# Patient Record
Sex: MALE | Race: BLACK OR AFRICAN AMERICAN | NOT HISPANIC OR LATINO | Employment: OTHER | URBAN - METROPOLITAN AREA
[De-identification: names, ages, dates, MRNs, and addresses within clinical notes are randomized per-mention and may not be internally consistent; named-entity substitution may affect disease eponyms.]

---

## 2017-03-30 ENCOUNTER — HOSPITAL ENCOUNTER (EMERGENCY)
Facility: HOSPITAL | Age: 67
Discharge: HOME | End: 2017-03-30
Attending: EMERGENCY MEDICINE
Payer: MEDICARE

## 2017-03-30 VITALS
OXYGEN SATURATION: 97 % | RESPIRATION RATE: 15 BRPM | DIASTOLIC BLOOD PRESSURE: 80 MMHG | TEMPERATURE: 98 F | HEIGHT: 66 IN | HEART RATE: 89 BPM | WEIGHT: 162 LBS | BODY MASS INDEX: 26.03 KG/M2 | SYSTOLIC BLOOD PRESSURE: 172 MMHG

## 2017-03-30 DIAGNOSIS — R07.89 LEFT-SIDED CHEST WALL PAIN: ICD-10-CM

## 2017-03-30 DIAGNOSIS — S22.42XA CLOSED FRACTURE OF MULTIPLE RIBS OF LEFT SIDE, INITIAL ENCOUNTER: Primary | ICD-10-CM

## 2017-03-30 PROCEDURE — 94799 UNLISTED PULMONARY SVC/PX: CPT

## 2017-03-30 PROCEDURE — 25000003 PHARM REV CODE 250: Performed by: PHYSICIAN ASSISTANT

## 2017-03-30 PROCEDURE — 99900035 HC TECH TIME PER 15 MIN (STAT)

## 2017-03-30 PROCEDURE — 99284 EMERGENCY DEPT VISIT MOD MDM: CPT

## 2017-03-30 RX ORDER — WARFARIN 7.5 MG/1
7.5 TABLET ORAL DAILY
Status: ON HOLD | COMMUNITY
End: 2022-02-10 | Stop reason: HOSPADM

## 2017-03-30 RX ORDER — HYDROCODONE BITARTRATE AND ACETAMINOPHEN 5; 325 MG/1; MG/1
1 TABLET ORAL
Status: COMPLETED | OUTPATIENT
Start: 2017-03-30 | End: 2017-03-30

## 2017-03-30 RX ORDER — HYDROCODONE BITARTRATE AND ACETAMINOPHEN 5; 325 MG/1; MG/1
1 TABLET ORAL EVERY 6 HOURS PRN
Qty: 12 TABLET | Refills: 0 | Status: SHIPPED | OUTPATIENT
Start: 2017-03-30 | End: 2019-09-25

## 2017-03-30 RX ORDER — METFORMIN HYDROCHLORIDE 500 MG/1
1000 TABLET ORAL 2 TIMES DAILY WITH MEALS
Status: ON HOLD | COMMUNITY
End: 2022-04-07 | Stop reason: HOSPADM

## 2017-03-30 RX ADMIN — HYDROCODONE BITARTRATE AND ACETAMINOPHEN 1 TABLET: 5; 325 TABLET ORAL at 04:03

## 2017-03-30 NOTE — ED NOTES
"Spoke to pt's son, Forest Jones to let him know pt does not have ride home. Son states tell him to call me at 5:45, I will be outside".   "

## 2017-03-30 NOTE — DISCHARGE INSTRUCTIONS
Please take pain medicine as directed and be careful as this medication can cause drowsiness and balance problems which can lead to falls and further injuries.  Please return to the emergency department if you have worsening shortness of breath, coughing, fever.

## 2017-03-30 NOTE — ED AVS SNAPSHOT
[facility]  [place] [address]               Forest Lopez   3/30/2017  3:24 AM   ED    Description:  Male : 1950   Department:  [facility]           Your Care was Coordinated By:     Provider Role From To    Bri Yanez MD Attending Provider 17 --    Paul Miller PA-C Physician Assistant 17 --      Reason for Visit     Rib Injury           Diagnoses this Visit        Comments    Closed fracture of multiple ribs of left side, initial encounter    -  Primary     Left-sided chest wall pain           ED Disposition     None           To Do List           Follow-up Information     Go to [facility].    Specialty:  Emergency Medicine    Why:  If symptoms worsen    Contact information:    [place] [id number] [phone]        Follow up with [facility]. Schedule an appointment as soon as possible for a visit in 3 days.    Why:  For follow-up care    Contact information:    [place] [address]  [phone]         These Medications        Disp Refills Start End    hydrocodone-acetaminophen 5-325mg (NORCO) 5-325 mg per tablet 12 tablet 0 3/30/2017     Take 1 tablet by mouth every 6 (six) hours as needed for Pain. - Oral    Pharmacy: [facility] [address] Ph #: [phone]         [facility]     [facility] Nurse Care Line -  Assistance  Unless otherwise directed by your provider, please contact [facility], our nurse care line that is available for  assistance.     Registered nurses in the [facility] provide: appointment scheduling, clinical advisement, health education, and other advisory services.  Call: [phone] (toll free)               Medications           Message regarding Medications     Verify the changes and/or additions to your medication  regime listed below are the same as discussed with your clinician today.  If any of these changes or additions are incorrect, please notify your healthcare provider.        START taking these NEW medications        Refills    hydrocodone-acetaminophen 5-325mg (NORCO) 5-325 mg per tablet 0    Sig: Take 1 tablet by mouth every 6 (six) hours as needed for Pain.    Class: Print    Route: Oral      These medications were administered today        Dose Freq    hydrocodone-acetaminophen 5-325mg per tablet 1 tablet 1 tablet ED 1 Time    Sig: Take 1 tablet by mouth ED 1 Time.    Class: Normal    Route: Oral    Cosign for Ordering: Accepted by Bri Yanez MD on 3/30/2017  3:51 AM      STOP taking these medications     atorvastatin (LIPITOR) 40 MG tablet Take 1 tablet (40 mg total) by mouth once daily.    hydrocodone-acetaminophen 7.5-325mg (NORCO) 7.5-325 mg per tablet Take 1 tablet by mouth every 8 (eight) hours as needed.    XARELTO 20 mg Tab TAKE 1 TABLET BY MOUTH ONCE DAILY.    cilostazol (PLETAL) 50 MG Tab TAKE 1 TABLET (50 MG TOTAL) BY MOUTH ONCE DAILY.           Verify that the below list of medications is an accurate representation of the medications you are currently taking.  If none reported, the list may be blank. If incorrect, please contact your healthcare provider. Carry this list with you in case of emergency.           Current Medications     blood sugar diagnostic (BLOOD GLUCOSE TEST) Strp by Misc.(Non-Drug; Combo Route) route.    glipiZIDE (GLUCOTROL) 10 MG tablet Take 1 tablet (10 mg total) by mouth 2 (two) times daily before meals.    glipiZIDE (GLUCOTROL) 10 MG tablet TAKE 1 TABLET (10 MG TOTAL) BY MOUTH 2 TIMES DAILY BEFORE MEALS.    metformin (GLUCOPHAGE) 500 MG tablet Take 500 mg by mouth 2 (two) times daily with meals.    omeprazole (PRILOSEC) 20 MG capsule TAKE 2 CAPSULES (40 MG) BY MOUTH ONCE DAILY.    omeprazole (PRILOSEC) 40 MG capsule Take 1 capsule (40 mg total) by mouth once daily.     "tamsulosin (FLOMAX) 0.4 mg Cp24 Take 1 capsule (0.4 mg total) by mouth once daily.    tobramycin-dexamethasone 0.3-0.1% (TOBRADEX) 0.3-0.1 % DrpS     triamcinolone acetonide 0.1% (KENALOG) 0.1 % cream Apply topically 3 (three) times daily.    ULTILET CLASSIC LANCETS MISC by Misc.(Non-Drug; Combo Route) route.    warfarin (COUMADIN) 7.5 MG tablet Take 7.5 mg by mouth once daily.    benazepril (LOTENSIN) 5 MG tablet TAKE 1 TABLET (5 MG TOTAL) BY MOUTH ONCE DAILY.    ciclopirox (PENLAC) 8 % Soln Apply topically once daily. Apply topically to affected nails once daily.  Remove once weekly.  Repeat.  Follow package insert.    finasteride (PROSCAR) 5 mg tablet Take 1 tablet (5 mg total) by mouth once daily.    hydrocodone-acetaminophen 5-325mg (NORCO) 5-325 mg per tablet Take 1 tablet by mouth every 6 (six) hours as needed for Pain.    sildenafil (VIAGRA) 100 MG tablet Take 1 tablet (100 mg total) by mouth daily as needed for Erectile Dysfunction.           Clinical Reference Information           Your Vitals Were     BP Pulse Temp Resp Height Weight    130/80 (BP Location: Left arm, Patient Position: Sitting) 96 98.3 °F (36.8 °C) (Oral) 15 5' 6" (1.676 m) 73.5 kg (162 lb)    SpO2 BMI             97% 26.15 kg/m2         Allergies as of 3/30/2017     No Known Allergies      Immunizations Administered on Date of Encounter - 3/30/2017     None      ED Micro, Lab, POCT     None      ED Imaging Orders     Start Ordered       Status Ordering Provider    03/30/17 0344 03/30/17 0343  X-Ray Chest PA And Lateral  1 time imaging      Final result     03/30/17 0343 03/30/17 0343  X-Ray Ribs 2 View Left  1 time imaging      Final result         Discharge Instructions       Please take pain medicine as directed and be careful as this medication can cause drowsiness and balance problems which can lead to falls and further injuries.  Please return to the emergency department if you have worsening shortness of breath, coughing, " fever.    Discharge References/Attachments     RIB FRACTURE (ENGLISH)    PAIN, MEDICINE FOR (ENGLISH)      [facility] Sign-Up     Activating your [facility] account is as easy as 1-2-3!     1) Visit [email or web address], select Sign Up Now, enter this activation code and your date of birth, then select Next.  [email or web address]  Expires: 5/14/2017  5:03 AM      2) Create a username and password to use when you visit [facility] in the future and select a security question in case you lose your password and select Next.    3) Enter your e-mail address and click Sign Up!    Additional Information  If you have questions, please e-mail [email or web address] or call [phone] to talk to our [facility] staff. Remember, [facility] is NOT to be used for urgent needs. For medical emergencies, dial 911.         Smoking Cessation     If you would like to quit smoking:   You may be eligible for free services if you are a [place] resident and started smoking cigarettes before September 1, 1988.  Call the Smoking Cessation Trust (SCT) toll free at (819) 574-2167 or (737) 878-0981.   Call 7-800-QUIT-NOW if you do not meet the above criteria.   Contact us via email: [email or web address]   View our website for more information: [email or web address]         [facility] complies with applicable Federal civil rights laws and does not discriminate on the basis of race, color, national origin, age, disability, or sex.        Language Assistance Services     ATTENTION: Language assistance services are available, free of charge. Please call [phone].      ATENCIÓN: Si habla español, tiene a nobles disposición servicios gratuitos de asistencia lingüística. Llame al [phone].     CHÚ Ý: N?u b?n nói Ti?ng Vi?t, có các d?ch v? h? tr? ngôn ng? mi?n phí dành cho b?n. G?i s? [phone].
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
## 2019-09-25 ENCOUNTER — HOSPITAL ENCOUNTER (OUTPATIENT)
Facility: HOSPITAL | Age: 69
Discharge: HOME HEALTH | DRG: 065 | End: 2019-09-30
Attending: EMERGENCY MEDICINE | Admitting: HOSPITALIST
Payer: MEDICARE

## 2019-09-25 DIAGNOSIS — I63.9 CVA (CEREBRAL VASCULAR ACCIDENT): ICD-10-CM

## 2019-09-25 DIAGNOSIS — I63.9 ACUTE ISCHEMIC STROKE: Primary | ICD-10-CM

## 2019-09-25 DIAGNOSIS — I63.511 CEREBROVASCULAR ACCIDENT (CVA) DUE TO STENOSIS OF RIGHT MIDDLE CEREBRAL ARTERY: ICD-10-CM

## 2019-09-25 DIAGNOSIS — I44.1 MOBITZ TYPE 2 SECOND DEGREE ATRIOVENTRICULAR BLOCK: ICD-10-CM

## 2019-09-25 DIAGNOSIS — I63.9 STROKE: ICD-10-CM

## 2019-09-25 PROBLEM — F12.10 CANNABIS ABUSE: Status: ACTIVE | Noted: 2019-09-25

## 2019-09-25 PROBLEM — Z86.718 HISTORY OF DVT (DEEP VEIN THROMBOSIS): Status: ACTIVE | Noted: 2019-09-25

## 2019-09-25 PROBLEM — Z86.718 HISTORY OF DVT (DEEP VEIN THROMBOSIS): Chronic | Status: ACTIVE | Noted: 2019-09-25

## 2019-09-25 PROBLEM — N40.0 BPH (BENIGN PROSTATIC HYPERPLASIA): Status: ACTIVE | Noted: 2019-09-25

## 2019-09-25 PROBLEM — F12.10 CANNABIS ABUSE: Chronic | Status: ACTIVE | Noted: 2019-09-25

## 2019-09-25 PROBLEM — K21.9 GERD (GASTROESOPHAGEAL REFLUX DISEASE): Status: ACTIVE | Noted: 2019-09-25

## 2019-09-25 PROBLEM — Z79.01 CHRONIC ANTICOAGULATION: Chronic | Status: ACTIVE | Noted: 2019-09-25

## 2019-09-25 PROBLEM — F14.10 COCAINE ABUSE: Status: ACTIVE | Noted: 2019-09-25

## 2019-09-25 PROBLEM — K21.9 GERD (GASTROESOPHAGEAL REFLUX DISEASE): Chronic | Status: ACTIVE | Noted: 2019-09-25

## 2019-09-25 PROBLEM — Z79.01 CHRONIC ANTICOAGULATION: Status: ACTIVE | Noted: 2019-09-25

## 2019-09-25 PROBLEM — F14.10 COCAINE ABUSE: Chronic | Status: ACTIVE | Noted: 2019-09-25

## 2019-09-25 PROBLEM — N40.0 BPH (BENIGN PROSTATIC HYPERPLASIA): Chronic | Status: ACTIVE | Noted: 2019-09-25

## 2019-09-25 LAB
ALBUMIN SERPL BCP-MCNC: 3.8 G/DL (ref 3.5–5.2)
ALP SERPL-CCNC: 81 U/L (ref 55–135)
ALT SERPL W/O P-5'-P-CCNC: 11 U/L (ref 10–44)
ANION GAP SERPL CALC-SCNC: 9 MMOL/L (ref 8–16)
APTT BLDCRRT: 30.2 SEC (ref 21–32)
AST SERPL-CCNC: 15 U/L (ref 10–40)
BASOPHILS # BLD AUTO: 0.05 K/UL (ref 0–0.2)
BASOPHILS NFR BLD: 0.4 % (ref 0–1.9)
BILIRUB SERPL-MCNC: 0.2 MG/DL (ref 0.1–1)
BUN SERPL-MCNC: 13 MG/DL (ref 8–23)
CALCIUM SERPL-MCNC: 10.4 MG/DL (ref 8.7–10.5)
CHLORIDE SERPL-SCNC: 104 MMOL/L (ref 95–110)
CHOLEST SERPL-MCNC: 156 MG/DL (ref 120–199)
CHOLEST/HDLC SERPL: 4.3 {RATIO} (ref 2–5)
CO2 SERPL-SCNC: 23 MMOL/L (ref 23–29)
CREAT SERPL-MCNC: 1.1 MG/DL (ref 0.5–1.4)
DIFFERENTIAL METHOD: ABNORMAL
EOSINOPHIL # BLD AUTO: 0.1 K/UL (ref 0–0.5)
EOSINOPHIL NFR BLD: 0.9 % (ref 0–8)
ERYTHROCYTE [DISTWIDTH] IN BLOOD BY AUTOMATED COUNT: 13.5 % (ref 11.5–14.5)
EST. GFR  (AFRICAN AMERICAN): >60 ML/MIN/1.73 M^2
EST. GFR  (NON AFRICAN AMERICAN): >60 ML/MIN/1.73 M^2
GLUCOSE SERPL-MCNC: 276 MG/DL (ref 70–110)
HCT VFR BLD AUTO: 40 % (ref 40–54)
HDLC SERPL-MCNC: 36 MG/DL (ref 40–75)
HDLC SERPL: 23.1 % (ref 20–50)
HGB BLD-MCNC: 12.8 G/DL (ref 14–18)
INR PPP: 1.2 (ref 0.8–1.2)
LDLC SERPL CALC-MCNC: 98.6 MG/DL (ref 63–159)
LYMPHOCYTES # BLD AUTO: 3.7 K/UL (ref 1–4.8)
LYMPHOCYTES NFR BLD: 31 % (ref 18–48)
MCH RBC QN AUTO: 25.8 PG (ref 27–31)
MCHC RBC AUTO-ENTMCNC: 32 G/DL (ref 32–36)
MCV RBC AUTO: 81 FL (ref 82–98)
MONOCYTES # BLD AUTO: 1 K/UL (ref 0.3–1)
MONOCYTES NFR BLD: 8.2 % (ref 4–15)
NEUTROPHILS # BLD AUTO: 7 K/UL (ref 1.8–7.7)
NEUTROPHILS NFR BLD: 59.5 % (ref 38–73)
NONHDLC SERPL-MCNC: 120 MG/DL
PLATELET # BLD AUTO: 266 K/UL (ref 150–350)
PMV BLD AUTO: 9.8 FL (ref 9.2–12.9)
POCT GLUCOSE: 251 MG/DL (ref 70–110)
POCT GLUCOSE: 263 MG/DL (ref 70–110)
POTASSIUM SERPL-SCNC: 4.1 MMOL/L (ref 3.5–5.1)
PROT SERPL-MCNC: 7.5 G/DL (ref 6–8.4)
PROTHROMBIN TIME: 13 SEC (ref 9–12.5)
RBC # BLD AUTO: 4.97 M/UL (ref 4.6–6.2)
SODIUM SERPL-SCNC: 136 MMOL/L (ref 136–145)
T4 FREE SERPL-MCNC: 1.05 NG/DL (ref 0.71–1.51)
TRIGL SERPL-MCNC: 107 MG/DL (ref 30–150)
TSH SERPL DL<=0.005 MIU/L-ACNC: 0.25 UIU/ML (ref 0.4–4)
WBC # BLD AUTO: 11.76 K/UL (ref 3.9–12.7)

## 2019-09-25 PROCEDURE — 96372 THER/PROPH/DIAG INJ SC/IM: CPT | Mod: 59

## 2019-09-25 PROCEDURE — 84443 ASSAY THYROID STIM HORMONE: CPT

## 2019-09-25 PROCEDURE — C9399 UNCLASSIFIED DRUGS OR BIOLOG: HCPCS | Performed by: INTERNAL MEDICINE

## 2019-09-25 PROCEDURE — 21400001 HC TELEMETRY ROOM

## 2019-09-25 PROCEDURE — 85025 COMPLETE CBC W/AUTO DIFF WBC: CPT

## 2019-09-25 PROCEDURE — 85730 THROMBOPLASTIN TIME PARTIAL: CPT

## 2019-09-25 PROCEDURE — 93005 ELECTROCARDIOGRAM TRACING: CPT

## 2019-09-25 PROCEDURE — G0378 HOSPITAL OBSERVATION PER HR: HCPCS

## 2019-09-25 PROCEDURE — 82962 GLUCOSE BLOOD TEST: CPT

## 2019-09-25 PROCEDURE — 63600175 PHARM REV CODE 636 W HCPCS: Performed by: INTERNAL MEDICINE

## 2019-09-25 PROCEDURE — 36000 PLACE NEEDLE IN VEIN: CPT

## 2019-09-25 PROCEDURE — 80053 COMPREHEN METABOLIC PANEL: CPT

## 2019-09-25 PROCEDURE — 80061 LIPID PANEL: CPT

## 2019-09-25 PROCEDURE — 99291 CRITICAL CARE FIRST HOUR: CPT | Mod: 25

## 2019-09-25 PROCEDURE — 84439 ASSAY OF FREE THYROXINE: CPT

## 2019-09-25 PROCEDURE — G0427 PR INPT TELEHEALTH CON 70/>M: ICD-10-PCS | Mod: GT,G0,, | Performed by: PSYCHIATRY & NEUROLOGY

## 2019-09-25 PROCEDURE — 83036 HEMOGLOBIN GLYCOSYLATED A1C: CPT

## 2019-09-25 PROCEDURE — G0427 INPT/ED TELECONSULT70: HCPCS | Mod: GT,G0,, | Performed by: PSYCHIATRY & NEUROLOGY

## 2019-09-25 PROCEDURE — 25000003 PHARM REV CODE 250: Performed by: INTERNAL MEDICINE

## 2019-09-25 PROCEDURE — 25500020 PHARM REV CODE 255: Performed by: EMERGENCY MEDICINE

## 2019-09-25 PROCEDURE — 85610 PROTHROMBIN TIME: CPT

## 2019-09-25 PROCEDURE — 93010 EKG 12-LEAD: ICD-10-PCS | Mod: ,,, | Performed by: INTERNAL MEDICINE

## 2019-09-25 PROCEDURE — 93010 ELECTROCARDIOGRAM REPORT: CPT | Mod: ,,, | Performed by: INTERNAL MEDICINE

## 2019-09-25 RX ORDER — ROSUVASTATIN CALCIUM 40 MG/1
10 TABLET, COATED ORAL NIGHTLY
Status: ON HOLD | COMMUNITY
End: 2020-06-14

## 2019-09-25 RX ORDER — GEMFIBROZIL 600 MG/1
600 TABLET, FILM COATED ORAL
Status: ON HOLD | COMMUNITY
End: 2022-02-10 | Stop reason: HOSPADM

## 2019-09-25 RX ORDER — INSULIN ASPART 100 [IU]/ML
3 INJECTION, SOLUTION INTRAVENOUS; SUBCUTANEOUS
Status: DISCONTINUED | OUTPATIENT
Start: 2019-09-26 | End: 2019-09-28

## 2019-09-25 RX ORDER — FINASTERIDE 5 MG/1
5 TABLET, FILM COATED ORAL DAILY
Status: DISCONTINUED | OUTPATIENT
Start: 2019-09-26 | End: 2019-09-30 | Stop reason: HOSPADM

## 2019-09-25 RX ORDER — IBUPROFEN 200 MG
24 TABLET ORAL
Status: DISCONTINUED | OUTPATIENT
Start: 2019-09-25 | End: 2019-09-30 | Stop reason: HOSPADM

## 2019-09-25 RX ORDER — INSULIN ASPART 100 [IU]/ML
0-5 INJECTION, SOLUTION INTRAVENOUS; SUBCUTANEOUS
Status: DISCONTINUED | OUTPATIENT
Start: 2019-09-25 | End: 2019-09-30 | Stop reason: HOSPADM

## 2019-09-25 RX ORDER — GLUCAGON 1 MG
1 KIT INJECTION
Status: DISCONTINUED | OUTPATIENT
Start: 2019-09-25 | End: 2019-09-30 | Stop reason: HOSPADM

## 2019-09-25 RX ORDER — WARFARIN 7.5 MG/1
7.5 TABLET ORAL DAILY
Status: DISCONTINUED | OUTPATIENT
Start: 2019-09-26 | End: 2019-09-27

## 2019-09-25 RX ORDER — RAMELTEON 8 MG/1
8 TABLET ORAL NIGHTLY PRN
Status: DISCONTINUED | OUTPATIENT
Start: 2019-09-25 | End: 2019-09-30 | Stop reason: HOSPADM

## 2019-09-25 RX ORDER — ACETAMINOPHEN 500 MG
500 TABLET ORAL EVERY 6 HOURS PRN
Status: DISCONTINUED | OUTPATIENT
Start: 2019-09-25 | End: 2019-09-30 | Stop reason: HOSPADM

## 2019-09-25 RX ORDER — HYDROCODONE BITARTRATE AND ACETAMINOPHEN 5; 325 MG/1; MG/1
1 TABLET ORAL EVERY 6 HOURS PRN
Status: DISCONTINUED | OUTPATIENT
Start: 2019-09-25 | End: 2019-09-25

## 2019-09-25 RX ORDER — CLONIDINE HYDROCHLORIDE 0.1 MG/1
0.1 TABLET ORAL 3 TIMES DAILY PRN
Status: DISCONTINUED | OUTPATIENT
Start: 2019-09-25 | End: 2019-09-30 | Stop reason: HOSPADM

## 2019-09-25 RX ORDER — LISINOPRIL 40 MG/1
40 TABLET ORAL DAILY
Status: ON HOLD | COMMUNITY
End: 2022-01-31

## 2019-09-25 RX ORDER — BENAZEPRIL HYDROCHLORIDE 5 MG/1
5 TABLET ORAL DAILY
Status: DISCONTINUED | OUTPATIENT
Start: 2019-09-26 | End: 2019-09-25

## 2019-09-25 RX ORDER — AMOXICILLIN 250 MG
1 CAPSULE ORAL 2 TIMES DAILY PRN
Status: DISCONTINUED | OUTPATIENT
Start: 2019-09-25 | End: 2019-09-30 | Stop reason: HOSPADM

## 2019-09-25 RX ORDER — IBUPROFEN 200 MG
16 TABLET ORAL
Status: DISCONTINUED | OUTPATIENT
Start: 2019-09-25 | End: 2019-09-30 | Stop reason: HOSPADM

## 2019-09-25 RX ORDER — ROSUVASTATIN CALCIUM 10 MG/1
10 TABLET, COATED ORAL NIGHTLY
Status: DISCONTINUED | OUTPATIENT
Start: 2019-09-25 | End: 2019-09-30 | Stop reason: HOSPADM

## 2019-09-25 RX ORDER — ASPIRIN 325 MG
325 TABLET, DELAYED RELEASE (ENTERIC COATED) ORAL DAILY
Status: DISCONTINUED | OUTPATIENT
Start: 2019-09-26 | End: 2019-09-30 | Stop reason: HOSPADM

## 2019-09-25 RX ORDER — TAMSULOSIN HYDROCHLORIDE 0.4 MG/1
0.4 CAPSULE ORAL DAILY
Status: DISCONTINUED | OUTPATIENT
Start: 2019-09-26 | End: 2019-09-25

## 2019-09-25 RX ORDER — SODIUM CHLORIDE 0.9 % (FLUSH) 0.9 %
10 SYRINGE (ML) INJECTION
Status: DISCONTINUED | OUTPATIENT
Start: 2019-09-25 | End: 2019-09-25

## 2019-09-25 RX ORDER — PANTOPRAZOLE SODIUM 40 MG/1
40 TABLET, DELAYED RELEASE ORAL DAILY
Status: DISCONTINUED | OUTPATIENT
Start: 2019-09-26 | End: 2019-09-30 | Stop reason: HOSPADM

## 2019-09-25 RX ORDER — ONDANSETRON 2 MG/ML
8 INJECTION INTRAMUSCULAR; INTRAVENOUS EVERY 8 HOURS PRN
Status: DISCONTINUED | OUTPATIENT
Start: 2019-09-25 | End: 2019-09-30 | Stop reason: HOSPADM

## 2019-09-25 RX ADMIN — ROSUVASTATIN CALCIUM 10 MG: 10 TABLET, COATED ORAL at 11:09

## 2019-09-25 RX ADMIN — INSULIN DETEMIR 10 UNITS: 100 INJECTION, SOLUTION SUBCUTANEOUS at 11:09

## 2019-09-25 RX ADMIN — IOHEXOL 75 ML: 350 INJECTION, SOLUTION INTRAVENOUS at 05:09

## 2019-09-25 RX ADMIN — RAMELTEON 8 MG: 8 TABLET, FILM COATED ORAL at 11:09

## 2019-09-25 RX ADMIN — INSULIN ASPART 1 UNITS: 100 INJECTION, SOLUTION INTRAVENOUS; SUBCUTANEOUS at 11:09

## 2019-09-25 NOTE — ASSESSMENT & PLAN NOTE
Cerebrovascular accident (CVA) due to stenosis of right middle cerebral artery  Antithrombotics for secondary stroke prevention: Anticoagulants: Warfarin INR adjusted target    Statins for secondary stroke prevention and hyperlipidemia, if present:   Statins: Atorvastatin- 80 mg daily    Aggressive risk factor modification: HTN, DM, HLD     Rehab efforts: The patient has been evaluated by a stroke team provider and the therapy needs have been fully considered based off the presenting complaints and exam findings. The following therapy evaluations are needed: PT evaluate and treat, OT evaluate and treat, SLP evaluate and treat    Diagnostics ordered/pending: CTA Head to assess vasculature , CTA Neck/Arch to assess vasculature, HgbA1C to assess blood glucose levels, Lipid Profile to assess cholesterol levels, MRI head without contrast to assess brain parenchyma, TTE to assess cardiac function/status     VTE prophylaxis: None: Reason for No Pharmacological VTE Prophylaxis: Currently on anticoagulation    BP parameters: Infarct: No intervention, SBP <220

## 2019-09-25 NOTE — HPI
69 y/o male LKN at 0300 today when he retried. Woke up at 830 with left facial and arm paresis and drooling as well as dysarthria.  Denies numbness, ataxia or visual changes.

## 2019-09-25 NOTE — SUBJECTIVE & OBJECTIVE
Woke up with symptoms?: yes    Recent bleeding noted: no  Does the patient take any Blood Thinners? yes  Medications: Anticoagulants:  coumadin      Past Medical History: hypertension, diabetes and hyperlipidemia    Past Surgical History: no major surgeries within the last 2 weeks    Family History: no relevant history    Social History: drinking and smoker (active)    Allergies: No Known Allergies     Review of Systems   Neurological: Positive for facial asymmetry, speech difficulty and weakness.   All other systems reviewed and are negative.    Objective:   Vitals: Blood pressure (!) 183/82, pulse 84, resp. rate 16, height 6' (1.829 m), weight 70.3 kg (155 lb), SpO2 100 %.     CT READ: Yes  No hemmorhage. No mass effect. No early infarct signs.     Physical Exam   Constitutional: He is oriented to person, place, and time. He appears well-developed and well-nourished.   HENT:   Head: Normocephalic and atraumatic.   Eyes: Pupils are equal, round, and reactive to light. EOM are normal.   Cardiovascular: Normal rate and regular rhythm.   Pulmonary/Chest: Effort normal.   Neurological: He is alert and oriented to person, place, and time. A cranial nerve deficit and sensory deficit is present.   Vitals reviewed.

## 2019-09-25 NOTE — CONSULTS
[facility]  Vascular Neurology  Comprehensive Stroke Center  Tele-Consultation Note      Inpatient consult to Telemedicine-Stroke  Consult performed by: Kathryn Orellana MD  Consult ordered by: Diana Newton MD  Reason for consult: Cerebrovascular accident (CVA) due to stenosis of right middle cerebral artery          Consulting Provider: DIANA NEWTON  Current Providers  No providers found    Patient Location:  [facility] EMERGENCY DEPARTMENT Emergency Department  Spoke hospital nurse at bedside with patient assisting consultant.     Patient information was obtained from patient.         Assessment/Plan:     STROKE DOCUMENTATION     Acute Stroke Times:   Acute Stroke Times   Last Known Normal Date: 09/25/19  Last Known Normal Time: 0300  Symptom Onset Date: 09/25/19  Symptom Onset Time: 0300  Stroke Team Called Date: 09/25/19  Stroke Team Called Time: 1629  Stroke Team Arrival Date: 09/25/19  Stroke Team Arrival Time: 1629  CT Interpretation Time: 1619    NIH Scale:  Interval: baseline  1a. Level of Consciousness: 0-->Alert, keenly responsive  1b. LOC Questions: 0-->Answers both questions correctly  1c. LOC Commands: 0-->Performs both tasks correctly  2. Best Gaze: 0-->Normal  3. Visual: 0-->No visual loss  4. Facial Palsy: 2-->Partial paralysis (total or near-total paralysis of lower face)  5a. Motor Arm, Left: 1-->Drift, limb holds 90 (or 45) degrees, but drifts down before full 10 seconds, does not hit bed or other support  5b. Motor Arm, Right: 0-->No drift, limb holds 90 (or 45) degrees for full 10 secs  6a. Motor Leg, Left: 0-->No drift, leg holds 30 degree position for full 5 secs  6b. Motor Leg, Right: 0-->No drift, leg holds 30 degree position for full 5 secs  7. Limb Ataxia: 0-->Absent  8. Sensory: 1-->Mild-to-moderate sensory loss, patient feels pinprick is less sharp or is dull on the affected side, or there is a loss of superficial pain with pinprick, but patient  is aware of being touched  9. Best Language: 0-->No aphasia, normal  10. Dysarthria: 1-->Mild-to-moderate dysarthria, patient slurs at least some words and, at worst, can be understood with some difficulty  11. Extinction and Inattention (formerly Neglect): 0-->No abnormality  Total (NIH Stroke Scale): 5     Modified [place] Score: 1  [place] Coma Scale:    ABCD2 Score:    [id number] Score:   HAS -BLED Score:   ICH Score:   Hunt & Allred Classification:       Diagnoses:   Cerebrovascular accident (CVA) due to stenosis of right middle cerebral artery  Cerebrovascular accident (CVA) due to stenosis of right middle cerebral artery  Antithrombotics for secondary stroke prevention: Anticoagulants: Warfarin INR adjusted target    Statins for secondary stroke prevention and hyperlipidemia, if present:   Statins: Atorvastatin- 80 mg daily    Aggressive risk factor modification: HTN, DM, HLD     Rehab efforts: The patient has been evaluated by a stroke team provider and the therapy needs have been fully considered based off the presenting complaints and exam findings. The following therapy evaluations are needed: PT evaluate and treat, OT evaluate and treat, SLP evaluate and treat    Diagnostics ordered/pending: CTA Head to assess vasculature , CTA Neck/Arch to assess vasculature, HgbA1C to assess blood glucose levels, Lipid Profile to assess cholesterol levels, MRI head without contrast to assess brain parenchyma, TTE to assess cardiac function/status     VTE prophylaxis: None: Reason for No Pharmacological VTE Prophylaxis: Currently on anticoagulation    BP parameters: Infarct: No intervention, SBP <220            Blood pressure (!) 183/82, pulse 84, resp. rate 16, height 6' (1.829 m), weight 70.3 kg (155 lb), SpO2 100 %.  Alteplase Eligible?: No  Alteplase Recommendation: Alteplase not recommended due to Outside of treatment window  and Full dose anticoagulation   Possible Interventional Revascularization Candidate? No; No  large vessel occlusion    Disposition Recommendation: admit to inpatient  do not transfer    Subjective:     History of Present Illness:  67 y/o male LKN at 0300 today when he retried. Woke up at 830 with left facial and arm paresis and drooling as well as dysarthria.  Denies numbness, ataxia or visual changes.        Woke up with symptoms?: yes    Recent bleeding noted: no  Does the patient take any Blood Thinners? yes  Medications: Anticoagulants:  coumadin      Past Medical History: hypertension, diabetes and hyperlipidemia    Past Surgical History: no major surgeries within the last 2 weeks    Family History: no relevant history    Social History: drinking and smoker (active)    Allergies: No Known Allergies     Review of Systems   Neurological: Positive for facial asymmetry, speech difficulty and weakness.   All other systems reviewed and are negative.    Objective:   Vitals: Blood pressure (!) 183/82, pulse 84, resp. rate 16, height 6' (1.829 m), weight 70.3 kg (155 lb), SpO2 100 %.     CT READ: Yes  No hemmorhage. No mass effect. No early infarct signs.     Physical Exam   Constitutional: He is oriented to person, place, and time. He appears well-developed and well-nourished.   HENT:   Head: Normocephalic and atraumatic.   Eyes: Pupils are equal, round, and reactive to light. EOM are normal.   Cardiovascular: Normal rate and regular rhythm.   Pulmonary/Chest: Effort normal.   Neurological: He is alert and oriented to person, place, and time. A cranial nerve deficit and sensory deficit is present.   Vitals reviewed.            Recommended the emergency room physician to have a brief discussion with the patient and/or family if available regarding the risks and benefits of treatment, and to briefly document the occurrence of that discussion in his clinical encounter note.     The attending portion of this evaluation, treatment, and documentation was performed per Kathryn Orellana MD via  audiovisual.    Billing code:  (moderate to severe stroke, large areas of edema, some mimics)    · This patient has a critical neurological condition/illness, with high morbidity and mortality.  · There is a high probability for acute neurological change leading to clinical and possibly life-threatening deterioration requiring highest level of physician preparedness for urgent intervention.  · Care was coordinated with other physicians involved in the patient's care.  · Radiologic studies and laboratory data were reviewed and interpreted, and plan of care was re-assessed based on the results.  · Diagnosis, treatment options and prognosis may have been discussed with the patient and/or family members or caregiver.  · Further advanced medical management and further evaluation is warranted for his care.      In your opinion, this was a: Tier 2 Van Negative    Consult End Time: 5:05 PM     Kathryn Orellana MD  Comprehensive Stroke Center  Vascular Neurology   [facility]

## 2019-09-25 NOTE — ED PROVIDER NOTES
"Encounter Date: 9/25/2019    SCRIBE #1 NOTE: I, Milton Saldaña, am scribing for, and in the presence of,  Carl Noe MD. I have scribed the following portions of the note - Other sections scribed: HPI, ROS.       History     Chief Complaint   Patient presents with    Extremity Weakness     left arm weakness onset 0900 while watching TV, pt states he woke fine, ate breakfast and weakness began 1 hour after eating     CC: Extremity Weakness    HPI: This is a 69 y.o. M who has DM, HLD, and HTN who presents to the ED via EMS transportation for emergent evaluation of acute weakness in the left upper extremity that began after eating breakfast at 9:00am today. Pt reports that he noticed the remote control falling out of his left hand while watching television this morning. Pt has associated left sided facial droop, and slurred speech. He states, "my speech is not normal." Additionally, the pt has a "Hx of DVT" and currently takes Coumadin 7.5mg per day. Pt denies fever, chills, ear pain, sore throat, eye problem, cough, SOB, CP, abdominal pain, nausea, vomiting, diarrhea, dysuria, leg problems, back pain, rash, headache, or Hx of stroke.    The history is provided by the patient. No  was used.     Review of patient's allergies indicates:  No Known Allergies  Past Medical History:   Diagnosis Date    Blind left eye     Diabetes mellitus, type 2     Hyperlipidemia     Hypertension      No past surgical history on file.  Family History   Problem Relation Age of Onset    Heart disease Mother         heart surgery    Heart disease Father         heart attack    Stroke Sister      Social History     Tobacco Use    Smoking status: Light Tobacco Smoker    Smokeless tobacco: Never Used   Substance Use Topics    Alcohol use: Yes     Alcohol/week: 0.0 standard drinks    Drug use: No     Review of Systems   Constitutional: Negative for chills and fever.   HENT: Negative for ear pain and sore " throat.    Eyes: Negative for pain.   Respiratory: Negative for cough and shortness of breath.    Cardiovascular: Negative for chest pain.   Gastrointestinal: Negative for abdominal pain, diarrhea, nausea and vomiting.   Genitourinary: Negative for dysuria.   Musculoskeletal: Negative for back pain and myalgias.   Skin: Negative for rash.   Neurological: Positive for facial asymmetry, speech difficulty and weakness (in the left upper extremity).   Hematological: Does not bruise/bleed easily.       Physical Exam     Initial Vitals [09/25/19 1618]   BP Pulse Resp Temp SpO2   (!) 166/89 87 16 -- 100 %      MAP       --         Physical Exam  The patient was examined specifically for the following:   General:No significant distress, Good color, Warm and dry. Head and neck:Scalp atraumatic, Neck supple. Neurological:Appropriate conversation, Gross motor deficits. Eyes:Conjugate gaze, Clear corneas. ENT: No epistaxis. Cardiac: Regular rate and rhythm, Grossly normal heart tones. Pulmonary: Wheezing, Rales. Gastrointestinal: Abdominal tenderness, Abdominal distention. Musculoskeletal: Extremity deformity, Apparent pain with range of motion of the joints. Skin: Rash.   The findings on examination were normal except for the following:  The patient has left arm weakness and a left facial droop and slurred speech.   ED Course   Critical Care  Date/Time: 9/25/2019 5:55 PM  Performed by: Carl Noe MD  Authorized by: Carl Noe MD   Direct patient critical care time: 22 minutes  Additional history critical care time: 11 minutes  Ordering / reviewing critical care time: 11 minutes  Documentation critical care time: 11 minutes  Consulting other physicians critical care time: 5 minutes  Consult with family critical care time: 3 minutes  Total critical care time (exclusive of procedural time) : 63 minutes  Critical care time was exclusive of separately billable procedures and treating other patients and teaching  time.  Critical care was necessary to treat or prevent imminent or life-threatening deterioration of the following conditions: CNS failure or compromise.  Critical care was time spent personally by me on the following activities: discussions with consultants, evaluation of patient's response to treatment, obtaining history from patient or surrogate, ordering and review of laboratory studies, pulse oximetry, review of old charts, development of treatment plan with patient or surrogate, discussions with primary provider, examination of patient, ordering and performing treatments and interventions, ordering and review of radiographic studies and re-evaluation of patient's condition.        Labs Reviewed   CBC W/ AUTO DIFFERENTIAL - Abnormal; Notable for the following components:       Result Value    Hemoglobin 12.8 (*)     Mean Corpuscular Volume 81 (*)     Mean Corpuscular Hemoglobin 25.8 (*)     All other components within normal limits   COMPREHENSIVE METABOLIC PANEL - Abnormal; Notable for the following components:    Glucose 276 (*)     All other components within normal limits   PROTIME-INR - Abnormal; Notable for the following components:    Prothrombin Time 13.0 (*)     All other components within normal limits   TSH - Abnormal; Notable for the following components:    TSH 0.251 (*)     All other components within normal limits   LIPID PANEL - Abnormal; Notable for the following components:    HDL 36 (*)     All other components within normal limits   APTT   T4, FREE   POCT GLUCOSE, HAND-HELD DEVICE     EKG Readings: (Independently Interpreted)   This patient is in a normal sinus rhythm with a second-degree AV block, Mobitz type 1, with a heart rate of 69.  The p.r. QRS and QT intervals are normal.  There is no definite evidence of acute myocardial infarction.  ST segments and T-waves are normal.        Imaging Results          CTA Head and Neck (xpd) (In process)  Result time 09/25/19 17:49:51                X-Ray Chest AP Portable (Final result)  Result time 09/25/19 16:52:45    Final result by Jace Guaman MD (09/25/19 16:52:45)                 Impression:      No acute radiographic findings in the chest on this single view.      Electronically signed by: Jace Guaman MD  Date:    09/25/2019  Time:    16:52             Narrative:    EXAMINATION:  XR CHEST AP PORTABLE    CLINICAL HISTORY:  Stroke;    TECHNIQUE:  Single frontal view of the chest was performed.    COMPARISON:  Radiograph 03/30/2017.    FINDINGS:  Cardiac monitoring leads project over the bilateral hemithoraces.  Mediastinal structures are midline.  Hilar contours are unremarkable.  Cardiac silhouette is normal in size.  Lung volumes are symmetric.  No consolidation.  No pneumothorax or pleural effusions.  No free air beneath the diaphragm.  Degenerative changes of the spine noted.  There are multiple remote left-sided rib fractures.                                CT Head Without Contrast (Final result)  Result time 09/25/19 16:43:04    Final result by Josep Morse MD (09/25/19 16:43:04)                 Impression:      Right frontal lobe subtle focus of peripheral hypoattenuation which may be related to artifact from volume averaging versus recent infarct.  No intracranial hemorrhage.  Further evaluation/follow-up as warranted.    Mild generalized cerebral volume loss with supratentorial white matter patchy hypoattenuation suggesting sequela of advanced chronic small vessel ischemic change.    Right internal capsule and bilateral thalamic suspected lacunar type infarcts, age-indeterminate.  Correlate clinically.    This report was flagged in Epic as abnormal.      Electronically signed by: Josep Morse MD  Date:    09/25/2019  Time:    16:43             Narrative:    EXAMINATION:  CT HEAD WITHOUT CONTRAST    CLINICAL HISTORY:  Focal neuro deficit, new, fixed or worsening, >6 hours;    TECHNIQUE:  Low dose axial CT images obtained throughout  the head without intravenous contrast. Sagittal and coronal reconstructions were performed.    COMPARISON:  None.    FINDINGS:  Intracranial compartment:    Brain appears normally formed.  Mild generalized cerebral volume loss, age-appropriate.  Ventricles are midline without distortion by mass effect or acute hydrocephalus noting cavum septum pellucidum.  No extra-axial blood or fluid collections.    Moderate to marked degree of patchy hypoattenuation of the bilateral subcortical and periventricular white matter, nonspecific but likely sequela of advanced chronic small vessel ischemic change.  More focal areas of hypoattenuation at the right internal capsule and bilateral thalami, likely lacunar type infarcts.  Small focus peripheral hypoattenuation with poor visualization of the cortical ribbon and gray-white differentiation at the posterior right frontal lobe best seen on axial image 21 of series 2 no parenchymal mass, hemorrhage, edema or major vascular distribution infarct.  Skull base atherosclerotic vascular calcifications noted.    Skull/extracranial contents (limited evaluation): No fracture.  Mild patchy mucosal thickening of the right-sided ethmoidal air cells.  Mastoid air cells and remaining imaged paranasal sinuses are essentially clear.  Probable prior surgery to the right ocular lens.                             Medical decision making:  I examined this patient at 4:11 p.m. stroke code was called.  Patient was evaluated by Dr. Orellana who recommends admission and a CTA and an MRI of the brain.  I will execute this directive.  I will write admission orders to the internal medicine service.  I will consult Neurology.  No further anticoagulation has been recommended.  The patient is on Coumadin.  The INR is 2. The patient is also found to be in a a second-degree heart block, Mobitz type 1.  This patient will not be given aspirin because they are already on Coumadin.  TPA is not consideration because the  patient is on Coumadin and had a time of onset greater than 7 hr prior to evaluation.                   I personally performed the services described in this documentation.  All medical record  entries made by the scribe are at my direction and in my presence.  Signed, Dr. Noe        Clinical Impression:       ICD-10-CM ICD-9-CM   1. Acute ischemic stroke I63.9 434.91   2. Stroke I63.9 434.91   3. Mobitz type 2 second degree atrioventricular block I44.1 426.12                                Carl Noe MD  09/25/19 1704

## 2019-09-26 LAB
ALBUMIN SERPL BCP-MCNC: 4.1 G/DL (ref 3.5–5.2)
ALP SERPL-CCNC: 82 U/L (ref 55–135)
ALT SERPL W/O P-5'-P-CCNC: 11 U/L (ref 10–44)
ANION GAP SERPL CALC-SCNC: 8 MMOL/L (ref 8–16)
AST SERPL-CCNC: 14 U/L (ref 10–40)
BASOPHILS # BLD AUTO: 0.04 K/UL (ref 0–0.2)
BASOPHILS NFR BLD: 0.5 % (ref 0–1.9)
BILIRUB SERPL-MCNC: 0.3 MG/DL (ref 0.1–1)
BUN SERPL-MCNC: 11 MG/DL (ref 8–23)
CALCIUM SERPL-MCNC: 10.1 MG/DL (ref 8.7–10.5)
CHLORIDE SERPL-SCNC: 102 MMOL/L (ref 95–110)
CO2 SERPL-SCNC: 28 MMOL/L (ref 23–29)
CREAT SERPL-MCNC: 1.1 MG/DL (ref 0.5–1.4)
DIFFERENTIAL METHOD: ABNORMAL
EOSINOPHIL # BLD AUTO: 0.2 K/UL (ref 0–0.5)
EOSINOPHIL NFR BLD: 1.8 % (ref 0–8)
ERYTHROCYTE [DISTWIDTH] IN BLOOD BY AUTOMATED COUNT: 12.8 % (ref 11.5–14.5)
EST. GFR  (AFRICAN AMERICAN): >60 ML/MIN/1.73 M^2
EST. GFR  (NON AFRICAN AMERICAN): >60 ML/MIN/1.73 M^2
ESTIMATED AVG GLUCOSE: 240 MG/DL (ref 68–131)
ESTIMATED AVG GLUCOSE: 240 MG/DL (ref 68–131)
GLUCOSE SERPL-MCNC: 155 MG/DL (ref 70–110)
HBA1C MFR BLD HPLC: 10 % (ref 4–5.6)
HBA1C MFR BLD HPLC: 10 % (ref 4–5.6)
HCT VFR BLD AUTO: 42.1 % (ref 40–54)
HGB BLD-MCNC: 13.5 G/DL (ref 14–18)
INR PPP: 1.2 (ref 0.8–1.2)
LYMPHOCYTES # BLD AUTO: 3.1 K/UL (ref 1–4.8)
LYMPHOCYTES NFR BLD: 38.2 % (ref 18–48)
MAGNESIUM SERPL-MCNC: 1.7 MG/DL (ref 1.6–2.6)
MCH RBC QN AUTO: 26 PG (ref 27–31)
MCHC RBC AUTO-ENTMCNC: 32.1 G/DL (ref 32–36)
MCV RBC AUTO: 81 FL (ref 82–98)
MONOCYTES # BLD AUTO: 0.7 K/UL (ref 0.3–1)
MONOCYTES NFR BLD: 8.6 % (ref 4–15)
NEUTROPHILS # BLD AUTO: 4.1 K/UL (ref 1.8–7.7)
NEUTROPHILS NFR BLD: 51 % (ref 38–73)
PHOSPHATE SERPL-MCNC: 3.7 MG/DL (ref 2.7–4.5)
PLATELET # BLD AUTO: 292 K/UL (ref 150–350)
PMV BLD AUTO: 10.3 FL (ref 9.2–12.9)
POCT GLUCOSE: 205 MG/DL (ref 70–110)
POCT GLUCOSE: 216 MG/DL (ref 70–110)
POCT GLUCOSE: 218 MG/DL (ref 70–110)
POCT GLUCOSE: 257 MG/DL (ref 70–110)
POCT GLUCOSE: 263 MG/DL (ref 70–110)
POTASSIUM SERPL-SCNC: 4 MMOL/L (ref 3.5–5.1)
PROT SERPL-MCNC: 7.7 G/DL (ref 6–8.4)
PROTHROMBIN TIME: 12.6 SEC (ref 9–12.5)
RBC # BLD AUTO: 5.19 M/UL (ref 4.6–6.2)
SODIUM SERPL-SCNC: 138 MMOL/L (ref 136–145)
WBC # BLD AUTO: 8.14 K/UL (ref 3.9–12.7)

## 2019-09-26 PROCEDURE — 63600175 PHARM REV CODE 636 W HCPCS: Performed by: INTERNAL MEDICINE

## 2019-09-26 PROCEDURE — 94761 N-INVAS EAR/PLS OXIMETRY MLT: CPT

## 2019-09-26 PROCEDURE — 96372 THER/PROPH/DIAG INJ SC/IM: CPT

## 2019-09-26 PROCEDURE — 85610 PROTHROMBIN TIME: CPT

## 2019-09-26 PROCEDURE — 36415 COLL VENOUS BLD VENIPUNCTURE: CPT

## 2019-09-26 PROCEDURE — 83036 HEMOGLOBIN GLYCOSYLATED A1C: CPT

## 2019-09-26 PROCEDURE — G0378 HOSPITAL OBSERVATION PER HR: HCPCS

## 2019-09-26 PROCEDURE — 92610 EVALUATE SWALLOWING FUNCTION: CPT

## 2019-09-26 PROCEDURE — 21400001 HC TELEMETRY ROOM

## 2019-09-26 PROCEDURE — G8997 SWALLOW GOAL STATUS: HCPCS | Mod: CJ

## 2019-09-26 PROCEDURE — G8996 SWALLOW CURRENT STATUS: HCPCS | Mod: CJ

## 2019-09-26 PROCEDURE — 99223 1ST HOSP IP/OBS HIGH 75: CPT | Mod: ,,, | Performed by: NEUROLOGICAL SURGERY

## 2019-09-26 PROCEDURE — 97165 OT EVAL LOW COMPLEX 30 MIN: CPT

## 2019-09-26 PROCEDURE — 80053 COMPREHEN METABOLIC PANEL: CPT

## 2019-09-26 PROCEDURE — 25000003 PHARM REV CODE 250: Performed by: INTERNAL MEDICINE

## 2019-09-26 PROCEDURE — 85025 COMPLETE CBC W/AUTO DIFF WBC: CPT

## 2019-09-26 PROCEDURE — 83735 ASSAY OF MAGNESIUM: CPT

## 2019-09-26 PROCEDURE — 99223 PR INITIAL HOSPITAL CARE,LEVL III: ICD-10-PCS | Mod: ,,, | Performed by: NEUROLOGICAL SURGERY

## 2019-09-26 PROCEDURE — 84100 ASSAY OF PHOSPHORUS: CPT

## 2019-09-26 RX ADMIN — INSULIN ASPART 3 UNITS: 100 INJECTION, SOLUTION INTRAVENOUS; SUBCUTANEOUS at 08:09

## 2019-09-26 RX ADMIN — ASPIRIN 325 MG: 325 TABLET, DELAYED RELEASE ORAL at 08:09

## 2019-09-26 RX ADMIN — INSULIN ASPART 3 UNITS: 100 INJECTION, SOLUTION INTRAVENOUS; SUBCUTANEOUS at 12:09

## 2019-09-26 RX ADMIN — ROSUVASTATIN CALCIUM 10 MG: 10 TABLET, COATED ORAL at 09:09

## 2019-09-26 RX ADMIN — PANTOPRAZOLE SODIUM 40 MG: 40 TABLET, DELAYED RELEASE ORAL at 08:09

## 2019-09-26 RX ADMIN — WARFARIN SODIUM 7.5 MG: 7.5 TABLET ORAL at 05:09

## 2019-09-26 RX ADMIN — FINASTERIDE 5 MG: 5 TABLET, FILM COATED ORAL at 08:09

## 2019-09-26 RX ADMIN — INSULIN ASPART 3 UNITS: 100 INJECTION, SOLUTION INTRAVENOUS; SUBCUTANEOUS at 05:09

## 2019-09-26 RX ADMIN — INSULIN ASPART 2 UNITS: 100 INJECTION, SOLUTION INTRAVENOUS; SUBCUTANEOUS at 05:09

## 2019-09-26 RX ADMIN — INSULIN ASPART 1 UNITS: 100 INJECTION, SOLUTION INTRAVENOUS; SUBCUTANEOUS at 09:09

## 2019-09-26 RX ADMIN — RAMELTEON 8 MG: 8 TABLET, FILM COATED ORAL at 09:09

## 2019-09-26 RX ADMIN — INSULIN DETEMIR 10 UNITS: 100 INJECTION, SOLUTION SUBCUTANEOUS at 09:09

## 2019-09-26 NOTE — ASSESSMENT & PLAN NOTE
His INR is subtherapeutic at 1.2; will restart his home regimen of warfarin and monitor INR daily.

## 2019-09-26 NOTE — PLAN OF CARE
09/26/19 1547   Post-Acute Status   Post-Acute Authorization Placement   Post-Acute Placement Status Referrals Sent   Discharge Delays None known at this time     Pasrr completed.  Locet called in to [facility] @ [phone].  PASSR faxed to [facility] at:  [phone].   Awaiting 142.    SW faxed face sheet, packet, and MAR to several SNF facilities. SW waiting to determine if services will be provided.

## 2019-09-26 NOTE — ASSESSMENT & PLAN NOTE
CT-head was significant for [r]ight frontal lobe subtle focus of peripheral hypoattenuation which may be related to artifact from volume averaging versus recent infarct.  No intracranial hemorrhage.  MRI/MRA-brain are pending.  I have reviewed the EKG and it reveals sinus rhythm with a second degree AVB.  2D-echo is pending. Patient is not within the therapeutic window for tPA. Patient is aspirin-naive, so will start aspirin; obtain a lipid panel; allow for permissive hypertension in order not to extend the penumbra; consult PT/OT and Speech Therapy for evaluation; consult  for discharge planning; and consult Neurology for further recommendations.

## 2019-09-26 NOTE — NURSING
Patient complains of pain on the right eye ball. Eye assessment no redness or swelling to right eye, pupil size 2 mm.  No apparent distress noted. Notified Dr. Sorenson, no new orders.

## 2019-09-26 NOTE — H&P
[facility] Hospital Medicine  History & Physical    Patient Name: Forest Lopez  MRN: [id number]  Admission Date: 9/25/2019  Attending Physician: Karlene Jacome MD   Primary Care Provider: [facility]         Patient information was obtained from patient.     Subjective:     Principal Problem:Cerebrovascular accident (CVA) due to stenosis of right middle cerebral artery    Chief Complaint: Left arm weakness this morning.    HPI: Mr. Forest Lopez is a 69 y.o. right-handed male with essential hypertension, type 2 diabetes mellitus (HbA1c 7.3% Sept 2015), hyperlipidemia (LDL 98.6 Sept 2019), GERD, BPH, cocaine abuse, cannabis abuse, tobacco abuse, and history of DVT on chronic anticoagulation who presents to [facility] ED with complaints of left upper extremity weakness this morning.  He noted the left hand weakness this morning when he woke up around 9:00 AM--he was holding a sandwich when he suddenly dropped it.  He was drinking some water and it was spilling out the left side of his lips.  He decided to go back to sleep and when he woke up around noon he decided to come to the ED.  He does report some speech difficulties but denies any headaches, swallowing difficulties, visual disturbances, palpitations, chest pain, nor shortness of breath.  He denies any numbness and denies any symptoms in any other extremities.  He has otherwise been in his usual state of health.    Chart Review:  Patient has not had any recent hospitalizations within the system.    Outpatient Follow-Up  Date of Visit Physician Service   Oct 2015 Christine Ching DPM Podiatry   Sept 2015 Kenny Shoemaker MD Primary Care   Jul 2015 Paul King MD Urology     Past Medical History:   Diagnosis Date    Blind left eye     able to see, but poorly    BPH (benign prostatic hyperplasia)     Diabetes mellitus, type 2     Hyperlipidemia     Hypertension     Left rib fracture 03/30/2017    Stroke-like symptoms 09/25/2019    L  facial droop, slurred speech & L arm weakness       Past Surgical History:   Procedure Laterality Date    NO PAST SURGERIES  09/25/2019       Review of patient's allergies indicates:  No Known Allergies    No current facility-administered medications on file prior to encounter.      Current Outpatient Medications on File Prior to Encounter   Medication Sig    gemfibrozil (LOPID) 600 MG tablet Take 600 mg by mouth 2 (two) times daily before meals.    glipiZIDE (GLUCOTROL) 10 MG tablet Take 1 tablet (10 mg total) by mouth 2 (two) times daily before meals.    lisinopril (PRINIVIL,ZESTRIL) 40 MG tablet Take 40 mg by mouth once daily.    metformin (GLUCOPHAGE) 500 MG tablet Take 1,000 mg by mouth 2 (two) times daily with meals.     omeprazole (PRILOSEC) 20 MG capsule TAKE 2 CAPSULES (40 MG) BY MOUTH ONCE DAILY.    rosuvastatin (CRESTOR) 40 MG Tab Take 10 mg by mouth every evening.    tamsulosin (FLOMAX) 0.4 mg Cp24 Take 1 capsule (0.4 mg total) by mouth once daily.    warfarin (COUMADIN) 7.5 MG tablet Take 7.5 mg by mouth once daily.    blood sugar diagnostic (BLOOD GLUCOSE TEST) Strp by Misc.(Non-Drug; Combo Route) route.    ciclopirox (PENLAC) 8 % Soln Apply topically once daily. Apply topically to affected nails once daily.  Remove once weekly.  Repeat.  Follow package insert.    finasteride (PROSCAR) 5 mg tablet Take 1 tablet (5 mg total) by mouth once daily.    glipiZIDE (GLUCOTROL) 10 MG tablet TAKE 1 TABLET (10 MG TOTAL) BY MOUTH 2 TIMES DAILY BEFORE MEALS.    sildenafil (VIAGRA) 100 MG tablet Take 1 tablet (100 mg total) by mouth daily as needed for Erectile Dysfunction.    triamcinolone acetonide 0.1% (KENALOG) 0.1 % cream Apply topically 3 (three) times daily.    ULTILET CLASSIC LANCETS MISC by Misc.(Non-Drug; Combo Route) route.    [DISCONTINUED] benazepril (LOTENSIN) 5 MG tablet TAKE 1 TABLET (5 MG TOTAL) BY MOUTH ONCE DAILY.    [DISCONTINUED] hydrocodone-acetaminophen 5-325mg (NORCO) 5-325  "mg per tablet Take 1 tablet by mouth every 6 (six) hours as needed for Pain.    [DISCONTINUED] omeprazole (PRILOSEC) 40 MG capsule Take 1 capsule (40 mg total) by mouth once daily.    [DISCONTINUED] tobramycin-dexamethasone 0.3-0.1% (TOBRADEX) 0.3-0.1 % DrpS      Family History     Problem Relation (Age of Onset)    Heart disease Mother, Father    Stroke Sister        Tobacco Use    Smoking status: Light Tobacco Smoker     Types: Cigarettes, Cigars    Smokeless tobacco: Never Used    Tobacco comment: socially   Substance and Sexual Activity    Alcohol use: Yes     Alcohol/week: 0.0 standard drinks     Comment: drinks beer socially     Drug use: Yes     Types: Marijuana, "Crack" cocaine     Comment: 9/25/19: last smoked "a blunt, with some crack in it, about a month ago"    Sexual activity: Not Currently     Review of Systems   Constitutional: Negative for activity change, appetite change, chills, diaphoresis, fatigue, fever and unexpected weight change.   HENT: Negative.    Eyes: Negative.    Respiratory: Negative for cough, chest tightness, shortness of breath and wheezing.    Cardiovascular: Negative for chest pain, palpitations and leg swelling.   Gastrointestinal: Negative for abdominal distention, abdominal pain, blood in stool, constipation, diarrhea, nausea and vomiting.   Genitourinary: Negative for dysuria and hematuria.   Musculoskeletal: Negative.    Skin: Negative.    Neurological: Positive for facial asymmetry, speech difficulty and weakness. Negative for dizziness, tremors, seizures, syncope, light-headedness, numbness and headaches.   Psychiatric/Behavioral: Negative.      Objective:     Vital Signs (Most Recent):  Temp: 98.4 °F (36.9 °C) (09/25/19 1920)  Pulse: (!) 58 (09/25/19 1901)  Resp: 19 (09/25/19 1901)  BP: (!) 150/71 (09/25/19 1901)  SpO2: 100 % (09/25/19 1901) Vital Signs (24h Range):  Temp:  [98.4 °F (36.9 °C)] 98.4 °F (36.9 °C)  Pulse:  [58-87] 58  Resp:  [16-20] 19  SpO2:  [100 %] " 100 %  BP: (150-183)/(71-89) 150/71     Weight: 70.3 kg (155 lb)  Body mass index is 21.02 kg/m².    Physical Exam   Constitutional: He is oriented to person, place, and time. He appears well-developed and well-nourished. No distress.   HENT:   Head: Normocephalic and atraumatic.   Right Ear: External ear normal.   Left Ear: External ear normal.   Nose: Nose normal.   Eyes: Right eye exhibits no discharge. Left eye exhibits no discharge.   Neck: Normal range of motion.   Cardiovascular: Normal rate, regular rhythm, normal heart sounds and intact distal pulses. Exam reveals no gallop and no friction rub.   No murmur heard.  Pulmonary/Chest: Effort normal and breath sounds normal. No stridor. No respiratory distress. He has no wheezes. He has no rales. He exhibits no tenderness.   Abdominal: Soft. Bowel sounds are normal. He exhibits no distension. There is no tenderness. There is no rebound and no guarding.   Musculoskeletal: Normal range of motion. He exhibits no edema.   Neurological: He is alert and oriented to person, place, and time. GCS eye subscore is 4. GCS verbal subscore is 5. GCS motor subscore is 6.   Reflex Scores:       Bicep reflexes are 2+ on the right side and 2+ on the left side.       Brachioradialis reflexes are 2+ on the right side and 2+ on the left side.       Patellar reflexes are 3+ on the right side and 2+ on the left side.  Although a little delayed on the LUE, he has 5/5 strength throughout and intact sensation; there is left-sided facial asymmetry along with dysarthria and left-sided dysmetria, but no aphasia or pronator drift   Skin: Skin is warm and dry. He is not diaphoretic. No erythema.   Psychiatric: He has a normal mood and affect. His behavior is normal. Judgment and thought content normal.   Nursing note and vitals reviewed.          Significant Labs: All pertinent labs within the past 24 hours have been reviewed.    Significant Imaging: I have reviewed and interpreted all  pertinent imaging results/findings within the past 24 hours.    Assessment/Plan:     * Cerebrovascular accident (CVA) due to stenosis of right middle cerebral artery  CT-head was significant for [r]ight frontal lobe subtle focus of peripheral hypoattenuation which may be related to artifact from volume averaging versus recent infarct.  No intracranial hemorrhage.  MRI/MRA-brain are pending.  I have reviewed the EKG and it reveals sinus rhythm with a second degree AVB.  2D-echo is pending. Patient is not within the therapeutic window for tPA. Patient is aspirin-naive, so will start aspirin; obtain a lipid panel; allow for permissive hypertension in order not to extend the penumbra; consult PT/OT and Speech Therapy for evaluation; consult  for discharge planning; and consult Neurology for further recommendations.     Essential hypertension  Will allow for permissive hypertension as addressed above.    Type 2 diabetes mellitus, controlled  Well controlled on a home regimen of metformin and a sulfonylurea; will provide basal-prandial insulin therapy along with insulin sliding scale.    Hyperlipidemia  Poorly controlled; will continue his home regimen of rosuvastatin.    GERD (gastroesophageal reflux disease)  With his to his home regimen of omeprazole for pantoprazole while he is inpatient.    BPH (benign prostatic hyperplasia)  Stable; will continue his home regimen of finasteride and tamsulosin.    Cocaine abuse  He has been counseled on cessation.    Cannabis abuse  He has been counseled on cessation.    Tobacco dependence  Patient was counseled on smoking cessation and he will be provided a nicotine transdermal patch applied while inpatient.  Will provide additional smoking cessation counseling prior to discharge.    History of DVT (deep vein thrombosis)  His INR is subtherapeutic at 1.2; will restart his home regimen of warfarin and monitor INR daily.    Chronic anticoagulation  As addressed  above.    VTE Risk Mitigation (From admission, onward)         Ordered     warfarin (COUMADIN) tablet 7.5 mg  Daily      09/25/19 2026     IP VTE HIGH RISK PATIENT  Once      09/25/19 2026                   Alexi Lozano M.D.  Staff Nocturnist  Department of Hospital Medicine  [facility]  Pager: [phone]          N.B.: Portions of this note was dictated using M*Modal Fluency Direct--there may be voice recognition errors occasionally missed on review.

## 2019-09-26 NOTE — CONSULTS
[facility]  Neurology  Consult Note    Patient Name: Forest Lopez  MRN: [id number]  Admission Date: 9/25/2019  Hospital Length of Stay: 1 days  Code Status: Full Code   Attending Provider: Beatrice Sorenson MD   Consulting Provider: Justin Hernandez MD  Primary Care Physician: [facility]  Principal Problem:Cerebrovascular accident (CVA) due to stenosis of right middle cerebral artery    Inpatient consult to Neurology  Consult performed by: Justin Hernandez MD  Consult ordered by: Alexi Lozano MD         Subjective:     Chief Complaint:   Left arm weakness    HPI:   69-year-old male hypertension type 2 diabetes, hyperlipidemia, cocaine abuse, and THC abuse, presented to the hospital with left upper extremity weakness that began this morning.  He says that he woke up this morning and noticed that his left arm was feeling weak.  He tried to drink something but felt that he was having inability to maintain any liquids in his mouth as it seemed to be falling to the left side.  He says that he decided to take a rest and after the symptoms did not resolve when he awoke, he decided to come to the hospital for evaluation.  He has been feeling no improvement since he presented to the hospital.  He has been taking medications as directed including his oral anticoagulation.       Past Medical History:   Diagnosis Date    Blind left eye     able to see, but poorly    BPH (benign prostatic hyperplasia)     Diabetes mellitus, type 2     Hyperlipidemia     Hypertension     Left rib fracture 03/30/2017    Stroke-like symptoms 09/25/2019    L facial droop, slurred speech & L arm weakness       Past Surgical History:   Procedure Laterality Date    NO PAST SURGERIES  09/25/2019       Review of patient's allergies indicates:  No Known Allergies    Current Neurological Medications:     No current facility-administered medications on file prior to encounter.      Current Outpatient Medications on File Prior  "to Encounter   Medication Sig    gemfibrozil (LOPID) 600 MG tablet Take 600 mg by mouth 2 (two) times daily before meals.    glipiZIDE (GLUCOTROL) 10 MG tablet Take 1 tablet (10 mg total) by mouth 2 (two) times daily before meals.    lisinopril (PRINIVIL,ZESTRIL) 40 MG tablet Take 40 mg by mouth once daily.    metformin (GLUCOPHAGE) 500 MG tablet Take 1,000 mg by mouth 2 (two) times daily with meals.     omeprazole (PRILOSEC) 20 MG capsule TAKE 2 CAPSULES (40 MG) BY MOUTH ONCE DAILY.    rosuvastatin (CRESTOR) 40 MG Tab Take 10 mg by mouth every evening.    tamsulosin (FLOMAX) 0.4 mg Cp24 Take 1 capsule (0.4 mg total) by mouth once daily.    warfarin (COUMADIN) 7.5 MG tablet Take 7.5 mg by mouth once daily.    blood sugar diagnostic (BLOOD GLUCOSE TEST) Strp by Misc.(Non-Drug; Combo Route) route.    ciclopirox (PENLAC) 8 % Soln Apply topically once daily. Apply topically to affected nails once daily.  Remove once weekly.  Repeat.  Follow package insert.    finasteride (PROSCAR) 5 mg tablet Take 1 tablet (5 mg total) by mouth once daily.    glipiZIDE (GLUCOTROL) 10 MG tablet TAKE 1 TABLET (10 MG TOTAL) BY MOUTH 2 TIMES DAILY BEFORE MEALS.    sildenafil (VIAGRA) 100 MG tablet Take 1 tablet (100 mg total) by mouth daily as needed for Erectile Dysfunction.    triamcinolone acetonide 0.1% (KENALOG) 0.1 % cream Apply topically 3 (three) times daily.    ULTILET CLASSIC LANCETS MISC by Misc.(Non-Drug; Combo Route) route.     Family History     Problem Relation (Age of Onset)    Heart disease Mother, Father    Stroke Sister        Tobacco Use    Smoking status: Light Tobacco Smoker     Types: Cigarettes, Cigars    Smokeless tobacco: Never Used    Tobacco comment: socially   Substance and Sexual Activity    Alcohol use: Yes     Alcohol/week: 0.0 standard drinks     Comment: drinks beer socially     Drug use: Yes     Types: Marijuana, "Crack" cocaine     Comment: 9/25/19: last smoked "a blunt, with some " "crack in it, about a month ago"    Sexual activity: Not Currently     Review of Systems   Constitutional: Negative for activity change, appetite change, chills, diaphoresis, fatigue, fever and unexpected weight change.   HENT: Negative.    Eyes: Negative.    Respiratory: Negative for cough, chest tightness, shortness of breath and wheezing.    Cardiovascular: Negative for chest pain, palpitations and leg swelling.   Gastrointestinal: Negative for abdominal distention, abdominal pain, blood in stool, constipation, diarrhea, nausea and vomiting.   Genitourinary: Negative for dysuria and hematuria.   Musculoskeletal: Negative.    Skin: Negative.    Neurological: Positive for facial asymmetry, speech difficulty and weakness. Negative for dizziness, tremors, seizures, syncope, light-headedness, numbness and headaches.   Psychiatric/Behavioral: Negative.      Objective:     Vital Signs (Most Recent):  Temp: 98.7 °F (37.1 °C) (09/26/19 1948)  Pulse: (!) 57 (09/26/19 1948)  Resp: 18 (09/26/19 1948)  BP: (!) 157/83 (09/26/19 1948)  SpO2: 96 % (09/26/19 1948) Vital Signs (24h Range):  Temp:  [98 °F (36.7 °C)-98.7 °F (37.1 °C)] 98.7 °F (37.1 °C)  Pulse:  [56-74] 57  Resp:  [16-18] 18  SpO2:  [93 %-97 %] 96 %  BP: (127-158)/(74-93) 157/83     Weight: 70.2 kg (154 lb 12.2 oz)  Body mass index is 20.99 kg/m².    Physical Exam   Constitutional: He is oriented to person, place, and time. He appears well-developed and well-nourished.   HENT:   Head: Normocephalic and atraumatic.   Eyes: Pupils are equal, round, and reactive to light. EOM are normal.   Cardiovascular: Intact distal pulses.   Pulmonary/Chest: Effort normal. No respiratory distress.   Musculoskeletal: Normal range of motion.   Neurological: He is alert and oriented to person, place, and time. He has a normal Finger-Nose-Finger Test.   Reflex Scores:       Tricep reflexes are 2+ on the right side and 2+ on the left side.       Bicep reflexes are 2+ on the right side " and 2+ on the left side.       Brachioradialis reflexes are 2+ on the right side and 2+ on the left side.       Patellar reflexes are 2+ on the right side and 2+ on the left side.       Achilles reflexes are 2+ on the right side and 2+ on the left side.  Skin: Skin is warm and dry.   Psychiatric: He has a normal mood and affect. His behavior is normal. His speech is slurred.       NEUROLOGICAL EXAMINATION:     MENTAL STATUS   Oriented to person, place, and time.   Registration: recalls 3 of 3 objects.   Attention: normal. Concentration: normal.   Speech: slurred   Level of consciousness: alert  Knowledge: good.   Able to name object.     CRANIAL NERVES     CN II   Visual acuity: normal  Right visual field deficit: none  Left visual field deficit: none     CN III, IV, VI   Pupils are equal, round, and reactive to light.  Extraocular motions are normal.   Right pupil: Shape: regular. Reactivity: brisk.   Left pupil: Shape: regular. Reactivity: brisk.   CN III: no CN III palsy  CN VI: no CN VI palsy  Nystagmus: none   Ophthalmoparesis: none    CN V   Right facial sensation deficit: none  Left facial sensation deficit: complete    CN VII   Right facial weakness: none  Left facial weakness: central    CN VIII   Hearing: intact    CN IX, X   Palate: symmetric    CN XI   Right sternocleidomastoid strength: normal  Left sternocleidomastoid strength: normal  Right trapezius strength: normal  Left trapezius strength: normal    CN XII   CN XII normal.   Tongue: not atrophic  Tongue deviation: none    MOTOR EXAM   Muscle bulk: normal  Overall muscle tone: normal    Strength   Strength 5/5 except as noted.   Left deltoid: 4/5  Left biceps: 4/5  Left triceps: 4/5  Left wrist flexion: 4/5  Left wrist extension: 4/5    REFLEXES     Reflexes   Right brachioradialis: 2+  Left brachioradialis: 2+  Right biceps: 2+  Left biceps: 2+  Right triceps: 2+  Left triceps: 2+  Right patellar: 2+  Left patellar: 2+  Right achilles: 2+  Left  achilles: 2+    SENSORY EXAM   Right arm light touch: normal  Left arm light touch: decreased from elbow  Right leg light touch: normal  Left leg light touch: normal  Right arm vibration: normal  Left arm vibration: normal  Right leg vibration: normal  Left leg vibration: normal  Right arm proprioception: normal  Left arm proprioception: normal  Right leg proprioception: normal  Left leg proprioception: normal  Right arm pinprick: normal  Left arm pinprick: decreased from elbow  Right leg pinprick: normal  Left leg pinprick: normal  Graphesthesia: normal  Stereognosis: normal    GAIT AND COORDINATION      Coordination   Finger to nose coordination: normal    Tremor   Resting tremor: absent      Significant Labs: All pertinent lab results from the past 24 hours have been reviewed.    Significant Imaging: I have reviewed all pertinent imaging results/findings within the past 24 hours.    Assessment and Plan:     * Cerebrovascular accident (CVA) due to stenosis of right middle cerebral artery  No acute stroke seen on MRI.  Patient will benefit from optimization of risk factors including blood pressure, cholesterol, and advice on cessation of all illicit drug use.      VTE Risk Mitigation (From admission, onward)         Ordered     warfarin (COUMADIN) tablet 7.5 mg  Daily      09/25/19 2026     IP VTE HIGH RISK PATIENT  Once      09/25/19 2026                Thank you for your consult. I will continue to follow Please contact us if you have any additional questions.    Justin Hernandez MD  Neurology  [facility]

## 2019-09-26 NOTE — ASSESSMENT & PLAN NOTE
Well controlled on a home regimen of metformin and a sulfonylurea; will provide basal-prandial insulin therapy along with insulin sliding scale.

## 2019-09-26 NOTE — NURSING
Arrived to pt room, pt awake in bed. Cardiac monitor in use, alarm set. Oriented pt to room. Bed alarm set. Call light within reach. Will continue to monitor.

## 2019-09-26 NOTE — PT/OT/SLP EVAL
"Occupational Therapy   Evaluation    Name: Forest Lopez  MRN: [id number]  Admitting Diagnosis:  Cerebrovascular accident (CVA) due to stenosis of right middle cerebral artery      Recommendations:     Discharge Recommendations: nursing facility, skilled  Discharge Equipment Recommendations:  other (see comments)(TBD)  Barriers to discharge:  Decreased caregiver support    Assessment:     Forest Lopez is a 69 y.o. male with a medical diagnosis of Cerebrovascular accident (CVA) due to stenosis of right middle cerebral artery.  He presents with  independence for self-care and functional transfers. Performance deficits affecting function: weakness, impaired sensation, impaired self care skills, impaired functional mobilty, impaired balance, decreased coordination, decreased upper extremity function, decreased lower extremity function, decreased safety awareness, decreased ROM, impaired coordination, impaired fine motor.      Rehab Prognosis: Good; patient would benefit from acute skilled OT services to address these deficits and reach maximum level of function.       Plan:     Patient to be seen 5 x/week to address the above listed problems via self-care/home management, therapeutic activities, therapeutic exercises  · Plan of Care Expires: 10/10/19  · Plan of Care Reviewed with: patient    Subjective     Chief Complaint: "I'm not sure that I can go home alone right now"  Patient/Family Comments/goals: to get better    Occupational Profile:  Living Environment: lives alone at [facility] on the 8th floor, building has an elavator   Previous level of function: independent  Roles and Routines: doesn't drive  Equipment Used at Home:  none  Assistance upon Discharge: no assistance available    Pain/Comfort:  · Pain Rating 1: 0/10    Patients cultural, spiritual, Jain conflicts given the current situation: no    Objective:     Communicated with: nurse Maradiaga prior to session.  Patient found supine with " peripheral IV, telemetry upon OT entry to room.    General Precautions: Standard, fall, other (see comments)(Mechanical soft diet with thin liquids)   Orthopedic Precautions:N/A   Braces: N/A     Occupational Performance:    Bed Mobility:    · Patient completed Rolling/Turning to Left with  minimum assistance  · Patient completed Rolling/Turning to Right with minimum assistance  · Patient completed Scooting/Bridging with contact guard assistance  · Patient completed Supine to Sit with contact guard assistance  · Patient completed Sit to Supine with minimum assistance    Functional Mobility/Transfers:  · Patient completed Sit <> Stand Transfer with contact guard assistance  with  rolling walker   · Functional Mobility: able to take side steps along the EOB with SBA, patient is impulsive, picking up the RW while side stepping, educated on proper walker use.    Activities of Daily Living:  · Feeding:  independence bed level, supported sit in bed, requiring verbal cues secondary to pocketing in L cheek  · Upper Body Dressing: contact guard assistance to kathie robe  · Lower Body Dressing: stand by assistance for patient to adjust socks while seated EOB    Cognitive/Visual Perceptual:  Cognitive/Psychosocial Skills:     -       Oriented to: Person, Place and Situation   -       Follows Commands/attention:Follows one-step commands  -       Communication: clear/fluent, some words garbled secondary to L facial droop  -       Memory: No Deficits noted  -       Safety awareness/insight to disability: impaired secondary to decreased walker safety   -       Mood/Affect/Coping skills/emotional control: Appropriate to situation    Physical Exam:  Balance:    -       sit balance good; stading balance good minus  Postural examination/scapula alignment:    -       Rounded shoulders  Skin integrity: Visible skin intact  Upper Extremity Range of Motion:     -       Right Upper Extremity: WFL  -       Left Upper Extremity: WFL except  decreased shoulder  Upper Extremity Strength:    -       Right Upper Extremity: WFL  -       Left Upper Extremity: limited 3-/5    AMPAC 6 Click ADL:  AMPAC Total Score: 21    Treatment & Education:  Evaluation   Education:    Patient left HOB elevated with all lines intact, call button in reach and Ashwini notified    GOALS:   Multidisciplinary Problems     Occupational Therapy Goals        Problem: Occupational Therapy Goal    Goal Priority Disciplines Outcome Interventions   Occupational Therapy Goal     OT, PT/OT Ongoing, Progressing    Description:  Goals to be met by: 10/10/2019     Patient will increase functional independence with ADLs by performing:    UE Dressing with Grand.  LE Dressing with Grand.  Grooming while standing at sink with Stand-by Assistance.  Sitting at edge of bed x15 minutes with Grand.  Supine to sit with Grand.  Step transfer with Contact Guard Assistance  Upper extremity exercise program per handout, with assistance as needed.                      History:     Past Medical History:   Diagnosis Date    Blind left eye     able to see, but poorly    BPH (benign prostatic hyperplasia)     Diabetes mellitus, type 2     Hyperlipidemia     Hypertension     Left rib fracture 03/30/2017    Stroke-like symptoms 09/25/2019    L facial droop, slurred speech & L arm weakness       Past Surgical History:   Procedure Laterality Date    NO PAST SURGERIES  09/25/2019       Time Tracking:     OT Date of Treatment: 09/26/19  OT Start Time: 1223  OT Stop Time: 1239  OT Total Time (min): 16 min    Billable Minutes:Evaluation 16 minutes    MICHELLE Dasilva, MS  9/26/2019

## 2019-09-26 NOTE — NURSING
Report given to RN. Pt resting comfortably. No acute distress noted. Safety precautions maintained.     Chart check completed.

## 2019-09-26 NOTE — ASSESSMENT & PLAN NOTE
No acute stroke seen on MRI.  Patient will benefit from optimization of risk factors including blood pressure, cholesterol, and advice on cessation of all illicit drug use.

## 2019-09-26 NOTE — HPI
Mr. Forest Lopez is a 69 y.o. 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  He noted the left hand weakness this morning when he woke up around 9:00 AM--he was holding a sandwich when he suddenly dropped it.  He was drinking some water and it was spilling out the left side of his lips.  He decided to go back to sleep and when he woke up around noon he decided to come to the ED.  He does report some speech difficulties but denies any headaches, swallowing difficulties, visual disturbances, palpitations, chest pain, nor shortness of breath.  He denies any numbness and denies any symptoms in any other extremities.  He has otherwise been in his usual state of health.

## 2019-09-26 NOTE — PLAN OF CARE
Problem: SLP Goal  Goal: SLP Goal  Description  ST. Pt will tolerate PO diet of mechanical soft solids with thin liquids without overt s/s of aspiration.  2. Pt will perform OME 2sets x10 each with good ability.  3.Pt will participate in speech/language eval to further assess dysarthria.   Outcome: Ongoing, Progressing   19 ST: Swallow eval completed. Pt presents with mild oral dysphagia. Rec: mechanical soft diet with thin liquids & whole meds. ST will follow pt for tx & complete speech/lang eval. Kourtney Espinoza, CCC-SLP

## 2019-09-26 NOTE — MEDICAL/APP STUDENT
[facility] Medicine  History & Physical    Patient Name: Forest Lopez   MRN: [id number]  Admission Date: 09/25/2019  Attending Physician:        PCP:     Julian Escobar    CC:     Chief Complaint   Patient presents with    Extremity Weakness     left arm weakness onset 0900 while watching TV, pt states he woke fine, ate breakfast and weakness began 1 hour after eating       HISTORY OF PRESENT ILLNESS:     Forest Lopez is a 69 y.o. male that (in part)  has a past medical history of Blind left eye, BPH (benign prostatic hyperplasia), Diabetes mellitus, type 2, Hyperlipidemia, Hypertension, Left rib fracture, and Stroke-like symptoms. Presents to [facility] for L hand weakness and slurred speech. Patient states that he first noticed the facial droop at 9:30 am when he was trying to drink water with his breakfast and the water would not stay in his mouth. After this he went to watch tv when he noticed that he was unable to hold the remote in his left hand. He proceeded to fall asleep and noticed that the symptoms were still present upon waking. At that time he went to the ED. Patient endorses slurred speech, facial droop, left handed weakness, and feeling off balance when standing. He denies palpitations, confusion, fever/chills, CP, SOB, vision changes. Patient is currently on Coumadin for hx of b/l DVTs.     In the ED, stroke code was called. CT showed R MCA ischemic stroke. TPA was not given due to time > 7 hours since symptom onset.     Hospital medicine has been asked to admit for further evaluation and treatment.       REVIEW OF SYSTEMS:     Review of Systems   Constitutional: Negative for chills and fever.   HENT: Negative for sinus pain and sore throat.    Eyes: Negative for blurred vision and double vision.   Respiratory: Negative for cough and shortness of breath.    Cardiovascular: Negative for chest pain, palpitations and leg  "swelling.   Gastrointestinal: Positive for diarrhea (3 episodes yesterday). Negative for nausea and vomiting.   Genitourinary: Negative for frequency and urgency.   Neurological: Positive for speech change (slurred) and weakness (L hand). Negative for dizziness, loss of consciousness and headaches.        Facial asymmetry   Psychiatric/Behavioral: Negative for memory loss. The patient is not nervous/anxious.          PAST MEDICAL / SURGICAL HISTORY:     Past Medical History:   Diagnosis Date    Blind left eye     able to see, but poorly    BPH (benign prostatic hyperplasia)     Diabetes mellitus, type 2     Hyperlipidemia     Hypertension     Left rib fracture 03/30/2017    Stroke-like symptoms 09/25/2019    L facial droop, slurred speech & L arm weakness     Past Surgical History:   Procedure Laterality Date    NO PAST SURGERIES  09/25/2019         FAMILY HISTORY:     Family History   Problem Relation Age of Onset    Heart disease Mother         heart surgery    Heart disease Father         heart attack    Stroke Sister          SOCIAL HISTORY:     Social History     Tobacco Use    Smoking status: Light Tobacco Smoker     Types: Cigarettes, Cigars    Smokeless tobacco: Never Used    Tobacco comment: socially   Substance Use Topics    Alcohol use: Yes     Alcohol/week: 0.0 standard drinks     Comment: drinks beer socially     Drug use: Yes     Types: Marijuana, "Crack" cocaine     Comment: 9/25/19: last smoked "a blunt, with some crack in it, about a month ago"     Social History     Socioeconomic History    Marital status: Single     Spouse name: Not on file    Number of children: Not on file    Years of education: Not on file    Highest education level: Not on file   Occupational History    Not on file   Social Needs    Financial resource strain: Not on file    Food insecurity:     Worry: Not on file     Inability: Not on file    Transportation needs:     Medical: Not on file     " "Non-medical: Not on file   Tobacco Use    Smoking status: Light Tobacco Smoker     Types: Cigarettes, Cigars    Smokeless tobacco: Never Used    Tobacco comment: socially   Substance and Sexual Activity    Alcohol use: Yes     Alcohol/week: 0.0 standard drinks     Comment: drinks beer socially     Drug use: Yes     Types: Marijuana, "Crack" cocaine     Comment: 9/25/19: last smoked "a blunt, with some crack in it, about a month ago"    Sexual activity: Not Currently   Lifestyle    Physical activity:     Days per week: Not on file     Minutes per session: Not on file    Stress: Not on file   Relationships    Social connections:     Talks on phone: Not on file     Gets together: Not on file     Attends Yazdanism service: Not on file     Active member of club or organization: Not on file     Attends meetings of clubs or organizations: Not on file     Relationship status: Not on file   Other Topics Concern    Not on file   Social History Narrative    Not on file         ALLERGIES:       Review of patient's allergies indicates:  No Known Allergies      HOME MEDICATIONS:     Prior to Admission medications    Medication Sig Start Date End Date Taking? Authorizing Provider   gemfibrozil (LOPID) 600 MG tablet Take 600 mg by mouth 2 (two) times daily before meals.   Yes Historical Provider, MD   glipiZIDE (GLUCOTROL) 10 MG tablet Take 1 tablet (10 mg total) by mouth 2 (two) times daily before meals. 11/19/14  Yes Kenny Shoemaker MD   lisinopril (PRINIVIL,ZESTRIL) 40 MG tablet Take 40 mg by mouth once daily.   Yes Historical Provider, MD   metformin (GLUCOPHAGE) 500 MG tablet Take 1,000 mg by mouth 2 (two) times daily with meals.    Yes Historical Provider, MD   omeprazole (PRILOSEC) 20 MG capsule TAKE 2 CAPSULES (40 MG) BY MOUTH ONCE DAILY. 10/17/15  Yes Kenny Shoemaker MD   rosuvastatin (CRESTOR) 40 MG Tab Take 10 mg by mouth every evening.   Yes Historical Provider, MD   tamsulosin (FLOMAX) 0.4 mg Cp24 Take 1 " capsule (0.4 mg total) by mouth once daily. 7/30/15  Yes SHERRY King MD   warfarin (COUMADIN) 7.5 MG tablet Take 7.5 mg by mouth once daily.   Yes Historical Provider, MD   blood sugar diagnostic (BLOOD GLUCOSE TEST) Strp by Misc.(Non-Drug; Combo Route) route.    Historical Provider, MD   ciclopirox (PENLAC) 8 % Soln Apply topically once daily. Apply topically to affected nails once daily.  Remove once weekly.  Repeat.  Follow package insert. 5/20/14   Preston George DPM   finasteride (PROSCAR) 5 mg tablet Take 1 tablet (5 mg total) by mouth once daily. 7/30/15 7/29/16  SHERRY King MD   glipiZIDE (GLUCOTROL) 10 MG tablet TAKE 1 TABLET (10 MG TOTAL) BY MOUTH 2 TIMES DAILY BEFORE MEALS. 10/20/15   SANDY Paul   sildenafil (VIAGRA) 100 MG tablet Take 1 tablet (100 mg total) by mouth daily as needed for Erectile Dysfunction. 9/29/15   Kenny Shoemaker MD   triamcinolone acetonide 0.1% (KENALOG) 0.1 % cream Apply topically 3 (three) times daily. 10/1/14   Kenny Shoemaker MD   ULTILET CLASSIC LANCETS MISC by Misc.(Non-Drug; Combo Route) route.    Historical Provider, MD   benazepril (LOTENSIN) 5 MG tablet TAKE 1 TABLET (5 MG TOTAL) BY MOUTH ONCE DAILY. 11/20/15 9/25/19  SANDY Paul   hydrocodone-acetaminophen 5-325mg (NORCO) 5-325 mg per tablet Take 1 tablet by mouth every 6 (six) hours as needed for Pain. 3/30/17 9/25/19  Paul Miller PAShantalC   omeprazole (PRILOSEC) 40 MG capsule Take 1 capsule (40 mg total) by mouth once daily. 2/19/15 9/25/19  Kenny Shoemaker MD   tobramycin-dexamethasone 0.3-0.1% (TOBRADEX) 0.3-0.1 % DrpS  9/9/15 9/25/19  Historical Provider, MD          [facility] MEDICATIONS:     Scheduled Meds:    [START ON 9/26/2019] aspirin  325 mg Oral Daily    [START ON 9/26/2019] finasteride  5 mg Oral Daily    [START ON 9/26/2019] insulin aspart U-100  3 Units Subcutaneous TIDWM    insulin detemir U-100  10 Units Subcutaneous QHS    [START ON 9/26/2019]  pantoprazole  40 mg Oral Daily    rosuvastatin  10 mg Oral QHS    [START ON 9/26/2019] warfarin  7.5 mg Oral Daily     Continuous Infusions:    sodium chloride 0.9%       PRN Meds: acetaminophen, cloNIDine, dextrose 50%, dextrose 50%, glucagon (human recombinant), glucose, glucose, influenza, insulin aspart U-100, ondansetron, promethazine (PHENERGAN) IVPB, ramelteon, senna-docusate 8.6-50 mg, sodium chloride 0.9%      PHYSICAL EXAM:     Physical Exam   Constitutional: He is oriented to person, place, and time. He appears well-developed and well-nourished.   HENT:   Head: Normocephalic and atraumatic.   Eyes: Pupils are equal, round, and reactive to light. EOM are normal.   Neck: Normal range of motion.   Cardiovascular: Normal rate and regular rhythm.   Pulmonary/Chest: Effort normal and breath sounds normal.   Abdominal: Soft. Bowel sounds are normal.   Musculoskeletal: Normal range of motion.   Decreased  strength in L hand, L facial droop, slurred speech   Neurological: He is alert and oriented to person, place, and time. No sensory deficit.   Skin: Skin is warm and dry.   Psychiatric: He has a normal mood and affect. His behavior is normal.   Nursing note and vitals reviewed.        Wt Readings from Last 1 Encounters:   09/25/19 1618 70.3 kg (155 lb)     Body mass index is 21.02 kg/m².  Vitals:    09/25/19 1701 09/25/19 1802 09/25/19 1901 09/25/19 1920   BP:  (!) 162/87 (!) 150/71    BP Location:       Patient Position:       Pulse: 80 70 (!) 58    Resp:  17 19    Temp:   98.4 °F (36.9 °C) 98.4 °F (36.9 °C)   TempSrc:   Oral Oral   SpO2:  100% 100%    Weight:       Height:                  LABORATORY STUDIES:     Recent Results (from the past 36 hour(s))   POCT glucose    Collection Time: 09/25/19  4:13 PM   Result Value Ref Range    POCT Glucose 263 (H) 70 - 110 mg/dL   CBC W/ AUTO DIFFERENTIAL    Collection Time: 09/25/19  4:18 PM   Result Value Ref Range    WBC 11.76 3.90 - 12.70 K/uL    RBC 4.97 4.60  - 6.20 M/uL    Hemoglobin 12.8 (L) 14.0 - 18.0 g/dL    Hematocrit 40.0 40.0 - 54.0 %    Mean Corpuscular Volume 81 (L) 82 - 98 fL    Mean Corpuscular Hemoglobin 25.8 (L) 27.0 - 31.0 pg    Mean Corpuscular Hemoglobin Conc 32.0 32.0 - 36.0 g/dL    RDW 13.5 11.5 - 14.5 %    Platelets 266 150 - 350 K/uL    MPV 9.8 9.2 - 12.9 fL    Gran # (ANC) 7.0 1.8 - 7.7 K/uL    Lymph # 3.7 1.0 - 4.8 K/uL    Mono # 1.0 0.3 - 1.0 K/uL    Eos # 0.1 0.0 - 0.5 K/uL    Baso # 0.05 0.00 - 0.20 K/uL    Gran% 59.5 38.0 - 73.0 %    Lymph% 31.0 18.0 - 48.0 %    Mono% 8.2 4.0 - 15.0 %    Eosinophil% 0.9 0.0 - 8.0 %    Basophil% 0.4 0.0 - 1.9 %    Differential Method Automated    Comprehensive metabolic panel    Collection Time: 09/25/19  4:18 PM   Result Value Ref Range    Sodium 136 136 - 145 mmol/L    Potassium 4.1 3.5 - 5.1 mmol/L    Chloride 104 95 - 110 mmol/L    CO2 23 23 - 29 mmol/L    Glucose 276 (H) 70 - 110 mg/dL    BUN, Bld 13 8 - 23 mg/dL    Creatinine 1.1 0.5 - 1.4 mg/dL    Calcium 10.4 8.7 - 10.5 mg/dL    Total Protein 7.5 6.0 - 8.4 g/dL    Albumin 3.8 3.5 - 5.2 g/dL    Total Bilirubin 0.2 0.1 - 1.0 mg/dL    Alkaline Phosphatase 81 55 - 135 U/L    AST 15 10 - 40 U/L    ALT 11 10 - 44 U/L    Anion Gap 9 8 - 16 mmol/L    eGFR if African American >60 >60 mL/min/1.73 m^2    eGFR if non African American >60 >60 mL/min/1.73 m^2   Protime-INR    Collection Time: 09/25/19  4:18 PM   Result Value Ref Range    Prothrombin Time 13.0 (H) 9.0 - 12.5 sec    INR 1.2 0.8 - 1.2   TSH    Collection Time: 09/25/19  4:18 PM   Result Value Ref Range    TSH 0.251 (L) 0.400 - 4.000 uIU/mL   LDL - Lipid Panel    Collection Time: 09/25/19  4:18 PM   Result Value Ref Range    Cholesterol 156 120 - 199 mg/dL    Triglycerides 107 30 - 150 mg/dL    HDL 36 (L) 40 - 75 mg/dL    LDL Cholesterol 98.6 63.0 - 159.0 mg/dL    Hdl/Cholesterol Ratio 23.1 20.0 - 50.0 %    Total Cholesterol/HDL Ratio 4.3 2.0 - 5.0    Non-HDL Cholesterol 120 mg/dL   APTT    Collection  Time: 09/25/19  4:18 PM   Result Value Ref Range    aPTT 30.2 21.0 - 32.0 sec   T4, free    Collection Time: 09/25/19  4:18 PM   Result Value Ref Range    Free T4 1.05 0.71 - 1.51 ng/dL       Lab Results   Component Value Date    INR 1.2 09/25/2019     Lab Results   Component Value Date    HGBA1C 7.3 (H) 09/29/2015     Recent Labs     09/25/19  1613   POCTGLUCOSE 263*       IMAGING:     Imaging Results          MRI Brain Without Contrast (In process)  Result time 09/25/19 22:40:35               CTA Head and Neck (xpd) (Final result)  Result time 09/25/19 18:08:43    Final result by Cuong Herrera MD (09/25/19 18:08:43)                 Impression:      No enhancing lesion or mass or acute infarcts seen.    No significant stenosis.  No aneurysm or embolism or thrombosis.    Degenerative changes of cervical spine    Mild emphysema.      Electronically signed by: Cuong Herrera  Date:    09/25/2019  Time:    18:08             Narrative:    EXAMINATION:  CTA HEAD AND NECK (XPD)    CLINICAL HISTORY:  Focal neuro deficit, new, fixed or worsening, >6 hours;    TECHNIQUE:  Non contrast low dose axial images were obtained through the head.  CT angiogram was performed from the level of the terri to the top of the head following the IV administration of 75mL of Omnipaque 350.   Sagittal and coronal reconstructions and maximum intensity projection reconstructions were performed. Arterial stenosis percentages are based on NASCET measurement criteria.    COMPARISON:  Head CT from 04/20 18:00 on 09/25/2019.    FINDINGS:  Head CT: No enhancing lesion or mass.  No midline shift or mass effect.  There is moderate periventricular white matter decreased density likely microvascular changes.  No subdural collection appreciated.    Vascular: Brachiocephalic artery and the visualized right subclavian artery appear normal.    The right common carotid artery and the right external carotid artery and the right internal carotid artery  appears normal through the neck and skull base and cavernous sinuses.  The right M1 and M2 branches appear normal.  The right A1 segment and right RODOLFO appears normal.  The anterior communicating artery appears normal.    The left common carotid artery and the left external carotid artery and the left internal carotid artery appears normal through the neck and skull base and cavernous sinus.  The left M1 segment and the M2 branches appear normal.    The vertebral arteries appear codominant.  The right vertebral artery appears normal.  The left vertebral artery appears normal without stenosis.  The basilar artery appears normal.  The superior cerebellar arteries appear normal the left posterior cerebral artery appears normal and has fetal origin.  The right P1 segment and PCA a appear normal.  There is a right posterior communicating artery.    No aneurysm or stenosis or embolus found.    Miscellaneous: There is moderate to severe degenerative change of the C3-4 and C5-6 and C6-7 levels with posterior osteophytic ridging.  No fracture or compression deformity or endplate erosion.  The atlantal dens interval appears normal.  Facets show normal alignment without fracture.  No prevertebral soft tissue swelling.  The epiglottis appears normal.  Mandible appears intact with normal morphology and position of the condyles.  The patient is edentulous.  No skull fracture.  The visualized mastoid sinuses and middle ears appear normal.  There is a small retention cyst in the right maxillary sinus, likely incidental finding.  The orbits and globes appear normal.  There is right cataract surgery.  Visualized lungs show mild centrilobular emphysema.  No pneumothorax or pleural effusion.  No interstitial edema.                               X-Ray Chest AP Portable (Final result)  Result time 09/25/19 16:52:45    Final result by Jace Guaman MD (09/25/19 16:52:45)                 Impression:      No acute radiographic findings  in the chest on this single view.      Electronically signed by: Jace Guaman MD  Date:    09/25/2019  Time:    16:52             Narrative:    EXAMINATION:  XR CHEST AP PORTABLE    CLINICAL HISTORY:  Stroke;    TECHNIQUE:  Single frontal view of the chest was performed.    COMPARISON:  Radiograph 03/30/2017.    FINDINGS:  Cardiac monitoring leads project over the bilateral hemithoraces.  Mediastinal structures are midline.  Hilar contours are unremarkable.  Cardiac silhouette is normal in size.  Lung volumes are symmetric.  No consolidation.  No pneumothorax or pleural effusions.  No free air beneath the diaphragm.  Degenerative changes of the spine noted.  There are multiple remote left-sided rib fractures.                                CT Head Without Contrast (Final result)  Result time 09/25/19 16:43:04    Final result by Josep Morse MD (09/25/19 16:43:04)                 Impression:      Right frontal lobe subtle focus of peripheral hypoattenuation which may be related to artifact from volume averaging versus recent infarct.  No intracranial hemorrhage.  Further evaluation/follow-up as warranted.    Mild generalized cerebral volume loss with supratentorial white matter patchy hypoattenuation suggesting sequela of advanced chronic small vessel ischemic change.    Right internal capsule and bilateral thalamic suspected lacunar type infarcts, age-indeterminate.  Correlate clinically.    This report was flagged in Epic as abnormal.      Electronically signed by: Josep Morse MD  Date:    09/25/2019  Time:    16:43             Narrative:    EXAMINATION:  CT HEAD WITHOUT CONTRAST    CLINICAL HISTORY:  Focal neuro deficit, new, fixed or worsening, >6 hours;    TECHNIQUE:  Low dose axial CT images obtained throughout the head without intravenous contrast. Sagittal and coronal reconstructions were performed.    COMPARISON:  None.    FINDINGS:  Intracranial compartment:    Brain appears normally formed.  Mild  generalized cerebral volume loss, age-appropriate.  Ventricles are midline without distortion by mass effect or acute hydrocephalus noting cavum septum pellucidum.  No extra-axial blood or fluid collections.    Moderate to marked degree of patchy hypoattenuation of the bilateral subcortical and periventricular white matter, nonspecific but likely sequela of advanced chronic small vessel ischemic change.  More focal areas of hypoattenuation at the right internal capsule and bilateral thalami, likely lacunar type infarcts.  Small focus peripheral hypoattenuation with poor visualization of the cortical ribbon and gray-white differentiation at the posterior right frontal lobe best seen on axial image 21 of series 2 no parenchymal mass, hemorrhage, edema or major vascular distribution infarct.  Skull base atherosclerotic vascular calcifications noted.    Skull/extracranial contents (limited evaluation): No fracture.  Mild patchy mucosal thickening of the right-sided ethmoidal air cells.  Mastoid air cells and remaining imaged paranasal sinuses are essentially clear.  Probable prior surgery to the right ocular lens.                                  CONSULTS:     IP CONSULT TO TELEMEDICINE - STROKE  IP CONSULT TO NEUROLOGY  IP CONSULT TO SOCIAL WORK/CASE MANAGEMENT       ASSESSMENT & PLAN:     Primary Diagnosis:  Cerebrovascular accident (CVA) due to stenosis of right middle cerebral artery    Cerebrovascular accident (CVA) due to stenosis of right middle cerebral artery  Cerebrovascular accident (CVA) due to stenosis of right middle cerebral artery  Antithrombotics for secondary stroke prevention: Anticoagulants: Warfarin INR adjusted target    Statins for secondary stroke prevention and hyperlipidemia, if present:   Statins: Atorvastatin- 80 mg daily    Aggressive risk factor modification: HTN, DM, HLD     Rehab efforts: The patient has been evaluated by a stroke team provider and the therapy needs have been fully  considered based off the presenting complaints and exam findings. The following therapy evaluations are needed: PT evaluate and treat, OT evaluate and treat, SLP evaluate and treat    Diagnostics ordered/pending: CTA Head to assess vasculature , CTA Neck/Arch to assess vasculature, HgbA1C to assess blood glucose levels, Lipid Profile to assess cholesterol levels, MRI head without contrast to assess brain parenchyma, TTE to assess cardiac function/status     VTE prophylaxis: None: Reason for No Pharmacological VTE Prophylaxis: Currently on anticoagulation    BP parameters: Infarct: No intervention, SBP <220        Essential hypertension  Will allow for permissive hypertension as addressed above.    Type 2 diabetes mellitus, controlled  Well controlled on a home regimen of metformin and a sulfonylurea; will provide basal-prandial insulin therapy along with insulin sliding scale.    Hyperlipidemia  Poorly controlled; will continue his home regimen of rosuvastatin.    GERD (gastroesophageal reflux disease)  With his to his home regimen of omeprazole for pantoprazole while he is inpatient.    BPH (benign prostatic hyperplasia)  Stable; will continue his home regimen of finasteride and tamsulosin.    Cocaine abuse  He has been counseled on cessation.    Cannabis abuse  He has been counseled on cessation.    Tobacco dependence  Patient was counseled on smoking cessation and he will be provided a nicotine transdermal patch applied while inpatient.  Will provide additional smoking cessation counseling prior to discharge.    History of DVT (deep vein thrombosis)  His INR is subtherapeutic at 1.2; will restart his home regimen of warfarin and monitor INR daily.    Chronic anticoagulation  As addressed above.    Cerebrovascular accident (CVA) due to stenosis of right middle cerebral artery  CT-head was significant for [r]ight frontal lobe subtle focus of peripheral hypoattenuation which may be related to artifact from volume  averaging versus recent infarct.  No intracranial hemorrhage.  MRI/MRA-brain are pending.  I have reviewed the EKG and it reveals sinus rhythm with a second degree AVB.  2D-echo is pending. Patient is not within the therapeutic window for tPA. Patient is aspirin-naive, so will start aspirin; obtain a lipid panel; allow for permissive hypertension in order not to extend the penumbra; consult PT/OT and Speech Therapy for evaluation; consult  for discharge planning; and consult Neurology for further recommendations.         VTE Risk Mitigation (From admission, onward)         Ordered     warfarin (COUMADIN) tablet 7.5 mg  Daily      09/25/19 2026     IP VTE HIGH RISK PATIENT  Once      09/25/19 2026                PENELOPE Delaney-S2  [facility] Medicine   [facility]

## 2019-09-26 NOTE — SUBJECTIVE & OBJECTIVE
Past Medical History:   Diagnosis Date    Blind left eye     able to see, but poorly    BPH (benign prostatic hyperplasia)     Diabetes mellitus, type 2     Hyperlipidemia     Hypertension     Left rib fracture 03/30/2017    Stroke-like symptoms 09/25/2019    L facial droop, slurred speech & L arm weakness       Past Surgical History:   Procedure Laterality Date    NO PAST SURGERIES  09/25/2019       Review of patient's allergies indicates:  No Known Allergies    No current facility-administered medications on file prior to encounter.      Current Outpatient Medications on File Prior to Encounter   Medication Sig    gemfibrozil (LOPID) 600 MG tablet Take 600 mg by mouth 2 (two) times daily before meals.    glipiZIDE (GLUCOTROL) 10 MG tablet Take 1 tablet (10 mg total) by mouth 2 (two) times daily before meals.    lisinopril (PRINIVIL,ZESTRIL) 40 MG tablet Take 40 mg by mouth once daily.    metformin (GLUCOPHAGE) 500 MG tablet Take 1,000 mg by mouth 2 (two) times daily with meals.     omeprazole (PRILOSEC) 20 MG capsule TAKE 2 CAPSULES (40 MG) BY MOUTH ONCE DAILY.    rosuvastatin (CRESTOR) 40 MG Tab Take 10 mg by mouth every evening.    tamsulosin (FLOMAX) 0.4 mg Cp24 Take 1 capsule (0.4 mg total) by mouth once daily.    warfarin (COUMADIN) 7.5 MG tablet Take 7.5 mg by mouth once daily.    blood sugar diagnostic (BLOOD GLUCOSE TEST) Strp by Misc.(Non-Drug; Combo Route) route.    ciclopirox (PENLAC) 8 % Soln Apply topically once daily. Apply topically to affected nails once daily.  Remove once weekly.  Repeat.  Follow package insert.    finasteride (PROSCAR) 5 mg tablet Take 1 tablet (5 mg total) by mouth once daily.    glipiZIDE (GLUCOTROL) 10 MG tablet TAKE 1 TABLET (10 MG TOTAL) BY MOUTH 2 TIMES DAILY BEFORE MEALS.    sildenafil (VIAGRA) 100 MG tablet Take 1 tablet (100 mg total) by mouth daily as needed for Erectile Dysfunction.    triamcinolone acetonide 0.1% (KENALOG) 0.1 % cream Apply  "topically 3 (three) times daily.    ULTILET CLASSIC LANCETS MISC by Misc.(Non-Drug; Combo Route) route.    [DISCONTINUED] benazepril (LOTENSIN) 5 MG tablet TAKE 1 TABLET (5 MG TOTAL) BY MOUTH ONCE DAILY.    [DISCONTINUED] hydrocodone-acetaminophen 5-325mg (NORCO) 5-325 mg per tablet Take 1 tablet by mouth every 6 (six) hours as needed for Pain.    [DISCONTINUED] omeprazole (PRILOSEC) 40 MG capsule Take 1 capsule (40 mg total) by mouth once daily.    [DISCONTINUED] tobramycin-dexamethasone 0.3-0.1% (TOBRADEX) 0.3-0.1 % DrpS      Family History     Problem Relation (Age of Onset)    Heart disease Mother, Father    Stroke Sister        Tobacco Use    Smoking status: Light Tobacco Smoker     Types: Cigarettes, Cigars    Smokeless tobacco: Never Used    Tobacco comment: socially   Substance and Sexual Activity    Alcohol use: Yes     Alcohol/week: 0.0 standard drinks     Comment: drinks beer socially     Drug use: Yes     Types: Marijuana, "Crack" cocaine     Comment: 9/25/19: last smoked "a blunt, with some crack in it, about a month ago"    Sexual activity: Not Currently     Review of Systems   Constitutional: Negative for activity change, appetite change, chills, diaphoresis, fatigue, fever and unexpected weight change.   HENT: Negative.    Eyes: Negative.    Respiratory: Negative for cough, chest tightness, shortness of breath and wheezing.    Cardiovascular: Negative for chest pain, palpitations and leg swelling.   Gastrointestinal: Negative for abdominal distention, abdominal pain, blood in stool, constipation, diarrhea, nausea and vomiting.   Genitourinary: Negative for dysuria and hematuria.   Musculoskeletal: Negative.    Skin: Negative.    Neurological: Positive for facial asymmetry, speech difficulty and weakness. Negative for dizziness, tremors, seizures, syncope, light-headedness, numbness and headaches.   Psychiatric/Behavioral: Negative.      Objective:     Vital Signs (Most Recent):  Temp: 98.4 " °F (36.9 °C) (09/25/19 1920)  Pulse: (!) 58 (09/25/19 1901)  Resp: 19 (09/25/19 1901)  BP: (!) 150/71 (09/25/19 1901)  SpO2: 100 % (09/25/19 1901) Vital Signs (24h Range):  Temp:  [98.4 °F (36.9 °C)] 98.4 °F (36.9 °C)  Pulse:  [58-87] 58  Resp:  [16-20] 19  SpO2:  [100 %] 100 %  BP: (150-183)/(71-89) 150/71     Weight: 70.3 kg (155 lb)  Body mass index is 21.02 kg/m².    Physical Exam   Constitutional: He is oriented to person, place, and time. He appears well-developed and well-nourished. No distress.   HENT:   Head: Normocephalic and atraumatic.   Right Ear: External ear normal.   Left Ear: External ear normal.   Nose: Nose normal.   Eyes: Right eye exhibits no discharge. Left eye exhibits no discharge.   Neck: Normal range of motion.   Cardiovascular: Normal rate, regular rhythm, normal heart sounds and intact distal pulses. Exam reveals no gallop and no friction rub.   No murmur heard.  Pulmonary/Chest: Effort normal and breath sounds normal. No stridor. No respiratory distress. He has no wheezes. He has no rales. He exhibits no tenderness.   Abdominal: Soft. Bowel sounds are normal. He exhibits no distension. There is no tenderness. There is no rebound and no guarding.   Musculoskeletal: Normal range of motion. He exhibits no edema.   Neurological: He is alert and oriented to person, place, and time. GCS eye subscore is 4. GCS verbal subscore is 5. GCS motor subscore is 6.   Reflex Scores:       Bicep reflexes are 2+ on the right side and 2+ on the left side.       Brachioradialis reflexes are 2+ on the right side and 2+ on the left side.       Patellar reflexes are 3+ on the right side and 2+ on the left side.  Although a little delayed on the LUE, he has 5/5 strength throughout and intact sensation; there is left-sided facial asymmetry along with dysarthria and left-sided dysmetria, but no aphasia or pronator drift   Skin: Skin is warm and dry. He is not diaphoretic. No erythema.   Psychiatric: He has a  normal mood and affect. His behavior is normal. Judgment and thought content normal.   Nursing note and vitals reviewed.          Significant Labs: All pertinent labs within the past 24 hours have been reviewed.    Significant Imaging: I have reviewed and interpreted all pertinent imaging results/findings within the past 24 hours.

## 2019-09-26 NOTE — PLAN OF CARE
09/26/19 1125   Discharge Assessment   Assessment Type Discharge Planning Assessment   Confirmed/corrected address and phone number on facesheet? Yes   Assessment information obtained from? Patient   Communicated expected length of stay with patient/caregiver yes   Prior to hospitilization cognitive status: Alert/Oriented   Prior to hospitalization functional status: Independent   Current cognitive status: Alert/Oriented   Current Functional Status: Assistive Equipment   Facility Arrived From: Home    Lives With alone   Able to Return to Prior Arrangements yes   Is patient able to care for self after discharge? Unable to determine at this time (comments)   Who are your caregiver(s) and their phone number(s)? Forest pt's son, [phone]   Patient's perception of discharge disposition admitted as an inpatient   Readmission Within the Last 30 Days no previous admission in last 30 days   Patient currently being followed by outpatient case management? No   Patient currently receives any other outside agency services? No   Equipment Currently Used at Home none   Do you have any problems affording any of your prescribed medications? No   Is the patient taking medications as prescribed? yes   Does the patient have transportation home? Yes   Transportation Anticipated family or friend will provide   Dialysis Name and Scheduled days N/A    Does the patient receive services at [facility]? Yes   Discharge Plan A Home;Home Health   Discharge Plan B Other  (TBD )   DME Needed Upon Discharge  other (see comments)  (TBD )   Patient/Family in Agreement with Plan yes     SW to patient's room to discuss Helping the patient manage care at home.   TN/SW role explained to pt.  Patient identified using two identifiers:  Name and date of birth.    SW's name and contact info placed on white board.     Person who will help at home if needed:  Forest, pt's son.     Preferred pharmacy:   [facility] [place] - [phone]  [address]  [place] [address]  Phone: [phone] Fax: [phone]      "Help at home Questions" discussed and placed in "My Health Packet" and placed at bedside.     Preferred Appointment time: Afternoon appointments.         "

## 2019-09-26 NOTE — PLAN OF CARE
Problem: Adult Inpatient Plan of Care  Goal: Plan of Care Review  Outcome: Ongoing, Progressing  Goal: Patient-Specific Goal (Individualization)  Outcome: Ongoing, Progressing  Goal: Absence of Hospital-Acquired Illness or Injury  Outcome: Ongoing, Progressing  Goal: Optimal Comfort and Wellbeing  Outcome: Ongoing, Progressing  Goal: Readiness for Transition of Care  Outcome: Ongoing, Progressing  Goal: Rounds/Family Conference  Outcome: Ongoing, Progressing     Problem: Fall Injury Risk  Goal: Absence of Fall and Fall-Related Injury  Outcome: Ongoing, Progressing     Problem: Fall Injury Risk  Goal: Absence of Fall and Fall-Related Injury  Outcome: Ongoing, Progressing     Problem: Diabetes Comorbidity  Goal: Blood Glucose Level Within Desired Range  Outcome: Ongoing, Progressing

## 2019-09-26 NOTE — PT/OT/SLP EVAL
"Speech Language Pathology Evaluation  Bedside Swallow    Patient Name:  Forest Lopez   MRN:  [id number]  Admitting Diagnosis: Cerebrovascular accident (CVA) due to stenosis of right middle cerebral artery    Recommendations:                 General Recommendations:  Dysphagia therapy  Diet recommendations:  Mechanical soft, Thin   Aspiration Precautions: 1 bite/sip at a time, Alternating bites/sips, Check for pocketing/oral residue, Small bites/sips and Standard aspiration precautions   General Precautions: Standard, other (see comments)(mechanical soft)  Communication strategies:  speak slowly, open mouth wide when talking    History:     Past Medical History:   Diagnosis Date    Blind left eye     able to see, but poorly    BPH (benign prostatic hyperplasia)     Diabetes mellitus, type 2     Hyperlipidemia     Hypertension     Left rib fracture 03/30/2017    Stroke-like symptoms 09/25/2019    L facial droop, slurred speech & L arm weakness       Past Surgical History:   Procedure Laterality Date    NO PAST SURGERIES  09/25/2019       Modified Barium Swallow: n/a    Chest X-Rays: 9/25/19 Lung volumes are symmetric.  No consolidation.  No pneumothorax or pleural effusions.  No free air beneath the diaphragm    Prior diet: unrestricted.    Subjective     Pt asleep upon SLP arrival, however, easily awoken to participate in eval. Pt reports occasional spillage of liquids from left anterior corner of mouth when drinking from cup.    Patient goals: "I'm eating good."    Pain/Comfort:  · Pain Rating 1: 0/10    Objective:     Oral Musculature Evaluation  · Oral Musculature: facial asymmetry present, other (see comments)(left sided)  · Dentition: edentulous  · Secretion Management: adequate  · Mucosal Quality: good  · Mandibular Strength and Mobility: WFL  · Oral Labial Strength and Mobility: impaired retraction, impaired coordination, impaired seal, impaired pursing  · Lingual Strength and Mobility: WFL  · Velar " Elevation: WFL  · Buccal Strength and Mobility: impaired  · Voice Prior to PO Intake: adequate volume; clear quality    Bedside Swallow Eval:   Consistencies Assessed:  · Thin liquids via straw x6 trials self presented  · Puree x4 trials  · Soft solids x4 trials   · Regular solids x 2 trials    Oral Phase:   · Excess chewing  · Prolonged mastication  · Oral residue        Spillage of liquids from left anterior corner of mouth with cup drinking    Pharyngeal Phase:   · no overt clinical signs/symptoms of aspiration    Compensatory Strategies  · Drink using straw  · Lingual sweep    Treatment: Rec: PO diet of mech soft/thin liquids; OME & will complete dysarthria eval    Assessment:     Forest Lopez is a 69 y.o. male with a Cerebrovascular accident (CVA) due to stenosis of right middle cerebral artery. Pt presents with mod oral dysphagia c/b impaired labial ROM, strength, and pocketing on left side. ST will also complete dysarthria eval to further assess speech production and update POC.    Goals:   Multidisciplinary Problems     SLP Goals        Problem: SLP Goal    Goal Priority Disciplines Outcome   SLP Goal    Medium SLP Ongoing, Progressing   Description:  ST. Pt will tolerate PO diet of mechanical soft solids with thin liquids without overt s/s of aspiration.  2. Pt will perform OME 2sets x10 each with good ability.  3.Pt will participate in speech/language eval to further assess dysarthria.                    Plan:     · Patient to be seen:  3 x/week   · Plan of Care expires:     · Plan of Care reviewed with:  patient   · SLP Follow-Up:  Yes       Discharge recommendations:  other (see comments)(TBD)   Barriers to Discharge:  None    Time Tracking:     SLP Treatment Date:   19  Speech Start Time:  1005  Speech Stop Time:  1020     Speech Total Time (min):  15 min    Billable Minutes: Eval Swallow and Oral Function 15 min    Kourtney Espinoza CCC-SLP  2019

## 2019-09-26 NOTE — PLAN OF CARE
Problem: Occupational Therapy Goal  Goal: Occupational Therapy Goal  Description  Goals to be met by: 10/10/2019     Patient will increase functional independence with ADLs by performing:    UE Dressing with Dix.  LE Dressing with Dix.  Grooming while standing at sink with Stand-by Assistance.  Sitting at edge of bed x15 minutes with Dix.  Supine to sit with Dix.  Step transfer with Contact Guard Assistance  Upper extremity exercise program per handout, with assistance as needed.     Outcome: Ongoing, Progressing    Patient tolerated evaluation well, good participation.  Patient will benefit from skilled OT services to address the above mentioned goals. MICHELLE Dasilva, MS

## 2019-09-27 LAB
ALBUMIN SERPL BCP-MCNC: 3.9 G/DL (ref 3.5–5.2)
ALP SERPL-CCNC: 81 U/L (ref 55–135)
ALT SERPL W/O P-5'-P-CCNC: 14 U/L (ref 10–44)
ANION GAP SERPL CALC-SCNC: 7 MMOL/L (ref 8–16)
AORTIC ROOT ANNULUS: 3.32 CM
AORTIC VALVE CUSP SEPERATION: 2.06 CM
ASCENDING AORTA: 2.34 CM
AST SERPL-CCNC: 13 U/L (ref 10–40)
AV INDEX (PROSTH): 0.69
AV MEAN GRADIENT: 3 MMHG
AV PEAK GRADIENT: 6 MMHG
AV VALVE AREA: 2.11 CM2
AV VELOCITY RATIO: 0.82
BASOPHILS # BLD AUTO: 0.05 K/UL (ref 0–0.2)
BASOPHILS NFR BLD: 0.5 % (ref 0–1.9)
BILIRUB SERPL-MCNC: 0.3 MG/DL (ref 0.1–1)
BSA FOR ECHO PROCEDURE: 1.9 M2
BUN SERPL-MCNC: 12 MG/DL (ref 8–23)
CALCIUM SERPL-MCNC: 9.7 MG/DL (ref 8.7–10.5)
CHLORIDE SERPL-SCNC: 103 MMOL/L (ref 95–110)
CO2 SERPL-SCNC: 26 MMOL/L (ref 23–29)
CREAT SERPL-MCNC: 1 MG/DL (ref 0.5–1.4)
CV ECHO LV RWT: 0.5 CM
DIFFERENTIAL METHOD: ABNORMAL
DOP CALC AO PEAK VEL: 1.19 M/S
DOP CALC AO VTI: 25.19 CM
DOP CALC LVOT AREA: 3.1 CM2
DOP CALC LVOT DIAMETER: 1.98 CM
DOP CALC LVOT PEAK VEL: 0.98 M/S
DOP CALC LVOT STROKE VOLUME: 53.27 CM3
DOP CALCLVOT PEAK VEL VTI: 17.31 CM
E WAVE DECELERATION TIME: 300.44 MSEC
E/A RATIO: 0.75
E/E' RATIO: 8.46 M/S
ECHO LV POSTERIOR WALL: 1.04 CM (ref 0.6–1.1)
EOSINOPHIL # BLD AUTO: 0.1 K/UL (ref 0–0.5)
EOSINOPHIL NFR BLD: 1.3 % (ref 0–8)
ERYTHROCYTE [DISTWIDTH] IN BLOOD BY AUTOMATED COUNT: 12.9 % (ref 11.5–14.5)
EST. GFR  (AFRICAN AMERICAN): >60 ML/MIN/1.73 M^2
EST. GFR  (NON AFRICAN AMERICAN): >60 ML/MIN/1.73 M^2
FRACTIONAL SHORTENING: 29 % (ref 28–44)
GLUCOSE SERPL-MCNC: 237 MG/DL (ref 70–110)
HCT VFR BLD AUTO: 41.7 % (ref 40–54)
HGB BLD-MCNC: 13.6 G/DL (ref 14–18)
INR PPP: 1.1 (ref 0.8–1.2)
INTERVENTRICULAR SEPTUM: 0.97 CM (ref 0.6–1.1)
IVRT: 0.08 MSEC
LA MAJOR: 4.67 CM
LA MINOR: 4.78 CM
LA WIDTH: 4.46 CM
LEFT ATRIUM SIZE: 3.17 CM
LEFT ATRIUM VOLUME INDEX: 29.6 ML/M2
LEFT ATRIUM VOLUME: 56.77 CM3
LEFT INTERNAL DIMENSION IN SYSTOLE: 2.95 CM (ref 2.1–4)
LEFT VENTRICLE DIASTOLIC VOLUME INDEX: 40.1 ML/M2
LEFT VENTRICLE DIASTOLIC VOLUME: 76.87 ML
LEFT VENTRICLE MASS INDEX: 71 G/M2
LEFT VENTRICLE SYSTOLIC VOLUME INDEX: 17.6 ML/M2
LEFT VENTRICLE SYSTOLIC VOLUME: 33.72 ML
LEFT VENTRICULAR INTERNAL DIMENSION IN DIASTOLE: 4.16 CM (ref 3.5–6)
LEFT VENTRICULAR MASS: 136.13 G
LV LATERAL E/E' RATIO: 6.88 M/S
LV SEPTAL E/E' RATIO: 11 M/S
LYMPHOCYTES # BLD AUTO: 2.5 K/UL (ref 1–4.8)
LYMPHOCYTES NFR BLD: 23.9 % (ref 18–48)
MCH RBC QN AUTO: 25.8 PG (ref 27–31)
MCHC RBC AUTO-ENTMCNC: 32.6 G/DL (ref 32–36)
MCV RBC AUTO: 79 FL (ref 82–98)
MONOCYTES # BLD AUTO: 1.1 K/UL (ref 0.3–1)
MONOCYTES NFR BLD: 10.1 % (ref 4–15)
MV PEAK A VEL: 0.73 M/S
MV PEAK E VEL: 0.55 M/S
NEUTROPHILS # BLD AUTO: 6.7 K/UL (ref 1.8–7.7)
NEUTROPHILS NFR BLD: 64.2 % (ref 38–73)
PISA TR MAX VEL: 1.75 M/S
PLATELET # BLD AUTO: 295 K/UL (ref 150–350)
PMV BLD AUTO: 10.2 FL (ref 9.2–12.9)
POCT GLUCOSE: 175 MG/DL (ref 70–110)
POCT GLUCOSE: 294 MG/DL (ref 70–110)
POCT GLUCOSE: 328 MG/DL (ref 70–110)
POCT GLUCOSE: 332 MG/DL (ref 70–110)
POTASSIUM SERPL-SCNC: 4 MMOL/L (ref 3.5–5.1)
PROT SERPL-MCNC: 7.3 G/DL (ref 6–8.4)
PROTHROMBIN TIME: 11.3 SEC (ref 9–12.5)
PULM VEIN S/D RATIO: 1.54
PV PEAK D VEL: 0.35 M/S
PV PEAK S VEL: 0.54 M/S
PV PEAK VELOCITY: 1.07 CM/S
RA MAJOR: 4.11 CM
RA PRESSURE: 3 MMHG
RA WIDTH: 3.67 CM
RBC # BLD AUTO: 5.28 M/UL (ref 4.6–6.2)
RIGHT VENTRICULAR END-DIASTOLIC DIMENSION: 3.08 CM
RV TISSUE DOPPLER FREE WALL SYSTOLIC VELOCITY 1 (APICAL 4 CHAMBER VIEW): 11.46 CM/S
SINUS: 2.53 CM
SODIUM SERPL-SCNC: 136 MMOL/L (ref 136–145)
STJ: 2.25 CM
TDI LATERAL: 0.08 M/S
TDI SEPTAL: 0.05 M/S
TDI: 0.07 M/S
TR MAX PG: 12 MMHG
TRICUSPID ANNULAR PLANE SYSTOLIC EXCURSION: 2.15 CM
TV REST PULMONARY ARTERY PRESSURE: 15 MMHG
WBC # BLD AUTO: 10.44 K/UL (ref 3.9–12.7)

## 2019-09-27 PROCEDURE — 85610 PROTHROMBIN TIME: CPT

## 2019-09-27 PROCEDURE — 36415 COLL VENOUS BLD VENIPUNCTURE: CPT

## 2019-09-27 PROCEDURE — 96372 THER/PROPH/DIAG INJ SC/IM: CPT

## 2019-09-27 PROCEDURE — 97161 PT EVAL LOW COMPLEX 20 MIN: CPT

## 2019-09-27 PROCEDURE — 21400001 HC TELEMETRY ROOM

## 2019-09-27 PROCEDURE — 80053 COMPREHEN METABOLIC PANEL: CPT

## 2019-09-27 PROCEDURE — 25000003 PHARM REV CODE 250: Performed by: INTERNAL MEDICINE

## 2019-09-27 PROCEDURE — 94761 N-INVAS EAR/PLS OXIMETRY MLT: CPT

## 2019-09-27 PROCEDURE — 92523 SPEECH SOUND LANG COMPREHEN: CPT

## 2019-09-27 PROCEDURE — 85025 COMPLETE CBC W/AUTO DIFF WBC: CPT

## 2019-09-27 PROCEDURE — G0378 HOSPITAL OBSERVATION PER HR: HCPCS

## 2019-09-27 RX ORDER — WARFARIN SODIUM 5 MG/1
10 TABLET ORAL DAILY
Status: DISCONTINUED | OUTPATIENT
Start: 2019-09-28 | End: 2019-09-30 | Stop reason: HOSPADM

## 2019-09-27 RX ADMIN — INSULIN DETEMIR 10 UNITS: 100 INJECTION, SOLUTION SUBCUTANEOUS at 09:09

## 2019-09-27 RX ADMIN — PANTOPRAZOLE SODIUM 40 MG: 40 TABLET, DELAYED RELEASE ORAL at 08:09

## 2019-09-27 RX ADMIN — INSULIN ASPART 3 UNITS: 100 INJECTION, SOLUTION INTRAVENOUS; SUBCUTANEOUS at 04:09

## 2019-09-27 RX ADMIN — FINASTERIDE 5 MG: 5 TABLET, FILM COATED ORAL at 08:09

## 2019-09-27 RX ADMIN — INSULIN ASPART 3 UNITS: 100 INJECTION, SOLUTION INTRAVENOUS; SUBCUTANEOUS at 08:09

## 2019-09-27 RX ADMIN — INSULIN ASPART 3 UNITS: 100 INJECTION, SOLUTION INTRAVENOUS; SUBCUTANEOUS at 12:09

## 2019-09-27 RX ADMIN — INSULIN ASPART 4 UNITS: 100 INJECTION, SOLUTION INTRAVENOUS; SUBCUTANEOUS at 12:09

## 2019-09-27 RX ADMIN — ROSUVASTATIN CALCIUM 10 MG: 10 TABLET, COATED ORAL at 09:09

## 2019-09-27 RX ADMIN — ASPIRIN 325 MG: 325 TABLET, DELAYED RELEASE ORAL at 08:09

## 2019-09-27 RX ADMIN — WARFARIN SODIUM 7.5 MG: 7.5 TABLET ORAL at 04:09

## 2019-09-27 RX ADMIN — INSULIN ASPART 2 UNITS: 100 INJECTION, SOLUTION INTRAVENOUS; SUBCUTANEOUS at 09:09

## 2019-09-27 RX ADMIN — SENNOSIDES, DOCUSATE SODIUM 1 TABLET: 50; 8.6 TABLET, FILM COATED ORAL at 09:09

## 2019-09-27 NOTE — PLAN OF CARE
Problem: Adult Inpatient Plan of Care  Goal: Plan of Care Review  Outcome: Ongoing, Progressing  Goal: Patient-Specific Goal (Individualization)  Outcome: Ongoing, Progressing  Goal: Absence of Hospital-Acquired Illness or Injury  Outcome: Ongoing, Progressing  Goal: Optimal Comfort and Wellbeing  Outcome: Ongoing, Progressing  Goal: Rounds/Family Conference  Outcome: Ongoing, Progressing     Problem: Fall Injury Risk  Goal: Absence of Fall and Fall-Related Injury  Outcome: Ongoing, Progressing     Problem: Fall Injury Risk  Goal: Absence of Fall and Fall-Related Injury  Outcome: Ongoing, Progressing     Problem: Diabetes Comorbidity  Goal: Blood Glucose Level Within Desired Range  Outcome: Ongoing, Progressing

## 2019-09-27 NOTE — HOSPITAL COURSE
69-year-old male hypertension type 2 diabetes, hyperlipidemia, cocaine abuse, and THC abuse, presented to the hospital with left upper extremity weakness that began this morning.  He says that he woke up this morning and noticed that his left arm was feeling weak.  He tried to drink something but felt that he was having inability to maintain any liquids in his mouth as it seemed to be falling to the left side.  He says that he decided to take a rest and after the symptoms did not resolve when he awoke, he decided to come to the hospital for evaluation.  He has been feeling no improvement since he presented to the hospital.  He has been taking medications as directed including his oral anticoagulation.    - 09/29/2019  Persistent weakness in the left face and left upper extremity.

## 2019-09-27 NOTE — SUBJECTIVE & OBJECTIVE
Interval History:  Patient with left facial droop and difficulty eating on the left side of his mouth that is not improved.  He also reports left hand, especially his ability to hold onto things has not improved.    Review of Systems   Constitutional: Negative for chills and fever.   Respiratory: Negative for shortness of breath.    Cardiovascular: Negative for chest pain.     Objective:     Vital Signs (Most Recent):  Temp: 97.6 °F (36.4 °C) (09/27/19 1622)  Pulse: 61 (09/27/19 1622)  Resp: 16 (09/27/19 1622)  BP: (!) 185/86 (09/27/19 1622)  SpO2: 98 % (09/27/19 1622) Vital Signs (24h Range):  Temp:  [97.6 °F (36.4 °C)-98.7 °F (37.1 °C)] 97.6 °F (36.4 °C)  Pulse:  [52-63] 61  Resp:  [16-18] 16  SpO2:  [96 %-98 %] 98 %  BP: (151-185)/(79-88) 185/86     Weight: 70.8 kg (156 lb)  Body mass index is 21.16 kg/m².    Intake/Output Summary (Last 24 hours) at 9/27/2019 1628  Last data filed at 9/27/2019 1300  Gross per 24 hour   Intake 1194 ml   Output 1900 ml   Net -706 ml      Physical Exam   Constitutional: He is oriented to person, place, and time. He appears well-developed and well-nourished. No distress.   HENT:   Head: Atraumatic.   Left facial droop   Eyes: Conjunctivae and EOM are normal. Right eye exhibits no discharge. Left eye exhibits no discharge.   Neck: Normal range of motion.   Cardiovascular: Normal rate, regular rhythm, normal heart sounds and intact distal pulses. Exam reveals no gallop and no friction rub.   No murmur heard.  Pulmonary/Chest: Effort normal and breath sounds normal. No stridor. No respiratory distress. He has no wheezes. He has no rales. He exhibits no tenderness.   Abdominal: Soft. Bowel sounds are normal. He exhibits no distension. There is no tenderness. There is no rebound and no guarding.   Musculoskeletal: Normal range of motion. He exhibits no edema.   Neurological: He is alert and oriented to person, place, and time. GCS eye subscore is 4. GCS verbal subscore is 5. GCS motor  subscore is 6.   Reflex Scores:       Bicep reflexes are 2+ on the right side and 2+ on the left side.       Brachioradialis reflexes are 2+ on the right side and 2+ on the left side.       Patellar reflexes are 3+ on the right side and 2+ on the left side.  4/5 strength in LUE, especially decreased  strength and intact sensation; there is left-sided facial asymmetry along with dysarthria and left-sided dysmetria, but no aphasia or pronator drift   Skin: Skin is warm and dry. He is not diaphoretic. No erythema.   Psychiatric: He has a normal mood and affect. His behavior is normal. Judgment and thought content normal.   Nursing note and vitals reviewed.      Significant Labs: All pertinent labs within the past 24 hours have been reviewed.    Significant Imaging: I have reviewed and interpreted all pertinent imaging results/findings within the past 24 hours.

## 2019-09-27 NOTE — SUBJECTIVE & OBJECTIVE
Past Medical History:   Diagnosis Date    Blind left eye     able to see, but poorly    BPH (benign prostatic hyperplasia)     Diabetes mellitus, type 2     Hyperlipidemia     Hypertension     Left rib fracture 03/30/2017    Stroke-like symptoms 09/25/2019    L facial droop, slurred speech & L arm weakness       Past Surgical History:   Procedure Laterality Date    NO PAST SURGERIES  09/25/2019       Review of patient's allergies indicates:  No Known Allergies    Current Neurological Medications: ***    No current facility-administered medications on file prior to encounter.      Current Outpatient Medications on File Prior to Encounter   Medication Sig    gemfibrozil (LOPID) 600 MG tablet Take 600 mg by mouth 2 (two) times daily before meals.    glipiZIDE (GLUCOTROL) 10 MG tablet Take 1 tablet (10 mg total) by mouth 2 (two) times daily before meals.    lisinopril (PRINIVIL,ZESTRIL) 40 MG tablet Take 40 mg by mouth once daily.    metformin (GLUCOPHAGE) 500 MG tablet Take 1,000 mg by mouth 2 (two) times daily with meals.     omeprazole (PRILOSEC) 20 MG capsule TAKE 2 CAPSULES (40 MG) BY MOUTH ONCE DAILY.    rosuvastatin (CRESTOR) 40 MG Tab Take 10 mg by mouth every evening.    tamsulosin (FLOMAX) 0.4 mg Cp24 Take 1 capsule (0.4 mg total) by mouth once daily.    warfarin (COUMADIN) 7.5 MG tablet Take 7.5 mg by mouth once daily.    blood sugar diagnostic (BLOOD GLUCOSE TEST) Strp by Misc.(Non-Drug; Combo Route) route.    ciclopirox (PENLAC) 8 % Soln Apply topically once daily. Apply topically to affected nails once daily.  Remove once weekly.  Repeat.  Follow package insert.    finasteride (PROSCAR) 5 mg tablet Take 1 tablet (5 mg total) by mouth once daily.    glipiZIDE (GLUCOTROL) 10 MG tablet TAKE 1 TABLET (10 MG TOTAL) BY MOUTH 2 TIMES DAILY BEFORE MEALS.    sildenafil (VIAGRA) 100 MG tablet Take 1 tablet (100 mg total) by mouth daily as needed for Erectile Dysfunction.    triamcinolone  "acetonide 0.1% (KENALOG) 0.1 % cream Apply topically 3 (three) times daily.    ULTILET CLASSIC LANCETS MISC by Misc.(Non-Drug; Combo Route) route.     Family History     Problem Relation (Age of Onset)    Heart disease Mother, Father    Stroke Sister        Tobacco Use    Smoking status: Light Tobacco Smoker     Types: Cigarettes, Cigars    Smokeless tobacco: Never Used    Tobacco comment: socially   Substance and Sexual Activity    Alcohol use: Yes     Alcohol/week: 0.0 standard drinks     Comment: drinks beer socially     Drug use: Yes     Types: Marijuana, "Crack" cocaine     Comment: 9/25/19: last smoked "a blunt, with some crack in it, about a month ago"    Sexual activity: Not Currently     Review of Systems   Constitutional: Negative for activity change, appetite change, chills, diaphoresis, fatigue, fever and unexpected weight change.   HENT: Negative.    Eyes: Negative.    Respiratory: Negative for cough, chest tightness, shortness of breath and wheezing.    Cardiovascular: Negative for chest pain, palpitations and leg swelling.   Gastrointestinal: Negative for abdominal distention, abdominal pain, blood in stool, constipation, diarrhea, nausea and vomiting.   Genitourinary: Negative for dysuria and hematuria.   Musculoskeletal: Negative.    Skin: Negative.    Neurological: Positive for facial asymmetry, speech difficulty and weakness. Negative for dizziness, tremors, seizures, syncope, light-headedness, numbness and headaches.   Psychiatric/Behavioral: Negative.      Objective:     Vital Signs (Most Recent):  Temp: 98.7 °F (37.1 °C) (09/26/19 1948)  Pulse: (!) 57 (09/26/19 1948)  Resp: 18 (09/26/19 1948)  BP: (!) 157/83 (09/26/19 1948)  SpO2: 96 % (09/26/19 1948) Vital Signs (24h Range):  Temp:  [98 °F (36.7 °C)-98.7 °F (37.1 °C)] 98.7 °F (37.1 °C)  Pulse:  [56-74] 57  Resp:  [16-18] 18  SpO2:  [93 %-97 %] 96 %  BP: (127-158)/(74-93) 157/83     Weight: 70.2 kg (154 lb 12.2 oz)  Body mass index is " 20.99 kg/m².    Physical Exam   Constitutional: He is oriented to person, place, and time. He appears well-developed and well-nourished.   HENT:   Head: Normocephalic and atraumatic.   Eyes: Pupils are equal, round, and reactive to light. EOM are normal.   Cardiovascular: Intact distal pulses.   Pulmonary/Chest: Effort normal. No respiratory distress.   Musculoskeletal: Normal range of motion.   Neurological: He is alert and oriented to person, place, and time. He has a normal Finger-Nose-Finger Test.   Reflex Scores:       Tricep reflexes are 2+ on the right side and 2+ on the left side.       Bicep reflexes are 2+ on the right side and 2+ on the left side.       Brachioradialis reflexes are 2+ on the right side and 2+ on the left side.       Patellar reflexes are 2+ on the right side and 2+ on the left side.       Achilles reflexes are 2+ on the right side and 2+ on the left side.  Skin: Skin is warm and dry.   Psychiatric: He has a normal mood and affect. His behavior is normal. His speech is slurred.       NEUROLOGICAL EXAMINATION:     MENTAL STATUS   Oriented to person, place, and time.   Registration: recalls 3 of 3 objects.   Attention: normal. Concentration: normal.   Speech: slurred   Level of consciousness: alert  Knowledge: good.   Able to name object.     CRANIAL NERVES     CN II   Visual acuity: normal  Right visual field deficit: none  Left visual field deficit: none     CN III, IV, VI   Pupils are equal, round, and reactive to light.  Extraocular motions are normal.   Right pupil: Shape: regular. Reactivity: brisk.   Left pupil: Shape: regular. Reactivity: brisk.   CN III: no CN III palsy  CN VI: no CN VI palsy  Nystagmus: none   Ophthalmoparesis: none    CN V   Right facial sensation deficit: none  Left facial sensation deficit: complete    CN VII   Right facial weakness: none  Left facial weakness: central    CN VIII   Hearing: intact    CN IX, X   Palate: symmetric    CN XI   Right  sternocleidomastoid strength: normal  Left sternocleidomastoid strength: normal  Right trapezius strength: normal  Left trapezius strength: normal    CN XII   CN XII normal.   Tongue: not atrophic  Tongue deviation: none    MOTOR EXAM   Muscle bulk: normal  Overall muscle tone: normal    Strength   Strength 5/5 except as noted.   Left deltoid: 4/5  Left biceps: 4/5  Left triceps: 4/5  Left wrist flexion: 4/5  Left wrist extension: 4/5    REFLEXES     Reflexes   Right brachioradialis: 2+  Left brachioradialis: 2+  Right biceps: 2+  Left biceps: 2+  Right triceps: 2+  Left triceps: 2+  Right patellar: 2+  Left patellar: 2+  Right achilles: 2+  Left achilles: 2+    SENSORY EXAM   Right arm light touch: normal  Left arm light touch: decreased from elbow  Right leg light touch: normal  Left leg light touch: normal  Right arm vibration: normal  Left arm vibration: normal  Right leg vibration: normal  Left leg vibration: normal  Right arm proprioception: normal  Left arm proprioception: normal  Right leg proprioception: normal  Left leg proprioception: normal  Right arm pinprick: normal  Left arm pinprick: decreased from elbow  Right leg pinprick: normal  Left leg pinprick: normal  Graphesthesia: normal  Stereognosis: normal    GAIT AND COORDINATION      Coordination   Finger to nose coordination: normal    Tremor   Resting tremor: absent      Significant Labs: {Results:54196}    Significant Imaging: {Imaging Review:15090}

## 2019-09-27 NOTE — NURSING
Report received from Ashwini LÓPEZ. Pt awake in bed. NAD noted. Bed alarm set. Call light within reach. Will continue to monitor.

## 2019-09-27 NOTE — SUBJECTIVE & OBJECTIVE
Interval History:  Patient reports feeling better and is very hungry, speech therapy at the bedside and he is doing well with pudding.    Review of Systems   Constitutional: Negative for chills and fever.   Respiratory: Negative for shortness of breath.    Cardiovascular: Negative for chest pain.     Objective:     Vital Signs (Most Recent):  Temp: 98.7 °F (37.1 °C) (09/26/19 1948)  Pulse: (!) 57 (09/26/19 1948)  Resp: 18 (09/26/19 1948)  BP: (!) 157/83 (09/26/19 1948)  SpO2: 96 % (09/26/19 1948) Vital Signs (24h Range):  Temp:  [98 °F (36.7 °C)-98.7 °F (37.1 °C)] 98.7 °F (37.1 °C)  Pulse:  [56-74] 57  Resp:  [16-18] 18  SpO2:  [93 %-97 %] 96 %  BP: (127-158)/(74-93) 157/83     Weight: 70.2 kg (154 lb 12.2 oz)  Body mass index is 20.99 kg/m².    Intake/Output Summary (Last 24 hours) at 9/26/2019 2035  Last data filed at 9/26/2019 1734  Gross per 24 hour   Intake 713 ml   Output 1650 ml   Net -937 ml      Physical Exam   Constitutional: He is oriented to person, place, and time. He appears well-developed and well-nourished. No distress.   HENT:   Head: Normocephalic and atraumatic.   Eyes: Conjunctivae are normal. Right eye exhibits no discharge. Left eye exhibits no discharge.   Neck: Normal range of motion.   Cardiovascular: Normal rate, regular rhythm, normal heart sounds and intact distal pulses. Exam reveals no gallop and no friction rub.   No murmur heard.  Pulmonary/Chest: Effort normal and breath sounds normal. No stridor. No respiratory distress. He has no wheezes. He has no rales. He exhibits no tenderness.   Abdominal: Soft. Bowel sounds are normal. He exhibits no distension. There is no tenderness. There is no rebound and no guarding.   Musculoskeletal: Normal range of motion. He exhibits no edema.   Neurological: He is alert and oriented to person, place, and time. GCS eye subscore is 4. GCS verbal subscore is 5. GCS motor subscore is 6.   Reflex Scores:       Bicep reflexes are 2+ on the right side and  2+ on the left side.       Brachioradialis reflexes are 2+ on the right side and 2+ on the left side.       Patellar reflexes are 3+ on the right side and 2+ on the left side.  5/5 strength throughout and intact sensation; there is left-sided facial asymmetry along with dysarthria and left-sided dysmetria, but no aphasia or pronator drift   Skin: Skin is warm and dry. He is not diaphoretic. No erythema.   Psychiatric: He has a normal mood and affect. His behavior is normal. Judgment and thought content normal.   Nursing note and vitals reviewed.      Significant Labs: All pertinent labs within the past 24 hours have been reviewed.    Significant Imaging: I have reviewed and interpreted all pertinent imaging results/findings within the past 24 hours.

## 2019-09-27 NOTE — NURSING
Bubble study performed, 8cc NS with 0.5cc air agitated and injected through PIV x2, tolerated well, NAD noted.

## 2019-09-27 NOTE — ASSESSMENT & PLAN NOTE
CT-head was significant for [r]ight frontal lobe subtle focus of peripheral hypoattenuation which may be related to artifact from volume averaging versus recent infarct.  No intracranial hemorrhage.  MRI/MRA-brain are pending.   Patient is not within the therapeutic window for tPA.   Patient is aspirin-naive, so will start aspirin and statin  Allow for permissive hypertension in order not to extend the penumbra  PT/OT and Speech Therapy for evaluation  2D-echo  and carotid ultrasound pending  Await Neurology recommendations

## 2019-09-27 NOTE — PROGRESS NOTES
[facility] Hospital Medicine  Progress Note    Patient Name: Forest Lopez  MRN: [id number]  Patient Class: IP- Inpatient   Admission Date: 9/25/2019  Length of Stay: 1 days  Attending Physician: Beatrice Sorenson MD  Primary Care Provider: [facility]        Subjective:     Principal Problem:Cerebrovascular accident (CVA) due to stenosis of right middle cerebral artery        HPI:  Mr. Forest Lopez is a 69 y.o. right-handed male with essential hypertension, type 2 diabetes mellitus (HbA1c 7.3% Sept 2015), hyperlipidemia (LDL 98.6 Sept 2019), GERD, BPH, cocaine abuse, cannabis abuse, tobacco abuse, and history of DVT on chronic anticoagulation who presents to [facility] ED with complaints of left upper extremity weakness this morning.  He noted the left hand weakness this morning when he woke up around 9:00 AM--he was holding a sandwich when he suddenly dropped it.  He was drinking some water and it was spilling out the left side of his lips.  He decided to go back to sleep and when he woke up around noon he decided to come to the ED.  He does report some speech difficulties but denies any headaches, swallowing difficulties, visual disturbances, palpitations, chest pain, nor shortness of breath.  He denies any numbness and denies any symptoms in any other extremities.  He has otherwise been in his usual state of health.    Overview/Hospital Course:  No notes on file    Interval History:  Patient reports feeling better and is very hungry, speech therapy at the bedside and he is doing well with pudding.    Review of Systems   Constitutional: Negative for chills and fever.   Respiratory: Negative for shortness of breath.    Cardiovascular: Negative for chest pain.     Objective:     Vital Signs (Most Recent):  Temp: 98.7 °F (37.1 °C) (09/26/19 1948)  Pulse: (!) 57 (09/26/19 1948)  Resp: 18 (09/26/19 1948)  BP: (!) 157/83 (09/26/19 1948)  SpO2: 96 % (09/26/19 1948) Vital Signs (24h Range):  Temp:  [98  °F (36.7 °C)-98.7 °F (37.1 °C)] 98.7 °F (37.1 °C)  Pulse:  [56-74] 57  Resp:  [16-18] 18  SpO2:  [93 %-97 %] 96 %  BP: (127-158)/(74-93) 157/83     Weight: 70.2 kg (154 lb 12.2 oz)  Body mass index is 20.99 kg/m².    Intake/Output Summary (Last 24 hours) at 9/26/2019 2035  Last data filed at 9/26/2019 1734  Gross per 24 hour   Intake 713 ml   Output 1650 ml   Net -937 ml      Physical Exam   Constitutional: He is oriented to person, place, and time. He appears well-developed and well-nourished. No distress.   HENT:   Head: Normocephalic and atraumatic.   Eyes: Conjunctivae are normal. Right eye exhibits no discharge. Left eye exhibits no discharge.   Neck: Normal range of motion.   Cardiovascular: Normal rate, regular rhythm, normal heart sounds and intact distal pulses. Exam reveals no gallop and no friction rub.   No murmur heard.  Pulmonary/Chest: Effort normal and breath sounds normal. No stridor. No respiratory distress. He has no wheezes. He has no rales. He exhibits no tenderness.   Abdominal: Soft. Bowel sounds are normal. He exhibits no distension. There is no tenderness. There is no rebound and no guarding.   Musculoskeletal: Normal range of motion. He exhibits no edema.   Neurological: He is alert and oriented to person, place, and time. GCS eye subscore is 4. GCS verbal subscore is 5. GCS motor subscore is 6.   Reflex Scores:       Bicep reflexes are 2+ on the right side and 2+ on the left side.       Brachioradialis reflexes are 2+ on the right side and 2+ on the left side.       Patellar reflexes are 3+ on the right side and 2+ on the left side.  5/5 strength throughout and intact sensation; there is left-sided facial asymmetry along with dysarthria and left-sided dysmetria, but no aphasia or pronator drift   Skin: Skin is warm and dry. He is not diaphoretic. No erythema.   Psychiatric: He has a normal mood and affect. His behavior is normal. Judgment and thought content normal.   Nursing note and  vitals reviewed.      Significant Labs: All pertinent labs within the past 24 hours have been reviewed.    Significant Imaging: I have reviewed and interpreted all pertinent imaging results/findings within the past 24 hours.      Assessment/Plan:      * Cerebrovascular accident (CVA) due to stenosis of right middle cerebral artery  CT-head was significant for [r]ight frontal lobe subtle focus of peripheral hypoattenuation which may be related to artifact from volume averaging versus recent infarct.  No intracranial hemorrhage.  MRI/MRA-brain are pending.   Patient is not within the therapeutic window for tPA.   Patient is aspirin-naive, so will start aspirin and statin  Allow for permissive hypertension in order not to extend the penumbra  PT/OT and Speech Therapy for evaluation  2D-echo  and carotid ultrasound pending  Await Neurology recommendations    Chronic anticoagulation  As addressed above.    History of DVT (deep vein thrombosis)  His INR is subtherapeutic at 1.2; will restart his home regimen of warfarin and monitor INR daily.    Cannabis abuse  He has been counseled on cessation.    Cocaine abuse  He has been counseled on cessation.    BPH (benign prostatic hyperplasia)  Stable; will continue his home regimen of finasteride and tamsulosin.    GERD (gastroesophageal reflux disease)  With his to his home regimen of omeprazole for pantoprazole while he is inpatient.    Hyperlipidemia  Poorly controlled; will continue his home regimen of rosuvastatin.    Essential hypertension  Will allow for permissive hypertension as addressed above.    Type 2 diabetes mellitus, controlled  Well controlled on a home regimen of metformin and a sulfonylurea; will provide basal-prandial insulin therapy along with insulin sliding scale.    Tobacco dependence  Patient was counseled on smoking cessation and he will be provided a nicotine transdermal patch applied while inpatient.  Will provide additional smoking cessation  counseling prior to discharge.      VTE Risk Mitigation (From admission, onward)         Ordered     warfarin (COUMADIN) tablet 7.5 mg  Daily      09/25/19 2026     IP VTE HIGH RISK PATIENT  Once      09/25/19 2026                      Beatrice Sorenson MD  Department of Hospital Medicine   [facility]

## 2019-09-27 NOTE — PLAN OF CARE
Problem: Adult Inpatient Plan of Care  Goal: Plan of Care Review  Outcome: Ongoing, Progressing     Problem: Fall Injury Risk  Goal: Absence of Fall and Fall-Related Injury  Outcome: Ongoing, Progressing     Problem: Diabetes Comorbidity  Goal: Blood Glucose Level Within Desired Range  Outcome: Ongoing, Progressing

## 2019-09-27 NOTE — PT/OT/SLP PROGRESS
Occupational Therapy      Patient Name:  Forest Lopez   MRN:  [id number]    Patient not seen today secondary to Patient unwilling to participate(stated that he just had a BM and is feeling weak, was out at testing in the am). Will follow-up on the weekend as able.    MICHELLE Dasilva, MS  9/27/2019

## 2019-09-27 NOTE — NURSING
Report given to receiving nurse Ashwini. Pt awake in bed. Walking rounds completed. Pt assessed, NAD noted.     24hr chart check completed.

## 2019-09-27 NOTE — PT/OT/SLP EVAL
Physical Therapy Evaluation    Patient Name:  Forest Lopez   MRN:  [id number]    Recommendations:     Discharge Recommendations:  outpatient PT(Pt does not drive, ok for home health services as well.)   Discharge Equipment Recommendations: none   Barriers to discharge: None    Assessment:     Forest Lopez is a 69 y.o. male admitted with a medical diagnosis of Cerebrovascular accident (CVA) due to stenosis of right middle cerebral artery (no acute infarct on this admission).  He presents with the following impairments/functional limitations:  weakness, impaired functional mobilty, gait instability, impaired balance, decreased lower extremity function, decreased upper extremity function.    Rehab Prognosis: Fair+; patient would benefit from acute skilled PT services to address these deficits and reach maximum level of function.    Recent Surgery: * No surgery found *      Plan:     During this hospitalization, patient to be seen 2 x/week to address the identified rehab impairments via gait training, therapeutic activities, therapeutic exercises and progress toward the following goals:    · Plan of Care Expires:  10/11/19    Subjective     Chief Complaint: LUE/LLE weakness  Patient/Family Comments/goals: Pt reported limited assistance at home.   Pain/Comfort:  · Pain Rating 1: 0/10    Living Environment:  Pt lives alone on the 8th floor [facility] apt with elevators.  Prior to admission, patients level of function was independent.  Pt does not drive.  Equipment used at home: none.  Upon discharge, patient will have limited assistance from son (lives in [facility]).  Pt has another son in [place], [place].    Objective:     Patient found HOB elevated with peripheral IV, telemetry  upon PT entry to room.    General Precautions: Standard, fall, diabetic, L eye with visual deficits (Pt wears eyeglasses.)   Orthopedic Precautions:N/A   Braces: N/A     Exams:  · Cognitive Exam:  Patient was able to follow multiple commands.    · Gross Motor Coordination:  WFL  · Postural Exam:  Patient presented with the following abnormalities:    · -       No postural abnormalities identified  · Sensation:    · -       Intact  light/touch BLE  · Skin Integrity/Edema:      · -       Skin integrity: Visible skin intact  · -       Edema: None noted BLE  · BLE ROM: WFL  · BLE Strength: 4/5    Functional Mobility:  · Bed Mobility:     · Scooting: supervision  · Supine to Sit: supervision with HOB elevated  · Sit to Supine: supervision with HOB elevated   · Transfers:     · Sit to Stand:  stand by assistance and contact guard assistance with no AD  · Bed to Chair: Pt declined bedside chair.   · Gait: Pt ambulated ~500 ft x 2 trials with CGA-SBA using no AD.  Pt with decreased step length and ermelinda.  Pt reported some mild weakness to LLE.    · Balance: Pt with fair+ balance.       AM-PAC 6 CLICK MOBILITY  Total Score:22     Patient left HOB elevated with all lines intact and call button in reach.    GOALS:   Multidisciplinary Problems     Physical Therapy Goals        Problem: Physical Therapy Goal    Goal Priority Disciplines Outcome Goal Variances Interventions   Physical Therapy Goal     PT, PT/OT Ongoing, Progressing     Description:  Goals to be met by: 10/11/19     Patient will increase functional independence with mobility by performin. Supine to sit with [place]  2. Rolling to Left and Right with [place]  3. Sit to stand transfer with [place]  4. Bed to chair transfer with [place]   5. Gait  >500 feet with [place] using no AD  6. Lower extremity exercise program 3 sets x10 reps per handout, with independence                      History:     Past Medical History:   Diagnosis Date    Blind left eye     able to see, but poorly    BPH (benign prostatic hyperplasia)     Diabetes mellitus, type 2     Hyperlipidemia     Hypertension     Left rib fracture 2017    Stroke-like symptoms 2019    L facial  droop, slurred speech & L arm weakness       Past Surgical History:   Procedure Laterality Date    NO PAST SURGERIES  09/25/2019       Time Tracking:     PT Received On: 09/27/19  PT Start Time: 1508     PT Stop Time: 1523  PT Total Time (min): 15 min     Billable Minutes: Evaluation 15 min      Cornelia Osullivan, PT  09/27/2019

## 2019-09-27 NOTE — PLAN OF CARE
Problem: Physical Therapy Goal  Goal: Physical Therapy Goal  Description  Goals to be met by: 10/11/19     Patient will increase functional independence with mobility by performin. Supine to sit with [place]  2. Rolling to Left and Right with [place]  3. Sit to stand transfer with [place]  4. Bed to chair transfer with [place]   5. Gait  >500 feet with [place] using no AD  6. Lower extremity exercise program 3 sets x10 reps per handout, with independence     Outcome: Ongoing, Progressing     Pt ambulated ~500 ft x2 trials with SBA using no AD.

## 2019-09-27 NOTE — PT/OT/SLP EVAL
Speech Language Pathology Evaluation  Cognitive Communication    Patient Name:  Forest Lopez   MRN:  [id number]  Admitting Diagnosis: Cerebrovascular accident (CVA) due to stenosis of right middle cerebral artery    Recommendations:     Recommendations:                General Recommendations:  dysarthria & dysphagia tx  Diet recommendations:  Mechanical soft, Thin   Aspiration Precautions: check for oral residue (L) and anterior and 1 bite/sip at a time   General Precautions: Standard, other (see comments)(mechanical soft)  Communication strategies:  provide increased time to answer    History:     Past Medical History:   Diagnosis Date    Blind left eye     able to see, but poorly    BPH (benign prostatic hyperplasia)     Diabetes mellitus, type 2     Hyperlipidemia     Hypertension     Left rib fracture 03/30/2017    Stroke-like symptoms 09/25/2019    L facial droop, slurred speech & L arm weakness       Past Surgical History:   Procedure Laterality Date    NO PAST SURGERIES  09/25/2019       Subjective   Per nursing Pt is tolerating diet without overt s/s of aspiration however anterior loss of bolus persists during oral prep.  Patient goals: bolus prep and artic to baseline    Pain/Comfort:  ·  0    Objective:   Cognitive Status:    Arousal/Alertness Appropriate response to stimuli  Attention No obvious deficits observed .  Orientation Oriented x4  Memory WFL  Problem Solving WFL      Receptive Language:   Comprehension:      WFL    Pragmatics:    wfl    Expressive Language:  Verbal:    Naming Confrontation wfl and Divergent responsive wfl  Conversational speech wfl  Nonverbal:   wfl      Motor Speech:  Dysarthria speech is apolonia. 80% intelligbile in known contexts    Voice:   Intensity decreased as compared to baseline per Pt    Visual-Spatial:  WFL    Treatment: dysarthria, dysphonia, and dysphagia therapy    Assessment:   Forest Lopez is a 69 y.o. male with dx of CVA he presents with mild-moderate  dysarthria, mild-moderate oral dysphagia c/b anterior loss of bolus during oral prep, and mild dysphonia c/b decreased intensity.     Goals:   Multidisciplinary Problems     SLP Goals        Problem: SLP Goal    Goal Priority Disciplines Outcome   SLP Goal    Medium SLP Ongoing, Progressing   Description:  ST. Pt will tolerate PO diet of mechanical soft solids with thin liquids without overt s/s of aspiration.  2. Pt will perform OME 2sets x10 each with good ability.  3.Pt will participate in speech/language eval to further assess dysarthria. (goal met 19)  4. Pt will  (I)ly utilize speech intelligibility strategies at level of conversation.                     Plan:   · Patient to be seen:  3 x/week   · Plan of Care reviewed with:  patient   · SLP Follow-Up:  Yes       Discharge recommendations:  ST at next level of care  Barriers to Discharge:  None    Time Tracking:   SLP Treatment Date:   19  Speech Start Time:  1140  Speech Stop Time:  1200     Speech Total Time (min):  20 min    Billable Minutes: Eval 20     Yen Noriega CCC-SLP  2019

## 2019-09-27 NOTE — HOSPITAL COURSE
Mr. Lopez was presented with acute left arm weakness and left-sided facial droop.  Head CT showed right frontal lobe subtle focus of peripheral hypoattenuation which may be related to artifact from volume averaging versus recent infarct.  No intracranial hemorrhage.  He presented outside the window for tPA along with contraindication due to chronic anticoagulation with Coumadin.  Treatment initiated with permissive hypertension, aspirin and high-dose statin.  Neurology consulted along with rehab services. Further workup with MRI showed 2 foci of diffusion hyperintensity within the left thalamus and right thalamus/basal ganglia felt to be related to T2 shine through from remote lacunar infarcts.  CTA of the head and neck negative for significant stenosis or aneurysm.  Carotid ultrasound also negative for significant stenosis.   Patient with persistent left facial droop along with left upper extremity weakness that has not resolved.  Rehab services recommending SNF and  working on placement.    Adjust BP and insulin. Await placement   9/29 INR 1.3- on coumadin     9/30- pt ambulated 500 feet without AD. Will dc home with HH. Pt does not drive which limits outpatient therapy. Resume BP meds and coumadin at 7.5mg daily. Needs PCP follow up and INR monitoring this week. Neurology follow up.  services to include RN, PT/OT/ST, aide and social work.    DC home

## 2019-09-28 LAB
INR PPP: 1.1 (ref 0.8–1.2)
POCT GLUCOSE: 208 MG/DL (ref 70–110)
POCT GLUCOSE: 228 MG/DL (ref 70–110)
POCT GLUCOSE: 308 MG/DL (ref 70–110)
PROTHROMBIN TIME: 11.8 SEC (ref 9–12.5)

## 2019-09-28 PROCEDURE — G8988 SELF CARE GOAL STATUS: HCPCS | Mod: CI | Performed by: SPECIALIST

## 2019-09-28 PROCEDURE — 36415 COLL VENOUS BLD VENIPUNCTURE: CPT

## 2019-09-28 PROCEDURE — 96372 THER/PROPH/DIAG INJ SC/IM: CPT

## 2019-09-28 PROCEDURE — 25000003 PHARM REV CODE 250: Performed by: HOSPITALIST

## 2019-09-28 PROCEDURE — G0378 HOSPITAL OBSERVATION PER HR: HCPCS

## 2019-09-28 PROCEDURE — 94761 N-INVAS EAR/PLS OXIMETRY MLT: CPT

## 2019-09-28 PROCEDURE — 85610 PROTHROMBIN TIME: CPT

## 2019-09-28 PROCEDURE — 25000003 PHARM REV CODE 250: Performed by: INTERNAL MEDICINE

## 2019-09-28 PROCEDURE — G8987 SELF CARE CURRENT STATUS: HCPCS | Mod: CJ | Performed by: SPECIALIST

## 2019-09-28 PROCEDURE — 97535 SELF CARE MNGMENT TRAINING: CPT | Performed by: SPECIALIST

## 2019-09-28 PROCEDURE — 97110 THERAPEUTIC EXERCISES: CPT | Performed by: SPECIALIST

## 2019-09-28 PROCEDURE — 21400001 HC TELEMETRY ROOM

## 2019-09-28 RX ORDER — LISINOPRIL 20 MG/1
20 TABLET ORAL DAILY
Status: DISCONTINUED | OUTPATIENT
Start: 2019-09-28 | End: 2019-09-30 | Stop reason: HOSPADM

## 2019-09-28 RX ORDER — INSULIN ASPART 100 [IU]/ML
5 INJECTION, SOLUTION INTRAVENOUS; SUBCUTANEOUS
Status: DISCONTINUED | OUTPATIENT
Start: 2019-09-28 | End: 2019-09-30 | Stop reason: HOSPADM

## 2019-09-28 RX ORDER — GEMFIBROZIL 600 MG/1
600 TABLET, FILM COATED ORAL
Status: DISCONTINUED | OUTPATIENT
Start: 2019-09-28 | End: 2019-09-30 | Stop reason: HOSPADM

## 2019-09-28 RX ORDER — TAMSULOSIN HYDROCHLORIDE 0.4 MG/1
0.4 CAPSULE ORAL DAILY
Status: DISCONTINUED | OUTPATIENT
Start: 2019-09-29 | End: 2019-09-30 | Stop reason: HOSPADM

## 2019-09-28 RX ADMIN — INSULIN ASPART 4 UNITS: 100 INJECTION, SOLUTION INTRAVENOUS; SUBCUTANEOUS at 04:09

## 2019-09-28 RX ADMIN — FINASTERIDE 5 MG: 5 TABLET, FILM COATED ORAL at 09:09

## 2019-09-28 RX ADMIN — INSULIN ASPART 2 UNITS: 100 INJECTION, SOLUTION INTRAVENOUS; SUBCUTANEOUS at 09:09

## 2019-09-28 RX ADMIN — INSULIN ASPART 1 UNITS: 100 INJECTION, SOLUTION INTRAVENOUS; SUBCUTANEOUS at 09:09

## 2019-09-28 RX ADMIN — INSULIN ASPART 3 UNITS: 100 INJECTION, SOLUTION INTRAVENOUS; SUBCUTANEOUS at 11:09

## 2019-09-28 RX ADMIN — PANTOPRAZOLE SODIUM 40 MG: 40 TABLET, DELAYED RELEASE ORAL at 09:09

## 2019-09-28 RX ADMIN — WARFARIN SODIUM 10 MG: 5 TABLET ORAL at 04:09

## 2019-09-28 RX ADMIN — INSULIN ASPART 5 UNITS: 100 INJECTION, SOLUTION INTRAVENOUS; SUBCUTANEOUS at 04:09

## 2019-09-28 RX ADMIN — ROSUVASTATIN CALCIUM 10 MG: 10 TABLET, COATED ORAL at 08:09

## 2019-09-28 RX ADMIN — LISINOPRIL 20 MG: 20 TABLET ORAL at 04:09

## 2019-09-28 RX ADMIN — GEMFIBROZIL 600 MG: 600 TABLET ORAL at 04:09

## 2019-09-28 RX ADMIN — INSULIN ASPART 3 UNITS: 100 INJECTION, SOLUTION INTRAVENOUS; SUBCUTANEOUS at 09:09

## 2019-09-28 RX ADMIN — ASPIRIN 325 MG: 325 TABLET, DELAYED RELEASE ORAL at 09:09

## 2019-09-28 NOTE — PROGRESS NOTES
[facility] Hospital Medicine  Progress Note    Patient Name: Forest Lopez  MRN: [id number]  Patient Class: IP- Inpatient   Admission Date: 9/25/2019  Length of Stay: 2 days  Attending Physician: Beatrice Sorenson MD  Primary Care Provider: [facility]        Subjective:     Principal Problem:Cerebrovascular accident (CVA) due to stenosis of right middle cerebral artery        HPI:  Mr. Forest Lopez is a 69 y.o. right-handed male with essential hypertension, type 2 diabetes mellitus (HbA1c 7.3% Sept 2015), hyperlipidemia (LDL 98.6 Sept 2019), GERD, BPH, cocaine abuse, cannabis abuse, tobacco abuse, and history of DVT on chronic anticoagulation who presents to [facility] ED with complaints of left upper extremity weakness this morning.  He noted the left hand weakness this morning when he woke up around 9:00 AM--he was holding a sandwich when he suddenly dropped it.  He was drinking some water and it was spilling out the left side of his lips.  He decided to go back to sleep and when he woke up around noon he decided to come to the ED.  He does report some speech difficulties but denies any headaches, swallowing difficulties, visual disturbances, palpitations, chest pain, nor shortness of breath.  He denies any numbness and denies any symptoms in any other extremities.  He has otherwise been in his usual state of health.    Overview/Hospital Course:  Mr. Lopez was presented with acute left arm weakness and left-sided facial droop.  Head CT showed right frontal lobe subtle focus of peripheral hypoattenuation which may be related to artifact from volume averaging versus recent infarct.  No intracranial hemorrhage.  He presented outside the window for tPA along with contraindication due to chronic anticoagulation with Coumadin.  Treatment initiated with permissive hypertension, aspirin and high-dose statin.  Neurology consulted along with rehab services. Further workup with MRI showed 2 foci of  diffusion hyperintensity within the left thalamus and right thalamus/basal ganglia felt to be related to T2 shine through from remote lacunar infarcts.  CTA of the head and neck negative for significant stenosis or aneurysm.  Carotid ultrasound also negative for significant stenosis.   Patient with persistent left facial droop along with left upper extremity weakness that has not resolved.  Rehab services recommending SNF and  working on placement.    Interval History:  Patient with left facial droop and difficulty eating on the left side of his mouth that is not improved.  He also reports left hand, especially his ability to hold onto things has not improved.    Review of Systems   Constitutional: Negative for chills and fever.   Respiratory: Negative for shortness of breath.    Cardiovascular: Negative for chest pain.     Objective:     Vital Signs (Most Recent):  Temp: 97.6 °F (36.4 °C) (09/27/19 1622)  Pulse: 61 (09/27/19 1622)  Resp: 16 (09/27/19 1622)  BP: (!) 185/86 (09/27/19 1622)  SpO2: 98 % (09/27/19 1622) Vital Signs (24h Range):  Temp:  [97.6 °F (36.4 °C)-98.7 °F (37.1 °C)] 97.6 °F (36.4 °C)  Pulse:  [52-63] 61  Resp:  [16-18] 16  SpO2:  [96 %-98 %] 98 %  BP: (151-185)/(79-88) 185/86     Weight: 70.8 kg (156 lb)  Body mass index is 21.16 kg/m².    Intake/Output Summary (Last 24 hours) at 9/27/2019 1628  Last data filed at 9/27/2019 1300  Gross per 24 hour   Intake 1194 ml   Output 1900 ml   Net -706 ml      Physical Exam   Constitutional: He is oriented to person, place, and time. He appears well-developed and well-nourished. No distress.   HENT:   Head: Atraumatic.   Left facial droop   Eyes: Conjunctivae and EOM are normal. Right eye exhibits no discharge. Left eye exhibits no discharge.   Neck: Normal range of motion.   Cardiovascular: Normal rate, regular rhythm, normal heart sounds and intact distal pulses. Exam reveals no gallop and no friction rub.   No murmur  heard.  Pulmonary/Chest: Effort normal and breath sounds normal. No stridor. No respiratory distress. He has no wheezes. He has no rales. He exhibits no tenderness.   Abdominal: Soft. Bowel sounds are normal. He exhibits no distension. There is no tenderness. There is no rebound and no guarding.   Musculoskeletal: Normal range of motion. He exhibits no edema.   Neurological: He is alert and oriented to person, place, and time. GCS eye subscore is 4. GCS verbal subscore is 5. GCS motor subscore is 6.   Reflex Scores:       Bicep reflexes are 2+ on the right side and 2+ on the left side.       Brachioradialis reflexes are 2+ on the right side and 2+ on the left side.       Patellar reflexes are 3+ on the right side and 2+ on the left side.  4/5 strength in LUE, especially decreased  strength and intact sensation; there is left-sided facial asymmetry along with dysarthria and left-sided dysmetria, but no aphasia or pronator drift   Skin: Skin is warm and dry. He is not diaphoretic. No erythema.   Psychiatric: He has a normal mood and affect. His behavior is normal. Judgment and thought content normal.   Nursing note and vitals reviewed.      Significant Labs: All pertinent labs within the past 24 hours have been reviewed.    Significant Imaging: I have reviewed and interpreted all pertinent imaging results/findings within the past 24 hours.      Assessment/Plan:      * Cerebrovascular accident (CVA) due to stenosis of right middle cerebral artery  CT-head was significant for [r]ight frontal lobe subtle focus of peripheral hypoattenuation which may be related to artifact from volume averaging versus recent infarct.  No intracranial hemorrhage.    Patient was not within the therapeutic window for tPA along with anticoagulation Coumadin.   Treatment with permissive hypertension, aspirin and statin  Neurology consulted, PT/OT and Speech Therapy for evaluation  MRI showed 2 foci of diffusion hyperintensity within the  left thalamus and right thalamus/basal ganglia felt to be related to T2 shine through from remote lacunar infarcts  CTA of the head and neck negative for significant stenosis or aneurysm.    Carotid ultrasound also negative for significant stenosis.     Echo with EF of 65% and normal diastolic function, bubble study was negative  Speech therapy recommended soft mechanical diet  Patient with persistent left facial droop along with left upper extremity weakness that has not resolved.    Case discussed with Neurology who felt patient met criteria for CVA today  Likely an extension of previous stroke that is obscured on imaging  Rehab services recommending SNF    working on placement    Chronic anticoagulation  As addressed above.    History of DVT (deep vein thrombosis)  His INR was subtherapeutic on presentation  Will increase Coumadin to 10 mg p.o. Daily  Continue monitor daily INR    Cannabis abuse  He has been counseled on cessation.    Cocaine abuse  He has been counseled on cessation.    BPH (benign prostatic hyperplasia)  Continue his home regimen of finasteride and tamsulosin.    GERD (gastroesophageal reflux disease)  Continue pantoprazole    Hyperlipidemia  Continue his home regimen of rosuvastatin.    Essential hypertension  Allow for permissive hypertension as addressed above.  Will consider resuming antihypertensive medications in the next few days    Type 2 diabetes mellitus, controlled  Home regimen of metformin and a sulfonylurea on hold  Hemoglobin A1c 10.0 on this admission  Continue basal-prandial insulin therapy along with insulin sliding scale  Glucose goal during hospitalization between 140-180    Tobacco dependence  Patient was counseled on smoking cessation for greater than 3 min  Nicotine transdermal patch applied while inpatient      VTE Risk Mitigation (From admission, onward)         Ordered     warfarin (COUMADIN) tablet 10 mg  Daily      09/27/19 2302     IP VTE HIGH RISK  PATIENT  Once      09/25/19 2026                      Beatrice Sorenson MD  Department of Hospital Medicine   [facility]

## 2019-09-28 NOTE — ASSESSMENT & PLAN NOTE
Home regimen of metformin and a sulfonylurea on hold  Hemoglobin A1c 10.0 on this admission  Continue basal-prandial insulin therapy along with insulin sliding scale  Glucose goal during hospitalization between 140-180

## 2019-09-28 NOTE — ASSESSMENT & PLAN NOTE
CT-head was significant for [r]ight frontal lobe subtle focus of peripheral hypoattenuation which may be related to artifact from volume averaging versus recent infarct.  No intracranial hemorrhage.    Patient was not within the therapeutic window for tPA along with anticoagulation Coumadin.   Treatment with permissive hypertension, aspirin and statin  Neurology consulted, PT/OT and Speech Therapy for evaluation  MRI showed 2 foci of diffusion hyperintensity within the left thalamus and right thalamus/basal ganglia felt to be related to T2 shine through from remote lacunar infarcts  CTA of the head and neck negative for significant stenosis or aneurysm.    Carotid ultrasound also negative for significant stenosis.     Echo with EF of 65% and normal diastolic function, bubble study was negative  Speech therapy recommended soft mechanical diet  Patient with persistent left facial droop along with left upper extremity weakness that has not resolved.    Case discussed with Neurology who felt patient met criteria for CVA today  Likely an extension of previous stroke that is obscured on imaging  Rehab services recommending SNF    working on placement

## 2019-09-28 NOTE — PT/OT/SLP PROGRESS
Occupational Therapy   Treatment    Name: Forest Lopez  MRN: [id number]  Admitting Diagnosis:  Cerebrovascular accident (CVA) due to stenosis of right middle cerebral artery       Recommendations:     Discharge Recommendations: nursing facility, skilled  Discharge Equipment Recommendations:  (TBD at next LOC)  Barriers to discharge:  Decreased caregiver support    Assessment:     Forest Lopez is a 69 y.o. male with a medical diagnosis of Cerebrovascular accident (CVA) due to stenosis of right middle cerebral artery.  He presents with continued deficits in L UE and required mod VCs for safety to follow feeding recommendations for safe swallowing. Performance deficits affecting function are weakness, impaired self care skills, impaired functional mobilty, impaired balance, decreased safety awareness, decreased coordination, decreased upper extremity function, decreased ROM, impaired coordination, impaired fine motor.     Rehab Prognosis:  Good; patient would benefit from acute skilled OT services to address these deficits and reach maximum level of function.       Plan:     Patient to be seen 5 x/week to address the above listed problems via self-care/home management, therapeutic activities, therapeutic exercises, neuromuscular re-education  · Plan of Care Expires: 10/10/19  · Plan of Care Reviewed with: patient    Subjective     Pain/Comfort:  · Pain Rating 1: 0/10    Objective:     Communicated with: RN (Zach) prior to session.  Patient found supine with   upon OT entry to room.    General Precautions: Standard, fall, diabetic   Orthopedic Precautions:N/A   Braces: N/A     Occupational Performance:     Bed Mobility:    · Patient completed Supine to Sit with stand by assistance  · Patient completed Sit to Supine with stand by assistance       Activities of Daily Living:  · Feeding:  supervision ; Pt with mild coughing after first bite of mechanical soft diet; Mod VCs for alternating bites and sips and to clear  mouth for pocketing of food.  · Upper Body Dressing: supervision donning gown as robe with VCs for ana-dressing technique.      [facility] 6 Click ADL: 21    Treatment & Education:  L UE AROM: scapula e/d, pro/ret, Shoulder f/e (to 90*), Elbow f/e, Forearm pro/sup, wrist f/e; 10 reps x 2 sets    Patient left HOB elevated with all lines intact, call button in reach and RN (Zach) presentEducation:      GOALS:   Multidisciplinary Problems     Occupational Therapy Goals        Problem: Occupational Therapy Goal    Goal Priority Disciplines Outcome Interventions   Occupational Therapy Goal     OT, PT/OT Ongoing, Progressing    Description:  Goals to be met by: 10/10/2019     Patient will increase functional independence with ADLs by performing:    UE Dressing with [place].  LE Dressing with [place].  Grooming while standing at sink with Stand-by Assistance.  Sitting at edge of bed x15 minutes with [place].  Supine to sit with [place].  Step transfer with Contact Guard Assistance  Upper extremity exercise program per handout, with assistance as needed.                      Time Tracking:     OT Date of Treatment: 09/28/19  OT Start Time: 1114  OT Stop Time: 1142  OT Total Time (min): 28 min    Billable Minutes:Self Care/Home Management 14  Therapeutic Exercise 14    Patricia Jain OT  9/28/2019

## 2019-09-28 NOTE — NURSING
Report received from MARIBEL Maradiaga. Patient in bed awake and alert, oriented x4. Appears to be no apparent distress noted.All safety and fall precautions maintained. Will continue to monitor.

## 2019-09-28 NOTE — PROGRESS NOTES
[facility] Hospital Medicine  Progress Note    Patient Name: Forest Lopez  MRN: [id number]  Patient Class: IP- Inpatient   Admission Date: 9/25/2019  Length of Stay: 3 days  Attending Physician: Karlene Jacome MD  Primary Care Provider: [facility]        Subjective:     Principal Problem:Cerebrovascular accident (CVA) due to stenosis of right middle cerebral artery        HPI:  Mr. Forest Lopez is a 69 y.o. right-handed male with essential hypertension, type 2 diabetes mellitus (HbA1c 7.3% Sept 2015), hyperlipidemia (LDL 98.6 Sept 2019), GERD, BPH, cocaine abuse, cannabis abuse, tobacco abuse, and history of DVT on chronic anticoagulation who presents to [facility] ED with complaints of left upper extremity weakness this morning.  He noted the left hand weakness this morning when he woke up around 9:00 AM--he was holding a sandwich when he suddenly dropped it.  He was drinking some water and it was spilling out the left side of his lips.  He decided to go back to sleep and when he woke up around noon he decided to come to the ED.  He does report some speech difficulties but denies any headaches, swallowing difficulties, visual disturbances, palpitations, chest pain, nor shortness of breath.  He denies any numbness and denies any symptoms in any other extremities.  He has otherwise been in his usual state of health.    Overview/Hospital Course:  Mr. Lopez was presented with acute left arm weakness and left-sided facial droop.  Head CT showed right frontal lobe subtle focus of peripheral hypoattenuation which may be related to artifact from volume averaging versus recent infarct.  No intracranial hemorrhage.  He presented outside the window for tPA along with contraindication due to chronic anticoagulation with Coumadin.  Treatment initiated with permissive hypertension, aspirin and high-dose statin.  Neurology consulted along with rehab services. Further workup with MRI showed 2 foci of  diffusion hyperintensity within the left thalamus and right thalamus/basal ganglia felt to be related to T2 shine through from remote lacunar infarcts.  CTA of the head and neck negative for significant stenosis or aneurysm.  Carotid ultrasound also negative for significant stenosis.   Patient with persistent left facial droop along with left upper extremity weakness that has not resolved.  Rehab services recommending SNF and  working on placement.    Adjust BP and insulin. Await placement     Interval History: no new complaints     Review of Systems   Constitutional: Negative for chills and fever.   Respiratory: Negative for shortness of breath.    Cardiovascular: Negative for chest pain.     Objective:     Vital Signs (Most Recent):  Temp: 96.8 °F (36 °C) (09/28/19 1157)  Pulse: 74 (09/28/19 1157)  Resp: 16 (09/28/19 1157)  BP: (!) 157/78 (09/28/19 1157)  SpO2: (!) 94 % (09/28/19 1157) Vital Signs (24h Range):  Temp:  [96.8 °F (36 °C)-98.2 °F (36.8 °C)] 96.8 °F (36 °C)  Pulse:  [58-74] 74  Resp:  [16-18] 16  SpO2:  [94 %-99 %] 94 %  BP: (142-185)/(75-99) 157/78     Weight: 71.8 kg (158 lb 4.6 oz)  Body mass index is 21.47 kg/m².    Intake/Output Summary (Last 24 hours) at 9/28/2019 1528  Last data filed at 9/28/2019 1400  Gross per 24 hour   Intake 1137 ml   Output 2060 ml   Net -923 ml      Physical Exam   Constitutional: He is oriented to person, place, and time. He appears well-developed and well-nourished. No distress.   HENT:   Head: Atraumatic.   Left facial droop   Eyes: Conjunctivae and EOM are normal. Right eye exhibits no discharge. Left eye exhibits no discharge.   Neck: Normal range of motion.   Cardiovascular: Normal rate, regular rhythm, normal heart sounds and intact distal pulses. Exam reveals no gallop and no friction rub.   No murmur heard.  Pulmonary/Chest: Effort normal and breath sounds normal. No stridor. No respiratory distress. He has no wheezes. He has no rales. He exhibits  no tenderness.   Abdominal: Soft. Bowel sounds are normal. He exhibits no distension. There is no tenderness. There is no rebound and no guarding.   Musculoskeletal: Normal range of motion. He exhibits no edema.   Neurological: He is alert and oriented to person, place, and time. GCS eye subscore is 4. GCS verbal subscore is 5. GCS motor subscore is 6.   Reflex Scores:       Bicep reflexes are 2+ on the right side and 2+ on the left side.       Brachioradialis reflexes are 2+ on the right side and 2+ on the left side.       Patellar reflexes are 3+ on the right side and 2+ on the left side.  4/5 strength in LUE, especially decreased  strength and intact sensation; there is left-sided facial asymmetry along with dysarthria and left-sided dysmetria, but no aphasia or pronator drift   Skin: Skin is warm and dry. He is not diaphoretic. No erythema.   Psychiatric: He has a normal mood and affect. His behavior is normal. Judgment and thought content normal.   Nursing note and vitals reviewed.      Significant Labs: All pertinent labs within the past 24 hours have been reviewed.    Significant Imaging: I have reviewed and interpreted all pertinent imaging results/findings within the past 24 hours.      Assessment/Plan:      * Cerebrovascular accident (CVA) due to stenosis of right middle cerebral artery  CT-head was significant for [r]ight frontal lobe subtle focus of peripheral hypoattenuation which may be related to artifact from volume averaging versus recent infarct.  No intracranial hemorrhage.    Patient was not within the therapeutic window for tPA along with anticoagulation Coumadin.   Treatment with permissive hypertension, aspirin and statin  Neurology consulted, PT/OT and Speech Therapy for evaluation  MRI showed 2 foci of diffusion hyperintensity within the left thalamus and right thalamus/basal ganglia felt to be related to T2 shine through from remote lacunar infarcts  CTA of the head and neck negative  for significant stenosis or aneurysm.    Carotid ultrasound also negative for significant stenosis.     Echo with EF of 65% and normal diastolic function, bubble study was negative  Speech therapy recommended soft mechanical diet  Patient with persistent left facial droop along with left upper extremity weakness that has not resolved.    Case discussed with Neurology who felt patient met criteria for CVA today  Likely an extension of previous stroke that is obscured on imaging  Rehab services recommending SNF    working on placement    Chronic anticoagulation  As addressed above.    History of DVT (deep vein thrombosis)  His INR was subtherapeutic on presentation  Will increase Coumadin to 10 mg p.o. Daily  Continue monitor daily INR    Cannabis abuse  He has been counseled on cessation.    Cocaine abuse  He has been counseled on cessation.    BPH (benign prostatic hyperplasia)  Continue his home regimen of finasteride and tamsulosin.    GERD (gastroesophageal reflux disease)  Continue pantoprazole    Hyperlipidemia  Continue his home regimen of rosuvastatin.    Essential hypertension  Allow for permissive hypertension as addressed above.  Lisinopril resumed at lower dose     Type 2 diabetes mellitus, controlled  Home regimen of metformin and a sulfonylurea on hold  Hemoglobin A1c 10.0 on this admission  Continue basal-prandial insulin therapy along with insulin sliding scale  Glucose goal during hospitalization between 140-180    Tobacco dependence  Patient was counseled on smoking cessation for greater than 3 min  Nicotine transdermal patch applied while inpatient      VTE Risk Mitigation (From admission, onward)         Ordered     warfarin (COUMADIN) tablet 10 mg  Daily      09/27/19 2302     IP VTE HIGH RISK PATIENT  Once      09/25/19 2026                      Karlene Jacome MD  Department of Hospital Medicine   [facility]

## 2019-09-28 NOTE — ASSESSMENT & PLAN NOTE
Patient was counseled on smoking cessation for greater than 3 min  Nicotine transdermal patch applied while inpatient

## 2019-09-28 NOTE — ASSESSMENT & PLAN NOTE
Allow for permissive hypertension as addressed above.  Will consider resuming antihypertensive medications in the next few days

## 2019-09-28 NOTE — PLAN OF CARE
Problem: Occupational Therapy Goal  Goal: Occupational Therapy Goal  Description  Goals to be met by: 10/10/2019     Patient will increase functional independence with ADLs by performing:    UE Dressing with Bryant.  LE Dressing with Bryant.  Grooming while standing at sink with Stand-by Assistance.  Sitting at edge of bed x15 minutes with Bryant.  Supine to sit with Bryant.  Step transfer with Contact Guard Assistance  Upper extremity exercise program per handout, with assistance as needed.     Outcome: Ongoing, Progressing   Pt tolerated well.  Con't with L UE deficits in AROM, strength, and coordination.  Required VCs for swallowing precautions during self feeding.

## 2019-09-28 NOTE — NURSING
Report given to MARIBEL Serrano. Patient in bed awake and alert. No apparent distress noted. No further complaints made. All safety precautions maintained.

## 2019-09-28 NOTE — SUBJECTIVE & OBJECTIVE
Interval History: no new complaints     Review of Systems   Constitutional: Negative for chills and fever.   Respiratory: Negative for shortness of breath.    Cardiovascular: Negative for chest pain.     Objective:     Vital Signs (Most Recent):  Temp: 96.8 °F (36 °C) (09/28/19 1157)  Pulse: 74 (09/28/19 1157)  Resp: 16 (09/28/19 1157)  BP: (!) 157/78 (09/28/19 1157)  SpO2: (!) 94 % (09/28/19 1157) Vital Signs (24h Range):  Temp:  [96.8 °F (36 °C)-98.2 °F (36.8 °C)] 96.8 °F (36 °C)  Pulse:  [58-74] 74  Resp:  [16-18] 16  SpO2:  [94 %-99 %] 94 %  BP: (142-185)/(75-99) 157/78     Weight: 71.8 kg (158 lb 4.6 oz)  Body mass index is 21.47 kg/m².    Intake/Output Summary (Last 24 hours) at 9/28/2019 1528  Last data filed at 9/28/2019 1400  Gross per 24 hour   Intake 1137 ml   Output 2060 ml   Net -923 ml      Physical Exam   Constitutional: He is oriented to person, place, and time. He appears well-developed and well-nourished. No distress.   HENT:   Head: Atraumatic.   Left facial droop   Eyes: Conjunctivae and EOM are normal. Right eye exhibits no discharge. Left eye exhibits no discharge.   Neck: Normal range of motion.   Cardiovascular: Normal rate, regular rhythm, normal heart sounds and intact distal pulses. Exam reveals no gallop and no friction rub.   No murmur heard.  Pulmonary/Chest: Effort normal and breath sounds normal. No stridor. No respiratory distress. He has no wheezes. He has no rales. He exhibits no tenderness.   Abdominal: Soft. Bowel sounds are normal. He exhibits no distension. There is no tenderness. There is no rebound and no guarding.   Musculoskeletal: Normal range of motion. He exhibits no edema.   Neurological: He is alert and oriented to person, place, and time. GCS eye subscore is 4. GCS verbal subscore is 5. GCS motor subscore is 6.   Reflex Scores:       Bicep reflexes are 2+ on the right side and 2+ on the left side.       Brachioradialis reflexes are 2+ on the right side and 2+ on the  left side.       Patellar reflexes are 3+ on the right side and 2+ on the left side.  4/5 strength in LUE, especially decreased  strength and intact sensation; there is left-sided facial asymmetry along with dysarthria and left-sided dysmetria, but no aphasia or pronator drift   Skin: Skin is warm and dry. He is not diaphoretic. No erythema.   Psychiatric: He has a normal mood and affect. His behavior is normal. Judgment and thought content normal.   Nursing note and vitals reviewed.      Significant Labs: All pertinent labs within the past 24 hours have been reviewed.    Significant Imaging: I have reviewed and interpreted all pertinent imaging results/findings within the past 24 hours.

## 2019-09-28 NOTE — ASSESSMENT & PLAN NOTE
His INR was subtherapeutic on presentation  Will increase Coumadin to 10 mg p.o. Daily  Continue monitor daily INR

## 2019-09-28 NOTE — PLAN OF CARE
Problem: Adult Inpatient Plan of Care  Goal: Plan of Care Review  Outcome: Ongoing, Progressing     Problem: Fall Injury Risk  Goal: Absence of Fall and Fall-Related Injury  Outcome: Ongoing, Progressing  Intervention: Identify and Manage Contributors to Fall Injury Risk  Flowsheets (Taken 9/28/2019 0559)  Self-Care Promotion: independence encouraged; BADL personal routines maintained; safe use of adaptive equipment encouraged  Medication Review/Management: medications reviewed; high risk medications identified  Intervention: Promote Injury-Free Environment  Flowsheets (Taken 9/28/2019 0559)  Safety Promotion/Fall Prevention: assistive device/personal item within reach; bed alarm set; commode/urinal/bedpan at bedside; Fall Risk reviewed with patient/family; high risk medications identified; medications reviewed; nonskid shoes/socks when out of bed; supervised activity; instructed to call staff for mobility     Problem: Adult Inpatient Plan of Care  Goal: Plan of Care Review  Outcome: Ongoing, Progressing     Problem: Fall Injury Risk  Goal: Absence of Fall and Fall-Related Injury  Outcome: Ongoing, Progressing  Intervention: Identify and Manage Contributors to Fall Injury Risk  Flowsheets (Taken 9/28/2019 0559)  Self-Care Promotion: independence encouraged; BADL personal routines maintained; safe use of adaptive equipment encouraged  Medication Review/Management: medications reviewed; high risk medications identified  Intervention: Promote Injury-Free Environment  Flowsheets (Taken 9/28/2019 0559)  Safety Promotion/Fall Prevention: assistive device/personal item within reach; bed alarm set; commode/urinal/bedpan at bedside; Fall Risk reviewed with patient/family; high risk medications identified; medications reviewed; nonskid shoes/socks when out of bed; supervised activity; instructed to call staff for mobility     Problem: Diabetes Comorbidity  Goal: Blood Glucose Level Within Desired Range  Outcome: Ongoing,  Progressing  Intervention: Maintain Glycemic Control  Flowsheets (Taken 9/28/2019 0600)  Glycemic Management: blood glucose monitoring; supplemental insulin given     No acute events overnight. No falls or any injury. Pain free as claimed. No further complaints made.

## 2019-09-29 LAB
INR PPP: 1.3 (ref 0.8–1.2)
POCT GLUCOSE: 188 MG/DL (ref 70–110)
POCT GLUCOSE: 240 MG/DL (ref 70–110)
POCT GLUCOSE: 276 MG/DL (ref 70–110)
POCT GLUCOSE: 294 MG/DL (ref 70–110)
POCT GLUCOSE: 359 MG/DL (ref 70–110)
PROTHROMBIN TIME: 13.2 SEC (ref 9–12.5)

## 2019-09-29 PROCEDURE — 92526 ORAL FUNCTION THERAPY: CPT

## 2019-09-29 PROCEDURE — 94761 N-INVAS EAR/PLS OXIMETRY MLT: CPT

## 2019-09-29 PROCEDURE — 85610 PROTHROMBIN TIME: CPT

## 2019-09-29 PROCEDURE — 96372 THER/PROPH/DIAG INJ SC/IM: CPT

## 2019-09-29 PROCEDURE — G0378 HOSPITAL OBSERVATION PER HR: HCPCS

## 2019-09-29 PROCEDURE — 92507 TX SP LANG VOICE COMM INDIV: CPT

## 2019-09-29 PROCEDURE — 36415 COLL VENOUS BLD VENIPUNCTURE: CPT

## 2019-09-29 PROCEDURE — 99232 PR SUBSEQUENT HOSPITAL CARE,LEVL II: ICD-10-PCS | Mod: ,,, | Performed by: NEUROLOGICAL SURGERY

## 2019-09-29 PROCEDURE — 99232 SBSQ HOSP IP/OBS MODERATE 35: CPT | Mod: ,,, | Performed by: NEUROLOGICAL SURGERY

## 2019-09-29 PROCEDURE — 21400001 HC TELEMETRY ROOM

## 2019-09-29 PROCEDURE — 25000003 PHARM REV CODE 250: Performed by: HOSPITALIST

## 2019-09-29 PROCEDURE — 25000003 PHARM REV CODE 250: Performed by: INTERNAL MEDICINE

## 2019-09-29 RX ADMIN — INSULIN ASPART 5 UNITS: 100 INJECTION, SOLUTION INTRAVENOUS; SUBCUTANEOUS at 12:09

## 2019-09-29 RX ADMIN — ASPIRIN 325 MG: 325 TABLET, DELAYED RELEASE ORAL at 08:09

## 2019-09-29 RX ADMIN — INSULIN ASPART 2 UNITS: 100 INJECTION, SOLUTION INTRAVENOUS; SUBCUTANEOUS at 12:09

## 2019-09-29 RX ADMIN — GEMFIBROZIL 600 MG: 600 TABLET ORAL at 03:09

## 2019-09-29 RX ADMIN — WARFARIN SODIUM 10 MG: 5 TABLET ORAL at 04:09

## 2019-09-29 RX ADMIN — INSULIN ASPART 5 UNITS: 100 INJECTION, SOLUTION INTRAVENOUS; SUBCUTANEOUS at 04:09

## 2019-09-29 RX ADMIN — PANTOPRAZOLE SODIUM 40 MG: 40 TABLET, DELAYED RELEASE ORAL at 08:09

## 2019-09-29 RX ADMIN — ROSUVASTATIN CALCIUM 10 MG: 10 TABLET, COATED ORAL at 08:09

## 2019-09-29 RX ADMIN — TAMSULOSIN HYDROCHLORIDE 0.4 MG: 0.4 CAPSULE ORAL at 08:09

## 2019-09-29 RX ADMIN — INSULIN ASPART 5 UNITS: 100 INJECTION, SOLUTION INTRAVENOUS; SUBCUTANEOUS at 08:09

## 2019-09-29 RX ADMIN — INSULIN ASPART 3 UNITS: 100 INJECTION, SOLUTION INTRAVENOUS; SUBCUTANEOUS at 08:09

## 2019-09-29 RX ADMIN — FINASTERIDE 5 MG: 5 TABLET, FILM COATED ORAL at 08:09

## 2019-09-29 RX ADMIN — GEMFIBROZIL 600 MG: 600 TABLET ORAL at 06:09

## 2019-09-29 RX ADMIN — LISINOPRIL 20 MG: 20 TABLET ORAL at 08:09

## 2019-09-29 NOTE — PROGRESS NOTES
[facility] Hospital Medicine  Progress Note    Patient Name: Forest Lopez  MRN: [id number]  Patient Class: IP- Inpatient   Admission Date: 9/25/2019  Length of Stay: 4 days  Attending Physician: Karlene Jacome MD  Primary Care Provider: [facility]        Subjective:     Principal Problem:Cerebrovascular accident (CVA) due to stenosis of right middle cerebral artery        HPI:  Mr. Forest Lopez is a 69 y.o. right-handed male with essential hypertension, type 2 diabetes mellitus (HbA1c 7.3% Sept 2015), hyperlipidemia (LDL 98.6 Sept 2019), GERD, BPH, cocaine abuse, cannabis abuse, tobacco abuse, and history of DVT on chronic anticoagulation who presents to [facility] ED with complaints of left upper extremity weakness this morning.  He noted the left hand weakness this morning when he woke up around 9:00 AM--he was holding a sandwich when he suddenly dropped it.  He was drinking some water and it was spilling out the left side of his lips.  He decided to go back to sleep and when he woke up around noon he decided to come to the ED.  He does report some speech difficulties but denies any headaches, swallowing difficulties, visual disturbances, palpitations, chest pain, nor shortness of breath.  He denies any numbness and denies any symptoms in any other extremities.  He has otherwise been in his usual state of health.    Overview/Hospital Course:  Mr. Lopez was presented with acute left arm weakness and left-sided facial droop.  Head CT showed right frontal lobe subtle focus of peripheral hypoattenuation which may be related to artifact from volume averaging versus recent infarct.  No intracranial hemorrhage.  He presented outside the window for tPA along with contraindication due to chronic anticoagulation with Coumadin.  Treatment initiated with permissive hypertension, aspirin and high-dose statin.  Neurology consulted along with rehab services. Further workup with MRI showed 2 foci of  diffusion hyperintensity within the left thalamus and right thalamus/basal ganglia felt to be related to T2 shine through from remote lacunar infarcts.  CTA of the head and neck negative for significant stenosis or aneurysm.  Carotid ultrasound also negative for significant stenosis.   Patient with persistent left facial droop along with left upper extremity weakness that has not resolved.  Rehab services recommending SNF and  working on placement.    Adjust BP and insulin. Await placement   9/29 INR 1.3- on coumadin     Interval History: pt has no new complaints     Review of Systems   Constitutional: Negative for chills and fever.   Respiratory: Negative for shortness of breath.    Cardiovascular: Negative for chest pain.     Objective:     Vital Signs (Most Recent):  Temp: 97.9 °F (36.6 °C) (09/29/19 1551)  Pulse: (!) 57 (09/29/19 1551)  Resp: 16 (09/29/19 1551)  BP: (!) 150/76 (09/29/19 1551)  SpO2: 96 % (09/29/19 1551) Vital Signs (24h Range):  Temp:  [97.8 °F (36.6 °C)-98.8 °F (37.1 °C)] 97.9 °F (36.6 °C)  Pulse:  [55-68] 57  Resp:  [16-18] 16  SpO2:  [95 %-98 %] 96 %  BP: (131-159)/(72-81) 150/76     Weight: 71.1 kg (156 lb 12 oz)  Body mass index is 21.26 kg/m².    Intake/Output Summary (Last 24 hours) at 9/29/2019 1610  Last data filed at 9/29/2019 0900  Gross per 24 hour   Intake --   Output 750 ml   Net -750 ml      Physical Exam   Constitutional: He is oriented to person, place, and time. He appears well-developed and well-nourished. No distress.   HENT:   Head: Atraumatic.   Left facial droop   Eyes: Conjunctivae and EOM are normal. Right eye exhibits no discharge. Left eye exhibits no discharge.   Neck: Normal range of motion.   Cardiovascular: Normal rate, regular rhythm, normal heart sounds and intact distal pulses. Exam reveals no gallop and no friction rub.   No murmur heard.  Pulmonary/Chest: Effort normal and breath sounds normal. No stridor. No respiratory distress. He has no  wheezes. He has no rales. He exhibits no tenderness.   Abdominal: Soft. Bowel sounds are normal. He exhibits no distension. There is no tenderness. There is no rebound and no guarding.   Musculoskeletal: Normal range of motion. He exhibits no edema.   Neurological: He is alert and oriented to person, place, and time. GCS eye subscore is 4. GCS verbal subscore is 5. GCS motor subscore is 6.   Reflex Scores:       Bicep reflexes are 2+ on the right side and 2+ on the left side.       Brachioradialis reflexes are 2+ on the right side and 2+ on the left side.       Patellar reflexes are 3+ on the right side and 2+ on the left side.  4/5 strength in LUE, especially decreased  strength and intact sensation; there is left-sided facial asymmetry along with dysarthria and left-sided dysmetria, but no aphasia or pronator drift   Skin: Skin is warm and dry. He is not diaphoretic. No erythema.   Psychiatric: He has a normal mood and affect. His behavior is normal. Judgment and thought content normal.   Nursing note and vitals reviewed.      Significant Labs: All pertinent labs within the past 24 hours have been reviewed.    Significant Imaging: I have reviewed and interpreted all pertinent imaging results/findings within the past 24 hours.      Assessment/Plan:      * Cerebrovascular accident (CVA) due to stenosis of right middle cerebral artery  CT-head was significant for [r]ight frontal lobe subtle focus of peripheral hypoattenuation which may be related to artifact from volume averaging versus recent infarct.  No intracranial hemorrhage.    Patient was not within the therapeutic window for tPA along with anticoagulation Coumadin.   Treatment with permissive hypertension, aspirin and statin  Neurology consulted, PT/OT and Speech Therapy for evaluation  MRI showed 2 foci of diffusion hyperintensity within the left thalamus and right thalamus/basal ganglia felt to be related to T2 shine through from remote lacunar  infarcts  CTA of the head and neck negative for significant stenosis or aneurysm.    Carotid ultrasound also negative for significant stenosis.     Echo with EF of 65% and normal diastolic function, bubble study was negative  Speech therapy recommended soft mechanical diet  Patient with persistent left facial droop along with left upper extremity weakness that has not resolved.    Case discussed with Neurology who felt patient met criteria for CVA today  Likely an extension of previous stroke that is obscured on imaging  Rehab services recommending SNF    working on placement    Chronic anticoagulation  As addressed above.    INR trending up - monitor daily   Coumadin resumed     History of DVT (deep vein thrombosis)  His INR was subtherapeutic on presentation  Will increase Coumadin to 10 mg p.o. Daily  Continue monitor daily INR    Cannabis abuse  He has been counseled on cessation.    Cocaine abuse  He has been counseled on cessation.    BPH (benign prostatic hyperplasia)  Continue his home regimen of finasteride and tamsulosin.    GERD (gastroesophageal reflux disease)  Continue pantoprazole    Hyperlipidemia  Continue his home regimen of rosuvastatin.    Essential hypertension  Allow for permissive hypertension as addressed above.  Lisinopril resumed at lower dose     Type 2 diabetes mellitus, controlled  Home regimen of metformin and a sulfonylurea on hold  Hemoglobin A1c 10.0 on this admission  Continue basal-prandial insulin therapy along with insulin sliding scale  Glucose goal during hospitalization between 140-180    Tobacco dependence  Patient was counseled on smoking cessation for greater than 3 min  Nicotine transdermal patch applied while inpatient      VTE Risk Mitigation (From admission, onward)         Ordered     warfarin (COUMADIN) tablet 10 mg  Daily      09/27/19 2302     IP VTE HIGH RISK PATIENT  Once      09/25/19 2026                      Karlene Jacome MD  Department of  Hospital Medicine [facility]

## 2019-09-29 NOTE — SUBJECTIVE & OBJECTIVE
Interval History: pt has no new complaints     Review of Systems   Constitutional: Negative for chills and fever.   Respiratory: Negative for shortness of breath.    Cardiovascular: Negative for chest pain.     Objective:     Vital Signs (Most Recent):  Temp: 97.9 °F (36.6 °C) (09/29/19 1551)  Pulse: (!) 57 (09/29/19 1551)  Resp: 16 (09/29/19 1551)  BP: (!) 150/76 (09/29/19 1551)  SpO2: 96 % (09/29/19 1551) Vital Signs (24h Range):  Temp:  [97.8 °F (36.6 °C)-98.8 °F (37.1 °C)] 97.9 °F (36.6 °C)  Pulse:  [55-68] 57  Resp:  [16-18] 16  SpO2:  [95 %-98 %] 96 %  BP: (131-159)/(72-81) 150/76     Weight: 71.1 kg (156 lb 12 oz)  Body mass index is 21.26 kg/m².    Intake/Output Summary (Last 24 hours) at 9/29/2019 1610  Last data filed at 9/29/2019 0900  Gross per 24 hour   Intake --   Output 750 ml   Net -750 ml      Physical Exam   Constitutional: He is oriented to person, place, and time. He appears well-developed and well-nourished. No distress.   HENT:   Head: Atraumatic.   Left facial droop   Eyes: Conjunctivae and EOM are normal. Right eye exhibits no discharge. Left eye exhibits no discharge.   Neck: Normal range of motion.   Cardiovascular: Normal rate, regular rhythm, normal heart sounds and intact distal pulses. Exam reveals no gallop and no friction rub.   No murmur heard.  Pulmonary/Chest: Effort normal and breath sounds normal. No stridor. No respiratory distress. He has no wheezes. He has no rales. He exhibits no tenderness.   Abdominal: Soft. Bowel sounds are normal. He exhibits no distension. There is no tenderness. There is no rebound and no guarding.   Musculoskeletal: Normal range of motion. He exhibits no edema.   Neurological: He is alert and oriented to person, place, and time. GCS eye subscore is 4. GCS verbal subscore is 5. GCS motor subscore is 6.   Reflex Scores:       Bicep reflexes are 2+ on the right side and 2+ on the left side.       Brachioradialis reflexes are 2+ on the right side and 2+ on  the left side.       Patellar reflexes are 3+ on the right side and 2+ on the left side.  4/5 strength in LUE, especially decreased  strength and intact sensation; there is left-sided facial asymmetry along with dysarthria and left-sided dysmetria, but no aphasia or pronator drift   Skin: Skin is warm and dry. He is not diaphoretic. No erythema.   Psychiatric: He has a normal mood and affect. His behavior is normal. Judgment and thought content normal.   Nursing note and vitals reviewed.      Significant Labs: All pertinent labs within the past 24 hours have been reviewed.    Significant Imaging: I have reviewed and interpreted all pertinent imaging results/findings within the past 24 hours.

## 2019-09-29 NOTE — PLAN OF CARE
Blood sugar during night was 276. Coverage given as ordered along with snack. Educated on good snacks to eat at home before bed like peanut butter.

## 2019-09-29 NOTE — SUBJECTIVE & OBJECTIVE
Subjective:     Interval History: 69-year-old male hypertension type 2 diabetes, hyperlipidemia, cocaine abuse, and THC abuse, presented to the hospital with left upper extremity weakness that began this morning.  He says that he woke up this morning and noticed that his left arm was feeling weak.  He tried to drink something but felt that he was having inability to maintain any liquids in his mouth as it seemed to be falling to the left side.  He says that he decided to take a rest and after the symptoms did not resolve when he awoke, he decided to come to the hospital for evaluation.  He has been feeling no improvement since he presented to the hospital.  He has been taking medications as directed including his oral anticoagulation.    - 09/29/2019  Persistent weakness in the left face and left upper extremity.        Current Neurological Medications:     Current Facility-Administered Medications   Medication Dose Route Frequency Provider Last Rate Last Dose    acetaminophen tablet 500 mg  500 mg Oral Q6H PRN Alexi Lozano MD        aspirin EC tablet 325 mg  325 mg Oral Daily Alexi Lozano MD   325 mg at 09/29/19 0857    cloNIDine tablet 0.1 mg  0.1 mg Oral TID PRN Alexi Lozano MD        dextrose 50% injection 12.5 g  12.5 g Intravenous PRN Alexi Lozano MD        dextrose 50% injection 25 g  25 g Intravenous PRN Alexi Lozano MD        finasteride tablet 5 mg  5 mg Oral Daily Alexi Lozano MD   5 mg at 09/29/19 0857    gemfibrozil tablet 600 mg  600 mg Oral BID AC Karlene Jacome MD   600 mg at 09/29/19 0616    glucagon (human recombinant) injection 1 mg  1 mg Intramuscular PRN Alexi Lozano MD        glucose chewable tablet 16 g  16 g Oral PRN Alexi Lozano MD        glucose chewable tablet 24 g  24 g Oral PRN Alexi Lozano MD        influenza (HIGH-DOSE PF) vaccine 0.5 mL  0.5 mL Intramuscular vaccine x 1 dose Alexi Lozano MD        insulin aspart U-100 pen 0-5 Units  0-5 Units Subcutaneous  PRN Alexi Lozano MD   1 Units at 09/28/19 2136    insulin aspart U-100 pen 5 Units  5 Units Subcutaneous TIDWM Karlene Jacome MD   5 Units at 09/29/19 0857    insulin detemir U-100 pen 15 Units  15 Units Subcutaneous QHS Beatrice Sorenson MD   15 Units at 09/28/19 2136    lisinopril tablet 20 mg  20 mg Oral Daily Karlene Jacome MD   20 mg at 09/29/19 0857    ondansetron injection 8 mg  8 mg Intravenous Q8H PRN Alexi Lozano MD        pantoprazole EC tablet 40 mg  40 mg Oral Daily Alexi Lozano MD   40 mg at 09/29/19 0857    promethazine (PHENERGAN) 6.25 mg in dextrose 5 % 50 mL IVPB  6.25 mg Intravenous Q6H PRN Alexi Lozano MD        ramelteon tablet 8 mg  8 mg Oral Nightly PRN Alexi Lozano MD   8 mg at 09/26/19 2121    rosuvastatin tablet 10 mg  10 mg Oral QHS Alexi Lozano MD   10 mg at 09/28/19 2013    senna-docusate 8.6-50 mg per tablet 1 tablet  1 tablet Oral BID PRN Alexi Lozano MD   1 tablet at 09/27/19 2128    sodium chloride 0.9% bolus 500 mL  500 mL Intravenous Continuous PRN Alexi Lozano MD        tamsulosin 24 hr capsule 0.4 mg  0.4 mg Oral Daily Karlene Jacome MD   0.4 mg at 09/29/19 0857    warfarin (COUMADIN) tablet 10 mg  10 mg Oral Daily Beatrice Sorenson MD   10 mg at 09/28/19 1617       Review of Systems   Constitutional: Negative for activity change, appetite change, chills, diaphoresis, fatigue, fever and unexpected weight change.   HENT: Negative.    Eyes: Negative.    Respiratory: Negative for cough, chest tightness, shortness of breath and wheezing.    Cardiovascular: Negative for chest pain, palpitations and leg swelling.   Gastrointestinal: Negative for abdominal distention, abdominal pain, blood in stool, constipation, diarrhea, nausea and vomiting.   Genitourinary: Negative for dysuria and hematuria.   Musculoskeletal: Negative.    Skin: Negative.    Neurological: Positive for facial asymmetry, speech difficulty and weakness. Negative for dizziness, tremors, seizures,  syncope, light-headedness, numbness and headaches.   Psychiatric/Behavioral: Negative.      Objective:     Vital Signs (Most Recent):  Temp: 97.9 °F (36.6 °C) (09/29/19 0814)  Pulse: 68 (09/29/19 0814)  Resp: 16 (09/29/19 0814)  BP: 131/72 (09/29/19 0814)  SpO2: 95 % (09/29/19 0814) Vital Signs (24h Range):  Temp:  [96.8 °F (36 °C)-98.8 °F (37.1 °C)] 97.9 °F (36.6 °C)  Pulse:  [55-74] 68  Resp:  [16-18] 16  SpO2:  [94 %-98 %] 95 %  BP: (131-159)/(72-88) 131/72     Weight: 71.1 kg (156 lb 12 oz)  Body mass index is 21.26 kg/m².    Physical Exam   Constitutional: He is oriented to person, place, and time. He appears well-developed and well-nourished.   HENT:   Head: Normocephalic and atraumatic.   Eyes: Pupils are equal, round, and reactive to light. EOM are normal.   Cardiovascular: Intact distal pulses.   Pulmonary/Chest: Effort normal. No respiratory distress.   Musculoskeletal: Normal range of motion.   Neurological: He is alert and oriented to person, place, and time. He has a normal Finger-Nose-Finger Test.   Reflex Scores:       Tricep reflexes are 2+ on the right side and 2+ on the left side.       Bicep reflexes are 2+ on the right side and 2+ on the left side.       Brachioradialis reflexes are 2+ on the right side and 2+ on the left side.       Patellar reflexes are 2+ on the right side and 2+ on the left side.       Achilles reflexes are 2+ on the right side and 2+ on the left side.  Skin: Skin is warm and dry.   Psychiatric: He has a normal mood and affect. His behavior is normal. His speech is slurred.       NEUROLOGICAL EXAMINATION:     MENTAL STATUS   Oriented to person, place, and time.   Registration: recalls 3 of 3 objects.   Attention: normal. Concentration: normal.   Speech: slurred   Level of consciousness: alert  Knowledge: good.   Able to name object.     CRANIAL NERVES     CN II   Visual acuity: normal  Right visual field deficit: none  Left visual field deficit: none     CN III, IV, VI    Pupils are equal, round, and reactive to light.  Extraocular motions are normal.   Right pupil: Shape: regular. Reactivity: brisk.   Left pupil: Shape: regular. Reactivity: brisk.   CN III: no CN III palsy  CN VI: no CN VI palsy  Nystagmus: none   Ophthalmoparesis: none    CN V   Right facial sensation deficit: none  Left facial sensation deficit: complete    CN VII   Right facial weakness: none  Left facial weakness: central    CN VIII   Hearing: intact    CN IX, X   Palate: symmetric    CN XI   Right sternocleidomastoid strength: normal  Left sternocleidomastoid strength: normal  Right trapezius strength: normal  Left trapezius strength: normal    CN XII   CN XII normal.   Tongue: not atrophic  Tongue deviation: none    MOTOR EXAM   Muscle bulk: normal  Overall muscle tone: normal    Strength   Strength 5/5 except as noted.   Left deltoid: 4/5  Left biceps: 4/5  Left triceps: 4/5  Left wrist flexion: 4/5  Left wrist extension: 4/5    REFLEXES     Reflexes   Right brachioradialis: 2+  Left brachioradialis: 2+  Right biceps: 2+  Left biceps: 2+  Right triceps: 2+  Left triceps: 2+  Right patellar: 2+  Left patellar: 2+  Right achilles: 2+  Left achilles: 2+    SENSORY EXAM   Right arm light touch: normal  Left arm light touch: decreased from elbow  Right leg light touch: normal  Left leg light touch: normal  Right arm vibration: normal  Left arm vibration: normal  Right leg vibration: normal  Left leg vibration: normal  Right arm proprioception: normal  Left arm proprioception: normal  Right leg proprioception: normal  Left leg proprioception: normal  Right arm pinprick: normal  Left arm pinprick: decreased from elbow  Right leg pinprick: normal  Left leg pinprick: normal  Graphesthesia: normal  Stereognosis: normal    GAIT AND COORDINATION      Coordination   Finger to nose coordination: normal    Tremor   Resting tremor: absent      Significant Labs: All pertinent lab results from the past 24 hours have been  reviewed.    Significant Imaging: I have reviewed all pertinent imaging results/findings within the past 24 hours.

## 2019-09-29 NOTE — PT/OT/SLP PROGRESS
Speech Language Pathology Treatment    Patient Name:  Forest Lopez   MRN:  [id number]  Admitting Diagnosis: Cerebrovascular accident (CVA) due to stenosis of right middle cerebral artery    Recommendations:                 General Recommendations:  Dysphagia therapy and motor speech tx  Diet recommendations:  Mechanical soft, Liquid Diet Level: Thin   Aspiration Precautions: clear (L) lateral sulci post meals and 1 bite/sip at a time   General Precautions: Standard, other (see comments)(mechanical soft)  Communication strategies:  speech intell strategy    Subjective   Pt (I) utilized speech intelligibility strategy upon greeting ST. (L) Facial droop notably improved as compared to previous session.   Patient goals: resolve to baseline    Pain/Comfort:  · Pain Rating 1: 0/10    Objective:     Has the patient been evaluated by SLP for swallowing?   Yes  Keep patient NPO? No   Current Respiratory Status: room air      Pt seen upright in bed (I)ly feeding diet of mech soft with thin liquids. No overt s/s aspiration throughout.Oral residue post intake of solids (L) lateral sulci persists, Pt demonstrated good awareness of residue (I) oral sweep (lingual and with utensil) Pt reported he prefers to use fork for oral swallow. Mild anterior loss of light puree noted on left, Pt (I)ly clearing with tongue to min-trace amount. Pt performed OMEs against min-mod resistance demonstrating good motivation throughout.   Pt's speech ineligibility initially was apolonia. 85% in unknown context. During informal conversation he utilized speech intelligibility strategies with mod assist. Articulatory fatigue noted, speech intelligibility dropped to apolonia. 70% after apolonia. 10-15 minutes of conversation.    Assessment:     Forest Lopez is a 69 y.o. male Forest Lopez is a 69 y.o. male with dx of CVA he presents with mild-moderate dysarthria, mild-moderate oral dysphagia c/b anterior loss of bolus during oral prep, and mild dysphonia c/b  decreased intensity.     Goals:   Multidisciplinary Problems     SLP Goals        Problem: SLP Goal    Goal Priority Disciplines Outcome   SLP Goal    Medium SLP Ongoing, Progressing   Description:  ST. Pt will tolerate PO diet of mechanical soft solids with thin liquids without overt s/s of aspiration.  2. Pt will perform OME 2sets x10 each with good ability.  3.Pt will participate in speech/language eval to further assess dysarthria. (goal met 19)  4. Pt will  (I)ly utilize speech intelligibility strategies at level of conversation.                     Plan:     · Patient to be seen:  3 x/week   Plan of Care reviewed with:  patient   · SLP Follow-Up:  Yes       Discharge recommendations:  rehab  Barriers to Discharge:  None    Time Tracking:     SLP Treatment Date:   19  Speech Start Time:  1200  Speech Stop Time:  1220     Speech Total Time (min):  20 min    Billable Minutes: Speech Therapy Individual 10 and Treatment Swallowing Dysfunction 10    Yen Noriega CCC-SLP  2019

## 2019-09-29 NOTE — ASSESSMENT & PLAN NOTE
No acute stroke seen on MRI.  Patient has never had any stroke in the past, nor has he had any weakness similar to this in the past. MRI showed possible T2 shine through, but FLAIR showed hyperintensity in the right basal ganglia not seen on contralateral side which increases the likelihood of a recent ischemic event. Other possibility includes extension of prior stroke. Patient will benefit from optimization of risk factors including blood pressure, cholesterol, and advice on cessation of all illicit drug use.    - placement as per PT/OT

## 2019-09-29 NOTE — NURSING
Patient awake and alert without complaints of pain or distress. Skin intact no edema . Assessment completed.

## 2019-09-29 NOTE — PROGRESS NOTES
[facility]  Neurology  Progress Note    Patient Name: Forest Lopez  MRN: [id number]  Admission Date: 9/25/2019  Hospital Length of Stay: 4 days  Code Status: Full Code   Attending Provider: Karlene Jacome MD  Primary Care Physician: [facility] Shantal Escobar   Principal Problem:Cerebrovascular accident (CVA) due to stenosis of right middle cerebral artery      Subjective:     Interval History: 69-year-old male hypertension type 2 diabetes, hyperlipidemia, cocaine abuse, and THC abuse, presented to the hospital with left upper extremity weakness that began this morning.  He says that he woke up this morning and noticed that his left arm was feeling weak.  He tried to drink something but felt that he was having inability to maintain any liquids in his mouth as it seemed to be falling to the left side.  He says that he decided to take a rest and after the symptoms did not resolve when he awoke, he decided to come to the hospital for evaluation.  He has been feeling no improvement since he presented to the hospital.  He has been taking medications as directed including his oral anticoagulation.    - 09/29/2019  Persistent weakness in the left face and left upper extremity.        Current Neurological Medications:     Current Facility-Administered Medications   Medication Dose Route Frequency Provider Last Rate Last Dose    acetaminophen tablet 500 mg  500 mg Oral Q6H PRN Alexi Lozano MD        aspirin EC tablet 325 mg  325 mg Oral Daily Alexi Lozano MD   325 mg at 09/29/19 0857    cloNIDine tablet 0.1 mg  0.1 mg Oral TID PRN Alexi Lozano MD        dextrose 50% injection 12.5 g  12.5 g Intravenous PRN Alexi Lozano MD        dextrose 50% injection 25 g  25 g Intravenous PRN Alexi Lozano MD        finasteride tablet 5 mg  5 mg Oral Daily Alexi Lozano MD   5 mg at 09/29/19 0857    gemfibrozil tablet 600 mg  600 mg Oral BID AC Karlene Jacome MD   600 mg at 09/29/19 0616    glucagon (human  recombinant) injection 1 mg  1 mg Intramuscular PRN Alexi Lozano MD        glucose chewable tablet 16 g  16 g Oral PRN Alexi Lozano MD        glucose chewable tablet 24 g  24 g Oral PRN Alexi Lozano MD        influenza (HIGH-DOSE PF) vaccine 0.5 mL  0.5 mL Intramuscular vaccine x 1 dose Alexi Lozano MD        insulin aspart U-100 pen 0-5 Units  0-5 Units Subcutaneous PRN Alexi Lozano MD   1 Units at 09/28/19 2136    insulin aspart U-100 pen 5 Units  5 Units Subcutaneous TIDWM Karlene Jacome MD   5 Units at 09/29/19 0857    insulin detemir U-100 pen 15 Units  15 Units Subcutaneous QHS Beatrice Sorenson MD   15 Units at 09/28/19 2136    lisinopril tablet 20 mg  20 mg Oral Daily Karlene Jacome MD   20 mg at 09/29/19 0857    ondansetron injection 8 mg  8 mg Intravenous Q8H PRN Alexi Lozano MD        pantoprazole EC tablet 40 mg  40 mg Oral Daily Alexi Lozano MD   40 mg at 09/29/19 0857    promethazine (PHENERGAN) 6.25 mg in dextrose 5 % 50 mL IVPB  6.25 mg Intravenous Q6H PRN Alexi Lozano MD        ramelteon tablet 8 mg  8 mg Oral Nightly PRN Alexi Lozano MD   8 mg at 09/26/19 2121    rosuvastatin tablet 10 mg  10 mg Oral QHS Alexi Lozano MD   10 mg at 09/28/19 2013    senna-docusate 8.6-50 mg per tablet 1 tablet  1 tablet Oral BID PRN Alexi Lozano MD   1 tablet at 09/27/19 2128    sodium chloride 0.9% bolus 500 mL  500 mL Intravenous Continuous PRN Alexi Lozano MD        tamsulosin 24 hr capsule 0.4 mg  0.4 mg Oral Daily Karlene Jacome MD   0.4 mg at 09/29/19 0857    warfarin (COUMADIN) tablet 10 mg  10 mg Oral Daily Beatrice Sorenson MD   10 mg at 09/28/19 1617       Review of Systems   Constitutional: Negative for activity change, appetite change, chills, diaphoresis, fatigue, fever and unexpected weight change.   HENT: Negative.    Eyes: Negative.    Respiratory: Negative for cough, chest tightness, shortness of breath and wheezing.    Cardiovascular: Negative for chest pain,  palpitations and leg swelling.   Gastrointestinal: Negative for abdominal distention, abdominal pain, blood in stool, constipation, diarrhea, nausea and vomiting.   Genitourinary: Negative for dysuria and hematuria.   Musculoskeletal: Negative.    Skin: Negative.    Neurological: Positive for facial asymmetry, speech difficulty and weakness. Negative for dizziness, tremors, seizures, syncope, light-headedness, numbness and headaches.   Psychiatric/Behavioral: Negative.      Objective:     Vital Signs (Most Recent):  Temp: 97.9 °F (36.6 °C) (09/29/19 0814)  Pulse: 68 (09/29/19 0814)  Resp: 16 (09/29/19 0814)  BP: 131/72 (09/29/19 0814)  SpO2: 95 % (09/29/19 0814) Vital Signs (24h Range):  Temp:  [96.8 °F (36 °C)-98.8 °F (37.1 °C)] 97.9 °F (36.6 °C)  Pulse:  [55-74] 68  Resp:  [16-18] 16  SpO2:  [94 %-98 %] 95 %  BP: (131-159)/(72-88) 131/72     Weight: 71.1 kg (156 lb 12 oz)  Body mass index is 21.26 kg/m².    Physical Exam   Constitutional: He is oriented to person, place, and time. He appears well-developed and well-nourished.   HENT:   Head: Normocephalic and atraumatic.   Eyes: Pupils are equal, round, and reactive to light. EOM are normal.   Cardiovascular: Intact distal pulses.   Pulmonary/Chest: Effort normal. No respiratory distress.   Musculoskeletal: Normal range of motion.   Neurological: He is alert and oriented to person, place, and time. He has a normal Finger-Nose-Finger Test.   Reflex Scores:       Tricep reflexes are 2+ on the right side and 2+ on the left side.       Bicep reflexes are 2+ on the right side and 2+ on the left side.       Brachioradialis reflexes are 2+ on the right side and 2+ on the left side.       Patellar reflexes are 2+ on the right side and 2+ on the left side.       Achilles reflexes are 2+ on the right side and 2+ on the left side.  Skin: Skin is warm and dry.   Psychiatric: He has a normal mood and affect. His behavior is normal. His speech is slurred.       NEUROLOGICAL  EXAMINATION:     MENTAL STATUS   Oriented to person, place, and time.   Registration: recalls 3 of 3 objects.   Attention: normal. Concentration: normal.   Speech: slurred   Level of consciousness: alert  Knowledge: good.   Able to name object.     CRANIAL NERVES     CN II   Visual acuity: normal  Right visual field deficit: none  Left visual field deficit: none     CN III, IV, VI   Pupils are equal, round, and reactive to light.  Extraocular motions are normal.   Right pupil: Shape: regular. Reactivity: brisk.   Left pupil: Shape: regular. Reactivity: brisk.   CN III: no CN III palsy  CN VI: no CN VI palsy  Nystagmus: none   Ophthalmoparesis: none    CN V   Right facial sensation deficit: none  Left facial sensation deficit: complete    CN VII   Right facial weakness: none  Left facial weakness: central    CN VIII   Hearing: intact    CN IX, X   Palate: symmetric    CN XI   Right sternocleidomastoid strength: normal  Left sternocleidomastoid strength: normal  Right trapezius strength: normal  Left trapezius strength: normal    CN XII   CN XII normal.   Tongue: not atrophic  Tongue deviation: none    MOTOR EXAM   Muscle bulk: normal  Overall muscle tone: normal    Strength   Strength 5/5 except as noted.   Left deltoid: 4/5  Left biceps: 4/5  Left triceps: 4/5  Left wrist flexion: 4/5  Left wrist extension: 4/5    REFLEXES     Reflexes   Right brachioradialis: 2+  Left brachioradialis: 2+  Right biceps: 2+  Left biceps: 2+  Right triceps: 2+  Left triceps: 2+  Right patellar: 2+  Left patellar: 2+  Right achilles: 2+  Left achilles: 2+    SENSORY EXAM   Right arm light touch: normal  Left arm light touch: decreased from elbow  Right leg light touch: normal  Left leg light touch: normal  Right arm vibration: normal  Left arm vibration: normal  Right leg vibration: normal  Left leg vibration: normal  Right arm proprioception: normal  Left arm proprioception: normal  Right leg proprioception: normal  Left leg  proprioception: normal  Right arm pinprick: normal  Left arm pinprick: decreased from elbow  Right leg pinprick: normal  Left leg pinprick: normal  Graphesthesia: normal  Stereognosis: normal    GAIT AND COORDINATION      Coordination   Finger to nose coordination: normal    Tremor   Resting tremor: absent      Significant Labs: All pertinent lab results from the past 24 hours have been reviewed.    Significant Imaging: I have reviewed all pertinent imaging results/findings within the past 24 hours.    Assessment and Plan:     * Cerebrovascular accident (CVA) due to stenosis of right middle cerebral artery  No acute stroke seen on MRI.  Patient has never had any stroke in the past, nor has he had any weakness similar to this in the past. MRI showed possible T2 shine through, but FLAIR showed hyperintensity in the right basal ganglia not seen on contralateral side which increases the likelihood of a recent ischemic event. Other possibility includes extension of prior stroke. Patient will benefit from optimization of risk factors including blood pressure, cholesterol, and advice on cessation of all illicit drug use.    - placement as per PT/OT        VTE Risk Mitigation (From admission, onward)         Ordered     warfarin (COUMADIN) tablet 10 mg  Daily      09/27/19 2302     IP VTE HIGH RISK PATIENT  Once      09/25/19 2026                Justin Hernandez MD  Neurology  [facility]

## 2019-09-30 VITALS
BODY MASS INDEX: 21.17 KG/M2 | WEIGHT: 156.31 LBS | OXYGEN SATURATION: 97 % | TEMPERATURE: 98 F | HEIGHT: 72 IN | HEART RATE: 96 BPM | SYSTOLIC BLOOD PRESSURE: 141 MMHG | RESPIRATION RATE: 19 BRPM | DIASTOLIC BLOOD PRESSURE: 78 MMHG

## 2019-09-30 LAB
INR PPP: 1.5 (ref 0.8–1.2)
POCT GLUCOSE: 218 MG/DL (ref 70–110)
POCT GLUCOSE: 298 MG/DL (ref 70–110)
PROTHROMBIN TIME: 15.8 SEC (ref 9–12.5)

## 2019-09-30 PROCEDURE — 97110 THERAPEUTIC EXERCISES: CPT

## 2019-09-30 PROCEDURE — 97535 SELF CARE MNGMENT TRAINING: CPT

## 2019-09-30 PROCEDURE — 85610 PROTHROMBIN TIME: CPT

## 2019-09-30 PROCEDURE — 25000003 PHARM REV CODE 250: Performed by: HOSPITALIST

## 2019-09-30 PROCEDURE — 97116 GAIT TRAINING THERAPY: CPT

## 2019-09-30 PROCEDURE — G0378 HOSPITAL OBSERVATION PER HR: HCPCS

## 2019-09-30 PROCEDURE — 96372 THER/PROPH/DIAG INJ SC/IM: CPT

## 2019-09-30 PROCEDURE — 25000003 PHARM REV CODE 250: Performed by: INTERNAL MEDICINE

## 2019-09-30 PROCEDURE — 36415 COLL VENOUS BLD VENIPUNCTURE: CPT

## 2019-09-30 RX ADMIN — INSULIN ASPART 5 UNITS: 100 INJECTION, SOLUTION INTRAVENOUS; SUBCUTANEOUS at 08:09

## 2019-09-30 RX ADMIN — INSULIN ASPART 3 UNITS: 100 INJECTION, SOLUTION INTRAVENOUS; SUBCUTANEOUS at 11:09

## 2019-09-30 RX ADMIN — PANTOPRAZOLE SODIUM 40 MG: 40 TABLET, DELAYED RELEASE ORAL at 08:09

## 2019-09-30 RX ADMIN — LISINOPRIL 20 MG: 20 TABLET ORAL at 08:09

## 2019-09-30 RX ADMIN — INSULIN ASPART 2 UNITS: 100 INJECTION, SOLUTION INTRAVENOUS; SUBCUTANEOUS at 08:09

## 2019-09-30 RX ADMIN — FINASTERIDE 5 MG: 5 TABLET, FILM COATED ORAL at 08:09

## 2019-09-30 RX ADMIN — TAMSULOSIN HYDROCHLORIDE 0.4 MG: 0.4 CAPSULE ORAL at 08:09

## 2019-09-30 RX ADMIN — ASPIRIN 325 MG: 325 TABLET, DELAYED RELEASE ORAL at 08:09

## 2019-09-30 RX ADMIN — GEMFIBROZIL 600 MG: 600 TABLET ORAL at 06:09

## 2019-09-30 RX ADMIN — INSULIN ASPART 5 UNITS: 100 INJECTION, SOLUTION INTRAVENOUS; SUBCUTANEOUS at 11:09

## 2019-09-30 NOTE — PLAN OF CARE
09/30/19 1439   Final Note   Assessment Type Final Discharge Note   Anticipated Discharge Disposition Home   What phone number can be called within the next 1-3 days to see how you are doing after discharge?   (see chart)   Hospital Follow Up  Appt(s) scheduled? Yes   Discharge plans and expectations educations in teach back method with documentation complete? Yes   Right Care Referral Info   Post Acute Recommendation No Care   Referral Type HOME CARE   Facility Name [facility]

## 2019-09-30 NOTE — PROGRESS NOTES
[facility] WRITTEN HEALTHCARE AND DISCHARGE INFORMATION:..  Follow-up Information     [facility] On 10/4/2019.    Why:  out patient services: follow up from the hospital at 3:50pm  Contact information:  [address]  [place] [address]  [phone]             Schedule an appointment as soon as possible for a visit with Justin Hernandez MD.    Specialty:  Neurology  Why:  out patient services: On wait list for an appointment.  Contact information:  [address] [facility] [address]  [address]  [place] [address]  [phone]             [facility] On 10/1/2019.    Specialties:  Home Health Services, Hospice Services  Why:  Home Health  Contact information:  [address] [place] [address]  [phone]                   Help at Home           [phone]  After discharge for assistance [facility] On Call Nurse Care Line 24/7  Assistance   Things You are responsible For To Manage Your Care At Home:  1.    Getting your prescriptions filled   2.    Taking your medications as directed, DO NOT MISS ANY DOSES!  3.    Going to your follow-up doctor appointment. This is important because it  allow the doctor to monitor your progress and determine if  any changes need to made to your treatment plan.   Thank you for choosing [facility] for your care.  Please answer any calls you may receive from [facility] we want to continue to support you as you manage your healthcare needs. [facility] is happy to have the opportunity to serve you.    Sincerely,  Your [facility] Team,  Lexie Dawkins RN, BSN, [facility]  9/30/2019  [phone]

## 2019-09-30 NOTE — NURSING
Discharge instructions given to patient in blue folder. Belongings given to patient. IV and cardiac monitor removed. No prescriptions were written for the patient. Patient folder at desk was cleaned and put in filing bin.

## 2019-09-30 NOTE — PLAN OF CARE
09/30/19 1350   Medicare Message   Important Message from Medicare regarding Discharge Appeal Rights Signed/date by patient/caregiver;Given to patient/caregiver;Explained to patient/caregiver   Date IMM was signed 09/30/19   Time IMM was signed [id number]

## 2019-09-30 NOTE — NURSING
Bedside shift report received from MARIBEL Márquez. Communication board has been updated. NAD noted. Will continue to monitor. Pt is asleep on right side at this time.

## 2019-09-30 NOTE — PLAN OF CARE
Problem: Physical Therapy Goal  Goal: Physical Therapy Goal  Description  Goals to be met by: 10/11/19     Patient will increase functional independence with mobility by performin. Supine to sit with [place]  2. Rolling to Left and Right with [place]  3. Sit to stand transfer with [place]  4. Bed to chair transfer with [place]   5. Gait  >500 feet with [place] using no AD  6. Lower extremity exercise program 3 sets x10 reps per handout, with independence     Outcome: Ongoing, Progressing  Pt ambulated ~500 ft x 3 trials with SBA using no AD.

## 2019-09-30 NOTE — PROGRESS NOTES
TN informed telemetry nurse, Parul that patient is cleared for discharge from cm viewpoint..Lexie Dawkins RN, BSN, [facility]  9/30/2019

## 2019-09-30 NOTE — PLAN OF CARE
[facility]  In bed huddle status of patient per Georgiana, Manager Inpt Physical Rehab says pt walked 500 ft without assist. Plan for Homehealth health.     09/30/19 1057   Post-Acute Status   Post-Acute Authorization Home Health/Hospice   Post-Acute Placement Status Awaiting Internal Medical Clearance   Home Health/Hospice Status Awaiting Orders for HH   Discharge Delays None known at this time   OPCM ordered.Lexie Dawkins RN, BSN, [facility]  9/30/2019

## 2019-09-30 NOTE — PHYSICIAN QUERY
PT Name: Forest Lopez  MR #: [id number]     Physician Query Form - Documentation Clarification      CDS: Melissa Heath RN  Contact information: [email or web address]    This form is a permanent document in the medical record.     Query Date: September 30, 2019    By submitting this query, we are merely seeking further clarification of documentation. Please utilize your independent clinical judgment when addressing the question(s) below.    The Medical record reflects the following:    Supporting Clinical Findings Location in Medical Record   Patient with persistent left facial droop along with left upper extremity weakness that has not resolved.    Case discussed with Neurology who felt patient met criteria for CVA today  Likely an extension of previous stroke that is obscured on imaging   HM PN 9/29   Cerebrovascular accident (CVA) due to stenosis of right middle cerebral artery  No acute stroke seen on MRI.  Patient has never had any stroke in the past, nor has he had any weakness similar to this in the past. MRI showed possible T2 shine through, but FLAIR showed hyperintensity in the right basal ganglia not seen on contralateral side which increases the likelihood of a recent ischemic event. Other possibility includes extension of prior stroke.    Neuro PN 9/29                                                                        Doctor, please clarify the diagnosis associated with above clinical findings of left upper extremity weakness.    Provider Use Only      [ x ] Left hemiparesis due to CVA      [  ] Other: _______________________                                                                                                           [  ] Clinically Undetermined     Please document in your progress notes daily for the duration of treatment, until resolved, and include in your discharge summary.

## 2019-09-30 NOTE — PT/OT/SLP PROGRESS
Physical Therapy Treatment    Patient Name:  Forest Lopez   MRN:  [id number]    Recommendations:     Discharge Recommendations:  outpatient PT(Pt does not drive, ok for home health services as well.)   Discharge Equipment Recommendations: none   Barriers to discharge home: Decreased caregiver support    Assessment:     Forest Lopez is a 69 y.o. male admitted with a medical diagnosis of Cerebrovascular accident (CVA) due to stenosis of right middle cerebral artery.  He presents with the following impairments/functional limitations:  weakness, impaired functional mobilty, gait instability, impaired balance, decreased lower extremity function, decreased upper extremity function.    Rehab Prognosis: Good; patient would benefit from acute skilled PT services to address these deficits and reach maximum level of function.    Recent Surgery: * No surgery found *      Plan:     During this hospitalization, patient to be seen 2 x/week to address the identified rehab impairments via gait training, therapeutic activities, therapeutic exercises and progress toward the following goals:    · Plan of Care Expires:  10/11/19    Subjective     Chief Complaint: Pt reported drooling, change in speech, and LUE weakness.   Patient/Family Comments/goals: to get well   Pain/Comfort:  · Pain Rating 1: 0/10      Objective:     Patient found HOB elevated with peripheral IV, telemetry upon PT entry to room.     General Precautions: Standard, fall, diabetic   Orthopedic Precautions:N/A   Braces: N/A    Functional Mobility:  · Bed Mobility:     · Scooting: supervision  · Supine to Sit: supervision with HOB elevated   · Transfers:     · Sit to Stand:  stand by assistance with no AD  · Bed to Chair: stand by assistance with  no AD  using  Step Transfer  · Gait: Pt ambulated ~500 ft x 3 trials with SBA using no AD.  Pt with 1 LOB when distracted, required CGA.  Pt with mild unsteadiness, decreased step length, and decreased ermelinda.  · Balance: Pt  with fair dynamic standing balance.       AM-PAC 6 CLICK MOBILITY  Turning over in bed (including adjusting bedclothes, sheets and blankets)?: 4  Sitting down on and standing up from a chair with arms (e.g., wheelchair, bedside commode, etc.): 4  Moving from lying on back to sitting on the side of the bed?: 4  Moving to and from a bed to a chair (including a wheelchair)?: 4  Need to walk in hospital room?: 3  Climbing 3-5 steps with a railing?: 3  Basic Mobility Total Score: 22       Therapeutic Activities and Exercises:  BLE standing therex 10 reps holding onto counter for balance: hip flex, hip abd, hip ext, heel/toe raises, and mini squats    Pt issued HEP for standing LE therex.  Pt verbalized understanding, HEP placed in pt's communication blue folder.     Patient left up in chair with all lines intact, call button in reach and nurse Parul notified.  Tray table in front.     GOALS:   Multidisciplinary Problems     Physical Therapy Goals        Problem: Physical Therapy Goal    Goal Priority Disciplines Outcome Goal Variances Interventions   Physical Therapy Goal     PT, PT/OT Ongoing, Progressing     Description:  Goals to be met by: 10/11/19     Patient will increase functional independence with mobility by performin. Supine to sit with [place]  2. Rolling to Left and Right with [place]  3. Sit to stand transfer with [place]  4. Bed to chair transfer with [place]   5. Gait  >500 feet with [place] using no AD  6. Lower extremity exercise program 3 sets x10 reps per handout, with independence                      Time Tracking:     PT Received On: 19  PT Start Time: 1046     PT Stop Time: 1112  PT Total Time (min): 26 min     Billable Minutes: Gait Training 13 min and Therapeutic Exercise 13 min                   Cornelia Osullivan PT  2019

## 2019-09-30 NOTE — PROGRESS NOTES
TN sent referral through [facility] for home health.  [facility] unable to to staff...Lexie Dawkins RN, BSN, [facility]  9/30/2019  '

## 2019-09-30 NOTE — DISCHARGE SUMMARY
[facility] Hospital Medicine  Discharge Summary      Patient Name: Forest Lopez  MRN: [id number]  Admission Date: 9/25/2019  Hospital Length of Stay: 5 days  Discharge Date and Time:  09/30/2019 12:12 PM  Attending Physician: Karlene Jacome MD   Discharging Provider: Karlene Jacome MD  Primary Care Provider: [facility]      HPI:   Mr. Forest Lopez is a 69 y.o. right-handed male with essential hypertension, type 2 diabetes mellitus (HbA1c 7.3% Sept 2015), hyperlipidemia (LDL 98.6 Sept 2019), GERD, BPH, cocaine abuse, cannabis abuse, tobacco abuse, and history of DVT on chronic anticoagulation who presents to [facility] ED with complaints of left upper extremity weakness this morning.  He noted the left hand weakness this morning when he woke up around 9:00 AM--he was holding a sandwich when he suddenly dropped it.  He was drinking some water and it was spilling out the left side of his lips.  He decided to go back to sleep and when he woke up around noon he decided to come to the ED.  He does report some speech difficulties but denies any headaches, swallowing difficulties, visual disturbances, palpitations, chest pain, nor shortness of breath.  He denies any numbness and denies any symptoms in any other extremities.  He has otherwise been in his usual state of health.    * No surgery found *      Hospital Course:   Mr. Lopez was presented with acute left arm weakness and left-sided facial droop.  Head CT showed right frontal lobe subtle focus of peripheral hypoattenuation which may be related to artifact from volume averaging versus recent infarct.  No intracranial hemorrhage.  He presented outside the window for tPA along with contraindication due to chronic anticoagulation with Coumadin.  Treatment initiated with permissive hypertension, aspirin and high-dose statin.  Neurology consulted along with rehab services. Further workup with MRI showed 2 foci of diffusion hyperintensity  within the left thalamus and right thalamus/basal ganglia felt to be related to T2 shine through from remote lacunar infarcts.  CTA of the head and neck negative for significant stenosis or aneurysm.  Carotid ultrasound also negative for significant stenosis.   Patient with persistent left facial droop along with left upper extremity weakness that has not resolved.  Rehab services recommending SNF and  working on placement.    Adjust BP and insulin. Await placement   9/29 INR 1.3- on coumadin     9/30- pt ambulated 500 feet without AD. Will dc home with HH. Pt does not drive which limits outpatient therapy. Resume BP meds and coumadin at 7.5mg daily. Needs PCP follow up and INR monitoring this week. Neurology follow up. HH services to include RN, PT/OT/ST, aide and social work.    DC home      Consults:   Consults (From admission, onward)        Status Ordering Provider     Inpatient consult to Neurology  Once     Provider:  Js Alcala MD    Completed SHELBY EDWARD     Inpatient consult to Telemedicine-Stroke  Once     Provider:  (Not yet assigned)    Completed DIANA NEWTON     IP consult to case management/social wor  Once     Provider:  (Not yet assigned)    Acknowledged SHELBY EDWARD          No new Assessment & Plan notes have been filed under this hospital service since the last note was generated.  Service: Hospital Medicine    Final Active Diagnoses:    Diagnosis Date Noted POA    PRINCIPAL PROBLEM:  Cerebrovascular accident (CVA) due to stenosis of right middle cerebral artery [I63.511] 09/25/2019 Yes    GERD (gastroesophageal reflux disease) [K21.9] 09/25/2019 Yes     Chronic    BPH (benign prostatic hyperplasia) [N40.0] 09/25/2019 Yes     Chronic    Cocaine abuse [F14.10] 09/25/2019 Yes     Chronic    Cannabis abuse [F12.10] 09/25/2019 Yes     Chronic    History of DVT (deep vein thrombosis) [Z86.718] 09/25/2019 Not Applicable     Chronic    Chronic anticoagulation [Z79.01]  09/25/2019 Not Applicable     Chronic    Essential hypertension [I10] 03/03/2014 Yes     Chronic    Type 2 diabetes mellitus, controlled [E11.9] 03/03/2014 Yes     Chronic    Hyperlipidemia [E78.5] 03/03/2014 Yes     Chronic    Tobacco dependence [F17.200] 03/03/2014 Yes     Chronic      Problems Resolved During this Admission:       Discharged Condition: good    Disposition: Home-Health Care [place]    Follow Up:  Follow-up Information     Julian Escobar In 1 week.    Contact information:  [address] [id number]  [phone]             Justin Hernandez MD.    Specialty:  Neurology  Contact information:  [address] [facility] [address]  [address]  [facility] [address]  [phone]                 Patient Instructions:      Diet diabetic     Diet Cardiac     Notify your health care provider if you experience any of the following:  temperature >100.4     Notify your health care provider if you experience any of the following:  persistent dizziness, light-headedness, or visual disturbances     Notify your health care provider if you experience any of the following:  difficulty breathing or increased cough     Activity as tolerated       Significant Diagnostic Studies:   MRI brain:  Impression       Two foci of diffusion hyperintensity within the left thalamus and right thalamus/basal ganglia felt to be related to T2 shine through from remote lacunar infarcts.  Otherwise no additional diffusion signal abnormality seen to suggest acute infarction.    Moderate chronic microvascular ischemic disease.         Pending Diagnostic Studies:     None         Medications:  Reconciled Home Medications:      Medication List      CHANGE how you take these medications    glipiZIDE 10 MG tablet  Commonly known as:  GLUCOTROL  Take 1 tablet (10 mg total) by mouth 2 (two) times daily before meals.  What changed:  Another medication with the same name was removed. Continue taking this medication, and follow the directions you see here.      omeprazole 20 MG capsule  Commonly known as:  PRILOSEC  TAKE 2 CAPSULES (40 MG) BY MOUTH ONCE DAILY.  What changed:  Another medication with the same name was removed. Continue taking this medication, and follow the directions you see here.        CONTINUE taking these medications    BLOOD GLUCOSE TEST Strp  Generic drug:  blood sugar diagnostic  by Misc.(Non-Drug; Combo Route) route.     ciclopirox 8 % Soln  Commonly known as:  PENLAC  Apply topically once daily. Apply topically to affected nails once daily.  Remove once weekly.  Repeat.  Follow package insert.     finasteride 5 mg tablet  Commonly known as:  PROSCAR  Take 1 tablet (5 mg total) by mouth once daily.     gemfibrozil 600 MG tablet  Commonly known as:  LOPID  Take 600 mg by mouth 2 (two) times daily before meals.     lisinopril 40 MG tablet  Commonly known as:  PRINIVIL,ZESTRIL  Take 40 mg by mouth once daily.     metFORMIN 500 MG tablet  Commonly known as:  GLUCOPHAGE  Take 1,000 mg by mouth 2 (two) times daily with meals.     rosuvastatin 40 MG Tab  Commonly known as:  CRESTOR  Take 10 mg by mouth every evening.     tamsulosin 0.4 mg Cap  Commonly known as:  FLOMAX  Take 1 capsule (0.4 mg total) by mouth once daily.     ULTILET CLASSIC LANCETS MISC  by Misc.(Non-Drug; Combo Route) route.     warfarin 7.5 MG tablet  Commonly known as:  COUMADIN  Take 7.5 mg by mouth once daily.        STOP taking these medications    benazepril 5 MG tablet  Commonly known as:  LOTENSIN     HYDROcodone-acetaminophen 5-325 mg per tablet  Commonly known as:  NORCO     sildenafil 100 MG tablet  Commonly known as:  VIAGRA     tobramycin-dexamethasone 0.3-0.1% 0.3-0.1 % Drps  Commonly known as:  TOBRADEX     triamcinolone acetonide 0.1% 0.1 % cream  Commonly known as:  KENALOG            Indwelling Lines/Drains at time of discharge:   Lines/Drains/Airways     None                 Time spent on the discharge of patient: 30 minutes  Patient was seen and examined on the date  of discharge and determined to be suitable for discharge.         Karlene Jacome MD  Department of Hospital Medicine  [facility]

## 2019-09-30 NOTE — PLAN OF CARE
[facility]    HOME HEALTH ORDERS  FACE TO FACE ENCOUNTER    Patient Name: Forest Lopez  YOB: 1950    PCP: Julian [facility]   PCP Address: [address] / [place]  PCP Phone Number: [phone]  PCP Fax: [phone]    Encounter Date: 09/30/2019    Admit to Home Health    Diagnoses:  Active Hospital Problems    Diagnosis  POA    *Cerebrovascular accident (CVA) due to stenosis of right middle cerebral artery [I63.511]  Yes    GERD (gastroesophageal reflux disease) [K21.9]  Yes     Chronic    BPH (benign prostatic hyperplasia) [N40.0]  Yes     Chronic    Cocaine abuse [F14.10]  Yes     Chronic    Cannabis abuse [F12.10]  Yes     Chronic    History of DVT (deep vein thrombosis) [Z86.718]  Not Applicable     Chronic    Chronic anticoagulation [Z79.01]  Not Applicable     Chronic    Essential hypertension [I10]  Yes     Chronic    Type 2 diabetes mellitus, controlled [E11.9]  Yes     Chronic    Hyperlipidemia [E78.5]  Yes     Chronic    Tobacco dependence [F17.200]  Yes     Chronic      Resolved Hospital Problems   No resolved problems to display.       No future appointments.  Follow-up Information     [facility] In 1 week.    Contact information:  [place] [address]  [phone]             Justin Hernandez MD.    Specialty:  Neurology  Contact information:  [address] [facility] [address]  [address]  [place] [facility]  [phone]                     I have seen and examined this patient face to face today. My clinical findings that support the need for the home health skilled services and home bound status are the following:  Weakness/numbness causing balance and gait disturbance due to Stroke and Weakness/Debility making it taxing to leave home.    Allergies:Review of patient's allergies indicates:  No Known Allergies    Diet: cardiac diet and diabetic diet: 1800 calorie    Activities: activity as tolerated    Nursing:   SN to complete  comprehensive assessment including routine vital signs. Instruct on disease process and s/s of complications to report to MD. Review/verify medication list sent home with the patient at time of discharge  and instruct patient/caregiver as needed. Frequency may be adjusted depending on start of care date.    Notify MD if SBP > 160 or < 90; DBP > 90 or < 50; HR > 120 or < 50; Temp > 101; Other:         CONSULTS:    Physical Therapy to evaluate and treat. Evaluate for home safety and equipment needs; Establish/upgrade home exercise program. Perform / instruct on therapeutic exercises, gait training, transfer training, and Range of Motion.  Occupational Therapy to evaluate and treat. Evaluate home environment for safety and equipment needs. Perform/Instruct on transfers, ADL training, ROM, and therapeutic exercises.  Speech Therapy  to evaluate and treat for  Language and Cognition.   to evaluate for community resources/long-range planning.  Aide to provide assistance with personal care, ADLs, and vital signs.    MISCELLANEOUS CARE:  Diabetic Care:   SN to perform and educate Diabetic management with blood glucose monitoring:, Fingerstick blood sugar AC and HS and Report CBG < 60 or > 350 to physician.      Medications: Review discharge medications with patient and family and provide education.      Current Discharge Medication List      CONTINUE these medications which have NOT CHANGED    Details   gemfibrozil (LOPID) 600 MG tablet Take 600 mg by mouth 2 (two) times daily before meals.      glipiZIDE (GLUCOTROL) 10 MG tablet Take 1 tablet (10 mg total) by mouth 2 (two) times daily before meals.  Qty: 60 tablet, Refills: 6    Associated Diagnoses: Type II or unspecified type diabetes mellitus without mention of complication, not stated as uncontrolled      lisinopril (PRINIVIL,ZESTRIL) 40 MG tablet Take 40 mg by mouth once daily.      metformin (GLUCOPHAGE) 500 MG tablet Take 1,000 mg by mouth 2 (two) times  daily with meals.       omeprazole (PRILOSEC) 20 MG capsule TAKE 2 CAPSULES (40 MG) BY MOUTH ONCE DAILY.  Qty: 60 capsule, Refills: 5      rosuvastatin (CRESTOR) 40 MG Tab Take 10 mg by mouth every evening.      tamsulosin (FLOMAX) 0.4 mg Cp24 Take 1 capsule (0.4 mg total) by mouth once daily.  Qty: 90 capsule, Refills: 1    Associated Diagnoses: BPH without urinary obstruction      warfarin (COUMADIN) 7.5 MG tablet Take 7.5 mg by mouth once daily.      blood sugar diagnostic (BLOOD GLUCOSE TEST) Strp by Misc.(Non-Drug; Combo Route) route.      ciclopirox (PENLAC) 8 % Soln Apply topically once daily. Apply topically to affected nails once daily.  Remove once weekly.  Repeat.  Follow package insert.  Qty: 6.6 mL, Refills: 12    Associated Diagnoses: Onychomycosis due to dermatophyte      finasteride (PROSCAR) 5 mg tablet Take 1 tablet (5 mg total) by mouth once daily.  Qty: 90 tablet, Refills: 1    Associated Diagnoses: BPH without urinary obstruction      ULTILET CLASSIC LANCETS MISC by Misc.(Non-Drug; Combo Route) route.         STOP taking these medications       benazepril (LOTENSIN) 5 MG tablet Comments:   Reason for Stopping:         hydrocodone-acetaminophen 5-325mg (NORCO) 5-325 mg per tablet Comments:   Reason for Stopping:         sildenafil (VIAGRA) 100 MG tablet Comments:   Reason for Stopping:         tobramycin-dexamethasone 0.3-0.1% (TOBRADEX) 0.3-0.1 % DrpS Comments:   Reason for Stopping:         triamcinolone acetonide 0.1% (KENALOG) 0.1 % cream Comments:   Reason for Stopping:               I certify that this patient is confined to his home and needs intermittent skilled nursing care, physical therapy, speech therapy and occupational therapy.

## 2019-10-01 NOTE — PT/OT/SLP PROGRESS
Occupational Therapy   Treatment/Discharge    Name: Forest Lopez  MRN: [id number]  Admitting Diagnosis:  Cerebrovascular accident (CVA) due to stenosis of right middle cerebral artery       Recommendations:     Discharge Recommendations: nursing facility, skilled  Discharge Equipment Recommendations:  none  Barriers to discharge:  Decreased caregiver support    Assessment:     Forest Lopez is a 69 y.o. male with a medical diagnosis of Cerebrovascular accident (CVA) due to stenosis of right middle cerebral artery. The patient was able to feed himself with verbal cues to take small bites and clear his mouth between bies. The patient was able to use his left hand as an assist without dropping items. The patient states he is ready for D/C to home today. Performance deficits affecting function are impaired self care skills, decreased upper extremity function, impaired functional mobilty, impaired fine motor.     Rehab Prognosis:  Good; patient would benefit from acute skilled OT services to address these deficits and reach maximum level of function.       Plan:     Patient to be seen 5 x/week to address the above listed problems via self-care/home management, therapeutic exercises, therapeutic activities  · Plan of Care Expires: 10/10/19  · Plan of Care Reviewed with: patient    Subjective     Pain/Comfort:  · Pain Rating 1: 0/10    Objective:     Patient found HOB elevated with telemetry upon OT entry to room.    General Precautions: Standard, fall, diabetic   Orthopedic Precautions:N/A   Braces: N/A     Occupational Performance:     Bed Mobility:    · Patient completed Scooting/Bridging with stand by assistance, contact guard assistance and and verbal cues to pull self to the top of the bed with use of the head board     Functional Mobility/Transfers:  · N/T      Activities of Daily Living:  · Feeding:  The patient required assist to cut his pork chop in small bites/chopped bites. The patient required verbal cues to  take smaller bites and chew foof/clear his mouth before taking another bite. The patient was able to manage food after verbal cues and tray set up. The patient was able to use his left hand to assit with buttering bread.  · Grooming: modified independence to wipe face and hands      [facility] 6 Click ADL: 21    Treatment & Education:  The patient participated in feeding in the bed. The patient's lunch was present and the patient refused to transfer to the chair 2* sitting in the chair all morning. The patient was educated re: need to cut food in small pieces and take small bites. The patient demonstated understanding after education.    Patient left HOB elevated with all lines intact, call button in reach and nurse notifiedEducation:      GOALS:   Multidisciplinary Problems     Occupational Therapy Goals     Not on file          Multidisciplinary Problems (Resolved)        Problem: Occupational Therapy Goal    Goal Priority Disciplines Outcome Interventions   Occupational Therapy Goal   (Resolved)     OT, PT/OT Met    Description:  Goals to be met by: 10/10/2019     Patient will increase functional independence with ADLs by performing:    UE Dressing with [place].  LE Dressing with [place].  Grooming while standing at sink with Stand-by Assistance.  Sitting at edge of bed x15 minutes with [place].  Supine to sit with [place].  Step transfer with Contact Guard Assistance  Upper extremity exercise program per handout, with assistance as needed.                      Time Tracking:     OT Date of Treatment: 09/30/19  OT Start Time: 1145  OT Stop Time: 1208  OT Total Time (min): 23 min    Billable Minutes:Self Care/Home Management 24    Jade Hoover OT  10/1/2019

## 2019-10-07 ENCOUNTER — OFFICE VISIT (OUTPATIENT)
Dept: NEUROLOGY | Facility: CLINIC | Age: 69
End: 2019-10-07
Payer: MEDICARE

## 2019-10-07 VITALS
DIASTOLIC BLOOD PRESSURE: 70 MMHG | WEIGHT: 153.88 LBS | SYSTOLIC BLOOD PRESSURE: 142 MMHG | HEIGHT: 72 IN | BODY MASS INDEX: 20.84 KG/M2 | HEART RATE: 73 BPM

## 2019-10-07 DIAGNOSIS — I63.511 CEREBROVASCULAR ACCIDENT (CVA) DUE TO STENOSIS OF RIGHT MIDDLE CEREBRAL ARTERY: Primary | ICD-10-CM

## 2019-10-07 DIAGNOSIS — I10 ESSENTIAL HYPERTENSION: Chronic | ICD-10-CM

## 2019-10-07 DIAGNOSIS — E78.5 HYPERLIPIDEMIA, UNSPECIFIED HYPERLIPIDEMIA TYPE: Chronic | ICD-10-CM

## 2019-10-07 DIAGNOSIS — R13.10 DYSPHAGIA, UNSPECIFIED TYPE: ICD-10-CM

## 2019-10-07 PROBLEM — I63.9 ACUTE ISCHEMIC STROKE: Status: RESOLVED | Noted: 2019-09-25 | Resolved: 2019-10-07

## 2019-10-07 PROCEDURE — 3078F DIAST BP <80 MM HG: CPT | Mod: CPTII,S$GLB,, | Performed by: NEUROLOGICAL SURGERY

## 2019-10-07 PROCEDURE — 99215 PR OFFICE/OUTPT VISIT, EST, LEVL V, 40-54 MIN: ICD-10-PCS | Mod: S$GLB,,, | Performed by: NEUROLOGICAL SURGERY

## 2019-10-07 PROCEDURE — 99999 PR PBB SHADOW E&M-EST. PATIENT-LVL III: CPT | Mod: PBBFAC,,, | Performed by: NEUROLOGICAL SURGERY

## 2019-10-07 PROCEDURE — 1101F PR PT FALLS ASSESS DOC 0-1 FALLS W/OUT INJ PAST YR: ICD-10-PCS | Mod: CPTII,S$GLB,, | Performed by: NEUROLOGICAL SURGERY

## 2019-10-07 PROCEDURE — 99215 OFFICE O/P EST HI 40 MIN: CPT | Mod: S$GLB,,, | Performed by: NEUROLOGICAL SURGERY

## 2019-10-07 PROCEDURE — 1101F PT FALLS ASSESS-DOCD LE1/YR: CPT | Mod: CPTII,S$GLB,, | Performed by: NEUROLOGICAL SURGERY

## 2019-10-07 PROCEDURE — 99999 PR PBB SHADOW E&M-EST. PATIENT-LVL III: ICD-10-PCS | Mod: PBBFAC,,, | Performed by: NEUROLOGICAL SURGERY

## 2019-10-07 PROCEDURE — 3077F PR MOST RECENT SYSTOLIC BLOOD PRESSURE >= 140 MM HG: ICD-10-PCS | Mod: CPTII,S$GLB,, | Performed by: NEUROLOGICAL SURGERY

## 2019-10-07 PROCEDURE — 3078F PR MOST RECENT DIASTOLIC BLOOD PRESSURE < 80 MM HG: ICD-10-PCS | Mod: CPTII,S$GLB,, | Performed by: NEUROLOGICAL SURGERY

## 2019-10-07 PROCEDURE — 3077F SYST BP >= 140 MM HG: CPT | Mod: CPTII,S$GLB,, | Performed by: NEUROLOGICAL SURGERY

## 2019-10-07 NOTE — PROGRESS NOTES
Chief Complaint   Patient presents with    Stroke        Forest Lopez is a 69 y.o. male with a history of multiple medical diagnoses as listed below that presents for follow-up after being hospitalized for an acute stroke.  The patient was evaluated in the hospital for sudden onset of left-sided weakness including the face, arm, and leg.  MRI did not show any acute events although there were areas of FLAIR hyperintensity that were consistent with shunt to verses new ischemic changes.  The patient's symptoms have not resolved since being hospitalized more than 2 weeks ago.  He has noticed that eating and drinking have become more difficult as food tends to fall from the left side of his mouth.  He has also been coughing and choking when he has been taking in fluids as well as solid foods.  He was able to walk V 100 ft without assistance at the hospital so he was not eligible for inpatient rehabilitation, but was felt to be an outpatient rehab candidate.  Due to lack of transportation the patient is not able to make it to outpatient rehab reliably and is consequently requesting any services that are arranged to be done in home.  His weight has been dropping.  Blood pressure has been stable.  He has been taking all medications as directed including Coumadin.  Recently the patient had a fall due to loss of balance and coordination in the left leg.  He did not injure himself.  He has not noticed any bruising since the time of the falls.      PAST MEDICAL HISTORY:  Past Medical History:   Diagnosis Date    Blind left eye     able to see, but poorly    BPH (benign prostatic hyperplasia)     Diabetes mellitus, type 2     Hyperlipidemia     Hypertension     Left rib fracture 03/30/2017    Stroke-like symptoms 09/25/2019    L facial droop, slurred speech & L arm weakness       PAST SURGICAL HISTORY:  Past Surgical History:   Procedure Laterality Date    NO PAST SURGERIES  09/25/2019       SOCIAL HISTORY:  Social  "History     Socioeconomic History    Marital status: Single     Spouse name: Not on file    Number of children: Not on file    Years of education: Not on file    Highest education level: Not on file   Occupational History    Not on file   Social Needs    Financial resource strain: Not on file    Food insecurity:     Worry: Not on file     Inability: Not on file    Transportation needs:     Medical: Not on file     Non-medical: Not on file   Tobacco Use    Smoking status: Light Tobacco Smoker     Types: Cigarettes, Cigars    Smokeless tobacco: Never Used    Tobacco comment: socially   Substance and Sexual Activity    Alcohol use: Yes     Alcohol/week: 0.0 standard drinks     Comment: drinks beer socially     Drug use: Yes     Types: Marijuana, "Crack" cocaine     Comment: 9/25/19: last smoked "a blunt, with some crack in it, about a month ago"    Sexual activity: Not Currently   Lifestyle    Physical activity:     Days per week: Not on file     Minutes per session: Not on file    Stress: Not on file   Relationships    Social connections:     Talks on phone: Not on file     Gets together: Not on file     Attends Denominational service: Not on file     Active member of club or organization: Not on file     Attends meetings of clubs or organizations: Not on file     Relationship status: Not on file   Other Topics Concern    Not on file   Social History Narrative    Not on file       FAMILY HISTORY:  Family History   Problem Relation Age of Onset    Heart disease Mother         heart surgery    Heart disease Father         heart attack    Stroke Sister        ALLERGIES AND MEDICATIONS: updated and reviewed.  Review of patient's allergies indicates:  No Known Allergies  Current Outpatient Medications   Medication Sig Dispense Refill    blood sugar diagnostic (BLOOD GLUCOSE TEST) Strp by Misc.(Non-Drug; Combo Route) route.      ciclopirox (PENLAC) 8 % Soln Apply topically once daily. Apply topically to " affected nails once daily.  Remove once weekly.  Repeat.  Follow package insert. 6.6 mL 12    finasteride (PROSCAR) 5 mg tablet Take 1 tablet (5 mg total) by mouth once daily. 90 tablet 1    gemfibrozil (LOPID) 600 MG tablet Take 600 mg by mouth 2 (two) times daily before meals.      glipiZIDE (GLUCOTROL) 10 MG tablet Take 1 tablet (10 mg total) by mouth 2 (two) times daily before meals. 60 tablet 6    lisinopril (PRINIVIL,ZESTRIL) 40 MG tablet Take 40 mg by mouth once daily.      metformin (GLUCOPHAGE) 500 MG tablet Take 1,000 mg by mouth 2 (two) times daily with meals.       omeprazole (PRILOSEC) 20 MG capsule TAKE 2 CAPSULES (40 MG) BY MOUTH ONCE DAILY. 60 capsule 5    rosuvastatin (CRESTOR) 40 MG Tab Take 10 mg by mouth every evening.      tamsulosin (FLOMAX) 0.4 mg Cp24 Take 1 capsule (0.4 mg total) by mouth once daily. 90 capsule 1    ULTILET CLASSIC LANCETS MISC by Misc.(Non-Drug; Combo Route) route.      warfarin (COUMADIN) 7.5 MG tablet Take 7.5 mg by mouth once daily.       No current facility-administered medications for this visit.        Review of Systems   Constitutional: Positive for unexpected weight change. Negative for activity change and fatigue.   HENT: Positive for trouble swallowing and voice change.    Eyes: Negative for photophobia, pain and visual disturbance.   Respiratory: Negative for apnea and shortness of breath.    Cardiovascular: Negative for chest pain and palpitations.   Gastrointestinal: Negative for constipation, nausea and vomiting.   Genitourinary: Negative for difficulty urinating.   Musculoskeletal: Positive for gait problem. Negative for arthralgias, back pain, myalgias and neck pain.   Skin: Negative for color change and rash.   Neurological: Positive for facial asymmetry, speech difficulty, weakness and numbness. Negative for dizziness, seizures, syncope, light-headedness and headaches.   Psychiatric/Behavioral: Negative for agitation, behavioral problems and  confusion.       Neurologic Exam     Mental Status   Oriented to person, place, and time.   Registration: recalls 3 of 3 objects.   Attention: normal. Concentration: normal.   Speech: speech is normal   Level of consciousness: alert  Knowledge: good.     Cranial Nerves     CN II   Visual fields full to confrontation.   Right visual field deficit: none  Left visual field deficit: none     CN III, IV, VI   Pupils are equal, round, and reactive to light.  Extraocular motions are normal.   Right pupil: Size: 3 mm. Shape: regular. Accommodation: intact.   Left pupil: Size: 3 mm. Shape: regular. Accommodation: intact.   CN III: no CN III palsy  CN VI: no CN VI palsy  Nystagmus: none   Diplopia: none  Ophthalmoparesis: none  Upgaze: normal  Downgaze: normal  Conjugate gaze: present    CN V   Facial sensation intact.   Right facial sensation deficit: none  Left facial sensation deficit: none    CN VII   Facial expression full, symmetric.   Right facial weakness: none  Left facial weakness: central    CN VIII   CN VIII normal.     CN IX, X   CN IX normal.   CN X normal.   Palate: symmetric    CN XI   CN XI normal.   Right sternocleidomastoid strength: normal  Left sternocleidomastoid strength: normal  Right trapezius strength: normal  Left trapezius strength: normal    CN XII   CN XII normal.   Tongue deviation: none    Motor Exam   Muscle bulk: normal  Overall muscle tone: normal  Right arm tone: normal  Left arm tone: normal  Right leg tone: normal  Left leg tone: normal    Strength   Strength 5/5 except as noted.   Left deltoid: 4/5  Left biceps: 4/5  Left triceps: 4/5  Left wrist flexion: 4/5  Left wrist extension: 4/5  Left interossei: 4/5  Left iliopsoas: 4/5    Sensory Exam   Right arm light touch: normal  Left arm light touch: normal  Right leg light touch: normal  Left leg light touch: normal  Right arm vibration: normal  Left arm vibration: normal  Right leg vibration: normal  Left leg vibration: normal  Right arm  proprioception: normal  Left arm proprioception: normal  Right leg proprioception: normal  Left leg proprioception: normal  Right arm pinprick: normal  Left arm pinprick: normal  Right leg pinprick: normal  Left leg pinprick: normal    Gait, Coordination, and Reflexes     Gait  Gait: wide-based    Coordination   Romberg: positive  Finger to nose coordination: abnormal  Heel to shin coordination: abnormal  Tandem walking coordination: normal    Tremor   Resting tremor: absent    Reflexes   Right brachioradialis: 2+  Left brachioradialis: 3+  Right biceps: 2+  Left biceps: 3+  Right triceps: 2+  Left triceps: 3+  Right patellar: 2+  Left patellar: 3+  Right achilles: 2+  Left achilles: 3+  Right plantar: equivocal  Left plantar: equivocal      Physical Exam   Constitutional: He is oriented to person, place, and time. He appears well-developed and well-nourished.   HENT:   Head: Normocephalic and atraumatic.   Eyes: Pupils are equal, round, and reactive to light. EOM are normal.   Cardiovascular: Intact distal pulses.   Pulmonary/Chest: Effort normal. No respiratory distress.   Musculoskeletal: Normal range of motion.   Neurological: He is alert and oriented to person, place, and time. He has an abnormal Finger-Nose-Finger Test, an abnormal Heel to Shin Test and an abnormal Romberg Test. He has a normal Tandem Gait Test.   Reflex Scores:       Tricep reflexes are 2+ on the right side and 3+ on the left side.       Bicep reflexes are 2+ on the right side and 3+ on the left side.       Brachioradialis reflexes are 2+ on the right side and 3+ on the left side.       Patellar reflexes are 2+ on the right side and 3+ on the left side.       Achilles reflexes are 2+ on the right side and 3+ on the left side.  Skin: Skin is warm and dry.   Psychiatric: He has a normal mood and affect. His speech is normal and behavior is normal.       Vitals:    10/07/19 1452   BP: (!) 142/70   BP Location: Left arm   Patient Position:  Sitting   BP Method: Large (Automatic)   Pulse: 73   Weight: 69.8 kg (153 lb 14.1 oz)   Height: 6' (1.829 m)       Assessment & Plan:    Problem List Items Addressed This Visit     Essential hypertension (Chronic)    Relevant Orders    Ambulatory Referral to Home Health    Hyperlipidemia (Chronic)    Relevant Orders    Ambulatory Referral to Home Health    Cerebrovascular accident (CVA) due to stenosis of right middle cerebral artery - Primary    Relevant Orders    Ambulatory Referral to Home Health      Other Visit Diagnoses     Dysphagia, unspecified type        Relevant Orders    Fl Modified Barium Swallow Speech    SLP video swallow          Follow-up: Follow up in about 3 months (around 1/7/2020).   More than 50% of this 45 minute encounter was spent in counseling and coordinating care after stroke    This note was done with the assistance of voice recognition software. Some errors may be present after proofreading.

## 2019-10-29 ENCOUNTER — HOME CARE VISIT (OUTPATIENT)
Dept: NEUROLOGY | Facility: HOSPITAL | Age: 69
End: 2019-10-29

## 2019-10-29 VITALS
SYSTOLIC BLOOD PRESSURE: 122 MMHG | DIASTOLIC BLOOD PRESSURE: 74 MMHG | WEIGHT: 144 LBS | OXYGEN SATURATION: 95 % | HEART RATE: 81 BPM | BODY MASS INDEX: 19.53 KG/M2

## 2019-10-29 NOTE — PROGRESS NOTES
Mr. Lopez VS Nimo WNL on today's visit. He denies knowledge of previous stroke, PMH of HTN, DM, and tobacco abuse. He denies any ER visit or hospital stays since stroke encounter. Home health present for ST/PT and nursing services. LHE educated patient on stroke RF, stroke mobile program, and stroke S/S.

## 2019-12-02 ENCOUNTER — HOME CARE VISIT (OUTPATIENT)
Dept: NEUROLOGY | Facility: HOSPITAL | Age: 69
End: 2019-12-02

## 2019-12-02 VITALS
OXYGEN SATURATION: 98 % | DIASTOLIC BLOOD PRESSURE: 78 MMHG | WEIGHT: 147.19 LBS | BODY MASS INDEX: 19.96 KG/M2 | HEART RATE: 75 BPM | SYSTOLIC BLOOD PRESSURE: 126 MMHG

## 2019-12-03 NOTE — PROGRESS NOTES
Mr. Lopez VS are WNL on todays visit. He denies any ER visits or hospital stays. Patient has new CBG machine and he has been checking BG daily. LHE reinforced medication compliance and proper monitoring of CBG with entries into log book.

## 2020-01-07 ENCOUNTER — HOME CARE VISIT (OUTPATIENT)
Dept: NEUROLOGY | Facility: HOSPITAL | Age: 70
End: 2020-01-07

## 2020-02-05 ENCOUNTER — HOME CARE VISIT (OUTPATIENT)
Dept: NEUROLOGY | Facility: HOSPITAL | Age: 70
End: 2020-02-05

## 2020-02-05 VITALS
SYSTOLIC BLOOD PRESSURE: 130 MMHG | OXYGEN SATURATION: 98 % | BODY MASS INDEX: 19.77 KG/M2 | WEIGHT: 145.81 LBS | DIASTOLIC BLOOD PRESSURE: 70 MMHG | HEART RATE: 74 BPM

## 2020-02-05 NOTE — PROGRESS NOTES
Mr. Lopez VS are WNL on today's visit. He denies any ER visits or hospital stays. Has new medications from PCP for diagnosis of COPD. LHE reviewed low salt diet, and medication compliance.

## 2020-03-06 ENCOUNTER — HOME CARE VISIT (OUTPATIENT)
Dept: NEUROLOGY | Facility: HOSPITAL | Age: 70
End: 2020-03-06

## 2020-04-06 ENCOUNTER — HOME CARE VISIT (OUTPATIENT)
Dept: NEUROLOGY | Facility: HOSPITAL | Age: 70
End: 2020-04-06

## 2020-04-06 NOTE — PROGRESS NOTES
Mr. Lopez denies any ER visits or hospital stays. SM nurse reinforced education on medication compliance and smoking cessation. Reinforced education on S/S patient needs to notify medical professionals including contact information to do so.

## 2020-05-06 ENCOUNTER — HOME CARE VISIT (OUTPATIENT)
Dept: NEUROLOGY | Facility: HOSPITAL | Age: 70
End: 2020-05-06

## 2020-05-11 NOTE — PROGRESS NOTES
Phone Visit completed. Mr. Lopez denies any ER visits or hospital stays. SM nurse reinforced continued medication compliance. Educated patient on S/S he is to report to medic al care team.

## 2020-06-10 ENCOUNTER — HOME CARE VISIT (OUTPATIENT)
Dept: NEUROLOGY | Facility: HOSPITAL | Age: 70
End: 2020-06-10

## 2020-06-13 ENCOUNTER — HOSPITAL ENCOUNTER (INPATIENT)
Facility: HOSPITAL | Age: 70
LOS: 2 days | Discharge: HOME | DRG: 897 | End: 2020-06-15
Attending: EMERGENCY MEDICINE | Admitting: EMERGENCY MEDICINE
Payer: MEDICARE

## 2020-06-13 DIAGNOSIS — F10.929 ALCOHOL INTOXICATION: ICD-10-CM

## 2020-06-13 DIAGNOSIS — F10.129 ALCOHOL ABUSE WITH INTOXICATION: ICD-10-CM

## 2020-06-13 DIAGNOSIS — E87.20 LACTIC ACIDOSIS: Primary | ICD-10-CM

## 2020-06-13 PROCEDURE — 99291 CRITICAL CARE FIRST HOUR: CPT | Mod: 25

## 2020-06-13 PROCEDURE — G0379 DIRECT REFER HOSPITAL OBSERV: HCPCS

## 2020-06-13 PROCEDURE — G0378 HOSPITAL OBSERVATION PER HR: HCPCS

## 2020-06-13 PROCEDURE — 12000002 HC ACUTE/MED SURGE SEMI-PRIVATE ROOM

## 2020-06-14 PROBLEM — E87.20 LACTIC ACIDOSIS: Status: ACTIVE | Noted: 2020-06-14

## 2020-06-14 PROBLEM — F10.129 ALCOHOL ABUSE WITH INTOXICATION: Status: ACTIVE | Noted: 2020-06-14

## 2020-06-14 LAB
ALBUMIN SERPL BCP-MCNC: 3.5 G/DL (ref 3.5–5.2)
ALBUMIN SERPL BCP-MCNC: 3.6 G/DL (ref 3.5–5.2)
ALLENS TEST: ABNORMAL
ALLENS TEST: ABNORMAL
ALP SERPL-CCNC: 81 U/L (ref 55–135)
ALP SERPL-CCNC: 81 U/L (ref 55–135)
ALT SERPL W/O P-5'-P-CCNC: 8 U/L (ref 10–44)
ALT SERPL W/O P-5'-P-CCNC: 8 U/L (ref 10–44)
AMPHET+METHAMPHET UR QL: NEGATIVE
ANION GAP SERPL CALC-SCNC: 11 MMOL/L (ref 8–16)
ANION GAP SERPL CALC-SCNC: 15 MMOL/L (ref 8–16)
ANION GAP SERPL CALC-SCNC: 16 MMOL/L (ref 8–16)
ANION GAP SERPL CALC-SCNC: 9 MMOL/L (ref 8–16)
AST SERPL-CCNC: 13 U/L (ref 10–40)
AST SERPL-CCNC: 14 U/L (ref 10–40)
B-OH-BUTYR BLD STRIP-SCNC: 0.2 MMOL/L (ref 0–0.5)
BARBITURATES UR QL SCN>200 NG/ML: NEGATIVE
BASOPHILS # BLD AUTO: 0.04 K/UL (ref 0–0.2)
BASOPHILS NFR BLD: 0.4 % (ref 0–1.9)
BENZODIAZ UR QL SCN>200 NG/ML: NEGATIVE
BILIRUB SERPL-MCNC: 0.2 MG/DL (ref 0.1–1)
BILIRUB SERPL-MCNC: 0.3 MG/DL (ref 0.1–1)
BILIRUB UR QL STRIP: NEGATIVE
BUN SERPL-MCNC: 11 MG/DL (ref 8–23)
BUN SERPL-MCNC: 12 MG/DL (ref 8–23)
BUN SERPL-MCNC: 6 MG/DL (ref 8–23)
BUN SERPL-MCNC: 9 MG/DL (ref 8–23)
BZE UR QL SCN: NEGATIVE
CALCIUM SERPL-MCNC: 8.5 MG/DL (ref 8.7–10.5)
CALCIUM SERPL-MCNC: 8.5 MG/DL (ref 8.7–10.5)
CALCIUM SERPL-MCNC: 8.6 MG/DL (ref 8.7–10.5)
CALCIUM SERPL-MCNC: 8.6 MG/DL (ref 8.7–10.5)
CANNABINOIDS UR QL SCN: NEGATIVE
CHLORIDE SERPL-SCNC: 107 MMOL/L (ref 95–110)
CHLORIDE SERPL-SCNC: 107 MMOL/L (ref 95–110)
CHLORIDE SERPL-SCNC: 112 MMOL/L (ref 95–110)
CHLORIDE SERPL-SCNC: 116 MMOL/L (ref 95–110)
CK SERPL-CCNC: 68 U/L (ref 20–200)
CLARITY UR: CLEAR
CO2 SERPL-SCNC: 15 MMOL/L (ref 23–29)
CO2 SERPL-SCNC: 15 MMOL/L (ref 23–29)
CO2 SERPL-SCNC: 21 MMOL/L (ref 23–29)
CO2 SERPL-SCNC: 22 MMOL/L (ref 23–29)
COLOR UR: NORMAL
CREAT SERPL-MCNC: 0.8 MG/DL (ref 0.5–1.4)
CREAT SERPL-MCNC: 0.9 MG/DL (ref 0.5–1.4)
CREAT UR-MCNC: 41.4 MG/DL (ref 23–375)
DELSYS: ABNORMAL
DELSYS: ABNORMAL
DIFFERENTIAL METHOD: ABNORMAL
EOSINOPHIL # BLD AUTO: 0 K/UL (ref 0–0.5)
EOSINOPHIL NFR BLD: 0.4 % (ref 0–8)
ERYTHROCYTE [DISTWIDTH] IN BLOOD BY AUTOMATED COUNT: 13.4 % (ref 11.5–14.5)
EST. GFR  (AFRICAN AMERICAN): >60 ML/MIN/1.73 M^2
EST. GFR  (NON AFRICAN AMERICAN): >60 ML/MIN/1.73 M^2
ETHANOL SERPL-MCNC: 224 MG/DL
FIO2: 21
FIO2: 21
GLUCOSE SERPL-MCNC: 136 MG/DL (ref 70–110)
GLUCOSE SERPL-MCNC: 138 MG/DL (ref 70–110)
GLUCOSE SERPL-MCNC: 171 MG/DL (ref 70–110)
GLUCOSE SERPL-MCNC: 279 MG/DL (ref 70–110)
GLUCOSE UR QL STRIP: NEGATIVE
HCO3 UR-SCNC: 20.5 MMOL/L (ref 24–28)
HCO3 UR-SCNC: 21.9 MMOL/L (ref 24–28)
HCT VFR BLD AUTO: 38.6 % (ref 40–54)
HGB BLD-MCNC: 12.2 G/DL (ref 14–18)
HGB UR QL STRIP: NEGATIVE
IMM GRANULOCYTES # BLD AUTO: 0.06 K/UL (ref 0–0.04)
IMM GRANULOCYTES NFR BLD AUTO: 0.6 % (ref 0–0.5)
INR PPP: 1.2 (ref 0.8–1.2)
KETONES UR QL STRIP: NEGATIVE
LACTATE SERPL-SCNC: 2.5 MMOL/L (ref 0.5–2.2)
LACTATE SERPL-SCNC: 3.3 MMOL/L (ref 0.5–2.2)
LACTATE SERPL-SCNC: 3.8 MMOL/L (ref 0.5–2.2)
LACTATE SERPL-SCNC: 4.6 MMOL/L (ref 0.5–2.2)
LEUKOCYTE ESTERASE UR QL STRIP: NEGATIVE
LYMPHOCYTES # BLD AUTO: 2.4 K/UL (ref 1–4.8)
LYMPHOCYTES NFR BLD: 23.6 % (ref 18–48)
MAGNESIUM SERPL-MCNC: 1.6 MG/DL (ref 1.6–2.6)
MCH RBC QN AUTO: 26.1 PG (ref 27–31)
MCHC RBC AUTO-ENTMCNC: 31.6 G/DL (ref 32–36)
MCV RBC AUTO: 83 FL (ref 82–98)
METHADONE UR QL SCN>300 NG/ML: NEGATIVE
MODE: ABNORMAL
MODE: ABNORMAL
MONOCYTES # BLD AUTO: 0.6 K/UL (ref 0.3–1)
MONOCYTES NFR BLD: 6.1 % (ref 4–15)
NEUTROPHILS # BLD AUTO: 7.1 K/UL (ref 1.8–7.7)
NEUTROPHILS NFR BLD: 68.9 % (ref 38–73)
NITRITE UR QL STRIP: NEGATIVE
NRBC BLD-RTO: 0 /100 WBC
OPIATES UR QL SCN: NEGATIVE
OSMOLALITY SERPL: 354 MOSM/KG (ref 280–300)
PCO2 BLDA: 43.3 MMHG (ref 35–45)
PCO2 BLDA: 49.4 MMHG (ref 35–45)
PCP UR QL SCN>25 NG/ML: NEGATIVE
PH SMN: 7.25 [PH] (ref 7.35–7.45)
PH SMN: 7.28 [PH] (ref 7.35–7.45)
PH UR STRIP: 6 [PH] (ref 5–8)
PHOSPHATE SERPL-MCNC: 2.2 MG/DL (ref 2.7–4.5)
PLATELET # BLD AUTO: 223 K/UL (ref 150–350)
PMV BLD AUTO: 10.8 FL (ref 9.2–12.9)
PO2 BLDA: 16 MMHG (ref 40–60)
PO2 BLDA: 41 MMHG (ref 40–60)
POC BE: -6 MMOL/L
POC BE: -6 MMOL/L
POC SATURATED O2: 16 % (ref 95–100)
POC SATURATED O2: 69 % (ref 95–100)
POC TCO2: 22 MMOL/L (ref 24–29)
POC TCO2: 23 MMOL/L (ref 24–29)
POCT GLUCOSE: 133 MG/DL (ref 70–110)
POCT GLUCOSE: 166 MG/DL (ref 70–110)
POCT GLUCOSE: 184 MG/DL (ref 70–110)
POTASSIUM SERPL-SCNC: 3.4 MMOL/L (ref 3.5–5.1)
POTASSIUM SERPL-SCNC: 3.5 MMOL/L (ref 3.5–5.1)
POTASSIUM SERPL-SCNC: 3.8 MMOL/L (ref 3.5–5.1)
POTASSIUM SERPL-SCNC: 4 MMOL/L (ref 3.5–5.1)
PROT SERPL-MCNC: 6.3 G/DL (ref 6–8.4)
PROT SERPL-MCNC: 6.7 G/DL (ref 6–8.4)
PROT UR QL STRIP: NEGATIVE
PROTHROMBIN TIME: 13.8 SEC (ref 9–12.5)
RBC # BLD AUTO: 4.67 M/UL (ref 4.6–6.2)
SAMPLE: ABNORMAL
SAMPLE: ABNORMAL
SARS-COV-2 RDRP RESP QL NAA+PROBE: NEGATIVE
SITE: ABNORMAL
SITE: ABNORMAL
SODIUM SERPL-SCNC: 137 MMOL/L (ref 136–145)
SODIUM SERPL-SCNC: 138 MMOL/L (ref 136–145)
SODIUM SERPL-SCNC: 143 MMOL/L (ref 136–145)
SODIUM SERPL-SCNC: 148 MMOL/L (ref 136–145)
SP GR UR STRIP: 1 (ref 1–1.03)
TOXICOLOGY INFORMATION: NORMAL
TROPONIN I SERPL DL<=0.01 NG/ML-MCNC: <0.006 NG/ML (ref 0–0.03)
URN SPEC COLLECT METH UR: NORMAL
UROBILINOGEN UR STRIP-ACNC: NEGATIVE EU/DL
WBC # BLD AUTO: 10.25 K/UL (ref 3.9–12.7)

## 2020-06-14 PROCEDURE — G0378 HOSPITAL OBSERVATION PER HR: HCPCS

## 2020-06-14 PROCEDURE — 63600175 PHARM REV CODE 636 W HCPCS: Performed by: EMERGENCY MEDICINE

## 2020-06-14 PROCEDURE — 25000003 PHARM REV CODE 250: Performed by: EMERGENCY MEDICINE

## 2020-06-14 PROCEDURE — 80320 DRUG SCREEN QUANTALCOHOLS: CPT

## 2020-06-14 PROCEDURE — 80053 COMPREHEN METABOLIC PANEL: CPT

## 2020-06-14 PROCEDURE — 81003 URINALYSIS AUTO W/O SCOPE: CPT

## 2020-06-14 PROCEDURE — 83605 ASSAY OF LACTIC ACID: CPT | Mod: 91

## 2020-06-14 PROCEDURE — 21400001 HC TELEMETRY ROOM

## 2020-06-14 PROCEDURE — U0002 COVID-19 LAB TEST NON-CDC: HCPCS

## 2020-06-14 PROCEDURE — 93010 ELECTROCARDIOGRAM REPORT: CPT | Mod: ,,, | Performed by: INTERNAL MEDICINE

## 2020-06-14 PROCEDURE — 96374 THER/PROPH/DIAG INJ IV PUSH: CPT

## 2020-06-14 PROCEDURE — 80053 COMPREHEN METABOLIC PANEL: CPT | Mod: 91

## 2020-06-14 PROCEDURE — 82550 ASSAY OF CK (CPK): CPT

## 2020-06-14 PROCEDURE — 82803 BLOOD GASES ANY COMBINATION: CPT

## 2020-06-14 PROCEDURE — 85025 COMPLETE CBC W/AUTO DIFF WBC: CPT

## 2020-06-14 PROCEDURE — 84484 ASSAY OF TROPONIN QUANT: CPT

## 2020-06-14 PROCEDURE — 93010 EKG 12-LEAD: ICD-10-PCS | Mod: ,,, | Performed by: INTERNAL MEDICINE

## 2020-06-14 PROCEDURE — 83036 HEMOGLOBIN GLYCOSYLATED A1C: CPT

## 2020-06-14 PROCEDURE — 84100 ASSAY OF PHOSPHORUS: CPT

## 2020-06-14 PROCEDURE — 80048 BASIC METABOLIC PNL TOTAL CA: CPT

## 2020-06-14 PROCEDURE — 93005 ELECTROCARDIOGRAM TRACING: CPT

## 2020-06-14 PROCEDURE — 83930 ASSAY OF BLOOD OSMOLALITY: CPT

## 2020-06-14 PROCEDURE — 85610 PROTHROMBIN TIME: CPT

## 2020-06-14 PROCEDURE — 36415 COLL VENOUS BLD VENIPUNCTURE: CPT

## 2020-06-14 PROCEDURE — 96361 HYDRATE IV INFUSION ADD-ON: CPT

## 2020-06-14 PROCEDURE — 83735 ASSAY OF MAGNESIUM: CPT

## 2020-06-14 PROCEDURE — 99900035 HC TECH TIME PER 15 MIN (STAT)

## 2020-06-14 PROCEDURE — 25000003 PHARM REV CODE 250: Performed by: NURSE PRACTITIONER

## 2020-06-14 PROCEDURE — 80307 DRUG TEST PRSMV CHEM ANLYZR: CPT

## 2020-06-14 PROCEDURE — 82010 KETONE BODYS QUAN: CPT

## 2020-06-14 PROCEDURE — 63600175 PHARM REV CODE 636 W HCPCS: Performed by: NURSE PRACTITIONER

## 2020-06-14 RX ORDER — HYDRALAZINE HYDROCHLORIDE 20 MG/ML
10 INJECTION INTRAMUSCULAR; INTRAVENOUS EVERY 6 HOURS PRN
Status: DISCONTINUED | OUTPATIENT
Start: 2020-06-14 | End: 2020-06-15 | Stop reason: HOSPADM

## 2020-06-14 RX ORDER — THIAMINE HCL 100 MG
100 TABLET ORAL DAILY
Status: DISCONTINUED | OUTPATIENT
Start: 2020-06-15 | End: 2020-06-15 | Stop reason: HOSPADM

## 2020-06-14 RX ORDER — PANTOPRAZOLE SODIUM 40 MG/1
40 TABLET, DELAYED RELEASE ORAL DAILY
Status: DISCONTINUED | OUTPATIENT
Start: 2020-06-14 | End: 2020-06-14

## 2020-06-14 RX ORDER — GLUCAGON 1 MG
1 KIT INJECTION
Status: DISCONTINUED | OUTPATIENT
Start: 2020-06-14 | End: 2020-06-15 | Stop reason: HOSPADM

## 2020-06-14 RX ORDER — TAMSULOSIN HYDROCHLORIDE 0.4 MG/1
0.4 CAPSULE ORAL DAILY
Status: DISCONTINUED | OUTPATIENT
Start: 2020-06-14 | End: 2020-06-15 | Stop reason: HOSPADM

## 2020-06-14 RX ORDER — MAGNESIUM SULFATE HEPTAHYDRATE 40 MG/ML
2 INJECTION, SOLUTION INTRAVENOUS ONCE
Status: COMPLETED | OUTPATIENT
Start: 2020-06-14 | End: 2020-06-14

## 2020-06-14 RX ORDER — ENOXAPARIN SODIUM 100 MG/ML
40 INJECTION SUBCUTANEOUS EVERY 24 HOURS
Status: DISCONTINUED | OUTPATIENT
Start: 2020-06-14 | End: 2020-06-14

## 2020-06-14 RX ORDER — SODIUM CHLORIDE, SODIUM LACTATE, POTASSIUM CHLORIDE, CALCIUM CHLORIDE 600; 310; 30; 20 MG/100ML; MG/100ML; MG/100ML; MG/100ML
1000 INJECTION, SOLUTION INTRAVENOUS CONTINUOUS
Status: DISCONTINUED | OUTPATIENT
Start: 2020-06-14 | End: 2020-06-14

## 2020-06-14 RX ORDER — IBUPROFEN 200 MG
24 TABLET ORAL
Status: DISCONTINUED | OUTPATIENT
Start: 2020-06-14 | End: 2020-06-15 | Stop reason: HOSPADM

## 2020-06-14 RX ORDER — INSULIN ASPART 100 [IU]/ML
0-5 INJECTION, SOLUTION INTRAVENOUS; SUBCUTANEOUS
Status: DISCONTINUED | OUTPATIENT
Start: 2020-06-14 | End: 2020-06-15 | Stop reason: HOSPADM

## 2020-06-14 RX ORDER — SODIUM CHLORIDE, SODIUM LACTATE, POTASSIUM CHLORIDE, CALCIUM CHLORIDE 600; 310; 30; 20 MG/100ML; MG/100ML; MG/100ML; MG/100ML
1000 INJECTION, SOLUTION INTRAVENOUS CONTINUOUS
Status: DISCONTINUED | OUTPATIENT
Start: 2020-06-14 | End: 2020-06-15

## 2020-06-14 RX ORDER — ONDANSETRON 2 MG/ML
4 INJECTION INTRAMUSCULAR; INTRAVENOUS EVERY 8 HOURS PRN
Status: DISCONTINUED | OUTPATIENT
Start: 2020-06-14 | End: 2020-06-15 | Stop reason: HOSPADM

## 2020-06-14 RX ORDER — ENOXAPARIN SODIUM 100 MG/ML
1 INJECTION SUBCUTANEOUS
Status: DISCONTINUED | OUTPATIENT
Start: 2020-06-14 | End: 2020-06-15 | Stop reason: HOSPADM

## 2020-06-14 RX ORDER — LISINOPRIL 20 MG/1
40 TABLET ORAL DAILY
Status: DISCONTINUED | OUTPATIENT
Start: 2020-06-14 | End: 2020-06-15 | Stop reason: HOSPADM

## 2020-06-14 RX ORDER — FOLIC ACID 1 MG/1
1 TABLET ORAL DAILY
Status: DISCONTINUED | OUTPATIENT
Start: 2020-06-15 | End: 2020-06-15 | Stop reason: HOSPADM

## 2020-06-14 RX ORDER — PANTOPRAZOLE SODIUM 40 MG/1
40 TABLET, DELAYED RELEASE ORAL DAILY
Status: DISCONTINUED | OUTPATIENT
Start: 2020-06-14 | End: 2020-06-15 | Stop reason: HOSPADM

## 2020-06-14 RX ORDER — IBUPROFEN 200 MG
16 TABLET ORAL
Status: DISCONTINUED | OUTPATIENT
Start: 2020-06-14 | End: 2020-06-15 | Stop reason: HOSPADM

## 2020-06-14 RX ORDER — GEMFIBROZIL 600 MG/1
600 TABLET, FILM COATED ORAL
Status: DISCONTINUED | OUTPATIENT
Start: 2020-06-14 | End: 2020-06-15 | Stop reason: HOSPADM

## 2020-06-14 RX ORDER — WARFARIN 7.5 MG/1
7.5 TABLET ORAL DAILY
Status: DISCONTINUED | OUTPATIENT
Start: 2020-06-14 | End: 2020-06-15 | Stop reason: HOSPADM

## 2020-06-14 RX ORDER — ACETAMINOPHEN 325 MG/1
650 TABLET ORAL EVERY 8 HOURS PRN
Status: DISCONTINUED | OUTPATIENT
Start: 2020-06-14 | End: 2020-06-15 | Stop reason: HOSPADM

## 2020-06-14 RX ORDER — SODIUM CHLORIDE 0.9 % (FLUSH) 0.9 %
10 SYRINGE (ML) INJECTION
Status: DISCONTINUED | OUTPATIENT
Start: 2020-06-14 | End: 2020-06-15 | Stop reason: HOSPADM

## 2020-06-14 RX ADMIN — ENOXAPARIN SODIUM 60 MG: 60 INJECTION SUBCUTANEOUS at 11:06

## 2020-06-14 RX ADMIN — MAGNESIUM SULFATE IN WATER 2 G: 40 INJECTION, SOLUTION INTRAVENOUS at 06:06

## 2020-06-14 RX ADMIN — TAMSULOSIN HYDROCHLORIDE 0.4 MG: 0.4 CAPSULE ORAL at 11:06

## 2020-06-14 RX ADMIN — HYDRALAZINE HYDROCHLORIDE 10 MG: 20 INJECTION INTRAMUSCULAR; INTRAVENOUS at 04:06

## 2020-06-14 RX ADMIN — DEXTROSE AND SODIUM CHLORIDE 1000 ML: 5; .9 INJECTION, SOLUTION INTRAVENOUS at 03:06

## 2020-06-14 RX ADMIN — WARFARIN SODIUM 7.5 MG: 7.5 TABLET ORAL at 04:06

## 2020-06-14 RX ADMIN — LISINOPRIL 40 MG: 20 TABLET ORAL at 11:06

## 2020-06-14 RX ADMIN — FOLIC ACID: 5 INJECTION, SOLUTION INTRAMUSCULAR; INTRAVENOUS; SUBCUTANEOUS at 07:06

## 2020-06-14 RX ADMIN — SODIUM CHLORIDE, SODIUM LACTATE, POTASSIUM CHLORIDE, AND CALCIUM CHLORIDE 1000 ML: .6; .31; .03; .02 INJECTION, SOLUTION INTRAVENOUS at 11:06

## 2020-06-14 RX ADMIN — SODIUM CHLORIDE 1000 ML: 0.9 INJECTION, SOLUTION INTRAVENOUS at 12:06

## 2020-06-14 RX ADMIN — PANTOPRAZOLE SODIUM 40 MG: 40 TABLET, DELAYED RELEASE ORAL at 11:06

## 2020-06-14 RX ADMIN — GEMFIBROZIL 600 MG: 600 TABLET ORAL at 04:06

## 2020-06-14 NOTE — HPI
"Mr. 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 Patient was found out in front "in the grass" of [facility]. Patient reports he was drinking with his buddies-1 beer, 2 slurp of Gin out the bottle and 1 glass of OJ with Gin. Denies headaches, dizziness, change in vision, chest pain, shortness of breath, palpitations, diaphoresis, orthopnea, PND, abdominal pain, nausea, vomiting, or new focal  extremities weakness/numbness. Baseline intermittently use a rollator. Last use cocaine "been a minute." Spoke a pack of cig/week.      Chest x ray clear. CTH no acute intracranial abnormality.   UA no UTI  Na 137>148 after NS  CPK 86, Troponin negative, EKG 1AVB 80.   Lactate improving after IVF  Urine tox negative  Alcohol 224  Beta hydroxy okay  COVID 19 negative   VBG metabolic acidosis     "

## 2020-06-14 NOTE — ASSESSMENT & PLAN NOTE
Patient found in grass in from of his house felt due to alcohol intoxication. Reports he does not drink daily and had drink with his buddies  Currently AAOx 4.   No UTI, CTH okay, CXR okay  Alcohol 224  VBG mild metabolic lactic acidosis-alcohol/voume depletion  Banana bag then start LR   Tele, neuro/aspiration/fall precaution, CIWA q 4  Repeat lactate, CMP noon

## 2020-06-14 NOTE — ED PROVIDER NOTES
"Encounter Date: 6/13/2020       History     Chief Complaint   Patient presents with    Alcohol Intoxication     found outside his assisted living facility in the grass, admits to drinking all day, received fluids with EMS and states he feels better now     70-year-old male with history of diabetes, hyperlipidemia, hypertension, reported stroke-like symptoms presenting after being found in the grass outside of his assisted living center.  Patient does not know why he is in the emergency department.  He reportedly told EMS he had been drinking all day.  He has no acute complaints at this time.  He denies chest pain, abdominal pain, nausea, vomiting, fevers, changes in vision, severe headache, or other complaints.  He reports otherwise being his usual state of health.  History otherwise limited as the patient appears intoxicated.        Review of patient's allergies indicates:  No Known Allergies  Past Medical History:   Diagnosis Date    Blind left eye     able to see, but poorly    BPH (benign prostatic hyperplasia)     Diabetes mellitus, type 2     Hyperlipidemia     Hypertension     Left rib fracture 03/30/2017    Stroke-like symptoms 09/25/2019    L facial droop, slurred speech & L arm weakness     Past Surgical History:   Procedure Laterality Date    NO PAST SURGERIES  09/25/2019     Family History   Problem Relation Age of Onset    Heart disease Mother         heart surgery    Heart disease Father         heart attack    Stroke Sister      Social History     Tobacco Use    Smoking status: Light Tobacco Smoker     Types: Cigarettes, Cigars    Smokeless tobacco: Never Used    Tobacco comment: socially   Substance Use Topics    Alcohol use: Yes     Alcohol/week: 0.0 standard drinks     Comment: drinks beer socially     Drug use: Yes     Types: Marijuana, "Crack" cocaine     Comment: 9/25/19: last smoked "a blunt, with some crack in it, about a month ago"     Review of Systems   Unable to perform " ROS: Other       Physical Exam     Initial Vitals [06/13/20 2254]   BP Pulse Resp Temp SpO2   110/70 68 16 98.2 °F (36.8 °C) 97 %      MAP       --         Physical Exam    Constitutional: He appears well-developed and well-nourished. He is not diaphoretic. No distress.   HENT:   Head: Normocephalic and atraumatic.   Eyes: Conjunctivae and EOM are normal. Pupils are equal, round, and reactive to light. No scleral icterus.   Neck: Normal range of motion. Neck supple.   Cardiovascular: Normal rate, regular rhythm, normal heart sounds and intact distal pulses. Exam reveals no gallop and no friction rub.    No murmur heard.  Pulmonary/Chest: Breath sounds normal. No stridor. No respiratory distress. He has no wheezes. He has no rhonchi. He has no rales.   Abdominal: Soft. Bowel sounds are normal. He exhibits no distension. There is no abdominal tenderness. There is no rebound and no guarding.   Musculoskeletal: Normal range of motion. No tenderness or edema.   Neurological: He is alert and oriented to person, place, and time. He has normal strength. No cranial nerve deficit.   Skin: Skin is warm and dry. No rash noted.   Psychiatric: He has a normal mood and affect. His behavior is normal.         ED Course   Procedures  Labs Reviewed   CBC W/ AUTO DIFFERENTIAL - Abnormal; Notable for the following components:       Result Value    Hemoglobin 12.2 (*)     Hematocrit 38.6 (*)     Mean Corpuscular Hemoglobin 26.1 (*)     Mean Corpuscular Hemoglobin Conc 31.6 (*)     Immature Granulocytes 0.6 (*)     Immature Grans (Abs) 0.06 (*)     All other components within normal limits   COMPREHENSIVE METABOLIC PANEL - Abnormal; Notable for the following components:    CO2 15 (*)     Glucose 136 (*)     Calcium 8.5 (*)     ALT 8 (*)     All other components within normal limits   ALCOHOL,MEDICAL (ETHANOL) - Abnormal; Notable for the following components:    Alcohol, Medical, Serum 224 (*)     All other components within normal limits    LACTIC ACID, PLASMA - Abnormal; Notable for the following components:    Lactate (Lactic Acid) 3.8 (*)     All other components within normal limits    Narrative:     Lactic Acid critical result(s) called and verbal readback obtained   from Amelie Johnston  by [facility] 06/14/2020 03:03   BASIC METABOLIC PANEL - Abnormal; Notable for the following components:    Potassium 3.4 (*)     CO2 15 (*)     Glucose 138 (*)     Calcium 8.6 (*)     All other components within normal limits   LACTIC ACID, PLASMA - Abnormal; Notable for the following components:    Lactate (Lactic Acid) 4.6 (*)     All other components within normal limits    Narrative:     LACTIC ACID critical result(s) called and verbal readback obtained   from JASMIN RHOADES  by [facility] 06/14/2020 08:45   BASIC METABOLIC PANEL - Abnormal; Notable for the following components:    Sodium 148 (*)     Chloride 116 (*)     CO2 21 (*)     Glucose 279 (*)     Calcium 8.5 (*)     All other components within normal limits   PROTIME-INR - Abnormal; Notable for the following components:    Prothrombin Time 13.8 (*)     All other components within normal limits   LACTIC ACID, PLASMA - Abnormal; Notable for the following components:    Lactate (Lactic Acid) 3.3 (*)     All other components within normal limits   ISTAT PROCEDURE - Abnormal; Notable for the following components:    POC PH 7.254 (*)     POC PCO2 49.4 (*)     POC PO2 16 (*)     POC HCO3 21.9 (*)     POC SATURATED O2 16 (*)     POC TCO2 23 (*)     All other components within normal limits   ISTAT PROCEDURE - Abnormal; Notable for the following components:    POC PH 7.284 (*)     POC HCO3 20.5 (*)     POC SATURATED O2 69 (*)     POC TCO2 22 (*)     All other components within normal limits   URINALYSIS, REFLEX TO URINE CULTURE    Narrative:     Preferred Collection Type->Urine, Clean Catch  Specimen Source->Urine   TROPONIN I   CK   SARS-COV-2 RNA AMPLIFICATION, QUAL   DRUG SCREEN PANEL, URINE EMERGENCY    Narrative:      Specimen Source->Urine   BETA - HYDROXYBUTYRATE, SERUM   DRUG SCREEN PANEL, URINE EMERGENCY   OSMOLALITY, SERUM          Imaging Results          X-Ray Chest 1 View (Final result)  Result time 06/14/20 06:59:33    Final result by Amber Blue MD (06/14/20 06:59:33)                 Impression:      No acute intrathoracic abnormality on this single radiographic view of the chest.      Electronically signed by: Amber Blue MD  Date:    06/14/2020  Time:    06:59             Narrative:    EXAMINATION:  XR CHEST 1 VIEW    CLINICAL HISTORY:  Alcohol use, unspecified with intoxication, unspecified    TECHNIQUE:  Single frontal view of the chest was performed.    COMPARISON:  09/25/2019    FINDINGS:  The cardiomediastinal silhouette is stable. The trachea is midline. The lungs are symmetrically expanded with slight coarse interstitial attenuation, similar to prior examination.  There is no evidence of confluent airspace consolidation, significant volume of pleural fluid or pneumothorax.  Visualized osseous structures are stable with remote left rib fracture deformities.                               CT Head Without Contrast (Final result)  Result time 06/14/20 02:33:08    Final result by Anderson Robbins MD (06/14/20 02:33:08)                 Impression:      No CT evidence of acute intracranial abnormality.    Generalized cerebral volume loss and chronic ischemic changes as discussed above.      Electronically signed by: Anderson Robbins MD  Date:    06/14/2020  Time:    02:33             Narrative:    EXAMINATION:  CT HEAD WITHOUT CONTRAST    CLINICAL HISTORY:  Altered mental status;    TECHNIQUE:  Low dose axial images were obtained through the head.  Coronal and sagittal reformations were also performed. Contrast was not administered.    COMPARISON:  MRI brain and CT head, 09/25/2019.    FINDINGS:  No evidence of acute territorial infarct, hemorrhage, mass effect, or midline shift.    Generalized cerebral volume  loss with ex vacuo dilation of the ventricles and sulci.  Advanced periventricular and subcortical white matter hypoattenuation, most likely consistent with chronic microvascular ischemic disease.  Remote lacunar infarcts in the bilateral basal ganglia and thalami.    No displaced calvarial fracture.  Trace air noted in the cavernous sinus, most likely secondary to intravenous access.    Mild patchy mucosal thickening in the maxillary sinuses and ethmoid air cells.  No air-fluid levels.  Mastoid air cells are essentially clear.  Postoperative changes of prior cataract surgery on the right.                                 Medical Decision Making:   Initial Assessment:   70-year-old male presenting with likely intoxication.  Patient has no complaints.  He is unclear why he is here.  Reportedly had been drinking.  Suspect alcohol intoxication, though the patient does have a history of stroke and diabetes.  Patient appears intoxicated on exam.  No trauma or focal neuro deficits noted.  Will get labs, CT scan, allowed come to clinical sobriety.  Believe he will be safe for discharge home once sober provided there are no other acute findings.  Will monitor to clinical sobriety.  ED Management:  Patient noted to have significant acidosis.  Lactic acid elevated.  Patient given 2 L IV fluid including D5 normal for possible alcoholic ketoacidosis.  Repeat labs reveal continued acidosis, pH 7.26.  Lactic acid level actually increased, though bicarb on repeat BMP improved.  Patient has no complaints and more clinically sober.  Denies headache, chest pain, abdominal pain.  Vitals normal.  Significantly unclear constellation of symptoms.  Toxic alcohol ingestion unlikely, patient denies.  Additionally, history not consistent with carbon oxide poisoning.  Patient is being signed out to Dr. Adan pending repeat labs.  Suspect patient may need admission for further evaluation and workup.  Patient appears nontoxic, no evidence of  sepsis or infection at this time.    Dr. Adan Addendum:  Patient care taken over from Dr. Porras pending repeat lab testing.  Patient is a 70-year-old male with history CVA who presents to the ED after being down.  At time of my assessment patient is alert with a GCS of 15.  Vital signs are within acceptable ranges.  Patient is neurovascularly intact.  He has a benign abdominal exam.  He has no evidence of limb ischemia.  No wounds.  No evidence of head trauma.  CT of the head is without definite acute intracranial abnormality.  Labs are notable for alcohol intoxication, lactic acidosis without anion gap.  Patient given IV hydration and repeat labs with continued acidosis and worsened lactate.  Patient given further hydration and repeat labs with some improvement elected acidosis however patient still has elevated lactic acid.  On reassessment he is resting comfortably in stretcher.  He remains with a benign abdominal exam.  I have low clinical suspicion of ischemic colitis, limb ischemia, acute CVA as the cause of patient's symptoms.  He is to be placed in observation for IV hydration, serial exam, trending lactic acid and chemistry, further evaluation and management.    Please put in 60 minutes of critical care due to patient having a high risk of metabolic failure.   Separate from teaching and exclusive of procedure and ekg time  Includes:  Time at bedside  Time reviewing test results  Time discussing case with staff  Time documenting the medical record  Time spent with family members  Time spent with consults  Management   Other:   I have discussed this case with another health care provider.       <> Summary of the Discussion: I have discussed the patient's presentation and workup with the hospitalist who agrees with placing the patient in the hospital.  I have placed orders for the hospitalist.                                    Clinical Impression:       ICD-10-CM ICD-9-CM   1. Lactic acidosis  E87.2  276.2   2. Alcohol intoxication  F10.929 305.00         Disposition:   Disposition: Placed in Observation  Condition: Stable     ED Disposition Condition    Admit                           Brayden Porras MD  06/14/20 0657       Kenan Adan MD  06/14/20 0954

## 2020-06-14 NOTE — ED TRIAGE NOTES
Pt presents to ED via EMS with c/o alcohol intoxication. Pt was found outside his assisted living facility in the grass, admits to drinking all day, received fluids with EMS and states he feels better now. Pt alert and oriented in no distress.

## 2020-06-14 NOTE — PROGRESS NOTES
Audio Phone Virtual visit completed. Patient denies any ER visits or hospital stays since last SM encounter. SM nurse educated patient on S/S needing to be reported to medical care team, including when to seek medical emergent, urgent, and routine care.

## 2020-06-14 NOTE — PROGRESS NOTES
pt just came back from u/s BLE. I just received a call from radiology, stating he has BLE DVT. PENELOPE Bojorquez notified.

## 2020-06-14 NOTE — SUBJECTIVE & OBJECTIVE
Past Medical History:   Diagnosis Date    Blind left eye     able to see, but poorly    BPH (benign prostatic hyperplasia)     Diabetes mellitus, type 2     Hyperlipidemia     Hypertension     Left rib fracture 03/30/2017    Stroke-like symptoms 09/25/2019    L facial droop, slurred speech & L arm weakness       Past Surgical History:   Procedure Laterality Date    NO PAST SURGERIES  09/25/2019       Review of patient's allergies indicates:  No Known Allergies    No current facility-administered medications on file prior to encounter.      Current Outpatient Medications on File Prior to Encounter   Medication Sig    blood sugar diagnostic (BLOOD GLUCOSE TEST) Strp by Misc.(Non-Drug; Combo Route) route.    ciclopirox (PENLAC) 8 % Soln Apply topically once daily. Apply topically to affected nails once daily.  Remove once weekly.  Repeat.  Follow package insert.    finasteride (PROSCAR) 5 mg tablet Take 1 tablet (5 mg total) by mouth once daily.    gemfibrozil (LOPID) 600 MG tablet Take 600 mg by mouth 2 (two) times daily before meals.    glipiZIDE (GLUCOTROL) 10 MG tablet Take 1 tablet (10 mg total) by mouth 2 (two) times daily before meals.    lisinopril (PRINIVIL,ZESTRIL) 40 MG tablet Take 40 mg by mouth once daily.    metformin (GLUCOPHAGE) 500 MG tablet Take 1,000 mg by mouth 2 (two) times daily with meals.     omeprazole (PRILOSEC) 20 MG capsule TAKE 2 CAPSULES (40 MG) BY MOUTH ONCE DAILY.    rosuvastatin (CRESTOR) 40 MG Tab Take 10 mg by mouth every evening.    tamsulosin (FLOMAX) 0.4 mg Cp24 Take 1 capsule (0.4 mg total) by mouth once daily.    ULTILET CLASSIC LANCETS MISC by Misc.(Non-Drug; Combo Route) route.    warfarin (COUMADIN) 7.5 MG tablet Take 7.5 mg by mouth once daily.     Family History     Problem Relation (Age of Onset)    Heart disease Mother, Father    Stroke Sister        Tobacco Use    Smoking status: Light Tobacco Smoker     Types: Cigarettes, Cigars    Smokeless  "tobacco: Never Used    Tobacco comment: socially   Substance and Sexual Activity    Alcohol use: Yes     Alcohol/week: 0.0 standard drinks     Comment: drinks beer socially     Drug use: Yes     Types: Marijuana, "Crack" cocaine     Comment: 9/25/19: last smoked "a blunt, with some crack in it, about a month ago"    Sexual activity: Not Currently     Review of Systems   Constitutional: Negative.    HENT: Negative.    Eyes: Negative.    Respiratory: Negative.    Cardiovascular: Negative.    Gastrointestinal: Negative.    Genitourinary: Negative.    Musculoskeletal: Negative.    Skin: Negative.    Neurological: Positive for weakness. Negative for dizziness, seizures, light-headedness and numbness.   Hematological: Negative.    Psychiatric/Behavioral: Negative.      Objective:     Vital Signs (Most Recent):  Temp: 98.2 °F (36.8 °C) (06/13/20 2254)  Pulse: 63 (06/14/20 0901)  Resp: 16 (06/13/20 2254)  BP: (!) 166/80 (06/14/20 0901)  SpO2: 95 % (06/14/20 0901) Vital Signs (24h Range):  Temp:  [98.2 °F (36.8 °C)] 98.2 °F (36.8 °C)  Pulse:  [61-78] 63  Resp:  [16] 16  SpO2:  [95 %-100 %] 95 %  BP: (110-166)/(70-86) 166/80     Weight: 64.3 kg (141 lb 12.1 oz)  Body mass index is 19.23 kg/m².    Physical Exam  Constitutional:       General: He is not in acute distress.     Appearance: He is not toxic-appearing.      Comments: Thin ill appearing elderly   HENT:      Head: Atraumatic.      Nose: Nose normal.      Mouth/Throat:      Comments: No teeth  Eyes:      Extraocular Movements: Extraocular movements intact.      Pupils: Pupils are equal, round, and reactive to light.   Cardiovascular:      Rate and Rhythm: Normal rate and regular rhythm.      Pulses: Normal pulses.      Heart sounds: Normal heart sounds. No murmur.   Pulmonary:      Effort: Pulmonary effort is normal.      Breath sounds: Normal breath sounds.   Abdominal:      General: There is no distension.      Palpations: Abdomen is soft.      Tenderness: There " is no abdominal tenderness.   Musculoskeletal: Normal range of motion.         General: No swelling or tenderness.   Skin:     General: Skin is warm and dry.   Neurological:      General: No focal deficit present.      Mental Status: He is alert and oriented to person, place, and time.   Psychiatric:         Mood and Affect: Mood normal.           CRANIAL NERVES     CN III, IV, VI   Pupils are equal, round, and reactive to light.       Significant Labs: All pertinent labs within the past 24 hours have been reviewed.    Significant Imaging: I have reviewed and interpreted all pertinent imaging results/findings within the past 24 hours.

## 2020-06-14 NOTE — ASSESSMENT & PLAN NOTE
Stable. Continue lovenox bridge to warfarin. Not on ASA. No longer on crestor LDL at goal for DVA. Continue Lopid.

## 2020-06-14 NOTE — ASSESSMENT & PLAN NOTE
Lab Results   Component Value Date    HGBA1C 10.0 (H) 09/26/2019   Repeat hgbA1c.   Hold home glipizide and metformin  Basal with SSI. Add prandial accordingly

## 2020-06-14 NOTE — H&P
"[facility] Hospital Medicine  History & Physical    Patient Name: Forest Lopez  MRN: [id number]  Admission Date: 6/13/2020  Attending Physician: Shweta Londono MD   Primary Care Provider: [facility]         Patient information was obtained from patient and ER records.     Subjective:     Principal Problem:Alcohol abuse with intoxication    Chief Complaint:   Chief Complaint   Patient presents with    Alcohol Intoxication     found outside his assisted living facility in the grass, admits to drinking all day, received fluids with EMS and states he feels better now        HPI: Mr. 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 Patient was found out in front "in the grass" of [facility]. Patient reports he was drinking with his buddies-1 beer, 2 slurp of Gin out the bottle and 1 glass of OJ with Gin. Denies headaches, dizziness, change in vision, chest pain, shortness of breath, palpitations, diaphoresis, orthopnea, PND, abdominal pain, nausea, vomiting, or new focal  extremities weakness/numbness. Baseline intermittently use a rollator. Last use cocaine "been a minute." Spoke a pack of cig/week.      Chest x ray clear. CTH no acute intracranial abnormality.   UA no UTI  Na 137>148 after NS  CPK 86, Troponin negative, EKG 1AVB 80.   Lactate improving after IVF  Urine tox negative  Alcohol 224  Beta hydroxy okay  COVID 19 negative   VBG metabolic acidosis       Past Medical History:   Diagnosis Date    Blind left eye     able to see, but poorly    BPH (benign prostatic hyperplasia)     Diabetes mellitus, type 2     Hyperlipidemia     Hypertension     Left rib fracture 03/30/2017    Stroke-like symptoms 09/25/2019    L facial droop, slurred speech & L arm " "weakness       Past Surgical History:   Procedure Laterality Date    NO PAST SURGERIES  09/25/2019       Review of patient's allergies indicates:  No Known Allergies    No current facility-administered medications on file prior to encounter.      Current Outpatient Medications on File Prior to Encounter   Medication Sig    blood sugar diagnostic (BLOOD GLUCOSE TEST) Strp by Misc.(Non-Drug; Combo Route) route.    ciclopirox (PENLAC) 8 % Soln Apply topically once daily. Apply topically to affected nails once daily.  Remove once weekly.  Repeat.  Follow package insert.    finasteride (PROSCAR) 5 mg tablet Take 1 tablet (5 mg total) by mouth once daily.    gemfibrozil (LOPID) 600 MG tablet Take 600 mg by mouth 2 (two) times daily before meals.    glipiZIDE (GLUCOTROL) 10 MG tablet Take 1 tablet (10 mg total) by mouth 2 (two) times daily before meals.    lisinopril (PRINIVIL,ZESTRIL) 40 MG tablet Take 40 mg by mouth once daily.    metformin (GLUCOPHAGE) 500 MG tablet Take 1,000 mg by mouth 2 (two) times daily with meals.     omeprazole (PRILOSEC) 20 MG capsule TAKE 2 CAPSULES (40 MG) BY MOUTH ONCE DAILY.    rosuvastatin (CRESTOR) 40 MG Tab Take 10 mg by mouth every evening.    tamsulosin (FLOMAX) 0.4 mg Cp24 Take 1 capsule (0.4 mg total) by mouth once daily.    ULTILET CLASSIC LANCETS MISC by Misc.(Non-Drug; Combo Route) route.    warfarin (COUMADIN) 7.5 MG tablet Take 7.5 mg by mouth once daily.     Family History     Problem Relation (Age of Onset)    Heart disease Mother, Father    Stroke Sister        Tobacco Use    Smoking status: Light Tobacco Smoker     Types: Cigarettes, Cigars    Smokeless tobacco: Never Used    Tobacco comment: socially   Substance and Sexual Activity    Alcohol use: Yes     Alcohol/week: 0.0 standard drinks     Comment: drinks beer socially     Drug use: Yes     Types: Marijuana, "Crack" cocaine     Comment: 9/25/19: last smoked "a blunt, with some crack in it, about a month " "ago"    Sexual activity: Not Currently     Review of Systems   Constitutional: Negative.    HENT: Negative.    Eyes: Negative.    Respiratory: Negative.    Cardiovascular: Negative.    Gastrointestinal: Negative.    Genitourinary: Negative.    Musculoskeletal: Negative.    Skin: Negative.    Neurological: Positive for weakness. Negative for dizziness, seizures, light-headedness and numbness.   Hematological: Negative.    Psychiatric/Behavioral: Negative.      Objective:     Vital Signs (Most Recent):  Temp: 98.2 °F (36.8 °C) (06/13/20 2254)  Pulse: 63 (06/14/20 0901)  Resp: 16 (06/13/20 2254)  BP: (!) 166/80 (06/14/20 0901)  SpO2: 95 % (06/14/20 0901) Vital Signs (24h Range):  Temp:  [98.2 °F (36.8 °C)] 98.2 °F (36.8 °C)  Pulse:  [61-78] 63  Resp:  [16] 16  SpO2:  [95 %-100 %] 95 %  BP: (110-166)/(70-86) 166/80     Weight: 64.3 kg (141 lb 12.1 oz)  Body mass index is 19.23 kg/m².    Physical Exam  Constitutional:       General: He is not in acute distress.     Appearance: He is not toxic-appearing.      Comments: Thin ill appearing elderly   HENT:      Head: Atraumatic.      Nose: Nose normal.      Mouth/Throat:      Comments: No teeth  Eyes:      Extraocular Movements: Extraocular movements intact.      Pupils: Pupils are equal, round, and reactive to light.   Cardiovascular:      Rate and Rhythm: Normal rate and regular rhythm.      Pulses: Normal pulses.      Heart sounds: Normal heart sounds. No murmur.   Pulmonary:      Effort: Pulmonary effort is normal.      Breath sounds: Normal breath sounds.   Abdominal:      General: There is no distension.      Palpations: Abdomen is soft.      Tenderness: There is no abdominal tenderness.   Musculoskeletal: Normal range of motion.         General: No swelling or tenderness.   Skin:     General: Skin is warm and dry.   Neurological:      General: No focal deficit present.      Mental Status: He is alert and oriented to person, place, and time.   Psychiatric:         " Mood and Affect: Mood normal.           CRANIAL NERVES     CN III, IV, VI   Pupils are equal, round, and reactive to light.       Significant Labs: All pertinent labs within the past 24 hours have been reviewed.    Significant Imaging: I have reviewed and interpreted all pertinent imaging results/findings within the past 24 hours.    Assessment/Plan:     * Alcohol abuse with intoxication  Patient found in grass in from of his house felt due to alcohol intoxication. Reports he does not drink daily and had drink with his buddies  Currently AAOx 4.   No UTI, CTH okay, CXR okay  Alcohol 224  VBG mild metabolic lactic acidosis-alcohol/voume depletion  Banana bag then start LR   Tele, neuro/aspiration/fall precaution, CIWA q 4  Repeat lactate, CMP noon      Lactic acidosis  See above #1.       History of DVT (deep vein thrombosis)  Hx of 3 DVT 1 RLE and 2 LLE  Sub therapeutic INR 1.2  Patient reports adherence with warfarin 7.5 mg daily?  Lovenox bridge to warfarin       Cerebrovascular accident (CVA) due to stenosis of right middle cerebral artery  Stable. Continue lovenox bridge to warfarin. Not on ASA. No longer on crestor LDL at goal for DVA. Continue Lopid.      Hyperlipidemia  Lab Results   Component Value Date    LDLCALC 98.6 09/25/2019   continue home lopid, no longer on crestor        Essential hypertension  Controlled on arrival. Continue home lisinopril. Hydralazine prn.       Type 2 diabetes mellitus, controlled  Lab Results   Component Value Date    HGBA1C 10.0 (H) 09/26/2019   Repeat hgbA1c.   Hold home glipizide and metformin  Basal with SSI. Add prandial accordingly        Tobacco dependence  Encourage smoking cessation. Declines nicotine patch at this time.      VTE Risk Mitigation (From admission, onward)         Ordered     enoxaparin injection 60 mg  Every 12 hours (non-standard times)      06/14/20 1056     IP VTE HIGH RISK PATIENT  Once      06/14/20 1004     Place sequential compression device   Until discontinued      06/14/20 1004                   Lala Barrios NP  Department of Hospital Medicine   [facility]

## 2020-06-14 NOTE — ASSESSMENT & PLAN NOTE
Lab Results   Component Value Date    LDLCALC 98.6 09/25/2019   continue home lopid, no longer on crestor

## 2020-06-14 NOTE — ASSESSMENT & PLAN NOTE
Hx of 3 DVT 1 RLE and 2 LLE  Sub therapeutic INR 1.2  Patient reports adherence with warfarin 7.5 mg daily?  Lovenox bridge to warfarin

## 2020-06-15 VITALS
SYSTOLIC BLOOD PRESSURE: 151 MMHG | OXYGEN SATURATION: 99 % | HEART RATE: 59 BPM | TEMPERATURE: 98 F | HEIGHT: 72 IN | BODY MASS INDEX: 18.96 KG/M2 | RESPIRATION RATE: 18 BRPM | WEIGHT: 140 LBS | DIASTOLIC BLOOD PRESSURE: 77 MMHG

## 2020-06-15 LAB
ALBUMIN SERPL BCP-MCNC: 3.3 G/DL (ref 3.5–5.2)
ALP SERPL-CCNC: 82 U/L (ref 55–135)
ALT SERPL W/O P-5'-P-CCNC: 8 U/L (ref 10–44)
ANION GAP SERPL CALC-SCNC: 7 MMOL/L (ref 8–16)
AST SERPL-CCNC: 13 U/L (ref 10–40)
BASOPHILS # BLD AUTO: 0.05 K/UL (ref 0–0.2)
BASOPHILS NFR BLD: 0.6 % (ref 0–1.9)
BILIRUB SERPL-MCNC: 0.5 MG/DL (ref 0.1–1)
BUN SERPL-MCNC: 8 MG/DL (ref 8–23)
CALCIUM SERPL-MCNC: 8.2 MG/DL (ref 8.7–10.5)
CHLORIDE SERPL-SCNC: 108 MMOL/L (ref 95–110)
CO2 SERPL-SCNC: 24 MMOL/L (ref 23–29)
CREAT SERPL-MCNC: 0.7 MG/DL (ref 0.5–1.4)
DIFFERENTIAL METHOD: ABNORMAL
EOSINOPHIL # BLD AUTO: 0.3 K/UL (ref 0–0.5)
EOSINOPHIL NFR BLD: 3.1 % (ref 0–8)
ERYTHROCYTE [DISTWIDTH] IN BLOOD BY AUTOMATED COUNT: 13.2 % (ref 11.5–14.5)
EST. GFR  (AFRICAN AMERICAN): >60 ML/MIN/1.73 M^2
EST. GFR  (NON AFRICAN AMERICAN): >60 ML/MIN/1.73 M^2
ESTIMATED AVG GLUCOSE: 171 MG/DL (ref 68–131)
GLUCOSE SERPL-MCNC: 103 MG/DL (ref 70–110)
HBA1C MFR BLD HPLC: 7.6 % (ref 4–5.6)
HCT VFR BLD AUTO: 37.6 % (ref 40–54)
HGB BLD-MCNC: 12 G/DL (ref 14–18)
IMM GRANULOCYTES # BLD AUTO: 0.01 K/UL (ref 0–0.04)
IMM GRANULOCYTES NFR BLD AUTO: 0.1 % (ref 0–0.5)
INR PPP: 1.1 (ref 0.8–1.2)
LACTATE SERPL-SCNC: 1.3 MMOL/L (ref 0.5–2.2)
LYMPHOCYTES # BLD AUTO: 3.1 K/UL (ref 1–4.8)
LYMPHOCYTES NFR BLD: 35.3 % (ref 18–48)
MCH RBC QN AUTO: 26 PG (ref 27–31)
MCHC RBC AUTO-ENTMCNC: 31.9 G/DL (ref 32–36)
MCV RBC AUTO: 81 FL (ref 82–98)
MONOCYTES # BLD AUTO: 0.9 K/UL (ref 0.3–1)
MONOCYTES NFR BLD: 10 % (ref 4–15)
NEUTROPHILS # BLD AUTO: 4.4 K/UL (ref 1.8–7.7)
NEUTROPHILS NFR BLD: 50.9 % (ref 38–73)
NRBC BLD-RTO: 0 /100 WBC
PLATELET # BLD AUTO: 211 K/UL (ref 150–350)
PMV BLD AUTO: 10.7 FL (ref 9.2–12.9)
POCT GLUCOSE: 105 MG/DL (ref 70–110)
POCT GLUCOSE: 155 MG/DL (ref 70–110)
POCT GLUCOSE: 99 MG/DL (ref 70–110)
POTASSIUM SERPL-SCNC: 3.3 MMOL/L (ref 3.5–5.1)
PROT SERPL-MCNC: 6.2 G/DL (ref 6–8.4)
PROTHROMBIN TIME: 12.1 SEC (ref 9–12.5)
RBC # BLD AUTO: 4.62 M/UL (ref 4.6–6.2)
SODIUM SERPL-SCNC: 139 MMOL/L (ref 136–145)
WBC # BLD AUTO: 8.72 K/UL (ref 3.9–12.7)

## 2020-06-15 PROCEDURE — 25000003 PHARM REV CODE 250: Performed by: NURSE PRACTITIONER

## 2020-06-15 PROCEDURE — 85025 COMPLETE CBC W/AUTO DIFF WBC: CPT

## 2020-06-15 PROCEDURE — G0378 HOSPITAL OBSERVATION PER HR: HCPCS

## 2020-06-15 PROCEDURE — 80053 COMPREHEN METABOLIC PANEL: CPT

## 2020-06-15 PROCEDURE — 63600175 PHARM REV CODE 636 W HCPCS: Performed by: NURSE PRACTITIONER

## 2020-06-15 PROCEDURE — 94761 N-INVAS EAR/PLS OXIMETRY MLT: CPT

## 2020-06-15 PROCEDURE — 25000003 PHARM REV CODE 250: Performed by: EMERGENCY MEDICINE

## 2020-06-15 PROCEDURE — 36415 COLL VENOUS BLD VENIPUNCTURE: CPT

## 2020-06-15 PROCEDURE — 85610 PROTHROMBIN TIME: CPT

## 2020-06-15 PROCEDURE — 83605 ASSAY OF LACTIC ACID: CPT

## 2020-06-15 RX ORDER — ENOXAPARIN SODIUM 100 MG/ML
1 INJECTION SUBCUTANEOUS EVERY 12 HOURS
Qty: 8 SYRINGE | Refills: 0 | Status: SHIPPED | OUTPATIENT
Start: 2020-06-15 | End: 2020-06-19

## 2020-06-15 RX ORDER — LANOLIN ALCOHOL/MO/W.PET/CERES
100 CREAM (GRAM) TOPICAL DAILY
Qty: 30 TABLET | Refills: 3 | Status: ON HOLD | OUTPATIENT
Start: 2020-06-15 | End: 2022-01-31

## 2020-06-15 RX ORDER — FOLIC ACID 1 MG/1
1 TABLET ORAL DAILY
Qty: 30 TABLET | Refills: 3 | Status: ON HOLD | OUTPATIENT
Start: 2020-06-15 | End: 2022-02-10 | Stop reason: HOSPADM

## 2020-06-15 RX ORDER — POTASSIUM CHLORIDE 20 MEQ/1
40 TABLET, EXTENDED RELEASE ORAL ONCE
Status: COMPLETED | OUTPATIENT
Start: 2020-06-15 | End: 2020-06-15

## 2020-06-15 RX ADMIN — POTASSIUM CHLORIDE 40 MEQ: 1500 TABLET, EXTENDED RELEASE ORAL at 06:06

## 2020-06-15 RX ADMIN — Medication 100 MG: at 08:06

## 2020-06-15 RX ADMIN — FOLIC ACID 1 MG: 1 TABLET ORAL at 08:06

## 2020-06-15 RX ADMIN — PANTOPRAZOLE SODIUM 40 MG: 40 TABLET, DELAYED RELEASE ORAL at 08:06

## 2020-06-15 RX ADMIN — GEMFIBROZIL 600 MG: 600 TABLET ORAL at 06:06

## 2020-06-15 RX ADMIN — TAMSULOSIN HYDROCHLORIDE 0.4 MG: 0.4 CAPSULE ORAL at 08:06

## 2020-06-15 RX ADMIN — ENOXAPARIN SODIUM 60 MG: 60 INJECTION SUBCUTANEOUS at 11:06

## 2020-06-15 RX ADMIN — LISINOPRIL 40 MG: 20 TABLET ORAL at 08:06

## 2020-06-15 NOTE — PROGRESS NOTES
Call placed to [facility], spoke to Isa who advised that the pt was told by  at the office that he is to come in today after discharging from the hospital.  Pt confirmed stating that his son is going to bring him to the office.

## 2020-06-15 NOTE — PLAN OF CARE
Problem: Fall Injury Risk  Goal: Absence of Fall and Fall-Related Injury  Outcome: Ongoing, Progressing     Problem: Adult Inpatient Plan of Care  Goal: Plan of Care Review  Outcome: Ongoing, Progressing  Goal: Patient-Specific Goal (Individualization)  Outcome: Ongoing, Progressing  Goal: Absence of Hospital-Acquired Illness or Injury  Outcome: Ongoing, Progressing  Goal: Optimal Comfort and Wellbeing  Outcome: Ongoing, Progressing  Goal: Readiness for Transition of Care  Outcome: Ongoing, Progressing  Goal: Rounds/Family Conference  Outcome: Ongoing, Progressing     Problem: Diabetes Comorbidity  Goal: Blood Glucose Level Within Desired Range  Outcome: Ongoing, Progressing     Problem: Alcohol Withdrawal  Goal: Alcohol Withdrawal Symptom Control  Outcome: Ongoing, Progressing

## 2020-06-15 NOTE — DISCHARGE SUMMARY
"[facility] Medicine  Discharge Summary      Patient Name: Forest Lopez  MRN: [id number]  Admission Date: 6/13/2020  Hospital Length of Stay: 1 days  Discharge Date and Time:  06/15/2020 8:25 AM  Attending Physician: Shweta Londono MD   Discharging Provider: Lala Barrios NP  Primary Care Provider: [facility]      HPI:   Mr. Lopez is a 71 yo male with significant history for hypertension, hyperlipidemia, DMT2, cocaine abuse, marijuana abuse, and lower extremities DVT x 3 on warfarin, CVA 9/2019  and alcohol abuse who was transferred to hospital for alcohol intoxication. Patient was found out in front "in the grass" of [facility]. Patient reports he was drinking with his buddies-1 beer, 2 slurp of Gin out the bottle and 1 glass of OJ with Gin. Denies headaches, dizziness, change in vision, chest pain, shortness of breath, palpitations, diaphoresis, orthopnea, PND, abdominal pain, nausea, vomiting, or new focal  extremities weakness/numbness. Baseline intermittently use a rollator. Last use cocaine "been a minute." Spoke a pack of cig/week.      Chest x ray clear. CTH no acute intracranial abnormality.   UA no UTI  Na 137>148 after NS  CPK 86, Troponin negative, EKG 1AVB 80.   Lactate improving after IVF  Urine tox negative  Alcohol 224  Beta hydroxy okay  COVID 19 negative   VBG metabolic acidosis       * No surgery found *      Hospital Course:   Mr. Lopez is a 71 yo AAM who placed in observation for alcohol intoxication and metabolic lactic acidosis. Lytes and acid base abnormalities resolved after appropriate hydration. Noted sub therapeutic INR. Repeat US BLE showed non-occlussive thrombi left common femoral/poplitieal and non-occlussive thrombi left common femoral and popliteal veins. Lovenox bridge coumadin. Patient reported he know how to give self SQ injections. Patient will follow up with PCP Julian tomorrow or 6/17 for repeat INR and " continue adjustment on anticoagulations. On discharge, BLE no swelling/pain and no chest pain/sob. Encourage patient to stop smoking and drinking alcohol. Patient stable for discharge home.      Consults:     No new Assessment & Plan notes have been filed under this hospital service since the last note was generated.  Service: Hospital Medicine    Final Active Diagnoses:    Diagnosis Date Noted POA    PRINCIPAL PROBLEM:  Alcohol abuse with intoxication [F10.129] 06/14/2020 Yes    Lactic acidosis [E87.2] 06/14/2020 Yes    History of DVT (deep vein thrombosis) [Z86.718] 09/25/2019 Not Applicable     Chronic    Cerebrovascular accident (CVA) due to stenosis of right middle cerebral artery [I63.511] 09/25/2019 Yes    Tobacco dependence [F17.200] 03/03/2014 Yes     Chronic    Type 2 diabetes mellitus, controlled [E11.9] 03/03/2014 Yes     Chronic    Essential hypertension [I10] 03/03/2014 Yes     Chronic    Hyperlipidemia [E78.5] 03/03/2014 Yes     Chronic      Problems Resolved During this Admission:       Discharged Condition: stable    Disposition: Home or Self Care    Follow Up:  Follow-up Information     [facility].    Contact information:  [facility] [place] [address]  [phone]                 Patient Instructions:      Diet diabetic     Diet Cardiac     Notify your health care provider if you experience any of the following:  temperature >100.4     Notify your health care provider if you experience any of the following:  difficulty breathing or increased cough     Notify your health care provider if you experience any of the following:  increased confusion or weakness     Activity as tolerated       Significant Diagnostic Studies: Labs: All labs within the past 24 hours have been reviewed    Pending Diagnostic Studies:     Procedure Component Value Units Date/Time    Hemoglobin A1c if not done in past 3 months [[id number]] Collected: 06/14/20 1329    Order Status: Sent Lab Status: In  process Updated: 06/15/20 0812    Specimen: Blood          Medications:  Reconciled Home Medications:      Medication List      START taking these medications    enoxaparin 60 mg/0.6 mL Syrg  Commonly known as: LOVENOX  Inject 0.6 mLs (60 mg total) into the skin every 12 (twelve) hours. for 4 days     folic acid 1 MG tablet  Commonly known as: FOLVITE  Take 1 tablet (1 mg total) by mouth once daily.     thiamine 100 MG tablet  Take 1 tablet (100 mg total) by mouth once daily.        CONTINUE taking these medications    BLOOD GLUCOSE TEST Strp  Generic drug: blood sugar diagnostic  by Misc.(Non-Drug; Combo Route) route.     finasteride 5 mg tablet  Commonly known as: PROSCAR  Take 1 tablet (5 mg total) by mouth once daily.     gemfibroziL 600 MG tablet  Commonly known as: LOPID  Take 600 mg by mouth 2 (two) times daily before meals.     glipiZIDE 10 MG tablet  Commonly known as: GLUCOTROL  Take 1 tablet (10 mg total) by mouth 2 (two) times daily before meals.     lisinopriL 40 MG tablet  Commonly known as: PRINIVIL,ZESTRIL  Take 40 mg by mouth once daily.     metFORMIN 500 MG tablet  Commonly known as: GLUCOPHAGE  Take 1,000 mg by mouth 2 (two) times daily with meals.     omeprazole 20 MG capsule  Commonly known as: PRILOSEC  TAKE 2 CAPSULES (40 MG) BY MOUTH ONCE DAILY.     tamsulosin 0.4 mg Cap  Commonly known as: FLOMAX  Take 1 capsule (0.4 mg total) by mouth once daily.     warfarin 7.5 MG tablet  Commonly known as: COUMADIN  Take 7.5 mg by mouth once daily.        STOP taking these medications    ciclopirox 8 % Soln  Commonly known as: PENLAC     rosuvastatin 40 MG Tab  Commonly known as: CRESTOR     ULTILET CLASSIC LANCETS MISC            Indwelling Lines/Drains at time of discharge:   Lines/Drains/Airways     None                 Time spent on the discharge of patient: 30 minutes  Patient was seen and examined on the date of discharge and determined to be suitable for discharge.         Lala Barrios,  NP  Department of Hospital Medicine  [facility]

## 2020-06-15 NOTE — PLAN OF CARE
06/15/20 0930   Discharge Assessment   Assessment Type Discharge Planning Assessment   Confirmed/corrected address and phone number on facesheet? Yes   Assessment information obtained from? Medical Record   Communicated expected length of stay with patient/caregiver yes   Prior to hospitilization cognitive status: Alert/Oriented   Prior to hospitalization functional status: Independent   Current cognitive status: Alert/Oriented   Current Functional Status: Independent   Facility Arrived From: home   Lives With other (see comments)  (pt lives in an assisted living apartment)   Able to Return to Prior Arrangements yes   Is patient able to care for self after discharge? Yes   Who are your caregiver(s) and their phone number(s)? waqas Robles [phone]   Readmission Within the Last 30 Days no previous admission in last 30 days   Patient currently being followed by outpatient case management? No   Patient currently receives any other outside agency services? No   Equipment Currently Used at Home none   Do you have any problems affording any of your prescribed medications? No   Is the patient taking medications as prescribed? yes   Does the patient have transportation home? Yes   Transportation Anticipated family or friend will provide   Does the patient receive services at the Coumadin Clinic? No   Discharge Plan A Home  (with follow up)   DME Needed Upon Discharge  none   Patient/Family in Agreement with Plan yes     [facility] - [place] - [address]  [address], [facility] [address]  [facility] [address]  Phone: [phone] Fax: [phone]    [facility] - [facility] [address]  [address] [facility] [address]  Phone: [phone] Fax: [phone]    [id number] - [place], [place] - [address]  [address]  [place] [address]  Phone: [phone] Fax: [phone]

## 2020-06-15 NOTE — PROGRESS NOTES
"Alcohol Abuse  Alcoholic drinks are harmful when you have too many of them. There is no set number of drinks that defines too much. Drinking that disrupts your life or your health is called alcohol abuse. Alcohol abuse can hurt your relationships with others. You may lose friends, a spouse, or even your job. You may be abusing alcohol if any of the following are true for you:  · Duties at home or with  suffer because of drinking.  · Duties at work or in school suffer because of drinking.  · You have missed work or school because of drinking.  · You use alcohol while driving or operating machinery.  · You have legal problems such as arrests due to drinking.  · You keep drinking even though it causes serious problems in your life.  Health effects  Alcohol abuse causes health problems. Sometimes this can happen after only drinking a little." There is no set number of drinks or amount of alcohol that defines too much. The more you drink at one time, and the more often you drink determine both the short-term and long-term health effects. It affects all parts of your body and your health, including your:  · Brain. Alcohol is a central nervous system depressant. It can damage parts of the brain that affect your balance, memory, thinking, and emotions. It can cause memory loss, blackouts, depression, agitation, sleep cycle changes, and seizures. These changes may or may not be reversible.  · Heart and vascular system. Alcohol affects multiple areas. It can damage heart muscle causing cardiomyopathy, which is a weakening and stretching of the heart muscle. This can lead to trouble breathing, an irregular heartbeat, atrial fibrillation, leg swelling, and heart failure. Alcohol use makes the blood vessels stiffen causing high blood pressure. All of these problems increase your risk of having heart attacks or strokes.  · Liver. Alcohol causes fat to build up in the liver, affecting its normal function. This increases " the risk for hepatitis, leading to abdominal pain, appetite loss, jaundice, bleeding problems, liver fibrosis, and cirrhosis. This, in turn, can affect your ability to fight off infections, and can cause diabetes. The liver changes prevent it from removing toxins in your blood that can cause encephalopathy, which may show with confusion, altered level of consciousness, personality changes, memory loss, seizures, coma, and death.  · Pancreas. Alcohol can cause inflammation of the pancreas, or pancreatitis. This can cause abdominal pain, fever, and diabetes.  · Immune system. Alcohol weakens your immune system in a number of ways. It suppresses your immune system making it harder to fight infections and colds. It also increases the chance of getting pneumonia and tuberculosis.  · Cancer. Alcohol is a risk factor for developing cancer of the mouth, esophagus, pharynx, larynx, liver, and breast.  · Sexual function. Alcohol can lead to sexual problems.  Home care  The following guidelines will help you deal with alcohol abuse:  · Admit you have a problem with alcohol.  · Ask for help from your healthcare provider and trusted family members or close friends.  · Get help from people trained in dealing with alcohol abuse. This may be individual counseling or group therapy, or it may be a supervised alcohol treatment program.  · Join a self-help group for alcohol abuse such as Alcoholics Anonymous (AA).  · Avoid people who abuse alcohol or tempt you to drink.  Follow-up care  Follow up as advised by the healthcare provider, or as advised. Contact these groups to get help:  · Alcoholics Anonymous (AA): Go to www.aa.org or check the phone book for meetings near you.  · National Alcohol and Substance Abuse Information Center (NASAIC): 477.951.2127 www.addictioncareoptions.com  · National Tullos on Alcoholism and Drug Dependence (NCADD): 125-ULJ-OXVR (446-8381) www.ncadd.org  Call 911  Call 911 if any of these occur:  · Trouble  breathing or slow irregular breathing  · Chest pain  · Sudden weakness on one side of your body or sudden trouble speaking  · Heavy bleeding or vomiting blood  · Very drowsy or trouble awakening  · Fainting or loss of consciousness  · Rapid heart rate  · Seizure  When to seek medical care  Call your healthcare provider right away if any of these occur:   · Confusion  · Hallucinations (seeing, hearing, or feeling things that arent there)  · Pain in your upper abdomen that gets worse  · Repeated vomiting or black or tarry stools  · Severe shakiness

## 2020-06-15 NOTE — PROGRESS NOTES
TN taught Symptoms and Problems for ALCOHOL ABUSE home care with pt and   with teach back:  1. Black stool, 2.Vomiting blood. TN placed education sheet in [employer]..     Help at home will be from son Forest, assisting in pt's recovery.     TN taught patient about things HE is responsible for when discharged TO HELP WITH HIS RECOVERY:  Particularly on how to Manage HIS Care At Home:  1. Getting his prescriptions filled.  2. Taking his medications as directed. DO NOT MISS ANY DOSES!  3. Going to his follow-up doctor appointments.   .  Lexie Dawkins RN, BSN, STN CCM

## 2020-06-15 NOTE — PLAN OF CARE
Problem: Fall Injury Risk  Goal: Absence of Fall and Fall-Related Injury  Outcome: Met     Problem: Adult Inpatient Plan of Care  Goal: Plan of Care Review  Outcome: Met  Goal: Patient-Specific Goal (Individualization)  Outcome: Met  Goal: Absence of Hospital-Acquired Illness or Injury  Outcome: Met  Goal: Optimal Comfort and Wellbeing  Outcome: Met  Goal: Readiness for Transition of Care  Outcome: Met  Goal: Rounds/Family Conference  Outcome: Adequate for Care Transition     Problem: Diabetes Comorbidity  Goal: Blood Glucose Level Within Desired Range  Outcome: Adequate for Care Transition

## 2020-06-15 NOTE — HOSPITAL COURSE
Mr. Lopez is a 71 yo AAM who placed in observation for alcohol intoxication and metabolic lactic acidosis. Lytes and acid base abnormalities resolved after appropriate hydration. Noted sub therapeutic INR. Repeat US BLE showed non-occlussive thrombi left common femoral/poplitieal and non-occlussive thrombi left common femoral and popliteal veins. Lovenox bridge coumadin. Patient reported he know how to give self SQ injections. Patient will follow up with PCP Julian tomorrow or 6/17 for repeat INR and continue adjustment on anticoagulations. On discharge, BLE no swelling/pain and no chest pain/sob. Encourage patient to stop smoking. Patient stable for discharge home.

## 2020-06-15 NOTE — PROGRESS NOTES
WRITTEN HEALTHCARE DISCHARGE INFORMATION      Things that YOU are RESPONSIBLE for to Manage Your Care At Home:     1. Getting your prescriptions filled.  2. Taking you medications as directed. DO NOT MISS ANY DOSES!  3. Going to your follow-up doctor appointments. This is important because it allows the doctor to monitor your progress and to determine if any changes need to be made to your treatment plan.     If you are unable to make your follow up appointments, please call the number listed and reschedule this appointment.      ____________HELP AT HOME____________________     Experiencing any SIGNS or SYMPTOMS: YOU CAN     Schedule a same day appopintment with your Primary Care Doctor or  you can call [facility] On Call Nurse Care Line for 24/7 assistance at [phone]     If you are experience any signs or symptoms that have become severe, Call 911 and come to your nearest Emergency Room.     Thank you for choosing [facility] and allowing us to care for you.   From your care management team:      You should receive a call from [facility] Discharge Department within 48-72 hours to help manage your care after discharge. Please try to make sure that you answer your phone for this important phone call.    Follow-up Information     Julian Escobar.    Why: please report to office as instructed by MD  Contact information:  [place] [address] [phone]

## 2020-06-15 NOTE — PLAN OF CARE
06/15/20 0936   Final Note   Assessment Type Final Discharge Note   Anticipated Discharge Disposition Psych MI   Hospital Follow Up  Appt(s) scheduled? Yes   Discharge plans and expectations educations in teach back method with documentation complete? Yes   Right Care Referral Info   Post Acute Recommendation No Care   Post-Acute Status   Post-Acute Authorization Other   Other Status No Post-Acute Service Needs   Pts nurse Lovely notified that the pt can d/c from CM standpoint.

## 2020-07-14 ENCOUNTER — HOME CARE VISIT (OUTPATIENT)
Dept: NEUROLOGY | Facility: HOSPITAL | Age: 70
End: 2020-07-14

## 2020-08-13 ENCOUNTER — HOME CARE VISIT (OUTPATIENT)
Dept: NEUROLOGY | Facility: HOSPITAL | Age: 70
End: 2020-08-13

## 2020-08-14 NOTE — PROGRESS NOTES
PV completed. He is attending outpatient therapy weekly. Denies any ER visits or hospital stays.

## 2020-09-15 ENCOUNTER — HOME CARE VISIT (OUTPATIENT)
Dept: NEUROLOGY | Facility: HOSPITAL | Age: 70
End: 2020-09-15

## 2020-09-17 NOTE — PROGRESS NOTES
PV completed with patient. No reported ER visits or hospital stays. New medication added by PCP. LHE reinforced medication compliance, and proper hydration.

## 2020-10-16 ENCOUNTER — HOME CARE VISIT (OUTPATIENT)
Dept: NEUROLOGY | Facility: HOSPITAL | Age: 70
End: 2020-10-16

## 2020-10-21 NOTE — PROGRESS NOTES
Mr. Lopez has completed  program. He denies any ER visits or hospital stays. LHE reinforced stroke S/S, stroke RF management, and follow up with PCP.     
Alert and oriented to person, place and time

## 2021-08-13 ENCOUNTER — HOSPITAL ENCOUNTER (EMERGENCY)
Facility: HOSPITAL | Age: 71
Discharge: HOME | End: 2021-08-14
Attending: EMERGENCY MEDICINE
Payer: MEDICARE

## 2021-08-13 DIAGNOSIS — R06.02 SHORTNESS OF BREATH: ICD-10-CM

## 2021-08-13 DIAGNOSIS — N17.9 AKI (ACUTE KIDNEY INJURY): Primary | ICD-10-CM

## 2021-08-13 DIAGNOSIS — Z20.822 SUSPECTED COVID-19 VIRUS INFECTION: ICD-10-CM

## 2021-08-13 LAB
ALBUMIN SERPL BCP-MCNC: 3.6 G/DL (ref 3.5–5.2)
ALLENS TEST: ABNORMAL
ALP SERPL-CCNC: 94 U/L (ref 55–135)
ALT SERPL W/O P-5'-P-CCNC: 8 U/L (ref 10–44)
ANION GAP SERPL CALC-SCNC: 12 MMOL/L (ref 8–16)
AST SERPL-CCNC: 11 U/L (ref 10–40)
BASOPHILS # BLD AUTO: 0.04 K/UL (ref 0–0.2)
BASOPHILS NFR BLD: 0.3 % (ref 0–1.9)
BILIRUB SERPL-MCNC: 0.2 MG/DL (ref 0.1–1)
BNP SERPL-MCNC: 62 PG/ML (ref 0–99)
BUN SERPL-MCNC: 20 MG/DL (ref 8–23)
CALCIUM SERPL-MCNC: 11 MG/DL (ref 8.7–10.5)
CHLORIDE SERPL-SCNC: 104 MMOL/L (ref 95–110)
CO2 SERPL-SCNC: 20 MMOL/L (ref 23–29)
CREAT SERPL-MCNC: 1.8 MG/DL (ref 0.5–1.4)
CRP SERPL-MCNC: 4.3 MG/L (ref 0–8.2)
CTP QC/QA: YES
D DIMER PPP IA.FEU-MCNC: <0.19 MG/L FEU
DELSYS: ABNORMAL
DIFFERENTIAL METHOD: ABNORMAL
EOSINOPHIL # BLD AUTO: 0 K/UL (ref 0–0.5)
EOSINOPHIL NFR BLD: 0 % (ref 0–8)
ERYTHROCYTE [DISTWIDTH] IN BLOOD BY AUTOMATED COUNT: 13 % (ref 11.5–14.5)
EST. GFR  (AFRICAN AMERICAN): 43 ML/MIN/1.73 M^2
EST. GFR  (NON AFRICAN AMERICAN): 37 ML/MIN/1.73 M^2
FERRITIN SERPL-MCNC: 175 NG/ML (ref 20–300)
FIO2: 21
GLUCOSE SERPL-MCNC: 280 MG/DL (ref 70–110)
HCO3 UR-SCNC: 25.6 MMOL/L (ref 24–28)
HCT VFR BLD AUTO: 43.3 % (ref 40–54)
HGB BLD-MCNC: 14 G/DL (ref 14–18)
IMM GRANULOCYTES # BLD AUTO: 0.09 K/UL (ref 0–0.04)
IMM GRANULOCYTES NFR BLD AUTO: 0.6 % (ref 0–0.5)
LDH SERPL L TO P-CCNC: 202 U/L (ref 110–260)
LYMPHOCYTES # BLD AUTO: 1.5 K/UL (ref 1–4.8)
LYMPHOCYTES NFR BLD: 9.9 % (ref 18–48)
MCH RBC QN AUTO: 25.6 PG (ref 27–31)
MCHC RBC AUTO-ENTMCNC: 32.3 G/DL (ref 32–36)
MCV RBC AUTO: 79 FL (ref 82–98)
MODE: ABNORMAL
MONOCYTES # BLD AUTO: 0.7 K/UL (ref 0.3–1)
MONOCYTES NFR BLD: 4.8 % (ref 4–15)
NEUTROPHILS # BLD AUTO: 12.8 K/UL (ref 1.8–7.7)
NEUTROPHILS NFR BLD: 84.4 % (ref 38–73)
NRBC BLD-RTO: 0 /100 WBC
PCO2 BLDA: 45 MMHG (ref 35–45)
PH SMN: 7.36 [PH] (ref 7.35–7.45)
PLATELET # BLD AUTO: 263 K/UL (ref 150–450)
PMV BLD AUTO: 10.7 FL (ref 9.2–12.9)
PO2 BLDA: 27 MMHG (ref 40–60)
POC BE: 0 MMOL/L
POC SATURATED O2: 47 % (ref 95–100)
POC TCO2: 27 MMOL/L (ref 24–29)
POTASSIUM SERPL-SCNC: 4.8 MMOL/L (ref 3.5–5.1)
PROCALCITONIN SERPL IA-MCNC: 0.05 NG/ML
PROT SERPL-MCNC: 8 G/DL (ref 6–8.4)
RBC # BLD AUTO: 5.46 M/UL (ref 4.6–6.2)
SAMPLE: ABNORMAL
SARS-COV-2 RDRP RESP QL NAA+PROBE: NEGATIVE
SITE: ABNORMAL
SODIUM SERPL-SCNC: 136 MMOL/L (ref 136–145)
SP02: 99
TROPONIN I SERPL DL<=0.01 NG/ML-MCNC: <0.006 NG/ML (ref 0–0.03)
WBC # BLD AUTO: 15.11 K/UL (ref 3.9–12.7)

## 2021-08-13 PROCEDURE — U0003 INFECTIOUS AGENT DETECTION BY NUCLEIC ACID (DNA OR RNA); SEVERE ACUTE RESPIRATORY SYNDROME CORONAVIRUS 2 (SARS-COV-2) (CORONAVIRUS DISEASE [COVID-19]), AMPLIFIED PROBE TECHNIQUE, MAKING USE OF HIGH THROUGHPUT TECHNOLOGIES AS DESCRIBED BY CMS-2020-01-R: HCPCS | Performed by: STUDENT IN AN ORGANIZED HEALTH CARE EDUCATION/TRAINING PROGRAM

## 2021-08-13 PROCEDURE — 82803 BLOOD GASES ANY COMBINATION: CPT

## 2021-08-13 PROCEDURE — 96361 HYDRATE IV INFUSION ADD-ON: CPT

## 2021-08-13 PROCEDURE — 84145 PROCALCITONIN (PCT): CPT | Performed by: EMERGENCY MEDICINE

## 2021-08-13 PROCEDURE — 86140 C-REACTIVE PROTEIN: CPT | Performed by: EMERGENCY MEDICINE

## 2021-08-13 PROCEDURE — U0005 INFEC AGEN DETEC AMPLI PROBE: HCPCS | Performed by: STUDENT IN AN ORGANIZED HEALTH CARE EDUCATION/TRAINING PROGRAM

## 2021-08-13 PROCEDURE — 99285 EMERGENCY DEPT VISIT HI MDM: CPT | Mod: 25

## 2021-08-13 PROCEDURE — 25000003 PHARM REV CODE 250: Performed by: STUDENT IN AN ORGANIZED HEALTH CARE EDUCATION/TRAINING PROGRAM

## 2021-08-13 PROCEDURE — 96360 HYDRATION IV INFUSION INIT: CPT

## 2021-08-13 PROCEDURE — 80053 COMPREHEN METABOLIC PANEL: CPT | Performed by: EMERGENCY MEDICINE

## 2021-08-13 PROCEDURE — 85379 FIBRIN DEGRADATION QUANT: CPT | Performed by: EMERGENCY MEDICINE

## 2021-08-13 PROCEDURE — 93010 EKG 12-LEAD: ICD-10-PCS | Mod: ,,, | Performed by: INTERNAL MEDICINE

## 2021-08-13 PROCEDURE — 93010 ELECTROCARDIOGRAM REPORT: CPT | Mod: ,,, | Performed by: INTERNAL MEDICINE

## 2021-08-13 PROCEDURE — 85025 COMPLETE CBC W/AUTO DIFF WBC: CPT | Performed by: EMERGENCY MEDICINE

## 2021-08-13 PROCEDURE — 99900035 HC TECH TIME PER 15 MIN (STAT)

## 2021-08-13 PROCEDURE — 25000242 PHARM REV CODE 250 ALT 637 W/ HCPCS: Performed by: EMERGENCY MEDICINE

## 2021-08-13 PROCEDURE — 83880 ASSAY OF NATRIURETIC PEPTIDE: CPT | Performed by: EMERGENCY MEDICINE

## 2021-08-13 PROCEDURE — 83615 LACTATE (LD) (LDH) ENZYME: CPT | Performed by: EMERGENCY MEDICINE

## 2021-08-13 PROCEDURE — U0002 COVID-19 LAB TEST NON-CDC: HCPCS | Performed by: EMERGENCY MEDICINE

## 2021-08-13 PROCEDURE — 84484 ASSAY OF TROPONIN QUANT: CPT | Performed by: EMERGENCY MEDICINE

## 2021-08-13 PROCEDURE — 94640 AIRWAY INHALATION TREATMENT: CPT

## 2021-08-13 PROCEDURE — 82728 ASSAY OF FERRITIN: CPT | Performed by: EMERGENCY MEDICINE

## 2021-08-13 PROCEDURE — 93005 ELECTROCARDIOGRAM TRACING: CPT

## 2021-08-13 RX ORDER — ALBUTEROL SULFATE 90 UG/1
6 AEROSOL, METERED RESPIRATORY (INHALATION) ONCE
Status: COMPLETED | OUTPATIENT
Start: 2021-08-13 | End: 2021-08-13

## 2021-08-13 RX ADMIN — SODIUM CHLORIDE 1000 ML: 0.9 INJECTION, SOLUTION INTRAVENOUS at 11:08

## 2021-08-13 RX ADMIN — ALBUTEROL SULFATE 6 PUFF: 90 AEROSOL, METERED RESPIRATORY (INHALATION) at 11:08

## 2021-08-14 VITALS
TEMPERATURE: 98 F | DIASTOLIC BLOOD PRESSURE: 68 MMHG | SYSTOLIC BLOOD PRESSURE: 143 MMHG | RESPIRATION RATE: 17 BRPM | OXYGEN SATURATION: 98 % | HEART RATE: 62 BPM

## 2021-08-14 LAB
BACTERIA #/AREA URNS HPF: NORMAL /HPF
BILIRUB UR QL STRIP: NEGATIVE
CLARITY UR: CLEAR
COLOR UR: COLORLESS
GLUCOSE UR QL STRIP: ABNORMAL
HGB UR QL STRIP: NEGATIVE
KETONES UR QL STRIP: NEGATIVE
LEUKOCYTE ESTERASE UR QL STRIP: NEGATIVE
MICROSCOPIC COMMENT: NORMAL
NITRITE UR QL STRIP: NEGATIVE
PH UR STRIP: 6 [PH] (ref 5–8)
PROT UR QL STRIP: NEGATIVE
SP GR UR STRIP: 1 (ref 1–1.03)
URN SPEC COLLECT METH UR: ABNORMAL
UROBILINOGEN UR STRIP-ACNC: NEGATIVE EU/DL
WBC #/AREA URNS HPF: 1 /HPF (ref 0–5)
YEAST URNS QL MICRO: NORMAL

## 2021-08-14 PROCEDURE — 81000 URINALYSIS NONAUTO W/SCOPE: CPT | Performed by: STUDENT IN AN ORGANIZED HEALTH CARE EDUCATION/TRAINING PROGRAM

## 2021-08-14 RX ORDER — ALBUTEROL SULFATE 90 UG/1
2 AEROSOL, METERED RESPIRATORY (INHALATION) EVERY 6 HOURS PRN
Qty: 18 G | Refills: 0 | Status: SHIPPED | OUTPATIENT
Start: 2021-08-14

## 2021-08-15 LAB
SARS-COV-2 RNA RESP QL NAA+PROBE: NOT DETECTED
SARS-COV-2- CYCLE NUMBER: -1

## 2022-01-30 PROCEDURE — 82962 GLUCOSE BLOOD TEST: CPT

## 2022-01-30 PROCEDURE — 99285 EMERGENCY DEPT VISIT HI MDM: CPT | Mod: 25

## 2022-01-31 ENCOUNTER — HOSPITAL ENCOUNTER (INPATIENT)
Facility: OTHER | Age: 72
LOS: 86 days | DRG: 299 | End: 2022-04-27
Attending: EMERGENCY MEDICINE | Admitting: HOSPITALIST
Payer: MEDICARE

## 2022-01-31 DIAGNOSIS — A41.9 SEPSIS: ICD-10-CM

## 2022-01-31 DIAGNOSIS — K92.1 HEMATOCHEZIA: ICD-10-CM

## 2022-01-31 DIAGNOSIS — R55 SYNCOPE: Primary | ICD-10-CM

## 2022-01-31 DIAGNOSIS — Z71.89 ADVANCE CARE PLANNING: ICD-10-CM

## 2022-01-31 DIAGNOSIS — I63.9 ACUTE STROKE DUE TO ISCHEMIA: ICD-10-CM

## 2022-01-31 DIAGNOSIS — I82.413 ACUTE DEEP VEIN THROMBOSIS (DVT) OF FEMORAL VEIN OF BOTH LOWER EXTREMITIES: ICD-10-CM

## 2022-01-31 DIAGNOSIS — R53.81 DEBILITY: ICD-10-CM

## 2022-01-31 DIAGNOSIS — N40.0 BPH WITHOUT URINARY OBSTRUCTION: ICD-10-CM

## 2022-01-31 DIAGNOSIS — E43 SEVERE PROTEIN-CALORIE MALNUTRITION: ICD-10-CM

## 2022-01-31 DIAGNOSIS — R93.89 ABNORMAL IMAGING OF THYROID: ICD-10-CM

## 2022-01-31 DIAGNOSIS — E11.65 TYPE 2 DIABETES MELLITUS WITH HYPERGLYCEMIA, WITHOUT LONG-TERM CURRENT USE OF INSULIN: ICD-10-CM

## 2022-01-31 DIAGNOSIS — A41.9 SEPSIS, DUE TO UNSPECIFIED ORGANISM, UNSPECIFIED WHETHER ACUTE ORGAN DYSFUNCTION PRESENT: ICD-10-CM

## 2022-01-31 DIAGNOSIS — I82.413: ICD-10-CM

## 2022-01-31 DIAGNOSIS — F17.200 TOBACCO DEPENDENCE: Chronic | ICD-10-CM

## 2022-01-31 DIAGNOSIS — F14.10 COCAINE ABUSE: Chronic | ICD-10-CM

## 2022-01-31 DIAGNOSIS — R93.3 ABNORMAL CT SCAN, GASTROINTESTINAL TRACT: ICD-10-CM

## 2022-01-31 DIAGNOSIS — R76.11 POSITIVE PPD: ICD-10-CM

## 2022-01-31 DIAGNOSIS — R55 SYNCOPE AND COLLAPSE: ICD-10-CM

## 2022-01-31 DIAGNOSIS — I63.9 STROKE: ICD-10-CM

## 2022-01-31 DIAGNOSIS — E78.5 HYPERLIPIDEMIA, UNSPECIFIED HYPERLIPIDEMIA TYPE: Chronic | ICD-10-CM

## 2022-01-31 PROBLEM — I82.403 ACUTE DEEP VEIN THROMBOSIS (DVT) OF BOTH LOWER EXTREMITIES: Status: ACTIVE | Noted: 2022-01-31

## 2022-01-31 LAB
ALBUMIN SERPL BCP-MCNC: 3.5 G/DL (ref 3.5–5.2)
ALBUMIN SERPL BCP-MCNC: 4 G/DL (ref 3.5–5.2)
ALLENS TEST: ABNORMAL
ALP SERPL-CCNC: 84 U/L (ref 55–135)
ALP SERPL-CCNC: 90 U/L (ref 55–135)
ALT SERPL W/O P-5'-P-CCNC: 10 U/L (ref 10–44)
ALT SERPL W/O P-5'-P-CCNC: 15 U/L (ref 10–44)
AMPHET+METHAMPHET UR QL: NEGATIVE
ANION GAP SERPL CALC-SCNC: 10 MMOL/L (ref 8–16)
ANION GAP SERPL CALC-SCNC: 11 MMOL/L (ref 8–16)
ANISOCYTOSIS BLD QL SMEAR: SLIGHT
ASCENDING AORTA: 2.58 CM
AST SERPL-CCNC: 12 U/L (ref 10–40)
AST SERPL-CCNC: 12 U/L (ref 10–40)
AV INDEX (PROSTH): 0.81
AV MEAN GRADIENT: 2 MMHG
AV PEAK GRADIENT: 4 MMHG
AV VALVE AREA: 2.58 CM2
AV VELOCITY RATIO: 0.99
BARBITURATES UR QL SCN>200 NG/ML: NEGATIVE
BASOPHILS # BLD AUTO: 0.05 K/UL (ref 0–0.2)
BASOPHILS # BLD AUTO: 0.07 K/UL (ref 0–0.2)
BASOPHILS NFR BLD: 0.4 % (ref 0–1.9)
BASOPHILS NFR BLD: 0.6 % (ref 0–1.9)
BENZODIAZ UR QL SCN>200 NG/ML: NEGATIVE
BILIRUB SERPL-MCNC: 0.6 MG/DL (ref 0.1–1)
BILIRUB SERPL-MCNC: 0.7 MG/DL (ref 0.1–1)
BSA FOR ECHO PROCEDURE: 1.86 M2
BUN SERPL-MCNC: 11 MG/DL (ref 8–23)
BUN SERPL-MCNC: 11 MG/DL (ref 8–23)
BZE UR QL SCN: ABNORMAL
CALCIUM SERPL-MCNC: 10 MG/DL (ref 8.7–10.5)
CALCIUM SERPL-MCNC: 9.5 MG/DL (ref 8.7–10.5)
CANNABINOIDS UR QL SCN: NEGATIVE
CHLORIDE SERPL-SCNC: 101 MMOL/L (ref 95–110)
CHLORIDE SERPL-SCNC: 104 MMOL/L (ref 95–110)
CO2 SERPL-SCNC: 22 MMOL/L (ref 23–29)
CO2 SERPL-SCNC: 24 MMOL/L (ref 23–29)
CREAT SERPL-MCNC: 1 MG/DL (ref 0.5–1.4)
CREAT SERPL-MCNC: 1.2 MG/DL (ref 0.5–1.4)
CREAT SERPL-MCNC: 1.2 MG/DL (ref 0.5–1.4)
CREAT UR-MCNC: 110.7 MG/DL (ref 23–375)
CTP QC/QA: YES
CV ECHO LV RWT: 0.57 CM
D DIMER PPP IA.FEU-MCNC: 21.7 MG/L FEU
DACRYOCYTES BLD QL SMEAR: ABNORMAL
DIFFERENTIAL METHOD: ABNORMAL
DIFFERENTIAL METHOD: ABNORMAL
DOP CALC AO PEAK VEL: 0.97 M/S
DOP CALC AO VTI: 18.45 CM
DOP CALC LVOT AREA: 3.2 CM2
DOP CALC LVOT DIAMETER: 2.02 CM
DOP CALC LVOT PEAK VEL: 0.96 M/S
DOP CALC LVOT STROKE VOLUME: 47.66 CM3
DOP CALCLVOT PEAK VEL VTI: 14.88 CM
E WAVE DECELERATION TIME: 609.62 MSEC
E/A RATIO: 0.47
E/E' RATIO: 5.54 M/S
ECHO LV POSTERIOR WALL: 1.16 CM (ref 0.6–1.1)
EJECTION FRACTION: 62 %
EOSINOPHIL # BLD AUTO: 0 K/UL (ref 0–0.5)
EOSINOPHIL # BLD AUTO: 0.1 K/UL (ref 0–0.5)
EOSINOPHIL NFR BLD: 0.3 % (ref 0–8)
EOSINOPHIL NFR BLD: 0.6 % (ref 0–8)
ERYTHROCYTE [DISTWIDTH] IN BLOOD BY AUTOMATED COUNT: 12.1 % (ref 11.5–14.5)
ERYTHROCYTE [DISTWIDTH] IN BLOOD BY AUTOMATED COUNT: 12.1 % (ref 11.5–14.5)
EST. GFR  (AFRICAN AMERICAN): >60 ML/MIN/1.73 M^2
EST. GFR  (AFRICAN AMERICAN): >60 ML/MIN/1.73 M^2
EST. GFR  (NON AFRICAN AMERICAN): >60 ML/MIN/1.73 M^2
EST. GFR  (NON AFRICAN AMERICAN): >60 ML/MIN/1.73 M^2
ESTIMATED AVG GLUCOSE: 249 MG/DL (ref 68–131)
ETHANOL UR-MCNC: <10 MG/DL
FRACTIONAL SHORTENING: 33 % (ref 28–44)
GIANT PLATELETS BLD QL SMEAR: PRESENT
GLUCOSE SERPL-MCNC: 239 MG/DL (ref 70–110)
GLUCOSE SERPL-MCNC: 289 MG/DL (ref 70–110)
HBA1C MFR BLD: 10.3 % (ref 4–5.6)
HCO3 UR-SCNC: 29.7 MMOL/L (ref 24–28)
HCT VFR BLD AUTO: 40.2 % (ref 40–54)
HCT VFR BLD AUTO: 40.9 % (ref 40–54)
HCT VFR BLD CALC: 42 %PCV (ref 36–54)
HGB BLD-MCNC: 12.9 G/DL (ref 14–18)
HGB BLD-MCNC: 13.4 G/DL (ref 14–18)
HGB BLD-MCNC: 14 G/DL
IMM GRANULOCYTES # BLD AUTO: 0.04 K/UL (ref 0–0.04)
IMM GRANULOCYTES # BLD AUTO: 0.04 K/UL (ref 0–0.04)
IMM GRANULOCYTES NFR BLD AUTO: 0.3 % (ref 0–0.5)
IMM GRANULOCYTES NFR BLD AUTO: 0.3 % (ref 0–0.5)
INR PPP: 1 (ref 0.8–1.2)
INR PPP: 1 (ref 0.8–1.2)
INTERVENTRICULAR SEPTUM: 1.07 CM (ref 0.6–1.1)
IVRT: 117.65 MSEC
LA MAJOR: 4.99 CM
LA MINOR: 3.9 CM
LA WIDTH: 3.69 CM
LEFT ATRIUM SIZE: 3.3 CM
LEFT ATRIUM VOLUME INDEX MOD: 20.6 ML/M2
LEFT ATRIUM VOLUME INDEX: 24 ML/M2
LEFT ATRIUM VOLUME MOD: 39 CM3
LEFT ATRIUM VOLUME: 45.32 CM3
LEFT INTERNAL DIMENSION IN SYSTOLE: 2.74 CM (ref 2.1–4)
LEFT VENTRICLE DIASTOLIC VOLUME INDEX: 39.24 ML/M2
LEFT VENTRICLE DIASTOLIC VOLUME: 74.16 ML
LEFT VENTRICLE MASS INDEX: 82 G/M2
LEFT VENTRICLE SYSTOLIC VOLUME INDEX: 14.9 ML/M2
LEFT VENTRICLE SYSTOLIC VOLUME: 28.07 ML
LEFT VENTRICULAR INTERNAL DIMENSION IN DIASTOLE: 4.1 CM (ref 3.5–6)
LEFT VENTRICULAR MASS: 154.28 G
LV LATERAL E/E' RATIO: 4.5 M/S
LV SEPTAL E/E' RATIO: 7.2 M/S
LYMPHOCYTES # BLD AUTO: 2.1 K/UL (ref 1–4.8)
LYMPHOCYTES # BLD AUTO: 3.3 K/UL (ref 1–4.8)
LYMPHOCYTES NFR BLD: 17.1 % (ref 18–48)
LYMPHOCYTES NFR BLD: 27.7 % (ref 18–48)
MAGNESIUM SERPL-MCNC: 1.9 MG/DL (ref 1.6–2.6)
MCH RBC QN AUTO: 25.4 PG (ref 27–31)
MCH RBC QN AUTO: 26.2 PG (ref 27–31)
MCHC RBC AUTO-ENTMCNC: 32.1 G/DL (ref 32–36)
MCHC RBC AUTO-ENTMCNC: 32.8 G/DL (ref 32–36)
MCV RBC AUTO: 79 FL (ref 82–98)
MCV RBC AUTO: 80 FL (ref 82–98)
METHADONE UR QL SCN>300 NG/ML: NEGATIVE
MONOCYTES # BLD AUTO: 0.7 K/UL (ref 0.3–1)
MONOCYTES # BLD AUTO: 0.8 K/UL (ref 0.3–1)
MONOCYTES NFR BLD: 5.4 % (ref 4–15)
MONOCYTES NFR BLD: 6.4 % (ref 4–15)
MV PEAK A VEL: 0.76 M/S
MV PEAK E VEL: 0.36 M/S
MV STENOSIS PRESSURE HALF TIME: 176.79 MS
MV VALVE AREA P 1/2 METHOD: 1.24 CM2
NEUTROPHILS # BLD AUTO: 7.8 K/UL (ref 1.8–7.7)
NEUTROPHILS # BLD AUTO: 9.3 K/UL (ref 1.8–7.7)
NEUTROPHILS NFR BLD: 64.7 % (ref 38–73)
NEUTROPHILS NFR BLD: 76.2 % (ref 38–73)
NRBC BLD-RTO: 0 /100 WBC
NRBC BLD-RTO: 0 /100 WBC
OPIATES UR QL SCN: NEGATIVE
OVALOCYTES BLD QL SMEAR: ABNORMAL
PCO2 BLDA: 53.2 MMHG (ref 35–45)
PCP UR QL SCN>25 NG/ML: NEGATIVE
PH SMN: 7.36 [PH] (ref 7.35–7.45)
PHOSPHATE SERPL-MCNC: 3.6 MG/DL (ref 2.7–4.5)
PISA TR MAX VEL: 1.94 M/S
PLATELET # BLD AUTO: 199 K/UL (ref 150–450)
PLATELET # BLD AUTO: 203 K/UL (ref 150–450)
PLATELET BLD QL SMEAR: ABNORMAL
PMV BLD AUTO: 10.3 FL (ref 9.2–12.9)
PMV BLD AUTO: 10.8 FL (ref 9.2–12.9)
PO2 BLDA: 10 MMHG (ref 40–60)
POC BE: 4 MMOL/L
POC SATURATED O2: 9 % (ref 95–100)
POC TCO2: 31 MMOL/L (ref 24–29)
POCT GLUCOSE: 188 MG/DL (ref 70–110)
POCT GLUCOSE: 231 MG/DL (ref 70–110)
POCT GLUCOSE: 257 MG/DL (ref 70–110)
POCT GLUCOSE: 302 MG/DL (ref 70–110)
POCT GLUCOSE: 324 MG/DL (ref 70–110)
POIKILOCYTOSIS BLD QL SMEAR: SLIGHT
POTASSIUM BLD-SCNC: 3.3 MMOL/L (ref 3.5–5.1)
POTASSIUM SERPL-SCNC: 3.4 MMOL/L (ref 3.5–5.1)
POTASSIUM SERPL-SCNC: 3.9 MMOL/L (ref 3.5–5.1)
PROT SERPL-MCNC: 7.1 G/DL (ref 6–8.4)
PROT SERPL-MCNC: 7.5 G/DL (ref 6–8.4)
PROTHROMBIN TIME: 10.7 SEC (ref 9–12.5)
PROTHROMBIN TIME: 10.9 SEC (ref 9–12.5)
PV PEAK VELOCITY: 0.77 CM/S
RA MAJOR: 3.62 CM
RA PRESSURE: 3 MMHG
RA WIDTH: 3.49 CM
RBC # BLD AUTO: 5.07 M/UL (ref 4.6–6.2)
RBC # BLD AUTO: 5.12 M/UL (ref 4.6–6.2)
RIGHT VENTRICULAR END-DIASTOLIC DIMENSION: 3.11 CM
RV TISSUE DOPPLER FREE WALL SYSTOLIC VELOCITY 1 (APICAL 4 CHAMBER VIEW): 9.59 CM/S
SAMPLE: ABNORMAL
SAMPLE: NORMAL
SARS-COV-2 RDRP RESP QL NAA+PROBE: NEGATIVE
SINUS: 2.63 CM
SITE: ABNORMAL
SODIUM BLD-SCNC: 138 MMOL/L (ref 136–145)
SODIUM SERPL-SCNC: 136 MMOL/L (ref 136–145)
SODIUM SERPL-SCNC: 136 MMOL/L (ref 136–145)
STJ: 2.26 CM
TDI LATERAL: 0.08 M/S
TDI SEPTAL: 0.05 M/S
TDI: 0.07 M/S
TOXICOLOGY INFORMATION: ABNORMAL
TR MAX PG: 15 MMHG
TRICUSPID ANNULAR PLANE SYSTOLIC EXCURSION: 1.88 CM
TROPONIN I SERPL DL<=0.01 NG/ML-MCNC: 0.01 NG/ML (ref 0–0.03)
TV REST PULMONARY ARTERY PRESSURE: 18 MMHG
WBC # BLD AUTO: 12.03 K/UL (ref 3.9–12.7)
WBC # BLD AUTO: 12.2 K/UL (ref 3.9–12.7)

## 2022-01-31 PROCEDURE — 99406 PR TOBACCO USE CESSATION INTERMEDIATE 3-10 MINUTES: ICD-10-PCS | Mod: ,,, | Performed by: HOSPITALIST

## 2022-01-31 PROCEDURE — 25000003 PHARM REV CODE 250: Performed by: NURSE PRACTITIONER

## 2022-01-31 PROCEDURE — 96372 THER/PROPH/DIAG INJ SC/IM: CPT | Performed by: NURSE PRACTITIONER

## 2022-01-31 PROCEDURE — 85025 COMPLETE CBC W/AUTO DIFF WBC: CPT | Mod: 91 | Performed by: NURSE PRACTITIONER

## 2022-01-31 PROCEDURE — 96372 THER/PROPH/DIAG INJ SC/IM: CPT | Performed by: HOSPITALIST

## 2022-01-31 PROCEDURE — 85379 FIBRIN DEGRADATION QUANT: CPT | Performed by: EMERGENCY MEDICINE

## 2022-01-31 PROCEDURE — 85610 PROTHROMBIN TIME: CPT | Performed by: NURSE PRACTITIONER

## 2022-01-31 PROCEDURE — 21400001 HC TELEMETRY ROOM

## 2022-01-31 PROCEDURE — 83735 ASSAY OF MAGNESIUM: CPT | Performed by: NURSE PRACTITIONER

## 2022-01-31 PROCEDURE — 99220 PR INITIAL OBSERVATION CARE,LEVL III: CPT | Mod: 25,,, | Performed by: HOSPITALIST

## 2022-01-31 PROCEDURE — 84100 ASSAY OF PHOSPHORUS: CPT | Performed by: NURSE PRACTITIONER

## 2022-01-31 PROCEDURE — 99406 BEHAV CHNG SMOKING 3-10 MIN: CPT | Mod: ,,, | Performed by: HOSPITALIST

## 2022-01-31 PROCEDURE — 85610 PROTHROMBIN TIME: CPT | Mod: 91 | Performed by: HOSPITALIST

## 2022-01-31 PROCEDURE — U0002 COVID-19 LAB TEST NON-CDC: HCPCS | Performed by: EMERGENCY MEDICINE

## 2022-01-31 PROCEDURE — 80053 COMPREHEN METABOLIC PANEL: CPT | Performed by: EMERGENCY MEDICINE

## 2022-01-31 PROCEDURE — 63600175 PHARM REV CODE 636 W HCPCS: Performed by: HOSPITALIST

## 2022-01-31 PROCEDURE — 84132 ASSAY OF SERUM POTASSIUM: CPT

## 2022-01-31 PROCEDURE — 80307 DRUG TEST PRSMV CHEM ANLYZR: CPT | Performed by: HOSPITALIST

## 2022-01-31 PROCEDURE — 25500020 PHARM REV CODE 255: Performed by: EMERGENCY MEDICINE

## 2022-01-31 PROCEDURE — 99220 PR INITIAL OBSERVATION CARE,LEVL III: ICD-10-PCS | Mod: 25,,, | Performed by: HOSPITALIST

## 2022-01-31 PROCEDURE — 82803 BLOOD GASES ANY COMBINATION: CPT

## 2022-01-31 PROCEDURE — 63600175 PHARM REV CODE 636 W HCPCS: Performed by: NURSE PRACTITIONER

## 2022-01-31 PROCEDURE — 94761 N-INVAS EAR/PLS OXIMETRY MLT: CPT

## 2022-01-31 PROCEDURE — 84484 ASSAY OF TROPONIN QUANT: CPT | Performed by: EMERGENCY MEDICINE

## 2022-01-31 PROCEDURE — 80053 COMPREHEN METABOLIC PANEL: CPT | Mod: 91 | Performed by: NURSE PRACTITIONER

## 2022-01-31 PROCEDURE — 82565 ASSAY OF CREATININE: CPT

## 2022-01-31 PROCEDURE — 83036 HEMOGLOBIN GLYCOSYLATED A1C: CPT | Performed by: NURSE PRACTITIONER

## 2022-01-31 PROCEDURE — 84295 ASSAY OF SERUM SODIUM: CPT

## 2022-01-31 PROCEDURE — 99900035 HC TECH TIME PER 15 MIN (STAT)

## 2022-01-31 PROCEDURE — 85014 HEMATOCRIT: CPT

## 2022-01-31 PROCEDURE — 85025 COMPLETE CBC W/AUTO DIFF WBC: CPT | Performed by: EMERGENCY MEDICINE

## 2022-01-31 PROCEDURE — 36415 COLL VENOUS BLD VENIPUNCTURE: CPT | Performed by: HOSPITALIST

## 2022-01-31 RX ORDER — ATORVASTATIN CALCIUM 20 MG/1
80 TABLET, FILM COATED ORAL DAILY
Status: DISCONTINUED | OUTPATIENT
Start: 2022-01-31 | End: 2022-04-27 | Stop reason: HOSPADM

## 2022-01-31 RX ORDER — IBUPROFEN 200 MG
24 TABLET ORAL
Status: DISCONTINUED | OUTPATIENT
Start: 2022-01-31 | End: 2022-04-27 | Stop reason: HOSPADM

## 2022-01-31 RX ORDER — CITALOPRAM 20 MG/1
20 TABLET, FILM COATED ORAL DAILY
Status: ON HOLD | COMMUNITY
End: 2022-02-10 | Stop reason: HOSPADM

## 2022-01-31 RX ORDER — GLUCAGON 1 MG
1 KIT INJECTION
Status: DISCONTINUED | OUTPATIENT
Start: 2022-01-31 | End: 2022-04-27 | Stop reason: HOSPADM

## 2022-01-31 RX ORDER — MECLIZINE HYDROCHLORIDE 25 MG/1
12.5 TABLET ORAL 2 TIMES DAILY PRN
Status: ON HOLD | COMMUNITY
End: 2022-02-10 | Stop reason: HOSPADM

## 2022-01-31 RX ORDER — ROSUVASTATIN CALCIUM 40 MG/1
10 TABLET, COATED ORAL NIGHTLY
Status: ON HOLD | COMMUNITY
End: 2022-02-10 | Stop reason: HOSPADM

## 2022-01-31 RX ORDER — INSULIN ASPART 100 [IU]/ML
1-10 INJECTION, SOLUTION INTRAVENOUS; SUBCUTANEOUS
Status: DISCONTINUED | OUTPATIENT
Start: 2022-01-31 | End: 2022-02-07

## 2022-01-31 RX ORDER — NALOXONE HCL 0.4 MG/ML
0.02 VIAL (ML) INJECTION
Status: DISCONTINUED | OUTPATIENT
Start: 2022-01-31 | End: 2022-04-27 | Stop reason: HOSPADM

## 2022-01-31 RX ORDER — TALC
6 POWDER (GRAM) TOPICAL NIGHTLY PRN
Status: CANCELLED | OUTPATIENT
Start: 2022-01-31

## 2022-01-31 RX ORDER — ENOXAPARIN SODIUM 100 MG/ML
1 INJECTION SUBCUTANEOUS
Status: DISCONTINUED | OUTPATIENT
Start: 2022-01-31 | End: 2022-02-10

## 2022-01-31 RX ORDER — LOSARTAN POTASSIUM 25 MG/1
25 TABLET ORAL DAILY
Status: ON HOLD | COMMUNITY
End: 2022-04-07 | Stop reason: HOSPADM

## 2022-01-31 RX ORDER — ACETAMINOPHEN 325 MG/1
650 TABLET ORAL EVERY 4 HOURS PRN
Status: DISCONTINUED | OUTPATIENT
Start: 2022-01-31 | End: 2022-04-27 | Stop reason: HOSPADM

## 2022-01-31 RX ORDER — ONDANSETRON 2 MG/ML
4 INJECTION INTRAMUSCULAR; INTRAVENOUS EVERY 8 HOURS PRN
Status: CANCELLED | OUTPATIENT
Start: 2022-01-31

## 2022-01-31 RX ORDER — FLUTICASONE PROPIONATE AND SALMETEROL 250; 50 UG/1; UG/1
1 POWDER RESPIRATORY (INHALATION) 2 TIMES DAILY
COMMUNITY

## 2022-01-31 RX ORDER — ONDANSETRON 8 MG/1
8 TABLET, ORALLY DISINTEGRATING ORAL EVERY 8 HOURS PRN
Status: DISCONTINUED | OUTPATIENT
Start: 2022-01-31 | End: 2022-04-27 | Stop reason: HOSPADM

## 2022-01-31 RX ORDER — IBUPROFEN 200 MG
16 TABLET ORAL
Status: DISCONTINUED | OUTPATIENT
Start: 2022-01-31 | End: 2022-04-27 | Stop reason: HOSPADM

## 2022-01-31 RX ORDER — WARFARIN 7.5 MG/1
7.5 TABLET ORAL DAILY
Status: DISCONTINUED | OUTPATIENT
Start: 2022-01-31 | End: 2022-01-31

## 2022-01-31 RX ORDER — SILDENAFIL 25 MG/1
25 TABLET, FILM COATED ORAL DAILY PRN
Status: ON HOLD | COMMUNITY
End: 2022-02-10 | Stop reason: HOSPADM

## 2022-01-31 RX ORDER — SODIUM CHLORIDE 0.9 % (FLUSH) 0.9 %
10 SYRINGE (ML) INJECTION
Status: DISCONTINUED | OUTPATIENT
Start: 2022-01-31 | End: 2022-04-27 | Stop reason: HOSPADM

## 2022-01-31 RX ADMIN — INSULIN ASPART 8 UNITS: 100 INJECTION, SOLUTION INTRAVENOUS; SUBCUTANEOUS at 05:01

## 2022-01-31 RX ADMIN — IOHEXOL 100 ML: 350 INJECTION, SOLUTION INTRAVENOUS at 02:01

## 2022-01-31 RX ADMIN — ENOXAPARIN SODIUM 70 MG: 80 INJECTION SUBCUTANEOUS at 11:01

## 2022-01-31 RX ADMIN — ATORVASTATIN CALCIUM 80 MG: 20 TABLET, FILM COATED ORAL at 11:01

## 2022-01-31 RX ADMIN — INSULIN ASPART 2 UNITS: 100 INJECTION, SOLUTION INTRAVENOUS; SUBCUTANEOUS at 12:01

## 2022-01-31 NOTE — ED PROVIDER NOTES
"  Source of History:  Medical record, patient, EMS personnel    Chief complaint:  Per triage note: "Loss of Consciousness (Sudden onset dizziness with syncope & hypotension. EMS reports 120 systolic to 90 systolic upon rising. )  "    HPI:    Forest Lopez is a 72 y.o. male who presents with syncope. Pt reports feeling the onset of syncopal episode.  States that he has been in his usual state of health prior to this episode.  EMS reports that the patient had low normal blood pressure upon arrival, in normalized after starting an IV fluid bolus.  He denies chest pain, paresthesias, and unilateral weakness in upper or lower extremities. Pt endorses feeling at baseline yesterday, and he states that he has no hx of similar episode. Per EMS, his blood sugar was 230; no polydipsia or polyuria. He has received 3 COVID-19 vaccinations and does not have any sick contacts. Pt has had 2 strokes with no hx of MI.   This is the extent of the patient's complaints at this time.     ROS:   As per HPI and below:   General: No fever.   HENT: No facial pain.   Eyes: No eye pain.   Cardiovascular: No chest pain.   Respiratory:  No dyspnea.   GI: No abdominal pain. No nausea. No vomiting.   Endocrine: No polydipsia. No polyuria.  Skin: No rashes.   Neuro:  Notes syncope.  No focal deficits. No paresthesias. No weakness.  Musculoskeletal: No extremity pain.  All other systems reviewed and are negative.      Review of patient's allergies indicates:  No Known Allergies    PMH:  As per HPI and below:  Past Medical History:   Diagnosis Date    Blind left eye     able to see, but poorly    BPH (benign prostatic hyperplasia)     Diabetes mellitus, type 2     Hyperlipidemia     Hypertension     Left rib fracture 03/30/2017    Stroke-like symptoms 09/25/2019    L facial droop, slurred speech & L arm weakness       Past Surgical History:   Procedure Laterality Date    NO PAST SURGERIES  09/25/2019       Social History     Tobacco Use    " "Smoking status: Light Tobacco Smoker     Types: Cigarettes, Cigars    Smokeless tobacco: Never Used    Tobacco comment: socially   Substance Use Topics    Alcohol use: Yes     Alcohol/week: 0.0 standard drinks     Comment: drinks beer socially     Drug use: Yes     Types: Marijuana, "Crack" cocaine     Comment: 9/25/19: last smoked "a blunt, with some crack in it, about a month ago"       Physical Exam:      Nursing note and vitals reviewed.  /76 (BP Location: Left arm, Patient Position: Lying)   Pulse 82   Temp 98.8 °F (37.1 °C) (Oral)   Resp 16   Ht 6' (1.829 m)   Wt 68 kg (150 lb)   SpO2 98%   BMI 20.34 kg/m²       Constitutional: AAOx3. No distress.  Eyes: EOMI. No discharge. Anicteric.  HENT:   Neck: Normal range of motion. Neck supple.  Cardiovascular: Normal rate. No murmur, no gallop and no friction rub heard.   Pulmonary/Chest: No respiratory distress. Effort normal. No wheezes, no rales, no rhonchi.   Abdominal: Bowel sounds normal. Soft. No distension and no mass. There is no tenderness. There is no rebound, no guarding, no tenderness at McBurney's point.  Musculoskeletal: Normal range of motion.   Neurological: GCS 15. Alert and oriented to person, place, and time. No gross cranial nerve, light touch or strength deficit. Coordination normal.   Skin: Skin is warm and dry.   EXT: 2+ radial pulses.   Psychiatric: Behavior is normal. Judgment normal.    MDM:    I decided to obtain the patient's medical records.    ED Course as of 01/31/22 0501   Mon Jan 31, 2022   0124 --  EKG Interpretation: I independently reviewed and interpreted EKG which shows normal sinus rhythm at 79 beats per minute, no STEMI, no significant acute ST/T abnormalities, normal intervals.  No acute change compared to prior tracing.  --   [RC]   0220 I independently reviewed and interpreted labs which are notable for hyperglycemia without DKA.  Troponin is normal.  I independently reviewed and interpreted CXR which shows " no pneumothorax, no focal consolidation, no cardiomegaly, no acute process.   [RC]   0411 Patient is a 72-year-old male with hypertension, history of multiple strokes, diabetes, history of DVT who presents with syncopal episode with prodrome just prior to arrival.  Upon EMS arrival, patient had borderline low blood pressures which improved after IV fluid bolus.  Physical exam nonfocal.  The initial differential included hyperglycemia, DKA, acute coronary syndrome, dysrhythmia, sepsis, pneumonia, stroke.   I independently reviewed and interpreted labs which are unremarkable and unrevealing.  I independently interpreted and reviewed patient's CT a head and neck.  Is notable for right 60-70% stenosis segment of the mid left vertebral artery.     Patient history, findings, results discussed with stroke neurologist Dr. Tovar who feels that CT a findings are incidental.  He does not feel MRI is indicated at this time.  Will admit for syncope workup.  Placement to the ED observation unit was considered, however patient is unable to ambulate him perform his ADLs without assistance. [RC]   7688 I have discussed the patient history, exam, findings with hospitalist FRANNIE Hebert, who accepts the patient.    [RC]      ED Course User Index  [RC] Diego Fraga MD       Medications - No data to display           I, Destini Diamond, scribed for, and in the presence of, Dr. Fraga. I performed the scribed service and the documentation accurately describes the services I performed. I attest to the accuracy of the note.     Physician Attestation for Scribe:   I, Diego Fraga MD, reviewed documentation as scribed in my presence, which is both accurate and complete.    Diagnostic Impression:    1. Syncope                  Diego Fraga MD  01/31/22 4589

## 2022-01-31 NOTE — H&P
[facility]  History & Physical    Patient Name: Forest Lopez  MRN: [id number]  Patient Class: OP- Observation  Admission Date: 1/31/2022  Attending Physician: Mojgan Bosch MD   Primary Care Provider: [facility]         Patient information was obtained from patient, past medical records and ER records.     Subjective:     Principal Problem:Acute deep vein thrombosis (DVT) of both lower extremities    Chief Complaint:   Chief Complaint   Patient presents with    Loss of Consciousness     Sudden onset dizziness with syncope & hypotension. EMS reports 120 systolic to 90 systolic upon rising.         HPI: Mr. Lopez is a 72 year old man who presented after a syncopal episode.  He reports he had been feeling well prior to the episode but then had a prodrome prior to passing out.  EMS was called, report patient's BP was low normal on their arrival, improved after an IV fluids bolus.  Patient denies chest pain or shortness of breath and says he does not feel weak currently although is rather tired.  When seen in the ED this morning he could barely express himself audibly.     Vital signs were normal on presentation here.  He is on warfarin for recurrent DVT, but his INR was 1, and d-dimer markedly elevated at 21.7.  Tox screen was positive for cocaine.  He had a CTA of the brain and neck done on presentation so CTA of the chest for PE study could not be done for 48 hours.  Ultrasound of the lower extremities showed extensive bilateral DVT.  On the right there was thrombosis of the common femoral vein, femoral vein, popliteal vein, one of the paired posterior tibial veins and both visualized peroneal veins.  On the left there was thrombosis of the common femoral vein, femoral vein and popliteal vein.  Patient was started on full dose Lovenox and admitted.    Medical history is from the chart as he is unable to give me much information.  It includes hypertension, hyperlipidemia,  type II diabetes not on insulin, cocaine abuse, marijuana abuse, and lower extremities DVT x 3 on warfarin, stroke in 9/2019 and alcohol abuse.  He smokes 5-6 cigarettes/day and is not interested in quitting.  He is currently homeless and staying with a friend.  Despite the normal INR he is sure he is taking his warfarin and is not having difficulty taking his medications.  I discussed his care with [facility] [place] [facility] and patient had been lost to follow up since November 2021.      Past Medical History:   Diagnosis Date    Blind left eye     able to see, but poorly    BPH (benign prostatic hyperplasia)     Diabetes mellitus, type 2     Hyperlipidemia     Hypertension     Left rib fracture 03/30/2017    Stroke-like symptoms 09/25/2019    L facial droop, slurred speech & L arm weakness       Past Surgical History:   Procedure Laterality Date    NO PAST SURGERIES  09/25/2019       Review of patient's allergies indicates:  No Known Allergies    No current facility-administered medications on file prior to encounter.     Current Outpatient Medications on File Prior to Encounter   Medication Sig    metformin (GLUCOPHAGE) 500 MG tablet Take 1,000 mg by mouth 2 (two) times daily with meals.     warfarin (COUMADIN) 7.5 MG tablet Take 7.5 mg by mouth once daily.    albuterol (PROVENTIL/VENTOLIN HFA) 90 mcg/actuation inhaler Inhale 2 puffs into the lungs every 6 (six) hours as needed for Wheezing or Shortness of Breath.    blood sugar diagnostic (BLOOD GLUCOSE TEST) Strp by Misc.(Non-Drug; Combo Route) route.    finasteride (PROSCAR) 5 mg tablet Take 1 tablet (5 mg total) by mouth once daily.    folic acid (FOLVITE) 1 MG tablet Take 1 tablet (1 mg total) by mouth once daily.    gemfibrozil (LOPID) 600 MG tablet Take 600 mg by mouth 2 (two) times daily before meals.    glipiZIDE (GLUCOTROL) 10 MG tablet Take 1 tablet (10 mg total) by mouth 2 (two) times daily before meals.    lisinopril  "(PRINIVIL,ZESTRIL) 40 MG tablet Take 40 mg by mouth once daily.    omeprazole (PRILOSEC) 20 MG capsule TAKE 2 CAPSULES (40 MG) BY MOUTH ONCE DAILY.    tamsulosin (FLOMAX) 0.4 mg Cp24 Take 1 capsule (0.4 mg total) by mouth once daily.    thiamine 100 MG tablet Take 1 tablet (100 mg total) by mouth once daily.     Family History     Problem Relation (Age of Onset)    Cancer Sister    Heart disease Mother, Father    No Known Problems Son, Son    Stroke Sister        Tobacco Use    Smoking status: Light Tobacco Smoker     Types: Cigarettes, Cigars    Smokeless tobacco: Never Used    Tobacco comment: Smokes 5-6 cigarettes/day   Substance and Sexual Activity    Alcohol use: Yes     Alcohol/week: 0.0 standard drinks     Comment: drinks beer socially     Drug use: Not Currently     Types: Marijuana, "Crack" cocaine    Sexual activity: Not Currently     Review of Systems   Constitutional: Negative for chills and fever.   HENT: Negative for rhinorrhea and sore throat.    Eyes: Negative for photophobia and visual disturbance.   Respiratory: Negative for cough and shortness of breath.    Cardiovascular: Negative for chest pain and palpitations.   Gastrointestinal: Negative for diarrhea and nausea.   Genitourinary: Negative for dysuria and frequency.   Musculoskeletal: Negative for back pain and gait problem.   Neurological: Positive for syncope. Negative for weakness and headaches.   Psychiatric/Behavioral: Negative for confusion and dysphoric mood.     Objective:     Vital Signs (Most Recent):  Temp: 98.4 °F (36.9 °C) (01/31/22 1455)  Pulse: 73 (01/31/22 1455)  Resp: 19 (01/31/22 1455)  BP: 100/62 (01/31/22 1455)  SpO2: 97 % (01/31/22 1455) Vital Signs (24h Range):  Temp:  [98.3 °F (36.8 °C)-98.8 °F (37.1 °C)] 98.4 °F (36.9 °C)  Pulse:  [64-82] 73  Resp:  [13-20] 19  SpO2:  [97 %-100 %] 97 %  BP: (100-150)/(62-82) 100/62     Weight: 63.2 kg (139 lb 5.3 oz)  Body mass index is 18.9 kg/m².    Physical " Exam  Constitutional:       Comments: Lethargic, thin, chronically ill-appearing man lying on his side.   HENT:      Head: Normocephalic.      Nose: No congestion.      Mouth/Throat:      Mouth: Mucous membranes are moist.      Comments: Voice nearly inaudible.  Eyes:      Extraocular Movements: Extraocular movements intact.      Pupils: Pupils are equal, round, and reactive to light.      Comments: Conjunctivae injected   Cardiovascular:      Rate and Rhythm: Normal rate and regular rhythm.      Pulses: Normal pulses.      Heart sounds: Normal heart sounds. No murmur heard.  No gallop.       Comments: Distant heart sounds.  Pulmonary:      Effort: Pulmonary effort is normal.      Breath sounds: Normal breath sounds.   Abdominal:      General: Bowel sounds are normal.      Palpations: Abdomen is soft.   Musculoskeletal:         General: Normal range of motion.   Skin:     General: Skin is warm and dry.   Neurological:      Mental Status: He is alert.      Comments: Speech very soft.  Possible speech defect noted.           CRANIAL NERVES     CN III, IV, VI   Pupils are equal, round, and reactive to light.       Significant Labs: All pertinent labs within the past 24 hours have been reviewed.    Significant Imaging: I have reviewed all pertinent imaging results/findings within the past 24 hours.    Assessment/Plan:     Type 2 diabetes mellitus with hyperglycemia, without long-term current use of insulin  Reportedly he is on metformin and glipizide, both held.  Continue SSI for now.  He has 4+ sugar in urine and HbA1c is 10.3, and he is hyperglycemic here.      Acute deep vein thrombosis (DVT) of both lower extremities  Last ultrasound showed partially occlusive DVTs, now completely occlusive DVT of multiple lower extremity veins on ultrasound.  Patient reports compliance with warfarin but his INR is only one.  He is homeless but says he does not have difficulty obtaining his medications.  D-dimer is markedly elevated  and there is a question of possible PE, but he just had a CTA of the brain/neck so cannot get another CTA for another 48 hours.  As INR is not therapeutic will start full dose enoxaparin.      Syncope and collapse  Unclear cause.  Spoke to [facility] and patient has had previous syncopal episodes attributed to alcohol abuse.  Has been prescribed meclizine as well.  Tox positive for cocaine but not alcohol.  CTA brain/neck was done in ED, and per ED note the results were discussed with stroke neurologist Dr. Tovar who feels that CTA finding of a short segment of left vertebral artery stenosis was incidental.  He did not think stroke workup with MRI was indicated.    Cocaine abuse  As noted above      Tobacco dependence  He smokes 5-6 cigarettes/day.  Not trying to quit and not interested in a nicotine patch.  Benefits of smoking cessation were reviewed with patient in detail for for 8 minutes and patient was encouraged to quit. Nicotine replacement options were discussed.        VTE Risk Mitigation (From admission, onward)         Ordered     enoxaparin injection 70 mg  Every 12 hours (non-standard times)         01/31/22 1003     Place sequential compression device  Until discontinued         01/31/22 6304                   Mojgan Meza MD  Department of Hospital Medicine   [facility] ([place])

## 2022-01-31 NOTE — ASSESSMENT & PLAN NOTE
He smokes 5-6 cigarettes/day.  Not trying to quit and not interested in a nicotine patch.  Benefits of smoking cessation were reviewed with patient in detail for for 8 minutes and patient was encouraged to quit. Nicotine replacement options were discussed.

## 2022-01-31 NOTE — HPI
History of present illness:  The patient is a 73-year-old white gentleman who presents in consultation from Dr. Snyder regarding newly diagnosed adenocarcinoma the colon.  Patient had been screened with Cologuard test which was abnormal.  Patient then underwent endoscopy with Dr. Handley and was found to have polyps at the hepatic flexure and in the cecum.  The cecal polyp contained a focus of adenocarcinoma and prompted robotic right colectomy.  Pathology from this procedure is remarkable for the presence of 0.4 cm focus of colonic adenocarcinoma with moderate differentiation arising inside of a tubular adenoma.  Tumor invades the submucosa.  Margins of resection are negative.  There is no lymphovascular or perineural invasion identified.  Twenty-two resected lymph nodes are negative for any evidence of carcinoma.  MSI studies have been performed and revealed no mutation.      No postoperative adjuvant therapy was recommended, and the patient has been observed.  He returns to clinic for 3 month follow-up appointment.  Patient is not experiencing difficulties with abdominal pain, nausea, vomiting, dyspnea, or unexplained weight loss.  Bowel function is normal.  No other complaints pertinent findings on a 10 point review systems.    Physical examination:  Well-developed, well-nourished, gentleman, in no acute distress, who has a weight of 164 lb (increased by 1.5 lb).  VITAL SIGNS: Documented  and reviewed this visit.  HEENT: Normocephalic, atraumatic. Oral mucosa pink and moist. Lips without   lesions. Tongue midline. Oropharynx clear. Nonicteric sclerae.   NECK: Supple, no adenopathy. No carotid bruits, thyromegaly or thyroid nodule.   HEART: Regular rate and rhythm without murmur, gallop or rub.   LUNGS: Clear to auscultation bilaterally. Normal respiratory effort.   ABDOMEN: Soft, nontender, nondistended with positive normoactive bowel sounds, surgical scars consistent with robotic hemicolectomy are well  Mr. Lopez is a 72 year old man who presented after a syncopal episode.  He reports he had been feeling well prior to the episode but then had a prodrome prior to passing out.  EMS was called, report patient's BP was low normal on their arrival, improved after an IV fluids bolus.  Patient denies chest pain or shortness of breath and says he does not feel weak currently although is rather tired.  When seen in the ED this morning he could barely express himself audibly.     Vital signs were normal on presentation here.  He is on warfarin for recurrent DVT, but his INR was 1, and d-dimer markedly elevated at 21.7.  Tox screen was positive for cocaine.  He had a CTA of the brain and neck done on presentation so CTA of the chest for PE study could not be done for 48 hours.  Ultrasound of the lower extremities showed extensive bilateral DVT.  On the right there was thrombosis of the common femoral vein, femoral vein, popliteal vein, one of the paired posterior tibial veins and both visualized peroneal veins.  On the left there was thrombosis of the common femoral vein, femoral vein and popliteal vein.  Patient was started on full dose Lovenox and admitted.    Medical history is from the chart as he is unable to give me much information.  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  He smokes 5-6 cigarettes/day and is not interested in quitting.  He is currently homeless and staying with a friend.  Despite the normal INR he is sure he is taking his warfarin and is not having difficulty taking his medications.  I discussed his care with [facility] staff on the [facility] and patient had been lost to follow up since November 2021.   approximated, healed, and free from signs of local infection,  no hepatosplenomegaly.   EXTREMITIES: No cyanosis, clubbing or edema. Distal pulses are intact.     Laboratory:  White count 4.6, hemoglobin 13.8, hematocrit 42.9, platelets 179, absolute neutrophil count 2200.  Sodium 140, potassium 4.3, chloride 105, CO2 29, BUN 15, creatinine 0.9, glucose 81, calcium 9.7, liver function test within normal limits, LDH is 546, GFR is greater than 60.    Impression:  1.  Stage I (T1 N0 M0) adenocarcinoma the colon-no evidence of active disease.  2.  Anemia-resolved.    Plan:  1.  Return to clinic in 3 months for surveillance examination without interval study.    This note was created using voice recognition software and may contain grammatical errors.

## 2022-01-31 NOTE — ED NOTES
Pt rounding complete.  Pt resting comfortably and independently repositioned in stretcher with bed locked in lowest position for safety. NAD and patient denies pain at this time. Respirations even and unlabored and visible chest rise noted. Patient offered bathroom assistance and denies need at this time. Pt instructed to call if assistance is needed. Pt on Cardiac, BP and O2 monitoring. No needs at this time. Will continue to monitor. Call light within reach.  Rails up x 2.

## 2022-01-31 NOTE — ASSESSMENT & PLAN NOTE
Reportedly he is on metformin and glipizide, both held.  Continue SSI for now.  He has 4+ sugar in urine and HbA1c is 10.3, and he is hyperglycemic here.

## 2022-01-31 NOTE — ED NOTES
EMS reports pick-up address as noted: [address] [address] [place] [id number] vs Home address on face sheet.

## 2022-01-31 NOTE — PLAN OF CARE
Discussed Plan of care with patient. Verbalizes understanding. Patient is AAO x4. No falls, accidents, or traumas at this time. Patient is on room air. Cardiac Monitor: Normal Sinus Rhythm. IV site clean, dry, and intact. Repositions independently. SCD's intact as ordered. Bed is in lowest position, SR up x2, Call light within reach. Will continue to monitor.

## 2022-01-31 NOTE — ED NOTES
Pt presents to ED with c/o of syncopal episode this evening after using the restroom. Pt sts he fell in the bathroom. Pt is unsure of LOC. Pt sts unsure if he fit his head. Pt is AOX3, pt is able to assist in undressing . Pt is dressed in gown in preparation for imaging. Pt is connected to the monitor. Bed in the lowest position, call light within reach, pt in bed close to the nurses station for observation. Pt sts no needs or complaints at this time.

## 2022-01-31 NOTE — SUBJECTIVE & OBJECTIVE
Past Medical History:   Diagnosis Date    Blind left eye     able to see, but poorly    BPH (benign prostatic hyperplasia)     Diabetes mellitus, type 2     Hyperlipidemia     Hypertension     Left rib fracture 03/30/2017    Stroke-like symptoms 09/25/2019    L facial droop, slurred speech & L arm weakness       Past Surgical History:   Procedure Laterality Date    NO PAST SURGERIES  09/25/2019       Review of patient's allergies indicates:  No Known Allergies    No current facility-administered medications on file prior to encounter.     Current Outpatient Medications on File Prior to Encounter   Medication Sig    metformin (GLUCOPHAGE) 500 MG tablet Take 1,000 mg by mouth 2 (two) times daily with meals.     warfarin (COUMADIN) 7.5 MG tablet Take 7.5 mg by mouth once daily.    albuterol (PROVENTIL/VENTOLIN HFA) 90 mcg/actuation inhaler Inhale 2 puffs into the lungs every 6 (six) hours as needed for Wheezing or Shortness of Breath.    blood sugar diagnostic (BLOOD GLUCOSE TEST) Strp by Misc.(Non-Drug; Combo Route) route.    finasteride (PROSCAR) 5 mg tablet Take 1 tablet (5 mg total) by mouth once daily.    folic acid (FOLVITE) 1 MG tablet Take 1 tablet (1 mg total) by mouth once daily.    gemfibrozil (LOPID) 600 MG tablet Take 600 mg by mouth 2 (two) times daily before meals.    glipiZIDE (GLUCOTROL) 10 MG tablet Take 1 tablet (10 mg total) by mouth 2 (two) times daily before meals.    lisinopril (PRINIVIL,ZESTRIL) 40 MG tablet Take 40 mg by mouth once daily.    omeprazole (PRILOSEC) 20 MG capsule TAKE 2 CAPSULES (40 MG) BY MOUTH ONCE DAILY.    tamsulosin (FLOMAX) 0.4 mg Cp24 Take 1 capsule (0.4 mg total) by mouth once daily.    thiamine 100 MG tablet Take 1 tablet (100 mg total) by mouth once daily.     Family History     Problem Relation (Age of Onset)    Cancer Sister    Heart disease Mother, Father    No Known Problems Son, Son    Stroke Sister        Tobacco Use    Smoking status: Light  "Tobacco Smoker     Types: Cigarettes, Cigars    Smokeless tobacco: Never Used    Tobacco comment: Smokes 5-6 cigarettes/day   Substance and Sexual Activity    Alcohol use: Yes     Alcohol/week: 0.0 standard drinks     Comment: drinks beer socially     Drug use: Not Currently     Types: Marijuana, "Crack" cocaine    Sexual activity: Not Currently     Review of Systems   Constitutional: Negative for chills and fever.   HENT: Negative for rhinorrhea and sore throat.    Eyes: Negative for photophobia and visual disturbance.   Respiratory: Negative for cough and shortness of breath.    Cardiovascular: Negative for chest pain and palpitations.   Gastrointestinal: Negative for diarrhea and nausea.   Genitourinary: Negative for dysuria and frequency.   Musculoskeletal: Negative for back pain and gait problem.   Neurological: Positive for syncope. Negative for weakness and headaches.   Psychiatric/Behavioral: Negative for confusion and dysphoric mood.     Objective:     Vital Signs (Most Recent):  Temp: 98.4 °F (36.9 °C) (01/31/22 1455)  Pulse: 73 (01/31/22 1455)  Resp: 19 (01/31/22 1455)  BP: 100/62 (01/31/22 1455)  SpO2: 97 % (01/31/22 1455) Vital Signs (24h Range):  Temp:  [98.3 °F (36.8 °C)-98.8 °F (37.1 °C)] 98.4 °F (36.9 °C)  Pulse:  [64-82] 73  Resp:  [13-20] 19  SpO2:  [97 %-100 %] 97 %  BP: (100-150)/(62-82) 100/62     Weight: 63.2 kg (139 lb 5.3 oz)  Body mass index is 18.9 kg/m².    Physical Exam  Constitutional:       Comments: Lethargic, thin, chronically ill-appearing man lying on his side.   HENT:      Head: Normocephalic.      Nose: No congestion.      Mouth/Throat:      Mouth: Mucous membranes are moist.      Comments: Voice nearly inaudible.  Eyes:      Extraocular Movements: Extraocular movements intact.      Pupils: Pupils are equal, round, and reactive to light.      Comments: Conjunctivae injected   Cardiovascular:      Rate and Rhythm: Normal rate and regular rhythm.      Pulses: Normal pulses.    "   Heart sounds: Normal heart sounds. No murmur heard.  No gallop.       Comments: Distant heart sounds.  Pulmonary:      Effort: Pulmonary effort is normal.      Breath sounds: Normal breath sounds.   Abdominal:      General: Bowel sounds are normal.      Palpations: Abdomen is soft.   Musculoskeletal:         General: Normal range of motion.   Skin:     General: Skin is warm and dry.   Neurological:      Mental Status: He is alert.      Comments: Speech very soft.  Possible speech defect noted.           CRANIAL NERVES     CN III, IV, VI   Pupils are equal, round, and reactive to light.       Significant Labs: All pertinent labs within the past 24 hours have been reviewed.    Significant Imaging: I have reviewed all pertinent imaging results/findings within the past 24 hours.

## 2022-01-31 NOTE — ASSESSMENT & PLAN NOTE
Last ultrasound showed partially occlusive DVTs, now completely occlusive DVT of multiple lower extremity veins on ultrasound.  Patient reports compliance with warfarin but his INR is only one.  He is homeless but says he does not have difficulty obtaining his medications.  D-dimer is markedly elevated and there is a question of possible PE, but he just had a CTA of the brain/neck so cannot get another CTA for another 48 hours.  As INR is not therapeutic will start full dose enoxaparin.

## 2022-01-31 NOTE — ASSESSMENT & PLAN NOTE
Unclear cause.  Spoke to [facility] and patient has had previous syncopal episodes attributed to alcohol abuse.  Has been prescribed meclizine as well.  Tox positive for cocaine but not alcohol.  CTA brain/neck was done in ED, and per ED note the results were discussed with stroke neurologist Dr. Tovar who feels that CTA finding of a short segment of left vertebral artery stenosis was incidental.  He did not think stroke workup with MRI was indicated.

## 2022-02-01 ENCOUNTER — PATIENT OUTREACH (OUTPATIENT)
Dept: ADMINISTRATIVE | Facility: OTHER | Age: 72
End: 2022-02-01
Payer: MEDICARE

## 2022-02-01 LAB
ALBUMIN SERPL BCP-MCNC: 3.4 G/DL (ref 3.5–5.2)
ALP SERPL-CCNC: 82 U/L (ref 55–135)
ALT SERPL W/O P-5'-P-CCNC: 11 U/L (ref 10–44)
ANION GAP SERPL CALC-SCNC: 10 MMOL/L (ref 8–16)
AST SERPL-CCNC: 12 U/L (ref 10–40)
BASOPHILS # BLD AUTO: 0.07 K/UL (ref 0–0.2)
BASOPHILS NFR BLD: 0.7 % (ref 0–1.9)
BILIRUB SERPL-MCNC: 0.5 MG/DL (ref 0.1–1)
BUN SERPL-MCNC: 18 MG/DL (ref 8–23)
CALCIUM SERPL-MCNC: 9.5 MG/DL (ref 8.7–10.5)
CHLORIDE SERPL-SCNC: 104 MMOL/L (ref 95–110)
CO2 SERPL-SCNC: 23 MMOL/L (ref 23–29)
CREAT SERPL-MCNC: 1 MG/DL (ref 0.5–1.4)
DIFFERENTIAL METHOD: ABNORMAL
EOSINOPHIL # BLD AUTO: 0.1 K/UL (ref 0–0.5)
EOSINOPHIL NFR BLD: 1.4 % (ref 0–8)
ERYTHROCYTE [DISTWIDTH] IN BLOOD BY AUTOMATED COUNT: 12.1 % (ref 11.5–14.5)
EST. GFR  (AFRICAN AMERICAN): >60 ML/MIN/1.73 M^2
EST. GFR  (NON AFRICAN AMERICAN): >60 ML/MIN/1.73 M^2
GLUCOSE SERPL-MCNC: 186 MG/DL (ref 70–110)
HCT VFR BLD AUTO: 39.5 % (ref 40–54)
HGB BLD-MCNC: 12.7 G/DL (ref 14–18)
IMM GRANULOCYTES # BLD AUTO: 0.02 K/UL (ref 0–0.04)
IMM GRANULOCYTES NFR BLD AUTO: 0.2 % (ref 0–0.5)
LYMPHOCYTES # BLD AUTO: 3.9 K/UL (ref 1–4.8)
LYMPHOCYTES NFR BLD: 40.3 % (ref 18–48)
MAGNESIUM SERPL-MCNC: 1.8 MG/DL (ref 1.6–2.6)
MCH RBC QN AUTO: 25.5 PG (ref 27–31)
MCHC RBC AUTO-ENTMCNC: 32.2 G/DL (ref 32–36)
MCV RBC AUTO: 79 FL (ref 82–98)
MONOCYTES # BLD AUTO: 0.6 K/UL (ref 0.3–1)
MONOCYTES NFR BLD: 6.5 % (ref 4–15)
NEUTROPHILS # BLD AUTO: 4.9 K/UL (ref 1.8–7.7)
NEUTROPHILS NFR BLD: 50.9 % (ref 38–73)
NRBC BLD-RTO: 0 /100 WBC
PHOSPHATE SERPL-MCNC: 3.1 MG/DL (ref 2.7–4.5)
PLATELET # BLD AUTO: 192 K/UL (ref 150–450)
PMV BLD AUTO: 10.2 FL (ref 9.2–12.9)
POCT GLUCOSE: 190 MG/DL (ref 70–110)
POCT GLUCOSE: 218 MG/DL (ref 70–110)
POCT GLUCOSE: 321 MG/DL (ref 70–110)
POTASSIUM SERPL-SCNC: 4.1 MMOL/L (ref 3.5–5.1)
PROT SERPL-MCNC: 6.9 G/DL (ref 6–8.4)
RBC # BLD AUTO: 4.98 M/UL (ref 4.6–6.2)
SODIUM SERPL-SCNC: 137 MMOL/L (ref 136–145)
WBC # BLD AUTO: 9.7 K/UL (ref 3.9–12.7)

## 2022-02-01 PROCEDURE — 83735 ASSAY OF MAGNESIUM: CPT | Performed by: NURSE PRACTITIONER

## 2022-02-01 PROCEDURE — 97116 GAIT TRAINING THERAPY: CPT

## 2022-02-01 PROCEDURE — 99233 PR SUBSEQUENT HOSPITAL CARE,LEVL III: ICD-10-PCS | Mod: ,,, | Performed by: INTERNAL MEDICINE

## 2022-02-01 PROCEDURE — 25000003 PHARM REV CODE 250: Performed by: NURSE PRACTITIONER

## 2022-02-01 PROCEDURE — 94761 N-INVAS EAR/PLS OXIMETRY MLT: CPT

## 2022-02-01 PROCEDURE — 80053 COMPREHEN METABOLIC PANEL: CPT | Performed by: NURSE PRACTITIONER

## 2022-02-01 PROCEDURE — 63600175 PHARM REV CODE 636 W HCPCS: Performed by: HOSPITALIST

## 2022-02-01 PROCEDURE — 99233 SBSQ HOSP IP/OBS HIGH 50: CPT | Mod: ,,, | Performed by: INTERNAL MEDICINE

## 2022-02-01 PROCEDURE — 84100 ASSAY OF PHOSPHORUS: CPT | Performed by: NURSE PRACTITIONER

## 2022-02-01 PROCEDURE — 21400001 HC TELEMETRY ROOM

## 2022-02-01 PROCEDURE — 36415 COLL VENOUS BLD VENIPUNCTURE: CPT | Performed by: NURSE PRACTITIONER

## 2022-02-01 PROCEDURE — 25000003 PHARM REV CODE 250: Performed by: INTERNAL MEDICINE

## 2022-02-01 PROCEDURE — 97165 OT EVAL LOW COMPLEX 30 MIN: CPT

## 2022-02-01 PROCEDURE — C9399 UNCLASSIFIED DRUGS OR BIOLOG: HCPCS | Performed by: INTERNAL MEDICINE

## 2022-02-01 PROCEDURE — 97162 PT EVAL MOD COMPLEX 30 MIN: CPT

## 2022-02-01 PROCEDURE — 85025 COMPLETE CBC W/AUTO DIFF WBC: CPT | Performed by: NURSE PRACTITIONER

## 2022-02-01 RX ADMIN — ATORVASTATIN CALCIUM 80 MG: 20 TABLET, FILM COATED ORAL at 09:02

## 2022-02-01 RX ADMIN — ENOXAPARIN SODIUM 70 MG: 80 INJECTION SUBCUTANEOUS at 10:02

## 2022-02-01 RX ADMIN — ENOXAPARIN SODIUM 70 MG: 80 INJECTION SUBCUTANEOUS at 11:02

## 2022-02-01 RX ADMIN — INSULIN DETEMIR 8 UNITS: 100 INJECTION, SOLUTION SUBCUTANEOUS at 04:02

## 2022-02-01 RX ADMIN — WARFARIN SODIUM 7.5 MG: 5 TABLET ORAL at 04:02

## 2022-02-01 NOTE — ASSESSMENT & PLAN NOTE
Last ultrasound showed partially occlusive DVTs, now completely occlusive DVT of multiple lower extremity veins on ultrasound.  Patient reports compliance with warfarin but his INR is only one.  He is homeless but says he does not have difficulty obtaining his medications.  D-dimer is markedly elevated and there is a question of possible PE, but he just had a CTA of the brain/neck so cannot get another CTA for another 48 hours.VQ scan low probability for PE, echo ok.  As INR is not therapeutic will start full dose enoxaparin.Restart warfarin.

## 2022-02-01 NOTE — PT/OT/SLP EVAL
Physical Therapy Evaluation and Treatment    Patient Name:  Forest Lopez   MRN:  [id number]    Recommendations:     Discharge Recommendations:  home   Discharge Equipment Recommendations:  (TBD pending progress)   Barriers to discharge: None    Assessment:     Forest Lopez is a 72 y.o. male admitted with a medical diagnosis of Acute deep vein thrombosis (DVT) of both lower extremities.  He presents with the following impairments/functional limitations:  impaired balance,gait instability,impaired functional mobilty,decreased safety awareness.    Patient evaluated by PT and goals established. Pt. Agreeable to session and OOB mobility. Balance deficits noted in static stance and gait. Gait path deviations noted during bout x 125 ft without AD and CGA-SBA. Balance and gait path improvement with RW in additional gait bout of 50 ft w/ CGA-SBA. Pt. Reports PLOF mod (I) with rollator. PT will continue to follow and progress as tolerated. Rec d/c to home upon receiving 1-2 sessions of skilled PT for gait training with rollator.    Rehab Prognosis: Good; patient would benefit from acute skilled PT services to address these deficits and reach maximum level of function.    Recent Surgery: * No surgery found *      Plan:     During this hospitalization, patient to be seen 3 x/week to address the identified rehab impairments via gait training,therapeutic activities,neuromuscular re-education,therapeutic exercises and progress toward the following goals:    · Plan of Care Expires:  02/15/22    Subjective     Chief Complaint: none stated  Patient/Family Comments/goals: motivated for OOB mobility  Pain/Comfort:  · Pain Rating 1: 0/10  · Pain Rating Post-Intervention 1: 0/10    Patients cultural, spiritual, Scientologist conflicts given the current situation: no    Living Environment:  Pt. Is currently homeless and staying with a friend. Friend has SSH, 15 [address]AYLEY, B HR.     Prior to admission, patients level of function was mod (I) with  occasional use of rollator.  Equipment used at home: other (see comments) (occasionally uses a rollator).  DME owned (not currently used): none.  Upon discharge, patient will have assistance from friend as available.    Objective:     Communicated with nurse prior to session.  Patient found HOB elevated with peripheral IV,telemetry,SCD  upon PT entry to room.    General Precautions: Standard, fall   Orthopedic Precautions:N/A   Braces: N/A  Respiratory Status: Room air    Exams:  Cognition:   Patient is oriented to person, place, time, situation.  Pt follows approximately 100% of single-step commands.    Mood: Pleasant and cooperative.  Safety Awareness: intact, aware of instances of instability and postural reactions to prevent LOB.  Musculoskeletal:  BMI: 18.90  Posture: forward head, rounded shoulders  LE ROM/Strength:   R ROM: WFL  L ROM: WFL  R Strength: grossly 4/5  L Strength: grossly 5/5  Neuromuscular:  Sensation: Denies paresthesias in B LE.  Tone/Reflexes: No impairments identified with functional mobility. No formal testing performed.  Balance:   Static sitting: Good, sits unsupported > 2 mins  Static standing: Fair +, eyes open postural sway WNL, eyes closed LOB <15 sec with stepping-strategy to maintain balance.  Dynamic standing: Fair +, lateral gait deviations without LOB without AD, appropriate postural reactions with abrupt stop  Visual-vestibular: No impairments identified with functional mobility. No formal testing performed.  Integument: Visible skin intact  Cardiopulmonary:  O2 Requirements: RA  Vital signs:   Pre(Supine): /74 HR 62  During (EOB): /57  Post(EOB): /72    Functional Mobility:  Bed Mobility:     · Scooting: stand by assistance  · Bridging: stand by assistance, VCs for hand and foot placement  · Supine to Sit: stand by assistance  · Sit to Supine: stand by assistance  Transfers:     · Sit to Stand:  stand by assistance with no AD  · Gait: 1 x 125 w/out AD and  CGA-SBA, 1 x 50 ft with RW with CGA-SBA, R lateral deviations in gait path and impaired ability to control forward momentum without AD, improvement in path and momentum control with RW.    Therapeutic Activities and Exercises:  Gait training:  · 1 x 125 w/out AD and CGA-SBA, tactile cues for control of forward momentum  · 1 x 50 ft  W/ RW, VCs provided for hand placement and negotiation during stand>sit transfer, edu regarding use of RW for in room mobility with nurse assist, edu regarding continued use of RW/rollator for increased gait stability upon d/c.      AM-PAC 6 CLICK MOBILITY  Total Score:22     Patient left HOB elevated with all lines intact, call button in reach, bed alarm on, nurse notified and B SCDs in place.    GOALS:   Multidisciplinary Problems     Physical Therapy Goals        Problem: Physical Therapy Goal    Goal Priority Disciplines Outcome Goal Variances Interventions   Physical Therapy Goal     PT, PT/OT Ongoing, Progressing     Description: Goals to be met by: 2/15/2022    Patient will increase functional independence with mobility by performin. Sit<>stand with SPV with RW.  2. Gait x 300 feet with SPV with RW.                        History:     Past Medical History:   Diagnosis Date    Blind left eye     able to see, but poorly    BPH (benign prostatic hyperplasia)     Diabetes mellitus, type 2     Hyperlipidemia     Hypertension     Left rib fracture 2017    Stroke-like symptoms 2019    L facial droop, slurred speech & L arm weakness       Past Surgical History:   Procedure Laterality Date    NO PAST SURGERIES  2019       Time Tracking:     PT Received On: 22  PT Start Time: 1526     PT Stop Time: 1547  PT Total Time (min): 21 min     Billable Minutes: Evaluation 10 and Gait Training 11      2022

## 2022-02-01 NOTE — SUBJECTIVE & OBJECTIVE
Interval History: no acute issues, denies pain or sob.    Review of Systems   Constitutional: Negative for chills and fever.   HENT: Negative for trouble swallowing.    Respiratory: Negative for cough and shortness of breath.    Cardiovascular: Negative for chest pain and leg swelling.   Gastrointestinal: Negative for abdominal pain, blood in stool, nausea and vomiting.   Genitourinary: Negative for dysuria and hematuria.   Skin: Negative for rash.   Neurological: Positive for weakness. Negative for headaches.   Psychiatric/Behavioral: Negative for confusion.     Objective:     Vital Signs (Most Recent):  Temp: 96.7 °F (35.9 °C) (02/01/22 1201)  Pulse: 62 (02/01/22 1400)  Resp: 18 (02/01/22 1201)  BP: 127/86 (02/01/22 1201)  SpO2: 98 % (02/01/22 1201) Vital Signs (24h Range):  Temp:  [96.7 °F (35.9 °C)-99.6 °F (37.6 °C)] 96.7 °F (35.9 °C)  Pulse:  [56-86] 62  Resp:  [16-20] 18  SpO2:  [97 %-100 %] 98 %  BP: (119-135)/(56-86) 127/86     Weight: 63.2 kg (139 lb 5.3 oz)  Body mass index is 18.9 kg/m².  No intake or output data in the 24 hours ending 02/01/22 1604   Physical Exam  Vitals reviewed.   Constitutional:       General: He is not in acute distress.     Appearance: He is well-developed. He is ill-appearing.   HENT:      Head: Normocephalic and atraumatic.   Eyes:      Extraocular Movements: Extraocular movements intact.      Pupils: Pupils are equal, round, and reactive to light.   Cardiovascular:      Rate and Rhythm: Normal rate and regular rhythm.   Pulmonary:      Effort: Pulmonary effort is normal. No respiratory distress.      Breath sounds: Normal breath sounds.   Abdominal:      General: Bowel sounds are normal. There is no distension.      Palpations: Abdomen is soft.      Tenderness: There is no abdominal tenderness.   Musculoskeletal:         General: Swelling present. Normal range of motion.      Cervical back: Normal range of motion and neck supple.   Skin:     General: Skin is warm.      Findings:  No rash.   Neurological:      General: No focal deficit present.      Mental Status: He is alert and oriented to person, place, and time.   Psychiatric:         Mood and Affect: Mood normal.         Behavior: Behavior normal.         Significant Labs: All pertinent labs within the past 24 hours have been reviewed.    Significant Imaging: I have reviewed all pertinent imaging results/findings within the past 24 hours.

## 2022-02-01 NOTE — ASSESSMENT & PLAN NOTE
Reportedly he is on metformin and glipizide, both held.  Continue SSI for now.  He has 4+ sugar in urine and HbA1c is 10.3, and he is hyperglycemic here.  Will start levemir and continue iss.

## 2022-02-01 NOTE — PROGRESS NOTES
[facility] Medicine  Progress Note    Patient Name: Forest Lopez  MRN: [id number]  Patient Class: IP- Inpatient   Admission Date: 1/31/2022  Length of Stay: 1 days  Attending Physician: Alma Elizalde MD  Primary Care Provider: Julian Escobar        Subjective:     Principal Problem:Acute deep vein thrombosis (DVT) of both lower extremities        HPI:  Mr. Lopez is a 72 year old man who presented after a syncopal episode.  He reports he had been feeling well prior to the episode but then had a prodrome prior to passing out.  EMS was called, report patient's BP was low normal on their arrival, improved after an IV fluids bolus.  Patient denies chest pain or shortness of breath and says he does not feel weak currently although is rather tired.  When seen in the ED this morning he could barely express himself audibly.     Vital signs were normal on presentation here.  He is on warfarin for recurrent DVT, but his INR was 1, and d-dimer markedly elevated at 21.7.  Tox screen was positive for cocaine.  He had a CTA of the brain and neck done on presentation so CTA of the chest for PE study could not be done for 48 hours.  Ultrasound of the lower extremities showed extensive bilateral DVT.  On the right there was thrombosis of the common femoral vein, femoral vein, popliteal vein, one of the paired posterior tibial veins and both visualized peroneal veins.  On the left there was thrombosis of the common femoral vein, femoral vein and popliteal vein.  Patient was started on full dose Lovenox and admitted.    Medical history is from the chart as he is unable to give me much information.  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  He smokes 5-6 cigarettes/day and is not interested in quitting.  He is currently homeless and staying with a friend.  Despite  the normal INR he is sure he is taking his warfarin and is not having difficulty taking his medications.  I discussed his care with [facility] staff on the [facility] and patient had been lost to follow up since November 2021.      Overview/Hospital Course:  No notes on file    Interval History: no acute issues, denies pain or sob.    Review of Systems   Constitutional: Negative for chills and fever.   HENT: Negative for trouble swallowing.    Respiratory: Negative for cough and shortness of breath.    Cardiovascular: Negative for chest pain and leg swelling.   Gastrointestinal: Negative for abdominal pain, blood in stool, nausea and vomiting.   Genitourinary: Negative for dysuria and hematuria.   Skin: Negative for rash.   Neurological: Positive for weakness. Negative for headaches.   Psychiatric/Behavioral: Negative for confusion.     Objective:     Vital Signs (Most Recent):  Temp: 96.7 °F (35.9 °C) (02/01/22 1201)  Pulse: 62 (02/01/22 1400)  Resp: 18 (02/01/22 1201)  BP: 127/86 (02/01/22 1201)  SpO2: 98 % (02/01/22 1201) Vital Signs (24h Range):  Temp:  [96.7 °F (35.9 °C)-99.6 °F (37.6 °C)] 96.7 °F (35.9 °C)  Pulse:  [56-86] 62  Resp:  [16-20] 18  SpO2:  [97 %-100 %] 98 %  BP: (119-135)/(56-86) 127/86     Weight: 63.2 kg (139 lb 5.3 oz)  Body mass index is 18.9 kg/m².  No intake or output data in the 24 hours ending 02/01/22 1604   Physical Exam  Vitals reviewed.   Constitutional:       General: He is not in acute distress.     Appearance: He is well-developed. He is ill-appearing.   HENT:      Head: Normocephalic and atraumatic.   Eyes:      Extraocular Movements: Extraocular movements intact.      Pupils: Pupils are equal, round, and reactive to light.   Cardiovascular:      Rate and Rhythm: Normal rate and regular rhythm.   Pulmonary:      Effort: Pulmonary effort is normal. No respiratory distress.      Breath sounds: Normal breath sounds.   Abdominal:      General: Bowel sounds are normal. There is no  distension.      Palpations: Abdomen is soft.      Tenderness: There is no abdominal tenderness.   Musculoskeletal:         General: Swelling present. Normal range of motion.      Cervical back: Normal range of motion and neck supple.   Skin:     General: Skin is warm.      Findings: No rash.   Neurological:      General: No focal deficit present.      Mental Status: He is alert and oriented to person, place, and time.   Psychiatric:         Mood and Affect: Mood normal.         Behavior: Behavior normal.         Significant Labs: All pertinent labs within the past 24 hours have been reviewed.    Significant Imaging: I have reviewed all pertinent imaging results/findings within the past 24 hours.      Assessment/Plan:      * Acute deep vein thrombosis (DVT) of both lower extremities  Last ultrasound showed partially occlusive DVTs, now completely occlusive DVT of multiple lower extremity veins on ultrasound.  Patient reports compliance with warfarin but his INR is only one.  He is homeless but says he does not have difficulty obtaining his medications.  D-dimer is markedly elevated and there is a question of possible PE, but he just had a CTA of the brain/neck so cannot get another CTA for another 48 hours.VQ scan low probability for PE, echo ok.  As INR is not therapeutic will start full dose enoxaparin.Restart warfarin.      Type 2 diabetes mellitus with hyperglycemia, without long-term current use of insulin  Reportedly he is on metformin and glipizide, both held.  Continue SSI for now.  He has 4+ sugar in urine and HbA1c is 10.3, and he is hyperglycemic here.  Will start levemir and continue iss.      Syncope and collapse  Unclear cause.  Spoke to [facility] and patient has had previous syncopal episodes attributed to alcohol abuse.  Has been prescribed meclizine as well.  Tox positive for cocaine but not alcohol.  CTA brain/neck was done in ED, and per ED note the results were discussed with stroke neurologist   La who feels that CTA finding of a short segment of left vertebral artery stenosis was incidental.  He did not think stroke workup with MRI was indicated.    Cocaine abuse  As noted above      Hyperlipidemia  Resumed lipitor      Essential hypertension  Not sure on what meds at home, bp controlled currently.      Tobacco dependence  He smokes 5-6 cigarettes/day.  Not trying to quit and not interested in a nicotine patch.  Benefits of smoking cessation were reviewed with patient in detail for for 8 minutes and patient was encouraged to quit. Nicotine replacement options were discussed.        VTE Risk Mitigation (From admission, onward)         Ordered     enoxaparin injection 70 mg  Every 12 hours (non-standard times)         01/31/22 1003     Place sequential compression device  Until discontinued         01/31/22 0533                Discharge Planning   EFRAÍN:      Code Status: Full Code   Is the patient medically ready for discharge?:     Reason for patient still in hospital (select all that apply): Patient trending condition and Treatment                     Alma Elizalde MD  Department of Hospital Medicine   [facility] - Med S[facility] ([place])

## 2022-02-01 NOTE — PT/OT/SLP EVAL
Occupational Therapy   Evaluation    Name: Forest Lopez  MRN: [id number]  Admitting Diagnosis:  Acute deep vein thrombosis (DVT) of both lower extremities  Recent Surgery: * No surgery found *      Recommendations:     Discharge Recommendations: home  Discharge Equipment Recommendations:   (TBD pending progress)  Barriers to discharge:   (Pt reports he is currently staying with a friend; unsure how long he will be staying)    Assessment:     Forest Lopez is a 72 y.o. male with a medical diagnosis of Acute deep vein thrombosis (DVT) of both lower extremities.  He presents with no pain. Performance deficits affecting function: impaired balance,decreased safety awareness,impaired self care skills,impaired functional mobilty,gait instability.  Pt agreeable to participating in therapy upon arrival to room.  Pt demonstrates strength and ROM in (B) UE needed for ADLs that is WFL, and is able to perform sit <> stand transfer with SBA.  While ambulating pt mildly unsteady requiring CGA on occasion.  Pt able to perform grooming while standing with SBA.  Pt required increased time to respond to some questions.    PTA pt reports being (I) with ADLs and was occasionally using a rollator for mobility.  Pt appears to be performing near baseline.  Pt would benefit from 1-2 additional skilled OT sessions to address problems listed above and maximize independence with ADLs.  No further therapy needs recommended upon d/c from hospital.            Rehab Prognosis: Good; patient would benefit from acute skilled OT services to address these deficits and reach maximum level of function.       Plan:     Patient to be seen 3 x/week to address the above listed problems via self-care/home management,therapeutic activities,therapeutic exercises  · Plan of Care Expires: 03/03/22  · Plan of Care Reviewed with: patient    Subjective     Chief Complaint: None stated  Patient/Family Comments/goals: Resume PLOF    Occupational Profile:  Living  Environment: Pt currently homeless.  Pt reports that he is currently staying with a friend.  Friend lives in [facility], [address]  Bathroom has tub/shower and walk in shower.  Previous level of function:   -ADLs: Independent  -Mobility: Occasionally uses a rollator  Roles and Routines: Denies hobbies  Equipment Used at Home:   (occasionally uses a rollator)  Assistance upon Discharge: Friend might be able to provide some assist    Pain/Comfort:  · Pain Rating 1: 0/10  · Pain Rating Post-Intervention 1: 0/10    Patients cultural, spiritual, Protestant conflicts given the current situation:  None    Objective:     Communicated with: RN (Sarina) prior to session.  Patient found right sidelying with peripheral IV upon OT entry to room.    General Precautions: Standard, fall   Orthopedic Precautions:N/A   Braces: N/A  Respiratory Status: Room air    Occupational Performance:    Bed Mobility:    · Supine <> sit: Independent  · Scooting: Independent  · Sit <> supine: Independent    Functional Mobility/Transfers:  · Sit <> Stand: SBA x 1 trial from EOB   · Functional Mobility: Pt ambulated ~15 ft with SBA-CGA.  · Impaired balance and postural sway noted    Activities of Daily Living:  · Grooming: SBA for washing hands while standing at sink.  · Lower Body Dressing: Independent for donning socks while seated at EOB.    Cognitive/Visual Perceptual:  Cognitive/Psychosocial Skills:    -       Oriented to: Person, Place, Time and Situation   -       Follows Commands/attention: Follows two-step commands  -       Communication: clear/fluent; required increased time to respond to some questions  -       Memory: No Deficits noted  -       Safety awareness/insight to disability: intact   -       Mood/Affect/Coping skills/emotional control: Cooperative and Pleasant    Physical Exam:  Postural examination/scapula alignment:    -       No postural abnormalities identified  Skin integrity: Visible skin intact  Edema:  None  noted  Sensation: -       Intact  Motor Planning: -       WFL  Dominant hand: -       Right  Upper Extremity Range of Motion:    -       Right Upper Extremity: WFL  -       Left Upper Extremity: WFL  Upper Extremity Strength:   -       Right Upper Extremity: WFL  -       Left Upper Extremity: WFL   Strength: 5/5 both hands  Fine Motor Coordination: Intact  Gross motor coordination: WFL  Balance:  Sitting- Independent; Standing- SBA-CGA  Vision: Intact    AMPAC 6 Click ADL:  AMPAC Total Score: 21    Treatment & Education:  *Pt educated on role of OT and POC discussed  Education:    Patient left HOB elevated with call button in reach    GOALS:   Multidisciplinary Problems     Occupational Therapy Goals        Problem: Occupational Therapy Goal    Goal Priority Disciplines Outcome Interventions   Occupational Therapy Goal     OT, PT/OT Ongoing, Progressing    Description: Goals to be met by: 2/8/2022    Patient will increase functional independence with ADLs by performing:    UE Dressing with [place].  LE Dressing with [place].  Grooming while standing at sink with Supervision.  Toileting from toilet with Supervision for hygiene and clothing management.   Toilet transfer to toilet with Supervision.                     History:     Past Medical History:   Diagnosis Date    Blind left eye     able to see, but poorly    BPH (benign prostatic hyperplasia)     Diabetes mellitus, type 2     Hyperlipidemia     Hypertension     Left rib fracture 03/30/2017    Stroke-like symptoms 09/25/2019    L facial droop, slurred speech & L arm weakness       Past Surgical History:   Procedure Laterality Date    NO PAST SURGERIES  09/25/2019       Time Tracking:     OT Date of Treatment: 02/01/22  OT Start Time: 1228  OT Stop Time: 1241  OT Total Time (min): 13 min    Billable Minutes:Evaluation 13    2/1/2022

## 2022-02-01 NOTE — PLAN OF CARE
Problem: Occupational Therapy Goal  Goal: Occupational Therapy Goal  Description: Goals to be met by: 2/8/2022    Patient will increase functional independence with ADLs by performing:    UE Dressing with [place].  LE Dressing with [place].  Grooming while standing at sink with Supervision.  Toileting from toilet with Supervision for hygiene and clothing management.   Toilet transfer to toilet with Supervision.    Outcome: Ongoing, Progressing     OT evaluation complete and POC established.  Pt appears to be performing near baseline.  He could benefit from 1-2 additional skilled OT sessions to maximize independence with ADLs.  It is recommended that pt return home upon discharge from hospital.    MICHELLE Willett  2/1/2022

## 2022-02-01 NOTE — PLAN OF CARE
Problem: Physical Therapy Goal  Goal: Physical Therapy Goal  Description: Goals to be met by: 2/15/2022    Patient will increase functional independence with mobility by performin. Sit<>stand with SPV with RW.  2. Gait x 300 feet with SPV with RW.     Patient evaluated by PT and goals established. Pt. Agreeable to session and OOB mobility. Balance deficits noted in static stance and gait. Gait path deviations noted during bout x 125 ft without AD and CGA-SBA. Balance and gait path improvement with RW in additional gait bout of 50 ft w/ CGA-SBA. Pt. Reports PLOF mod (I) with rollator. PT will continue to follow and progress as tolerated. Rec d/c to home upon receiving 1-2 sessions of skilled PT for gait training with rollator.      Outcome: Ongoing, Progressing

## 2022-02-01 NOTE — ASSESSMENT & PLAN NOTE
Unclear cause.  Spoke to [facility] and patient has had previous syncopal episodes attributed to alcohol abuse.  Has been prescribed meclizine as well.  Tox positive for cocaine but not alcohol.  CTA brain/neck was done in ED, and per ED note the results were discussed with stroke neurologist Dr. Tvoar who feels that CTA finding of a short segment of left vertebral artery stenosis was incidental.  He did not think stroke workup with MRI was indicated.

## 2022-02-02 PROBLEM — R53.81 DEBILITY: Status: ACTIVE | Noted: 2022-02-02

## 2022-02-02 LAB
ALBUMIN SERPL BCP-MCNC: 3.6 G/DL (ref 3.5–5.2)
ALP SERPL-CCNC: 84 U/L (ref 55–135)
ALT SERPL W/O P-5'-P-CCNC: 8 U/L (ref 10–44)
ANION GAP SERPL CALC-SCNC: 9 MMOL/L (ref 8–16)
AST SERPL-CCNC: 13 U/L (ref 10–40)
BASOPHILS # BLD AUTO: 0.06 K/UL (ref 0–0.2)
BASOPHILS NFR BLD: 0.7 % (ref 0–1.9)
BILIRUB SERPL-MCNC: 0.7 MG/DL (ref 0.1–1)
BUN SERPL-MCNC: 12 MG/DL (ref 8–23)
CALCIUM SERPL-MCNC: 9.5 MG/DL (ref 8.7–10.5)
CHLORIDE SERPL-SCNC: 102 MMOL/L (ref 95–110)
CO2 SERPL-SCNC: 23 MMOL/L (ref 23–29)
CREAT SERPL-MCNC: 1 MG/DL (ref 0.5–1.4)
DIFFERENTIAL METHOD: ABNORMAL
EOSINOPHIL # BLD AUTO: 0.1 K/UL (ref 0–0.5)
EOSINOPHIL NFR BLD: 1.1 % (ref 0–8)
ERYTHROCYTE [DISTWIDTH] IN BLOOD BY AUTOMATED COUNT: 11.9 % (ref 11.5–14.5)
EST. GFR  (AFRICAN AMERICAN): >60 ML/MIN/1.73 M^2
EST. GFR  (NON AFRICAN AMERICAN): >60 ML/MIN/1.73 M^2
GLUCOSE SERPL-MCNC: 146 MG/DL (ref 70–110)
HCT VFR BLD AUTO: 44.3 % (ref 40–54)
HGB BLD-MCNC: 13.9 G/DL (ref 14–18)
IMM GRANULOCYTES # BLD AUTO: 0.02 K/UL (ref 0–0.04)
IMM GRANULOCYTES NFR BLD AUTO: 0.2 % (ref 0–0.5)
INR PPP: 1 (ref 0.8–1.2)
LYMPHOCYTES # BLD AUTO: 2.3 K/UL (ref 1–4.8)
LYMPHOCYTES NFR BLD: 26.8 % (ref 18–48)
MAGNESIUM SERPL-MCNC: 1.8 MG/DL (ref 1.6–2.6)
MCH RBC QN AUTO: 25.2 PG (ref 27–31)
MCHC RBC AUTO-ENTMCNC: 31.4 G/DL (ref 32–36)
MCV RBC AUTO: 80 FL (ref 82–98)
MONOCYTES # BLD AUTO: 0.6 K/UL (ref 0.3–1)
MONOCYTES NFR BLD: 7.6 % (ref 4–15)
NEUTROPHILS # BLD AUTO: 5.4 K/UL (ref 1.8–7.7)
NEUTROPHILS NFR BLD: 63.6 % (ref 38–73)
NRBC BLD-RTO: 0 /100 WBC
PHOSPHATE SERPL-MCNC: 2.9 MG/DL (ref 2.7–4.5)
PLATELET # BLD AUTO: 173 K/UL (ref 150–450)
PMV BLD AUTO: 10.1 FL (ref 9.2–12.9)
POCT GLUCOSE: 154 MG/DL (ref 70–110)
POCT GLUCOSE: 193 MG/DL (ref 70–110)
POCT GLUCOSE: 237 MG/DL (ref 70–110)
POCT GLUCOSE: 265 MG/DL (ref 70–110)
POCT GLUCOSE: 290 MG/DL (ref 70–110)
POCT GLUCOSE: 326 MG/DL (ref 70–110)
POTASSIUM SERPL-SCNC: 4.6 MMOL/L (ref 3.5–5.1)
PROT SERPL-MCNC: 7.3 G/DL (ref 6–8.4)
PROTHROMBIN TIME: 10.8 SEC (ref 9–12.5)
RBC # BLD AUTO: 5.52 M/UL (ref 4.6–6.2)
SODIUM SERPL-SCNC: 134 MMOL/L (ref 136–145)
WBC # BLD AUTO: 8.43 K/UL (ref 3.9–12.7)

## 2022-02-02 PROCEDURE — 94761 N-INVAS EAR/PLS OXIMETRY MLT: CPT

## 2022-02-02 PROCEDURE — 25000003 PHARM REV CODE 250: Performed by: INTERNAL MEDICINE

## 2022-02-02 PROCEDURE — 97530 THERAPEUTIC ACTIVITIES: CPT | Mod: CQ

## 2022-02-02 PROCEDURE — 63600175 PHARM REV CODE 636 W HCPCS: Performed by: HOSPITALIST

## 2022-02-02 PROCEDURE — 21400001 HC TELEMETRY ROOM

## 2022-02-02 PROCEDURE — 99233 PR SUBSEQUENT HOSPITAL CARE,LEVL III: ICD-10-PCS | Mod: ,,, | Performed by: INTERNAL MEDICINE

## 2022-02-02 PROCEDURE — 99233 SBSQ HOSP IP/OBS HIGH 50: CPT | Mod: ,,, | Performed by: INTERNAL MEDICINE

## 2022-02-02 PROCEDURE — C9399 UNCLASSIFIED DRUGS OR BIOLOG: HCPCS | Performed by: INTERNAL MEDICINE

## 2022-02-02 PROCEDURE — 85610 PROTHROMBIN TIME: CPT | Performed by: INTERNAL MEDICINE

## 2022-02-02 PROCEDURE — 25000003 PHARM REV CODE 250: Performed by: NURSE PRACTITIONER

## 2022-02-02 PROCEDURE — 80053 COMPREHEN METABOLIC PANEL: CPT | Performed by: NURSE PRACTITIONER

## 2022-02-02 PROCEDURE — 85025 COMPLETE CBC W/AUTO DIFF WBC: CPT | Performed by: NURSE PRACTITIONER

## 2022-02-02 PROCEDURE — 83735 ASSAY OF MAGNESIUM: CPT | Performed by: NURSE PRACTITIONER

## 2022-02-02 PROCEDURE — 84100 ASSAY OF PHOSPHORUS: CPT | Performed by: NURSE PRACTITIONER

## 2022-02-02 RX ORDER — HYDRALAZINE HYDROCHLORIDE 20 MG/ML
10 INJECTION INTRAMUSCULAR; INTRAVENOUS EVERY 6 HOURS PRN
Status: DISCONTINUED | OUTPATIENT
Start: 2022-02-02 | End: 2022-04-27 | Stop reason: HOSPADM

## 2022-02-02 RX ORDER — LOSARTAN POTASSIUM 25 MG/1
25 TABLET ORAL DAILY
Status: DISCONTINUED | OUTPATIENT
Start: 2022-02-02 | End: 2022-02-15

## 2022-02-02 RX ADMIN — ENOXAPARIN SODIUM 70 MG: 80 INJECTION SUBCUTANEOUS at 11:02

## 2022-02-02 RX ADMIN — INSULIN ASPART 2 UNITS: 100 INJECTION, SOLUTION INTRAVENOUS; SUBCUTANEOUS at 05:02

## 2022-02-02 RX ADMIN — INSULIN ASPART 2 UNITS: 100 INJECTION, SOLUTION INTRAVENOUS; SUBCUTANEOUS at 08:02

## 2022-02-02 RX ADMIN — ENOXAPARIN SODIUM 70 MG: 80 INJECTION SUBCUTANEOUS at 10:02

## 2022-02-02 RX ADMIN — INSULIN ASPART 4 UNITS: 100 INJECTION, SOLUTION INTRAVENOUS; SUBCUTANEOUS at 01:02

## 2022-02-02 RX ADMIN — ACETAMINOPHEN 650 MG: 325 TABLET, FILM COATED ORAL at 11:02

## 2022-02-02 RX ADMIN — ATORVASTATIN CALCIUM 80 MG: 20 TABLET, FILM COATED ORAL at 08:02

## 2022-02-02 RX ADMIN — WARFARIN SODIUM 7.5 MG: 5 TABLET ORAL at 04:02

## 2022-02-02 RX ADMIN — INSULIN DETEMIR 8 UNITS: 100 INJECTION, SOLUTION SUBCUTANEOUS at 10:02

## 2022-02-02 RX ADMIN — INSULIN ASPART 6 UNITS: 100 INJECTION, SOLUTION INTRAVENOUS; SUBCUTANEOUS at 11:02

## 2022-02-02 RX ADMIN — LOSARTAN POTASSIUM 25 MG: 25 TABLET, FILM COATED ORAL at 04:02

## 2022-02-02 NOTE — PLAN OF CARE
02/01/22 1925   Discharge Assessment   Assessment Type Discharge Planning Assessment   Confirmed/corrected address, phone number and insurance No   Reason No family to provide information/patient unable to answer   Source of Information patient   If unable to respond/provide information was family/caregiver contacted? Other (see comments)  (SHANICE left messages on sons ph# from face sheet)   When was your last doctors appointment?   (Patient unsure)   Communicated EFRAÍN with patient/caregiver Yes   Reason For Admission Acute deep vein thrombosis (DVT) of both lower extremities   Lives With friend(s)   Do you expect to return to your current living situation? No   Do you have help at home or someone to help you manage your care at home? No   Prior to hospitilization cognitive status: Alert/Oriented   Current cognitive status: Alert/Oriented   Walking or Climbing Stairs Difficulty none   Dressing/Bathing Difficulty none   Equipment Currently Used at Home none   Readmission within 30 days? No   Patient currently being followed by outpatient case management? No   Do you currently have service(s) that help you manage your care at home? No   Do you take prescription medications? Yes   Do you have prescription coverage? Yes   Coverage HUMANA MANAGED MEDICARE - HUMANA SNP (SPECIAL NEEDS PLAN)   Do you have any problems affording any of your prescribed medications? No   Is the patient taking medications as prescribed?   (Mostly. Thought he felt bad due to meds so he stopped taking some of them recently..)   Who is going to help you get home at discharge? Unknown as of yet.   How do you get to doctors appointments? health plan transportation   Are you on dialysis? No   Do you take coumadin? Yes   Who monitors your labs? He is on coumadin now for bilateral DVTs.   Discharge Plan A   (The patient states he is homeless.   working on his D/c plan.)   DME Needed Upon Discharge  none   Discharge Plan discussed with: Patient  (SHANICE  attempted to reach the patient's sons, but there were  no answers to her calls . LM on  requesting a callback.)   Religion - Med Surg ([place])  Initial Discharge Assessment       Primary Care Provider: Julian Escobar    Admission Diagnosis: Syncope [R55]  Acute deep vein thrombosis (DVT) of both femoral veins [I82.413]    Admission Date: 1/31/2022  Expected Discharge Date:           Payor: HUMANA MANAGED MEDICARE / Plan: HUMANA SNP (SPECIAL NEEDS PLAN) / Product Type: Medicare Advantage /     Extended Emergency Contact Information  Primary Emergency Contact: Forest Silva Jr  Address: [address]           [place] [address] [address] [facility]  Home Phone: [phone]  Mobile Phone: [phone]  Relation: Son  Secondary Emergency Contact: bertrand silva  Mobile Phone: [phone]  Relation: Son  Preferred language: English   needed? No    Discharge Plan A: (P)  (The patient states he is homeless.   working on his D/c plan.)         [facility] [place] - [address] [place] [address] [address]  [address] [facility] [address]  [facility] [place] [address]  Phone: [phone] Fax: [phone]    [facility] - [facility] [address]  [address] [facility] [address]  Phone: [phone] Fax: [phone]    [id number] [address] [place] [address] [address]  [facility] [address]  Phone: [phone] Fax: [phone]      Initial Assessment (most recent)       Adult Discharge Assessment - 02/01/22 1925          Discharge Assessment    Assessment Type Discharge Planning Assessment (P)      Confirmed/corrected address, phone number and insurance No (P)      Reason No family to provide information/patient unable to answer (P)      Source of Information patient (P)      If unable to respond/provide information was family/caregiver contacted? Other (see comments) (P)    SW left  messages on sons ph# from face sheet    When was your last doctors appointment? -- (P)    Patient unsure    Communicated EFRAÍN with patient/caregiver Yes (P)      Reason For Admission Acute deep vein thrombosis (DVT) of both lower extremities (P)      Lives With friend(s) (P)      Do you expect to return to your current living situation? No (P)      Do you have help at home or someone to help you manage your care at home? No (P)      Prior to hospitilization cognitive status: Alert/Oriented (P)      Current cognitive status: Alert/Oriented (P)      Walking or Climbing Stairs Difficulty none (P)      Dressing/Bathing Difficulty none (P)      Equipment Currently Used at Home none (P)      Readmission within 30 days? No (P)      Patient currently being followed by outpatient case management? No (P)      Do you currently have service(s) that help you manage your care at home? No (P)      Do you take prescription medications? Yes (P)      Do you have prescription coverage? Yes (P)      Coverage HUMANA MANAGED MEDICARE - HUMANA SNP (SPECIAL NEEDS PLAN) (P)      Do you have any problems affording any of your prescribed medications? No (P)      Is the patient taking medications as prescribed? -- (P)    Mostly. Thought he felt bad due to meds so he stopped taking some of them recently..    Who is going to help you get home at discharge? Unknown as of yet. (P)      How do you get to doctors appointments? health plan transportation (P)      Are you on dialysis? No (P)      Do you take coumadin? Yes (P)      Who monitors your labs? He is on coumadin now for bilateral DVTs. (P)      Discharge Plan A -- (P)    The patient states he is homeless.   working on his D/c plan.    DME Needed Upon Discharge  none (P)      Discharge Plan discussed with: Patient (P)    SW attempted to reach the patient's sons, but there were  no answers to her calls . LM on  requesting a callback.

## 2022-02-02 NOTE — PROGRESS NOTES
[facility] Medicine  Progress Note    Patient Name: Forest Lopez  MRN: [id number]  Patient Class: IP- Inpatient   Admission Date: 1/31/2022  Length of Stay: 2 days  Attending Physician: Alma Elizalde MD  Primary Care Provider: Julian Escobar        Subjective:     Principal Problem:Acute deep vein thrombosis (DVT) of both lower extremities        HPI:  Mr. Lopez is a 72 year old man who presented after a syncopal episode.  He reports he had been feeling well prior to the episode but then had a prodrome prior to passing out.  EMS was called, report patient's BP was low normal on their arrival, improved after an IV fluids bolus.  Patient denies chest pain or shortness of breath and says he does not feel weak currently although is rather tired.  When seen in the ED this morning he could barely express himself audibly.     Vital signs were normal on presentation here.  He is on warfarin for recurrent DVT, but his INR was 1, and d-dimer markedly elevated at 21.7.  Tox screen was positive for cocaine.  He had a CTA of the brain and neck done on presentation so CTA of the chest for PE study could not be done for 48 hours.  Ultrasound of the lower extremities showed extensive bilateral DVT.  On the right there was thrombosis of the common femoral vein, femoral vein, popliteal vein, one of the paired posterior tibial veins and both visualized peroneal veins.  On the left there was thrombosis of the common femoral vein, femoral vein and popliteal vein.  Patient was started on full dose Lovenox and admitted.    Medical history is from the chart as he is unable to give me much information.  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  He smokes 5-6 cigarettes/day and is not interested in quitting.  He is currently homeless and staying with a friend.  Despite  the normal INR he is sure he is taking his warfarin and is not having difficulty taking his medications.  I discussed his care with [facility] staff on the [facility] and patient had been lost to follow up since November 2021.      Overview/Hospital Course:  Started on lovenox and coumadin.      Interval History: no acute issues, denies pain or sob.    Review of Systems   Constitutional: Negative for chills and fever.   HENT: Negative for trouble swallowing.    Respiratory: Negative for cough and shortness of breath.    Cardiovascular: Negative for chest pain and leg swelling.   Gastrointestinal: Negative for abdominal pain, blood in stool, nausea and vomiting.   Genitourinary: Negative for dysuria and hematuria.   Skin: Negative for rash.   Neurological: Positive for weakness. Negative for headaches.   Psychiatric/Behavioral: Negative for confusion.     Objective:     Vital Signs (Most Recent):  Temp: 97.7 °F (36.5 °C) (02/02/22 1132)  Pulse: 66 (02/02/22 1600)  Resp: 16 (02/02/22 1132)  BP: (!) 155/81 (02/02/22 1132)  SpO2: 96 % (02/02/22 1132) Vital Signs (24h Range):  Temp:  [97.7 °F (36.5 °C)-100 °F (37.8 °C)] 97.7 °F (36.5 °C)  Pulse:  [62-85] 66  Resp:  [15-18] 16  SpO2:  [96 %-99 %] 96 %  BP: (145-180)/(74-95) 155/81     Weight: 63.2 kg (139 lb 5.3 oz)  Body mass index is 18.9 kg/m².    Intake/Output Summary (Last 24 hours) at 2/2/2022 1602  Last data filed at 2/2/2022 0730  Gross per 24 hour   Intake --   Output 600 ml   Net -600 ml      Physical Exam  Vitals reviewed.   Constitutional:       General: He is not in acute distress.     Appearance: He is well-developed. He is ill-appearing.   HENT:      Head: Normocephalic and atraumatic.   Eyes:      Extraocular Movements: Extraocular movements intact.      Pupils: Pupils are equal, round, and reactive to light.   Cardiovascular:      Rate and Rhythm: Normal rate and regular rhythm.   Pulmonary:      Effort: Pulmonary effort is normal. No respiratory distress.       Breath sounds: Normal breath sounds.   Abdominal:      General: Bowel sounds are normal. There is no distension.      Palpations: Abdomen is soft.      Tenderness: There is no abdominal tenderness.   Musculoskeletal:         General: Swelling present. Normal range of motion.      Cervical back: Normal range of motion and neck supple.   Skin:     General: Skin is warm.      Findings: No rash.   Neurological:      General: No focal deficit present.      Mental Status: He is alert and oriented to person, place, and time.   Psychiatric:         Mood and Affect: Mood normal.         Behavior: Behavior normal.         Significant Labs: All pertinent labs within the past 24 hours have been reviewed.    Significant Imaging: I have reviewed all pertinent imaging results/findings within the past 24 hours.      Assessment/Plan:      * Acute deep vein thrombosis (DVT) of both lower extremities  Last ultrasound showed partially occlusive DVTs, now completely occlusive DVT of multiple lower extremity veins on ultrasound.  Patient reports compliance with warfarin but his INR is only one.  He is homeless but says he does not have difficulty obtaining his medications.  D-dimer is markedly elevated and there is a question of possible PE, but he just had a CTA of the brain/neck so cannot get another CTA for another 48 hours.VQ scan low probability for PE, echo ok.  As INR is not therapeutic will start full dose enoxaparin.Restart warfarin.      Debility  Pt/ot efforts  Needs placement      Type 2 diabetes mellitus with hyperglycemia, without long-term current use of insulin  Reportedly he is on metformin and glipizide, both held.  Continue SSI for now.  He has 4+ sugar in urine and HbA1c is 10.3, and he is hyperglycemic here.  Will start levemir and continue iss., increase levemir to 10 units daily      Syncope and collapse  Unclear cause.  Spoke to [facility] and patient has had previous syncopal episodes attributed to alcohol abuse.   Has been prescribed meclizine as well.  Tox positive for cocaine but not alcohol.  CTA brain/neck was done in ED, and per ED note the results were discussed with stroke neurologist Dr. Tovar who feels that CTA finding of a short segment of left vertebral artery stenosis was incidental.  He did not think stroke workup with MRI was indicated.    Cocaine abuse  As noted above      Hyperlipidemia  Resumed lipitor      Essential hypertension  Not sure on what meds at home, bp high now  Will start losartan 25 mg daily      Tobacco dependence  He smokes 5-6 cigarettes/day.  Not trying to quit and not interested in a nicotine patch.  Benefits of smoking cessation were reviewed with patient in detail for for 8 minutes and patient was encouraged to quit. Nicotine replacement options were discussed.        VTE Risk Mitigation (From admission, onward)         Ordered     warfarin tablet 7.5 mg  Daily         02/01/22 1610     enoxaparin injection 70 mg  Every 12 hours (non-standard times)         01/31/22 1003     Place sequential compression device  Until discontinued         01/31/22 0533                Discharge Planning   EFRAÍN:      Code Status: Full Code   Is the patient medically ready for discharge?:     Reason for patient still in hospital (select all that apply): Patient trending condition and Treatment  Discharge Plan A:  (The patient states he is homeless.   working on his D/c plan.)                  Alma Elizalde MD  Department of Hospital Medicine   [facility] ([place])

## 2022-02-02 NOTE — ASSESSMENT & PLAN NOTE
Reportedly he is on metformin and glipizide, both held.  Continue SSI for now.  He has 4+ sugar in urine and HbA1c is 10.3, and he is hyperglycemic here.  Will start levemir and continue iss., increase levemir to 10 units daily

## 2022-02-02 NOTE — NURSING
Pt. In bed AAOX4 and has no voiced questions or concerns at this time. Pt. Ambulated with PT in the nice with assistive device and sitting on side of bed without complication. Education provided on health care status and understanding voiced. Fall monitoring in place and no redirection needed on shift. Call light and other assistive devices within reach. Will continue to monitor.

## 2022-02-02 NOTE — HOSPITAL COURSE
Patient presented to the ED after a syncopal episode and was found to have bilateral lower extremity DVTs and a low INR.  Tox screen was positive for cocaine.  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  He was started back on his warfarin with bridging Lovenox.  His 2D echo showed grade I diastolic dysfunction.  Neurology evaluated him and recommended echo with bubble study, which was negative for a shunt.    PT/OT evaluated him and recommended SNF placement versus inpatient rehab.  His INR was difficult to get therapeutic, and as he had no contraindication to a NOAC his warfarin was changed to apixaban, and the full dose Lovenox was discontinued.  As he appeared to be depressed and had no objection to starting an antidepressant Lexapro was started.  He had a fall in the hospital on 2/13, had gone to the bathroom for a BM with the nurse, and when she turned away while he was washing his hands he apparently lost his balance and fell.  He did not hit his head and did not lose consciousness, but his BP was low transiently and he was given a bolus of IV fluids.    Patient's mental status improved slowly, but he gradually became more talkative and was able to hold a conversation.  He was diagnosed with a polymicrobial UTI on 2/21 and completed treatment with antibiotics.  Psychiatry evaluated him on 3/3 and changed his antidepressant to Prozac as it can be more activating and patient was having difficulty with his level of motivation.      Patient had some episodes of nausea and vomiting and CT of the abdomen was done, with incidental finding of rectal wall thickening noted.  Colonoscopy was recommended, but he was unable to finish the prep.  GI evaluated him and decided the patient would benefit from a flex sig to evaluate the rectum.  He had the procedure on 3/25 and no abnormalities were seen other than internal  hemorrhoids.    Patient's discharge was delayed as he was not accepted by any SNF facilities in the area.  Reasons for the denials are unclear as he has a clear rehab diagnosis.  He has a history of drug use but has not shown any interest in getting any illicit or legal drugs since he has been here, and he has had no visitors that could have provided drugs to him.  He has been pleasant and cooperative while here although lacks motivation, likely due to the nature of the stroke affecting his frontal lobe.  He requires considerable encouragement to participate in therapy.  Discharging him to live on the street or shelter would not be appropriate as he would not be able to take care of himself.  At this point he has agreed to intermediate nursing care, as his stroke was more than 2 months ago and he has already had sufficient rehab while here in the hospital.  Discharge was planned for 4/8/22, but was delayed due to him not having access to his own bank account.  Hopefully he can be transferred to nursing home as soon as financial situation is straightened out.  Case management has had to involve the Office of Elder Protective Services in order to get access to his bank account.He was finally discharged to nursing facility.

## 2022-02-02 NOTE — ASSESSMENT & PLAN NOTE
Unclear cause.  Spoke to [facility] and patient has had previous syncopal episodes attributed to alcohol abuse.  Has been prescribed meclizine as well.  Tox positive for cocaine but not alcohol.  CTA brain/neck was done in ED, and per ED note the results were discussed with stroke neurologist Dr. Tovar who feels that CTA finding of a short segment of left vertebral artery stenosis was incidental.  He did not think stroke workup with MRI was indicated.

## 2022-02-02 NOTE — PT/OT/SLP PROGRESS
Physical Therapy Treatment    Patient Name:  Forest Lopez   MRN:  [id number]    Recommendations:     Discharge Recommendations:   Nursing home, skilled   Discharge Equipment Recommendations: none   Barriers to discharge: None    Assessment:     Forest Lopez is a 72 y.o. male admitted with a medical diagnosis of Acute deep vein thrombosis (DVT) of both lower extremities.  He presents with the following impairments/functional limitations:  gait instability,impaired balance,impaired functional mobilty,decreased safety awareness.    Pt refused treatment this visit; discussed with patient placement needs and preferences, use of AD, vs no AD (pt states he has rollator), and benefits of participating in PT  Rec: Home (pt needs placement as he is currently undomiciled)    Rehab Prognosis: Good; patient would benefit from acute skilled PT services to address these deficits and reach maximum level of function.    Recent Surgery: * No surgery found *      Plan:     During this hospitalization, patient to be seen 3 x/week to address the identified rehab impairments via gait training,therapeutic activities,therapeutic exercises,neuromuscular re-education and progress toward the following goals:    · Plan of Care Expires:  02/15/22    Subjective     Chief Complaint: none stated  Patient/Family Comments/goals: I just don't feel like getting up right now  Pain/Comfort:  · Pain Rating 1: 0/10  · Pain Rating Post-Intervention 1: 0/10      Objective:     Communicated with nurse Coelho prior to session.  Patient found right sidelying with peripheral IV,telemetry,SCD upon PT entry to room.     General Precautions: Standard, fall   Orthopedic Precautions:N/A   Braces:    Respiratory Status: Room air     Functional Mobility:  · Pt did not perform any functional mobility this visit      AM-PAC 6 CLICK MOBILITY  Turning over in bed (including adjusting bedclothes, sheets and blankets)?: 4  Sitting down on and standing up from a chair with  arms (e.g., wheelchair, bedside commode, etc.): 4  Moving from lying on back to sitting on the side of the bed?: 4  Moving to and from a bed to a chair (including a wheelchair)?: 4  Need to walk in hospital room?: 3  Climbing 3-5 steps with a railing?: 3  Basic Mobility Total Score: 22       Therapeutic Activities and Exercises:   Pt refused all treatment but was receptive to talk about his situation and needs.    Patient left right sidelying with all lines intact and call button in reach..    GOALS:   Multidisciplinary Problems     Physical Therapy Goals        Problem: Physical Therapy Goal    Goal Priority Disciplines Outcome Goal Variances Interventions   Physical Therapy Goal     PT, PT/OT Ongoing, Progressing     Description: Goals to be met by: 2/15/2022    Patient will increase functional independence with mobility by performin. Sit<>stand with SPV with RW.  2. Gait x 300 feet with SPV with RW.                        Time Tracking:     PT Received On: 22  PT Start Time: 1352     PT Stop Time: 1400  PT Total Time (min): 8 min     Billable Minutes: Therapeutic Activity 8    Treatment Type: Treatment  PT/PTA: PTA     PTA Visit Number: 1     2022

## 2022-02-02 NOTE — SUBJECTIVE & OBJECTIVE
Interval History: no acute issues, denies pain or sob.    Review of Systems   Constitutional: Negative for chills and fever.   HENT: Negative for trouble swallowing.    Respiratory: Negative for cough and shortness of breath.    Cardiovascular: Negative for chest pain and leg swelling.   Gastrointestinal: Negative for abdominal pain, blood in stool, nausea and vomiting.   Genitourinary: Negative for dysuria and hematuria.   Skin: Negative for rash.   Neurological: Positive for weakness. Negative for headaches.   Psychiatric/Behavioral: Negative for confusion.     Objective:     Vital Signs (Most Recent):  Temp: 97.7 °F (36.5 °C) (02/02/22 1132)  Pulse: 66 (02/02/22 1600)  Resp: 16 (02/02/22 1132)  BP: (!) 155/81 (02/02/22 1132)  SpO2: 96 % (02/02/22 1132) Vital Signs (24h Range):  Temp:  [97.7 °F (36.5 °C)-100 °F (37.8 °C)] 97.7 °F (36.5 °C)  Pulse:  [62-85] 66  Resp:  [15-18] 16  SpO2:  [96 %-99 %] 96 %  BP: (145-180)/(74-95) 155/81     Weight: 63.2 kg (139 lb 5.3 oz)  Body mass index is 18.9 kg/m².    Intake/Output Summary (Last 24 hours) at 2/2/2022 1609  Last data filed at 2/2/2022 0730  Gross per 24 hour   Intake --   Output 600 ml   Net -600 ml      Physical Exam  Vitals reviewed.   Constitutional:       General: He is not in acute distress.     Appearance: He is well-developed. He is ill-appearing.   HENT:      Head: Normocephalic and atraumatic.   Eyes:      Extraocular Movements: Extraocular movements intact.      Pupils: Pupils are equal, round, and reactive to light.   Cardiovascular:      Rate and Rhythm: Normal rate and regular rhythm.   Pulmonary:      Effort: Pulmonary effort is normal. No respiratory distress.      Breath sounds: Normal breath sounds.   Abdominal:      General: Bowel sounds are normal. There is no distension.      Palpations: Abdomen is soft.      Tenderness: There is no abdominal tenderness.   Musculoskeletal:         General: Swelling present. Normal range of motion.      Cervical  back: Normal range of motion and neck supple.   Skin:     General: Skin is warm.      Findings: No rash.   Neurological:      General: No focal deficit present.      Mental Status: He is alert and oriented to person, place, and time.   Psychiatric:         Mood and Affect: Mood normal.         Behavior: Behavior normal.         Significant Labs: All pertinent labs within the past 24 hours have been reviewed.    Significant Imaging: I have reviewed all pertinent imaging results/findings within the past 24 hours.

## 2022-02-02 NOTE — PROGRESS NOTES
IP Liaison - Initial Visit Note    Patient: Forest Lopez  MRN:  [id number]  Date of Service:  2/2/2022  Completed by:  SUMMER Wilson    Reason for Visit   Patient presents with    IP Liaison Initial Visit       RSW met with patient at bedside in order to complete SDOH questionnaire and liaison assessment.  Pt has identified barriers to care of housing.  Patient states that he was living with a friend at [address] [place] [address] for the past 4-5 weeks but states he cannot go back there. Patient states he is homeless and needs somewhere to stay. RSW will partner with CM to help find housing for patient.   The following were addressed during this visit:  - Review SDOH Questions   - Complete patient assessment   - Complete initial visit with patient        Patient Summary     IP Liaison Patient Assessment    General  Level of Caregiver support: Member independent and does not need caregiver assistance  Have you had to make a decision between paying for any of the following in the last 2 months?: None  Transportation means: Insurance-provided transportation  Employment status: Retired and not working  Current symptoms: Sleep disturbances  Assessments  Was the PHQ Depression Screening completed this visit?: Yes  Was the TOMMIE-7 Screening completed this visit?: No       SUMMER Wilson

## 2022-02-03 ENCOUNTER — PATIENT OUTREACH (OUTPATIENT)
Dept: ADMINISTRATIVE | Facility: OTHER | Age: 72
End: 2022-02-03
Payer: MEDICARE

## 2022-02-03 LAB
INR PPP: 1 (ref 0.8–1.2)
POCT GLUCOSE: 165 MG/DL (ref 70–110)
POCT GLUCOSE: 210 MG/DL (ref 70–110)
POCT GLUCOSE: 253 MG/DL (ref 70–110)
POCT GLUCOSE: 259 MG/DL (ref 70–110)
PROTHROMBIN TIME: 11.1 SEC (ref 9–12.5)

## 2022-02-03 PROCEDURE — 21400001 HC TELEMETRY ROOM

## 2022-02-03 PROCEDURE — 94761 N-INVAS EAR/PLS OXIMETRY MLT: CPT

## 2022-02-03 PROCEDURE — 97116 GAIT TRAINING THERAPY: CPT | Mod: CQ

## 2022-02-03 PROCEDURE — 36415 COLL VENOUS BLD VENIPUNCTURE: CPT | Performed by: INTERNAL MEDICINE

## 2022-02-03 PROCEDURE — 63600175 PHARM REV CODE 636 W HCPCS: Performed by: HOSPITALIST

## 2022-02-03 PROCEDURE — C9399 UNCLASSIFIED DRUGS OR BIOLOG: HCPCS | Performed by: INTERNAL MEDICINE

## 2022-02-03 PROCEDURE — 99231 PR SUBSEQUENT HOSPITAL CARE,LEVL I: ICD-10-PCS | Mod: 95,,, | Performed by: STUDENT IN AN ORGANIZED HEALTH CARE EDUCATION/TRAINING PROGRAM

## 2022-02-03 PROCEDURE — 97535 SELF CARE MNGMENT TRAINING: CPT

## 2022-02-03 PROCEDURE — 25000003 PHARM REV CODE 250: Performed by: INTERNAL MEDICINE

## 2022-02-03 PROCEDURE — 99231 SBSQ HOSP IP/OBS SF/LOW 25: CPT | Mod: 95,,, | Performed by: STUDENT IN AN ORGANIZED HEALTH CARE EDUCATION/TRAINING PROGRAM

## 2022-02-03 PROCEDURE — 85610 PROTHROMBIN TIME: CPT | Performed by: INTERNAL MEDICINE

## 2022-02-03 PROCEDURE — 25000003 PHARM REV CODE 250: Performed by: NURSE PRACTITIONER

## 2022-02-03 RX ADMIN — INSULIN ASPART 6 UNITS: 100 INJECTION, SOLUTION INTRAVENOUS; SUBCUTANEOUS at 12:02

## 2022-02-03 RX ADMIN — ENOXAPARIN SODIUM 70 MG: 80 INJECTION SUBCUTANEOUS at 09:02

## 2022-02-03 RX ADMIN — INSULIN DETEMIR 10 UNITS: 100 INJECTION, SOLUTION SUBCUTANEOUS at 09:02

## 2022-02-03 RX ADMIN — LOSARTAN POTASSIUM 25 MG: 25 TABLET, FILM COATED ORAL at 09:02

## 2022-02-03 RX ADMIN — INSULIN ASPART 6 UNITS: 100 INJECTION, SOLUTION INTRAVENOUS; SUBCUTANEOUS at 09:02

## 2022-02-03 RX ADMIN — INSULIN ASPART 4 UNITS: 100 INJECTION, SOLUTION INTRAVENOUS; SUBCUTANEOUS at 04:02

## 2022-02-03 RX ADMIN — ATORVASTATIN CALCIUM 80 MG: 20 TABLET, FILM COATED ORAL at 09:02

## 2022-02-03 RX ADMIN — INSULIN ASPART 2 UNITS: 100 INJECTION, SOLUTION INTRAVENOUS; SUBCUTANEOUS at 09:02

## 2022-02-03 RX ADMIN — WARFARIN SODIUM 7.5 MG: 5 TABLET ORAL at 04:02

## 2022-02-03 NOTE — CONSULTS
Thank you for your consult to [facility]. We have reviewed the patient chart. This patient does meet criteria for [facility] service at this time. Will assume care on 02/03/22 at 6AM     Mary Carmen Barr MD  [facility]

## 2022-02-03 NOTE — PLAN OF CARE
02/02/22 1858   Discharge Reassessment   Assessment Type Discharge Planning Reassessment   Did the patient's condition or plan change since previous assessment? Yes   Discharge Plan discussed with: Patient;Adult children   Communicated EFRAÍN with patient/caregiver Yes   Discharge Plan A New Nursing Home placement [facility]   DME Needed Upon Discharge  none   Discharge Barriers Identified None   Why the patient remains in the hospital Requires continued medical care   Post-Acute Status   Post-Acute Authorization Placement   Post-Acute Placement Status Patient List Provided   Coverage [employer] MANAGED MEDICARE - [employer] (SPECIAL NEEDS PLAN)   Discharge Delays None known at this time     SHANICE met with the patient and his son, Forest LopezJr. Discussed Nursing home placement.   Son given a list of area facilities. He already has some facilities picked out since his cousin is a CNS who is familiar w/ some facilities.   His Choices were:  -[facility]  -[facility]  -[facility].  SHANICE to send out referral in am.   The patient's son is moving out of town on Tuesday and he hopes to complete the placement by then.

## 2022-02-03 NOTE — PT/OT/SLP PROGRESS
Physical Therapy      Patient Name:  Forest Lopez   MRN:  [id number]    Patient not seen today secondary to patient eating lunch  . Will follow-up later as scheduling permits.

## 2022-02-03 NOTE — PT/OT/SLP PROGRESS
Occupational Therapy   Treatment    Name: Forest Lopez  MRN: [id number]  Admitting Diagnosis:  Acute deep vein thrombosis (DVT) of both lower extremities       Recommendations:     Discharge Recommendations: nursing facility, skilled  Discharge Equipment Recommendations:  none  Barriers to discharge:   (pt currently homeless)    Assessment:     Forest Lopez is a 72 y.o. male with a medical diagnosis of Acute deep vein thrombosis (DVT) of both lower extremities.  He presents with no pain. Performance deficits affecting function are decreased safety awareness,impaired functional mobilty,gait instability,impaired self care skills.  Pt agreeable to participating in therapy upon arrival to room.  Pt required CGA and RW to perform toileteing from toilet.  While performing hygiene care in standing position decreased postural stability and impaired balance noted.  In addition, one instance of LOB observed while performing grooming standing at sink.  While ambulating with SBA-CGA and RW pt displayed decreased step length.     Overall, pt tolerated therapy well; however, decreased safety awareness and impaired balance noted during ADLs and functional mobility.  D/c recommendation changed from Home to SNF to further address deficits and help pt improve overall functional independence.       Rehab Prognosis:  Good; patient would benefit from acute skilled OT services to address these deficits and reach maximum level of function.       Plan:     Patient to be seen 3 x/week to address the above listed problems via self-care/home management,therapeutic activities,therapeutic exercises  · Plan of Care Expires: 03/03/22  · Plan of Care Reviewed with: patient    Subjective     Pain/Comfort:  · Pain Rating 1: 0/10  · Pain Rating Post-Intervention 1: 0/10    Objective:     Communicated with: RN (Isela) prior to session.  Patient found HOB elevated with peripheral IV, telemetry, condom catheter upon OT entry to room.  RN present in  room.    General Precautions: Standard, fall   Orthopedic Precautions:N/A   Braces: N/A  Respiratory Status: Room air     Occupational Performance:     Bed Mobility:    · Supine <> sit: Supervision  · Scooting: Supervision  · Sit <> Supine: Supervision    Functional Mobility/Transfers:  · Sit <> Stand:    · CGA and RW x 2 trials from EOB  · Min A, grab bar, RW x 1 trial from toilet (toilet height low)  · Cues required for technique  · Toilet:  Min A and RW x 1 trial taking steps to toilet  · Functional Mobility: Pt ambulated ~30 ft in room with SBA-CGA and RW.  · Pt walked in shuffling pattern with small steps  · Impaired balance and postural instability observed  · Decreased safety awareness noted    Activities of Daily Living:  · Grooming: SBA and RW for washing hands x 2 trials.  · One instance of LOB observed during 2nd trial requiring  · Toileting: CGA and RW from toilet.  · Pt performed hygiene care in standing position after bowel movement; pt leaned completely over RW during task and held onto grab bar with left hand  · Impaired balance, postural instability, and decreased safety awareness noted      [facility] 6 Click ADL: 21    Treatment & Education:  *Pt educated on role of OT in acute care setting  *Pt ambulated within room to address coordination, endurance, and balance needed for functional mobility   *ADL retraining: grooming in standing and toileting from toilet.  Cues required for safety and balance  *Pt performed toilet transfer to toilet; cues required for technique  *Pt performed UB exercise to provide stretch and promote increased endurance needed for ADLs: Seated at EOB; 2 sets x 10 reps per exercise  -overhead clap  -horizontal shoulder abduction/adduction  *POC reviewed with pt    Patient left HOB elevated with all lines intact and call button in reachEducation:  ; lunch tray set up    GOALS:   Multidisciplinary Problems     Occupational Therapy Goals        Problem: Occupational Therapy Goal     Goal Priority Disciplines Outcome Interventions   Occupational Therapy Goal     OT, PT/OT Ongoing, Progressing    Description: Goals to be met by: 2/8/2022    Patient will increase functional independence with ADLs by performing:    UE Dressing with [place].  LE Dressing with [place].  Grooming while standing at sink with Supervision.  Toileting from toilet with Supervision for hygiene and clothing management.   Toilet transfer to toilet with Supervision.                     Time Tracking:     OT Date of Treatment: 02/03/22  OT Start Time: 1230  OT Stop Time: 1250  OT Total Time (min): 20 min    Billable Minutes:Self Care/Home Management 20    OT/DUTCH: OT        MICHELLE Willett  2/3/2022

## 2022-02-03 NOTE — PROGRESS NOTES
BERKLEYW called patient's son and patient will go into assisted placement. Patient has no additional needs.    SUMMER Wilson

## 2022-02-03 NOTE — PLAN OF CARE
Problem: Occupational Therapy Goal  Goal: Occupational Therapy Goal  Description: Goals to be met by: 2/8/2022    Patient will increase functional independence with ADLs by performing:    UE Dressing with [place].  LE Dressing with [place].  Grooming while standing at sink with Supervision.  Toileting from toilet with Supervision for hygiene and clothing management.   Toilet transfer to toilet with Supervision.    2/3/2022 1457 by MICHELLE Leal  Outcome: Ongoing, Progressing    Pt is making progress towards goals.  Decreased safety awareness and impaired balance noted during ADLs and functional mobility.  D/c recommendation changed from home to SNF to further address deficits and help pt improve overall functional independence.     MICHELLE Willett  2/3/2022

## 2022-02-03 NOTE — PT/OT/SLP PROGRESS
Physical Therapy Treatment    Patient Name:  Forest Lopez   MRN:  [id number]    Recommendations:     Discharge Recommendations:   Nursing home, skilled   Discharge Equipment Recommendations: none   Barriers to discharge: None    Assessment:     Forest Lopez is a 72 y.o. male admitted with a medical diagnosis of Acute deep vein thrombosis (DVT) of both lower extremities.  He presents with the following impairments/functional limitations:  gait instability,impaired self care skills,impaired functional mobilty,decreased safety awareness   Supine<>sit SBA  Sit>stand CGA with rollator  Amb 280' with rollator and CGA/occ Varun for control, pt with increased gait speed, cues given for decreased speed and to stay closer to AD; amb 60' with RW, pt more stable and safe with RW  Scoot toward HOB with BUE and BLE and CGA  Pt demos greater safety and control with RW than with rollator, though expresses preference for the latter.  Rec:  CM finding NH placement    Rehab Prognosis: Good; patient would benefit from acute skilled PT services to address these deficits and reach maximum level of function.    Recent Surgery: * No surgery found *      Plan:     During this hospitalization, patient to be seen 3 x/week to address the identified rehab impairments via gait training,therapeutic activities,therapeutic exercises,neuromuscular re-education and progress toward the following goals:    · Plan of Care Expires:  02/15/22    Subjective     Chief Complaint: none stated  Patient/Family Comments/goals: I prefer the rollator  Pain/Comfort:  · Pain Rating 1: 0/10  · Pain Rating Post-Intervention 1: 0/10      Objective:     Communicated with nurse Weiss prior to session.  Patient found HOB elevated with peripheral IV,telemetry,SCD,Condom Catheter (SCD donned bu not attached to pump) upon PT entry to room.     General Precautions: Standard, fall   Orthopedic Precautions:N/A   Braces:    Respiratory Status: Room air     Functional  Mobility:  · Bed Mobility:     · Scooting: contact guard assistance and toward HOB  · Supine to Sit: stand by assistance  · Sit to Supine: stand by assistance  · Transfers:     · Sit to Stand:  contact guard assistance with 4 wheeled walker  · Gait: 280' with CGA/occ Varun and rollator, cues for decreased gait speed and to keep rollator closer + 60' with RW and greater safety, control      AM-PAC 6 CLICK MOBILITY  Turning over in bed (including adjusting bedclothes, sheets and blankets)?: 4  Sitting down on and standing up from a chair with arms (e.g., wheelchair, bedside commode, etc.): 4  Moving from lying on back to sitting on the side of the bed?: 4  Moving to and from a bed to a chair (including a wheelchair)?: 4  Need to walk in hospital room?: 3  Climbing 3-5 steps with a railing?: 3  Basic Mobility Total Score: 22       Therapeutic Activities and Exercises:   Seated therex: LAQ x 10  Transfers and gait training as noted    Patient left HOB elevated with all lines intact, call button in reach, bed alarm on and nurse Isela notified..    GOALS:   Multidisciplinary Problems     Physical Therapy Goals        Problem: Physical Therapy Goal    Goal Priority Disciplines Outcome Goal Variances Interventions   Physical Therapy Goal     PT, PT/OT Ongoing, Progressing     Description: Goals to be met by: 2/15/2022    Patient will increase functional independence with mobility by performin. Sit<>stand with SPV with RW.  2. Gait x 300 feet with SPV with RW.                        Time Tracking:     PT Received On: 22  PT Start Time: 1507     PT Stop Time: 1534  PT Total Time (min): 27 min     Billable Minutes: Gait Training 27    Treatment Type: Treatment  PT/PTA: PTA     PTA Visit Number: 2     2022

## 2022-02-04 ENCOUNTER — PATIENT OUTREACH (OUTPATIENT)
Dept: ADMINISTRATIVE | Facility: OTHER | Age: 72
End: 2022-02-04
Payer: MEDICARE

## 2022-02-04 PROBLEM — Z71.89 ADVANCE CARE PLANNING: Status: ACTIVE | Noted: 2022-02-04

## 2022-02-04 PROBLEM — R50.9 FEVER: Status: ACTIVE | Noted: 2022-02-04

## 2022-02-04 PROBLEM — E43 SEVERE PROTEIN-CALORIE MALNUTRITION: Status: ACTIVE | Noted: 2022-02-04

## 2022-02-04 LAB
ANION GAP SERPL CALC-SCNC: 11 MMOL/L (ref 8–16)
BASOPHILS # BLD AUTO: 0.06 K/UL (ref 0–0.2)
BASOPHILS NFR BLD: 0.6 % (ref 0–1.9)
BUN SERPL-MCNC: 14 MG/DL (ref 8–23)
CALCIUM SERPL-MCNC: 10 MG/DL (ref 8.7–10.5)
CHLORIDE SERPL-SCNC: 100 MMOL/L (ref 95–110)
CO2 SERPL-SCNC: 21 MMOL/L (ref 23–29)
CREAT SERPL-MCNC: 1.1 MG/DL (ref 0.5–1.4)
DIFFERENTIAL METHOD: ABNORMAL
EOSINOPHIL # BLD AUTO: 0 K/UL (ref 0–0.5)
EOSINOPHIL NFR BLD: 0.4 % (ref 0–8)
ERYTHROCYTE [DISTWIDTH] IN BLOOD BY AUTOMATED COUNT: 12.2 % (ref 11.5–14.5)
EST. GFR  (AFRICAN AMERICAN): >60 ML/MIN/1.73 M^2
EST. GFR  (NON AFRICAN AMERICAN): >60 ML/MIN/1.73 M^2
GLUCOSE SERPL-MCNC: 215 MG/DL (ref 70–110)
HCT VFR BLD AUTO: 47.4 % (ref 40–54)
HGB BLD-MCNC: 15.1 G/DL (ref 14–18)
IMM GRANULOCYTES # BLD AUTO: 0.03 K/UL (ref 0–0.04)
IMM GRANULOCYTES NFR BLD AUTO: 0.3 % (ref 0–0.5)
INR PPP: 1.1 (ref 0.8–1.2)
LYMPHOCYTES # BLD AUTO: 2.6 K/UL (ref 1–4.8)
LYMPHOCYTES NFR BLD: 24.5 % (ref 18–48)
MCH RBC QN AUTO: 25.5 PG (ref 27–31)
MCHC RBC AUTO-ENTMCNC: 31.9 G/DL (ref 32–36)
MCV RBC AUTO: 80 FL (ref 82–98)
MONOCYTES # BLD AUTO: 0.9 K/UL (ref 0.3–1)
MONOCYTES NFR BLD: 8.3 % (ref 4–15)
NEUTROPHILS # BLD AUTO: 7 K/UL (ref 1.8–7.7)
NEUTROPHILS NFR BLD: 65.9 % (ref 38–73)
NRBC BLD-RTO: 0 /100 WBC
PLATELET # BLD AUTO: 206 K/UL (ref 150–450)
PMV BLD AUTO: 10.1 FL (ref 9.2–12.9)
POCT GLUCOSE: 196 MG/DL (ref 70–110)
POCT GLUCOSE: 201 MG/DL (ref 70–110)
POCT GLUCOSE: 261 MG/DL (ref 70–110)
POCT GLUCOSE: 269 MG/DL (ref 70–110)
POTASSIUM SERPL-SCNC: 4.9 MMOL/L (ref 3.5–5.1)
PROTHROMBIN TIME: 11.8 SEC (ref 9–12.5)
RBC # BLD AUTO: 5.91 M/UL (ref 4.6–6.2)
SODIUM SERPL-SCNC: 132 MMOL/L (ref 136–145)
WBC # BLD AUTO: 10.67 K/UL (ref 3.9–12.7)

## 2022-02-04 PROCEDURE — 25000003 PHARM REV CODE 250: Performed by: INTERNAL MEDICINE

## 2022-02-04 PROCEDURE — 99223 PR INITIAL HOSPITAL CARE,LEVL III: ICD-10-PCS | Mod: ,,, | Performed by: INTERNAL MEDICINE

## 2022-02-04 PROCEDURE — 99233 SBSQ HOSP IP/OBS HIGH 50: CPT | Mod: ,,, | Performed by: INTERNAL MEDICINE

## 2022-02-04 PROCEDURE — 99497 ADVNCD CARE PLAN 30 MIN: CPT | Mod: 25,,, | Performed by: INTERNAL MEDICINE

## 2022-02-04 PROCEDURE — 99497 PR ADVNCD CARE PLAN 30 MIN: ICD-10-PCS | Mod: 25,,, | Performed by: INTERNAL MEDICINE

## 2022-02-04 PROCEDURE — 80048 BASIC METABOLIC PNL TOTAL CA: CPT | Performed by: INTERNAL MEDICINE

## 2022-02-04 PROCEDURE — 25000003 PHARM REV CODE 250: Performed by: NURSE PRACTITIONER

## 2022-02-04 PROCEDURE — 85610 PROTHROMBIN TIME: CPT | Performed by: INTERNAL MEDICINE

## 2022-02-04 PROCEDURE — 21400001 HC TELEMETRY ROOM

## 2022-02-04 PROCEDURE — 99233 PR SUBSEQUENT HOSPITAL CARE,LEVL III: ICD-10-PCS | Mod: ,,, | Performed by: INTERNAL MEDICINE

## 2022-02-04 PROCEDURE — 85025 COMPLETE CBC W/AUTO DIFF WBC: CPT | Performed by: INTERNAL MEDICINE

## 2022-02-04 PROCEDURE — 30200315 PPD INTRADERMAL TEST REV CODE 302: Performed by: INTERNAL MEDICINE

## 2022-02-04 PROCEDURE — 36415 COLL VENOUS BLD VENIPUNCTURE: CPT | Performed by: INTERNAL MEDICINE

## 2022-02-04 PROCEDURE — 63600175 PHARM REV CODE 636 W HCPCS: Performed by: HOSPITALIST

## 2022-02-04 PROCEDURE — 86580 TB INTRADERMAL TEST: CPT | Performed by: INTERNAL MEDICINE

## 2022-02-04 PROCEDURE — 99223 1ST HOSP IP/OBS HIGH 75: CPT | Mod: ,,, | Performed by: INTERNAL MEDICINE

## 2022-02-04 PROCEDURE — 94761 N-INVAS EAR/PLS OXIMETRY MLT: CPT

## 2022-02-04 RX ORDER — WARFARIN SODIUM 5 MG/1
10 TABLET ORAL DAILY
Status: DISCONTINUED | OUTPATIENT
Start: 2022-02-04 | End: 2022-02-06

## 2022-02-04 RX ADMIN — ATORVASTATIN CALCIUM 80 MG: 20 TABLET, FILM COATED ORAL at 10:02

## 2022-02-04 RX ADMIN — INSULIN ASPART 2 UNITS: 100 INJECTION, SOLUTION INTRAVENOUS; SUBCUTANEOUS at 05:02

## 2022-02-04 RX ADMIN — WARFARIN SODIUM 10 MG: 5 TABLET ORAL at 05:02

## 2022-02-04 RX ADMIN — LOSARTAN POTASSIUM 25 MG: 25 TABLET, FILM COATED ORAL at 10:02

## 2022-02-04 RX ADMIN — ENOXAPARIN SODIUM 70 MG: 80 INJECTION SUBCUTANEOUS at 10:02

## 2022-02-04 RX ADMIN — INSULIN DETEMIR 10 UNITS: 100 INJECTION, SOLUTION SUBCUTANEOUS at 08:02

## 2022-02-04 RX ADMIN — INSULIN ASPART 4 UNITS: 100 INJECTION, SOLUTION INTRAVENOUS; SUBCUTANEOUS at 08:02

## 2022-02-04 RX ADMIN — TUBERCULIN PURIFIED PROTEIN DERIVATIVE 5 UNITS: 5 INJECTION, SOLUTION INTRADERMAL at 11:02

## 2022-02-04 RX ADMIN — INSULIN ASPART 6 UNITS: 100 INJECTION, SOLUTION INTRAVENOUS; SUBCUTANEOUS at 12:02

## 2022-02-04 NOTE — PROGRESS NOTES
[facility] Medicine  Telemedicine Progress Note    Patient Name: Forest Lopez  MRN: [id number]  Patient Class: IP- Inpatient   Admission Date: 1/31/2022  Length of Stay: 3 days  Attending Physician: Mary Carmen Barr MD  Primary Care Provider: Julian Escobar          Subjective:     Principal Problem:Acute deep vein thrombosis (DVT) of both lower extremities        HPI:  Mr. Lopez is a 72 year old man who presented after a syncopal episode.  He reports he had been feeling well prior to the episode but then had a prodrome prior to passing out.  EMS was called, report patient's BP was low normal on their arrival, improved after an IV fluids bolus.  Patient denies chest pain or shortness of breath and says he does not feel weak currently although is rather tired.  When seen in the ED this morning he could barely express himself audibly.     Vital signs were normal on presentation here.  He is on warfarin for recurrent DVT, but his INR was 1, and d-dimer markedly elevated at 21.7.  Tox screen was positive for cocaine.  He had a CTA of the brain and neck done on presentation so CTA of the chest for PE study could not be done for 48 hours.  Ultrasound of the lower extremities showed extensive bilateral DVT.  On the right there was thrombosis of the common femoral vein, femoral vein, popliteal vein, one of the paired posterior tibial veins and both visualized peroneal veins.  On the left there was thrombosis of the common femoral vein, femoral vein and popliteal vein.  Patient was started on full dose Lovenox and admitted.    Medical history is from the chart as he is unable to give me much information.  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  He smokes 5-6 cigarettes/day and is not interested in quitting.  He is currently homeless and  staying with a friend.  Despite the normal INR he is sure he is taking his warfarin and is not having difficulty taking his medications.  I discussed his care with [facility] staff on the [employer] and patient had been lost to follow up since November 2021.      Overview/Hospital Course:    Started on lovenox and coumadin. PT-INR  remains at 1.0.      Interval History: no acute issues, however patient minimally interactive despite virtual visit with charge nurse in the room - unable to ascertain any ROS.     Review of Systems   Unable to perform ROS: Other     Objective:     Vital Signs (Most Recent):  Temp: 98.6 °F (37 °C) (02/03/22 1611)  Pulse: 78 (02/03/22 1900)  Resp: 16 (02/03/22 1611)  BP: 114/76 (02/03/22 1611)  SpO2: 100 % (02/03/22 1611) Vital Signs (24h Range):  Temp:  [96.2 °F (35.7 °C)-100.1 °F (37.8 °C)] 98.6 °F (37 °C)  Pulse:  [] 78  Resp:  [16-18] 16  SpO2:  [96 %-100 %] 100 %  BP: (111-165)/(52-95) 114/76     Weight: 63.2 kg (139 lb 5.3 oz)  Body mass index is 18.9 kg/m².    Intake/Output Summary (Last 24 hours) at 2/3/2022 1930  Last data filed at 2/3/2022 1639  Gross per 24 hour   Intake 960 ml   Output 1300 ml   Net -340 ml      Physical Exam  Vitals reviewed.   Constitutional:       General: He is not in acute distress.     Appearance: He is well-developed. He is ill-appearing.   HENT:      Head: Normocephalic and atraumatic.   Eyes:      Extraocular Movements: Extraocular movements intact.      Pupils: Pupils are equal, round, and reactive to light.   Cardiovascular:      Rate and Rhythm: Normal rate and regular rhythm.   Pulmonary:      Effort: Pulmonary effort is normal. No respiratory distress.      Breath sounds: Normal breath sounds.   Abdominal:      General: Bowel sounds are normal. There is no distension.      Palpations: Abdomen is soft.      Tenderness: There is no abdominal tenderness.   Musculoskeletal:         General: Swelling present. Normal range of motion.      Cervical  back: Normal range of motion and neck supple.   Skin:     General: Skin is warm.      Findings: No rash.   Neurological:      General: No focal deficit present.      Mental Status: He is alert and oriented to person, place, and time.   Psychiatric:         Mood and Affect: Mood normal.         Behavior: Behavior normal.         Significant Labs: All pertinent labs within the past 24 hours have been reviewed.    Significant Imaging: I have reviewed all pertinent imaging results/findings within the past 24 hours.      Assessment/Plan:      * Acute deep vein thrombosis (DVT) of both lower extremities  Last ultrasound showed partially occlusive DVTs, now completely occlusive DVT of multiple lower extremity veins on ultrasound.  Patient reports compliance with warfarin but his INR is only one.  He is homeless but says he does not have difficulty obtaining his medications.  D-dimer is markedly elevated and there is a question of possible PE, but he just had a CTA of the brain/neck so cannot get another CTA for another 48 hours.VQ scan low probability for PE, echo ok.  As INR is not therapeutic will start full dose enoxaparin.Restart warfarin.      Debility  Pt/ot efforts  Needs placement      Type 2 diabetes mellitus with hyperglycemia, without long-term current use of insulin  Reportedly he is on metformin and glipizide, both held.  Continue SSI for now.  He has 4+ sugar in urine and HbA1c is 10.3, and he is hyperglycemic here.  Will start levemir and continue iss., increase levemir to 10 units daily      Syncope and collapse  Unclear cause.  Spoke to [facility] and patient has had previous syncopal episodes attributed to alcohol abuse.  Has been prescribed meclizine as well.  Tox positive for cocaine but not alcohol.  CTA brain/neck was done in ED, and per ED note the results were discussed with stroke neurologist Dr. Tovar who feels that CTA finding of a short segment of left vertebral artery stenosis was incidental.  He  did not think stroke workup with MRI was indicated.    Cocaine abuse  As noted above      Hyperlipidemia  Resumed lipitor      Essential hypertension  Not sure on what meds at home, bp high now  Will start losartan 25 mg daily      Tobacco dependence  He smokes 5-6 cigarettes/day.  Not trying to quit and not interested in a nicotine patch.  Benefits of smoking cessation were reviewed with patient in detail for for 8 minutes and patient was encouraged to quit. Nicotine replacement options were discussed.        VTE Risk Mitigation (From admission, onward)         Ordered     warfarin tablet 7.5 mg  Daily         02/01/22 1610     enoxaparin injection 70 mg  Every 12 hours (non-standard times)         01/31/22 1003     Place sequential compression device  Until discontinued         01/31/22 0533                      I have assessed these finding virtually using telemed platform and with assistance of bedside nurse                 The attending portion of this evaluation, treatment, and documentation was performed per Mary Carmen Barr MD via Telemedicine AudioVisual using the secure [employer] software platform with 2 way audio/video. The provider was located off-site and the patient is located in the hospital. The aforementioned video software was utilized to document the relevant history and physical exam    Mary Carmen Barr MD  Department of Hospital Medicine   [facility] ([place])

## 2022-02-04 NOTE — ASSESSMENT & PLAN NOTE
- BMI 18.90, visible temporal wasting on exam. Unclear if related to food insecurity vs ongoing drug abuse vs other causes (malignancy?)   - RD consult for nutritional optimization

## 2022-02-04 NOTE — ASSESSMENT & PLAN NOTE
Unclear cause.  Spoke to [facility] and patient has had previous syncopal episodes attributed to alcohol abuse.  Has been prescribed meclizine as well.  Tox positive for cocaine but not alcohol.  CTA brain/neck was done in ED, and per ED note the results were discussed with stroke neurologist Dr. Tovar who feels that CTA finding of a short segment of left vertebral artery stenosis was incidental.  He did not think stroke workup with MRI was indicated.

## 2022-02-04 NOTE — PLAN OF CARE
CM meet with patient and son Forest Lopez at bedside providing a update concerning patient discharge placement. CM updated Forest Jr Lopez his father admission is pending at [facility]

## 2022-02-04 NOTE — PLAN OF CARE
Problem: Adult Inpatient Plan of Care  Goal: Plan of Care Review  2/4/2022 1658 by Isela Stevenson RN  Outcome: Ongoing, Progressing  2/4/2022 1550 by Isela Stevenson RN  Outcome: Ongoing, Progressing  Goal: Patient-Specific Goal (Individualized)  2/4/2022 1658 by Isela Stevenson RN  Outcome: Ongoing, Progressing  2/4/2022 1550 by Isela Stevenson RN  Outcome: Ongoing, Progressing  Goal: Absence of Hospital-Acquired Illness or Injury  2/4/2022 1658 by Isela Stevenson RN  Outcome: Ongoing, Progressing  2/4/2022 1550 by Isela Stevenson RN  Outcome: Ongoing, Progressing  Goal: Optimal Comfort and Wellbeing  2/4/2022 1658 by Isela Stevenson RN  Outcome: Ongoing, Progressing  2/4/2022 1550 by Isela Stevenson RN  Outcome: Ongoing, Progressing  Goal: Readiness for Transition of Care  2/4/2022 1658 by Isela Setvenson RN  Outcome: Ongoing, Progressing  2/4/2022 1550 by Isela Stevenson RN  Outcome: Ongoing, Progressing     Problem: Diabetes Comorbidity  Goal: Blood Glucose Level Within Targeted Range  2/4/2022 1658 by Isela tSevenson RN  Outcome: Ongoing, Progressing  2/4/2022 1550 by Isela Stevenson RN  Outcome: Ongoing, Progressing

## 2022-02-04 NOTE — PLAN OF CARE
02/03/22 1813   Post-Acute Status   Post-Acute Authorization Placement   Post-Acute Placement Status Referrals Sent   Coverage [employer] MANAGED MEDICARE - [employer] SNP (SPECIAL NEEDS PLAN)   Discharge Delays (!) Post-Acute Set-up   Discharge Plan   Discharge Plan A New Nursing Home placement - senior care care facility     SW sent referrals today. Only [facility] has responded thus far. They are interested and want more information on the patient.   Referrals to other facilities are pending.

## 2022-02-04 NOTE — ASSESSMENT & PLAN NOTE
- Consult for advance care planning in older pt with multiple chronic illnesses, active cocaine abuse, homelessness currently in hospital after syncopal episode. Chart reviewed in depth; discussed pt in person with primary team.   - Met with pt at bedside; calm, polite, though speaks very quietly and is hard to hear at times. Introduced role of palliative medicine, and learned more about pt outside of hospital. Per pt, he is , and has two sons. His son Forest Jones ([phone], patient's preferred MPOA) lives locally and was just in to visit pt yesterday. He does not recall the phone number for his other son, Camilo. He has approximately 8 brothers and sisters. He is a retired  for the [employer] (possibly may have a pension?), and worked for them for many years. Lately, he spends much of his time watching TV.   - He did confirm that he is homeless, though he is receptive to SNF, and then likely NH after this. Asked him if anyone in his family would be open to having him move in with them; he suggested they would not.   - We did discuss his cocaine usage; he did seem to be reflecting when we discussed if he might be using this as a coping mechanism/self-medicating for possible depression. May benefit from initation of anti-depressant.   - Did not discuss code status during initial visit, though will address this at future visit given his frailty   - Our team will follow up with pt and likely will try to update his son Forest; discussed with primary team

## 2022-02-04 NOTE — CONSULTS
[facility] ([place])  Palliative Medicine  Consult Note    Patient Name: Forest Lopez  MRN: [id number]  Admission Date: 1/31/2022  Hospital Length of Stay: 4 days  Code Status: Full Code   Attending Provider: Alma Elizalde MD  Consulting Provider: Harmony Skinner MD  Primary Care Physician: Julian Escobar  Principal Problem:Acute deep vein thrombosis (DVT) of both lower extremities    Patient information was obtained from patient, past medical records, ER records and primary team.      Inpatient consult to Palliative Care  Consult performed by: Harmony Skinner MD  Consult ordered by: Alma Elizalde MD  Reason for consult: Advance Care Planning         Assessment/Plan:     Advance Care Planning        2/4/22  - Consult for advance care planning in older pt with multiple chronic illnesses, active cocaine abuse, homelessness currently in hospital after syncopal episode. Chart reviewed in depth; discussed pt in person with primary team.   - Met with pt at bedside; calm, polite, though speaks very quietly and is hard to hear at times. Introduced role of palliative medicine, and learned more about pt outside of hospital. Per pt, he is , and has two sons. His son Forest Jones ([phone], patient's preferred MPOA) lives locally and was just in to visit pt yesterday. He does not recall the phone number for his other son, Camilo. He has approximately 8 brothers and sisters. He is a retired  for the [employer] (possibly may have a pension?), and worked for them for many years. Lately, he spends much of his time watching TV.   - He did confirm that he is homeless, though he is receptive to SNF, and then likely NH after this. Asked him if anyone in his family would be open to having him move in with them; he suggested they would not.   - We did discuss his cocaine usage; he did seem to be reflecting when we discussed if he might be using this as a coping  "mechanism/self-medicating for possible depression. May benefit from initation of anti-depressant.   - Did not discuss code status during initial visit, though will address this at future visit given his frailty   - Our team will follow up with pt and likely will try to update his son Forest; discussed with primary team     Acute deep vein thrombosis (DVT) of both lower extremities  - Anticoagulation per primary     Severe protein-calorie malnutrition  - BMI 18.90, visible temporal wasting on exam. Unclear if related to food insecurity vs ongoing drug abuse vs other causes (malignancy?)   - RD consult for nutritional optimization     Debility  - PT/OT evaluation; pending SNF     Syncope and collapse  - Reason for hospital stay; evaluation/mgmt per primary team     Cocaine abuse  - Encouraged cessation  - Suspect pt may be self-medicating depression with this; would consider initiation of antidepressant if pt agreeable     Tobacco dependence  - Nicotine patch PRN    Thank you for your consult. I will follow-up with patient. Please contact us if you have any additional questions.     VASYL Lincoln  Palliative Medicine Staff   [phone]    Total visit time: 86 minutes    > 50% of 70 min visit spent in chart review, face to face discussion of symptom assessment, coordination of care with other specialists, and discharge planning.    16 min ACP time spent: goals of care, emotional support, formulating and communicating prognosis, exploring burden/ benefit of various approaches of treatment.     Subjective:     HPI:   (From H&P) "Mr. Lopez is a 72 year old man who presented after a syncopal episode.  He reports he had been feeling well prior to the episode but then had a prodrome prior to passing out.  EMS was called, report patient's BP was low normal on their arrival, improved after an IV fluids bolus.  Patient denies chest pain or shortness of breath and says he does not feel weak currently although is rather " "tired.  When seen in the ED this morning he could barely express himself audibly. Vital signs were normal on presentation here.  He is on warfarin for recurrent DVT, but his INR was 1, and d-dimer markedly elevated at 21.7.  Tox screen was positive for cocaine.  He had a CTA of the brain and neck done on presentation so CTA of the chest for PE study could not be done for 48 hours.  Ultrasound of the lower extremities showed extensive bilateral DVT.  On the right there was thrombosis of the common femoral vein, femoral vein, popliteal vein, one of the paired posterior tibial veins and both visualized peroneal veins.  On the left there was thrombosis of the common femoral vein, femoral vein and popliteal vein.  Patient was started on full dose Lovenox and admitted. Medical history is from the chart as he is unable to give me much information.  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  He smokes 5-6 cigarettes/day and is not interested in quitting.  He is currently homeless and staying with a friend.  Despite the normal INR he is sure he is taking his warfarin and is not having difficulty taking his medications.  I discussed his care with [facility] staff on the [facility] and patient had been lost to follow up since November 2021."     Palliative medicine is consulted for advance care planning, given pt's multiple chronic conditions, ongoing drug abuse, lack of routine healthcare follow up, and psychosocial challenges. Met with pt at bedside; introduced role of palliative medicine. For details of discussion, see advance care planning section of plan.       Past Medical History:   Diagnosis Date    Blind left eye     able to see, but poorly    BPH (benign prostatic hyperplasia)     Diabetes mellitus, type 2     Hyperlipidemia     Hypertension     Left rib fracture 03/30/2017 " "   Stroke-like symptoms 09/25/2019    L facial droop, slurred speech & L arm weakness       Past Surgical History:   Procedure Laterality Date    NO PAST SURGERIES  09/25/2019       Review of patient's allergies indicates:  No Known Allergies    Medications:  Continuous Infusions:  Scheduled Meds:   atorvastatin  80 mg Oral Daily    enoxaparin  1 mg/kg Subcutaneous Q12H    insulin detemir U-100  10 Units Subcutaneous Daily    losartan  25 mg Oral Daily    tuberculin  5 Units Intradermal Once    warfarin  7.5 mg Oral Daily     PRN Meds:acetaminophen, dextrose 50%, dextrose 50%, glucagon (human recombinant), glucose, glucose, hydrALAZINE, insulin aspart U-100, naloxone, ondansetron, sodium chloride 0.9%    Family History     Problem Relation (Age of Onset)    Cancer Sister    Heart disease Mother, Father    No Known Problems Son, Son    Stroke Sister        Tobacco Use    Smoking status: Light Tobacco Smoker     Types: Cigarettes, Cigars    Smokeless tobacco: Never Used    Tobacco comment: Smokes 5-6 cigarettes/day   Substance and Sexual Activity    Alcohol use: Yes     Alcohol/week: 0.0 standard drinks     Comment: drinks beer socially     Drug use: Not Currently     Types: Marijuana, "Crack" cocaine    Sexual activity: Not Currently       Review of Systems   Constitutional: Positive for fatigue.   HENT: Negative for congestion.    Eyes: Negative for visual disturbance.   Respiratory: Negative for shortness of breath.    Cardiovascular: Negative for chest pain.   Gastrointestinal: Negative for abdominal pain.   Genitourinary: Negative for difficulty urinating.   Musculoskeletal:        Left shoulder pain    Skin: Negative for wound.   Neurological: Positive for syncope.        (not since arriving in hospital)    Psychiatric/Behavioral: Negative for confusion.     Objective:     Vital Signs (Most Recent):  Temp: 99.1 °F (37.3 °C) (02/04/22 0734)  Pulse: 93 (02/04/22 1000)  Resp: 18 (02/04/22 0734)  BP: " 126/73 (02/04/22 0734)  SpO2: 100 % (02/04/22 0734) Vital Signs (24h Range):  Temp:  [97.7 °F (36.5 °C)-100.6 °F (38.1 °C)] 99.1 °F (37.3 °C)  Pulse:  [] 93  Resp:  [16-18] 18  SpO2:  [97 %-100 %] 100 %  BP: (111-133)/(73-76) 126/73     Weight: 63.2 kg (139 lb 5.3 oz)  Body mass index is 18.9 kg/m².    Physical Exam  Constitutional:       Comments: Lying in bed on his side, awake and able to have a conversation, speaks softly, polite    HENT:      Head:      Comments: Temporal wasting      Nose: Nose normal.      Mouth/Throat:      Mouth: Mucous membranes are moist.   Eyes:      Extraocular Movements: Extraocular movements intact.   Cardiovascular:      Rate and Rhythm: Normal rate.   Musculoskeletal:         General: No deformity.   Skin:     General: Skin is warm.   Neurological:      Comments: Able to have a complete conversation    Psychiatric:         Mood and Affect: Mood normal.         Behavior: Behavior normal.       Significant Labs: All pertinent labs within the past 24 hours have been reviewed.  CBC:   Recent Labs   Lab 02/04/22  0957   WBC 10.67   HGB 15.1   HCT 47.4   MCV 80*        BMP:  Recent Labs   Lab 02/04/22  0956   *   *   K 4.9      CO2 21*   BUN 14   CREATININE 1.1   CALCIUM 10.0     LFT:  Lab Results   Component Value Date    AST 13 02/02/2022    ALKPHOS 84 02/02/2022    BILITOT 0.7 02/02/2022     Albumin:   Albumin   Date Value Ref Range Status   02/02/2022 3.6 3.5 - 5.2 g/dL Final     Protein:   Total Protein   Date Value Ref Range Status   02/02/2022 7.3 6.0 - 8.4 g/dL Final     Lactic acid:   Lab Results   Component Value Date    LACTATE 1.3 06/15/2020    LACTATE 2.5 (H) 06/14/2020       Significant Imaging: I have reviewed all pertinent imaging results/findings within the past 24 hours.

## 2022-02-04 NOTE — PLAN OF CARE
02/04/22 0849   Post-Acute Status   Post-Acute Authorization Placement   Post-Acute Placement Status Referrals Sent   Coverage [employer]   Discharge Delays (!) Post-Acute Set-up   Discharge Plan   Discharge Plan A Skilled Nursing Facility     Updated notes/referral packet sent in careport to SNFs; [facility] has interest and will be reviewing.     LOCET/PASRR remain due  PPD placement remain due

## 2022-02-04 NOTE — ASSESSMENT & PLAN NOTE
- Encouraged cessation  - Suspect pt may be self-medicating depression with this; would consider initiation of antidepressant if pt agreeable

## 2022-02-04 NOTE — SUBJECTIVE & OBJECTIVE
Interval History: no acute issues, however patient minimally interactive despite virtual visit with charge nurse in the room - unable to ascertain any ROS.     Review of Systems   Unable to perform ROS: Other     Objective:     Vital Signs (Most Recent):  Temp: 98.6 °F (37 °C) (02/03/22 1611)  Pulse: 78 (02/03/22 1900)  Resp: 16 (02/03/22 1611)  BP: 114/76 (02/03/22 1611)  SpO2: 100 % (02/03/22 1611) Vital Signs (24h Range):  Temp:  [96.2 °F (35.7 °C)-100.1 °F (37.8 °C)] 98.6 °F (37 °C)  Pulse:  [] 78  Resp:  [16-18] 16  SpO2:  [96 %-100 %] 100 %  BP: (111-165)/(52-95) 114/76     Weight: 63.2 kg (139 lb 5.3 oz)  Body mass index is 18.9 kg/m².    Intake/Output Summary (Last 24 hours) at 2/3/2022 1930  Last data filed at 2/3/2022 1639  Gross per 24 hour   Intake 960 ml   Output 1300 ml   Net -340 ml      Physical Exam  Vitals reviewed.   Constitutional:       General: He is not in acute distress.     Appearance: He is well-developed. He is ill-appearing.   HENT:      Head: Normocephalic and atraumatic.   Eyes:      Extraocular Movements: Extraocular movements intact.      Pupils: Pupils are equal, round, and reactive to light.   Cardiovascular:      Rate and Rhythm: Normal rate and regular rhythm.   Pulmonary:      Effort: Pulmonary effort is normal. No respiratory distress.      Breath sounds: Normal breath sounds.   Abdominal:      General: Bowel sounds are normal. There is no distension.      Palpations: Abdomen is soft.      Tenderness: There is no abdominal tenderness.   Musculoskeletal:         General: Swelling present. Normal range of motion.      Cervical back: Normal range of motion and neck supple.   Skin:     General: Skin is warm.      Findings: No rash.   Neurological:      General: No focal deficit present.      Mental Status: He is alert and oriented to person, place, and time.   Psychiatric:         Mood and Affect: Mood normal.         Behavior: Behavior normal.         Significant Labs: All  pertinent labs within the past 24 hours have been reviewed.    Significant Imaging: I have reviewed all pertinent imaging results/findings within the past 24 hours.

## 2022-02-04 NOTE — HPI
"(From H&P) "Mr. Lopez is a 72 year old man who presented after a syncopal episode.  He reports he had been feeling well prior to the episode but then had a prodrome prior to passing out.  EMS was called, report patient's BP was low normal on their arrival, improved after an IV fluids bolus.  Patient denies chest pain or shortness of breath and says he does not feel weak currently although is rather tired.  When seen in the ED this morning he could barely express himself audibly. Vital signs were normal on presentation here.  He is on warfarin for recurrent DVT, but his INR was 1, and d-dimer markedly elevated at 21.7.  Tox screen was positive for cocaine.  He had a CTA of the brain and neck done on presentation so CTA of the chest for PE study could not be done for 48 hours.  Ultrasound of the lower extremities showed extensive bilateral DVT.  On the right there was thrombosis of the common femoral vein, femoral vein, popliteal vein, one of the paired posterior tibial veins and both visualized peroneal veins.  On the left there was thrombosis of the common femoral vein, femoral vein and popliteal vein.  Patient was started on full dose Lovenox and admitted. Medical history is from the chart as he is unable to give me much information.  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  He smokes 5-6 cigarettes/day and is not interested in quitting.  He is currently homeless and staying with a friend.  Despite the normal INR he is sure he is taking his warfarin and is not having difficulty taking his medications.  I discussed his care with [facility] staff on the [facility] and patient had been lost to follow up since November 2021."     Palliative medicine is consulted for advance care planning, given pt's multiple chronic conditions, ongoing drug abuse, lack of routine healthcare " follow up, and psychosocial challenges. Met with pt at bedside; introduced role of palliative medicine. For details of discussion, see advance care planning section of plan.

## 2022-02-04 NOTE — SUBJECTIVE & OBJECTIVE
"  Past Medical History:   Diagnosis Date    Blind left eye     able to see, but poorly    BPH (benign prostatic hyperplasia)     Diabetes mellitus, type 2     Hyperlipidemia     Hypertension     Left rib fracture 03/30/2017    Stroke-like symptoms 09/25/2019    L facial droop, slurred speech & L arm weakness       Past Surgical History:   Procedure Laterality Date    NO PAST SURGERIES  09/25/2019       Review of patient's allergies indicates:  No Known Allergies    Medications:  Continuous Infusions:  Scheduled Meds:   atorvastatin  80 mg Oral Daily    enoxaparin  1 mg/kg Subcutaneous Q12H    insulin detemir U-100  10 Units Subcutaneous Daily    losartan  25 mg Oral Daily    tuberculin  5 Units Intradermal Once    warfarin  7.5 mg Oral Daily     PRN Meds:acetaminophen, dextrose 50%, dextrose 50%, glucagon (human recombinant), glucose, glucose, hydrALAZINE, insulin aspart U-100, naloxone, ondansetron, sodium chloride 0.9%    Family History     Problem Relation (Age of Onset)    Cancer Sister    Heart disease Mother, Father    No Known Problems Son, Son    Stroke Sister        Tobacco Use    Smoking status: Light Tobacco Smoker     Types: Cigarettes, Cigars    Smokeless tobacco: Never Used    Tobacco comment: Smokes 5-6 cigarettes/day   Substance and Sexual Activity    Alcohol use: Yes     Alcohol/week: 0.0 standard drinks     Comment: drinks beer socially     Drug use: Not Currently     Types: Marijuana, "Crack" cocaine    Sexual activity: Not Currently       Review of Systems   Constitutional: Positive for fatigue.   HENT: Negative for congestion.    Eyes: Negative for visual disturbance.   Respiratory: Negative for shortness of breath.    Cardiovascular: Negative for chest pain.   Gastrointestinal: Negative for abdominal pain.   Genitourinary: Negative for difficulty urinating.   Musculoskeletal:        Left shoulder pain    Skin: Negative for wound.   Neurological: Positive for syncope.        " (not since arriving in hospital)    Psychiatric/Behavioral: Negative for confusion.     Objective:     Vital Signs (Most Recent):  Temp: 99.1 °F (37.3 °C) (02/04/22 0734)  Pulse: 93 (02/04/22 1000)  Resp: 18 (02/04/22 0734)  BP: 126/73 (02/04/22 0734)  SpO2: 100 % (02/04/22 0734) Vital Signs (24h Range):  Temp:  [97.7 °F (36.5 °C)-100.6 °F (38.1 °C)] 99.1 °F (37.3 °C)  Pulse:  [] 93  Resp:  [16-18] 18  SpO2:  [97 %-100 %] 100 %  BP: (111-133)/(73-76) 126/73     Weight: 63.2 kg (139 lb 5.3 oz)  Body mass index is 18.9 kg/m².    Physical Exam  Constitutional:       Comments: Lying in bed on his side, awake and able to have a conversation, speaks softly, polite    HENT:      Head:      Comments: Temporal wasting      Nose: Nose normal.      Mouth/Throat:      Mouth: Mucous membranes are moist.   Eyes:      Extraocular Movements: Extraocular movements intact.   Cardiovascular:      Rate and Rhythm: Normal rate.   Musculoskeletal:         General: No deformity.   Skin:     General: Skin is warm.   Neurological:      Comments: Able to have a complete conversation    Psychiatric:         Mood and Affect: Mood normal.         Behavior: Behavior normal.       Significant Labs: All pertinent labs within the past 24 hours have been reviewed.  CBC:   Recent Labs   Lab 02/04/22  0957   WBC 10.67   HGB 15.1   HCT 47.4   MCV 80*        BMP:  Recent Labs   Lab 02/04/22  0956   *   *   K 4.9      CO2 21*   BUN 14   CREATININE 1.1   CALCIUM 10.0     LFT:  Lab Results   Component Value Date    AST 13 02/02/2022    ALKPHOS 84 02/02/2022    BILITOT 0.7 02/02/2022     Albumin:   Albumin   Date Value Ref Range Status   02/02/2022 3.6 3.5 - 5.2 g/dL Final     Protein:   Total Protein   Date Value Ref Range Status   02/02/2022 7.3 6.0 - 8.4 g/dL Final     Lactic acid:   Lab Results   Component Value Date    LACTATE 1.3 06/15/2020    LACTATE 2.5 (H) 06/14/2020       Significant Imaging: I have reviewed all  pertinent imaging results/findings within the past 24 hours.

## 2022-02-04 NOTE — PROGRESS NOTES
BERKLEYW met with patient and patient has no additional needs. Patient will discharge to [facility] placement.    SUMMER Wilson

## 2022-02-05 LAB
ANION GAP SERPL CALC-SCNC: 9 MMOL/L (ref 8–16)
BILIRUB UR QL STRIP: NEGATIVE
BUN SERPL-MCNC: 19 MG/DL (ref 8–23)
CALCIUM SERPL-MCNC: 9.6 MG/DL (ref 8.7–10.5)
CHLORIDE SERPL-SCNC: 100 MMOL/L (ref 95–110)
CLARITY UR: CLEAR
CO2 SERPL-SCNC: 22 MMOL/L (ref 23–29)
COLOR UR: YELLOW
CREAT SERPL-MCNC: 1 MG/DL (ref 0.5–1.4)
EST. GFR  (AFRICAN AMERICAN): >60 ML/MIN/1.73 M^2
EST. GFR  (NON AFRICAN AMERICAN): >60 ML/MIN/1.73 M^2
GLUCOSE SERPL-MCNC: 206 MG/DL (ref 70–110)
GLUCOSE UR QL STRIP: ABNORMAL
HGB UR QL STRIP: NEGATIVE
INR PPP: 1.1 (ref 0.8–1.2)
KETONES UR QL STRIP: NEGATIVE
LEUKOCYTE ESTERASE UR QL STRIP: NEGATIVE
MAGNESIUM SERPL-MCNC: 1.9 MG/DL (ref 1.6–2.6)
NITRITE UR QL STRIP: NEGATIVE
PH UR STRIP: 6 [PH] (ref 5–8)
PHOSPHATE SERPL-MCNC: 3.6 MG/DL (ref 2.7–4.5)
POCT GLUCOSE: 187 MG/DL (ref 70–110)
POCT GLUCOSE: 214 MG/DL (ref 70–110)
POCT GLUCOSE: 231 MG/DL (ref 70–110)
POCT GLUCOSE: 244 MG/DL (ref 70–110)
POCT GLUCOSE: 249 MG/DL (ref 70–110)
POTASSIUM SERPL-SCNC: 4.4 MMOL/L (ref 3.5–5.1)
PROT UR QL STRIP: NEGATIVE
PROTHROMBIN TIME: 12.4 SEC (ref 9–12.5)
SODIUM SERPL-SCNC: 131 MMOL/L (ref 136–145)
SP GR UR STRIP: 1.02 (ref 1–1.03)
URN SPEC COLLECT METH UR: ABNORMAL
UROBILINOGEN UR STRIP-ACNC: NEGATIVE EU/DL

## 2022-02-05 PROCEDURE — 21400001 HC TELEMETRY ROOM

## 2022-02-05 PROCEDURE — C9399 UNCLASSIFIED DRUGS OR BIOLOG: HCPCS | Performed by: INTERNAL MEDICINE

## 2022-02-05 PROCEDURE — 99233 PR SUBSEQUENT HOSPITAL CARE,LEVL III: ICD-10-PCS | Mod: ,,, | Performed by: INTERNAL MEDICINE

## 2022-02-05 PROCEDURE — 85610 PROTHROMBIN TIME: CPT | Performed by: INTERNAL MEDICINE

## 2022-02-05 PROCEDURE — 25000003 PHARM REV CODE 250: Performed by: NURSE PRACTITIONER

## 2022-02-05 PROCEDURE — 80048 BASIC METABOLIC PNL TOTAL CA: CPT | Performed by: INTERNAL MEDICINE

## 2022-02-05 PROCEDURE — 63600175 PHARM REV CODE 636 W HCPCS: Performed by: HOSPITALIST

## 2022-02-05 PROCEDURE — 94761 N-INVAS EAR/PLS OXIMETRY MLT: CPT

## 2022-02-05 PROCEDURE — 25000003 PHARM REV CODE 250: Performed by: INTERNAL MEDICINE

## 2022-02-05 PROCEDURE — 84100 ASSAY OF PHOSPHORUS: CPT | Performed by: INTERNAL MEDICINE

## 2022-02-05 PROCEDURE — 99233 SBSQ HOSP IP/OBS HIGH 50: CPT | Mod: ,,, | Performed by: INTERNAL MEDICINE

## 2022-02-05 PROCEDURE — 81003 URINALYSIS AUTO W/O SCOPE: CPT | Performed by: INTERNAL MEDICINE

## 2022-02-05 PROCEDURE — 83735 ASSAY OF MAGNESIUM: CPT | Performed by: INTERNAL MEDICINE

## 2022-02-05 PROCEDURE — 36415 COLL VENOUS BLD VENIPUNCTURE: CPT | Performed by: INTERNAL MEDICINE

## 2022-02-05 RX ADMIN — INSULIN ASPART 2 UNITS: 100 INJECTION, SOLUTION INTRAVENOUS; SUBCUTANEOUS at 09:02

## 2022-02-05 RX ADMIN — WARFARIN SODIUM 10 MG: 5 TABLET ORAL at 05:02

## 2022-02-05 RX ADMIN — INSULIN ASPART 4 UNITS: 100 INJECTION, SOLUTION INTRAVENOUS; SUBCUTANEOUS at 05:02

## 2022-02-05 RX ADMIN — ENOXAPARIN SODIUM 70 MG: 80 INJECTION SUBCUTANEOUS at 12:02

## 2022-02-05 RX ADMIN — INSULIN ASPART 4 UNITS: 100 INJECTION, SOLUTION INTRAVENOUS; SUBCUTANEOUS at 01:02

## 2022-02-05 RX ADMIN — INSULIN ASPART 2 UNITS: 100 INJECTION, SOLUTION INTRAVENOUS; SUBCUTANEOUS at 10:02

## 2022-02-05 RX ADMIN — LOSARTAN POTASSIUM 25 MG: 25 TABLET, FILM COATED ORAL at 09:02

## 2022-02-05 RX ADMIN — ATORVASTATIN CALCIUM 80 MG: 20 TABLET, FILM COATED ORAL at 09:02

## 2022-02-05 RX ADMIN — ENOXAPARIN SODIUM 70 MG: 80 INJECTION SUBCUTANEOUS at 11:02

## 2022-02-05 RX ADMIN — ENOXAPARIN SODIUM 70 MG: 80 INJECTION SUBCUTANEOUS at 10:02

## 2022-02-05 RX ADMIN — INSULIN DETEMIR 15 UNITS: 100 INJECTION, SOLUTION SUBCUTANEOUS at 09:02

## 2022-02-05 NOTE — PROGRESS NOTES
[facility] Medicine  Progress Note    Patient Name: Forest Lopez  MRN: [id number]  Patient Class: IP- Inpatient   Admission Date: 1/31/2022  Length of Stay: 4 days  Attending Physician: Alma Elizalde MD  Primary Care Provider: Julian Escobar        Subjective:     Principal Problem:Acute deep vein thrombosis (DVT) of both lower extremities        HPI:  Mr. Lopez is a 72 year old man who presented after a syncopal episode.  He reports he had been feeling well prior to the episode but then had a prodrome prior to passing out.  EMS was called, report patient's BP was low normal on their arrival, improved after an IV fluids bolus.  Patient denies chest pain or shortness of breath and says he does not feel weak currently although is rather tired.  When seen in the ED this morning he could barely express himself audibly.     Vital signs were normal on presentation here.  He is on warfarin for recurrent DVT, but his INR was 1, and d-dimer markedly elevated at 21.7.  Tox screen was positive for cocaine.  He had a CTA of the brain and neck done on presentation so CTA of the chest for PE study could not be done for 48 hours.  Ultrasound of the lower extremities showed extensive bilateral DVT.  On the right there was thrombosis of the common femoral vein, femoral vein, popliteal vein, one of the paired posterior tibial veins and both visualized peroneal veins.  On the left there was thrombosis of the common femoral vein, femoral vein and popliteal vein.  Patient was started on full dose Lovenox and admitted.    Medical history is from the chart as he is unable to give me much information.  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  He smokes 5-6 cigarettes/day and is not interested in quitting.  He is currently homeless and staying with a friend.  Despite  the normal INR he is sure he is taking his warfarin and is not having difficulty taking his medications.  I discussed his care with [facility] staff on the [facility] and patient had been lost to follow up since November 2021.      Overview/Hospital Course:    Started on lovenox and coumadin. Inr still low.      Interval History: patient denies  acute issues, denies pain or sob, dysuria, diarrhea, had low grade fever, denies being depressed, when son came later in evening, he stated his father is different, not talking to him and is a change from Tuesday.    Review of Systems   Constitutional: Negative for chills and fever.   HENT: Negative for trouble swallowing.    Respiratory: Negative for cough and shortness of breath.    Cardiovascular: Negative for chest pain and leg swelling.   Gastrointestinal: Negative for abdominal pain, blood in stool, nausea and vomiting.   Genitourinary: Negative for dysuria and hematuria.   Skin: Negative for rash.   Neurological: Positive for weakness. Negative for headaches.   Psychiatric/Behavioral: Negative for confusion.     Objective:     Vital Signs (Most Recent):  Temp: 98 °F (36.7 °C) (02/04/22 1717)  Pulse: 90 (02/04/22 1900)  Resp: 18 (02/04/22 1717)  BP: 130/73 (02/04/22 1717)  SpO2: 96 % (02/04/22 1717) Vital Signs (24h Range):  Temp:  [98 °F (36.7 °C)-100.6 °F (38.1 °C)] 98 °F (36.7 °C)  Pulse:  [] 90  Resp:  [16-18] 18  SpO2:  [96 %-100 %] 96 %  BP: (116-133)/(73-76) 130/73     Weight: 63.2 kg (139 lb 5.3 oz)  Body mass index is 18.9 kg/m².    Intake/Output Summary (Last 24 hours) at 2/4/2022 1909  Last data filed at 2/4/2022 1600  Gross per 24 hour   Intake 480 ml   Output 1240 ml   Net -760 ml      Physical Exam  Vitals reviewed.   Constitutional:       General: He is not in acute distress.     Appearance: He is well-developed. He is ill-appearing.   HENT:      Head: Normocephalic and atraumatic.   Eyes:      Extraocular Movements: Extraocular movements intact.       Pupils: Pupils are equal, round, and reactive to light.   Cardiovascular:      Rate and Rhythm: Normal rate and regular rhythm.   Pulmonary:      Effort: Pulmonary effort is normal. No respiratory distress.      Breath sounds: Normal breath sounds.   Abdominal:      General: Bowel sounds are normal. There is no distension.      Palpations: Abdomen is soft.      Tenderness: There is no abdominal tenderness.   Musculoskeletal:         General: Swelling present. Normal range of motion.      Cervical back: Normal range of motion and neck supple.   Skin:     General: Skin is warm.      Findings: No rash.   Neurological:      General: No focal deficit present.      Mental Status: He is alert and oriented to person, place, and time.   Psychiatric:         Mood and Affect: Mood normal.         Behavior: Behavior normal.         Significant Labs: All pertinent labs within the past 24 hours have been reviewed.    Significant Imaging: I have reviewed all pertinent imaging results/findings within the past 24 hours.      Assessment/Plan:      * Acute deep vein thrombosis (DVT) of both lower extremities  Last ultrasound showed partially occlusive DVTs, now completely occlusive DVT of multiple lower extremity veins on ultrasound.  Patient reports compliance with warfarin but his INR is only one.  He is homeless but says he does not have difficulty obtaining his medications.  D-dimer is markedly elevated and there is a question of possible PE, but he just had a CTA of the brain/neck so cannot get another CTA for another 48 hours.VQ scan low probability for PE, echo ok.  As INR is not therapeutic will start full dose enoxaparin.Restart warfarin.Increase warfarin to 10 mg daily.      Fever  Will check cxr and urine        Debility  Pt/ot efforts  Needs placement skilled    Type 2 diabetes mellitus with hyperglycemia, without long-term current use of insulin  Reportedly he is on metformin and glipizide, both held.  Continue SSI for  now.  He has 4+ sugar in urine and HbA1c is 10.3, and he is hyperglycemic here.  Will start levemir and continue iss., increase levemir to 15 units daily      Syncope and collapse  Unclear cause.  Spoke to [facility] and patient has had previous syncopal episodes attributed to alcohol abuse.  Has been prescribed meclizine as well.  Tox positive for cocaine but not alcohol.  CTA brain/neck was done in ED, and per ED note the results were discussed with stroke neurologist Dr. Tovar who feels that CTA finding of a short segment of left vertebral artery stenosis was incidental.  He did not think stroke workup with MRI was indicated.    Since change of status per son, will get ct head .      Cocaine abuse  As noted above      Hyperlipidemia  Resumed lipitor      Essential hypertension  Not sure on what meds at home, bp high now  Will start losartan 25 mg daily  bp stable    Tobacco dependence  He smokes 5-6 cigarettes/day.  Not trying to quit and not interested in a nicotine patch.  Benefits of smoking cessation were reviewed with patient in detail for for 8 minutes and patient was encouraged to quit. Nicotine replacement options were discussed.        VTE Risk Mitigation (From admission, onward)         Ordered     warfarin (COUMADIN) tablet 10 mg  Daily         02/04/22 1644     enoxaparin injection 70 mg  Every 12 hours (non-standard times)         01/31/22 1003     Place sequential compression device  Until discontinued         01/31/22 0533                Discharge Planning   EFRAÍN: 2/5/2022     Code Status: Full Code   Is the patient medically ready for discharge?:     Reason for patient still in hospital (select all that apply): Patient trending condition and Treatment  Discharge Plan A: Skilled Nursing Facility   Discharge Delays: (!) Post-Acute Set-up              Alma Elizalde MD  Department of Hospital Medicine   [facility] - Med Surg ([place])

## 2022-02-05 NOTE — ASSESSMENT & PLAN NOTE
Reportedly he is on metformin and glipizide, both held.  Continue SSI for now.  He has 4+ sugar in urine and HbA1c is 10.3, and he is hyperglycemic here.  Will start levemir and continue iss., increase levemir to 15 units daily

## 2022-02-05 NOTE — SUBJECTIVE & OBJECTIVE
Interval History: patient denies  acute issues, denies pain or sob, dysuria, diarrhea, no further  fever, denies being depressed, says may be not right feeling but no specific c/o, having bm, no bleeding, eats all his food.  Review of Systems   Constitutional: Negative for chills and fever.   HENT: Negative for trouble swallowing.    Respiratory: Negative for cough and shortness of breath.    Cardiovascular: Negative for chest pain and leg swelling.   Gastrointestinal: Negative for abdominal pain, blood in stool, nausea and vomiting.   Genitourinary: Negative for dysuria and hematuria.   Skin: Negative for rash.   Neurological: Positive for weakness. Negative for headaches.   Psychiatric/Behavioral: Negative for confusion.     Objective:     Vital Signs (Most Recent):  Temp: 98.3 °F (36.8 °C) (02/05/22 1149)  Pulse: 98 (02/05/22 1400)  Resp: 19 (02/05/22 1149)  BP: 110/63 (02/05/22 1149)  SpO2: 95 % (02/05/22 1149) Vital Signs (24h Range):  Temp:  [98 °F (36.7 °C)-98.9 °F (37.2 °C)] 98.3 °F (36.8 °C)  Pulse:  [] 98  Resp:  [16-19] 19  SpO2:  [95 %-99 %] 95 %  BP: (110-136)/(63-77) 110/63     Weight: 63.2 kg (139 lb 5.3 oz)  Body mass index is 18.9 kg/m².    Intake/Output Summary (Last 24 hours) at 2/5/2022 1517  Last data filed at 2/5/2022 1300  Gross per 24 hour   Intake 960 ml   Output 1050 ml   Net -90 ml      Physical Exam  Vitals reviewed.   Constitutional:       General: He is not in acute distress.     Appearance: He is well-developed. He is ill-appearing.   HENT:      Head: Normocephalic and atraumatic.   Eyes:      Extraocular Movements: Extraocular movements intact.      Pupils: Pupils are equal, round, and reactive to light.   Cardiovascular:      Rate and Rhythm: Normal rate and regular rhythm.   Pulmonary:      Effort: Pulmonary effort is normal. No respiratory distress.      Breath sounds: Normal breath sounds.   Abdominal:      General: Bowel sounds are normal. There is no distension.       Palpations: Abdomen is soft.      Tenderness: There is no abdominal tenderness.   Musculoskeletal:         General: Swelling present. Normal range of motion.      Cervical back: Normal range of motion and neck supple.   Skin:     General: Skin is warm.      Findings: No rash.   Neurological:      General: No focal deficit present.      Mental Status: He is alert and oriented to person, place, and time.   Psychiatric:         Behavior: Behavior normal.      Comments: Flat affect         Significant Labs: All pertinent labs within the past 24 hours have been reviewed.    Significant Imaging: I have reviewed all pertinent imaging results/findings within the past 24 hours.

## 2022-02-05 NOTE — SUBJECTIVE & OBJECTIVE
Interval History: patient denies  acute issues, denies pain or sob, dysuria, diarrhea, had low grade fever, denies being depressed, when son came later in evening, he stated his father is different, not talking to him and is a change from Tuesday.    Review of Systems   Constitutional: Negative for chills and fever.   HENT: Negative for trouble swallowing.    Respiratory: Negative for cough and shortness of breath.    Cardiovascular: Negative for chest pain and leg swelling.   Gastrointestinal: Negative for abdominal pain, blood in stool, nausea and vomiting.   Genitourinary: Negative for dysuria and hematuria.   Skin: Negative for rash.   Neurological: Positive for weakness. Negative for headaches.   Psychiatric/Behavioral: Negative for confusion.     Objective:     Vital Signs (Most Recent):  Temp: 98 °F (36.7 °C) (02/04/22 1717)  Pulse: 90 (02/04/22 1900)  Resp: 18 (02/04/22 1717)  BP: 130/73 (02/04/22 1717)  SpO2: 96 % (02/04/22 1717) Vital Signs (24h Range):  Temp:  [98 °F (36.7 °C)-100.6 °F (38.1 °C)] 98 °F (36.7 °C)  Pulse:  [] 90  Resp:  [16-18] 18  SpO2:  [96 %-100 %] 96 %  BP: (116-133)/(73-76) 130/73     Weight: 63.2 kg (139 lb 5.3 oz)  Body mass index is 18.9 kg/m².    Intake/Output Summary (Last 24 hours) at 2/4/2022 1909  Last data filed at 2/4/2022 1600  Gross per 24 hour   Intake 480 ml   Output 1240 ml   Net -760 ml      Physical Exam  Vitals reviewed.   Constitutional:       General: He is not in acute distress.     Appearance: He is well-developed. He is ill-appearing.   HENT:      Head: Normocephalic and atraumatic.   Eyes:      Extraocular Movements: Extraocular movements intact.      Pupils: Pupils are equal, round, and reactive to light.   Cardiovascular:      Rate and Rhythm: Normal rate and regular rhythm.   Pulmonary:      Effort: Pulmonary effort is normal. No respiratory distress.      Breath sounds: Normal breath sounds.   Abdominal:      General: Bowel sounds are normal. There is  no distension.      Palpations: Abdomen is soft.      Tenderness: There is no abdominal tenderness.   Musculoskeletal:         General: Swelling present. Normal range of motion.      Cervical back: Normal range of motion and neck supple.   Skin:     General: Skin is warm.      Findings: No rash.   Neurological:      General: No focal deficit present.      Mental Status: He is alert and oriented to person, place, and time.   Psychiatric:         Mood and Affect: Mood normal.         Behavior: Behavior normal.         Significant Labs: All pertinent labs within the past 24 hours have been reviewed.    Significant Imaging: I have reviewed all pertinent imaging results/findings within the past 24 hours.

## 2022-02-05 NOTE — PROGRESS NOTES
Pt was put in transport around 6:03pm. Pt was dropped off of the transport list around 6:48pm. Tech called transport and was told they do not know what happen. Tech then called RN and was told to call back in 15 mins due to shift change. EVELYNE/ANTONIO  @6:52pm

## 2022-02-05 NOTE — ASSESSMENT & PLAN NOTE
Last ultrasound showed partially occlusive DVTs, now completely occlusive DVT of multiple lower extremity veins on ultrasound.  Patient reports compliance with warfarin but his INR is only one.  He is homeless but says he does not have difficulty obtaining his medications.  D-dimer is markedly elevated and there is a question of possible PE, but he just had a CTA of the brain/neck so cannot get another CTA for another 48 hours.VQ scan low probability for PE, echo ok.  As INR is not therapeutic will start full dose enoxaparin.Restart warfarin.Increase warfarin to 10 mg daily.

## 2022-02-05 NOTE — ASSESSMENT & PLAN NOTE
Unclear cause.  Spoke to [facility] and patient has had previous syncopal episodes attributed to alcohol abuse.  Has been prescribed meclizine as well.  Tox positive for cocaine but not alcohol.  CTA brain/neck was done in ED, and per ED note the results were discussed with stroke neurologist Dr. Tovar who feels that CTA finding of a short segment of left vertebral artery stenosis was incidental.  He did not think stroke workup with MRI was indicated.    Since change of status per son, will get ct head .

## 2022-02-05 NOTE — PROGRESS NOTES
[facility] Medicine  Progress Note    Patient Name: Forest Lopez  MRN: [id number]  Patient Class: IP- Inpatient   Admission Date: 1/31/2022  Length of Stay: 5 days  Attending Physician: Alma Elizalde MD  Primary Care Provider: Julian Escobar        Subjective:     Principal Problem:Acute deep vein thrombosis (DVT) of both lower extremities        HPI:  Mr. Lopez is a 72 year old man who presented after a syncopal episode.  He reports he had been feeling well prior to the episode but then had a prodrome prior to passing out.  EMS was called, report patient's BP was low normal on their arrival, improved after an IV fluids bolus.  Patient denies chest pain or shortness of breath and says he does not feel weak currently although is rather tired.  When seen in the ED this morning he could barely express himself audibly.     Vital signs were normal on presentation here.  He is on warfarin for recurrent DVT, but his INR was 1, and d-dimer markedly elevated at 21.7.  Tox screen was positive for cocaine.  He had a CTA of the brain and neck done on presentation so CTA of the chest for PE study could not be done for 48 hours.  Ultrasound of the lower extremities showed extensive bilateral DVT.  On the right there was thrombosis of the common femoral vein, femoral vein, popliteal vein, one of the paired posterior tibial veins and both visualized peroneal veins.  On the left there was thrombosis of the common femoral vein, femoral vein and popliteal vein.  Patient was started on full dose Lovenox and admitted.    Medical history is from the chart as he is unable to give me much information.  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  He smokes 5-6 cigarettes/day and is not interested in quitting.  He is currently homeless and staying with a friend.  Despite  the normal INR he is sure he is taking his warfarin and is not having difficulty taking his medications.  I discussed his care with [facility] staff on the [facility] and patient had been lost to follow up since November 2021.      Overview/Hospital Course:    Started on lovenox and coumadin. Inr still low.      Interval History: patient denies  acute issues, denies pain or sob, dysuria, diarrhea, no further  fever, denies being depressed, says may be not right feeling but no specific c/o, having bm, no bleeding, eats all his food.  Review of Systems   Constitutional: Negative for chills and fever.   HENT: Negative for trouble swallowing.    Respiratory: Negative for cough and shortness of breath.    Cardiovascular: Negative for chest pain and leg swelling.   Gastrointestinal: Negative for abdominal pain, blood in stool, nausea and vomiting.   Genitourinary: Negative for dysuria and hematuria.   Skin: Negative for rash.   Neurological: Positive for weakness. Negative for headaches.   Psychiatric/Behavioral: Negative for confusion.     Objective:     Vital Signs (Most Recent):  Temp: 98.3 °F (36.8 °C) (02/05/22 1149)  Pulse: 98 (02/05/22 1400)  Resp: 19 (02/05/22 1149)  BP: 110/63 (02/05/22 1149)  SpO2: 95 % (02/05/22 1149) Vital Signs (24h Range):  Temp:  [98 °F (36.7 °C)-98.9 °F (37.2 °C)] 98.3 °F (36.8 °C)  Pulse:  [] 98  Resp:  [16-19] 19  SpO2:  [95 %-99 %] 95 %  BP: (110-136)/(63-77) 110/63     Weight: 63.2 kg (139 lb 5.3 oz)  Body mass index is 18.9 kg/m².    Intake/Output Summary (Last 24 hours) at 2/5/2022 1517  Last data filed at 2/5/2022 1300  Gross per 24 hour   Intake 960 ml   Output 1050 ml   Net -90 ml      Physical Exam  Vitals reviewed.   Constitutional:       General: He is not in acute distress.     Appearance: He is well-developed. He is ill-appearing.   HENT:      Head: Normocephalic and atraumatic.   Eyes:      Extraocular Movements: Extraocular movements intact.      Pupils: Pupils are equal,  round, and reactive to light.   Cardiovascular:      Rate and Rhythm: Normal rate and regular rhythm.   Pulmonary:      Effort: Pulmonary effort is normal. No respiratory distress.      Breath sounds: Normal breath sounds.   Abdominal:      General: Bowel sounds are normal. There is no distension.      Palpations: Abdomen is soft.      Tenderness: There is no abdominal tenderness.   Musculoskeletal:         General: Swelling present. Normal range of motion.      Cervical back: Normal range of motion and neck supple.   Skin:     General: Skin is warm.      Findings: No rash.   Neurological:      General: No focal deficit present.      Mental Status: He is alert and oriented to person, place, and time.   Psychiatric:         Behavior: Behavior normal.      Comments: Flat affect         Significant Labs: All pertinent labs within the past 24 hours have been reviewed.    Significant Imaging: I have reviewed all pertinent imaging results/findings within the past 24 hours.      Assessment/Plan:      * Acute deep vein thrombosis (DVT) of both lower extremities  Last ultrasound showed partially occlusive DVTs, now completely occlusive DVT of multiple lower extremity veins on ultrasound.  Patient reports compliance with warfarin but his INR is only one.  He is homeless but says he does not have difficulty obtaining his medications.  D-dimer is markedly elevated and there is a question of possible PE, but he just had a CTA of the brain/neck so cannot get another CTA for another 48 hours.VQ scan low probability for PE, echo ok.  As INR is not therapeutic will start full dose enoxaparin.Restart warfarin.Increase warfarin to 10 mg daily.      Fever  No further fever  ua pending  cxr ? Infiltrate on left,  no wbc, no cough, on room air        Debility  Pt/ot efforts  Needs placement skilled    Type 2 diabetes mellitus with hyperglycemia, without long-term current use of insulin  Reportedly he is on metformin and glipizide, both  held.  Continue SSI for now.  He has 4+ sugar in urine and HbA1c is 10.3, and he is hyperglycemic here.  Will start levemir and continue iss., increase levemir to 15 units daily      Syncope and collapse  Unclear cause.  Spoke to [facility] and patient has had previous syncopal episodes attributed to alcohol abuse.  Has been prescribed meclizine as well.  Tox positive for cocaine but not alcohol.  CTA brain/neck was done in ED, and per ED note the results were discussed with stroke neurologist Dr. Tovar who feels that CTA finding of a short segment of left vertebral artery stenosis was incidental.  He did not think stroke workup with MRI was indicated.    Since change of status per son, ct head ok, will get mri brain.      Cocaine abuse  As noted above      Hyperlipidemia  Resumed lipitor      Essential hypertension  Not sure on what meds at home, bp high now  Will start losartan 25 mg daily  bp stable    Tobacco dependence  He smokes 5-6 cigarettes/day.  Not trying to quit and not interested in a nicotine patch.  Benefits of smoking cessation were reviewed with patient in detail for for 8 minutes and patient was encouraged to quit. Nicotine replacement options were discussed.        VTE Risk Mitigation (From admission, onward)         Ordered     warfarin (COUMADIN) tablet 10 mg  Daily         02/04/22 1644     enoxaparin injection 70 mg  Every 12 hours (non-standard times)         01/31/22 1003     Place sequential compression device  Until discontinued         01/31/22 0533                Discharge Planning   EFRAÍN: 2/7/2022     Code Status: Full Code   Is the patient medically ready for discharge?:     Reason for patient still in hospital (select all that apply): Patient trending condition and Treatment  Discharge Plan A: Skilled Nursing Facility   Discharge Delays: (!) Post-Acute Set-up              Alma Elizalde MD  Department of Hospital Medicine   [facility] ([place])

## 2022-02-05 NOTE — ASSESSMENT & PLAN NOTE
Unclear cause.  Spoke to [facility] and patient has had previous syncopal episodes attributed to alcohol abuse.  Has been prescribed meclizine as well.  Tox positive for cocaine but not alcohol.  CTA brain/neck was done in ED, and per ED note the results were discussed with stroke neurologist Dr. Tovar who feels that CTA finding of a short segment of left vertebral artery stenosis was incidental.  He did not think stroke workup with MRI was indicated.    Since change of status per son, ct head ok, will get mri brain.

## 2022-02-06 PROBLEM — I63.9 ACUTE STROKE DUE TO ISCHEMIA: Status: ACTIVE | Noted: 2022-02-06

## 2022-02-06 LAB
ANION GAP SERPL CALC-SCNC: 12 MMOL/L (ref 8–16)
BASOPHILS # BLD AUTO: 0.03 K/UL (ref 0–0.2)
BASOPHILS NFR BLD: 0.4 % (ref 0–1.9)
BUN SERPL-MCNC: 20 MG/DL (ref 8–23)
CALCIUM SERPL-MCNC: 9.8 MG/DL (ref 8.7–10.5)
CHLORIDE SERPL-SCNC: 101 MMOL/L (ref 95–110)
CO2 SERPL-SCNC: 19 MMOL/L (ref 23–29)
CREAT SERPL-MCNC: 0.9 MG/DL (ref 0.5–1.4)
DIFFERENTIAL METHOD: ABNORMAL
EOSINOPHIL # BLD AUTO: 0.1 K/UL (ref 0–0.5)
EOSINOPHIL NFR BLD: 1 % (ref 0–8)
ERYTHROCYTE [DISTWIDTH] IN BLOOD BY AUTOMATED COUNT: 12.3 % (ref 11.5–14.5)
EST. GFR  (AFRICAN AMERICAN): >60 ML/MIN/1.73 M^2
EST. GFR  (NON AFRICAN AMERICAN): >60 ML/MIN/1.73 M^2
GLUCOSE SERPL-MCNC: 164 MG/DL (ref 70–110)
HCT VFR BLD AUTO: 43.2 % (ref 40–54)
HGB BLD-MCNC: 14.1 G/DL (ref 14–18)
IMM GRANULOCYTES # BLD AUTO: 0.02 K/UL (ref 0–0.04)
IMM GRANULOCYTES NFR BLD AUTO: 0.3 % (ref 0–0.5)
INR PPP: 1.5 (ref 0.8–1.2)
LYMPHOCYTES # BLD AUTO: 2.3 K/UL (ref 1–4.8)
LYMPHOCYTES NFR BLD: 28.7 % (ref 18–48)
MCH RBC QN AUTO: 25.7 PG (ref 27–31)
MCHC RBC AUTO-ENTMCNC: 32.6 G/DL (ref 32–36)
MCV RBC AUTO: 79 FL (ref 82–98)
MONOCYTES # BLD AUTO: 0.7 K/UL (ref 0.3–1)
MONOCYTES NFR BLD: 9 % (ref 4–15)
NEUTROPHILS # BLD AUTO: 4.8 K/UL (ref 1.8–7.7)
NEUTROPHILS NFR BLD: 60.6 % (ref 38–73)
NRBC BLD-RTO: 0 /100 WBC
PLATELET # BLD AUTO: 185 K/UL (ref 150–450)
PMV BLD AUTO: 11.1 FL (ref 9.2–12.9)
POCT GLUCOSE: 103 MG/DL (ref 70–110)
POCT GLUCOSE: 159 MG/DL (ref 70–110)
POCT GLUCOSE: 178 MG/DL (ref 70–110)
POCT GLUCOSE: 274 MG/DL (ref 70–110)
POCT GLUCOSE: 280 MG/DL (ref 70–110)
POTASSIUM SERPL-SCNC: 4.3 MMOL/L (ref 3.5–5.1)
PROTHROMBIN TIME: 15.6 SEC (ref 9–12.5)
RBC # BLD AUTO: 5.48 M/UL (ref 4.6–6.2)
SODIUM SERPL-SCNC: 132 MMOL/L (ref 136–145)
TB INDURATION 48 - 72 HR READ: 6 MM
WBC # BLD AUTO: 7.91 K/UL (ref 3.9–12.7)

## 2022-02-06 PROCEDURE — 80048 BASIC METABOLIC PNL TOTAL CA: CPT | Performed by: INTERNAL MEDICINE

## 2022-02-06 PROCEDURE — 99233 SBSQ HOSP IP/OBS HIGH 50: CPT | Mod: ,,, | Performed by: INTERNAL MEDICINE

## 2022-02-06 PROCEDURE — 25000003 PHARM REV CODE 250: Performed by: NURSE PRACTITIONER

## 2022-02-06 PROCEDURE — 85025 COMPLETE CBC W/AUTO DIFF WBC: CPT | Performed by: INTERNAL MEDICINE

## 2022-02-06 PROCEDURE — 36415 COLL VENOUS BLD VENIPUNCTURE: CPT | Performed by: INTERNAL MEDICINE

## 2022-02-06 PROCEDURE — 21400001 HC TELEMETRY ROOM

## 2022-02-06 PROCEDURE — 63600175 PHARM REV CODE 636 W HCPCS: Performed by: HOSPITALIST

## 2022-02-06 PROCEDURE — 99233 PR SUBSEQUENT HOSPITAL CARE,LEVL III: ICD-10-PCS | Mod: ,,, | Performed by: INTERNAL MEDICINE

## 2022-02-06 PROCEDURE — 25000003 PHARM REV CODE 250: Performed by: INTERNAL MEDICINE

## 2022-02-06 PROCEDURE — 63600175 PHARM REV CODE 636 W HCPCS: Performed by: INTERNAL MEDICINE

## 2022-02-06 PROCEDURE — 94761 N-INVAS EAR/PLS OXIMETRY MLT: CPT

## 2022-02-06 PROCEDURE — 85610 PROTHROMBIN TIME: CPT | Performed by: INTERNAL MEDICINE

## 2022-02-06 RX ORDER — INSULIN ASPART 100 [IU]/ML
5 INJECTION, SOLUTION INTRAVENOUS; SUBCUTANEOUS
Status: DISCONTINUED | OUTPATIENT
Start: 2022-02-06 | End: 2022-02-15

## 2022-02-06 RX ADMIN — INSULIN ASPART 6 UNITS: 100 INJECTION, SOLUTION INTRAVENOUS; SUBCUTANEOUS at 12:02

## 2022-02-06 RX ADMIN — WARFARIN SODIUM 7.5 MG: 5 TABLET ORAL at 05:02

## 2022-02-06 RX ADMIN — ENOXAPARIN SODIUM 70 MG: 80 INJECTION SUBCUTANEOUS at 09:02

## 2022-02-06 RX ADMIN — ENOXAPARIN SODIUM 70 MG: 80 INJECTION SUBCUTANEOUS at 10:02

## 2022-02-06 RX ADMIN — ATORVASTATIN CALCIUM 80 MG: 20 TABLET, FILM COATED ORAL at 08:02

## 2022-02-06 RX ADMIN — LOSARTAN POTASSIUM 25 MG: 25 TABLET, FILM COATED ORAL at 08:02

## 2022-02-06 RX ADMIN — INSULIN ASPART 5 UNITS: 100 INJECTION, SOLUTION INTRAVENOUS; SUBCUTANEOUS at 05:02

## 2022-02-06 RX ADMIN — INSULIN DETEMIR 15 UNITS: 100 INJECTION, SOLUTION SUBCUTANEOUS at 09:02

## 2022-02-06 RX ADMIN — INSULIN ASPART 2 UNITS: 100 INJECTION, SOLUTION INTRAVENOUS; SUBCUTANEOUS at 08:02

## 2022-02-06 RX ADMIN — INSULIN ASPART 6 UNITS: 100 INJECTION, SOLUTION INTRAVENOUS; SUBCUTANEOUS at 05:02

## 2022-02-06 NOTE — ASSESSMENT & PLAN NOTE
Unclear cause.  Spoke to [facility] and patient has had previous syncopal episodes attributed to alcohol abuse.  Has been prescribed meclizine as well.  Tox positive for cocaine but not alcohol.  CTA brain/neck was done in ED, and per ED note the results were discussed with stroke neurologist Dr. Tovar who feels that CTA finding of a short segment of left vertebral artery stenosis was incidental.  He did not think stroke workup with MRI was indicated.    Since change of status per son compared to what he saw him early during the week, ct head ordered , was ok, mri brain shows acute stroke.

## 2022-02-06 NOTE — SUBJECTIVE & OBJECTIVE
Interval History: patient denies  acute issues, denies pain or sob, dysuria, diarrhea, no further  fever, denies being depressed, having bm, no bleeding, eats all his food.  Review of Systems   Constitutional: Negative for chills and fever.   HENT: Negative for trouble swallowing.    Respiratory: Negative for cough and shortness of breath.    Cardiovascular: Negative for chest pain and leg swelling.   Gastrointestinal: Negative for abdominal pain, blood in stool, nausea and vomiting.   Genitourinary: Negative for dysuria and hematuria.   Skin: Negative for rash.   Neurological: Positive for weakness. Negative for headaches.   Psychiatric/Behavioral: Negative for confusion.     Objective:     Vital Signs (Most Recent):  Temp: 98.4 °F (36.9 °C) (02/06/22 1138)  Pulse: 92 (02/06/22 1200)  Resp: 20 (02/06/22 1138)  BP: (!) 108/58 (02/06/22 1138)  SpO2: 95 % (02/06/22 1138) Vital Signs (24h Range):  Temp:  [98.2 °F (36.8 °C)-99 °F (37.2 °C)] 98.4 °F (36.9 °C)  Pulse:  [76-99] 92  Resp:  [18-20] 20  SpO2:  [95 %-98 %] 95 %  BP: ()/(56-70) 108/58     Weight: 63.2 kg (139 lb 5.3 oz)  Body mass index is 18.9 kg/m².    Intake/Output Summary (Last 24 hours) at 2/6/2022 1226  Last data filed at 2/6/2022 0900  Gross per 24 hour   Intake 960 ml   Output 1200 ml   Net -240 ml      Physical Exam  Vitals reviewed.   Constitutional:       General: He is not in acute distress.     Appearance: He is well-developed. He is ill-appearing.      Comments: Mild left facial droop   HENT:      Head: Normocephalic and atraumatic.   Eyes:      Extraocular Movements: Extraocular movements intact.      Pupils: Pupils are equal, round, and reactive to light.   Cardiovascular:      Rate and Rhythm: Normal rate and regular rhythm.   Pulmonary:      Effort: Pulmonary effort is normal. No respiratory distress.      Breath sounds: Normal breath sounds.   Abdominal:      General: Bowel sounds are normal. There is no distension.      Palpations:  Abdomen is soft.      Tenderness: There is no abdominal tenderness.   Musculoskeletal:         General: Swelling present. Normal range of motion.      Cervical back: Normal range of motion and neck supple.   Skin:     General: Skin is warm.      Findings: No rash.   Neurological:      General: No focal deficit present.      Mental Status: He is alert and oriented to person, place, and time.   Psychiatric:         Behavior: Behavior normal.      Comments: Flat affect         Significant Labs: All pertinent labs within the past 24 hours have been reviewed.    Significant Imaging: I have reviewed all pertinent imaging results/findings within the past 24 hours.

## 2022-02-06 NOTE — ASSESSMENT & PLAN NOTE
Last ultrasound showed partially occlusive DVTs, now completely occlusive DVT of multiple lower extremity veins on ultrasound.  Patient reports compliance with warfarin but his INR is only one.  He is homeless but says he does not have difficulty obtaining his medications.  D-dimer is markedly elevated and there is a question of possible PE, but he just had a CTA of the brain/neck so cannot get another CTA for another 48 hours.VQ scan low probability for PE, echo ok.  As INR is not therapeutic will start full dose enoxaparin.Restart warfarin.Increase warfarin to 10 mg daily, inr 1.5 and decreased to 7.5 mg daily

## 2022-02-06 NOTE — ASSESSMENT & PLAN NOTE
Reportedly he is on metformin and glipizide, both held.  Continue SSI for now.  He has 4+ sugar in urine and HbA1c is 10.3, and he is hyperglycemic here.  Will start levemir and continue iss., increase levemir to 15 units daily  Add scheduled novolog 5 units tid

## 2022-02-06 NOTE — ASSESSMENT & PLAN NOTE
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  No evidence of large vessel intracranial occlusion.   Neuro recs

## 2022-02-06 NOTE — PLAN OF CARE
POC reviewed w/ pt and hourly rounding complete. Meds administered per MAR. Blood glucose monitored and PRN insulin administered per order. VSS on RA. Pt disoriented to time and 1 assist to ambulate. Call light w/ in reach and room near unit station. Safety precautions intact; no injuries or falls this shift. Pt resting w/ eyes closed and visible chest rise at this time; will continue to monitor.

## 2022-02-06 NOTE — PROGRESS NOTES
[facility] Medicine  Progress Note    Patient Name: Forest Lopez  MRN: [id number]  Patient Class: IP- Inpatient   Admission Date: 1/31/2022  Length of Stay: 6 days  Attending Physician: Alma Elizalde MD  Primary Care Provider: Julian Escobar        Subjective:     Principal Problem:Acute deep vein thrombosis (DVT) of both lower extremities        HPI:  Mr. Lopez is a 72 year old man who presented after a syncopal episode.  He reports he had been feeling well prior to the episode but then had a prodrome prior to passing out.  EMS was called, report patient's BP was low normal on their arrival, improved after an IV fluids bolus.  Patient denies chest pain or shortness of breath and says he does not feel weak currently although is rather tired.  When seen in the ED this morning he could barely express himself audibly.     Vital signs were normal on presentation here.  He is on warfarin for recurrent DVT, but his INR was 1, and d-dimer markedly elevated at 21.7.  Tox screen was positive for cocaine.  He had a CTA of the brain and neck done on presentation so CTA of the chest for PE study could not be done for 48 hours.  Ultrasound of the lower extremities showed extensive bilateral DVT.  On the right there was thrombosis of the common femoral vein, femoral vein, popliteal vein, one of the paired posterior tibial veins and both visualized peroneal veins.  On the left there was thrombosis of the common femoral vein, femoral vein and popliteal vein.  Patient was started on full dose Lovenox and admitted.    Medical history is from the chart as he is unable to give me much information.  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  He smokes 5-6 cigarettes/day and is not interested in quitting.  He is currently homeless and staying with a friend.  Despite  the normal INR he is sure he is taking his warfarin and is not having difficulty taking his medications.  I discussed his care with [facility] staff on the [facility] and patient had been lost to follow up since November 2021.      Overview/Hospital Course:    Started on lovenox and coumadin. Inr improved.Mri brain showed areas of diffusion signal hyperintensity consistent with areas of acute ischemia/infarct involving the left cerebral hemisphere, the most prominent measures approximately 1.4 cm, there is also a small focus of the right frontal lobe.      Interval History: patient denies  acute issues, denies pain or sob, dysuria, diarrhea, no further  fever, denies being depressed, having bm, no bleeding, eats all his food.  Review of Systems   Constitutional: Negative for chills and fever.   HENT: Negative for trouble swallowing.    Respiratory: Negative for cough and shortness of breath.    Cardiovascular: Negative for chest pain and leg swelling.   Gastrointestinal: Negative for abdominal pain, blood in stool, nausea and vomiting.   Genitourinary: Negative for dysuria and hematuria.   Skin: Negative for rash.   Neurological: Positive for weakness. Negative for headaches.   Psychiatric/Behavioral: Negative for confusion.     Objective:     Vital Signs (Most Recent):  Temp: 98.4 °F (36.9 °C) (02/06/22 1138)  Pulse: 92 (02/06/22 1200)  Resp: 20 (02/06/22 1138)  BP: (!) 108/58 (02/06/22 1138)  SpO2: 95 % (02/06/22 1138) Vital Signs (24h Range):  Temp:  [98.2 °F (36.8 °C)-99 °F (37.2 °C)] 98.4 °F (36.9 °C)  Pulse:  [76-99] 92  Resp:  [18-20] 20  SpO2:  [95 %-98 %] 95 %  BP: ()/(56-70) 108/58     Weight: 63.2 kg (139 lb 5.3 oz)  Body mass index is 18.9 kg/m².    Intake/Output Summary (Last 24 hours) at 2/6/2022 1226  Last data filed at 2/6/2022 0900  Gross per 24 hour   Intake 960 ml   Output 1200 ml   Net -240 ml      Physical Exam  Vitals reviewed.   Constitutional:       General: He is not in acute distress.      Appearance: He is well-developed. He is ill-appearing.      Comments: Mild left facial droop   HENT:      Head: Normocephalic and atraumatic.   Eyes:      Extraocular Movements: Extraocular movements intact.      Pupils: Pupils are equal, round, and reactive to light.   Cardiovascular:      Rate and Rhythm: Normal rate and regular rhythm.   Pulmonary:      Effort: Pulmonary effort is normal. No respiratory distress.      Breath sounds: Normal breath sounds.   Abdominal:      General: Bowel sounds are normal. There is no distension.      Palpations: Abdomen is soft.      Tenderness: There is no abdominal tenderness.   Musculoskeletal:         General: Swelling present. Normal range of motion.      Cervical back: Normal range of motion and neck supple.   Skin:     General: Skin is warm.      Findings: No rash.   Neurological:      General: No focal deficit present.      Mental Status: He is alert and oriented to person, place, and time.   Psychiatric:         Behavior: Behavior normal.      Comments: Flat affect         Significant Labs: All pertinent labs within the past 24 hours have been reviewed.    Significant Imaging: I have reviewed all pertinent imaging results/findings within the past 24 hours.      Assessment/Plan:      * Acute deep vein thrombosis (DVT) of both lower extremities  Last ultrasound showed partially occlusive DVTs, now completely occlusive DVT of multiple lower extremity veins on ultrasound.  Patient reports compliance with warfarin but his INR is only one.  He is homeless but says he does not have difficulty obtaining his medications.  D-dimer is markedly elevated and there is a question of possible PE, but he just had a CTA of the brain/neck so cannot get another CTA for another 48 hours.VQ scan low probability for PE, echo ok.  As INR is not therapeutic will start full dose enoxaparin.Restart warfarin.Increase warfarin to 10 mg daily, inr 1.5 and decreased to 7.5 mg daily      Acute stroke  due to ischemia  Mri shows left stroke  On full dose anticoagulation  Tele shows sinus rhythm  Better dm control, continue statin  cta showed  Focal short segment 60-70% stenosis involving the proximal to mid left vertebral artery, new compared to prior study of 09/25/2019.  No evidence of large vessel intracranial occlusion.   Neuro recs      Fever  No further fever  ua ok  cxr ? Infiltrate on left,  no wbc, no cough, on room air        Severe protein-calorie malnutrition  Diet as tolerated      Debility  Pt/ot efforts  Needs placement skilled    Type 2 diabetes mellitus with hyperglycemia, without long-term current use of insulin  Reportedly he is on metformin and glipizide, both held.  Continue SSI for now.  He has 4+ sugar in urine and HbA1c is 10.3, and he is hyperglycemic here.  Will start levemir and continue iss., increase levemir to 15 units daily  Add scheduled novolog 5 units tid      Syncope and collapse  Unclear cause.  Spoke to [facility] and patient has had previous syncopal episodes attributed to alcohol abuse.  Has been prescribed meclizine as well.  Tox positive for cocaine but not alcohol.  CTA brain/neck was done in ED, and per ED note the results were discussed with stroke neurologist Dr. Tovar who feels that CTA finding of a short segment of left vertebral artery stenosis was incidental.  He did not think stroke workup with MRI was indicated.    Since change of status per son compared to what he saw him early during the week, ct head ordered , was ok, mri brain shows acute stroke.      Cocaine abuse  As noted above      Hyperlipidemia  Resumed lipitor      Essential hypertension  Not sure on what meds at home, bp high now  Will start losartan 25 mg daily  bp stable    Tobacco dependence  He smokes 5-6 cigarettes/day.  Not trying to quit and not interested in a nicotine patch.  Benefits of smoking cessation were reviewed with patient in detail for for 8 minutes and patient was encouraged to quit.  Nicotine replacement options were discussed.        VTE Risk Mitigation (From admission, onward)         Ordered     warfarin tablet 7.5 mg  Daily         02/06/22 0917     enoxaparin injection 70 mg  Every 12 hours (non-standard times)         01/31/22 1003     Place sequential compression device  Until discontinued         01/31/22 0533                Discharge Planning   EFRAÍN: 2/7/2022     Code Status: Full Code   Is the patient medically ready for discharge?:     Reason for patient still in hospital (select all that apply): Patient trending condition and Treatment  Discharge Plan A: Skilled Nursing Facility   Discharge Delays: (!) Post-Acute Set-up              Alma Elizalde MD  Department of Hospital Medicine   [facility] ([place])

## 2022-02-07 ENCOUNTER — PATIENT OUTREACH (OUTPATIENT)
Dept: ADMINISTRATIVE | Facility: OTHER | Age: 72
End: 2022-02-07
Payer: MEDICARE

## 2022-02-07 PROBLEM — R93.89 ABNORMAL IMAGING OF THYROID: Status: ACTIVE | Noted: 2022-02-07

## 2022-02-07 LAB
BSA FOR ECHO PROCEDURE: 1.79 M2
INR PPP: 1.9 (ref 0.8–1.2)
POCT GLUCOSE: 130 MG/DL (ref 70–110)
POCT GLUCOSE: 154 MG/DL (ref 70–110)
PROTHROMBIN TIME: 20.3 SEC (ref 9–12.5)
T4 FREE SERPL-MCNC: 0.93 NG/DL (ref 0.71–1.51)
TSH SERPL DL<=0.005 MIU/L-ACNC: 1.31 UIU/ML (ref 0.4–4)

## 2022-02-07 PROCEDURE — 21400001 HC TELEMETRY ROOM

## 2022-02-07 PROCEDURE — 63600175 PHARM REV CODE 636 W HCPCS: Performed by: INTERNAL MEDICINE

## 2022-02-07 PROCEDURE — 99223 1ST HOSP IP/OBS HIGH 75: CPT | Mod: ,,, | Performed by: PSYCHIATRY & NEUROLOGY

## 2022-02-07 PROCEDURE — 99233 SBSQ HOSP IP/OBS HIGH 50: CPT | Mod: ,,, | Performed by: INTERNAL MEDICINE

## 2022-02-07 PROCEDURE — 25000003 PHARM REV CODE 250: Performed by: INTERNAL MEDICINE

## 2022-02-07 PROCEDURE — 94761 N-INVAS EAR/PLS OXIMETRY MLT: CPT

## 2022-02-07 PROCEDURE — 85610 PROTHROMBIN TIME: CPT | Performed by: INTERNAL MEDICINE

## 2022-02-07 PROCEDURE — 92610 EVALUATE SWALLOWING FUNCTION: CPT

## 2022-02-07 PROCEDURE — 99233 PR SUBSEQUENT HOSPITAL CARE,LEVL III: ICD-10-PCS | Mod: ,,, | Performed by: INTERNAL MEDICINE

## 2022-02-07 PROCEDURE — 97535 SELF CARE MNGMENT TRAINING: CPT

## 2022-02-07 PROCEDURE — 84439 ASSAY OF FREE THYROXINE: CPT | Performed by: INTERNAL MEDICINE

## 2022-02-07 PROCEDURE — 84443 ASSAY THYROID STIM HORMONE: CPT | Performed by: INTERNAL MEDICINE

## 2022-02-07 PROCEDURE — 63600175 PHARM REV CODE 636 W HCPCS: Performed by: HOSPITALIST

## 2022-02-07 PROCEDURE — 36415 COLL VENOUS BLD VENIPUNCTURE: CPT | Performed by: INTERNAL MEDICINE

## 2022-02-07 PROCEDURE — 25000003 PHARM REV CODE 250: Performed by: NURSE PRACTITIONER

## 2022-02-07 PROCEDURE — 99223 PR INITIAL HOSPITAL CARE,LEVL III: ICD-10-PCS | Mod: ,,, | Performed by: PSYCHIATRY & NEUROLOGY

## 2022-02-07 PROCEDURE — 92523 SPEECH SOUND LANG COMPREHEN: CPT

## 2022-02-07 RX ORDER — INSULIN ASPART 100 [IU]/ML
0-5 INJECTION, SOLUTION INTRAVENOUS; SUBCUTANEOUS
Status: DISCONTINUED | OUTPATIENT
Start: 2022-02-07 | End: 2022-04-27 | Stop reason: HOSPADM

## 2022-02-07 RX ORDER — WARFARIN SODIUM 5 MG/1
5 TABLET ORAL DAILY
Status: DISCONTINUED | OUTPATIENT
Start: 2022-02-07 | End: 2022-02-10

## 2022-02-07 RX ADMIN — ATORVASTATIN CALCIUM 80 MG: 20 TABLET, FILM COATED ORAL at 08:02

## 2022-02-07 RX ADMIN — INSULIN ASPART 5 UNITS: 100 INJECTION, SOLUTION INTRAVENOUS; SUBCUTANEOUS at 08:02

## 2022-02-07 RX ADMIN — LOSARTAN POTASSIUM 25 MG: 25 TABLET, FILM COATED ORAL at 08:02

## 2022-02-07 RX ADMIN — ENOXAPARIN SODIUM 70 MG: 80 INJECTION SUBCUTANEOUS at 09:02

## 2022-02-07 RX ADMIN — INSULIN DETEMIR 15 UNITS: 100 INJECTION, SOLUTION SUBCUTANEOUS at 08:02

## 2022-02-07 RX ADMIN — INSULIN ASPART 1 UNITS: 100 INJECTION, SOLUTION INTRAVENOUS; SUBCUTANEOUS at 09:02

## 2022-02-07 RX ADMIN — INSULIN ASPART 5 UNITS: 100 INJECTION, SOLUTION INTRAVENOUS; SUBCUTANEOUS at 12:02

## 2022-02-07 RX ADMIN — INSULIN ASPART 5 UNITS: 100 INJECTION, SOLUTION INTRAVENOUS; SUBCUTANEOUS at 06:02

## 2022-02-07 RX ADMIN — WARFARIN SODIUM 5 MG: 5 TABLET ORAL at 05:02

## 2022-02-07 NOTE — PROGRESS NOTES
[facility] ([place])  Wound Care  Progress Note    Patient Name: Forest Lopez  MRN: [id number]  Admission Date: 1/31/2022  Hospital Length of Stay: 7 days  Attending Physician: Alma Elizalde MD  Primary Care Provider: Julian Escobar   Subjective & objective note cannot be loaded without a specified hospital service.    Assessment/Plan:     HAPI prevention, Ald[place] < 18, PIP orders placed         02/06/22 2000   [place] Risk Assessment   Sensory Perception 4-->no impairment   Moisture 3-->occasionally moist   Activity 2-->chairfast   Mobility 3-->slightly limited   Nutrition 3-->adequate   Friction and Shear 2-->potential problem   Aldair Score 17       Olga Lidia Bonilla LPN  Wound Care  [facility] ([place])

## 2022-02-07 NOTE — ASSESSMENT & PLAN NOTE
Last ultrasound showed partially occlusive DVTs, now completely occlusive DVT of multiple lower extremity veins on ultrasound.  Patient reports compliance with warfarin but his INR is only one.  He is homeless but says he does not have difficulty obtaining his medications.  D-dimer is markedly elevated and there is a question of possible PE, but he just had a CTA of the brain/neck so cannot get another CTA for another 48 hours.VQ scan low probability for PE, echo ok.  As INR is not therapeutic will start full dose enoxaparin.Restart warfarin.Increase warfarin to 10 mg daily, inr 1.5 and decreased to 7.5 mg daily  inr 1.9, decrease coumadin to 5 mg, monitor daily inr

## 2022-02-07 NOTE — PT/OT/SLP EVAL
"Speech Language Pathology Evaluation  Cognitive/Bedside Swallow    Patient Name:  Forest Lopez   MRN:  [id number]  Admitting Diagnosis: Acute deep vein thrombosis (DVT) of both lower extremities    Recommendations:                  General Recommendations:   1. Speech pathology to continue to follow 3-5x/week for ongoing assessment of cognitive-communicative deficits s/p acute infarcts of L cerebral hemisphere    Diet recommendations: Solids: Regular, Liquids: Thin     Aspiration Precautions: Eliminate distractions, Feed only when awake/alert, Frequent oral care and HOB to 90 degrees     General Precautions: Standard,      Communication strategies:  Reduce informational content/context    History:     Past Medical History:   Diagnosis Date    Blind left eye     able to see, but poorly    BPH (benign prostatic hyperplasia)     Diabetes mellitus, type 2     Hyperlipidemia     Hypertension     Left rib fracture 03/30/2017    Stroke-like symptoms 09/25/2019    L facial droop, slurred speech & L arm weakness       Past Surgical History:   Procedure Laterality Date    NO PAST SURGERIES  09/25/2019       Per chart review:  "HPI:  Mr. Lopez is a 72 year old man who presented after a syncopal episode.  He reports he had been feeling well prior to the episode but then had a prodrome prior to passing out.  EMS was called, report patient's BP was low normal on their arrival, improved after an IV fluids bolus.  Patient denies chest pain or shortness of breath and says he does not feel weak currently although is rather tired.  When seen in the ED this morning he could barely express himself audibly.      Vital signs were normal on presentation here.  He is on warfarin for recurrent DVT, but his INR was 1, and d-dimer markedly elevated at 21.7.  Tox screen was positive for cocaine.  He had a CTA of the brain and neck done on presentation so CTA of the chest for PE study could not be done for 48 hours.  Ultrasound of the " "lower extremities showed extensive bilateral DVT.  On the right there was thrombosis of the common femoral vein, femoral vein, popliteal vein, one of the paired posterior tibial veins and both visualized peroneal veins.  On the left there was thrombosis of the common femoral vein, femoral vein and popliteal vein.  Patient was started on full dose Lovenox and admitted.     Medical history is from the chart as he is unable to give me much information.  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  He smokes 5-6 cigarettes/day and is not interested in quitting.  He is currently homeless and staying with a friend.  Despite the normal INR he is sure he is taking his warfarin and is not having difficulty taking his medications.  I discussed his care with [facility] staff on the [facility] and patient had been lost to follow up since November 2021.        Overview/Hospital Course:    Started on lovenox and coumadin. Inr improved.Mri brain showed areas of diffusion signal hyperintensity consistent with areas of acute ischemia/infarct involving the left cerebral hemisphere, the most prominent measures approximately 1.4 cm, there is also a small focus of the right frontal lobe."    Subjective     · Pt awake/alert, reclined in bed. Finishing lunch tray- 100% eaten.    Respiratory Status: Room air    Objective:     Cognitive-Communicative Status:  Pt awake/alert, oriented to person and generalized place. Stated he is hospitalized due to "falling off of the toilet". Stated the month is February and the year is 2002. Reduced ability to sustain attention. Able to sustain attention for 1-3 minute increments, required frequent repetition of questions/commands. Pt followed 1-2 step commands with 100% acc IND. Breakdown with incr units and higher level sequencing. Dcr initiation and ability to " "participate in conversation, responding in 1-4 word utterances with a delay for processing. Given FO3 stimuli, pt able to recall 1/3 words IND, required mod assist for semantic cues to recall 2/3. During assessment of reasoning skills, pt unable to complete simple divergent reasoning task 2/2 incr time required for information processing and delayed initiation of task. Pt admitted to incr difficulty "understanding", as well as, endorsed difficulty with word finding and memory since this admit.       Oral Musculature Evaluation  · Oral Musculature: facial asymmetry present (L facial asymmetry at rest)  · Dentition: edentulous  · Secretion Management: adequate  · Mucosal Quality: good  · Oral Labial Strength and Mobility: impaired retraction,functional pursing  · Lingual Strength and Mobility: impaired strength,impaired right lateral movement,functional protrusion  · Voice Prior to PO Intake: Clear  · Oral Musculature Comments: L facial asymmetry at rest, able to achieve functional labial retraction. Noted lingual weakness L>R. Per chart, L facial and lingual weakness present following CVA in 2019. Pt's speech judged to be 100% intelligible in known context.    Bedside Swallow Eval:   Consistencies Assessed:  · Thin liquids 2-3ml sips via cup edge  · Solids small pieces of dry solids     Oral Phase:   · WFL for labial seal, bolus prep/mastication, and a-p transport  · Efficient bolus mastication despite pt being edentulous  · No oral residue appreciated    Pharyngeal Phase:   · Adequate laryngeal elevation/excursion on subjective palpation  · Trigger of the pharyngeal swallow appears to be WFL  · No overt s/s of aspiration or airway threat during 100% of po intake- no coughing, choking, changes in breath stream or vocal quality  · Continue regular/thin diet      Treatment: Pt exhibits cognitive-communicative deficits in the areas of sequencing, initiation, processing, complex word finding, and memory. Pt endorses " these are new onset deficits. Per chart review, L facial asymmetry present following CVA in 2019. SLP to continue to follow for ongoing assessment of cognitive-communicative deficits.    Assessment:     Forest Lopez is a 72 y.o. 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    Goals:   Multidisciplinary Problems     SLP Goals        Problem: SLP Goal    Goal Priority Disciplines Outcome   SLP Goal     SLP Ongoing, Progressing   Description: 1. Pt will consume a regular/thin diet without overt s/s of aspiration or airway threat without assistance.  2. Pt will increase orientation to person, place, situation and date with 90% acc given min assist.  3. Pt will follow 3-4 step commands to increase functional IND with 75% acc given min assist.   4. Pt will complete immediate and delayed recall tasks with 75% acc given mod assist.  5. Targeting word finding, pt will complete divergent naming tasks given 1-minute time frame with 50% acc given mod assist.  6. Ongoing cognitive-communicative assessment.                    Plan:     · Patient to be seen:  3 x/week,5 x/week   · Plan of Care expires:  02/21/22  · Plan of Care reviewed with:  patient   · SLP Follow-Up:  Yes       Discharge recommendations:  Discharge Facility/Level of Care Needs: nursing facility, skilled     Time Tracking:     SLP Treatment Date:   02/07/22  Speech Start Time:  1330  Speech Stop Time:  1350     Speech Total Time (min):  20 min    Billable Minutes: Eval 20 min     02/07/2022

## 2022-02-07 NOTE — PLAN OF CARE
POC reviewed w/ pt and rounding complete. Meds administered per MAR. Blood glucose monitored and no PRN insulin required this shift. VSS on RA. Pt oriented to self and place. Turns independently. Bed alarm in use. Safety precautions intact; no injuries or falls this shift. Pt denies needs at this time; will continue to monitor.    No

## 2022-02-07 NOTE — SUBJECTIVE & OBJECTIVE
Interval History: patient denies  acute issues, denies pain or sob, dysuria, diarrhea, no further  fever, denies being depressed, having bm, no bleeding, eats all his food, doesn't talk much.  Review of Systems   Constitutional: Negative for chills and fever.   HENT: Negative for trouble swallowing.    Respiratory: Negative for cough and shortness of breath.    Cardiovascular: Negative for chest pain and leg swelling.   Gastrointestinal: Negative for abdominal pain, blood in stool, nausea and vomiting.   Genitourinary: Negative for dysuria and hematuria.   Skin: Negative for rash.   Neurological: Positive for weakness. Negative for headaches.   Psychiatric/Behavioral: Negative for confusion.     Objective:     Vital Signs (Most Recent):  Temp: 97.8 °F (36.6 °C) (02/07/22 0746)  Pulse: 82 (02/07/22 1000)  Resp: 18 (02/07/22 0746)  BP: 132/79 (02/07/22 0746)  SpO2: 98 % (02/07/22 0746) Vital Signs (24h Range):  Temp:  [97.5 °F (36.4 °C)-98.1 °F (36.7 °C)] 97.8 °F (36.6 °C)  Pulse:  [67-92] 82  Resp:  [18-20] 18  SpO2:  [96 %-99 %] 98 %  BP: (119-137)/(51-79) 132/79     Weight: 63.2 kg (139 lb 5.3 oz)  Body mass index is 18.9 kg/m².    Intake/Output Summary (Last 24 hours) at 2/7/2022 1154  Last data filed at 2/7/2022 0800  Gross per 24 hour   Intake 1760 ml   Output 1460 ml   Net 300 ml      Physical Exam  Vitals reviewed.   Constitutional:       General: He is not in acute distress.     Appearance: He is well-developed. He is ill-appearing.      Comments: Mild left facial droop   HENT:      Head: Normocephalic and atraumatic.   Eyes:      Extraocular Movements: Extraocular movements intact.      Pupils: Pupils are equal, round, and reactive to light.   Cardiovascular:      Rate and Rhythm: Normal rate and regular rhythm.   Pulmonary:      Effort: Pulmonary effort is normal. No respiratory distress.      Breath sounds: Normal breath sounds.   Abdominal:      General: Bowel sounds are normal. There is no distension.       Palpations: Abdomen is soft.      Tenderness: There is no abdominal tenderness.   Musculoskeletal:         General: Swelling present. Normal range of motion.      Cervical back: Normal range of motion and neck supple.   Skin:     General: Skin is warm.      Findings: No rash.   Neurological:      General: No focal deficit present.      Mental Status: He is alert and oriented to person, place, and time.   Psychiatric:         Behavior: Behavior normal.      Comments: Flat affect         Significant Labs: All pertinent labs within the past 24 hours have been reviewed.    Significant Imaging: I have reviewed all pertinent imaging results/findings within the past 24 hours.

## 2022-02-07 NOTE — PT/OT/SLP PROGRESS
"Occupational Therapy   Treatment    Name: Forest Lopez  MRN: [id number]  Admitting Diagnosis:  Acute deep vein thrombosis (DVT) of both lower extremities        MRI 02/05/2022: "There are areas of diffusion signal hyperintensity consistent with areas of acute ischemia/infarct involving the left cerebral hemisphere, the most prominent measures approximately 1.4 cm, there is also a small focus of the right frontal lobe"    Recommendations:     Discharge Recommendations: nursing facility, skilled  Discharge Equipment Recommendations:  bedside commode,shower chair  Barriers to discharge:  Decreased caregiver support (homelessness)    Assessment:     Forest Lopez is a 72 y.o. male with a medical diagnosis of Acute deep vein thrombosis (DVT) of both lower extremities.  He presents with the following performance deficits affecting function: weakness,impaired functional mobilty,impaired cognition,decreased safety awareness,impaired endurance,gait instability,impaired balance,decreased upper extremity function,impaired self care skills,decreased lower extremity function.     Patient agreeable to OOB for OT session.  Tolerated up to restroom, sink then return to EOB for short ADL session.  Session shortened by arrival of transport.  Required Total assist for anne-marie care as patient did not seem aware of having BM.  Continue OT services to maximize patient functioning.     Rehab Prognosis:  Fair; patient would benefit from acute skilled OT services to address these deficits and reach maximum level of function.       Plan:     Patient to be seen 3 x/week to address the above listed problems via self-care/home management,therapeutic activities,therapeutic exercises  · Plan of Care Expires: 03/03/22  · Plan of Care Reviewed with: patient    Subjective     Pain/Comfort:  · Pain Rating 1: 0/10  · Pain Rating Post-Intervention 1: 0/10    Objective:     Communicated with: MARIBEL Velasquez prior to session.  Patient found right sidelying " with telemetry,peripheral IV,Other (comments) (condom cath found on floor, RN notified) upon OT entry to room.    General Precautions: Standard, anti-coagulation medicine,diabetic,fall,other (see comments) (syncope)   Orthopedic Precautions:N/A   Braces: N/A  Respiratory Status: Room air     Occupational Performance:     Bed Mobility:    · Patient completed Scooting/Bridging with supervision  · Patient completed Supine to Sit with supervision     Functional Mobility/Transfers:  · Patient completed Sit <> Stand Transfer with contact guard assistance  with  rolling walker   · Patient completed Toilet Transfer Step Transfer technique with minimum assistance with  rolling walker and grab bars  · Functional Mobility: CGA within room with RW    Activities of Daily Living:  · Grooming: contact guard assistance in standing at sink while washing hands, with RW for balance  · Upper Body Dressing: minimum assistance for donning gown in standing  · Lower Body Dressing: stand by assistance and increased time for donning non skid socks  · Toileting: maximal assistance for anne-marie care secondary to unnoticed BM      [facility] 6 Click ADL: 16    Treatment & Education:  Educated on role of OT, POC, need for anne-marie care (patient did not appear aware).  Will reinforce.    Re-assessed (B) UE ROM, sensation, fine motor with no changes from initial OT evaluation noted. Initial difficulty tracking (R), but after rest able to track.    Patient left leaving on stretcher with transport with all lines intact, RN notified and RN and transport presentEducation:      GOALS:   Multidisciplinary Problems     Occupational Therapy Goals        Problem: Occupational Therapy Goal    Goal Priority Disciplines Outcome Interventions   Occupational Therapy Goal     OT, PT/OT Ongoing, Progressing    Description: Goals to be met by: 2/15/2022    Patient will increase functional independence with ADLs by performing:    UE Dressing with [place].  LE Dressing with  [place].  Grooming while standing at sink with Supervision.  Toileting from toilet with Supervision for hygiene and clothing management.   Toilet transfer to toilet with Supervision.                     Time Tracking:     OT Date of Treatment: 02/07/22  OT Start Time: 1101  OT Stop Time: 1123  OT Total Time (min): 22 min    Billable Minutes:Self Care/Home Management 22    OT/DUTCH: OT          2/7/2022

## 2022-02-07 NOTE — PLAN OF CARE
Problem: SLP Goal  Goal: SLP Goal  Description: 1. Pt will consume a regular/thin diet without overt s/s of aspiration or airway threat without assistance.  2. Pt will increase orientation to person, place, situation and date with 90% acc given min assist.  3. Pt will follow 3-4 step commands to increase functional IND with 75% acc given min assist.   4. Pt will complete immediate and delayed recall tasks with 75% acc given mod assist.  5. Targeting word finding, pt will complete divergent naming tasks given 1-minute time frame with 50% acc given mod assist.  6. Ongoing cognitive-communicative assessment.   Outcome: Ongoing, Progressing    SLP evaluation initiated this date. SLP to continue to follow for ongoing assessment of cognitive-communicative deficits 3-5x/week.

## 2022-02-07 NOTE — ASSESSMENT & PLAN NOTE
Reportedly he is on metformin and glipizide, both held.  Continue SSI for now.  He has 4+ sugar in urine and HbA1c is 10.3, and he is hyperglycemic here.  Will start levemir and continue iss., increase levemir to 15 units daily  Add scheduled novolog 5 units tid  Will monitor

## 2022-02-07 NOTE — PROGRESS NOTES
[facility] Medicine  Progress Note    Patient Name: Forest Lopez  MRN: [id number]  Patient Class: IP- Inpatient   Admission Date: 1/31/2022  Length of Stay: 7 days  Attending Physician: Alma Elizalde MD  Primary Care Provider: Julian Escobar        Subjective:     Principal Problem:Acute deep vein thrombosis (DVT) of both lower extremities        HPI:  Mr. Lopez is a 72 year old man who presented after a syncopal episode.  He reports he had been feeling well prior to the episode but then had a prodrome prior to passing out.  EMS was called, report patient's BP was low normal on their arrival, improved after an IV fluids bolus.  Patient denies chest pain or shortness of breath and says he does not feel weak currently although is rather tired.  When seen in the ED this morning he could barely express himself audibly.     Vital signs were normal on presentation here.  He is on warfarin for recurrent DVT, but his INR was 1, and d-dimer markedly elevated at 21.7.  Tox screen was positive for cocaine.  He had a CTA of the brain and neck done on presentation so CTA of the chest for PE study could not be done for 48 hours.  Ultrasound of the lower extremities showed extensive bilateral DVT.  On the right there was thrombosis of the common femoral vein, femoral vein, popliteal vein, one of the paired posterior tibial veins and both visualized peroneal veins.  On the left there was thrombosis of the common femoral vein, femoral vein and popliteal vein.  Patient was started on full dose Lovenox and admitted.    Medical history is from the chart as he is unable to give me much information.  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  He smokes 5-6 cigarettes/day and is not interested in quitting.  He is currently homeless and staying with a friend.  Despite  the normal INR he is sure he is taking his warfarin and is not having difficulty taking his medications.  I discussed his care with [facility] staff on the [facility] and patient had been lost to follow up since November 2021.      Overview/Hospital Course:    Started on lovenox and coumadin. Inr improved.Mri brain showed areas of diffusion signal hyperintensity consistent with areas of acute ischemia/infarct involving the left cerebral hemisphere, the most prominent measures approximately 1.4 cm, there is also a small focus of the right frontal lobe.      No new subjective & objective note has been filed under this hospital service since the last note was generated.      Assessment/Plan:      * Acute deep vein thrombosis (DVT) of both lower extremities  Last ultrasound showed partially occlusive DVTs, now completely occlusive DVT of multiple lower extremity veins on ultrasound.  Patient reports compliance with warfarin but his INR is only one.  He is homeless but says he does not have difficulty obtaining his medications.  D-dimer is markedly elevated and there is a question of possible PE, but he just had a CTA of the brain/neck so cannot get another CTA for another 48 hours.VQ scan low probability for PE, echo ok.  As INR is not therapeutic will start full dose enoxaparin.Restart warfarin.Increase warfarin to 10 mg daily, inr 1.5 and decreased to 7.5 mg daily  inr 1.9, decrease coumadin to 5 mg, monitor daily inr      Abnormal imaging of thyroid  Tsh/t4 ok, check thyroid us      Acute stroke due to ischemia  Mri shows left stroke  On full dose anticoagulation  Tele shows sinus rhythm  Better dm control, continue statin  cta showed  Focal short segment 60-70% stenosis involving the proximal to mid left vertebral artery, new compared to prior study of 09/25/2019.  No evidence of large vessel intracranial occlusion.   Neuro recs appr  Will also consult speech  Echo with bubble study  Follow vascular neuro in 4 weeks and  neuro  Check b12, b1, rpr , hiv      Fever  No further fever  ua ok  cxr ? Infiltrate on left,  no wbc, no cough, on room air        Severe protein-calorie malnutrition  Diet as tolerated      Debility  Pt/ot efforts  Needs placement skilled    Type 2 diabetes mellitus with hyperglycemia, without long-term current use of insulin  Reportedly he is on metformin and glipizide, both held.  Continue SSI for now.  He has 4+ sugar in urine and HbA1c is 10.3, and he is hyperglycemic here.  Will start levemir and continue iss., increase levemir to 15 units daily  Add scheduled novolog 5 units tid  Will monitor    Syncope and collapse  Unclear cause.  Spoke to [facility] and patient has had previous syncopal episodes attributed to alcohol abuse.  Has been prescribed meclizine as well.  Tox positive for cocaine but not alcohol.  CTA brain/neck was done in ED, and per ED note the results were discussed with stroke neurologist Dr. Tovar who feels that CTA finding of a short segment of left vertebral artery stenosis was incidental.  He did not think stroke workup with MRI was indicated.    Since change of status per son compared to what he saw him early during the week, ct head ordered , was ok, mri brain shows acute stroke.      Cocaine abuse  As noted above      Hyperlipidemia  Resumed lipitor      Essential hypertension  Not sure on what meds at home, bp high now  Started losartan 25 mg daily  bp stable    Tobacco dependence  He smokes 5-6 cigarettes/day.  Not trying to quit and not interested in a nicotine patch.  Benefits of smoking cessation were reviewed with patient in detail for for 8 minutes and patient was encouraged to quit. Nicotine replacement options were discussed.      VTE Risk Mitigation (From admission, onward)         Ordered     warfarin (COUMADIN) tablet 5 mg  Daily         02/07/22 0801     enoxaparin injection 70 mg  Every 12 hours (non-standard times)         01/31/22 1003     Place sequential compression  device  Until discontinued         01/31/22 0533                Discharge Planning   EFRAÍN: 2/7/2022     Code Status: Full Code   Is the patient medically ready for discharge?:     Reason for patient still in hospital (select all that apply): Patient trending condition and Treatment  Discharge Plan A: Skilled Nursing Facility   Discharge Delays: (!) Post-Acute Set-up              Alma Elizalde MD  Department of Hospital Medicine   [facility] ([place])

## 2022-02-07 NOTE — PT/OT/SLP PROGRESS
Physical Therapy      Patient Name:  Forest Lopez   MRN:  [id number]    Patient not seen today secondary to patient being transported to Ultrasound  . Will follow-up later as scheduling permits.

## 2022-02-07 NOTE — ASSESSMENT & PLAN NOTE
Unclear cause.  Spoke to [facility] and patient has had previous syncopal episodes attributed to alcohol abuse.  Has been prescribed meclizine as well.  Tox positive for cocaine but not alcohol.  CTA brain/neck was done in ED, and per ED note the results were discussed with stroke neurologist Dr. Tovar who feels that CTA finding of a short segment of left vertebral artery stenosis was incidental.  He did not think stroke workup with MRI was indicated.    Since change of status per son compared to what he saw him early during the week, ct head ordered , was ok, mri brain shows acute stroke.

## 2022-02-07 NOTE — CONSULTS
NEUROLOGY CONSULT NOTE  [facility]    Patient Name:  Forest Lopez  Date of Consult: 2022  Admit Date:  131  MR #:  [id number]  Acct #:  [id number]  :  1950    Physicians:  Julian Escobar (Family); Alma Elizalde MD  Consulting Physician:  Carlee Marquez MD       Chief Complaint/Reason for Consult: Stroke      Assessment and Plan:  Forest Lopez is a 72 y.o. male who presented to the ER after a syncopal episode on 2022. As part of his work up he underwent an MRI brain which reveals multtiple deep cerebral left sided infarcts. Etiology possibly 2/2 initial hypotension . He was noted with bilateral lower extremity DVTs, low INR. He was admitted for management of the same. cocaine use noted by EMS    Patient currently denies any focal deficits. He has no right sided focal findings. Infarcts -are acute but asymptomatic. On neurological examination the patient has 5/5 strength, 3+ knee reflexes, 1+ ankle reflexes, downgoing toes. He is unsteady when he walks but is bale to walk without support, he is unable to walk on heels/ toes/ tandem. He states the year is , unable to name date/ month. 2/3 recall at 5 minutes. He is unable to name the medications he is on.     MRI brain, CTA head/ neck  Reviewed. Hemoglobin Ac1- 10.3, LDL- 98.6        Assessmnet:  1. Left cerebral periventricular acute, small right frontal infarcts likely 2/2 relative hypotension  2. Cocaine use   3. Uncontrolled diabetes   4. Bilateral lower extremity acute DVTs  5. ?cognitive impairment  6. 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 Chronic alcohol use  8. Current smoker  9. HTN  10. HLD  11. BPH      Plan:  1. EKG, I am unable to review EKG from , tele strips with no evidence of atrial fibrilllation   2. 2D Echo with bubble study  3. Telemetry monitoring  4. Lipitor 80 mg nightly   5. Continue Lovenox/ Coumadin per primary team for management of DVTs  6. PT/OT consultation  "  7. Diabetes management per primary service  8. Smoking cessation counseling given  9. Follow up with Stroke Neurology in 4 weeks   10.  consult, patient does not seem to have insight into his condition, unable to name medications/ frequency etc. Will likely need supervision to help with medication compliance  11. Patient will need outpatient neurology follow up given issues with orientation, memory noted on exam   12. Check B1, B12, RPR and HIV  14. Consider MRI C spine without contrast as an outpatient given hyperreflexia, unsteady gait as noted above           History of Present Illness:  Forest Lopez is a 72 y.o. { male on whom I have been asked to consult for evaluation and treatment of left sided acute cerebral infarcts noted on MRI brain.  Patient is unable to provide his past medical history. Per chart review he has hypertension, hyperlipidemia, type II diabetes not on insulin, cocaine abuse, marijuana abuse, and lower extremities DVT x 3 on warfarin, stroke in 9/2019, GERD, BPH and alcohol abuse    Patient resented to the ER after a syncopal episode on 1-. Per patient he was having a bowel movement when he "passed out and fell". He is unsure as to how long he was "passed out for" . He states he is living with a friend who found him and called 911. He denies a previous h/o stroke/ seizures/ syncope.    He is unable to name his medications , he states he is in charge of his own medications.   Per records, the patient was noted with low normal BP by EMS staff on arrival which improved with IV bolus.   He denies any weakness, facial droop, dysarthria or dysphagia. Vital signs were normal on presentation here. Ultrasound of the lower extremities showed extensive bilateral DVT.  On the right there was thrombosis of the common femoral vein, femoral vein, popliteal vein, one of the paired posterior tibial veins and both visualized peroneal veins.  On the left there was thrombosis of the " common femoral vein, femoral vein and popliteal vein.  Patient was started on full dose Lovenox and admitted.    2019 stroke details per chart review-  Hospital Course:   Mr. Lopez was presented with acute left arm weakness and left-sided facial droop.  Head CT showed right frontal lobe subtle focus of peripheral hypoattenuation which may be related to artifact from volume averaging versus recent infarct.  No intracranial hemorrhage.  He presented outside the window for tPA along with contraindication due to chronic anticoagulation with Coumadin.  Treatment initiated with permissive hypertension, aspirin and high-dose statin.  Neurology consulted along with rehab services. Further workup with MRI showed 2 foci of diffusion hyperintensity within the left thalamus and right thalamus/basal ganglia felt to be related to T2 shine through from remote lacunar infarcts.  CTA of the head and neck negative for significant stenosis or aneurysm.  Carotid ultrasound also negative for significant stenosis.   Patient with persistent left facial droop along with left upper extremity weakness that has not resolved.  Rehab services recommending SNF and  working on placement.               Laboratory and Additional Data Reviewed:    Echo without bubble study 1/31/2022:  · The left ventricle is normal in size with concentric remodeling and normal systolic function.  · Normal central venous pressure (3 mmHg).  · The estimated PA systolic pressure is 18 mmHg.  · The estimated ejection fraction is 62%.  · Grade I left ventricular diastolic dysfunction.  · Normal right ventricular size with normal right ventricular systolic function.            Tests to date: All imaging reviewed personally by me in addition to cardiology reports.  MRI Brain Without Contrast   Final Result   Abnormal      There are areas of diffusion signal hyperintensity consistent with areas of acute ischemia/infarct involving the left cerebral  hemisphere, the most prominent measures approximately 1.4 cm, there is also a small focus of the right frontal lobe, as discussed above.      In addition chronic intracranial changes are noted.      This report was flagged in Epic as abnormal.         Electronically signed by: Zana Luque   Date:    02/05/2022   Time:    21:37      CT Head Without Contrast   Final Result      No acute intracranial abnormality.         Electronically signed by: Bakari Mayer MD   Date:    02/05/2022   Time:    11:06      X-Ray Chest 1 View   Final Result      Suspected mild pulmonary infiltrates on the left as discussed above superimposed on diminished depth of inspiration.         Electronically signed by: Zana Luque   Date:    02/04/2022   Time:    21:11      NM Lung Ventilation Perfusion Imaging   Final Result      Normal examination.  No scintigraphic evidence for pulmonary emboli.         Electronically signed by: Bakari Lorenzana MD   Date:    01/31/2022   Time:    11:17      US Lower Extremity Veins Bilateral   Final Result   Abnormal      Examination positive for deep venous thrombosis in bilateral lower extremities, as described.      This report was flagged in Epic as abnormal.         Electronically signed by: Gerhard Valadez   Date:    01/31/2022   Time:    08:12      CTA Head and Neck (xpd)   Final Result      1.  No CT evidence of acute intracranial abnormality.  There is moderately advanced chronic senescent and microvascular ischemic change with scattered remote appearing lacunar type infarcts of the bilateral basal ganglia and thalami.  If there is concern for superimposed acute ischemia, more sensitive assessment could be performed with MRI if there are no patient contraindications.      2.  Focal short segment 60-70% stenosis involving the proximal to mid left vertebral artery, new compared to prior study of 09/25/2019.  No evidence of large vessel intracranial occlusion.      3.  Multinodular appearance of the  thyroid nonemergent dedicated thyroid ultrasound follow-up is advised.         Electronically signed by: Biju Blue MD   Date:    01/31/2022   Time:    03:39      X-Ray Chest 1 View   Final Result      No acute cardiopulmonary abnormality.         Electronically signed by: Iain Hernandez   Date:    01/31/2022   Time:    01:47      US Soft Tissue Head Neck Thyroid    (Results Pending)           Results for orders placed or performed during the hospital encounter of 01/31/22   MRI Brain Without Contrast    Narrative    EXAMINATION:  MRI BRAIN WITHOUT CONTRAST    CLINICAL HISTORY:  Stroke, follow up;r/o stroke;    TECHNIQUE:  Multiplanar multisequence MR imaging of the brain was performed without contrast.    COMPARISON:  CT examination of the brain February 5, 2022, MRI examination of the brain September 25, 2019    FINDINGS:  Routine noncontrast MRI examination of the brain was performed, there is artifact present diminishing image quality and diminishing the sensitivity of the exam.    The ventricular system and sulcal pattern demonstrate chronic involutional change.  There is prominent subcortical and periventricular white matter change most consistent with chronic white matter change appearing similar to the prior MRI examination with progression.  There are also focal areas consistent with remote lacunar-type ischemic change noted.    On diffusion imaging there are areas of diffusion signal hyperintensity most consistent with areas of acute ischemia/infarct, the most prominent is seen adjacent to the left lateral ventricle and extending inferiorly to the left basal ganglia, as best seen on series 3, image 17 measuring up to approximately 1.3 x 1.4 cm in size on axial imaging.  Additional focus is seen just posterosuperior to this level, lateral to the posterior aspect of the left lateral ventricle.  There is also a small focus seen in the right frontal lobe as seen on diffusion axial image 20.    There is no  evidence for mass effect or midline shift.  Appropriate CSF spaces are appropriate flow voids are noted at the skull base.  The upper cervical cord, brainstem and cervical cranial junction appear appropriate.  The visualized orbits appear intact.  There is appearance that may relate to prior cataract surgery.  The mastoid air cells demonstrate appropriate signal void.  Mild paranasal sinus mucosal thickening is noted.      Impression    There are areas of diffusion signal hyperintensity consistent with areas of acute ischemia/infarct involving the left cerebral hemisphere, the most prominent measures approximately 1.4 cm, there is also a small focus of the right frontal lobe, as discussed above.    In addition chronic intracranial changes are noted.    This report was flagged in Epic as abnormal.      Electronically signed by: Zana Luque  Date:    02/05/2022  Time:    21:37   CT Head Without Contrast    Narrative    EXAMINATION:  CT HEAD WITHOUT CONTRAST    CLINICAL HISTORY:  altered mental status;    TECHNIQUE:  Low dose axial images were obtained through the head.  Coronal and sagittal reformations were also performed. Contrast was not administered.    FINDINGS:  The brain is significant for periventricular and subcortical hypoattenuation consistent with microvascular ischemic change.  The ventricular system is prominent consistent with involutional change.  There are lacunar type infarcts of the basal ganglia bilaterally.  The visualized extracranial structures are unremarkable.      Impression    No acute intracranial abnormality.      Electronically signed by: Bakari Mayer MD  Date:    02/05/2022  Time:    11:06       Labs: All labs reviewed by me.  Lab Results   Component Value Date    WBC 7.91 02/06/2022    HGB 14.1 02/06/2022    HCT 43.2 02/06/2022     02/06/2022    CHOL 156 09/25/2019    TRIG 107 09/25/2019    HDL 36 (L) 09/25/2019    LDLCALC 98.6 09/25/2019    ALT 8 (L) 02/02/2022    AST 13  02/02/2022     (L) 02/06/2022    K 4.3 02/06/2022     02/06/2022    CREATININE 0.9 02/06/2022    BUN 20 02/06/2022    TSH 1.309 02/07/2022    INR 1.9 (H) 02/07/2022    HGBA1C 10.3 (H) 01/31/2022       Admission on 08/13/2021, Discharged on 08/14/2021   Component Date Value Ref Range Status    POC Rapid COVID 08/13/2021 Negative  Negative Final     Acceptable 08/13/2021 Yes   Final    WBC 08/13/2021 15.11* 3.90 - 12.70 K/uL Final    RBC 08/13/2021 5.46  4.60 - 6.20 M/uL Final    Hemoglobin 08/13/2021 14.0  14.0 - 18.0 g/dL Final    Hematocrit 08/13/2021 43.3  40.0 - 54.0 % Final    MCV 08/13/2021 79* 82 - 98 fL Final    MCH 08/13/2021 25.6* 27.0 - 31.0 pg Final    MCHC 08/13/2021 32.3  32.0 - 36.0 g/dL Final    RDW 08/13/2021 13.0  11.5 - 14.5 % Final    Platelets 08/13/2021 263  150 - 450 K/uL Final    MPV 08/13/2021 10.7  9.2 - 12.9 fL Final    Immature Granulocytes 08/13/2021 0.6* 0.0 - 0.5 % Final    Gran # (ANC) 08/13/2021 12.8* 1.8 - 7.7 K/uL Final    Immature Grans (Abs) 08/13/2021 0.09* 0.00 - 0.04 K/uL Final    Comment: Mild elevation in immature granulocytes is non specific and   can be seen in a variety of conditions including stress response,   acute inflammation, trauma and pregnancy. Correlation with other   laboratory and clinical findings is essential.      Lymph # 08/13/2021 1.5  1.0 - 4.8 K/uL Final    Mono # 08/13/2021 0.7  0.3 - 1.0 K/uL Final    Eos # 08/13/2021 0.0  0.0 - 0.5 K/uL Final    Baso # 08/13/2021 0.04  0.00 - 0.20 K/uL Final    nRBC 08/13/2021 0  0 /100 WBC Final    Gran % 08/13/2021 84.4* 38.0 - 73.0 % Final    Lymph % 08/13/2021 9.9* 18.0 - 48.0 % Final    Mono % 08/13/2021 4.8  4.0 - 15.0 % Final    Eosinophil % 08/13/2021 0.0  0.0 - 8.0 % Final    Basophil % 08/13/2021 0.3  0.0 - 1.9 % Final    Differential Method 08/13/2021 Automated   Final    Sodium 08/13/2021 136  136 - 145 mmol/L Final    Potassium 08/13/2021 4.8  3.5 -  5.1 mmol/L Final    Chloride 08/13/2021 104  95 - 110 mmol/L Final    CO2 08/13/2021 20* 23 - 29 mmol/L Final    Glucose 08/13/2021 280* 70 - 110 mg/dL Final    BUN 08/13/2021 20  8 - 23 mg/dL Final    Creatinine 08/13/2021 1.8* 0.5 - 1.4 mg/dL Final    Calcium 08/13/2021 11.0* 8.7 - 10.5 mg/dL Final    Total Protein 08/13/2021 8.0  6.0 - 8.4 g/dL Final    Albumin 08/13/2021 3.6  3.5 - 5.2 g/dL Final    Total Bilirubin 08/13/2021 0.2  0.1 - 1.0 mg/dL Final    Comment: For infants and newborns, interpretation of results should be based  on gestational age, weight and in agreement with clinical  observations.    Premature Infant recommended reference ranges:  Up to 24 hours.............<8.0 mg/dL  Up to 48 hours............<12.0 mg/dL  3-5 days..................<15.0 mg/dL  6-29 days.................<15.0 mg/dL      Alkaline Phosphatase 08/13/2021 94  55 - 135 U/L Final    AST 08/13/2021 11  10 - 40 U/L Final    ALT 08/13/2021 8* 10 - 44 U/L Final    Anion Gap 08/13/2021 12  8 - 16 mmol/L Final    eGFR if  08/13/2021 43* >60 mL/min/1.73 m^2 Final    eGFR if non  08/13/2021 37* >60 mL/min/1.73 m^2 Final    Comment: Calculation used to obtain the estimated glomerular filtration  rate (eGFR) is the CKD-EPI equation.       BNP 08/13/2021 62  0 - 99 pg/mL Final    Values of less than 100 pg/ml are consistent with non-CHF populations.    Troponin I 08/13/2021 <0.006  0.000 - 0.026 ng/mL Final    Comment: The reference interval for Troponin I represents the 99th percentile   cutoff   for our facility and is consistent with 3rd generation assay   performance.      D-Dimer 08/13/2021 <0.19  <0.50 mg/L FEU Final    Comment: The quantitative D-dimer assay should be used as an aid in   the diagnosis of deep vein thrombosis and pulmonary embolism  in patients with the appropriate presentation and clinical  history. The upper limit of the reference interval and the clinical   cut  off   point are identical. Causes of a positive (>0.50 mg/L FEU) D-Dimer   test  include, but are not limited to: DVT, PE, DIC, thrombolytic   therapy, anticoagulant therapy, recent surgery, trauma, or   pregnancy, disseminated malignancy, aortic aneurysm, cirrhosis,  and severe infection. False negative results may occur in   patients with distal DVT.      Ferritin 08/13/2021 175  20.0 - 300.0 ng/mL Final    LD 08/13/2021 202  110 - 260 U/L Final    Results are increased in hemolyzed samples.    CRP 08/13/2021 4.3  0.0 - 8.2 mg/L Final    Procalcitonin 08/13/2021 0.05  <0.25 ng/mL Final    Comment: A concentration < 0.25 ng/mL represents a low risk of bacterial   infection.  Procalcitonin may not be accurate among patients with localized   infection, recent trauma or major surgery, immunosuppressed state,   invasive fungal infection, renal dysfunction. Decisions regarding   initiation or continuation of antibiotic therapy should not be based   solely on procalcitonin levels.      POC PH 08/13/2021 7.362  7.35 - 7.45 Final    POC PCO2 08/13/2021 45.0  35 - 45 mmHg Final    POC PO2 08/13/2021 27* 40 - 60 mmHg Final    POC HCO3 08/13/2021 25.6  24 - 28 mmol/L Final    POC BE 08/13/2021 0  -2 to 2 mmol/L Final    POC SATURATED O2 08/13/2021 47* 95 - 100 % Final    POC TCO2 08/13/2021 27  24 - 29 mmol/L Final    Sample 08/13/2021 VENOUS   Final    Site 08/13/2021 Other   Final    Allens Test 08/13/2021 N/A   Final    DelSys 08/13/2021 Room Air   Final    Mode 08/13/2021 SPONT   Final    FiO2 08/13/2021 21   Final    Sp02 08/13/2021 99   Final    Specimen UA 08/14/2021 Urine, Clean Catch   Final    Color, UA 08/14/2021 Colorless* Yellow, Straw, Tracy Final    Appearance, UA 08/14/2021 Clear  Clear Final    pH, UA 08/14/2021 6.0  5.0 - 8.0 Final    Specific Gravity, UA 08/14/2021 1.005  1.005 - 1.030 Final    Protein, UA 08/14/2021 Negative  Negative Final    Comment: Recommend a 24 hour urine  protein or a urine   protein/creatinine ratio if globulin induced proteinuria is  clinically suspected.      Glucose, UA 08/14/2021 4+* Negative Final    Ketones, UA 08/14/2021 Negative  Negative Final    Bilirubin (UA) 08/14/2021 Negative  Negative Final    Occult Blood UA 08/14/2021 Negative  Negative Final    Nitrite, UA 08/14/2021 Negative  Negative Final    Urobilinogen, UA 08/14/2021 Negative  <2.0 EU/dL Final    Leukocytes, UA 08/14/2021 Negative  Negative Final    SARS-CoV2 (COVID-19) Qualitative P* 08/13/2021 Not Detected  Not Detected Final    Comment: This test utilizes a real-time reverse transcription  polymerase chain reaction procedure to amplify and   detect the SARS-CoV-2 RdRp and N genes.    The analytical sensitivity (limit of detection) of   this assay is 100 copies/mL.     A Detected result is considered positive for COVID-19.  This patient is considered infected with the   SARS-CoV-2 virus and is presumed to be contagious.    A Not Detected result means that SARS-CoV-2 RNA is not  present above the limit of detection. It does not rule  out the possibility of COVID-19 and should not be the  sole basis for treatment decisions.  If COVID-19 is   strongly suspected based on clinical and exposure   history,re-testing should be considered.      This test is only for use under Food and Drug   Administration s Emergency Use Authorization (EUA).   Commercial reagents are provided by [employer].  Performance characteristics of the EUA have been   independently verified by [facility]   Department of Pathology a                           nd Laboratory Medicine.        WBC, UA 08/14/2021 1  0 - 5 /hpf Final    Bacteria 08/14/2021 Rare  None-Occ /hpf Final    Yeast, UA 08/14/2021 None  None Final    Microscopic Comment 08/14/2021 SEE COMMENT   Final    Comment: Other formed elements not mentioned in the report are not   present in the microscopic examination.        "SARS-COV-2- Cycle Number 08/13/2021 -1.00  Not Detected Final    Comment: CT (Cycle Threshold) values are surrogate markers of   nucleic acid concentration in a sample. They are non-standard  measurements and should only be interpreted by those familiar   with both PCR technology and the patient's clinical presentation.           History:   Past Medical History:   Diagnosis Date    Blind left eye     able to see, but poorly    BPH (benign prostatic hyperplasia)     Diabetes mellitus, type 2     Hyperlipidemia     Hypertension     Left rib fracture 03/30/2017    Stroke-like symptoms 09/25/2019    L facial droop, slurred speech & L arm weakness     Past Surgical History:   Procedure Laterality Date    NO PAST SURGERIES  09/25/2019     Family History   Problem Relation Age of Onset    Heart disease Mother         heart surgery    Heart disease Father         heart attack    Stroke Sister     Cancer Sister         Unknown type of cancer    No Known Problems Son     No Known Problems Son      Social History     Socioeconomic History    Marital status: Single    Number of children: 2   Tobacco Use    Smoking status: Light Tobacco Smoker     Types: Cigarettes, Cigars    Smokeless tobacco: Never Used    Tobacco comment: Smokes 5-6 cigarettes/day   Substance and Sexual Activity    Alcohol use: Yes     Alcohol/week: 0.0 standard drinks     Comment: drinks beer socially     Drug use: Not Currently     Types: Marijuana, "Crack" cocaine    Sexual activity: Not Currently     Social Determinants of Health     Financial Resource Strain: High Risk    Difficulty of Paying Living Expenses: Hard   Food Insecurity: No Food Insecurity    Worried About Running Out of Food in the Last Year: Never true    Ran Out of Food in the Last Year: Never true   Transportation Needs: No Transportation Needs    Lack of Transportation (Medical): No    Lack of Transportation (Non-Medical): No   Physical Activity: Inactive    " Days of Exercise per Week: 0 days    Minutes of Exercise per Session: 0 min   Stress: No Stress Concern Present    Feeling of Stress : Not at all   Social Connections: Socially Isolated    Frequency of Communication with Friends and Family: Twice a week    Frequency of Social Gatherings with Friends and Family: Never    Attends Congregation Services: Never    Active Member of Clubs or Organizations: No    Attends Club or Organization Meetings: Never    Marital Status:    Housing Stability: High Risk    Unable to Pay for Housing in the Last Year: Yes    Number of Places Lived in the Last Year: 2    Unstable Housing in the Last Year: Yes       Allergy Information:        I have reviewed the patient's allergies.       Patient has no known allergies.    Home Medications:   No current facility-administered medications on file prior to encounter.     Current Outpatient Medications on File Prior to Encounter   Medication Sig Dispense Refill    metformin (GLUCOPHAGE) 500 MG tablet Take 1,000 mg by mouth 2 (two) times daily with meals.       warfarin (COUMADIN) 7.5 MG tablet Take 7.5 mg by mouth once daily.      albuterol (PROVENTIL/VENTOLIN HFA) 90 mcg/actuation inhaler Inhale 2 puffs into the lungs every 6 (six) hours as needed for Wheezing or Shortness of Breath. 18 g 0    blood sugar diagnostic (BLOOD GLUCOSE TEST) Strp by Misc.(Non-Drug; Combo Route) route.      citalopram (CELEXA) 20 MG tablet Take 20 mg by mouth once daily.      finasteride (PROSCAR) 5 mg tablet Take 1 tablet (5 mg total) by mouth once daily. 90 tablet 1    fluticasone-salmeterol diskus inhaler 250-50 mcg Inhale 1 puff into the lungs 2 (two) times daily. Controller      folic acid (FOLVITE) 1 MG tablet Take 1 tablet (1 mg total) by mouth once daily. 30 tablet 3    gemfibrozil (LOPID) 600 MG tablet Take 600 mg by mouth 2 (two) times daily before meals.      glipiZIDE (GLUCOTROL) 10 MG tablet Take 1 tablet (10 mg total) by  mouth 2 (two) times daily before meals. 60 tablet 6    losartan (COZAAR) 25 MG tablet Take 25 mg by mouth once daily.      meclizine (ANTIVERT) 25 mg tablet Take 12.5 mg by mouth 2 (two) times daily as needed for Dizziness.      omeprazole (PRILOSEC) 20 MG capsule TAKE 2 CAPSULES (40 MG) BY MOUTH ONCE DAILY. 60 capsule 5    rosuvastatin (CRESTOR) 40 MG Tab Take 10 mg by mouth every evening.      sildenafiL (VIAGRA) 25 MG tablet Take 25 mg by mouth daily as needed for Erectile Dysfunction.      tamsulosin (FLOMAX) 0.4 mg Cp24 Take 1 capsule (0.4 mg total) by mouth once daily. 90 capsule 1       Hospital Medications Reviewed in EPIC   atorvastatin  80 mg Oral Daily    enoxaparin  1 mg/kg Subcutaneous Q12H    insulin aspart U-100  5 Units Subcutaneous TIDWM    insulin detemir U-100  15 Units Subcutaneous Daily    losartan  25 mg Oral Daily    warfarin  5 mg Oral Daily       Review of Systems:    ROS    14-point review of systems as follows:   No check ke indicates NEGATIVE response   Constitutional: [] weight loss, [] change to appetite   Eyes: [] change in vision, [] double vision   Ears, nose, mouth, throat: [] frequent nose bleeds, [] ringing in the ears   Respiratory: [] cough, [] wheezing   Cardiovascular: [] chest pain, [] palpitations   Gastrointestinal: [] jaundice, [] nausea/vomiting   Genitourinary: [] incontinence, [] burning with urination   Hematologic/lymphatic: [] easy bruising/bleeding, [] night sweats   Neurological: [] numbness, [] weakness   Endocrine: [] fatigue, [] heat/cold intolerance   Allergy/Immunologic: [] fevers, [] chills   Musculoskeletal: [] muscle pain, [] joint pain   Psychiatric: [] thoughts of harming self/others, [] depression   Integumentary: [] rashes, [] sores that do not heal     Physical Examination:  Vital signs in last 24 hours:  Temp:  [97.5 °F (36.4 °C)-98.4 °F (36.9 °C)] 97.8 °F (36.6 °C)  Pulse:  [67-98] 76  Resp:  [18-20] 18  SpO2:  [95 %-99 %] 98 %  BP:  (108-137)/(51-79) 132/79      GENERAL:  General appearance: Well, non-toxic appearing.  No apparent distress.  Fundi exam: normal.  Neck: supple.  Carotid auscultation: normal.  Heart auscultation: normal.  Peripheral pulses: normal.  Extremities: normal.    MENTAL STATUS:  Alertness, attention span & concentration: normal. Patient needs to be asked questions several times before he answers them, sometimes he does not answer questions appropriately  Language: normal.  Orientation to self, place & time:  Oriented to self, place, unable to name date/ year states -2021, unable to name month- states January, able to name President  Memory, recent & remote: 3/3 immediate recall, 2/3 recall at 5 minutes  Fund of knowledge: Able to spell MONEY forwards but not backwards  Simple calculation intact       SPEECH:  Clear and fluent.  Follows complex commands.    CRANIAL NERVES:  Cranial Nerves II-XII were examined.  II - Visual fields: normal.  III, IV, VI: PERRL, EOMI, No ptosis, No nystagmus.  V - Facial sensation: normal.  VII - Face symmetry & mobility: normal.  VIII - Hearing: normal.  IX, X - Palate: mobile & midline.  XI - Shoulder shrug: normal.  XII - Tongue protrusion: normal.    GROSS MOTOR:  Gait & station: Unsteady, unable to walk on toes/ heels/ tandem   Tone: normal.  Abnormal movements: none.  Finger-nose & Heel-knee-shin: normal.      MUSCLE STRENGTH:     Fascics Atrophy RIGHT    LEFT Atrophy Fascics     5 Neck Ext. 5       5 Neck Flex 5       5 Deltoids 5       5 Sh.Ext.Rot. 5       5 Sh.Int.Rot. 5       5 Biceps 5       5 Triceps 5       5 Forearm.Pr. 5       5 Wrist Ext. 5       5 Wrist Flex 5       5 Finger Ext. 5       5 Finger Flex 5       5 FPL 5       5 Inteross. 5       5 FDI 5                5 Iliopsoas 5       5 Hip Abduct 5       5 Hip Adduct 5       5 Quads 5       5 Hams 5       5 Dorsiflex 5       5 Plantar Flex 5       5 Ankle Laci 5       5 Ankle Invert 5       5 Toe Ext. 5       5 Toe Flex 5                          REFLEXES:    RIGHT Reflex   LEFT   2+ Biceps 2+   2+ Brachiorad. 2+   2+ Triceps 2+        3+ Patellar 3+   1+ Ankle 1+        Down PLANTAR Triple flexion      SENSORY:  Light touch: Normal throughout.  Sharp touch: Normal throughout.  Vibration:  Does not cooperate consistently with testing   Normal throughout  Temperature: Normal throughout.  Joint Position: Intact to low amplitude changes at the great toes     40 minutes spent face-to-face, >50% spent advising about: counseling and/or coordination of care           Carlee Marquez M.D    Medicine-Neurology, Clinical Neurophysiology    This note was created with voice recognition software.  Grammatical, syntax and spelling errors may be inevitable.    Problem List Items Addressed This Visit        Psychiatric    Cocaine abuse (Chronic)       Hematology    * (Principal) Acute deep vein thrombosis (DVT) of both lower extremities       Endocrine    Type 2 diabetes mellitus with hyperglycemia, without long-term current use of insulin    Relevant Medications    insulin detemir U-100 pen 15 Units    insulin aspart U-100 pen 5 Units    insulin aspart U-100 pen 0-5 Units       Other    Tobacco dependence (Chronic)    Syncope and collapse      Other Visit Diagnoses     Syncope    -  Primary    Relevant Orders    X-Ray Chest 1 View (Completed)    Saline lock IV (Completed)    EKG 12-lead (Completed)    CBC auto differential (Completed)    Comprehensive metabolic panel (Completed)    Troponin I #1 (Completed)    CTA Head and Neck (xpd) (Completed)    POCT Venous Blood Gas Once (Completed)    POCT Venous Blood Gas Once (Completed)    ISTAT PROCEDURE (Completed)    ISTAT CREATININE (Completed)    POCT glucose (Completed)    Echo (Completed)    Acute deep vein thrombosis (DVT) of both femoral veins        Stroke        Relevant Orders    Echo

## 2022-02-07 NOTE — ASSESSMENT & PLAN NOTE
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  No evidence of large vessel intracranial occlusion.   Neuro recs appr  Will also consult speech  Echo with bubble study  Follow vascular neuro in 4 weeks and neuro  Check b12, b1, rpr , hiv

## 2022-02-07 NOTE — PLAN OF CARE
Problem: Occupational Therapy Goal  Goal: Occupational Therapy Goal  Description: Goals to be met by: 2/15/2022    Patient will increase functional independence with ADLs by performing:    UE Dressing with Forrest.  LE Dressing with Forrest.  Grooming while standing at sink with Supervision.  Toileting from toilet with Supervision for hygiene and clothing management.   Toilet transfer to toilet with Supervision.    Outcome: Ongoing, Progressing  Patient agreeable to OOB for OT session.  Tolerated up to restroom, sink then return to EOB for short ADL session.  Session shortened by arrival of transport.  Required Total assist for anne-marie care as patient did not seem aware of having BM.  Continue OT services to maximize patient functioning.

## 2022-02-08 ENCOUNTER — PATIENT OUTREACH (OUTPATIENT)
Dept: ADMINISTRATIVE | Facility: OTHER | Age: 72
End: 2022-02-08
Payer: MEDICARE

## 2022-02-08 PROBLEM — R50.9 FEVER: Status: RESOLVED | Noted: 2022-02-04 | Resolved: 2022-02-08

## 2022-02-08 LAB
ANION GAP SERPL CALC-SCNC: 9 MMOL/L (ref 8–16)
BASOPHILS # BLD AUTO: 0.05 K/UL (ref 0–0.2)
BASOPHILS NFR BLD: 0.6 % (ref 0–1.9)
BUN SERPL-MCNC: 18 MG/DL (ref 8–23)
CALCIUM SERPL-MCNC: 9.7 MG/DL (ref 8.7–10.5)
CHLORIDE SERPL-SCNC: 101 MMOL/L (ref 95–110)
CO2 SERPL-SCNC: 25 MMOL/L (ref 23–29)
CREAT SERPL-MCNC: 1.1 MG/DL (ref 0.5–1.4)
DIFFERENTIAL METHOD: ABNORMAL
EOSINOPHIL # BLD AUTO: 0.1 K/UL (ref 0–0.5)
EOSINOPHIL NFR BLD: 1.2 % (ref 0–8)
ERYTHROCYTE [DISTWIDTH] IN BLOOD BY AUTOMATED COUNT: 12.3 % (ref 11.5–14.5)
EST. GFR  (AFRICAN AMERICAN): >60 ML/MIN/1.73 M^2
EST. GFR  (NON AFRICAN AMERICAN): >60 ML/MIN/1.73 M^2
GLUCOSE SERPL-MCNC: 128 MG/DL (ref 70–110)
HCT VFR BLD AUTO: 43.2 % (ref 40–54)
HGB BLD-MCNC: 13.8 G/DL (ref 14–18)
HIV1+2 IGG SERPL QL IA.RAPID: NORMAL
IMM GRANULOCYTES # BLD AUTO: 0.02 K/UL (ref 0–0.04)
IMM GRANULOCYTES NFR BLD AUTO: 0.2 % (ref 0–0.5)
INR PPP: 1.9 (ref 0.8–1.2)
LYMPHOCYTES # BLD AUTO: 3.3 K/UL (ref 1–4.8)
LYMPHOCYTES NFR BLD: 36.2 % (ref 18–48)
MAGNESIUM SERPL-MCNC: 2 MG/DL (ref 1.6–2.6)
MCH RBC QN AUTO: 25.3 PG (ref 27–31)
MCHC RBC AUTO-ENTMCNC: 31.9 G/DL (ref 32–36)
MCV RBC AUTO: 79 FL (ref 82–98)
MONOCYTES # BLD AUTO: 0.6 K/UL (ref 0.3–1)
MONOCYTES NFR BLD: 6.9 % (ref 4–15)
NEUTROPHILS # BLD AUTO: 5 K/UL (ref 1.8–7.7)
NEUTROPHILS NFR BLD: 54.9 % (ref 38–73)
NRBC BLD-RTO: 0 /100 WBC
PLATELET # BLD AUTO: 226 K/UL (ref 150–450)
PMV BLD AUTO: 9.9 FL (ref 9.2–12.9)
POCT GLUCOSE: 133 MG/DL (ref 70–110)
POCT GLUCOSE: 152 MG/DL (ref 70–110)
POCT GLUCOSE: 158 MG/DL (ref 70–110)
POCT GLUCOSE: 208 MG/DL (ref 70–110)
POCT GLUCOSE: 212 MG/DL (ref 70–110)
POTASSIUM SERPL-SCNC: 4.7 MMOL/L (ref 3.5–5.1)
PROTHROMBIN TIME: 19.5 SEC (ref 9–12.5)
RBC # BLD AUTO: 5.46 M/UL (ref 4.6–6.2)
RPR SER QL: NORMAL
SODIUM SERPL-SCNC: 135 MMOL/L (ref 136–145)
VIT B12 SERPL-MCNC: 316 PG/ML (ref 210–950)
WBC # BLD AUTO: 9.04 K/UL (ref 3.9–12.7)

## 2022-02-08 PROCEDURE — 85610 PROTHROMBIN TIME: CPT | Performed by: INTERNAL MEDICINE

## 2022-02-08 PROCEDURE — 11000001 HC ACUTE MED/SURG PRIVATE ROOM

## 2022-02-08 PROCEDURE — 94761 N-INVAS EAR/PLS OXIMETRY MLT: CPT

## 2022-02-08 PROCEDURE — 85025 COMPLETE CBC W/AUTO DIFF WBC: CPT | Performed by: INTERNAL MEDICINE

## 2022-02-08 PROCEDURE — 63600175 PHARM REV CODE 636 W HCPCS: Performed by: HOSPITALIST

## 2022-02-08 PROCEDURE — 99232 SBSQ HOSP IP/OBS MODERATE 35: CPT | Mod: ,,, | Performed by: HOSPITALIST

## 2022-02-08 PROCEDURE — 86592 SYPHILIS TEST NON-TREP QUAL: CPT | Performed by: INTERNAL MEDICINE

## 2022-02-08 PROCEDURE — 80048 BASIC METABOLIC PNL TOTAL CA: CPT | Performed by: INTERNAL MEDICINE

## 2022-02-08 PROCEDURE — 83735 ASSAY OF MAGNESIUM: CPT | Performed by: INTERNAL MEDICINE

## 2022-02-08 PROCEDURE — 86703 HIV-1/HIV-2 1 RESULT ANTBDY: CPT | Performed by: INTERNAL MEDICINE

## 2022-02-08 PROCEDURE — 84425 ASSAY OF VITAMIN B-1: CPT | Performed by: INTERNAL MEDICINE

## 2022-02-08 PROCEDURE — 82607 VITAMIN B-12: CPT | Performed by: INTERNAL MEDICINE

## 2022-02-08 PROCEDURE — 25000003 PHARM REV CODE 250: Performed by: INTERNAL MEDICINE

## 2022-02-08 PROCEDURE — 25000003 PHARM REV CODE 250: Performed by: NURSE PRACTITIONER

## 2022-02-08 PROCEDURE — 36415 COLL VENOUS BLD VENIPUNCTURE: CPT | Performed by: INTERNAL MEDICINE

## 2022-02-08 PROCEDURE — 99232 PR SUBSEQUENT HOSPITAL CARE,LEVL II: ICD-10-PCS | Mod: ,,, | Performed by: HOSPITALIST

## 2022-02-08 PROCEDURE — 92507 TX SP LANG VOICE COMM INDIV: CPT

## 2022-02-08 RX ADMIN — LOSARTAN POTASSIUM 25 MG: 25 TABLET, FILM COATED ORAL at 09:02

## 2022-02-08 RX ADMIN — INSULIN ASPART 2 UNITS: 100 INJECTION, SOLUTION INTRAVENOUS; SUBCUTANEOUS at 01:02

## 2022-02-08 RX ADMIN — INSULIN ASPART 5 UNITS: 100 INJECTION, SOLUTION INTRAVENOUS; SUBCUTANEOUS at 06:02

## 2022-02-08 RX ADMIN — WARFARIN SODIUM 5 MG: 5 TABLET ORAL at 06:02

## 2022-02-08 RX ADMIN — ATORVASTATIN CALCIUM 80 MG: 20 TABLET, FILM COATED ORAL at 09:02

## 2022-02-08 RX ADMIN — ENOXAPARIN SODIUM 70 MG: 80 INJECTION SUBCUTANEOUS at 09:02

## 2022-02-08 RX ADMIN — INSULIN ASPART 5 UNITS: 100 INJECTION, SOLUTION INTRAVENOUS; SUBCUTANEOUS at 09:02

## 2022-02-08 RX ADMIN — INSULIN ASPART 5 UNITS: 100 INJECTION, SOLUTION INTRAVENOUS; SUBCUTANEOUS at 01:02

## 2022-02-08 RX ADMIN — INSULIN DETEMIR 15 UNITS: 100 INJECTION, SOLUTION SUBCUTANEOUS at 09:02

## 2022-02-08 NOTE — PROGRESS NOTES
RSW called patient over the phone to follow up with any additional needs. Patient has no additional needs, patient will go to SNF then transfer to intermediate placement. Patient states this will be good for him because he is homeless. RSW to follow up.    Andrzej Jefferson, SUMMER

## 2022-02-08 NOTE — PT/OT/SLP PROGRESS
Speech Language Pathology Treatment    Patient Name:  Forest Lopez   MRN:  [id number]  Admitting Diagnosis: Acute deep vein thrombosis (DVT) of both lower extremities    Recommendations:                  General Recommendations:   1. Speech pathology to continue to follow 3-5x/week for ongoing assessment of cognitive-communicative deficits s/p acute infarcts of L cerebral hemisphere     Diet recommendations: Solids: Regular, Liquids: Thin      Aspiration Precautions: Eliminate distractions, Feed only when awake/alert, Frequent oral care and HOB to 90 degrees      General Precautions: Standard,       Communication strategies:  Reduce informational content/context    Subjective     · Pt awake/alert, reclined in bed. Agreeable to SLP session.     Respiratory Status: Room air    Objective:     Has the patient been evaluated by SLP for swallowing?   Yes  Keep patient NPO? No   Current Respiratory Status:    Room air    Cognitive-Communicative Status:  Ongoing cognitive-communicative assessment initiated this date. Limited due to incr in pt lethargy, frequent closing of eyes, requiring MAX verbal cues and name call to participate.   · Orientation: Pt answered orientation questions with 27% acc given MAX verbal, semantic, and redirectional cues. Dcr accuracy and breakdown related to dcr ability to sustain attention. Pt oriented to person only this date. Stated the year is 1972.   · Wechsler Memory Scale; mental control subtest: Pt without response during counting backwards despite MAX verbal and redirectional cues. During alphabet recitation, pt able to recite A-L, then transitioned into sleep state.     Ongoing assessment to be deferred to next session due to incr lethargy and difficulty sustain attention this date.       Assessment:     Forest Lopez is a 72 y.o. 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    Goals:    Multidisciplinary Problems     SLP Goals        Problem: SLP Goal    Goal Priority Disciplines Outcome   SLP Goal     SLP Ongoing, Progressing   Description: 1. Pt will consume a regular/thin diet without overt s/s of aspiration or airway threat without assistance.  2. Pt will increase orientation to person, place, situation and date with 90% acc given min assist.  3. Pt will follow 3-4 step commands to increase functional IND with 75% acc given min assist.   4. Pt will complete immediate and delayed recall tasks with 75% acc given mod assist.  5. Targeting word finding, pt will complete divergent naming tasks given 1-minute time frame with 50% acc given mod assist.  6. Ongoing cognitive-communicative assessment.     Updated Goals 2/8:  7. Pt will sustain attention to topic/task without distraction in 5-10 minute increments given mod verbal cues.                   Plan:     · Patient to be seen:  3 x/week,5 x/week   · Plan of Care expires:  02/21/22  · Plan of Care reviewed with:  patient   · SLP Follow-Up:  Yes       Discharge recommendations:  nursing facility, skilled     Time Tracking:     SLP Treatment Date:   02/08/22  Speech Start Time:  1056  Speech Stop Time:  1115     Speech Total Time (min):  19 min    Billable Minutes: Speech Therapy Individual 19 min    02/08/2022

## 2022-02-08 NOTE — PLAN OF CARE
Problem: SLP Goal  Goal: SLP Goal  Description: 1. Pt will consume a regular/thin diet without overt s/s of aspiration or airway threat without assistance.  2. Pt will increase orientation to person, place, situation and date with 90% acc given min assist.  3. Pt will follow 3-4 step commands to increase functional IND with 75% acc given min assist.   4. Pt will complete immediate and delayed recall tasks with 75% acc given mod assist.  5. Targeting word finding, pt will complete divergent naming tasks given 1-minute time frame with 50% acc given mod assist.  6. Ongoing cognitive-communicative assessment.     Updated Goals 2/8:  7. Pt will sustain attention to topic/task without distraction in 5-10 minute increments given mod verbal cues.  Outcome: Ongoing, Progressing    Ongoing cognitive assessment initiated this date. SLP to continue to follow 3-5x/week.

## 2022-02-08 NOTE — SUBJECTIVE & OBJECTIVE
Interval History:  Patient talking to his son on the phone.  Updates provided.  Patient not participating well in therapy and SNF has been recommended.  He says he does participate in therapy.  When asked if he could do 3 hours/day he says he would do it if he felt like it.    Review of Systems   Constitutional: Negative for chills and fever.   Respiratory: Negative for cough and shortness of breath.    Cardiovascular: Negative for chest pain and palpitations.     Objective:     Vital Signs (Most Recent):  Temp: 98.2 °F (36.8 °C) (02/08/22 1600)  Pulse: 78 (02/08/22 1600)  Resp: 18 (02/08/22 1600)  BP: 107/64 (02/08/22 1600)  SpO2: 97 % (02/08/22 1600) Vital Signs (24h Range):  Temp:  [97.8 °F (36.6 °C)-98.7 °F (37.1 °C)] 98.2 °F (36.8 °C)  Pulse:  [65-89] 78  Resp:  [16-20] 18  SpO2:  [93 %-99 %] 97 %  BP: (101-148)/(58-87) 107/64     Weight: 63 kg (139 lb)  Body mass index is 18.85 kg/m².    Intake/Output Summary (Last 24 hours) at 2/8/2022 1616  Last data filed at 2/8/2022 1400  Gross per 24 hour   Intake 480 ml   Output 300 ml   Net 180 ml      Physical Exam  Constitutional:       General: He is not in acute distress.  Cardiovascular:      Rate and Rhythm: Normal rate and regular rhythm.      Pulses: Normal pulses.      Heart sounds: Normal heart sounds. No murmur heard.  No gallop.    Pulmonary:      Effort: Pulmonary effort is normal.      Breath sounds: Normal breath sounds.   Abdominal:      General: Bowel sounds are normal.      Palpations: Abdomen is soft.   Skin:     General: Skin is warm and dry.   Neurological:      Mental Status: He is alert.      Comments: Not oriented to time, not talkative.   Psychiatric:      Comments: Affect flat.         Significant Labs: All pertinent labs within the past 24 hours have been reviewed.    Significant Imaging: I have reviewed all pertinent imaging results/findings within the past 24 hours.

## 2022-02-08 NOTE — ASSESSMENT & PLAN NOTE
Last ultrasound showed partially occlusive DVTs, now completely occlusive DVT of multiple lower extremity veins on ultrasound.  Patient reports compliance with warfarin but his INR is only one.  He is homeless but says he does not have difficulty obtaining his medications.  D-dimer is markedly elevated and there is a question of possible PE, but he just had a CTA of the brain/neck so cannot get another CTA for another 48 hours.    V/Q scan was done, showed low probability for PE.  2D echo showed grade I diastolic dysfunction.  Warfarin restarted with bridging Lovenox.

## 2022-02-08 NOTE — PLAN OF CARE
POC reviewed w/ pt and hourly rounding complete. Meds administered per MAR. Blood glucose monitored and PRN insulin administered per order. VSS on RA. Pt disoriented to time and situation. Pt turns independently. Bed alarm on. Safety precautions intact; no injuries or falls this shift. Pt denies needs at this time; will continue to monitor.

## 2022-02-08 NOTE — PT/OT/SLP PROGRESS
Physical Therapy      Patient Name:  Forest Lopez   MRN:  [id number]    Patient not seen today secondary to patient unwilling to participate in spite of strong encouragement   . Will follow-up next treatment day.

## 2022-02-08 NOTE — PROGRESS NOTES
[facility] Medicine  Progress Note    Patient Name: Forest Lopez  MRN: [id number]  Patient Class: IP- Inpatient   Admission Date: 1/31/2022  Length of Stay: 8 days  Attending Physician: Mojgan Bosch MD  Primary Care Provider: Julian Escobar        Subjective:     Principal Problem:Acute deep vein thrombosis (DVT) of both lower extremities        HPI:  Mr. Lopez is a 72 year old man who presented after a syncopal episode.  He reports he had been feeling well prior to the episode but then had a prodrome prior to passing out.  EMS was called, report patient's BP was low normal on their arrival, improved after an IV fluids bolus.  Patient denies chest pain or shortness of breath and says he does not feel weak currently although is rather tired.  When seen in the ED this morning he could barely express himself audibly.     Vital signs were normal on presentation here.  He is on warfarin for recurrent DVT, but his INR was 1, and d-dimer markedly elevated at 21.7.  Tox screen was positive for cocaine.  He had a CTA of the brain and neck done on presentation so CTA of the chest for PE study could not be done for 48 hours.  Ultrasound of the lower extremities showed extensive bilateral DVT.  On the right there was thrombosis of the common femoral vein, femoral vein, popliteal vein, one of the paired posterior tibial veins and both visualized peroneal veins.  On the left there was thrombosis of the common femoral vein, femoral vein and popliteal vein.  Patient was started on full dose Lovenox and admitted.    Medical history is from the chart as he is unable to give me much information.  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  He smokes 5-6 cigarettes/day and is not interested in quitting.  He is currently homeless and staying with a friend.   Despite the normal INR he is sure he is taking his warfarin and is not having difficulty taking his medications.  I discussed his care with [facility] staff on the [facility] and patient had been lost to follow up since November 2021.      Overview/Hospital Course:  Patient presented to the ED after a syncopal episode and was found to have bilateral lower extremity DVTs and a low INR.  Tox screen was positive for cocaine.  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  He was started back on his warfarin with bridging Lovenox.  His 2D echo showed grade I diastolic dysfunction.  Neurology evaluated him and recommended echo with bubble study, which was negative for a shunt.    PT/OT evaluated him and recommended SNF placement as he was not participating in therapy.      Interval History:  Patient talking to his son on the phone.  Updates provided.  Patient not participating well in therapy and SNF has been recommended.  He says he does participate in therapy.  When asked if he could do 3 hours/day he says he would do it if he felt like it.    Review of Systems   Constitutional: Negative for chills and fever.   Respiratory: Negative for cough and shortness of breath.    Cardiovascular: Negative for chest pain and palpitations.     Objective:     Vital Signs (Most Recent):  Temp: 98.2 °F (36.8 °C) (02/08/22 1600)  Pulse: 78 (02/08/22 1600)  Resp: 18 (02/08/22 1600)  BP: 107/64 (02/08/22 1600)  SpO2: 97 % (02/08/22 1600) Vital Signs (24h Range):  Temp:  [97.8 °F (36.6 °C)-98.7 °F (37.1 °C)] 98.2 °F (36.8 °C)  Pulse:  [65-89] 78  Resp:  [16-20] 18  SpO2:  [93 %-99 %] 97 %  BP: (101-148)/(58-87) 107/64     Weight: 63 kg (139 lb)  Body mass index is 18.85 kg/m².    Intake/Output Summary (Last 24 hours) at 2/8/2022 1616  Last data filed at 2/8/2022 1400  Gross per 24 hour   Intake 480 ml   Output 300 ml   Net 180 ml      Physical  Exam  Constitutional:       General: He is not in acute distress.  Cardiovascular:      Rate and Rhythm: Normal rate and regular rhythm.      Pulses: Normal pulses.      Heart sounds: Normal heart sounds. No murmur heard.  No gallop.    Pulmonary:      Effort: Pulmonary effort is normal.      Breath sounds: Normal breath sounds.   Abdominal:      General: Bowel sounds are normal.      Palpations: Abdomen is soft.   Skin:     General: Skin is warm and dry.   Neurological:      Mental Status: He is alert.      Comments: Not oriented to time, not talkative.   Psychiatric:      Comments: Affect flat.         Significant Labs: All pertinent labs within the past 24 hours have been reviewed.    Significant Imaging: I have reviewed all pertinent imaging results/findings within the past 24 hours.      Assessment/Plan:      * Acute deep vein thrombosis (DVT) of both lower extremities  Last ultrasound showed partially occlusive DVTs, now completely occlusive DVT of multiple lower extremity veins on ultrasound.  Patient reports compliance with warfarin but his INR is only one.  He is homeless but says he does not have difficulty obtaining his medications.  D-dimer is markedly elevated and there is a question of possible PE, but he just had a CTA of the brain/neck so cannot get another CTA for another 48 hours.    V/Q scan was done, showed low probability for PE.  2D echo showed grade I diastolic dysfunction.  Warfarin restarted with bridging Lovenox.    Acute stroke due to ischemia  Mri shows left stroke  On full dose anticoagulation  Tele shows sinus rhythm  Better dm control, continue statin  cta showed  Focal short segment 60-70% stenosis involving the proximal to mid left vertebral artery, new compared to prior study of 09/25/2019.  No evidence of large vessel intracranial occlusion.   Neuro recs appr  Will also consult speech  Echo with bubble study  Follow vascular neuro in 4 weeks and neuro  Check b12, b1, rpr ,  hiv  Therapy recommending SNF - ?due to poor participation.      Abnormal imaging of thyroid  Tsh/t4 ok, check thyroid us      Advance care planning        Severe protein-calorie malnutrition  Diet as tolerated      Debility  Pt/ot efforts  Needs placement skilled    Type 2 diabetes mellitus with hyperglycemia, without long-term current use of insulin  Reportedly he is on metformin and glipizide, both held.  Continue SSI for now.  He has 4+ sugar in urine and HbA1c is 10.3, and he is hyperglycemic here.  Will start levemir and continue iss., increase levemir to 15 units daily  Add scheduled novolog 5 units tid  Will monitor    Syncope and collapse  Unclear cause.  Spoke to [facility] and patient has had previous syncopal episodes attributed to alcohol abuse.  Has been prescribed meclizine as well.  Tox positive for cocaine but not alcohol.  CTA brain/neck was done in ED, and per ED note the results were discussed with stroke neurologist Dr. Tovar who feels that CTA finding of a short segment of left vertebral artery stenosis was incidental.  He did not think stroke workup with MRI was indicated.    Since change of status per son compared to what he saw him early during the week, ct head ordered , was ok, mri brain showsed acute stroke.      Cocaine abuse  As noted above      Hyperlipidemia  Resumed lipitor      Essential hypertension  Not sure on what meds at home, bp high now  Started losartan 25 mg daily  bp stable    Tobacco dependence  He smokes 5-6 cigarettes/day.  Not trying to quit and not interested in a nicotine patch.  Benefits of smoking cessation were reviewed with patient in detail for for 8 minutes and patient was encouraged to quit. Nicotine replacement options were discussed.        VTE Risk Mitigation (From admission, onward)         Ordered     warfarin (COUMADIN) tablet 5 mg  Daily         02/07/22 0801     enoxaparin injection 70 mg  Every 12 hours (non-standard times)         01/31/22 1003      Place sequential compression device  Until discontinued         01/31/22 0533                        Mojgan Meza MD  Department of Hospital Medicine   [facility] - Med Surg ([place])

## 2022-02-08 NOTE — PLAN OF CARE
02/07/22 1832   Post-Acute Status   Post-Acute Authorization Placement  (SNF)   Post-Acute Placement Status Referrals Sent   Coverage [employer] [facility] MEDICARE - [employer] [facility] (SPECIAL NEEDS PLAN   Discharge Delays (!) Post-Acute Set-up   Discharge Plan   Discharge Plan A Skilled Nursing Facility   Discharge Plan B New Nursing Home placement - senior living care facility     SHANICE sent additional information to [facility], [facility], and [facility]   for SNF placement.  Patient will stay custodially after SNF care. SHANICE will call the facilities in the am.

## 2022-02-08 NOTE — ASSESSMENT & PLAN NOTE
Unclear cause.  Spoke to [facility] and patient has had previous syncopal episodes attributed to alcohol abuse.  Has been prescribed meclizine as well.  Tox positive for cocaine but not alcohol.  CTA brain/neck was done in ED, and per ED note the results were discussed with stroke neurologist Dr. Tovar who feels that CTA finding of a short segment of left vertebral artery stenosis was incidental.  He did not think stroke workup with MRI was indicated.    Since change of status per son compared to what he saw him early during the week, ct head ordered , was ok, mri brain showsed acute stroke.

## 2022-02-08 NOTE — ASSESSMENT & PLAN NOTE
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  No evidence of large vessel intracranial occlusion.   Neuro recs appr  Will also consult speech  Echo with bubble study  Follow vascular neuro in 4 weeks and neuro  Check b12, b1, rpr , hiv  Therapy recommending SNF - ?due to poor participation.

## 2022-02-09 ENCOUNTER — PATIENT OUTREACH (OUTPATIENT)
Dept: ADMINISTRATIVE | Facility: OTHER | Age: 72
End: 2022-02-09
Payer: MEDICARE

## 2022-02-09 LAB
INR PPP: 1.6 (ref 0.8–1.2)
POCT GLUCOSE: 164 MG/DL (ref 70–110)
POCT GLUCOSE: 175 MG/DL (ref 70–110)
POCT GLUCOSE: 227 MG/DL (ref 70–110)
POCT GLUCOSE: 255 MG/DL (ref 70–110)
POCT GLUCOSE: 284 MG/DL (ref 70–110)
PROTHROMBIN TIME: 17.5 SEC (ref 9–12.5)

## 2022-02-09 PROCEDURE — 25000003 PHARM REV CODE 250: Performed by: NURSE PRACTITIONER

## 2022-02-09 PROCEDURE — 97535 SELF CARE MNGMENT TRAINING: CPT

## 2022-02-09 PROCEDURE — 99232 PR SUBSEQUENT HOSPITAL CARE,LEVL II: ICD-10-PCS | Mod: ,,, | Performed by: HOSPITALIST

## 2022-02-09 PROCEDURE — 99232 SBSQ HOSP IP/OBS MODERATE 35: CPT | Mod: ,,, | Performed by: HOSPITALIST

## 2022-02-09 PROCEDURE — 85610 PROTHROMBIN TIME: CPT | Performed by: HOSPITALIST

## 2022-02-09 PROCEDURE — 63600175 PHARM REV CODE 636 W HCPCS: Performed by: HOSPITALIST

## 2022-02-09 PROCEDURE — 25000003 PHARM REV CODE 250: Performed by: INTERNAL MEDICINE

## 2022-02-09 PROCEDURE — 36415 COLL VENOUS BLD VENIPUNCTURE: CPT | Performed by: HOSPITALIST

## 2022-02-09 PROCEDURE — 11000001 HC ACUTE MED/SURG PRIVATE ROOM

## 2022-02-09 RX ADMIN — LOSARTAN POTASSIUM 25 MG: 25 TABLET, FILM COATED ORAL at 08:02

## 2022-02-09 RX ADMIN — INSULIN ASPART 5 UNITS: 100 INJECTION, SOLUTION INTRAVENOUS; SUBCUTANEOUS at 12:02

## 2022-02-09 RX ADMIN — INSULIN ASPART 5 UNITS: 100 INJECTION, SOLUTION INTRAVENOUS; SUBCUTANEOUS at 08:02

## 2022-02-09 RX ADMIN — ENOXAPARIN SODIUM 70 MG: 80 INJECTION SUBCUTANEOUS at 10:02

## 2022-02-09 RX ADMIN — WARFARIN SODIUM 5 MG: 5 TABLET ORAL at 06:02

## 2022-02-09 RX ADMIN — ATORVASTATIN CALCIUM 80 MG: 20 TABLET, FILM COATED ORAL at 08:02

## 2022-02-09 RX ADMIN — INSULIN ASPART 3 UNITS: 100 INJECTION, SOLUTION INTRAVENOUS; SUBCUTANEOUS at 12:02

## 2022-02-09 RX ADMIN — INSULIN ASPART 2 UNITS: 100 INJECTION, SOLUTION INTRAVENOUS; SUBCUTANEOUS at 06:02

## 2022-02-09 RX ADMIN — ENOXAPARIN SODIUM 70 MG: 80 INJECTION SUBCUTANEOUS at 09:02

## 2022-02-09 RX ADMIN — INSULIN ASPART 5 UNITS: 100 INJECTION, SOLUTION INTRAVENOUS; SUBCUTANEOUS at 06:02

## 2022-02-09 RX ADMIN — INSULIN DETEMIR 15 UNITS: 100 INJECTION, SOLUTION SUBCUTANEOUS at 08:02

## 2022-02-09 NOTE — PROGRESS NOTES
BERKLEYW met with patient and patient has no additional needs. Patient will discharge to rehab vs snf.    SUMMER Wilson

## 2022-02-09 NOTE — ASSESSMENT & PLAN NOTE
Unclear cause.  Spoke to [facility] and patient has had previous syncopal episodes attributed to alcohol abuse.  Has been prescribed meclizine as well.  Tox positive for cocaine but not alcohol.  CTA brain/neck was done in ED, and per ED note the results were discussed with stroke neurologist Dr. Tovar who feels that CTA finding of a short segment of left vertebral artery stenosis was incidental.  He did not think stroke workup with MRI was indicated.    Since change of status per son compared to what he saw him early during the week, ct head ordered , was ok, mri brain showsed acute stroke.

## 2022-02-09 NOTE — SUBJECTIVE & OBJECTIVE
Interval History:  He's not interested in therapy today but has no complaints, is eating lunch.    Review of Systems   Constitutional: Negative for chills and fever.   Respiratory: Negative for cough and shortness of breath.    Cardiovascular: Negative for chest pain and palpitations.     Objective:     Vital Signs (Most Recent):  Temp: 98.7 °F (37.1 °C) (02/09/22 1111)  Pulse: 77 (02/09/22 1111)  Resp: 16 (02/09/22 1111)  BP: 102/63 (02/09/22 1111)  SpO2: 97 % (02/09/22 1111) Vital Signs (24h Range):  Temp:  [98 °F (36.7 °C)-98.8 °F (37.1 °C)] 98.7 °F (37.1 °C)  Pulse:  [60-79] 77  Resp:  [16-18] 16  SpO2:  [97 %-99 %] 97 %  BP: (102-119)/(63-76) 102/63     Weight: 63 kg (139 lb)  Body mass index is 18.85 kg/m².    Intake/Output Summary (Last 24 hours) at 2/9/2022 1622  Last data filed at 2/9/2022 1300  Gross per 24 hour   Intake 800 ml   Output 1460 ml   Net -660 ml      Physical Exam  Constitutional:       General: He is not in acute distress.  Cardiovascular:      Rate and Rhythm: Normal rate and regular rhythm.      Pulses: Normal pulses.      Heart sounds: Normal heart sounds. No murmur heard.  No gallop.    Pulmonary:      Effort: Pulmonary effort is normal.      Breath sounds: Normal breath sounds.   Abdominal:      General: Bowel sounds are normal.      Palpations: Abdomen is soft.   Skin:     General: Skin is warm and dry.   Neurological:      Mental Status: He is alert.      Comments: Not oriented to time, not talkative.   Psychiatric:      Comments: Affect flat.         Significant Labs: All pertinent labs within the past 24 hours have been reviewed.    Significant Imaging: I have reviewed all pertinent imaging results/findings within the past 24 hours.

## 2022-02-09 NOTE — PLAN OF CARE
Plan of care reviewed with pt question  and concerns addressed. No acute events thur shift. No distressed noted, no complaints,safety maintained. Bed locked in lowest position, call light within reach, side rails x2. Purposeful rounding. Will report off to oncoming nurse.

## 2022-02-09 NOTE — PROGRESS NOTES
[facility] - Med Surg ([place])  Adult Nutrition  Progress Note    SUMMARY       Recommendations  1.Continue DM/cardiac diet with coumadin restriction diet.   2.Encourage good PO intake as needed.    Goals: Pt to meet % EEN/EPN via PO intake by RD f/u.  Nutrition Goal Status: new  Communication of RD Recs:  (POC)    Assessment and Plan    No nutrition dx at this time.     Reason for Assessment    Reason For Assessment: length of stay  Diagnosis:  (acute DVT)  Relevant Medical History: HTN, HLD, T2DM, BPH  Interdisciplinary Rounds: attended  General Information Comments: 2/9- Pt seen 2/2 LOS.  Pt on 2000kcal DM/ Cardiac/ coumadin restriction diet. No ONS. Provided edu- Diet and Warfarin. PO intake 100%. Has not been participating in therapy per notes- SNF being recommend. Pt was sleeping and hard to awaken. Answered questions yes/no. Reports good appetite. No complaints. Will continue to monitor.    Nutrition Discharge Planning: Pt to follow a cardiac/ DM diet(vit K restriction as needed) as tolerated.    Nutrition Risk Screen    Nutrition Risk Screen: no indicators present    Nutrition/Diet History    Spiritual, Cultural Beliefs, Confucianist Practices, Values that Affect Care: no    Anthropometrics    Temp: 98.7 °F (37.1 °C)  Height: 6' (182.9 cm)  Height (inches): 72 in  Weight Method: Bed Scale  Weight: 63 kg (139 lb)  Weight (lb): 139 lb  Ideal Body Weight (IBW), Male: 178 lb  % Ideal Body Weight, Male (lb): 78.09 %  BMI (Calculated): 18.8  BMI Grade: 18.5-24.9 - normal    Lab/Procedures/Meds    Pertinent Labs Reviewed: reviewed  Pertinent Labs Comments: Na 135, glu 128, Hemo 13.8, INR 1.9, A1C 10.5  Pertinent Medications Reviewed: reviewed  Pertinent Medications Comments: atorvastatin, enoxaparin, insulin aspart, insulin detemir, losartan, warfarin     Estimated/Assessed Needs    Weight Used For Calorie Calculations: 63 kg (138 lb 14.2 oz)  Energy Calorie Requirements (kcal): 1980 kcal day (MSJ X AF 1.4)  Energy  Need Method: David Tatiana  Protein Requirements: 50-63 g day (0.8-1.0 g/kg)  Weight Used For Protein Calculations: 63 kg (138 lb 14.2 oz)  Estimated Fluid Requirement Method: RDA Method  RDA Method (mL): 1980  CHO Requirement: 248 g day    Nutrition Prescription Ordered    Current Diet Order: 2000kcal DM/ Cardiac/ coumadin restriction diet    Evaluation of Received Nutrient/Fluid Intake    Energy Calories Required: meeting needs  Protein Required: meeting needs  Fluid Required: meeting needs  % Intake of Estimated Energy Needs: 75 - 100 %  % Meal Intake: 75 - 100 %    Nutrition Risk    Level of Risk/Frequency of Follow-up:  (1 x weekly)     Monitor and Evaluation    Food and Nutrient Intake: food and beverage intake  Food and Nutrient Adminstration: diet order  Knowledge/Beliefs/Attitudes: food and nutrition knowledge/skill  Physical Activity and Function: nutrition-related ADLs and IADLs  Anthropometric Measurements: weight,weight change,body mass index  Biochemical Data, Medical Tests and Procedures: glucose/endocrine profile,gastrointestinal profile,electrolyte and renal panel,inflammatory profile,lipid profile  Nutrition-Focused Physical Findings: overall appearance     Nutrition Follow-Up    RD Follow-up?: Yes

## 2022-02-09 NOTE — PROGRESS NOTES
[facility] Medicine  Progress Note    Patient Name: Forest Lopez  MRN: [id number]  Patient Class: IP- Inpatient   Admission Date: 1/31/2022  Length of Stay: 9 days  Attending Physician: Mojgan Bsoch MD  Primary Care Provider: Julian Escobar        Subjective:     Principal Problem:Acute deep vein thrombosis (DVT) of both lower extremities        HPI:  Mr. Lopez is a 72 year old man who presented after a syncopal episode.  He reports he had been feeling well prior to the episode but then had a prodrome prior to passing out.  EMS was called, report patient's BP was low normal on their arrival, improved after an IV fluids bolus.  Patient denies chest pain or shortness of breath and says he does not feel weak currently although is rather tired.  When seen in the ED this morning he could barely express himself audibly.     Vital signs were normal on presentation here.  He is on warfarin for recurrent DVT, but his INR was 1, and d-dimer markedly elevated at 21.7.  Tox screen was positive for cocaine.  He had a CTA of the brain and neck done on presentation so CTA of the chest for PE study could not be done for 48 hours.  Ultrasound of the lower extremities showed extensive bilateral DVT.  On the right there was thrombosis of the common femoral vein, femoral vein, popliteal vein, one of the paired posterior tibial veins and both visualized peroneal veins.  On the left there was thrombosis of the common femoral vein, femoral vein and popliteal vein.  Patient was started on full dose Lovenox and admitted.    Medical history is from the chart as he is unable to give me much information.  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  He smokes 5-6 cigarettes/day and is not interested in quitting.  He is currently homeless and staying with a friend.   Despite the normal INR he is sure he is taking his warfarin and is not having difficulty taking his medications.  I discussed his care with [facility] staff on the [facility] and patient had been lost to follow up since November 2021.      Overview/Hospital Course:  Patient presented to the ED after a syncopal episode and was found to have bilateral lower extremity DVTs and a low INR.  Tox screen was positive for cocaine.  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  He was started back on his warfarin with bridging Lovenox.  His 2D echo showed grade I diastolic dysfunction.  Neurology evaluated him and recommended echo with bubble study, which was negative for a shunt.    PT/OT evaluated him and recommended SNF placement as he was not participating in therapy.      Interval History:  He's not interested in therapy today but has no complaints, is eating lunch.    Review of Systems   Constitutional: Negative for chills and fever.   Respiratory: Negative for cough and shortness of breath.    Cardiovascular: Negative for chest pain and palpitations.     Objective:     Vital Signs (Most Recent):  Temp: 98.7 °F (37.1 °C) (02/09/22 1111)  Pulse: 77 (02/09/22 1111)  Resp: 16 (02/09/22 1111)  BP: 102/63 (02/09/22 1111)  SpO2: 97 % (02/09/22 1111) Vital Signs (24h Range):  Temp:  [98 °F (36.7 °C)-98.8 °F (37.1 °C)] 98.7 °F (37.1 °C)  Pulse:  [60-79] 77  Resp:  [16-18] 16  SpO2:  [97 %-99 %] 97 %  BP: (102-119)/(63-76) 102/63     Weight: 63 kg (139 lb)  Body mass index is 18.85 kg/m².    Intake/Output Summary (Last 24 hours) at 2/9/2022 1622  Last data filed at 2/9/2022 1300  Gross per 24 hour   Intake 800 ml   Output 1460 ml   Net -660 ml      Physical Exam  Constitutional:       General: He is not in acute distress.  Cardiovascular:      Rate and Rhythm: Normal rate and regular rhythm.      Pulses: Normal pulses.      Heart sounds: Normal  heart sounds. No murmur heard.  No gallop.    Pulmonary:      Effort: Pulmonary effort is normal.      Breath sounds: Normal breath sounds.   Abdominal:      General: Bowel sounds are normal.      Palpations: Abdomen is soft.   Skin:     General: Skin is warm and dry.   Neurological:      Mental Status: He is alert.      Comments: Not oriented to time, not talkative.   Psychiatric:      Comments: Affect flat.         Significant Labs: All pertinent labs within the past 24 hours have been reviewed.    Significant Imaging: I have reviewed all pertinent imaging results/findings within the past 24 hours.      Assessment/Plan:      * Acute deep vein thrombosis (DVT) of both lower extremities  Last ultrasound showed partially occlusive DVTs, now completely occlusive DVT of multiple lower extremity veins on ultrasound.  Patient reports compliance with warfarin but his INR is only one.  He is homeless but says he does not have difficulty obtaining his medications.  D-dimer is markedly elevated and there is a question of possible PE, but he just had a CTA of the brain/neck so cannot get another CTA for another 48 hours.    V/Q scan was done, showed low probability for PE.  2D echo showed grade I diastolic dysfunction.  Warfarin restarted with bridging Lovenox.    Acute stroke due to ischemia  Mri shows left stroke  On full dose anticoagulation  Tele shows sinus rhythm  Better dm control, continue statin  cta showed  Focal short segment 60-70% stenosis involving the proximal to mid left vertebral artery, new compared to prior study of 09/25/2019.  No evidence of large vessel intracranial occlusion.   Neuro recs appr  Will also consult speech  Echo with bubble study  Follow vascular neuro in 4 weeks and neuro  Check b12, b1, rpr , hiv  Therapy recommending SNF - ?due to poor participation.      Abnormal imaging of thyroid  Tsh/t4 ok, thyroid ultrasound done, showed bilateral nodules which did not meet criteria for  biopsy.    Advance care planning        Severe protein-calorie malnutrition  Diet as tolerated      Debility  Pt/ot efforts  Needs placement skilled    Type 2 diabetes mellitus with hyperglycemia, without long-term current use of insulin  Reportedly he is on metformin and glipizide, both held.  Continue SSI for now.  He has 4+ sugar in urine and HbA1c is 10.3, and he is hyperglycemic here.  Will start levemir and continue iss., increase levemir to 15 units daily  Add scheduled novolog 5 units tid  Will monitor    Syncope and collapse  Unclear cause.  Spoke to [facility] and patient has had previous syncopal episodes attributed to alcohol abuse.  Has been prescribed meclizine as well.  Tox positive for cocaine but not alcohol.  CTA brain/neck was done in ED, and per ED note the results were discussed with stroke neurologist Dr. Tovar who feels that CTA finding of a short segment of left vertebral artery stenosis was incidental.  He did not think stroke workup with MRI was indicated.    Since change of status per son compared to what he saw him early during the week, ct head ordered , was ok, mri brain showsed acute stroke.      Cocaine abuse  As noted above      Hyperlipidemia  Resumed lipitor      Essential hypertension  Not sure on what meds at home, bp high now  Started losartan 25 mg daily  bp stable    Tobacco dependence  He smokes 5-6 cigarettes/day.  Not trying to quit and not interested in a nicotine patch.  Benefits of smoking cessation were reviewed with patient in detail for for 8 minutes and patient was encouraged to quit. Nicotine replacement options were discussed.        VTE Risk Mitigation (From admission, onward)         Ordered     warfarin (COUMADIN) tablet 5 mg  Daily         02/07/22 0801     enoxaparin injection 70 mg  Every 12 hours (non-standard times)         01/31/22 1003     Place sequential compression device  Until discontinued         01/31/22 0526                    Mojgan Meza,  MD  Department of Hospital Medicine   [facility] - Med Surg ([place])

## 2022-02-09 NOTE — PLAN OF CARE
02/09/22 1741   Post-Acute Status   Post-Acute Authorization Placement  (SNF)   Post-Acute Placement Status Referrals Sent   Coverage [facility] MEDICARE - [employer] SNP (SPECIAL NEEDS PLAN)   Patient choice form signed by patient/caregiver List from CMS Compare   Discharge Delays (!) Post-Acute Set-up   Discharge Plan   Discharge Plan A Skilled Nursing Facility     Patient is still on the lists for SNF.  His participation has been low for a Rehab. Unit   admit, so working on SNF placement. His PPD is done, his chest x-ray, [facility] called in   and his PASSR faxed to the state. Waiting for his 142, and acceptance to a SNF.    Have received responses from [facility] and [facility], but no acceptance yet.         12/02/19 1500   Group 4   Start Time 1500   Stop Time 1545   Length (min) 45 min   Group Name Therapeutic Activity   Focus of Group Art work group   Attendance Present   Mood Bright   Affect Calm;Positive;Relaxed   Behavior/Socialization Cooperative;Engaged   Thought Process Tracking   Patient Response Attentive;Good eye contact;Interactive   Task Performance Follows directions   Group Notes Pt engaged in completing an art work project during group time.  Pt was conversing with her peers while working on her art work project.   Migdalia Blanco, JASPERW, SAC

## 2022-02-09 NOTE — ASSESSMENT & PLAN NOTE
Tsh/t4 ok, thyroid ultrasound done, showed bilateral nodules which did not meet criteria for biopsy.

## 2022-02-09 NOTE — PT/OT/SLP PROGRESS
Physical Therapy      Patient Name:  Forest Lopez   MRN:  [id number]    Patient not seen today secondary to patient unwilling to participate  . Will follow-up next treatment day.

## 2022-02-10 ENCOUNTER — PATIENT OUTREACH (OUTPATIENT)
Dept: ADMINISTRATIVE | Facility: OTHER | Age: 72
End: 2022-02-10
Payer: MEDICARE

## 2022-02-10 LAB
INR PPP: 1.7 (ref 0.8–1.2)
POCT GLUCOSE: 135 MG/DL (ref 70–110)
POCT GLUCOSE: 153 MG/DL (ref 70–110)
POCT GLUCOSE: 168 MG/DL (ref 70–110)
POCT GLUCOSE: 183 MG/DL (ref 70–110)
PROTHROMBIN TIME: 17.7 SEC (ref 9–12.5)

## 2022-02-10 PROCEDURE — 25000003 PHARM REV CODE 250: Performed by: INTERNAL MEDICINE

## 2022-02-10 PROCEDURE — 25000003 PHARM REV CODE 250: Performed by: NURSE PRACTITIONER

## 2022-02-10 PROCEDURE — 36415 COLL VENOUS BLD VENIPUNCTURE: CPT | Performed by: HOSPITALIST

## 2022-02-10 PROCEDURE — 94761 N-INVAS EAR/PLS OXIMETRY MLT: CPT

## 2022-02-10 PROCEDURE — 85610 PROTHROMBIN TIME: CPT | Performed by: HOSPITALIST

## 2022-02-10 PROCEDURE — 97116 GAIT TRAINING THERAPY: CPT | Mod: CQ

## 2022-02-10 PROCEDURE — 99233 PR SUBSEQUENT HOSPITAL CARE,LEVL III: ICD-10-PCS | Mod: ,,, | Performed by: HOSPITALIST

## 2022-02-10 PROCEDURE — 25000003 PHARM REV CODE 250: Performed by: HOSPITALIST

## 2022-02-10 PROCEDURE — 99233 SBSQ HOSP IP/OBS HIGH 50: CPT | Mod: ,,, | Performed by: HOSPITALIST

## 2022-02-10 PROCEDURE — 11000001 HC ACUTE MED/SURG PRIVATE ROOM

## 2022-02-10 RX ORDER — ATORVASTATIN CALCIUM 80 MG/1
80 TABLET, FILM COATED ORAL DAILY
Start: 2022-02-10 | End: 2023-02-10

## 2022-02-10 RX ORDER — TAMSULOSIN HYDROCHLORIDE 0.4 MG/1
0.4 CAPSULE ORAL NIGHTLY
Status: DISCONTINUED | OUTPATIENT
Start: 2022-02-10 | End: 2022-04-27 | Stop reason: HOSPADM

## 2022-02-10 RX ORDER — INSULIN ASPART 100 [IU]/ML
5 INJECTION, SOLUTION INTRAVENOUS; SUBCUTANEOUS 3 TIMES DAILY
Refills: 0
Start: 2022-02-10 | End: 2022-04-07 | Stop reason: HOSPADM

## 2022-02-10 RX ADMIN — INSULIN ASPART 5 UNITS: 100 INJECTION, SOLUTION INTRAVENOUS; SUBCUTANEOUS at 09:02

## 2022-02-10 RX ADMIN — APIXABAN 5 MG: 2.5 TABLET, FILM COATED ORAL at 09:02

## 2022-02-10 RX ADMIN — LOSARTAN POTASSIUM 25 MG: 25 TABLET, FILM COATED ORAL at 09:02

## 2022-02-10 RX ADMIN — INSULIN ASPART 5 UNITS: 100 INJECTION, SOLUTION INTRAVENOUS; SUBCUTANEOUS at 05:02

## 2022-02-10 RX ADMIN — TAMSULOSIN HYDROCHLORIDE 0.4 MG: 0.4 CAPSULE ORAL at 09:02

## 2022-02-10 RX ADMIN — INSULIN DETEMIR 15 UNITS: 100 INJECTION, SOLUTION SUBCUTANEOUS at 09:02

## 2022-02-10 RX ADMIN — ATORVASTATIN CALCIUM 80 MG: 20 TABLET, FILM COATED ORAL at 09:02

## 2022-02-10 RX ADMIN — INSULIN ASPART 5 UNITS: 100 INJECTION, SOLUTION INTRAVENOUS; SUBCUTANEOUS at 12:02

## 2022-02-10 NOTE — PROGRESS NOTES
RSW called patient and patient has no additional needs. Patient just inquiring on when he will leave and go to rehab or skilled nursing. RSW assured CM was working on his acceptance per notes in chart. RSW to follow up.      SUMMER W[place]no

## 2022-02-10 NOTE — PT/OT/SLP PROGRESS
Occupational Therapy      Patient Name:  Forest Lopez   MRN:  [id number]    Patient not seen today secondary to fatigue  . Upon arrival to room at 11:52 pt declined participation in therapy stating he was too tired.  Encouragement provided.  Will follow-up at next scheduled therapy session.    MICHELLE Willett  2/10/2022

## 2022-02-10 NOTE — SUBJECTIVE & OBJECTIVE
Interval History:  He says he has no complaints.  Still not much interested in therapy.    Review of Systems   Constitutional: Negative for chills and fever.   Respiratory: Negative for cough and shortness of breath.    Cardiovascular: Negative for chest pain and palpitations.     Objective:     Vital Signs (Most Recent):  Temp: 97.5 °F (36.4 °C) (02/10/22 1115)  Pulse: 82 (02/10/22 1115)  Resp: 16 (02/10/22 1115)  BP: 109/62 (02/10/22 1115)  SpO2: 96 % (02/10/22 1115) Vital Signs (24h Range):  Temp:  [97.5 °F (36.4 °C)-99 °F (37.2 °C)] 97.5 °F (36.4 °C)  Pulse:  [60-84] 82  Resp:  [16-18] 16  SpO2:  [96 %-99 %] 96 %  BP: (109-145)/(60-76) 109/62     Weight: 63 kg (139 lb)  Body mass index is 18.85 kg/m².    Intake/Output Summary (Last 24 hours) at 2/10/2022 1539  Last data filed at 2/10/2022 0600  Gross per 24 hour   Intake --   Output 1400 ml   Net -1400 ml      Physical Exam  Constitutional:       General: He is not in acute distress.  Cardiovascular:      Rate and Rhythm: Normal rate and regular rhythm.      Pulses: Normal pulses.      Heart sounds: Normal heart sounds. No murmur heard.  No gallop.    Pulmonary:      Effort: Pulmonary effort is normal.      Breath sounds: Normal breath sounds.   Abdominal:      General: Bowel sounds are normal.      Palpations: Abdomen is soft.   Skin:     General: Skin is warm and dry.   Neurological:      Mental Status: He is alert.      Comments: Not oriented to time, not talkative.   Psychiatric:      Comments: Affect flat.         Significant Labs: All pertinent labs within the past 24 hours have been reviewed.    Significant Imaging: I have reviewed all pertinent imaging results/findings within the past 24 hours.

## 2022-02-10 NOTE — ASSESSMENT & PLAN NOTE
Reportedly he is on metformin and glipizide, both held.  Continue SSI for now.  He has 4+ sugar in urine and HbA1c is 10.3, and he is hyperglycemic here.  Will start levemir and continue iss., increase levemir to 15 units daily  Add scheduled novolog 5 units tid  Improving  Resume metformin in SNF

## 2022-02-10 NOTE — PLAN OF CARE
FACILITY TRANSFER ORDERS    04/11/2022  [facility] ([facility])  [facility] ([facility])  [address]  [place] [address]  Dept: [phone]  Loc: [phone]     Admit to Skilled Nursing      Diagnoses:  Active Hospital Problems    Diagnosis  POA    *Acute stroke due to ischemia [I63.9]  Yes     Priority: 1 - High    Acute deep vein thrombosis (DVT) of both femoral veins [I82.413]  Yes     Priority: 2     Severe protein-calorie malnutrition [E43]  Yes    Advance care planning [Z71.89]  Not Applicable    Debility [R53.81]  Yes    Type 2 diabetes mellitus with hyperglycemia, without long-term current use of insulin [E11.65]  Yes    Hyperlipidemia [E78.5]  Yes     Chronic      Resolved Hospital Problems    Diagnosis Date Resolved POA    Abnormal CT scan, gastrointestinal tract [R93.3] 04/06/2022 Yes    Vomiting [R11.10] 03/23/2022 No    Sepsis [A41.9] 03/03/2022 No    Drowsy [R40.0] 03/05/2022 No    Fever [R50.9] 02/08/2022 No    Syncope and collapse [R55] 03/25/2022 Yes    Cocaine abuse [F14.10] 03/14/2022 Yes     Chronic    Tobacco dependence [F17.200] 03/25/2022 Yes     Chronic    Essential hypertension [I10] 03/25/2022 Yes     Chronic       Code Status: Full Code      Allergies:Review of patient's allergies indicates:  No Known Allergies    Vitals:  Routine    Diet: diabetic diet: 2000 calorie    Activities:   Activity as tolerated    Nursing Precautions:  Aspiration , Fall and Pressure ulcer prevention    Consults:   PT to evaluate and treat- 5 times a week, OT to evaluate and treat- 5 times a week, ST to evaluate and treat- 5 times a week and Nutrition to evaluate and recommend diet             Diabetes Care:  Diabetes Care:   Fingerstick blood sugar AC and HS, check every 6 hours if NPO or if unable to eat.  If BG decreases while patient is on metformin, insulin doses may be decreased or discontinued.    Sliding Scale/Hypoglycemia Protocol:     For FSG <80, give 1/2 amp D50  or 1 glucose tablet and call MD  For FSG , do nothing. For -200, give 1 unit of novolog in addition to pre-meal insulin.   For -250, give 2 units of insulin aspart in addition to pre-meal insulin.   For -300, give 3 units of insulin aspart in addition to pre-meal insulin.   For -350, give 4 units of insulin aspart in addition to pre-meal insulin.   For -400, give 5 units of insulin aspart in addition to pre-meal insulin.   For FSG >400, give 5 units of insulin aspart in addition to pre-meal insulin and please call MD      Medications: Discontinue all previous medication orders, if any. See new list below.     Medication List      START taking these medications    apixaban 5 mg Tab  Commonly known as: ELIQUIS  Take 1 tablet (5 mg total) by mouth 2 (two) times daily.     atorvastatin 80 MG tablet  Commonly known as: LIPITOR  Take 1 tablet (80 mg total) by mouth once daily.     FLUoxetine 20 MG capsule  Take 1 capsule (20 mg total) by mouth once daily.     insulin aspart U-100 100 unit/mL (3 mL) Inpn pen  Commonly known as: NovoLOG  Inject 7 Units into the skin 3 (three) times daily with meals.     insulin detemir U-100 100 unit/mL (3 mL) Inpn pen  Commonly known as: Levemir FLEXTOUCH  Inject 30 Units into the skin once daily.        CHANGE how you take these medications    metFORMIN 500 MG tablet  Commonly known as: GLUCOPHAGE  Take 1 tablet (500 mg total) by mouth 2 (two) times daily with meals.  What changed: how much to take     tamsulosin 0.4 mg Cap  Commonly known as: FLOMAX  Take 1 capsule (0.4 mg total) by mouth every evening.  What changed: when to take this        CONTINUE taking these medications    albuterol 90 mcg/actuation inhaler  Commonly known as: PROVENTIL/VENTOLIN HFA  Inhale 2 puffs into the lungs every 6 (six) hours as needed for Wheezing or Shortness of Breath.     fluticasone-salmeterol 250-50 mcg/dose 250-50 mcg/dose diskus inhaler  Commonly known as:  ADVAIR  Inhale 1 puff into the lungs 2 (two) times daily. Controller        STOP taking these medications    BLOOD GLUCOSE TEST Strp  Generic drug: blood sugar diagnostic     citalopram 20 MG tablet  Commonly known as: CeleXA     finasteride 5 mg tablet  Commonly known as: PROSCAR     folic acid 1 MG tablet  Commonly known as: FOLVITE     gemfibroziL 600 MG tablet  Commonly known as: LOPID     glipiZIDE 10 MG tablet  Commonly known as: GLUCOTROL     losartan 25 MG tablet  Commonly known as: COZAAR     meclizine 25 mg tablet  Commonly known as: ANTIVERT     omeprazole 20 MG capsule  Commonly known as: PRILOSEC     rosuvastatin 40 MG Tab  Commonly known as: CRESTOR     sildenafiL 25 MG tablet  Commonly known as: VIAGRA     warfarin 7.5 MG tablet  Commonly known as: COUMADIN                   _________________________________  Mojgan Meza MD  04/11/2022

## 2022-02-10 NOTE — PROGRESS NOTES
[facility] Medicine  Progress Note    Patient Name: Forest Lopez  MRN: [id number]  Patient Class: IP- Inpatient   Admission Date: 1/31/2022  Length of Stay: 10 days  Attending Physician: Mojgan Bosch MD  Primary Care Provider: Julian Escobar        Subjective:     Principal Problem:Acute deep vein thrombosis (DVT) of both lower extremities        HPI:  Mr. Lopez is a 72 year old man who presented after a syncopal episode.  He reports he had been feeling well prior to the episode but then had a prodrome prior to passing out.  EMS was called, report patient's BP was low normal on their arrival, improved after an IV fluids bolus.  Patient denies chest pain or shortness of breath and says he does not feel weak currently although is rather tired.  When seen in the ED this morning he could barely express himself audibly.     Vital signs were normal on presentation here.  He is on warfarin for recurrent DVT, but his INR was 1, and d-dimer markedly elevated at 21.7.  Tox screen was positive for cocaine.  He had a CTA of the brain and neck done on presentation so CTA of the chest for PE study could not be done for 48 hours.  Ultrasound of the lower extremities showed extensive bilateral DVT.  On the right there was thrombosis of the common femoral vein, femoral vein, popliteal vein, one of the paired posterior tibial veins and both visualized peroneal veins.  On the left there was thrombosis of the common femoral vein, femoral vein and popliteal vein.  Patient was started on full dose Lovenox and admitted.    Medical history is from the chart as he is unable to give me much information.  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  He smokes 5-6 cigarettes/day and is not interested in quitting.  He is currently homeless and staying with a friend.   Despite the normal INR he is sure he is taking his warfarin and is not having difficulty taking his medications.  I discussed his care with [facility] staff on the [facility] and patient had been lost to follow up since November 2021.      Overview/Hospital Course:  Patient presented to the ED after a syncopal episode and was found to have bilateral lower extremity DVTs and a low INR.  Tox screen was positive for cocaine.  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  He was started back on his warfarin with bridging Lovenox.  His 2D echo showed grade I diastolic dysfunction.  Neurology evaluated him and recommended echo with bubble study, which was negative for a shunt.    PT/OT evaluated him and recommended SNF placement as he was not participating in therapy.  His INR was difficult to get therapeutic, and as he had no contraindication to a NOAC his warfarin was changed to apixaban, and the full dose Lovenox was discontinued.      Interval History:  He says he has no complaints.  Still not much interested in therapy.    Review of Systems   Constitutional: Negative for chills and fever.   Respiratory: Negative for cough and shortness of breath.    Cardiovascular: Negative for chest pain and palpitations.     Objective:     Vital Signs (Most Recent):  Temp: 97.5 °F (36.4 °C) (02/10/22 1115)  Pulse: 82 (02/10/22 1115)  Resp: 16 (02/10/22 1115)  BP: 109/62 (02/10/22 1115)  SpO2: 96 % (02/10/22 1115) Vital Signs (24h Range):  Temp:  [97.5 °F (36.4 °C)-99 °F (37.2 °C)] 97.5 °F (36.4 °C)  Pulse:  [60-84] 82  Resp:  [16-18] 16  SpO2:  [96 %-99 %] 96 %  BP: (109-145)/(60-76) 109/62     Weight: 63 kg (139 lb)  Body mass index is 18.85 kg/m².    Intake/Output Summary (Last 24 hours) at 2/10/2022 3609  Last data filed at 2/10/2022 0600  Gross per 24 hour   Intake --   Output 1400 ml   Net -1400 ml      Physical Exam  Constitutional:        General: He is not in acute distress.  Cardiovascular:      Rate and Rhythm: Normal rate and regular rhythm.      Pulses: Normal pulses.      Heart sounds: Normal heart sounds. No murmur heard.  No gallop.    Pulmonary:      Effort: Pulmonary effort is normal.      Breath sounds: Normal breath sounds.   Abdominal:      General: Bowel sounds are normal.      Palpations: Abdomen is soft.   Skin:     General: Skin is warm and dry.   Neurological:      Mental Status: He is alert.      Comments: Not oriented to time, not talkative.   Psychiatric:      Comments: Affect flat.         Significant Labs: All pertinent labs within the past 24 hours have been reviewed.    Significant Imaging: I have reviewed all pertinent imaging results/findings within the past 24 hours.      Assessment/Plan:      * Acute deep vein thrombosis (DVT) of both lower extremities  Last ultrasound showed partially occlusive DVTs, now completely occlusive DVT of multiple lower extremity veins on ultrasound.  Patient reports compliance with warfarin but his INR is only one.  He is homeless but says he does not have difficulty obtaining his medications.  D-dimer is markedly elevated and there is a question of possible PE, but he just had a CTA of the brain/neck so cannot get another CTA for another 48 hours.    V/Q scan was done, showed low probability for PE.  2D echo showed grade I diastolic dysfunction.  Warfarin restarted with bridging Lovenox, but INR still low.  Given the difficulty of getting him to follow up will change to apixaban and discontinue Lovenox.    Acute stroke due to ischemia  Mri shows left stroke  On full dose anticoagulation  Tele shows sinus rhythm  Better dm control, continue statin  cta showed  Focal short segment 60-70% stenosis involving the proximal to mid left vertebral artery, new compared to prior study of 09/25/2019.  No evidence of large vessel intracranial occlusion.   Neuro recs appr  Echo with bubble study done, no  shunt seen.  Follow vascular neuro in 4 weeks and neuro  RPR, HIV non reactive.  B12 low normal.  B1 pending.  Therapy recommending SNF due to his low interest in participation.      Abnormal imaging of thyroid  Tsh/t4 ok, thyroid ultrasound done, showed bilateral nodules which did not meet criteria for biopsy.    Advance care planning        Severe protein-calorie malnutrition  Diet as tolerated      Debility  Pt/ot efforts  Needs placement skilled    Type 2 diabetes mellitus with hyperglycemia, without long-term current use of insulin  Reportedly he is on metformin and glipizide, both held.  Continue SSI for now.  He has 4+ sugar in urine and HbA1c is 10.3, and he is hyperglycemic here.  Will start levemir and continue iss., increase levemir to 15 units daily  Add scheduled novolog 5 units tid  Improving  Resume metformin in SNF    Syncope and collapse  Unclear cause.  Spoke to [facility] and patient has had previous syncopal episodes attributed to alcohol abuse.  Has been prescribed meclizine as well.  Tox positive for cocaine but not alcohol.  CTA brain/neck was done in ED, and per ED note the results were discussed with stroke neurologist Dr. Tovar who feels that CTA finding of a short segment of left vertebral artery stenosis was incidental.  He did not think stroke workup with MRI was indicated.    Since change of status per son compared to what he saw him early during the week, ct head ordered , was ok, mri brain showed acute stroke but patient's symptoms do not correspond to stroke findings.      Cocaine abuse  As noted above      Hyperlipidemia  Resumed lipitor      Essential hypertension  Doubtful he was on anything at home.  Started losartan 25 mg daily  bp stable    Tobacco dependence  He smokes 5-6 cigarettes/day.  Not trying to quit and not interested in a nicotine patch.  Benefits of smoking cessation were reviewed with patient in detail for for 8 minutes and patient was encouraged to quit. Nicotine  replacement options were discussed.        VTE Risk Mitigation (From admission, onward)         Ordered     apixaban tablet 5 mg  2 times daily         02/10/22 0901     Place sequential compression device  Until discontinued         01/31/22 0512                      Mojgan Meza MD  Department of Hospital Medicine   [facility]

## 2022-02-10 NOTE — PLAN OF CARE
POC reviewed w/ pt. AAOx4. No significant events this shift. VSS on RA. No complaints of pain. Turn Q2/frequent weight shift encouraged. Instructed to call for help ambulating. No injuries, falls, or trauma occurred during shift. Purposeful rounding completed. Bed alarm set, bed low and locked, side rails up x3, call light within reach.

## 2022-02-10 NOTE — PT/OT/SLP PROGRESS
Physical Therapy Treatment    Patient Name:  Forest Lopez   MRN:  [id number]    Recommendations:     Discharge Recommendations:  nursing facility, skilled   Discharge Equipment Recommendations: bedside commode,shower chair,walker, rolling   Barriers to discharge: None    Assessment:     Forest Lopez is a 72 y.o. male admitted with a medical diagnosis of Acute deep vein thrombosis (DVT) of both lower extremities.  He presents with the following impairments/functional limitations:  weakness,gait instability,impaired balance,impaired endurance,impaired functional mobilty,impaired self care skills,decreased lower extremity function,decreased safety awareness,decreased upper extremity function.    Supine>sit with SPV  Sit>stand SBA with RW  Amb 5 x 100' with RW and SBA, brief standing rest break btwn bouts, pt with decreased gait speed and stability, no LoB but brief use of CGA early in first bout  Sit>supine with SBA  Scoot laterally along EOB with SPV  Pt demos good mobility and endurance with RW, with decreased stability and speed  Rec SNF    Rehab Prognosis: Good; patient would benefit from acute skilled PT services to address these deficits and reach maximum level of function.    Recent Surgery: * No surgery found *      Plan:     During this hospitalization, patient to be seen 3 x/week to address the identified rehab impairments via gait training,therapeutic activities,therapeutic exercises,neuromuscular re-education and progress toward the following goals:    · Plan of Care Expires:  02/15/22    Subjective     Chief Complaint: none stated  Patient/Family Comments/goals: I feel alright.  Pain/Comfort:  · Pain Rating 1: 0/10  · Pain Rating Post-Intervention 1: 0/10      Objective:     Communicated with nurse Weiss prior to session.  Patient found HOB elevated with telemetry,peripheral IV,bed alarm,Condom Catheter upon PT entry to room.     General Precautions: Standard, fall   Orthopedic Precautions:N/A   Braces:     Respiratory Status: Room air     Functional Mobility:  · Bed Mobility:     · Scooting: supervision and laterally along EOB  · Supine to Sit: supervision  · Sit to Supine: stand by assistance  · Transfers:     · Sit to Stand:  stand by assistance with rolling walker  · Gait: 5 x 100' with RW and SBA, brief standing rest breaks btwn bouts, pt with decreased gait speed and stability, no LoB though CGA used briefly during 1st bout      AM-PAC 6 CLICK MOBILITY  Turning over in bed (including adjusting bedclothes, sheets and blankets)?: 4  Sitting down on and standing up from a chair with arms (e.g., wheelchair, bedside commode, etc.): 4  Moving from lying on back to sitting on the side of the bed?: 4  Moving to and from a bed to a chair (including a wheelchair)?: 4  Need to walk in hospital room?: 3  Climbing 3-5 steps with a railing?: 3  Basic Mobility Total Score: 22       Therapeutic Activities and Exercises:   Gait training as noted    Patient left HOB elevated with all lines intact, call button in reach, nurse Isela notified and lunch served..    GOALS:   Multidisciplinary Problems     Physical Therapy Goals        Problem: Physical Therapy Goal    Goal Priority Disciplines Outcome Goal Variances Interventions   Physical Therapy Goal     PT, PT/OT Ongoing, Progressing     Description: Goals to be met by: 2/15/2022    Patient will increase functional independence with mobility by performin. Sit<>stand with SPV with RW.  2. Gait x 300 feet with SPV with RW.                        Time Tracking:     PT Received On: 02/10/22  PT Start Time: 1333     PT Stop Time: 1403  PT Total Time (min): 30 min     Billable Minutes: Gait Training 30    Treatment Type: Treatment  PT/PTA: PTA     PTA Visit Number: 3     02/10/2022

## 2022-02-10 NOTE — PT/OT/SLP PROGRESS
Speech Language Pathology      Forest Lopez  MRN: [id number]    Patient not seen today secondary to Other (Comment). Pt resting in bed, requesting to defer SLP session at this time. Will follow-up later today as schedule allows or next available date 2/11/22.

## 2022-02-10 NOTE — PT/OT/SLP PROGRESS
Occupational Therapy   Treatment    Name: Forest Lopez  MRN: [id number]  Admitting Diagnosis:  Acute deep vein thrombosis (DVT) of both lower extremities       Recommendations:     Discharge Recommendations: nursing facility, skilled  Discharge Equipment Recommendations:  bedside commode,shower chair (currently needed though will defer to next level of care)  Barriers to discharge:  Decreased caregiver support (current  functional level; homelessness)    Assessment:   Needs verbal cuing for RW safety as Pt. Lifting RW from floor while navigating turn.  TOTAL A for toileting this day as condom catheter found displaced with Pt. Reporting that he was continent of bladder though urinating on floor during hygiene task requiring TOTAL A for clean up with assist for cleaning feet thoroughly.  Able to don socks seated at toilet with increased time and increased cues for task initiation.  SBA for grooming/hygiene with increased cuing to initiate opening mouthwash this day as well as MIN cues with good follow through for proximity to sink prior to task.  Progressing towards goals.  Continued recommendation of post acute OT in SNF.  To benefit from continued acute care OT services to increase independence in self-care/functional transfers.  Continue POC.     Forest Lopez is a 72 y.o. male with a medical diagnosis of Acute deep vein thrombosis (DVT) of both lower extremities.  He presents with below deficits decreasing independence in self-care/functional transfers. Performance deficits affecting function are weakness,impaired endurance,impaired self care skills,impaired functional mobilty,gait instability,decreased lower extremity function,decreased upper extremity function,impaired balance,decreased safety awareness.     Rehab Prognosis:  Good; patient would benefit from acute skilled OT services to address these deficits and reach maximum level of function.       Plan:     Patient to be seen 3 x/week to address the above  listed problems via self-care/home management,therapeutic activities,therapeutic exercises  · Plan of Care Expires: 03/03/22  · Plan of Care Reviewed with: patient    Subjective     Pain/Comfort:  · Pain Rating 1: 0/10  · Pain Rating Post-Intervention 1: 0/10    Objective:     Communicated with: RN prior to session.  Patient found HOB elevated with telemetry,peripheral IV,bed alarm (condom catheter found displaced with RN and PCT made aware) upon OT entry to room.    General Precautions: Standard, diabetic,fall,anti-coagulation medicine   Orthopedic Precautions:N/A   Braces: N/A  Respiratory Status: Room air     Occupational Performance:     Bed Mobility:    Supine<>sit with increased time, SBA, and HOB elevated when coming into sit.      Functional Mobility/Transfers:  Ambulating short household distance bed>sink and after grooming task and urinating on floor sink>bathroom and return to bed with CGA for steadying/safety with RW, increased cues for safe RW use while negotiating turns as Pt. Lifting RW.  Step transfer to toilet with MIN A for lowering and use of grab bar at this time.      Activities of Daily Living:  Hand hygiene, rinsing mouthwash and brushing gums/tongue in stance, and washing face requiring retrieval of needed items, MIN cues with good follow through for proximity to sink prior to task, increased cues for incorporating mouthwash, and assist for opening mouthwash tab.  Voiding in stance on floor while standing at sink requiring assist with thoroughly cleaning legs and feet.  Donned socks seated at toilet via downward reaching and increased time.       [facility] 6 Click ADL: 16    Treatment & Education:  Supine<>sit with increased time, SBA, and HOB elevated when coming into sit.  Ambulating short household distance bed>sink and after grooming task and urinating on floor sink>bathroom and return to bed with CGA for steadying/safety with RW, increased cues for safe RW use while negotiating turns as Pt.  Lifting RW.  Step transfer to toilet with MIN A for lowering and use of grab bar at this time.  Hand hygiene, rinsing mouthwash and brushing gums/tongue in stance, and washing face requiring retrieval of needed items, MIN cues with good follow through for proximity to sink prior to task, increased cues for incorporating mouthwash, and assist for opening mouthwash tab.  Voiding in stance on floor while standing at sink requiring assist with thoroughly cleaning legs and feet.  Donned socks seated at toilet via downward reaching and increased time.    Patient left HOB elevated with all lines intact, call button in reach, bed alarm on and nursing notifiedEducation:      GOALS:   Multidisciplinary Problems     Occupational Therapy Goals        Problem: Occupational Therapy Goal    Goal Priority Disciplines Outcome Interventions   Occupational Therapy Goal     OT, PT/OT Ongoing, Progressing    Description: Goals to be met by: 2/15/2022    Patient will increase functional independence with ADLs by performing:    UE Dressing with [place].  LE Dressing with [place].  Grooming while standing at sink with Supervision.  Toileting from toilet with Supervision for hygiene and clothing management.   Toilet transfer to toilet with Supervision.                     Time Tracking:     OT Date of Treatment: 02/09/22  OT Start Time: 1642  OT Stop Time: 1717  OT Total Time (min): 35 min    Billable Minutes:Self Care/Home Management 35    OT/DUTCH: OT          2/9/2022

## 2022-02-10 NOTE — ASSESSMENT & PLAN NOTE
Last ultrasound showed partially occlusive DVTs, now completely occlusive DVT of multiple lower extremity veins on ultrasound.  Patient reports compliance with warfarin but his INR is only one.  He is homeless but says he does not have difficulty obtaining his medications.  D-dimer is markedly elevated and there is a question of possible PE, but he just had a CTA of the brain/neck so cannot get another CTA for another 48 hours.    V/Q scan was done, showed low probability for PE.  2D echo showed grade I diastolic dysfunction.  Warfarin restarted with bridging Lovenox, but INR still low.  Given the difficulty of getting him to follow up will change to apixaban and discontinue Lovenox.

## 2022-02-10 NOTE — ASSESSMENT & PLAN NOTE
Unclear cause.  Spoke to [facility] and patient has had previous syncopal episodes attributed to alcohol abuse.  Has been prescribed meclizine as well.  Tox positive for cocaine but not alcohol.  CTA brain/neck was done in ED, and per ED note the results were discussed with stroke neurologist Dr. Tovar who feels that CTA finding of a short segment of left vertebral artery stenosis was incidental.  He did not think stroke workup with MRI was indicated.    Since change of status per son compared to what he saw him early during the week, ct head ordered , was ok, mri brain showed acute stroke but patient's symptoms do not correspond to stroke findings.

## 2022-02-11 LAB
INR PPP: 1.4 (ref 0.8–1.2)
POCT GLUCOSE: 165 MG/DL (ref 70–110)
POCT GLUCOSE: 194 MG/DL (ref 70–110)
POCT GLUCOSE: 209 MG/DL (ref 70–110)
POCT GLUCOSE: 218 MG/DL (ref 70–110)
POCT GLUCOSE: 218 MG/DL (ref 70–110)
PROTHROMBIN TIME: 14.7 SEC (ref 9–12.5)

## 2022-02-11 PROCEDURE — 85610 PROTHROMBIN TIME: CPT | Performed by: HOSPITALIST

## 2022-02-11 PROCEDURE — 25000003 PHARM REV CODE 250: Performed by: HOSPITALIST

## 2022-02-11 PROCEDURE — 99233 SBSQ HOSP IP/OBS HIGH 50: CPT | Mod: ,,, | Performed by: INTERNAL MEDICINE

## 2022-02-11 PROCEDURE — 99232 PR SUBSEQUENT HOSPITAL CARE,LEVL II: ICD-10-PCS | Mod: ,,, | Performed by: HOSPITALIST

## 2022-02-11 PROCEDURE — 99497 PR ADVNCD CARE PLAN 30 MIN: ICD-10-PCS | Mod: ,,, | Performed by: INTERNAL MEDICINE

## 2022-02-11 PROCEDURE — 94761 N-INVAS EAR/PLS OXIMETRY MLT: CPT

## 2022-02-11 PROCEDURE — 92507 TX SP LANG VOICE COMM INDIV: CPT

## 2022-02-11 PROCEDURE — 25000003 PHARM REV CODE 250: Performed by: NURSE PRACTITIONER

## 2022-02-11 PROCEDURE — 99232 SBSQ HOSP IP/OBS MODERATE 35: CPT | Mod: ,,, | Performed by: HOSPITALIST

## 2022-02-11 PROCEDURE — 99233 PR SUBSEQUENT HOSPITAL CARE,LEVL III: ICD-10-PCS | Mod: ,,, | Performed by: INTERNAL MEDICINE

## 2022-02-11 PROCEDURE — 99497 ADVNCD CARE PLAN 30 MIN: CPT | Mod: ,,, | Performed by: INTERNAL MEDICINE

## 2022-02-11 PROCEDURE — 97116 GAIT TRAINING THERAPY: CPT | Mod: CQ

## 2022-02-11 PROCEDURE — 11000001 HC ACUTE MED/SURG PRIVATE ROOM

## 2022-02-11 PROCEDURE — 25000003 PHARM REV CODE 250: Performed by: INTERNAL MEDICINE

## 2022-02-11 PROCEDURE — 36415 COLL VENOUS BLD VENIPUNCTURE: CPT | Performed by: HOSPITALIST

## 2022-02-11 RX ORDER — ESCITALOPRAM OXALATE 10 MG/1
10 TABLET ORAL DAILY
Status: DISCONTINUED | OUTPATIENT
Start: 2022-02-12 | End: 2022-03-03

## 2022-02-11 RX ADMIN — INSULIN ASPART 5 UNITS: 100 INJECTION, SOLUTION INTRAVENOUS; SUBCUTANEOUS at 12:02

## 2022-02-11 RX ADMIN — INSULIN ASPART 5 UNITS: 100 INJECTION, SOLUTION INTRAVENOUS; SUBCUTANEOUS at 08:02

## 2022-02-11 RX ADMIN — INSULIN ASPART 2 UNITS: 100 INJECTION, SOLUTION INTRAVENOUS; SUBCUTANEOUS at 12:02

## 2022-02-11 RX ADMIN — APIXABAN 5 MG: 2.5 TABLET, FILM COATED ORAL at 08:02

## 2022-02-11 RX ADMIN — INSULIN DETEMIR 25 UNITS: 100 INJECTION, SOLUTION SUBCUTANEOUS at 08:02

## 2022-02-11 RX ADMIN — ATORVASTATIN CALCIUM 80 MG: 20 TABLET, FILM COATED ORAL at 08:02

## 2022-02-11 RX ADMIN — TAMSULOSIN HYDROCHLORIDE 0.4 MG: 0.4 CAPSULE ORAL at 08:02

## 2022-02-11 RX ADMIN — LOSARTAN POTASSIUM 25 MG: 25 TABLET, FILM COATED ORAL at 08:02

## 2022-02-11 RX ADMIN — INSULIN ASPART 5 UNITS: 100 INJECTION, SOLUTION INTRAVENOUS; SUBCUTANEOUS at 05:02

## 2022-02-11 NOTE — PLAN OF CARE
02/11/22 1624   Discharge Reassessment   Assessment Type Discharge Planning Reassessment   Did the patient's condition or plan change since previous assessment? No   Discharge Plan discussed with: Patient   Discharge Plan A Skilled Nursing Facility   Discharge Plan B New Nursing Home placement - senior care care facility   DME Needed Upon Discharge  none   Discharge Barriers Identified Substance Abuse   Why the patient remains in the hospital Placement issues   Post-Acute Status   Post-Acute Authorization Other  (Placement in a SNF unit.)   Coverage [employer] MANAGED MEDICARE - [employer] SNP (SPECIAL NEEDS PLAN   Patient choice form signed by patient/caregiver List from [employer] Compare     Patient's information has been sent to 10 SNF facilities.   He was not accepted at a senior care level, at the facilities the patient and his son selected for   him. Since he was diagnosed with a CVA, he has been recommended first for Rehab, and now for SNF. Questionable if he can or will do enough for a Rehab. admit.   PASSR completed  and 142 is back.

## 2022-02-11 NOTE — NURSING
Left forearm TB skin test from 2/4 sight is red and swollen, measuring 6mm. Dr. Bosch notified, CXR ordered.

## 2022-02-11 NOTE — PROGRESS NOTES
[facility] Medicine  Progress Note    Patient Name: Forest Lopez  MRN: [id number]  Patient Class: IP- Inpatient   Admission Date: 1/31/2022  Length of Stay: 11 days  Attending Physician: Mojgan Bosch MD  Primary Care Provider: Julian Escobar        Subjective:     Principal Problem:Acute deep vein thrombosis (DVT) of both lower extremities        HPI:  Mr. Lopez is a 72 year old man who presented after a syncopal episode.  He reports he had been feeling well prior to the episode but then had a prodrome prior to passing out.  EMS was called, report patient's BP was low normal on their arrival, improved after an IV fluids bolus.  Patient denies chest pain or shortness of breath and says he does not feel weak currently although is rather tired.  When seen in the ED this morning he could barely express himself audibly.     Vital signs were normal on presentation here.  He is on warfarin for recurrent DVT, but his INR was 1, and d-dimer markedly elevated at 21.7.  Tox screen was positive for cocaine.  He had a CTA of the brain and neck done on presentation so CTA of the chest for PE study could not be done for 48 hours.  Ultrasound of the lower extremities showed extensive bilateral DVT.  On the right there was thrombosis of the common femoral vein, femoral vein, popliteal vein, one of the paired posterior tibial veins and both visualized peroneal veins.  On the left there was thrombosis of the common femoral vein, femoral vein and popliteal vein.  Patient was started on full dose Lovenox and admitted.    Medical history is from the chart as he is unable to give me much information.  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  He smokes 5-6 cigarettes/day and is not interested in quitting.  He is currently homeless and staying with a friend.   Despite the normal INR he is sure he is taking his warfarin and is not having difficulty taking his medications.  I discussed his care with [facility] staff on the [facility] and patient had been lost to follow up since November 2021.      Overview/Hospital Course:  Patient presented to the ED after a syncopal episode and was found to have bilateral lower extremity DVTs and a low INR.  Tox screen was positive for cocaine.  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  He was started back on his warfarin with bridging Lovenox.  His 2D echo showed grade I diastolic dysfunction.  Neurology evaluated him and recommended echo with bubble study, which was negative for a shunt.    PT/OT evaluated him and recommended SNF placement as he was not participating in therapy.  His INR was difficult to get therapeutic, and as he had no contraindication to a NOAC his warfarin was changed to apixaban, and the full dose Lovenox was discontinued.  As he appeared to be depressed and had no objection to starting an antidepressant Lexapro was started.      Interval History:  Not talkative, just wants to lie there.  Takes a long time for him to say anything.  He does not think he is depressed but is willing to try an antidepressant.    Review of Systems   Constitutional: Negative for chills and fever.   Respiratory: Negative for cough and shortness of breath.    Cardiovascular: Negative for chest pain and palpitations.     Objective:     Vital Signs (Most Recent):  Temp: 97.3 °F (36.3 °C) (02/11/22 1206)  Pulse: 84 (02/11/22 1206)  Resp: 20 (02/11/22 1206)  BP: (!) 90/57 (02/11/22 1206)  SpO2: 95 % (02/11/22 1206) Vital Signs (24h Range):  Temp:  [97.3 °F (36.3 °C)-98.8 °F (37.1 °C)] 97.3 °F (36.3 °C)  Pulse:  [71-91] 84  Resp:  [16-20] 20  SpO2:  [90 %-100 %] 95 %  BP: ()/(57-77) 90/57     Weight: 63 kg (139 lb)  Body mass index is 18.85  kg/m².    Intake/Output Summary (Last 24 hours) at 2/11/2022 1705  Last data filed at 2/11/2022 1300  Gross per 24 hour   Intake 1200 ml   Output 800 ml   Net 400 ml      Physical Exam  Constitutional:       General: He is not in acute distress.  Cardiovascular:      Rate and Rhythm: Normal rate and regular rhythm.      Pulses: Normal pulses.      Heart sounds: Normal heart sounds. No murmur heard.  No gallop.    Pulmonary:      Effort: Pulmonary effort is normal.      Breath sounds: Normal breath sounds.   Abdominal:      General: Bowel sounds are normal.      Palpations: Abdomen is soft.   Skin:     General: Skin is warm and dry.   Neurological:      Mental Status: He is alert.      Comments: Not oriented to time, not talkative.   Psychiatric:      Comments: Affect flat.         Significant Labs: All pertinent labs within the past 24 hours have been reviewed.    Significant Imaging: I have reviewed all pertinent imaging results/findings within the past 24 hours.      Assessment/Plan:      * Acute deep vein thrombosis (DVT) of both lower extremities  Last ultrasound showed partially occlusive DVTs, now completely occlusive DVT of multiple lower extremity veins on ultrasound.  Patient reports compliance with warfarin but his INR is only one.  He is homeless but says he does not have difficulty obtaining his medications.  D-dimer is markedly elevated and there is a question of possible PE, but he just had a CTA of the brain/neck so cannot get another CTA for another 48 hours.    V/Q scan was done, showed low probability for PE.  2D echo showed grade I diastolic dysfunction.  Warfarin restarted with bridging Lovenox, but INR still low.  Given the difficulty of getting him to follow up will change to apixaban and discontinue Lovenox.    Acute stroke due to ischemia  Mri shows left stroke  On full dose anticoagulation  Tele shows sinus rhythm  Better dm control, continue statin  cta showed  Focal short segment 60-70%  stenosis involving the proximal to mid left vertebral artery, new compared to prior study of 09/25/2019.  No evidence of large vessel intracranial occlusion.   Neuro recs appr  Echo with bubble study done, no shunt seen.  Follow vascular neuro in 4 weeks and neuro  RPR, HIV non reactive.  B12 low normal.  B1 pending.  Therapy recommending SNF due to his low interest in participation.  Start Lexapro due to suspicion for depression.      Abnormal imaging of thyroid  Tsh/t4 ok, thyroid ultrasound done, showed bilateral nodules which did not meet criteria for biopsy.    Advance care planning        Severe protein-calorie malnutrition  Diet as tolerated      Debility  Pt/ot efforts  Needs placement skilled    Type 2 diabetes mellitus with hyperglycemia, without long-term current use of insulin  Reportedly he is on metformin and glipizide, both held.  Continue SSI for now.  He has 4+ sugar in urine and HbA1c is 10.3, and he is hyperglycemic here.  Will start levemir and continue iss., increase levemir to 15 units daily  Add scheduled novolog 5 units tid  Improving  Resume metformin in SNF    Syncope and collapse  Unclear cause.  Spoke to [facility] and patient has had previous syncopal episodes attributed to alcohol abuse.  Has been prescribed meclizine as well.  Tox positive for cocaine but not alcohol.  CTA brain/neck was done in ED, and per ED note the results were discussed with stroke neurologist Dr. Tovar who feels that CTA finding of a short segment of left vertebral artery stenosis was incidental.  He did not think stroke workup with MRI was indicated.    Since change of status per son compared to what he saw him early during the week, ct head ordered , was ok, mri brain showed acute stroke but patient's symptoms do not correspond to stroke findings.      Cocaine abuse  As noted above      Hyperlipidemia  Resumed lipitor      Essential hypertension  Doubtful he was on anything at home.  Started losartan 25 mg  daily  bp stable    Tobacco dependence  He smokes 5-6 cigarettes/day.  Not trying to quit and not interested in a nicotine patch.  Benefits of smoking cessation were reviewed with patient in detail for for 8 minutes and patient was encouraged to quit. Nicotine replacement options were discussed.        VTE Risk Mitigation (From admission, onward)         Ordered     apixaban tablet 5 mg  2 times daily         02/10/22 0901     Place sequential compression device  Until discontinued         01/31/22 0533                Discharge Planning   EFRAÍN: 2/14/2022     Code Status: Full Code   Is the patient medically ready for discharge?:     Reason for patient still in hospital (select all that apply): Pending disposition  Discharge Plan A: Skilled Nursing Facility   Discharge Delays: (!) Post-Acute Set-up              Mojgan Meza MD  Department of Hospital Medicine   [facility] ([place]

## 2022-02-11 NOTE — PT/OT/SLP PROGRESS
Physical Therapy Treatment    Patient Name:  Forest Lopez   MRN:  [id number]    Recommendations:     Discharge Recommendations:  nursing facility, skilled   Discharge Equipment Recommendations: bedside commode,shower chair,walker, rolling   Barriers to discharge: None    Assessment:     Forest Lopez is a 72 y.o. male admitted with a medical diagnosis of Acute deep vein thrombosis (DVT) of both lower extremities.  He presents with the following impairments/functional limitations:  weakness,gait instability,impaired balance,impaired endurance,impaired functional mobilty,impaired self care skills,decreased lower extremity function,decreased safety awareness,decreased upper extremity function.    Sit>stand with RW and SBA from chair  Amb 3 x 100' with RW and SBA, brief standing rest breaks btwn bouts, pt with narrow Marissa and decreased gait speed, cues to widen Marissa  Sit>supine with SPV  Pt demos good mobility today with safe use of RW  Rec SNF    Rehab Prognosis: Good; patient would benefit from acute skilled PT services to address these deficits and reach maximum level of function.    Recent Surgery: * No surgery found *      Plan:     During this hospitalization, patient to be seen 3 x/week to address the identified rehab impairments via gait training,therapeutic activities,therapeutic exercises,neuromuscular re-education and progress toward the following goals:    · Plan of Care Expires:  02/15/22    Subjective     Chief Complaint: none stated  Patient/Family Comments/goals: Sure, let's have a quick walk  Pain/Comfort:  · Pain Rating 1: 0/10  · Pain Rating Post-Intervention 1: 0/10      Objective:     Communicated with nurse Weiss prior to session.  Patient found up in chair with telemetry,peripheral IV,Condom Catheter upon PT entry to room.     General Precautions: Standard, fall   Orthopedic Precautions:N/A   Braces:    Respiratory Status: Room air     Functional Mobility:  · Bed Mobility:     · Sit to Supine:  supervision  · Transfers:     · Sit to Stand:  stand by assistance with rolling walker  · Gait: 3 x 100' with RW and SBA, brief standing rest breaks btwn bouts, pt with decreased gait speed and narrow Marissa, cues to widen Marissa      AM-PAC 6 CLICK MOBILITY  Turning over in bed (including adjusting bedclothes, sheets and blankets)?: 4  Sitting down on and standing up from a chair with arms (e.g., wheelchair, bedside commode, etc.): 4  Moving from lying on back to sitting on the side of the bed?: 4  Moving to and from a bed to a chair (including a wheelchair)?: 4  Need to walk in hospital room?: 3  Climbing 3-5 steps with a railing?: 3  Basic Mobility Total Score: 22       Therapeutic Activities and Exercises:   Gait training as noted    Patient left supine with all lines intact, call button in reach, nurse Isela notified and Crista telesitter present..    GOALS:   Multidisciplinary Problems     Physical Therapy Goals        Problem: Physical Therapy Goal    Goal Priority Disciplines Outcome Goal Variances Interventions   Physical Therapy Goal     PT, PT/OT Ongoing, Progressing     Description: Goals to be met by: 2/15/2022    Patient will increase functional independence with mobility by performin. Sit<>stand with SPV with RW.  2. Gait x 300 feet with SPV with RW.                        Time Tracking:     PT Received On: 22  PT Start Time: 1318     PT Stop Time: 1335  PT Total Time (min): 17 min     Billable Minutes: Gait Training 17    Treatment Type: Treatment  PT/PTA: PTA     PTA Visit Number: 4     2022

## 2022-02-11 NOTE — PROGRESS NOTES
[facility] ([place])  Palliative Medicine  Progress Note    Patient Name: Forest Lopez  MRN: [id number]  Admission Date: 1/31/2022  Hospital Length of Stay: 11 days  Code Status: Full Code   Attending Provider: Mojgan Bosch MD  Consulting Provider: Harmony Skinner MD  Primary Care Physician: Julian Escobar  Principal Problem:Acute deep vein thrombosis (DVT) of both lower extremities    Assessment/Plan:     Advance Care Planning       2/11/22  - Chart and interval history reviewed; discussed pt with primary team and during MDT rounds  - Met with pt at bedside; awake, able to have a conversation, though very flat affect (vs fatigued)  - He remains agreeable to possible SNF placement; discussed with him possibility of halfway NH after this, given his recent homelessness.   - As pt seems depressed, discussed starting anti-depressant. He said he would like to think about this before starting one.   - Brought up code status, and explained difference between DNR and full code. Did share transparent concern that pt would likely have very poor outcome with CPR/life support, given his low BMI and limited mobility. He would like to think about this prior to making decision; would maintain full code status at this time.   - While current plan is for SNF at this time, should pt ever decide that he wants to focus on symptom management, he does qualify for hospice care.   - Our team will follow up with pt; updated Dr Bosch.       2/4/22  - Consult for advance care planning in older pt with multiple chronic illnesses, active cocaine abuse, homelessness currently in hospital after syncopal episode. Chart reviewed in depth; discussed pt in person with primary team.   - Met with pt at bedside; calm, polite, though speaks very quietly and is hard to hear at times. Introduced role of palliative medicine, and learned more about pt outside of hospital. Per pt, he is , and has two sons. His son Forest Jones  ([phone], patient's preferred MPOA) lives locally and was just in to visit pt yesterday. He does not recall the phone number for his other son, Camilo. He has approximately 8 brothers and sisters. He is a retired  for the [employer] (possibly may have a pension?), and worked for them for many years. Lately, he spends much of his time watching TV.   - He did confirm that he is homeless, though he is receptive to SNF, and then likely NH after this. Asked him if anyone in his family would be open to having him move in with them; he suggested they would not.   - We did discuss his cocaine usage; he did seem to be reflecting when we discussed if he might be using this as a coping mechanism/self-medicating for possible depression. May benefit from initation of anti-depressant.   - Did not discuss code status during initial visit, though will address this at future visit given his frailty   - Our team will follow up with pt and likely will try to update his son Forest; discussed with primary team     Acute deep vein thrombosis (DVT) of both lower extremities  - Anticoagulation per primary     Acute stroke due to ischemia  - Noted during hospital stay    Severe protein-calorie malnutrition  - BMI 18.90, visible temporal wasting on exam. Unclear if related to food insecurity vs ongoing drug abuse vs other causes (malignancy?)   - RD consult for nutritional optimization     Debility  - PT/OT evaluation; pending SNF     Syncope and collapse  - Reason for hospital stay; evaluation/mgmt per primary team     Cocaine abuse  - Encouraged cessation  - Suspect pt may be self-medicating depression with this; would consider initiation of antidepressant if pt agreeable     Tobacco dependence  - Nicotine patch PRN    I will follow-up with patient. Please contact us if you have any additional questions.     VASYL Lincoln  Palliative Medicine Staff   [phone]    VASYL Lincoln  Palliative  Medicine Staff   [phone]    Total visit time: 51 minutes    > 50% of 35 min visit spent in chart review, face to face discussion of symptom assessment, coordination of care with other specialists, and discharge planning.    16 min ACP time spent: goals of care, emotional support, formulating and communicating prognosis, exploring burden/ benefit of various approaches of treatment.     Subjective:     Chief Complaint:   Chief Complaint   Patient presents with    Loss of Consciousness     Sudden onset dizziness with syncope & hypotension. EMS reports 120 systolic to 90 systolic upon rising.      Interval History: No major issues recently.     Medications:  Continuous Infusions:  Scheduled Meds:   apixaban  5 mg Oral BID    atorvastatin  80 mg Oral Daily    insulin aspart U-100  5 Units Subcutaneous TIDWM    insulin detemir U-100  25 Units Subcutaneous Daily    losartan  25 mg Oral Daily    tamsulosin  0.4 mg Oral QHS     PRN Meds:acetaminophen, dextrose 10%, dextrose 10%, glucagon (human recombinant), glucose, glucose, hydrALAZINE, insulin aspart U-100, naloxone, ondansetron, sodium chloride 0.9%    Objective:     Vital Signs (Most Recent):  Temp: 97.3 °F (36.3 °C) (02/11/22 1206)  Pulse: 84 (02/11/22 1206)  Resp: 20 (02/11/22 1206)  BP: (!) 90/57 (02/11/22 1206)  SpO2: 95 % (02/11/22 1206) Vital Signs (24h Range):  Temp:  [97.3 °F (36.3 °C)-98.8 °F (37.1 °C)] 97.3 °F (36.3 °C)  Pulse:  [71-91] 84  Resp:  [16-20] 20  SpO2:  [90 %-100 %] 95 %  BP: ()/(57-77) 90/57     Weight: 63 kg (139 lb)  Body mass index is 18.85 kg/m².    Physical Exam  Constitutional:       Comments: Lying in bed on his side, appears comfortable, able to have a basic conversation, frail     HENT:      Head:      Comments: Temporal wasting      Nose: Nose normal.      Mouth/Throat:      Mouth: Mucous membranes are dry.   Eyes:      Extraocular Movements: Extraocular movements intact.   Cardiovascular:      Rate and Rhythm: Normal  rate.   Pulmonary:      Effort: Pulmonary effort is normal.   Skin:     General: Skin is warm.   Neurological:      Mental Status: He is alert.   Psychiatric:      Comments: Flat affect        Significant Labs: All pertinent labs within the past 24 hours have been reviewed.  CBC:   Recent Labs   Lab 02/08/22  0524   WBC 9.04   HGB 13.8*   HCT 43.2   MCV 79*        BMP:  No results for input(s): GLU, NA, K, CL, CO2, BUN, CREATININE, CALCIUM, MG in the last 24 hours.  LFT:  Lab Results   Component Value Date    AST 13 02/02/2022    ALKPHOS 84 02/02/2022    BILITOT 0.7 02/02/2022     Albumin:   Albumin   Date Value Ref Range Status   02/02/2022 3.6 3.5 - 5.2 g/dL Final     Protein:   Total Protein   Date Value Ref Range Status   02/02/2022 7.3 6.0 - 8.4 g/dL Final     Lactic acid:   Lab Results   Component Value Date    LACTATE 1.3 06/15/2020    LACTATE 2.5 (H) 06/14/2020       Significant Imaging: I have reviewed all pertinent imaging results/findings within the past 24 hours.

## 2022-02-11 NOTE — ASSESSMENT & PLAN NOTE
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  No evidence of large vessel intracranial occlusion.   Neuro recs appr  Echo with bubble study done, no shunt seen.  Follow vascular neuro in 4 weeks and neuro  RPR, HIV non reactive.  B12 low normal.  B1 pending.  Therapy recommending SNF due to his low interest in participation.  Start Lexapro due to suspicion for depression.

## 2022-02-11 NOTE — ASSESSMENT & PLAN NOTE
Unclear cause.  Spoke to [facility] and patient has had previous syncopal episodes attributed to alcohol abuse.  Has been prescribed meclizine as well.  Tox positive for cocaine but not alcohol.  CTA brain/neck was done in ED, and per ED note the results were discussed with stroke neurologist Dr. Tovar who feels that CTA finding of a short segment of left vertebral artery stenosis was incidental.  He did not think stroke workup with MRI was indicated.    Since change of status per son compared to what he saw him early during the week, ct head ordered , was ok, mri brain showed acute stroke but patient's symptoms do not correspond to stroke findings.     Adequate: hears normal conversation without difficulty

## 2022-02-11 NOTE — SUBJECTIVE & OBJECTIVE
Interval History:  Not talkative, just wants to lie there.  Takes a long time for him to say anything.  He does not think he is depressed but is willing to try an antidepressant.    Review of Systems   Constitutional: Negative for chills and fever.   Respiratory: Negative for cough and shortness of breath.    Cardiovascular: Negative for chest pain and palpitations.     Objective:     Vital Signs (Most Recent):  Temp: 97.3 °F (36.3 °C) (02/11/22 1206)  Pulse: 84 (02/11/22 1206)  Resp: 20 (02/11/22 1206)  BP: (!) 90/57 (02/11/22 1206)  SpO2: 95 % (02/11/22 1206) Vital Signs (24h Range):  Temp:  [97.3 °F (36.3 °C)-98.8 °F (37.1 °C)] 97.3 °F (36.3 °C)  Pulse:  [71-91] 84  Resp:  [16-20] 20  SpO2:  [90 %-100 %] 95 %  BP: ()/(57-77) 90/57     Weight: 63 kg (139 lb)  Body mass index is 18.85 kg/m².    Intake/Output Summary (Last 24 hours) at 2/11/2022 1705  Last data filed at 2/11/2022 1300  Gross per 24 hour   Intake 1200 ml   Output 800 ml   Net 400 ml      Physical Exam  Constitutional:       General: He is not in acute distress.  Cardiovascular:      Rate and Rhythm: Normal rate and regular rhythm.      Pulses: Normal pulses.      Heart sounds: Normal heart sounds. No murmur heard.  No gallop.    Pulmonary:      Effort: Pulmonary effort is normal.      Breath sounds: Normal breath sounds.   Abdominal:      General: Bowel sounds are normal.      Palpations: Abdomen is soft.   Skin:     General: Skin is warm and dry.   Neurological:      Mental Status: He is alert.      Comments: Not oriented to time, not talkative.   Psychiatric:      Comments: Affect flat.         Significant Labs: All pertinent labs within the past 24 hours have been reviewed.    Significant Imaging: I have reviewed all pertinent imaging results/findings within the past 24 hours.

## 2022-02-11 NOTE — SUBJECTIVE & OBJECTIVE
Interval History: No major issues recently.     Medications:  Continuous Infusions:  Scheduled Meds:   apixaban  5 mg Oral BID    atorvastatin  80 mg Oral Daily    insulin aspart U-100  5 Units Subcutaneous TIDWM    insulin detemir U-100  25 Units Subcutaneous Daily    losartan  25 mg Oral Daily    tamsulosin  0.4 mg Oral QHS     PRN Meds:acetaminophen, dextrose 10%, dextrose 10%, glucagon (human recombinant), glucose, glucose, hydrALAZINE, insulin aspart U-100, naloxone, ondansetron, sodium chloride 0.9%    Objective:     Vital Signs (Most Recent):  Temp: 97.3 °F (36.3 °C) (02/11/22 1206)  Pulse: 84 (02/11/22 1206)  Resp: 20 (02/11/22 1206)  BP: (!) 90/57 (02/11/22 1206)  SpO2: 95 % (02/11/22 1206) Vital Signs (24h Range):  Temp:  [97.3 °F (36.3 °C)-98.8 °F (37.1 °C)] 97.3 °F (36.3 °C)  Pulse:  [71-91] 84  Resp:  [16-20] 20  SpO2:  [90 %-100 %] 95 %  BP: ()/(57-77) 90/57     Weight: 63 kg (139 lb)  Body mass index is 18.85 kg/m².    Physical Exam  Constitutional:       Comments: Lying in bed on his side, appears comfortable, able to have a basic conversation, frail     HENT:      Head:      Comments: Temporal wasting      Nose: Nose normal.      Mouth/Throat:      Mouth: Mucous membranes are dry.   Eyes:      Extraocular Movements: Extraocular movements intact.   Cardiovascular:      Rate and Rhythm: Normal rate.   Pulmonary:      Effort: Pulmonary effort is normal.   Skin:     General: Skin is warm.   Neurological:      Mental Status: He is alert.   Psychiatric:      Comments: Flat affect        Significant Labs: All pertinent labs within the past 24 hours have been reviewed.  CBC:   Recent Labs   Lab 02/08/22  0524   WBC 9.04   HGB 13.8*   HCT 43.2   MCV 79*        BMP:  No results for input(s): GLU, NA, K, CL, CO2, BUN, CREATININE, CALCIUM, MG in the last 24 hours.  LFT:  Lab Results   Component Value Date    AST 13 02/02/2022    ALKPHOS 84 02/02/2022    BILITOT 0.7 02/02/2022     Albumin:    Albumin   Date Value Ref Range Status   02/02/2022 3.6 3.5 - 5.2 g/dL Final     Protein:   Total Protein   Date Value Ref Range Status   02/02/2022 7.3 6.0 - 8.4 g/dL Final     Lactic acid:   Lab Results   Component Value Date    LACTATE 1.3 06/15/2020    LACTATE 2.5 (H) 06/14/2020       Significant Imaging: I have reviewed all pertinent imaging results/findings within the past 24 hours.

## 2022-02-11 NOTE — ASSESSMENT & PLAN NOTE
2/11/22  - Chart and interval history reviewed; discussed pt with primary team and during MDT rounds  - Met with pt at bedside; awake, able to have a conversation, though very flat affect (vs fatigued)  - He remains agreeable to possible SNF placement; discussed with him possibility of retirement NH after this, given his recent homelessness.   - As pt seems depressed, discussed starting anti-depressant. He said he would like to think about this before starting one.   - Brought up code status, and explained difference between DNR and full code. Did share transparent concern that pt would likely have very poor outcome with CPR/life support, given his low BMI and limited mobility. He would like to think about this prior to making decision; would maintain full code status at this time.   - Our team will follow up with pt; updated Dr Bosch.       2/4/22  - Consult for advance care planning in older pt with multiple chronic illnesses, active cocaine abuse, homelessness currently in hospital after syncopal episode. Chart reviewed in depth; discussed pt in person with primary team.   - Met with pt at bedside; calm, polite, though speaks very quietly and is hard to hear at times. Introduced role of palliative medicine, and learned more about pt outside of hospital. Per pt, he is , and has two sons. His son Forest Jones ([phone], patient's preferred MPOA) lives locally and was just in to visit pt yesterday. He does not recall the phone number for his other son, Camilo. He has approximately 8 brothers and sisters. He is a retired  for the [employer] (possibly may have a pension?), and worked for them for many years. Lately, he spends much of his time watching TV.   - He did confirm that he is homeless, though he is receptive to SNF, and then likely NH after this. Asked him if anyone in his family would be open to having him move in with them; he suggested they would not.   - We did  discuss his cocaine usage; he did seem to be reflecting when we discussed if he might be using this as a coping mechanism/self-medicating for possible depression. May benefit from initation of anti-depressant.   - Did not discuss code status during initial visit, though will address this at future visit given his frailty   - Our team will follow up with pt and likely will try to update his son Forest; discussed with primary team

## 2022-02-11 NOTE — PLAN OF CARE
Problem: Adult Inpatient Plan of Care  Goal: Plan of Care Review  Outcome: Ongoing, Progressing  Goal: Patient-Specific Goal (Individualized)  Outcome: Ongoing, Progressing  Goal: Absence of Hospital-Acquired Illness or Injury  Outcome: Ongoing, Progressing  Goal: Optimal Comfort and Wellbeing  Outcome: Ongoing, Progressing  Goal: Readiness for Transition of Care  Outcome: Ongoing, Progressing     Problem: Diabetes Comorbidity  Goal: Blood Glucose Level Within Targeted Range  Outcome: Ongoing, Progressing     Problem: Skin Injury Risk Increased  Goal: Skin Health and Integrity  Outcome: Ongoing, Progressing     Problem: Coping Ineffective  Goal: Effective Coping  Outcome: Ongoing, Progressing

## 2022-02-12 LAB
INR PPP: 1.2 (ref 0.8–1.2)
POCT GLUCOSE: 137 MG/DL (ref 70–110)
POCT GLUCOSE: 144 MG/DL (ref 70–110)
POCT GLUCOSE: 146 MG/DL (ref 70–110)
POCT GLUCOSE: 229 MG/DL (ref 70–110)
PROTHROMBIN TIME: 13.4 SEC (ref 9–12.5)

## 2022-02-12 PROCEDURE — 11000001 HC ACUTE MED/SURG PRIVATE ROOM

## 2022-02-12 PROCEDURE — 36415 COLL VENOUS BLD VENIPUNCTURE: CPT | Performed by: HOSPITALIST

## 2022-02-12 PROCEDURE — 99232 PR SUBSEQUENT HOSPITAL CARE,LEVL II: ICD-10-PCS | Mod: ,,, | Performed by: HOSPITALIST

## 2022-02-12 PROCEDURE — 85610 PROTHROMBIN TIME: CPT | Performed by: HOSPITALIST

## 2022-02-12 PROCEDURE — 99232 SBSQ HOSP IP/OBS MODERATE 35: CPT | Mod: ,,, | Performed by: HOSPITALIST

## 2022-02-12 PROCEDURE — 25000003 PHARM REV CODE 250: Performed by: INTERNAL MEDICINE

## 2022-02-12 PROCEDURE — 25000003 PHARM REV CODE 250: Performed by: HOSPITALIST

## 2022-02-12 PROCEDURE — 25000003 PHARM REV CODE 250: Performed by: NURSE PRACTITIONER

## 2022-02-12 RX ADMIN — INSULIN ASPART 5 UNITS: 100 INJECTION, SOLUTION INTRAVENOUS; SUBCUTANEOUS at 07:02

## 2022-02-12 RX ADMIN — TAMSULOSIN HYDROCHLORIDE 0.4 MG: 0.4 CAPSULE ORAL at 07:02

## 2022-02-12 RX ADMIN — INSULIN ASPART 5 UNITS: 100 INJECTION, SOLUTION INTRAVENOUS; SUBCUTANEOUS at 12:02

## 2022-02-12 RX ADMIN — APIXABAN 5 MG: 2.5 TABLET, FILM COATED ORAL at 08:02

## 2022-02-12 RX ADMIN — INSULIN DETEMIR 25 UNITS: 100 INJECTION, SOLUTION SUBCUTANEOUS at 08:02

## 2022-02-12 RX ADMIN — ATORVASTATIN CALCIUM 80 MG: 20 TABLET, FILM COATED ORAL at 08:02

## 2022-02-12 RX ADMIN — INSULIN ASPART 2 UNITS: 100 INJECTION, SOLUTION INTRAVENOUS; SUBCUTANEOUS at 05:02

## 2022-02-12 RX ADMIN — LOSARTAN POTASSIUM 25 MG: 25 TABLET, FILM COATED ORAL at 08:02

## 2022-02-12 RX ADMIN — APIXABAN 5 MG: 2.5 TABLET, FILM COATED ORAL at 07:02

## 2022-02-12 RX ADMIN — INSULIN ASPART 5 UNITS: 100 INJECTION, SOLUTION INTRAVENOUS; SUBCUTANEOUS at 05:02

## 2022-02-12 RX ADMIN — ESCITALOPRAM OXALATE 10 MG: 10 TABLET ORAL at 08:02

## 2022-02-12 NOTE — ASSESSMENT & PLAN NOTE
Unclear cause.  Spoke to [facility] and patient has had previous syncopal episodes attributed to alcohol abuse.  Has been prescribed meclizine as well.  Tox positive for cocaine but not alcohol.  CTA brain/neck was done in ED, and per ED note the results were discussed with stroke neurologist Dr. Tovar who feels that CTA finding of a short segment of left vertebral artery stenosis was incidental.  He did not think stroke workup with MRI was indicated.    Since change of status per son compared to what he saw him early during the week, ct head ordered , was ok, mri brain showed acute stroke but patient's symptoms do not correspond to stroke findings.     Clear bilaterally, pupils equal

## 2022-02-12 NOTE — ASSESSMENT & PLAN NOTE
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  No evidence of large vessel intracranial occlusion.   Neuro recs appr  Echo with bubble study done, no shunt seen.  Follow vascular neuro in 4 weeks and neuro  RPR, HIV non reactive.  B12 low normal.  B1 pending.  Therapy recommending SNF due to his low interest in participation.  Started Lexapro due to suspicion for depression.

## 2022-02-12 NOTE — PROGRESS NOTES
[facility] Medicine  Progress Note    Patient Name: Forest Lopez  MRN: [id number]  Patient Class: IP- Inpatient   Admission Date: 1/31/2022  Length of Stay: 12 days  Attending Physician: Mojgan Bosch MD  Primary Care Provider: Julian Escobar        Subjective:     Principal Problem:Acute deep vein thrombosis (DVT) of both lower extremities        HPI:  Mr. Lopez is a 72 year old man who presented after a syncopal episode.  He reports he had been feeling well prior to the episode but then had a prodrome prior to passing out.  EMS was called, report patient's BP was low normal on their arrival, improved after an IV fluids bolus.  Patient denies chest pain or shortness of breath and says he does not feel weak currently although is rather tired.  When seen in the ED this morning he could barely express himself audibly.     Vital signs were normal on presentation here.  He is on warfarin for recurrent DVT, but his INR was 1, and d-dimer markedly elevated at 21.7.  Tox screen was positive for cocaine.  He had a CTA of the brain and neck done on presentation so CTA of the chest for PE study could not be done for 48 hours.  Ultrasound of the lower extremities showed extensive bilateral DVT.  On the right there was thrombosis of the common femoral vein, femoral vein, popliteal vein, one of the paired posterior tibial veins and both visualized peroneal veins.  On the left there was thrombosis of the common femoral vein, femoral vein and popliteal vein.  Patient was started on full dose Lovenox and admitted.    Medical history is from the chart as he is unable to give me much information.  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  He smokes 5-6 cigarettes/day and is not interested in quitting.  He is currently homeless and staying with a friend.   Despite the normal INR he is sure he is taking his warfarin and is not having difficulty taking his medications.  I discussed his care with [facility] staff on the [facility] and patient had been lost to follow up since November 2021.      Overview/Hospital Course:  Patient presented to the ED after a syncopal episode and was found to have bilateral lower extremity DVTs and a low INR.  Tox screen was positive for cocaine.  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  He was started back on his warfarin with bridging Lovenox.  His 2D echo showed grade I diastolic dysfunction.  Neurology evaluated him and recommended echo with bubble study, which was negative for a shunt.    PT/OT evaluated him and recommended SNF placement as he was not participating in therapy.  His INR was difficult to get therapeutic, and as he had no contraindication to a NOAC his warfarin was changed to apixaban, and the full dose Lovenox was discontinued.  As he appeared to be depressed and had no objection to starting an antidepressant Lexapro was started.      Interval History:  Not interested in getting up.  Lexapro started.  He is not eating much either.    Review of Systems   Constitutional: Negative for chills and fever.   Respiratory: Negative for cough and shortness of breath.    Cardiovascular: Negative for chest pain and palpitations.     Objective:     Vital Signs (Most Recent):  Temp: 98 °F (36.7 °C) (02/12/22 1225)  Pulse: 85 (02/12/22 1225)  Resp: 18 (02/12/22 1225)  BP: 107/60 (02/12/22 1225)  SpO2: 96 % (02/12/22 1225) Vital Signs (24h Range):  Temp:  [98 °F (36.7 °C)-98.6 °F (37 °C)] 98 °F (36.7 °C)  Pulse:  [74-86] 85  Resp:  [18-20] 18  SpO2:  [96 %-99 %] 96 %  BP: (107-138)/(56-81) 107/60     Weight: 63 kg (139 lb)  Body mass index is 18.85 kg/m².    Intake/Output Summary (Last 24 hours) at 2/12/2022 1442  Last data filed at 2/12/2022  0900  Gross per 24 hour   Intake 480 ml   Output 900 ml   Net -420 ml      Physical Exam  Constitutional:       General: He is not in acute distress.     Comments: Appears depressed.   HENT:      Mouth/Throat:      Comments: Edentulous  Cardiovascular:      Rate and Rhythm: Normal rate and regular rhythm.      Pulses: Normal pulses.      Heart sounds: Normal heart sounds. No murmur heard.  No gallop.    Pulmonary:      Effort: Pulmonary effort is normal.      Breath sounds: Normal breath sounds.   Abdominal:      General: Bowel sounds are normal.      Palpations: Abdomen is soft.   Skin:     General: Skin is warm and dry.   Neurological:      Mental Status: He is alert.      Comments: Not oriented to time, not talkative.   Psychiatric:      Comments: Affect flat.         Significant Labs: All pertinent labs within the past 24 hours have been reviewed.    Significant Imaging: I have reviewed all pertinent imaging results/findings within the past 24 hours.      Assessment/Plan:      * Acute deep vein thrombosis (DVT) of both lower extremities  Last ultrasound showed partially occlusive DVTs, now completely occlusive DVT of multiple lower extremity veins on ultrasound.  Patient reports compliance with warfarin but his INR is only one.  He is homeless but says he does not have difficulty obtaining his medications.  D-dimer is markedly elevated and there is a question of possible PE, but he just had a CTA of the brain/neck so cannot get another CTA for another 48 hours.    V/Q scan was done, showed low probability for PE.  2D echo showed grade I diastolic dysfunction.  Warfarin restarted with bridging Lovenox, but INR still low.  Given the difficulty of getting him to follow up will change to apixaban and discontinue Lovenox.    Acute stroke due to ischemia  Mri shows left stroke  On full dose anticoagulation  Tele shows sinus rhythm  Better dm control, continue statin  cta showed  Focal short segment 60-70% stenosis  involving the proximal to mid left vertebral artery, new compared to prior study of 09/25/2019.  No evidence of large vessel intracranial occlusion.   Neuro recs appr  Echo with bubble study done, no shunt seen.  Follow vascular neuro in 4 weeks and neuro  RPR, HIV non reactive.  B12 low normal.  B1 pending.  Therapy recommending SNF due to his low interest in participation.  Started Lexapro due to suspicion for depression.      Abnormal imaging of thyroid  Tsh/t4 ok, thyroid ultrasound done, showed bilateral nodules which did not meet criteria for biopsy.    Advance care planning        Severe protein-calorie malnutrition  Diet as tolerated      Debility  Pt/ot efforts  Needs placement skilled    Type 2 diabetes mellitus with hyperglycemia, without long-term current use of insulin  Reportedly he is on metformin and glipizide, both held.  Continue SSI for now.  He has 4+ sugar in urine and HbA1c is 10.3, and he is hyperglycemic here.  Will start levemir and continue iss., increase levemir to 15 units daily  Add scheduled novolog 5 units tid  Improving  Resume metformin in SNF    Syncope and collapse  Unclear cause.  Spoke to [facility] and patient has had previous syncopal episodes attributed to alcohol abuse.  Has been prescribed meclizine as well.  Tox positive for cocaine but not alcohol.  CTA brain/neck was done in ED, and per ED note the results were discussed with stroke neurologist Dr. Tovar who feels that CTA finding of a short segment of left vertebral artery stenosis was incidental.  He did not think stroke workup with MRI was indicated.    Since change of status per son compared to what he saw him early during the week, ct head ordered , was ok, mri brain showed acute stroke but patient's symptoms do not correspond to stroke findings.      Cocaine abuse  As noted above      Hyperlipidemia  Resumed lipitor      Essential hypertension  Doubtful he was on anything at home.  Started losartan 25 mg daily  bp  stable    Tobacco dependence  He smokes 5-6 cigarettes/day.  Not trying to quit and not interested in a nicotine patch.  Benefits of smoking cessation were reviewed with patient in detail for for 8 minutes and patient was encouraged to quit. Nicotine replacement options were discussed.        VTE Risk Mitigation (From admission, onward)         Ordered     apixaban tablet 5 mg  2 times daily         02/10/22 0901     Place sequential compression device  Until discontinued         01/31/22 0503                        Mjogan Meza MD  Department of Hospital Medicine   [facility] ([place])

## 2022-02-12 NOTE — SUBJECTIVE & OBJECTIVE
Interval History:  Not interested in getting up.  Lexapro started.  He is not eating much either.    Review of Systems   Constitutional: Negative for chills and fever.   Respiratory: Negative for cough and shortness of breath.    Cardiovascular: Negative for chest pain and palpitations.     Objective:     Vital Signs (Most Recent):  Temp: 98 °F (36.7 °C) (02/12/22 1225)  Pulse: 85 (02/12/22 1225)  Resp: 18 (02/12/22 1225)  BP: 107/60 (02/12/22 1225)  SpO2: 96 % (02/12/22 1225) Vital Signs (24h Range):  Temp:  [98 °F (36.7 °C)-98.6 °F (37 °C)] 98 °F (36.7 °C)  Pulse:  [74-86] 85  Resp:  [18-20] 18  SpO2:  [96 %-99 %] 96 %  BP: (107-138)/(56-81) 107/60     Weight: 63 kg (139 lb)  Body mass index is 18.85 kg/m².    Intake/Output Summary (Last 24 hours) at 2/12/2022 1442  Last data filed at 2/12/2022 0900  Gross per 24 hour   Intake 480 ml   Output 900 ml   Net -420 ml      Physical Exam  Constitutional:       General: He is not in acute distress.     Comments: Appears depressed.   HENT:      Mouth/Throat:      Comments: Edentulous  Cardiovascular:      Rate and Rhythm: Normal rate and regular rhythm.      Pulses: Normal pulses.      Heart sounds: Normal heart sounds. No murmur heard.  No gallop.    Pulmonary:      Effort: Pulmonary effort is normal.      Breath sounds: Normal breath sounds.   Abdominal:      General: Bowel sounds are normal.      Palpations: Abdomen is soft.   Skin:     General: Skin is warm and dry.   Neurological:      Mental Status: He is alert.      Comments: Not oriented to time, not talkative.   Psychiatric:      Comments: Affect flat.         Significant Labs: All pertinent labs within the past 24 hours have been reviewed.    Significant Imaging: I have reviewed all pertinent imaging results/findings within the past 24 hours.

## 2022-02-13 LAB
POCT GLUCOSE: 148 MG/DL (ref 70–110)
POCT GLUCOSE: 172 MG/DL (ref 70–110)
POCT GLUCOSE: 182 MG/DL (ref 70–110)
POCT GLUCOSE: 184 MG/DL (ref 70–110)
POCT GLUCOSE: 209 MG/DL (ref 70–110)

## 2022-02-13 PROCEDURE — 25000003 PHARM REV CODE 250: Performed by: NURSE PRACTITIONER

## 2022-02-13 PROCEDURE — 99232 SBSQ HOSP IP/OBS MODERATE 35: CPT | Mod: ,,, | Performed by: HOSPITALIST

## 2022-02-13 PROCEDURE — 99232 PR SUBSEQUENT HOSPITAL CARE,LEVL II: ICD-10-PCS | Mod: ,,, | Performed by: HOSPITALIST

## 2022-02-13 PROCEDURE — 92507 TX SP LANG VOICE COMM INDIV: CPT

## 2022-02-13 PROCEDURE — 25000003 PHARM REV CODE 250: Performed by: INTERNAL MEDICINE

## 2022-02-13 PROCEDURE — 25000003 PHARM REV CODE 250: Performed by: HOSPITALIST

## 2022-02-13 PROCEDURE — 11000001 HC ACUTE MED/SURG PRIVATE ROOM

## 2022-02-13 RX ADMIN — INSULIN DETEMIR 25 UNITS: 100 INJECTION, SOLUTION SUBCUTANEOUS at 08:02

## 2022-02-13 RX ADMIN — APIXABAN 5 MG: 2.5 TABLET, FILM COATED ORAL at 08:02

## 2022-02-13 RX ADMIN — SODIUM CHLORIDE 500 ML: 0.9 INJECTION, SOLUTION INTRAVENOUS at 01:02

## 2022-02-13 RX ADMIN — INSULIN ASPART 5 UNITS: 100 INJECTION, SOLUTION INTRAVENOUS; SUBCUTANEOUS at 05:02

## 2022-02-13 RX ADMIN — INSULIN ASPART 5 UNITS: 100 INJECTION, SOLUTION INTRAVENOUS; SUBCUTANEOUS at 08:02

## 2022-02-13 RX ADMIN — LOSARTAN POTASSIUM 25 MG: 25 TABLET, FILM COATED ORAL at 08:02

## 2022-02-13 RX ADMIN — TAMSULOSIN HYDROCHLORIDE 0.4 MG: 0.4 CAPSULE ORAL at 09:02

## 2022-02-13 RX ADMIN — APIXABAN 5 MG: 2.5 TABLET, FILM COATED ORAL at 09:02

## 2022-02-13 RX ADMIN — ESCITALOPRAM OXALATE 10 MG: 10 TABLET ORAL at 08:02

## 2022-02-13 RX ADMIN — INSULIN ASPART 5 UNITS: 100 INJECTION, SOLUTION INTRAVENOUS; SUBCUTANEOUS at 01:02

## 2022-02-13 RX ADMIN — ATORVASTATIN CALCIUM 80 MG: 20 TABLET, FILM COATED ORAL at 08:02

## 2022-02-13 NOTE — SUBJECTIVE & OBJECTIVE
Interval History:  Patient seen early this AM and said he felt fine (as always), then after lunch he got up to have a BM and fell while he was washing his hands at the sink.  Nurse had turned away briefly when he fell.  He says he did not pass out, unclear whether he got dizzy before he fell.  He seemed to be gazing up and to the right briefly but was not post-ictal.  BP low temporarily and he was given a bolus of IV fluids.  Currently at baseline.    Review of Systems   Constitutional: Negative for chills and fever.   Respiratory: Negative for cough and shortness of breath.    Cardiovascular: Negative for chest pain and palpitations.     Objective:     Vital Signs (Most Recent):  Temp: 98.4 °F (36.9 °C) (02/13/22 1124)  Pulse: 74 (02/13/22 1419)  Resp: 12 (02/13/22 1124)  BP: 128/67 (02/13/22 1419)  SpO2: 100 % (02/13/22 1124) Vital Signs (24h Range):  Temp:  [96.5 °F (35.8 °C)-98.7 °F (37.1 °C)] 98.4 °F (36.9 °C)  Pulse:  [71-99] 74  Resp:  [12-18] 12  SpO2:  [96 %-100 %] 100 %  BP: (112-132)/(57-78) 128/67     Weight: 63 kg (139 lb)  Body mass index is 18.85 kg/m².    Intake/Output Summary (Last 24 hours) at 2/13/2022 1419  Last data filed at 2/13/2022 0900  Gross per 24 hour   Intake 960 ml   Output 1870 ml   Net -910 ml      Physical Exam  Constitutional:       General: He is not in acute distress.     Comments: Appears depressed.   HENT:      Mouth/Throat:      Comments: Edentulous  Cardiovascular:      Rate and Rhythm: Normal rate and regular rhythm.      Pulses: Normal pulses.      Heart sounds: Normal heart sounds. No murmur heard.  No gallop.    Pulmonary:      Effort: Pulmonary effort is normal.      Breath sounds: Normal breath sounds.   Abdominal:      General: Bowel sounds are normal.      Palpations: Abdomen is soft.   Skin:     General: Skin is warm and dry.   Neurological:      Mental Status: He is alert.      Comments: Not oriented to time, not talkative.   Psychiatric:      Comments: Affect flat.          Significant Labs: All pertinent labs within the past 24 hours have been reviewed.    Significant Imaging: I have reviewed all pertinent imaging results/findings within the past 24 hours.

## 2022-02-13 NOTE — PT/OT/SLP PROGRESS
Speech Language Pathology Treatment    Patient Name:  Forest Lopez   MRN:  [id number]  Admitting Diagnosis: Acute deep vein thrombosis (DVT) of both lower extremities    Recommendations:                 General Recommendations:   1. Speech pathology to continue to follow 3-5x/week for ongoing assessment of cognitive-communicative deficits s/p acute infarcts of L cerebral hemisphere     Diet recommendations: Solids: Regular, Liquids: Thin      Aspiration Precautions: Eliminate distractions, Feed only when awake/alert, Frequent oral care and HOB to 90 degrees      General Precautions: Standard     Communication strategies    Subjective     · Pt awake/alert and sititing upright in bed. Agreeable to SLP session this AM.     Pain/Comfort:  · Pain Rating 1: 0/10    Respiratory Status: Room air    Objective:     Has the patient been evaluated by SLP for swallowing?   Yes  Keep patient NPO? No     Cognitive-Communicative Status:  Ongoing cognitive-communicative assessment continued this session. Pt oriented x4 I'ly. Pt continues to be distracted, requiring min-mod verbal cues to sustain attention for 7-10 minute increments.      Targeting information processing and word finding, pt completed convergent and divergent naming tasks with familiar items with 70% given mod verbal cues. Given semantic description, pt completed object ID task with 90% acc given min cues. Pt completed concrete category exclusion tasks with 70% mod cues and solved functional problems in the community with 60% mod cues. Intermittently required repetition of directions and redirectional cues. Pt recalled SLP's name and therapy given 5 min delay I'ly.      Education: SLP to continue to follow for cognitive-communicative tx. Pt would benefit from ongoing SLP services at next level of care.     Assessment:     Forest Lopez is a 72 y.o. 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    Goals:   Multidisciplinary Problems     SLP Goals        Problem: SLP Goal    Goal Priority Disciplines Outcome   SLP Goal     SLP Ongoing, Progressing   Description: 1. Pt will consume a regular/thin diet without overt s/s of aspiration or airway threat without assistance.  2. Pt will increase orientation to person, place, situation and date with 90% acc given min assist.  3. Pt will follow 3-4 step commands to increase functional IND with 75% acc given min assist.   4. Pt will complete immediate and delayed recall tasks with 75% acc given mod assist.  5. Targeting word finding, pt will complete divergent naming tasks given 1-minute time frame with 50% acc given mod assist.  6. Ongoing cognitive-communicative assessment.     Updated Goals 2/8:  7. Pt will sustain attention to topic/task without distraction in 5-10 minute increments given mod verbal cues.                 Plan:     · Patient to be seen:  3 x/week,5 x/week   · Plan of Care expires:  02/21/22  · Plan of Care reviewed with:  patient   · SLP Follow-Up:  Yes       Discharge recommendations:  nursing facility, skilled     Time Tracking:     SLP Treatment Date:   02/13/22  Speech Start Time:  1045  Speech Stop Time:  1059     Speech Total Time (min):  14 min    Billable Minutes: Total Time 14 min    02/13/2022

## 2022-02-13 NOTE — ASSESSMENT & PLAN NOTE
Doubtful he was on anything at home.  Started losartan 25 mg daily  BP well controlled on low dose losartan.  BP transiently low today after he fell but recovered quickly.  IVF bolus given.

## 2022-02-13 NOTE — ASSESSMENT & PLAN NOTE
Reportedly he is on metformin and glipizide, both held.  Continue SSI for now.  He has 4+ sugar in urine and HbA1c is 10.3, and he is hyperglycemic here.  Will start levemir and continue ISS, increase levemir to 15 units daily  Add scheduled novolog 5 units tid  Improving  Resume metformin in SNF

## 2022-02-13 NOTE — ASSESSMENT & PLAN NOTE
Unclear cause.  Spoke to [facility] and patient has had previous syncopal episodes attributed to alcohol abuse.  Has been prescribed meclizine as well.  Tox positive for cocaine but not alcohol.  CTA brain/neck was done in ED, and per ED note the results were discussed with stroke neurologist Dr. Tovar who feels that CTA finding of a short segment of left vertebral artery stenosis was incidental.  He did not think stroke workup with MRI was indicated.    Since change of status per son compared to when he saw him early during the week a CT of the brain was ordered and no abnormalities seen.  MRI of the brain showed acute stroke but patient's symptoms did not correspond to stroke findings.

## 2022-02-13 NOTE — ASSESSMENT & PLAN NOTE
Last ultrasound showed partially occlusive DVTs, now completely occlusive DVT of multiple lower extremity veins on ultrasound.  Patient reports compliance with warfarin but his INR is only one.  He is homeless but says he does not have difficulty obtaining his medications.  Does not seem to care about anything.  D-dimer was markedly elevated on presentation and there was a question of possible PE, but he had a CTA of the brain/neck on presentation so could not get another CTA for another 48 hours.    V/Q scan was done, showed low probability for PE.  2D echo showed grade I diastolic dysfunction.  Warfarin was restarted with bridging Lovenox, but INR was still low.  Given the difficulty of getting him to follow up changed to apixaban and discontinued Lovenox.

## 2022-02-13 NOTE — ASSESSMENT & PLAN NOTE
- MRI showed areas of diffusion signal hyperintensity consistent with areas of acute ischemia/infarct involving the left cerebral hemisphere, the most prominent measuring approximately 1.4 cm, also a small focus of the right frontal lobe.  - Patient on full dose anticoagulation  - Sinus rhythm noted throughout hospital stay  - Risk factor modification - control diabetes, continue statin.  - RPR, HIV non reactive.  B12 low normal.  B1 pending.  - CTA showed a focal segment of 60-70% stenosis involving the proximal to mid left vertebral artery that was new when compared to the prior study of 09/25/2019.  No evidence of large vessel intracranial occlusion.   - Neurology noted symptoms do not correspond to stroke location.  - Echo with bubble study done, no shunt seen.  - Follow up with vascular neurology in 4 weeks.  -Therapy recommending SNF due to his low interest in participation.  - Started Lexapro due to suspicion for depression.

## 2022-02-13 NOTE — PROGRESS NOTES
[facility] Medicine  Progress Note    Patient Name: Forest Lopez  MRN: [id number]  Patient Class: IP- Inpatient   Admission Date: 1/31/2022  Length of Stay: 13 days  Attending Physician: Mojgan Bosch MD  Primary Care Provider: Julian Escobar        Subjective:     Principal Problem:Acute deep vein thrombosis (DVT) of both lower extremities        HPI:  Mr. Lopez is a 72 year old man who presented after a syncopal episode.  He reports he had been feeling well prior to the episode but then had a prodrome prior to passing out.  EMS was called, report patient's BP was low normal on their arrival, improved after an IV fluids bolus.  Patient denies chest pain or shortness of breath and says he does not feel weak currently although is rather tired.  When seen in the ED this morning he could barely express himself audibly.     Vital signs were normal on presentation here.  He is on warfarin for recurrent DVT, but his INR was 1, and d-dimer markedly elevated at 21.7.  Tox screen was positive for cocaine.  He had a CTA of the brain and neck done on presentation so CTA of the chest for PE study could not be done for 48 hours.  Ultrasound of the lower extremities showed extensive bilateral DVT.  On the right there was thrombosis of the common femoral vein, femoral vein, popliteal vein, one of the paired posterior tibial veins and both visualized peroneal veins.  On the left there was thrombosis of the common femoral vein, femoral vein and popliteal vein.  Patient was started on full dose Lovenox and admitted.    Medical history is from the chart as he is unable to give me much information.  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  He smokes 5-6 cigarettes/day and is not interested in quitting.  He is currently homeless and staying with a friend.   Despite the normal INR he is sure he is taking his warfarin and is not having difficulty taking his medications.  I discussed his care with [facility] staff on the [facility] and patient had been lost to follow up since November 2021.      Overview/Hospital Course:  Patient presented to the ED after a syncopal episode and was found to have bilateral lower extremity DVTs and a low INR.  Tox screen was positive for cocaine.  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  He was started back on his warfarin with bridging Lovenox.  His 2D echo showed grade I diastolic dysfunction.  Neurology evaluated him and recommended echo with bubble study, which was negative for a shunt.    PT/OT evaluated him and recommended SNF placement as he was not participating in therapy.  His INR was difficult to get therapeutic, and as he had no contraindication to a NOAC his warfarin was changed to apixaban, and the full dose Lovenox was discontinued.  As he appeared to be depressed and had no objection to starting an antidepressant Lexapro was started.  He had a fall in the hospital on 2/13, had gone to the bathroom for a BM with the nurse, and when she turned away while he was washing his hands he apparently lost his balance and fell.  He did not hit his head and did not lose consciousness, but his BP was low transiently and he was given a bolus of IV fluids.      Interval History:  Patient seen early this AM and said he felt fine (as always), then after lunch he got up to have a BM and fell while he was washing his hands at the sink.  Nurse had turned away briefly when he fell.  He says he did not pass out, unclear whether he got dizzy before he fell.  He seemed to be gazing up and to the right briefly but was not post-ictal.  BP low temporarily and he was given a bolus of IV fluids.  Currently at baseline.    Review of Systems   Constitutional: Negative  for chills and fever.   Respiratory: Negative for cough and shortness of breath.    Cardiovascular: Negative for chest pain and palpitations.     Objective:     Vital Signs (Most Recent):  Temp: 98.4 °F (36.9 °C) (02/13/22 1124)  Pulse: 74 (02/13/22 1419)  Resp: 12 (02/13/22 1124)  BP: 128/67 (02/13/22 1419)  SpO2: 100 % (02/13/22 1124) Vital Signs (24h Range):  Temp:  [96.5 °F (35.8 °C)-98.7 °F (37.1 °C)] 98.4 °F (36.9 °C)  Pulse:  [71-99] 74  Resp:  [12-18] 12  SpO2:  [96 %-100 %] 100 %  BP: (112-132)/(57-78) 128/67     Weight: 63 kg (139 lb)  Body mass index is 18.85 kg/m².    Intake/Output Summary (Last 24 hours) at 2/13/2022 1419  Last data filed at 2/13/2022 0900  Gross per 24 hour   Intake 960 ml   Output 1870 ml   Net -910 ml      Physical Exam  Constitutional:       General: He is not in acute distress.     Comments: Appears depressed.   HENT:      Mouth/Throat:      Comments: Edentulous  Cardiovascular:      Rate and Rhythm: Normal rate and regular rhythm.      Pulses: Normal pulses.      Heart sounds: Normal heart sounds. No murmur heard.  No gallop.    Pulmonary:      Effort: Pulmonary effort is normal.      Breath sounds: Normal breath sounds.   Abdominal:      General: Bowel sounds are normal.      Palpations: Abdomen is soft.   Skin:     General: Skin is warm and dry.   Neurological:      Mental Status: He is alert.      Comments: Not oriented to time, not talkative.   Psychiatric:      Comments: Affect flat.         Significant Labs: All pertinent labs within the past 24 hours have been reviewed.    Significant Imaging: I have reviewed all pertinent imaging results/findings within the past 24 hours.      Assessment/Plan:      * Acute deep vein thrombosis (DVT) of both lower extremities  Last ultrasound showed partially occlusive DVTs, now completely occlusive DVT of multiple lower extremity veins on ultrasound.  Patient reports compliance with warfarin but his INR is only one.  He is homeless but  says he does not have difficulty obtaining his medications.  Does not seem to care about anything.  D-dimer was markedly elevated on presentation and there was a question of possible PE, but he had a CTA of the brain/neck on presentation so could not get another CTA for another 48 hours.    V/Q scan was done, showed low probability for PE.  2D echo showed grade I diastolic dysfunction.  Warfarin was restarted with bridging Lovenox, but INR was still low.  Given the difficulty of getting him to follow up changed to apixaban and discontinued Lovenox.    Acute stroke due to ischemia  - MRI showed areas of diffusion signal hyperintensity consistent with areas of acute ischemia/infarct involving the left cerebral hemisphere, the most prominent measuring approximately 1.4 cm, also a small focus of the right frontal lobe.  - Patient on full dose anticoagulation  - Sinus rhythm noted throughout hospital stay  - Risk factor modification - control diabetes, continue statin.  - RPR, HIV non reactive.  B12 low normal.  B1 pending.  - CTA showed a focal segment of 60-70% stenosis involving the proximal to mid left vertebral artery that was new when compared to the prior study of 09/25/2019.  No evidence of large vessel intracranial occlusion.   - Neurology noted symptoms do not correspond to stroke location.  - Echo with bubble study done, no shunt seen.  - Follow up with vascular neurology in 4 weeks.  -Therapy recommending SNF due to his low interest in participation.  - Started Lexapro due to suspicion for depression.      Abnormal imaging of thyroid  Tsh/t4 ok, thyroid ultrasound done, showed bilateral nodules which did not meet criteria for biopsy.    Advance care planning        Severe protein-calorie malnutrition  Diet as tolerated      Debility  Pt/ot efforts  Needs placement skilled    Type 2 diabetes mellitus with hyperglycemia, without long-term current use of insulin  Reportedly he is on metformin and glipizide, both  held.  Continue SSI for now.  He has 4+ sugar in urine and HbA1c is 10.3, and he is hyperglycemic here.  Will start levemir and continue ISS, increase levemir to 15 units daily  Add scheduled novolog 5 units tid  Improving  Resume metformin in SNF    Syncope and collapse  Unclear cause.  Spoke to [facility] and patient has had previous syncopal episodes attributed to alcohol abuse.  Has been prescribed meclizine as well.  Tox positive for cocaine but not alcohol.  CTA brain/neck was done in ED, and per ED note the results were discussed with stroke neurologist Dr. Tovar who feels that CTA finding of a short segment of left vertebral artery stenosis was incidental.  He did not think stroke workup with MRI was indicated.    Since change of status per son compared to when he saw him early during the week a CT of the brain was ordered and no abnormalities seen.  MRI of the brain showed acute stroke but patient's symptoms did not correspond to stroke findings.      Cocaine abuse  As noted above      Hyperlipidemia  Resumed lipitor      Essential hypertension  Doubtful he was on anything at home.  Started losartan 25 mg daily  BP well controlled on low dose losartan.  BP transiently low today after he fell but recovered quickly.  IVF bolus given.    Tobacco dependence  He smokes 5-6 cigarettes/day.  Not trying to quit and not interested in a nicotine patch.  Benefits of smoking cessation were reviewed with patient in detail for for 8 minutes and patient was encouraged to quit. Nicotine replacement options were discussed.        VTE Risk Mitigation (From admission, onward)         Ordered     apixaban tablet 5 mg  2 times daily         02/10/22 0901     Place sequential compression device  Until discontinued         01/31/22 8444                      Mojgan Meza MD  Department of Hospital Medicine   [facility] S[facility])

## 2022-02-13 NOTE — NURSING
Patient had a witnessed fall while standing at the sink with his walker washing his hands. RN was standing by assisting patient when he suddenly fell to his left side He was initially responsive after falling and said he felt dizzy but suddenly became unresponsive verbally and his gaze deviated to the right. Rapid response called. Manual BP was reading 74/48. Patient became responsive again and with assist was able to get back in bed. Dr. Bosch at the bedside. 500mL NS bolus ordered. Once back in bed BP was stable at 124/71. Patient appeared to be back at baseline and was not complaining of any pain anywhere. Bed alarm on, [employer] camera on and patient with call light. Will continue to monitor.

## 2022-02-14 ENCOUNTER — PATIENT OUTREACH (OUTPATIENT)
Dept: ADMINISTRATIVE | Facility: OTHER | Age: 72
End: 2022-02-14
Payer: MEDICARE

## 2022-02-14 LAB
POCT GLUCOSE: 128 MG/DL (ref 70–110)
POCT GLUCOSE: 145 MG/DL (ref 70–110)
POCT GLUCOSE: 163 MG/DL (ref 70–110)
POCT GLUCOSE: 172 MG/DL (ref 70–110)
VIT B1 BLD-MCNC: 46 UG/L (ref 38–122)

## 2022-02-14 PROCEDURE — 97110 THERAPEUTIC EXERCISES: CPT

## 2022-02-14 PROCEDURE — 25000003 PHARM REV CODE 250: Performed by: HOSPITALIST

## 2022-02-14 PROCEDURE — 97116 GAIT TRAINING THERAPY: CPT

## 2022-02-14 PROCEDURE — 25000003 PHARM REV CODE 250: Performed by: INTERNAL MEDICINE

## 2022-02-14 PROCEDURE — 97112 NEUROMUSCULAR REEDUCATION: CPT

## 2022-02-14 PROCEDURE — 99232 PR SUBSEQUENT HOSPITAL CARE,LEVL II: ICD-10-PCS | Mod: ,,, | Performed by: HOSPITALIST

## 2022-02-14 PROCEDURE — 94761 N-INVAS EAR/PLS OXIMETRY MLT: CPT

## 2022-02-14 PROCEDURE — 99232 SBSQ HOSP IP/OBS MODERATE 35: CPT | Mod: ,,, | Performed by: HOSPITALIST

## 2022-02-14 PROCEDURE — 97535 SELF CARE MNGMENT TRAINING: CPT

## 2022-02-14 PROCEDURE — 11000001 HC ACUTE MED/SURG PRIVATE ROOM

## 2022-02-14 PROCEDURE — 25000003 PHARM REV CODE 250: Performed by: NURSE PRACTITIONER

## 2022-02-14 RX ADMIN — INSULIN ASPART 5 UNITS: 100 INJECTION, SOLUTION INTRAVENOUS; SUBCUTANEOUS at 01:02

## 2022-02-14 RX ADMIN — LOSARTAN POTASSIUM 25 MG: 25 TABLET, FILM COATED ORAL at 08:02

## 2022-02-14 RX ADMIN — APIXABAN 5 MG: 2.5 TABLET, FILM COATED ORAL at 08:02

## 2022-02-14 RX ADMIN — TAMSULOSIN HYDROCHLORIDE 0.4 MG: 0.4 CAPSULE ORAL at 08:02

## 2022-02-14 RX ADMIN — ATORVASTATIN CALCIUM 80 MG: 20 TABLET, FILM COATED ORAL at 08:02

## 2022-02-14 RX ADMIN — INSULIN ASPART 5 UNITS: 100 INJECTION, SOLUTION INTRAVENOUS; SUBCUTANEOUS at 05:02

## 2022-02-14 RX ADMIN — ESCITALOPRAM OXALATE 10 MG: 10 TABLET ORAL at 08:02

## 2022-02-14 RX ADMIN — INSULIN ASPART 5 UNITS: 100 INJECTION, SOLUTION INTRAVENOUS; SUBCUTANEOUS at 08:02

## 2022-02-14 RX ADMIN — INSULIN DETEMIR 25 UNITS: 100 INJECTION, SOLUTION SUBCUTANEOUS at 08:02

## 2022-02-14 NOTE — PT/OT/SLP PROGRESS
Speech Language Pathology      Forest Lopez  MRN: [id number]    Patient not seen today secondary to Other (Comment),Patient fatigue. Pt resting, requesting to defer SLP at this time. Will follow-up later today as schedule allows or next available date 2/15/22.

## 2022-02-14 NOTE — PROGRESS NOTES
[facility] Medicine  Progress Note    Patient Name: Forest Lopez  MRN: [id number]  Patient Class: IP- Inpatient   Admission Date: 1/31/2022  Length of Stay: 14 days  Attending Physician: Mojgan Bosch MD  Primary Care Provider: Julian Escobar        Subjective:     Principal Problem:Acute deep vein thrombosis (DVT) of both lower extremities        HPI:  Mr. Lopez is a 72 year old man who presented after a syncopal episode.  He reports he had been feeling well prior to the episode but then had a prodrome prior to passing out.  EMS was called, report patient's BP was low normal on their arrival, improved after an IV fluids bolus.  Patient denies chest pain or shortness of breath and says he does not feel weak currently although is rather tired.  When seen in the ED this morning he could barely express himself audibly.     Vital signs were normal on presentation here.  He is on warfarin for recurrent DVT, but his INR was 1, and d-dimer markedly elevated at 21.7.  Tox screen was positive for cocaine.  He had a CTA of the brain and neck done on presentation so CTA of the chest for PE study could not be done for 48 hours.  Ultrasound of the lower extremities showed extensive bilateral DVT.  On the right there was thrombosis of the common femoral vein, femoral vein, popliteal vein, one of the paired posterior tibial veins and both visualized peroneal veins.  On the left there was thrombosis of the common femoral vein, femoral vein and popliteal vein.  Patient was started on full dose Lovenox and admitted.    Medical history is from the chart as he is unable to give me much information.  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  He smokes 5-6 cigarettes/day and is not interested in quitting.  He is currently homeless and staying with a friend.   Despite the normal INR he is sure he is taking his warfarin and is not having difficulty taking his medications.  I discussed his care with [facility] staff on the [facility] and patient had been lost to follow up since November 2021.      Overview/Hospital Course:  Patient presented to the ED after a syncopal episode and was found to have bilateral lower extremity DVTs and a low INR.  Tox screen was positive for cocaine.  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  He was started back on his warfarin with bridging Lovenox.  His 2D echo showed grade I diastolic dysfunction.  Neurology evaluated him and recommended echo with bubble study, which was negative for a shunt.    PT/OT evaluated him and recommended SNF placement as he was not participating in therapy.  His INR was difficult to get therapeutic, and as he had no contraindication to a NOAC his warfarin was changed to apixaban, and the full dose Lovenox was discontinued.  As he appeared to be depressed and had no objection to starting an antidepressant Lexapro was started.  He had a fall in the hospital on 2/13, had gone to the bathroom for a BM with the nurse, and when she turned away while he was washing his hands he apparently lost his balance and fell.  He did not hit his head and did not lose consciousness, but his BP was low transiently and he was given a bolus of IV fluids.    Patient's mental status improved slowly, but he gradually became more talkative and was able to hold a brief conversation.      Interval History:  He was brighter today, just did some therapy with OT, became tired easily but was much more participatory than before.    Review of Systems   Constitutional: Positive for fatigue. Negative for chills and fever.   Respiratory: Negative for cough and shortness of breath.    Cardiovascular: Negative for chest pain and palpitations.     Objective:     Vital  Signs (Most Recent):  Temp: 97.7 °F (36.5 °C) (02/14/22 1146)  Pulse: 84 (02/14/22 1146)  Resp: 18 (02/14/22 1146)  BP: (!) 103/59 (02/14/22 1146)  SpO2: 95 % (02/14/22 1146) Vital Signs (24h Range):  Temp:  [97.7 °F (36.5 °C)-98.3 °F (36.8 °C)] 97.7 °F (36.5 °C)  Pulse:  [72-87] 84  Resp:  [16-18] 18  SpO2:  [95 %-99 %] 95 %  BP: (103-133)/(55-72) 103/59     Weight: 63 kg (139 lb)  Body mass index is 18.85 kg/m².    Intake/Output Summary (Last 24 hours) at 2/14/2022 1342  Last data filed at 2/14/2022 1100  Gross per 24 hour   Intake 980 ml   Output 700 ml   Net 280 ml      Physical Exam  Constitutional:       General: He is not in acute distress.     Comments: Thin   HENT:      Mouth/Throat:      Comments: Edentulous  Cardiovascular:      Rate and Rhythm: Normal rate and regular rhythm.      Pulses: Normal pulses.      Heart sounds: Normal heart sounds. No murmur heard.  No gallop.    Pulmonary:      Effort: Pulmonary effort is normal.      Breath sounds: Normal breath sounds.   Abdominal:      General: Bowel sounds are normal.      Palpations: Abdomen is soft.   Skin:     General: Skin is warm and dry.   Neurological:      Mental Status: He is alert.      Comments: Not oriented to time, more conversant today.   Psychiatric:      Comments: Affect flat.         Significant Labs: All pertinent labs within the past 24 hours have been reviewed.    Significant Imaging: I have reviewed all pertinent imaging results/findings within the past 24 hours.      Assessment/Plan:      * Acute deep vein thrombosis (DVT) of both lower extremities  Last ultrasound showed partially occlusive DVTs, now completely occlusive DVT of multiple lower extremity veins on ultrasound.  Patient reports compliance with warfarin but his INR is only one.  He is homeless but says he does not have difficulty obtaining his medications.  Does not seem to care about anything.  D-dimer was markedly elevated on presentation and there was a question of  possible PE, but he had a CTA of the brain/neck on presentation so could not get another CTA for another 48 hours.    V/Q scan was done, showed low probability for PE.  2D echo showed grade I diastolic dysfunction.  Warfarin was restarted with bridging Lovenox, but INR was still low.  Given the difficulty of getting him to follow up changed to apixaban and discontinued Lovenox.    Acute stroke due to ischemia  - MRI showed areas of diffusion signal hyperintensity consistent with areas of acute ischemia/infarct involving the left cerebral hemisphere, the most prominent measuring approximately 1.4 cm, also a small focus of the right frontal lobe.  - Patient on full dose anticoagulation  - Sinus rhythm noted throughout hospital stay  - Risk factor modification - control diabetes, continue statin.  - RPR, HIV non reactive.  B12 low normal.  B1 pending.  - CTA showed a focal segment of 60-70% stenosis involving the proximal to mid left vertebral artery that was new when compared to the prior study of 09/25/2019.  No evidence of large vessel intracranial occlusion.   - Neurology noted symptoms do not correspond to stroke location.  - Echo with bubble study done, no shunt seen.  - Follow up with vascular neurology in 4 weeks.  -Therapy recommending SNF due to his low interest in participation.  - Started Lexapro due to suspicion for depression.      Abnormal imaging of thyroid  Tsh/t4 ok, thyroid ultrasound done, showed bilateral nodules which did not meet criteria for biopsy.    Advance care planning        Severe protein-calorie malnutrition  Diet as tolerated      Debility  Pt/ot efforts  Needs placement skilled    Type 2 diabetes mellitus with hyperglycemia, without long-term current use of insulin  Reportedly he is on metformin and glipizide, both held.  Continue SSI for now.  He has 4+ sugar in urine and HbA1c is 10.3, and he is hyperglycemic here.  Will start levemir and continue ISS, increase levemir to 15 units  daily  Add scheduled novolog 5 units tid  Improving  Resume metformin in SNF    Syncope and collapse  Unclear cause.  Spoke to [facility] and patient has had previous syncopal episodes attributed to alcohol abuse.  Has been prescribed meclizine as well.  Tox positive for cocaine but not alcohol.  CTA brain/neck was done in ED, and per ED note the results were discussed with stroke neurologist Dr. Tovar who feels that CTA finding of a short segment of left vertebral artery stenosis was incidental.  He did not think stroke workup with MRI was indicated.    Since change of status per son compared to when he saw him early during the week a CT of the brain was ordered and no abnormalities seen.  MRI of the brain showed acute stroke but patient's symptoms did not correspond to stroke findings.      Cocaine abuse  As noted above.  He has not had any behavioral issues while here, and other than being disinterested in therapy he has been very cooperative.  Seems drug use was transient.      Hyperlipidemia  Resumed lipitor      Essential hypertension  Doubtful he was on anything at home.  Started losartan 25 mg daily  BP well controlled on low dose losartan.  BP transiently low today after he fell but recovered quickly.  IVF bolus given.    Tobacco dependence  He smokes 5-6 cigarettes/day.  Not trying to quit and not interested in a nicotine patch.  Benefits of smoking cessation were reviewed with patient in detail for for 8 minutes and patient was encouraged to quit. Nicotine replacement options were discussed.        VTE Risk Mitigation (From admission, onward)         Ordered     apixaban tablet 5 mg  2 times daily         02/10/22 0901     Place sequential compression device  Until discontinued         01/31/22 0533                Discharge Planning   EFRAÍN: 2/15/2022     Code Status: Full Code   Is the patient medically ready for discharge?:     Reason for patient still in hospital (select all that apply): Pending  disposition  Discharge Plan A: Skilled Nursing Facility   Discharge Delays: (!) Post-Acute Set-up              Mojgan Meza MD  Department of Hospital Medicine   [facility]

## 2022-02-14 NOTE — PT/OT/SLP PROGRESS
"Physical Therapy Treatment    Patient Name:  Forest Lopez   MRN:  [id number]    Recommendations:     Discharge Recommendations:  nursing facility, skilled   Discharge Equipment Recommendations: bedside commode,shower chair,walker, rolling   Barriers to discharge: None    Assessment:     Forest Lopez is a 72 y.o. male admitted with a medical diagnosis of Acute deep vein thrombosis (DVT) of both lower extremities.  He presents with the following impairments/functional limitations:  weakness,impaired self care skills,impaired functional mobilty,gait instability,impaired balance,decreased safety awareness.    Patient with improved affect and increased tolerance for mobility. Progressed to cane this date with decreased gait speed and increased assistance required. Performed standing balance activities with CGA. Rec for dc to SNF transitioning to NH.     Rehab Prognosis: Good; patient would benefit from acute skilled PT services to address these deficits and reach maximum level of function.    Recent Surgery: * No surgery found *      Plan:     During this hospitalization, patient to be seen 3 x/week to address the identified rehab impairments via gait training,therapeutic activities,therapeutic exercises,neuromuscular re-education and progress toward the following goals:    · Plan of Care Expires:  02/28/22    Subjective     Chief Complaint: None stated  Patient/Family Comments/goals: "Do you this at other places?" - initially pt with increased conservation with therapist, as session continued more withdrawn; Patient agreeable to PT treatment.  Pain/Comfort:  Pain Rating 1: 0/10  Pain Rating Post-Intervention 1: 0/10      Objective:     Communicated with MARIBEL Varela prior to session.  Patient found HOB elevated with Condom Catheter,peripheral IV,bed alarm (tash sys) upon PT entry to room. Condom catheter found with seal broken and linens soaked with urine.     General Precautions: Standard, fall   Orthopedic Precautions:N/A "   Braces: N/A  Respiratory Status: Room air     Patient donned red socks and gait belt for OOB mobility. Patient donned mask for hallway ambulation.    Functional Mobility:  · Bed Mobility:     · Supine to Sit: supervision  · Sit to Supine: minimum assistance  · Severely decreased initiation of return to bed noted requiring hand son assistance to perform   · Transfers:     · Sit to Stand:  supervision with straight cane  · Gait: 2x120 ft with SPC and CGA.   · Improved gait stability with increased step width and decreased frequency of cross over gait.   · Decreased gait speed with more shuffling gait noted.   · No overt LOB noted during gait bout with CGA.   · Balance: 10x5 sec SLS alternating LE with UE support. Increased crouched posture as pt fatigued.       AM-PAC 6 CLICK MOBILITY  Turning over in bed (including adjusting bedclothes, sheets and blankets)?: 4  Sitting down on and standing up from a chair with arms (e.g., wheelchair, bedside commode, etc.): 4  Moving from lying on back to sitting on the side of the bed?: 3  Moving to and from a bed to a chair (including a wheelchair)?: 4  Need to walk in hospital room?: 3  Climbing 3-5 steps with a railing?: 3  Basic Mobility Total Score: 21       Therapeutic Activities and Exercises:  · Pt performed seated therapeutic exercises including hip flexion, LAQs, isometric hip adduction and abduction x 10 reps with verbal and tactile cues.  · Neruo re-ed: SLS alternating LE with BUE support. No overt LOB or sway noted. Slow weight shifting performed.     Patient left HOB elevated with all lines intact, call button in reach, bed alarm on, Rn Nichole notified and Crista telesitter present..    GOALS:   Multidisciplinary Problems     Physical Therapy Goals        Problem: Physical Therapy Goal    Goal Priority Disciplines Outcome Goal Variances Interventions   Physical Therapy Goal     PT, PT/OT Ongoing, Progressing     Description: Goals to be met by: 2/15/2022    Patient  will increase functional independence with mobility by performin. Sit<>stand with SPV with LRAD.  2. Gait x 300 feet with SPV with LRAD.   3. Static standing in SLS with no UE support with SBA x10 sec.                      Time Tracking:     PT Received On: 22  PT Start Time: 1406     PT Stop Time: 1444  PT Total Time (min): 38 min     Billable Minutes: Gait Training 15, Therapeutic Exercise 13 and Neuromuscular Re-education 10    Treatment Type: Treatment  PT/PTA: PT     PTA Visit Number: 0     2022

## 2022-02-14 NOTE — PLAN OF CARE
SW reviewed feedback from Facilities contacted for SNF placement.   [facility] said not in Network  [facility] - Couldn't met needs/Cocaine use    Other's pending with no feedback yet     [facility]  [place]  [facility]  [place]   [facility]

## 2022-02-14 NOTE — PLAN OF CARE
Problem: Adult Inpatient Plan of Care  Goal: Plan of Care Review  Outcome: Ongoing, Progressing  Goal: Patient-Specific Goal (Individualized)  Outcome: Ongoing, Progressing  Goal: Absence of Hospital-Acquired Illness or Injury  Outcome: Ongoing, Progressing  Goal: Optimal Comfort and Wellbeing  Outcome: Ongoing, Progressing  Goal: Readiness for Transition of Care  Outcome: Ongoing, Progressing     Problem: Diabetes Comorbidity  Goal: Blood Glucose Level Within Targeted Range  Outcome: Ongoing, Progressing     Problem: Adult Inpatient Plan of Care  Goal: Plan of Care Review  2/13/2022 1824 by Isela Stevenson RN  Outcome: Ongoing, Progressing  2/13/2022 1823 by Isela Stevenson RN  Outcome: Ongoing, Progressing  Goal: Patient-Specific Goal (Individualized)  2/13/2022 1824 by Isela Stevenson RN  Outcome: Ongoing, Progressing  2/13/2022 1823 by Isela Stevenson RN  Outcome: Ongoing, Progressing  Goal: Absence of Hospital-Acquired Illness or Injury  2/13/2022 1824 by Isela Stevenson RN  Outcome: Ongoing, Progressing  2/13/2022 1823 by Isela Stevenson RN  Outcome: Ongoing, Progressing  Goal: Optimal Comfort and Wellbeing  2/13/2022 1824 by Isela Stevenson RN  Outcome: Ongoing, Progressing  2/13/2022 1823 by Isela Stevenson RN  Outcome: Ongoing, Progressing  Goal: Readiness for Transition of Care  2/13/2022 1824 by Isela Stevenson RN  Outcome: Ongoing, Progressing  2/13/2022 1823 by Isela Stevenson RN  Outcome: Ongoing, Progressing     Problem: Diabetes Comorbidity  Goal: Blood Glucose Level Within Targeted Range  2/13/2022 1824 by Isela Stevenson RN  Outcome: Ongoing, Progressing  2/13/2022 1823 by Isela Stevenson RN  Outcome: Ongoing, Progressing     Problem: Fall Injury Risk  Goal: Absence of Fall and Fall-Related Injury  Outcome: Ongoing, Progressing

## 2022-02-14 NOTE — PT/OT/SLP PROGRESS
Occupational Therapy   Treatment    Name: Forest Lopez  MRN: [id number]  Admitting Diagnosis:  Acute deep vein thrombosis (DVT) of both lower extremities       Recommendations:     Discharge Recommendations: nursing facility, skilled  Discharge Equipment Recommendations:  bedside commode,shower chair,walker, rolling  Barriers to discharge:  Decreased caregiver support    Assessment:     Forest Lopez is a 72 y.o. male with a medical diagnosis of Acute deep vein thrombosis (DVT) of both lower extremities.  He presents supine with RN at bedside and agreeable to OT session. Performance deficits affecting function are weakness,impaired endurance,impaired self care skills,impaired functional mobilty,gait instability,impaired balance,decreased coordination,decreased safety awareness.     Rehab Prognosis:  Good; patient would benefit from acute skilled OT services to address these deficits and reach maximum level of function.       Plan:     Patient to be seen 3 x/week to address the above listed problems via self-care/home management,therapeutic activities,therapeutic exercises  · Plan of Care Expires: 22  · Plan of Care Reviewed with: patient    Subjective     Pain/Comfort:  · Pain Rating 1: 0/10     Orthostatics:  Supine: 135/71 (map: 98) HR 89bpm  Sitting EOB: 140/69 (map: 97) HR 94bpm  Standin/88 (map: 104)     Pt asymptomatic and denied dizziness throughout.     Objective:     Communicated with: RN prior to session.  Patient found HOB elevated with Condom Catheter,peripheral IV,bed alarm upon OT entry to room.    General Precautions: Standard, fall   Orthopedic Precautions:N/A   Braces: N/A  Respiratory Status: Room air     Occupational Performance:     Bed Mobility:    · Supine <> sit SBA    Functional Mobility/Transfers:  · Sit <> Stand Min A with RW. Pt required cues for hand placement. Pt also required cues for use of momentum to achieve upright standing.   · Functional Mobility: Upon sitting  EOB, pt completed grooming. Then, pt stood and performed functional ambulation approx 15 feet with RW and CGA. Pt denied dizziness throughout. Pt then returned to sit EOB and performed BUE therex.    Activities of Daily Living:  · Grooming: SBA and set up at EOB to wash face. Pt required cues for thoroughness.     Therapeutic Exercise  · Pt sat EOB and performed 10 reps of B shoulder flexion, B scapula protraction/retraction, and cross lateral reaching. Pt limited by fatigue and required rest break after 5 reps of each exercise. Pt returned to supine upon completion of therex.    [facility] 6 Click ADL: 16    Treatment & Education:  Pt educated on OT role, plan of care, progression of goals, ADL retraining, safety education, transfer technique training. Pt verbalized understanding.    Pt motivated and agreeable to participate in OT session this date. Pt mainly limited by weakness and fatigue but tolerated session well with rest breaks throughout. Pt below functional baseline and required SBA-CGA for OOB activity 2/2 safety deficits and impaired balance. Pt would benefit from continued OT services to increase independence with ADLs.     Patient left supine with HOB elevated with all lines intact, call button in reach, bed alarm on and RN notifiedEducation:      GOALS:   Multidisciplinary Problems     Occupational Therapy Goals        Problem: Occupational Therapy Goal    Goal Priority Disciplines Outcome Interventions   Occupational Therapy Goal     OT, PT/OT Ongoing, Progressing    Description: Goals to be met by: 2/15/2022    Patient will increase functional independence with ADLs by performing:    UE Dressing with [place].  LE Dressing with [place].  Grooming while standing at sink with Supervision.  Toileting from toilet with Supervision for hygiene and clothing management.   Toilet transfer to toilet with Supervision.                     Time Tracking:     OT Date of Treatment: 02/14/22  OT Start Time:  0859  OT Stop Time: 0926  OT Total Time (min): 27 min    Billable Minutes:Self Care/Home Management 15 minutes  Therapeutic Exercise 12 minutes    OT/DUTCH: OT          2/14/2022

## 2022-02-14 NOTE — PLAN OF CARE
POC reviewed w/ pt and hourly rounding complete. Meds administered per MAR. VSS on RA/ Pt disoriented to time. Pt able to ambulate w/ 1 person assist. Bed alarm on and avasys in use. Safety precautions intact; no injuries or falls this shift. Pt resting w/ eyes closed and visible chest rise at this time; will continue to monitor.

## 2022-02-14 NOTE — PLAN OF CARE
POC reviewed w/ pt. AAOx4. No significant events this shift. VSS on RA. No complaints of pain. Turn Q2/frequent weight shift encouraged. Instructed to call for help ambulating. No injuries, falls, or trauma occurred during shift. Purposeful rounding completed. Bed alarm set, bed low and locked, side rails up x3, call light within reach. AVASYS in place for fall/harm prevention.

## 2022-02-14 NOTE — ASSESSMENT & PLAN NOTE
As noted above.  He has not had any behavioral issues while here, and other than being disinterested in therapy he has been very cooperative.  Seems drug use was transient.

## 2022-02-14 NOTE — SUBJECTIVE & OBJECTIVE
Interval History:  He was brighter today, just did some therapy with OT, became tired easily but was much more participatory than before.    Review of Systems   Constitutional: Positive for fatigue. Negative for chills and fever.   Respiratory: Negative for cough and shortness of breath.    Cardiovascular: Negative for chest pain and palpitations.     Objective:     Vital Signs (Most Recent):  Temp: 97.7 °F (36.5 °C) (02/14/22 1146)  Pulse: 84 (02/14/22 1146)  Resp: 18 (02/14/22 1146)  BP: (!) 103/59 (02/14/22 1146)  SpO2: 95 % (02/14/22 1146) Vital Signs (24h Range):  Temp:  [97.7 °F (36.5 °C)-98.3 °F (36.8 °C)] 97.7 °F (36.5 °C)  Pulse:  [72-87] 84  Resp:  [16-18] 18  SpO2:  [95 %-99 %] 95 %  BP: (103-133)/(55-72) 103/59     Weight: 63 kg (139 lb)  Body mass index is 18.85 kg/m².    Intake/Output Summary (Last 24 hours) at 2/14/2022 1342  Last data filed at 2/14/2022 1100  Gross per 24 hour   Intake 980 ml   Output 700 ml   Net 280 ml      Physical Exam  Constitutional:       General: He is not in acute distress.     Comments: Thin   HENT:      Mouth/Throat:      Comments: Edentulous  Cardiovascular:      Rate and Rhythm: Normal rate and regular rhythm.      Pulses: Normal pulses.      Heart sounds: Normal heart sounds. No murmur heard.  No gallop.    Pulmonary:      Effort: Pulmonary effort is normal.      Breath sounds: Normal breath sounds.   Abdominal:      General: Bowel sounds are normal.      Palpations: Abdomen is soft.   Skin:     General: Skin is warm and dry.   Neurological:      Mental Status: He is alert.      Comments: Not oriented to time, more conversant today.   Psychiatric:      Comments: Affect flat.         Significant Labs: All pertinent labs within the past 24 hours have been reviewed.    Significant Imaging: I have reviewed all pertinent imaging results/findings within the past 24 hours.

## 2022-02-14 NOTE — ASSESSMENT & PLAN NOTE
Unclear cause.  Spoke to [facility] and patient has had previous syncopal episodes attributed to alcohol abuse.  Has been prescribed meclizine as well.  Tox positive for cocaine but not alcohol.  CTA brain/neck was done in ED, and per ED note the results were discussed with stroke neurologist Dr. Tovar who feels that CTA finding of a short segment of left vertebral artery stenosis was incidental.  He did not think stroke workup with MRI was indicated.    Since change of status per son compared to when he saw him early during the week a CT of the brain was ordered and no abnormalities seen.  MRI of the brain showed acute stroke but patient's symptoms did not correspond to stroke findings.

## 2022-02-15 LAB
POCT GLUCOSE: 110 MG/DL (ref 70–110)
POCT GLUCOSE: 150 MG/DL (ref 70–110)
POCT GLUCOSE: 175 MG/DL (ref 70–110)
POCT GLUCOSE: 215 MG/DL (ref 70–110)

## 2022-02-15 PROCEDURE — 99233 SBSQ HOSP IP/OBS HIGH 50: CPT | Mod: ,,, | Performed by: INTERNAL MEDICINE

## 2022-02-15 PROCEDURE — 94761 N-INVAS EAR/PLS OXIMETRY MLT: CPT

## 2022-02-15 PROCEDURE — 97530 THERAPEUTIC ACTIVITIES: CPT

## 2022-02-15 PROCEDURE — 99233 PR SUBSEQUENT HOSPITAL CARE,LEVL III: ICD-10-PCS | Mod: ,,, | Performed by: INTERNAL MEDICINE

## 2022-02-15 PROCEDURE — 25000003 PHARM REV CODE 250: Performed by: HOSPITALIST

## 2022-02-15 PROCEDURE — 11000001 HC ACUTE MED/SURG PRIVATE ROOM

## 2022-02-15 PROCEDURE — 25000003 PHARM REV CODE 250: Performed by: INTERNAL MEDICINE

## 2022-02-15 PROCEDURE — 25000003 PHARM REV CODE 250: Performed by: NURSE PRACTITIONER

## 2022-02-15 PROCEDURE — 97535 SELF CARE MNGMENT TRAINING: CPT

## 2022-02-15 RX ORDER — INSULIN ASPART 100 [IU]/ML
7 INJECTION, SOLUTION INTRAVENOUS; SUBCUTANEOUS
Status: DISCONTINUED | OUTPATIENT
Start: 2022-02-16 | End: 2022-04-21

## 2022-02-15 RX ADMIN — LOSARTAN POTASSIUM 25 MG: 25 TABLET, FILM COATED ORAL at 08:02

## 2022-02-15 RX ADMIN — INSULIN ASPART 5 UNITS: 100 INJECTION, SOLUTION INTRAVENOUS; SUBCUTANEOUS at 06:02

## 2022-02-15 RX ADMIN — SODIUM CHLORIDE 250 ML: 0.9 INJECTION, SOLUTION INTRAVENOUS at 12:02

## 2022-02-15 RX ADMIN — APIXABAN 5 MG: 2.5 TABLET, FILM COATED ORAL at 08:02

## 2022-02-15 RX ADMIN — INSULIN ASPART 5 UNITS: 100 INJECTION, SOLUTION INTRAVENOUS; SUBCUTANEOUS at 08:02

## 2022-02-15 RX ADMIN — INSULIN DETEMIR 25 UNITS: 100 INJECTION, SOLUTION SUBCUTANEOUS at 08:02

## 2022-02-15 RX ADMIN — INSULIN ASPART 5 UNITS: 100 INJECTION, SOLUTION INTRAVENOUS; SUBCUTANEOUS at 01:02

## 2022-02-15 RX ADMIN — INSULIN ASPART 2 UNITS: 100 INJECTION, SOLUTION INTRAVENOUS; SUBCUTANEOUS at 01:02

## 2022-02-15 RX ADMIN — ESCITALOPRAM OXALATE 10 MG: 10 TABLET ORAL at 08:02

## 2022-02-15 RX ADMIN — TAMSULOSIN HYDROCHLORIDE 0.4 MG: 0.4 CAPSULE ORAL at 08:02

## 2022-02-15 RX ADMIN — ATORVASTATIN CALCIUM 80 MG: 20 TABLET, FILM COATED ORAL at 08:02

## 2022-02-15 NOTE — PT/OT/SLP PROGRESS
Occupational Therapy   Treatment    Name: Forest Lopez  MRN: [id number]  Admitting Diagnosis:  Acute deep vein thrombosis (DVT) of both lower extremities       Recommendations:     Discharge Recommendations: nursing facility, skilled  Discharge Equipment Recommendations:  bedside commode,shower chair,walker, rolling  Barriers to discharge:  Decreased caregiver support    Assessment:     Forest Lopez is a 72 y.o. male with a medical diagnosis of Acute deep vein thrombosis (DVT) of both lower extremities.  He presents with weakness,impaired endurance,impaired self care skills,impaired functional mobilty,gait instability,impaired balance,impaired cognition,decreased coordination,decreased safety awareness.     Rehab Prognosis:  Good; patient would benefit from acute skilled OT services to address these deficits and reach maximum level of function.       Plan:     Patient to be seen 3 x/week to address the above listed problems via self-care/home management,therapeutic activities,therapeutic exercises  · Plan of Care Expires: 03/03/22  · Plan of Care Reviewed with: patient    Subjective     Pain/Comfort:  · Pain Rating 1: 0/10    Objective:     Communicated with: RN prior to session.  Patient found HOB elevated with Condom Catheter,peripheral IV,bed alarm (condom catheter doffed upon entry, RN aware) upon OT entry to room.    General Precautions: Standard, fall   Orthopedic Precautions:N/A   Braces: N/A  Respiratory Status: Room air     Occupational Performance:     Bed Mobility:    · Supine <> sit SBA. Required extra time 2/2 decreased sequencing and coordination.    Functional Mobility/Transfers:  · Sit <> stand CGA RW. Pt required cues for hand placement and safe positioning of RW.   · Functional Mobility: Pt required CGA and RW for functional ambulation to sink and back. After grooming at sink, pt stood at the window for approx 5 minutes. Pt reported enjoyment of looking outside and participated in therapeutic  conversation about meaningful life events.     Activities of Daily Living:  · Grooming: CGA in standing at sink.     [facility] 6 Click ADL: 16    Treatment & Education:  Pt required cues throughout for safety and safe mobility techniques. Pt slightly impulsive and attempted to standing without RW from EOB. Pt verbalized understanding of safety precautions but required cues throughout for adherence.     OT role, plan of care, progression of goals, ADL/self care retraining, safe mobility techniques      Patient left HOB elevated with all lines intact, bed alarm on and RN notifiedEducation:      GOALS:   Multidisciplinary Problems     Occupational Therapy Goals        Problem: Occupational Therapy Goal    Goal Priority Disciplines Outcome Interventions   Occupational Therapy Goal     OT, PT/OT Ongoing, Progressing    Description: Goals to be met by: 2/15/2022    Patient will increase functional independence with ADLs by performing:    UE Dressing with [place].  LE Dressing with [place].  Grooming while standing at sink with Supervision.  Toileting from toilet with Supervision for hygiene and clothing management.   Toilet transfer to toilet with Supervision.                     Time Tracking:     OT Date of Treatment: 02/15/22  OT Start Time: 1049  OT Stop Time: 1112  OT Total Time (min): 23 min    Billable Minutes:Self Care/Home Management 15 minutes  Therapeutic Activity 8 minutes    OT/DUTCH: OT          2/15/2022

## 2022-02-15 NOTE — PLAN OF CARE
Problem: Occupational Therapy Goal  Goal: Occupational Therapy Goal  Description: Goals to be met by: 2/15/2022    Dc rec: SNF. Pt progressing well, slightly impulsive and requires cues for safety.     Patient will increase functional independence with ADLs by performing:    UE Dressing with [place].  LE Dressing with [place].  Grooming while standing at sink with Supervision.  Toileting from toilet with Supervision for hygiene and clothing management.   Toilet transfer to toilet with Supervision.    Outcome: Ongoing, Progressing

## 2022-02-15 NOTE — PLAN OF CARE
Upon hourly rounding, noticed pt to be more lethargic and arousing to voice but falling back asleep shortly after. VSS on RA, slightly hypotensive (95/62). . Admin 250 cc bolus per MD. Pt now awake, back to baseline.

## 2022-02-15 NOTE — PLAN OF CARE
POC reviewed w/ pt. AAOx4. VSS on RA. No complaints of pain. Turn Q2/frequent weight shift encouraged. Instructed to call for help ambulating. No injuries, falls, or trauma occurred during shift. Purposeful rounding completed. Bed alarm set, bed low and locked, side rails up x3, call light within reach. AVASYS in place for fall/harm prevention.

## 2022-02-16 ENCOUNTER — PATIENT OUTREACH (OUTPATIENT)
Dept: ADMINISTRATIVE | Facility: OTHER | Age: 72
End: 2022-02-16
Payer: MEDICARE

## 2022-02-16 LAB
ERYTHROCYTE [DISTWIDTH] IN BLOOD BY AUTOMATED COUNT: 12.4 % (ref 11.5–14.5)
HCT VFR BLD AUTO: 37.8 % (ref 40–54)
HGB BLD-MCNC: 12 G/DL (ref 14–18)
MCH RBC QN AUTO: 25.2 PG (ref 27–31)
MCHC RBC AUTO-ENTMCNC: 31.7 G/DL (ref 32–36)
MCV RBC AUTO: 79 FL (ref 82–98)
PLATELET # BLD AUTO: 290 K/UL (ref 150–450)
PMV BLD AUTO: 9.6 FL (ref 9.2–12.9)
POCT GLUCOSE: 127 MG/DL (ref 70–110)
POCT GLUCOSE: 146 MG/DL (ref 70–110)
POCT GLUCOSE: 158 MG/DL (ref 70–110)
RBC # BLD AUTO: 4.76 M/UL (ref 4.6–6.2)
WBC # BLD AUTO: 10.81 K/UL (ref 3.9–12.7)

## 2022-02-16 PROCEDURE — 25000003 PHARM REV CODE 250: Performed by: HOSPITALIST

## 2022-02-16 PROCEDURE — 92507 TX SP LANG VOICE COMM INDIV: CPT

## 2022-02-16 PROCEDURE — 97530 THERAPEUTIC ACTIVITIES: CPT

## 2022-02-16 PROCEDURE — 97110 THERAPEUTIC EXERCISES: CPT

## 2022-02-16 PROCEDURE — 99233 SBSQ HOSP IP/OBS HIGH 50: CPT | Mod: ,,, | Performed by: INTERNAL MEDICINE

## 2022-02-16 PROCEDURE — 99233 PR SUBSEQUENT HOSPITAL CARE,LEVL III: ICD-10-PCS | Mod: ,,, | Performed by: INTERNAL MEDICINE

## 2022-02-16 PROCEDURE — 85027 COMPLETE CBC AUTOMATED: CPT | Performed by: INTERNAL MEDICINE

## 2022-02-16 PROCEDURE — 11000001 HC ACUTE MED/SURG PRIVATE ROOM

## 2022-02-16 PROCEDURE — 36415 COLL VENOUS BLD VENIPUNCTURE: CPT | Performed by: INTERNAL MEDICINE

## 2022-02-16 PROCEDURE — 25000003 PHARM REV CODE 250: Performed by: NURSE PRACTITIONER

## 2022-02-16 PROCEDURE — 94761 N-INVAS EAR/PLS OXIMETRY MLT: CPT

## 2022-02-16 RX ADMIN — INSULIN ASPART 7 UNITS: 100 INJECTION, SOLUTION INTRAVENOUS; SUBCUTANEOUS at 04:02

## 2022-02-16 RX ADMIN — INSULIN ASPART 7 UNITS: 100 INJECTION, SOLUTION INTRAVENOUS; SUBCUTANEOUS at 11:02

## 2022-02-16 RX ADMIN — APIXABAN 5 MG: 2.5 TABLET, FILM COATED ORAL at 08:02

## 2022-02-16 RX ADMIN — INSULIN DETEMIR 25 UNITS: 100 INJECTION, SOLUTION SUBCUTANEOUS at 08:02

## 2022-02-16 RX ADMIN — ATORVASTATIN CALCIUM 80 MG: 20 TABLET, FILM COATED ORAL at 08:02

## 2022-02-16 RX ADMIN — TAMSULOSIN HYDROCHLORIDE 0.4 MG: 0.4 CAPSULE ORAL at 08:02

## 2022-02-16 RX ADMIN — INSULIN ASPART 7 UNITS: 100 INJECTION, SOLUTION INTRAVENOUS; SUBCUTANEOUS at 07:02

## 2022-02-16 RX ADMIN — ESCITALOPRAM OXALATE 10 MG: 10 TABLET ORAL at 08:02

## 2022-02-16 NOTE — PROGRESS NOTES
[facility] Medicine  Progress Note    Patient Name: Forest Lopez  MRN: [id number]  Patient Class: IP- Inpatient   Admission Date: 1/31/2022  Length of Stay: 16 days  Attending Physician: Alma Elizalde MD  Primary Care Provider: Julian Escobar        Subjective:     Principal Problem:Acute deep vein thrombosis (DVT) of both lower extremities        HPI:  Mr. Lopez is a 72 year old man who presented after a syncopal episode.  He reports he had been feeling well prior to the episode but then had a prodrome prior to passing out.  EMS was called, report patient's BP was low normal on their arrival, improved after an IV fluids bolus.  Patient denies chest pain or shortness of breath and says he does not feel weak currently although is rather tired.  When seen in the ED this morning he could barely express himself audibly.     Vital signs were normal on presentation here.  He is on warfarin for recurrent DVT, but his INR was 1, and d-dimer markedly elevated at 21.7.  Tox screen was positive for cocaine.  He had a CTA of the brain and neck done on presentation so CTA of the chest for PE study could not be done for 48 hours.  Ultrasound of the lower extremities showed extensive bilateral DVT.  On the right there was thrombosis of the common femoral vein, femoral vein, popliteal vein, one of the paired posterior tibial veins and both visualized peroneal veins.  On the left there was thrombosis of the common femoral vein, femoral vein and popliteal vein.  Patient was started on full dose Lovenox and admitted.    Medical history is from the chart as he is unable to give me much information.  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  He smokes 5-6 cigarettes/day and is not interested in quitting.  He is currently homeless and staying with a friend.  Despite  the normal INR he is sure he is taking his warfarin and is not having difficulty taking his medications.  I discussed his care with [facility] staff on the [facility] and patient had been lost to follow up since November 2021.      Overview/Hospital Course:  Patient presented to the ED after a syncopal episode and was found to have bilateral lower extremity DVTs and a low INR.  Tox screen was positive for cocaine.  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  He was started back on his warfarin with bridging Lovenox.  His 2D echo showed grade I diastolic dysfunction.  Neurology evaluated him and recommended echo with bubble study, which was negative for a shunt.    PT/OT evaluated him and recommended SNF placement as he was not participating in therapy.  His INR was difficult to get therapeutic, and as he had no contraindication to a NOAC his warfarin was changed to apixaban, and the full dose Lovenox was discontinued.  As he appeared to be depressed and had no objection to starting an antidepressant Lexapro was started.  He had a fall in the hospital on 2/13, had gone to the bathroom for a BM with the nurse, and when she turned away while he was washing his hands he apparently lost his balance and fell.  He did not hit his head and did not lose consciousness, but his BP was low transiently and he was given a bolus of IV fluids.    Patient's mental status improved slowly, but he gradually became more talkative and was able to hold a brief conversation.      Interval History:  Mood better today, talked little more.eating ok.    Review of Systems   Constitutional: Positive for fatigue. Negative for chills and fever.   Respiratory: Negative for cough and shortness of breath.    Cardiovascular: Negative for chest pain and palpitations.     Objective:     Vital Signs (Most Recent):  Temp: 98.5 °F (36.9 °C) (02/16/22 1136)  Pulse: 93  (02/16/22 1136)  Resp: 16 (02/16/22 1136)  BP: 109/63 (02/16/22 1136)  SpO2: 96 % (02/16/22 1136) Vital Signs (24h Range):  Temp:  [97.8 °F (36.6 °C)-99 °F (37.2 °C)] 98.5 °F (36.9 °C)  Pulse:  [78-94] 93  Resp:  [16-18] 16  SpO2:  [95 %-99 %] 96 %  BP: (106-121)/(63-75) 109/63     Weight: 63 kg (139 lb)  Body mass index is 18.85 kg/m².    Intake/Output Summary (Last 24 hours) at 2/16/2022 1526  Last data filed at 2/16/2022 1452  Gross per 24 hour   Intake 600 ml   Output 1450 ml   Net -850 ml      Physical Exam  Constitutional:       General: He is not in acute distress.     Comments: Thin   HENT:      Mouth/Throat:      Comments: Edentulous  Cardiovascular:      Rate and Rhythm: Normal rate and regular rhythm.      Pulses: Normal pulses.      Heart sounds: Normal heart sounds. No murmur heard.  No gallop.    Pulmonary:      Effort: Pulmonary effort is normal.      Breath sounds: Normal breath sounds.   Abdominal:      General: Bowel sounds are normal.      Palpations: Abdomen is soft.   Skin:     General: Skin is warm and dry.   Neurological:      Mental Status: He is alert.      Comments: Not oriented to time.   Psychiatric:      Comments: Affect flat.         Significant Labs: All pertinent labs within the past 24 hours have been reviewed.    Significant Imaging: I have reviewed all pertinent imaging results/findings within the past 24 hours.      Assessment/Plan:      * Acute deep vein thrombosis (DVT) of both lower extremities  Last ultrasound showed partially occlusive DVTs, now completely occlusive DVT of multiple lower extremity veins on ultrasound.  Patient reports compliance with warfarin but his INR is only one.  He is homeless but says he does not have difficulty obtaining his medications.  Does not seem to care about anything.  D-dimer was markedly elevated on presentation and there was a question of possible PE, but he had a CTA of the brain/neck on presentation so could not get another CTA for  another 48 hours.    V/Q scan was done, showed low probability for PE.  2D echo showed grade I diastolic dysfunction.  Warfarin was restarted with bridging Lovenox, but INR was still low.  Given the difficulty of getting him to follow up changed to apixaban and discontinued Lovenox.    Abnormal imaging of thyroid  Tsh/t4 ok, thyroid ultrasound done, showed bilateral nodules which did not meet criteria for biopsy.    Acute stroke due to ischemia  - MRI showed areas of diffusion signal hyperintensity consistent with areas of acute ischemia/infarct involving the left cerebral hemisphere, the most prominent measuring approximately 1.4 cm, also a small focus of the right frontal lobe.  - Patient on full dose anticoagulation  - Sinus rhythm noted throughout hospital stay  - Risk factor modification - control diabetes, continue statin.  - RPR, HIV non reactive.  B12 low normal.  B1 pending.  - CTA showed a focal segment of 60-70% stenosis involving the proximal to mid left vertebral artery that was new when compared to the prior study of 09/25/2019.  No evidence of large vessel intracranial occlusion.   - Neurology noted symptoms do not correspond to stroke location.  - Echo with bubble study done, no shunt seen.  - Follow up with vascular neurology in 4 weeks.  -Therapy recommending SNF due to his low interest in participation.  - Started Lexapro due to suspicion for depression.      Advance care planning        Severe protein-calorie malnutrition  Diet as tolerated      Debility  Pt/ot efforts  Needs placement skilled    Type 2 diabetes mellitus with hyperglycemia, without long-term current use of insulin  Reportedly he is on metformin and glipizide, both held.  Continue SSI for now.  He has 4+ sugar in urine and HbA1c is 10.3, and he is hyperglycemic here.  Will start levemir and continue ISS, increase levemir to 25 units daily  Add scheduled novolog 5 units tid, increase to 7 units  Improving  Resume metformin in  SNF  Sugars ok    Syncope and collapse  Unclear cause.  Spoke to [facility] and patient has had previous syncopal episodes attributed to alcohol abuse.  Has been prescribed meclizine as well.  Tox positive for cocaine but not alcohol.  CTA brain/neck was done in ED, and per ED note the results were discussed with stroke neurologist Dr. Tovar who feels that CTA finding of a short segment of left vertebral artery stenosis was incidental.  He did not think stroke workup with MRI was indicated.    Since change of status per son compared to when he saw him early during the week a CT of the brain was ordered and no abnormalities seen.  MRI of the brain showed acute stroke .      Cocaine abuse  As noted above.  He has not had any behavioral issues while here, and other than being disinterested in therapy he has been very cooperative.  Seems drug use was transient.      Hyperlipidemia  Resumed lipitor      Essential hypertension  Doubtful he was on anything at home.  Started losartan 25 mg daily  BP well controlled on low dose losartan.  BP transiently low today after he fell but recovered quickly.  IVF bolus given.  Will hold losartan as bp low again  bp ok without med    Tobacco dependence  He smokes 5-6 cigarettes/day.  Not trying to quit and not interested in a nicotine patch.  Benefits of smoking cessation were reviewed with patient in detail for for 8 minutes and patient was encouraged to quit. Nicotine replacement options were discussed.      VTE Risk Mitigation (From admission, onward)         Ordered     apixaban tablet 5 mg  2 times daily         02/10/22 0901     Place sequential compression device  Until discontinued         01/31/22 0533                Discharge Planning   EFRAÍN: 2/15/2022     Code Status: Full Code   Is the patient medically ready for discharge?:     Reason for patient still in hospital (select all that apply): Pending disposition  Discharge Plan A: Skilled Nursing Facility   Discharge Delays: (!)  Post-Acute Set-up              Alma Elizalde MD  Department of Hospital Medicine   [facility] ([place])

## 2022-02-16 NOTE — ASSESSMENT & PLAN NOTE
Reportedly he is on metformin and glipizide, both held.  Continue SSI for now.  He has 4+ sugar in urine and HbA1c is 10.3, and he is hyperglycemic here.  Will start levemir and continue ISS, increase levemir to 25 units daily  Add scheduled novolog 5 units tid, increase to 7 units  Improving  Resume metformin in SNF  Sugars ok

## 2022-02-16 NOTE — SUBJECTIVE & OBJECTIVE
Interval History:  Answers questions, bp lower in afternoon, had bm today, eating ok.    Review of Systems   Constitutional: Positive for fatigue. Negative for chills and fever.   Respiratory: Negative for cough and shortness of breath.    Cardiovascular: Negative for chest pain and palpitations.     Objective:     Vital Signs (Most Recent):  Temp: 98.1 °F (36.7 °C) (02/15/22 1906)  Pulse: 85 (02/15/22 1906)  Resp: 18 (02/15/22 1906)  BP: 119/63 (02/15/22 1906)  SpO2: 98 % (02/15/22 1906) Vital Signs (24h Range):  Temp:  [97.7 °F (36.5 °C)-98.3 °F (36.8 °C)] 98.1 °F (36.7 °C)  Pulse:  [] 85  Resp:  [18] 18  SpO2:  [96 %-99 %] 98 %  BP: ()/(61-68) 119/63     Weight: 63 kg (139 lb)  Body mass index is 18.85 kg/m².    Intake/Output Summary (Last 24 hours) at 2/15/2022 2019  Last data filed at 2/15/2022 1800  Gross per 24 hour   Intake 720 ml   Output 550 ml   Net 170 ml      Physical Exam  Constitutional:       General: He is not in acute distress.     Comments: Thin   HENT:      Mouth/Throat:      Comments: Edentulous  Cardiovascular:      Rate and Rhythm: Normal rate and regular rhythm.      Pulses: Normal pulses.      Heart sounds: Normal heart sounds. No murmur heard.  No gallop.    Pulmonary:      Effort: Pulmonary effort is normal.      Breath sounds: Normal breath sounds.   Abdominal:      General: Bowel sounds are normal.      Palpations: Abdomen is soft.   Skin:     General: Skin is warm and dry.   Neurological:      Mental Status: He is alert.      Comments: Not oriented to time.   Psychiatric:      Comments: Affect flat.         Significant Labs: All pertinent labs within the past 24 hours have been reviewed.    Significant Imaging: I have reviewed all pertinent imaging results/findings within the past 24 hours.

## 2022-02-16 NOTE — PROGRESS NOTES
[facility]  Adult Nutrition  Progress Note    SUMMARY       Recommendations    1.Continue DM/cardiac diet with coumadin restriction diet.   2.Encourage good PO intake as needed.    -if PO intake decreased Recommend Boost Plus BID.    Goals: Pt to meet % EEN/EPN via PO intake by RD f/u.  Nutrition Goal Status: progressing towards goal  Communication of RD Recs:  (POC)    Assessment and Plan    No nutrition dx at this time. Will continue to monitor.      Reason for Assessment    Reason For Assessment: RD follow-up  Diagnosis:  (acute DVT)  Relevant Medical History: HTN, HLD, T2DM, BPH  Interdisciplinary Rounds: attended  General Information Comments: 2/16- Mental status improving. Had BM. Eating ok per notes. On [facility] soft/ consistent carb diet. PO intake 100% per notes. Not talking much today. Reports good appetite. 100% PO intake for lunch at bedside. Will continue to monitor.    Nutrition Discharge Planning: Pt to follow a cardiac/ DM diet(vit K restriction as needed) as tolerated.    Nutrition Risk Screen    Nutrition Risk Screen: no indicators present    Nutrition/Diet History    Spiritual, Cultural Beliefs, Adventism Practices, Values that Affect Care:  (None stated.)    Anthropometrics    Temp: 98.5 °F (36.9 °C)  Height: 6' (182.9 cm)  Height (inches): 72 in  Weight Method: Bed Scale  Weight: 63 kg (139 lb)  Weight (lb): 139 lb  Ideal Body Weight (IBW), Male: 178 lb  % Ideal Body Weight, Male (lb): 78.09 %  BMI (Calculated): 18.8  BMI Grade: 18.5-24.9 - normal     Lab/Procedures/Meds    Pertinent Labs Reviewed: reviewed  Pertinent Labs Comments: H/H 12.0/37.8  Pertinent Medications Reviewed: reviewed  Pertinent Medications Comments: apixaban, atorvastatin, escitalopram, insulin aspart, insulin detemir, tamsulosin    Estimated/Assessed Needs    Weight Used For Calorie Calculations: 63 kg (138 lb 14.2 oz)  Energy Calorie Requirements (kcal): 1980 kcal day (MSJ X AF 1.4)  Energy Need Method:  [place]  Protein Requirements: 50-63 g day (0.8-1.0 g/kg)  Weight Used For Protein Calculations: 63 kg (138 lb 14.2 oz)  Estimated Fluid Requirement Method: RDA Method  RDA Method (mL): 1980  CHO Requirement: 248 g day    Nutrition Prescription Ordered    Current Diet Order: [facility] soft consistent carb    Evaluation of Received Nutrient/Fluid Intake    Energy Calories Required: meeting needs  Protein Required: meeting needs  Fluid Required: meeting needs  % Intake of Estimated Energy Needs: 75 - 100 %  % Meal Intake: 75 - 100 %    Nutrition Risk    Level of Risk/Frequency of Follow-up:  (1 x weekly)     Monitor and Evaluation    Food and Nutrient Intake: energy intake,food and beverage intake  Food and Nutrient Adminstration: diet order  Knowledge/Beliefs/Attitudes: food and nutrition knowledge/skill  Physical Activity and Function: nutrition-related ADLs and IADLs  Anthropometric Measurements: weight,weight change,body mass index  Biochemical Data, Medical Tests and Procedures: glucose/endocrine profile,gastrointestinal profile,electrolyte and renal panel,inflammatory profile,lipid profile  Nutrition-Focused Physical Findings: overall appearance     Nutrition Follow-Up    RD Follow-up?: Yes

## 2022-02-16 NOTE — PROGRESS NOTES
[facility], [place], operated by [facility]  Progress Note    Patient Name: Forest Lopez  MRN: [id number]  Patient Class: IP- Inpatient   Admission Date: 1/31/2022  Length of Stay: 15 days  Attending Physician: Alma Elizalde MD  Primary Care Provider: Julian Escobar        Subjective:     Principal Problem:Acute deep vein thrombosis (DVT) of both lower extremities        HPI:  Mr. Lopez is a 72 year old man who presented after a syncopal episode.  He reports he had been feeling well prior to the episode but then had a prodrome prior to passing out.  EMS was called, report patient's BP was low normal on their arrival, improved after an IV fluids bolus.  Patient denies chest pain or shortness of breath and says he does not feel weak currently although is rather tired.  When seen in the ED this morning he could barely express himself audibly.     Vital signs were normal on presentation here.  He is on warfarin for recurrent DVT, but his INR was 1, and d-dimer markedly elevated at 21.7.  Tox screen was positive for cocaine.  He had a CTA of the brain and neck done on presentation so CTA of the chest for PE study could not be done for 48 hours.  Ultrasound of the lower extremities showed extensive bilateral DVT.  On the right there was thrombosis of the common femoral vein, femoral vein, popliteal vein, one of the paired posterior tibial veins and both visualized peroneal veins.  On the left there was thrombosis of the common femoral vein, femoral vein and popliteal vein.  Patient was started on full dose Lovenox and admitted.    Medical history is from the chart as he is unable to give me much information.  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  He smokes 5-6 cigarettes/day and is not interested in quitting.  He is currently homeless and staying with a friend.  Despite  the normal INR he is sure he is taking his warfarin and is not having difficulty taking his medications.  I discussed his care with [facility] staff on the [facility] and patient had been lost to follow up since November 2021.      Overview/Hospital Course:  Patient presented to the ED after a syncopal episode and was found to have bilateral lower extremity DVTs and a low INR.  Tox screen was positive for cocaine.  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  He was started back on his warfarin with bridging Lovenox.  His 2D echo showed grade I diastolic dysfunction.  Neurology evaluated him and recommended echo with bubble study, which was negative for a shunt.    PT/OT evaluated him and recommended SNF placement as he was not participating in therapy.  His INR was difficult to get therapeutic, and as he had no contraindication to a NOAC his warfarin was changed to apixaban, and the full dose Lovenox was discontinued.  As he appeared to be depressed and had no objection to starting an antidepressant Lexapro was started.  He had a fall in the hospital on 2/13, had gone to the bathroom for a BM with the nurse, and when she turned away while he was washing his hands he apparently lost his balance and fell.  He did not hit his head and did not lose consciousness, but his BP was low transiently and he was given a bolus of IV fluids.    Patient's mental status improved slowly, but he gradually became more talkative and was able to hold a brief conversation.      Interval History:  Answers questions, bp lower in afternoon, had bm today, eating ok.    Review of Systems   Constitutional: Positive for fatigue. Negative for chills and fever.   Respiratory: Negative for cough and shortness of breath.    Cardiovascular: Negative for chest pain and palpitations.     Objective:     Vital Signs (Most Recent):  Temp: 98.1 °F (36.7 °C) (02/15/22  1906)  Pulse: 85 (02/15/22 1906)  Resp: 18 (02/15/22 1906)  BP: 119/63 (02/15/22 1906)  SpO2: 98 % (02/15/22 1906) Vital Signs (24h Range):  Temp:  [97.7 °F (36.5 °C)-98.3 °F (36.8 °C)] 98.1 °F (36.7 °C)  Pulse:  [] 85  Resp:  [18] 18  SpO2:  [96 %-99 %] 98 %  BP: ()/(61-68) 119/63     Weight: 63 kg (139 lb)  Body mass index is 18.85 kg/m².    Intake/Output Summary (Last 24 hours) at 2/15/2022 2019  Last data filed at 2/15/2022 1800  Gross per 24 hour   Intake 720 ml   Output 550 ml   Net 170 ml      Physical Exam  Constitutional:       General: He is not in acute distress.     Comments: Thin   HENT:      Mouth/Throat:      Comments: Edentulous  Cardiovascular:      Rate and Rhythm: Normal rate and regular rhythm.      Pulses: Normal pulses.      Heart sounds: Normal heart sounds. No murmur heard.  No gallop.    Pulmonary:      Effort: Pulmonary effort is normal.      Breath sounds: Normal breath sounds.   Abdominal:      General: Bowel sounds are normal.      Palpations: Abdomen is soft.   Skin:     General: Skin is warm and dry.   Neurological:      Mental Status: He is alert.      Comments: Not oriented to time.   Psychiatric:      Comments: Affect flat.         Significant Labs: All pertinent labs within the past 24 hours have been reviewed.    Significant Imaging: I have reviewed all pertinent imaging results/findings within the past 24 hours.      Assessment/Plan:      * Acute deep vein thrombosis (DVT) of both lower extremities  Last ultrasound showed partially occlusive DVTs, now completely occlusive DVT of multiple lower extremity veins on ultrasound.  Patient reports compliance with warfarin but his INR is only one.  He is homeless but says he does not have difficulty obtaining his medications.  Does not seem to care about anything.  D-dimer was markedly elevated on presentation and there was a question of possible PE, but he had a CTA of the brain/neck on presentation so could not get another  CTA for another 48 hours.    V/Q scan was done, showed low probability for PE.  2D echo showed grade I diastolic dysfunction.  Warfarin was restarted with bridging Lovenox, but INR was still low.  Given the difficulty of getting him to follow up changed to apixaban and discontinued Lovenox.    Abnormal imaging of thyroid  Tsh/t4 ok, thyroid ultrasound done, showed bilateral nodules which did not meet criteria for biopsy.    Acute stroke due to ischemia  - MRI showed areas of diffusion signal hyperintensity consistent with areas of acute ischemia/infarct involving the left cerebral hemisphere, the most prominent measuring approximately 1.4 cm, also a small focus of the right frontal lobe.  - Patient on full dose anticoagulation  - Sinus rhythm noted throughout hospital stay  - Risk factor modification - control diabetes, continue statin.  - RPR, HIV non reactive.  B12 low normal.  B1 pending.  - CTA showed a focal segment of 60-70% stenosis involving the proximal to mid left vertebral artery that was new when compared to the prior study of 09/25/2019.  No evidence of large vessel intracranial occlusion.   - Neurology noted symptoms do not correspond to stroke location.  - Echo with bubble study done, no shunt seen.  - Follow up with vascular neurology in 4 weeks.  -Therapy recommending SNF due to his low interest in participation.  - Started Lexapro due to suspicion for depression.      Advance care planning        Severe protein-calorie malnutrition  Diet as tolerated      Debility  Pt/ot efforts  Needs placement skilled    Type 2 diabetes mellitus with hyperglycemia, without long-term current use of insulin  Reportedly he is on metformin and glipizide, both held.  Continue SSI for now.  He has 4+ sugar in urine and HbA1c is 10.3, and he is hyperglycemic here.  Will start levemir and continue ISS, increase levemir to 25 units daily  Add scheduled novolog 5 units tid, increase to 7 units  Improving  Resume metformin  in SNF    Syncope and collapse  Unclear cause.  Spoke to [facility] and patient has had previous syncopal episodes attributed to alcohol abuse.  Has been prescribed meclizine as well.  Tox positive for cocaine but not alcohol.  CTA brain/neck was done in ED, and per ED note the results were discussed with stroke neurologist Dr. Tovar who feels that CTA finding of a short segment of left vertebral artery stenosis was incidental.  He did not think stroke workup with MRI was indicated.    Since change of status per son compared to when he saw him early during the week a CT of the brain was ordered and no abnormalities seen.  MRI of the brain showed acute stroke .      Cocaine abuse  As noted above.  He has not had any behavioral issues while here, and other than being disinterested in therapy he has been very cooperative.  Seems drug use was transient.      Hyperlipidemia  Resumed lipitor      Essential hypertension  Doubtful he was on anything at home.  Started losartan 25 mg daily  BP well controlled on low dose losartan.  BP transiently low today after he fell but recovered quickly.  IVF bolus given.  Will hold losartan as bp low again    Tobacco dependence  He smokes 5-6 cigarettes/day.  Not trying to quit and not interested in a nicotine patch.  Benefits of smoking cessation were reviewed with patient in detail for for 8 minutes and patient was encouraged to quit. Nicotine replacement options were discussed.        VTE Risk Mitigation (From admission, onward)         Ordered     apixaban tablet 5 mg  2 times daily         02/10/22 0901     Place sequential compression device  Until discontinued         01/31/22 0533                Discharge Planning   EFRAÍN: 2/15/2022     Code Status: Full Code   Is the patient medically ready for discharge?:     Reason for patient still in hospital (select all that apply): Patient trending condition and Treatment  Discharge Plan A: Skilled Nursing Facility   Discharge Delays: (!)  Post-Acute Set-up              Alma Elizalde MD  Department of Hospital Medicine   [facility] ([facility]

## 2022-02-16 NOTE — ASSESSMENT & PLAN NOTE
Doubtful he was on anything at home.  Started losartan 25 mg daily  BP well controlled on low dose losartan.  BP transiently low today after he fell but recovered quickly.  IVF bolus given.  Will hold losartan as bp low again

## 2022-02-16 NOTE — ASSESSMENT & PLAN NOTE
Doubtful he was on anything at home.  Started losartan 25 mg daily  BP well controlled on low dose losartan.  BP transiently low today after he fell but recovered quickly.  IVF bolus given.  Will hold losartan as bp low again  bp ok without med

## 2022-02-16 NOTE — PLAN OF CARE
Recommendations    1.Continue DM/cardiac diet with coumadin restriction diet.   2.Encourage good PO intake as needed.    -if PO intake decreased Recommend Boost Plus BID.    Goals: Pt to meet % EEN/EPN via PO intake by RD f/u.  Nutrition Goal Status: progressing towards goal  Communication of RD Recs:  (POC)

## 2022-02-16 NOTE — PLAN OF CARE
AAOx4. POC reviewed. No significant events this shift. No complaints of pain. Pt repositions himself in bed independently and is up with assist. Voids using external catheter and is bowel incontinent. Bed low and locked, side rails up x3, bed alarm set, [employer] camera at bedside, call light within reach.    Problem: Adult Inpatient Plan of Care  Goal: Plan of Care Review  Outcome: Ongoing, Progressing  Goal: Patient-Specific Goal (Individualized)  Outcome: Ongoing, Progressing  Goal: Absence of Hospital-Acquired Illness or Injury  Outcome: Ongoing, Progressing  Goal: Optimal Comfort and Wellbeing  Outcome: Ongoing, Progressing  Goal: Readiness for Transition of Care  Outcome: Ongoing, Progressing     Problem: Diabetes Comorbidity  Goal: Blood Glucose Level Within Targeted Range  Outcome: Ongoing, Progressing     Problem: Skin Injury Risk Increased  Goal: Skin Health and Integrity  Outcome: Ongoing, Progressing     Problem: Coping Ineffective  Goal: Effective Coping  Outcome: Ongoing, Progressing     Problem: Fall Injury Risk  Goal: Absence of Fall and Fall-Related Injury  Outcome: Ongoing, Progressing

## 2022-02-16 NOTE — PT/OT/SLP PROGRESS
Occupational Therapy   Treatment    Name: Forest Lopez  MRN: [id number]  Admitting Diagnosis:  Acute deep vein thrombosis (DVT) of both lower extremities       Recommendations:     Discharge Recommendations: nursing facility, skilled  Discharge Equipment Recommendations:  bedside commode,shower chair,walker, rolling  Barriers to discharge:  Decreased caregiver support (current functional level)    Assessment:   Consistent cuing needed for safe hand placement during transitional movements as well as cuing for upright trunk with good follow through though also requiring cues for relaxed shoulders with little follow through.  Pt. Requiring increased encouragement with shoulder flex with target needed to encourage increased ROM especially at LUE.  Pt. Reports wanting to remain seated in bedside chair with chair alarm placed and RN made aware.  Progressing towards goals.  Continued recommendation of post acute OT in SNF.  To benefit from continued acute care OT services to increase independence in self-care/functional transfers.  Continue POC.     Forest Lopez is a 72 y.o. male with a medical diagnosis of Acute deep vein thrombosis (DVT) of both lower extremities.  He presents with below deficits decreasing independence in self-care/functional transfers. Performance deficits affecting function are weakness,impaired endurance,impaired self care skills,impaired functional mobilty,gait instability,impaired balance,decreased safety awareness,decreased lower extremity function,decreased upper extremity function,decreased coordination.     Rehab Prognosis:  Good; patient would benefit from acute skilled OT services to address these deficits and reach maximum level of function.       Plan:     Patient to be seen 3 x/week to address the above listed problems via self-care/home management,therapeutic activities,therapeutic exercises  · Plan of Care Expires: 03/03/22  · Plan of Care Reviewed with: patient    Subjective      Pain/Comfort:  · Pain Rating 1: 0/10  · Pain Rating Post-Intervention 1: 0/10    Objective:     Communicated with: Kaela KU LPN prior to session.  Patient found right sidelying with peripheral IV,bed alarm,Condom Catheter (AVASYS/telesitter) upon OT entry to room.    General Precautions: Standard, fall   Orthopedic Precautions:N/A   Braces: N/A  Respiratory Status: Room air     Occupational Performance:     Bed Mobility:    Supine>sit with SBA and HOB slightly elevated.     Functional Mobility/Transfers:  Sit>stand from EOB with RW in place and SBA with MIN verbal cuing for safe hand placement during transition.  Step transfer bed>bedside chair with RW and SBA again with verbal cue needed for safe hand placement.     Activities of Daily Living:  Condom catheter in place this day to void.        [facility] 6 Click ADL: 16    Treatment & Education:  Sit<>stand bedside chair<>RW X 5 with SBA and consistent cuing for safe hand placement during transitional movements and upright trunk once in stance.  Also tolerated static stance with BUE supported at RW with SBA while chair alarm was being placed.  All for improving BUE/BLE strength, endurance, and GM coordination needed to increase independence in ADL.  Participated in BUE exercise for shoulder flex/ext and forward punches X 5 each with initial instruction and increased cuing for proper form throughout with target in place to encourage increased ROM; for improving strength needed to increase AD independence.      Patient left up in chair with all lines intact, call button in reach, bed alarm on, nursing notified and AVASYS/telesitter Education:      GOALS:   Multidisciplinary Problems     Occupational Therapy Goals        Problem: Occupational Therapy Goal    Goal Priority Disciplines Outcome Interventions   Occupational Therapy Goal     OT, PT/OT Ongoing, Progressing    Description: Goals to be met by: 2/15/2022    Patient will increase functional independence with  ADLs by performing:    UE Dressing with Rains.  LE Dressing with Rains.  Grooming while standing at sink with Supervision.  Toileting from toilet with Supervision for hygiene and clothing management.   Toilet transfer to toilet with Supervision.                     Time Tracking:     OT Date of Treatment: 02/16/22  OT Start Time: 1312  OT Stop Time: 1344  OT Total Time (min): 32 min    Billable Minutes:Therapeutic Activity 20  Therapeutic Exercise 12    OT/DUTCH: OT          2/16/2022

## 2022-02-16 NOTE — ASSESSMENT & PLAN NOTE
Reportedly he is on metformin and glipizide, both held.  Continue SSI for now.  He has 4+ sugar in urine and HbA1c is 10.3, and he is hyperglycemic here.  Will start levemir and continue ISS, increase levemir to 25 units daily  Add scheduled novolog 5 units tid, increase to 7 units  Improving  Resume metformin in SNF

## 2022-02-16 NOTE — SUBJECTIVE & OBJECTIVE
Interval History:  Mood better today, talked little more.eating ok.    Review of Systems   Constitutional: Positive for fatigue. Negative for chills and fever.   Respiratory: Negative for cough and shortness of breath.    Cardiovascular: Negative for chest pain and palpitations.     Objective:     Vital Signs (Most Recent):  Temp: 98.5 °F (36.9 °C) (02/16/22 1136)  Pulse: 93 (02/16/22 1136)  Resp: 16 (02/16/22 1136)  BP: 109/63 (02/16/22 1136)  SpO2: 96 % (02/16/22 1136) Vital Signs (24h Range):  Temp:  [97.8 °F (36.6 °C)-99 °F (37.2 °C)] 98.5 °F (36.9 °C)  Pulse:  [78-94] 93  Resp:  [16-18] 16  SpO2:  [95 %-99 %] 96 %  BP: (106-121)/(63-75) 109/63     Weight: 63 kg (139 lb)  Body mass index is 18.85 kg/m².    Intake/Output Summary (Last 24 hours) at 2/16/2022 1526  Last data filed at 2/16/2022 1452  Gross per 24 hour   Intake 600 ml   Output 1450 ml   Net -850 ml      Physical Exam  Constitutional:       General: He is not in acute distress.     Comments: Thin   HENT:      Mouth/Throat:      Comments: Edentulous  Cardiovascular:      Rate and Rhythm: Normal rate and regular rhythm.      Pulses: Normal pulses.      Heart sounds: Normal heart sounds. No murmur heard.  No gallop.    Pulmonary:      Effort: Pulmonary effort is normal.      Breath sounds: Normal breath sounds.   Abdominal:      General: Bowel sounds are normal.      Palpations: Abdomen is soft.   Skin:     General: Skin is warm and dry.   Neurological:      Mental Status: He is alert.      Comments: Not oriented to time.   Psychiatric:      Comments: Affect flat.         Significant Labs: All pertinent labs within the past 24 hours have been reviewed.    Significant Imaging: I have reviewed all pertinent imaging results/findings within the past 24 hours.

## 2022-02-16 NOTE — PT/OT/SLP PROGRESS
"Speech Language Pathology Treatment    Patient Name:  Forest Lopez   MRN:  [id number]  Admitting Diagnosis: Acute deep vein thrombosis (DVT) of both lower extremities    Recommendations:                  General Recommendations:   1. Speech pathology to continue to follow 3-5x/week for ongoing assessment of cognitive-communicative deficits s/p acute infarcts of L cerebral hemisphere     Diet recommendations: Solids: Regular, Liquids: Thin      Aspiration Precautions: Eliminate distractions, Feed only when awake/alert, Frequent oral care and HOB to 90 degrees      General Precautions: Standard,       Communication strategies:  Reduce informational content/context    Subjective     · Pt awake/alert, sitting upright in bedside chair.   · Pt agreeable to SLP session.     Respiratory Status: Room air    Objective:     Has the patient been evaluated by SLP for swallowing?   Yes  Keep patient NPO? No   Current Respiratory Status:    Room air    Cognitive-Communicative Status:  Pt seen for cognitive-communicative tx this date. Pt oriented to person and place. Stated he is hospitalized due to falling, given mod cues, pt able to state he has had a stroke. Pt continues to be highly distractible, requires MAX verbal cues to sustain attention for 2-5 minute increments.    Targeting sustained attention, pt given verbal sequential list of stimuli and requested to indicate via visual cue of (tapping finger on table or raising hand) when target stimuli of letter "A" was verbalized. Pt completed task within 1-minute time frame with 10% acc given MAX verbal cues and redirection. On secondary trial, pt completed task within 1-minute time frame with 50% acc given MAX verbal cues and redirection.     SLP facilitated discussion targeting functional problem solving and safety awareness. Pt stated he is having difficulty with memory following stroke diagnosis with MAX verbal and semantic cues. Unable to state physical limitations. SLP " "recalled recent fall event. Pt recalled event, dcr in safety awareness. Stated "I fell because I didn't know where I was going". Pt presents with moderately-severely impaired problem solving and safety awareness skills.         Education: SLP to continue to follow for cognitive-communicative tx. Pt would benefit from ongoing SLP services at next level of care.       Assessment:     Forest Lopez is a 72 y.o. 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    Goals:   Multidisciplinary Problems     SLP Goals        Problem: SLP Goal    Goal Priority Disciplines Outcome   SLP Goal     SLP Ongoing, Progressing   Description: 1. Pt will consume a regular/thin diet without overt s/s of aspiration or airway threat without assistance.  2. Pt will increase orientation to person, place, situation and date with 90% acc given min assist.  3. Pt will follow 3-4 step commands to increase functional IND with 75% acc given min assist.   4. Pt will complete immediate and delayed recall tasks with 75% acc given mod assist.  5. Targeting word finding, pt will complete divergent naming tasks given 1-minute time frame with 50% acc given mod assist.  6. Ongoing cognitive-communicative assessment.     Updated Goals 2/8:  7. Pt will sustain attention to topic/task without distraction in 5-10 minute increments given mod verbal cues.                   Plan:     · Patient to be seen:  3 x/week,5 x/week   · Plan of Care expires:  02/21/22  · Plan of Care reviewed with:  patient   · SLP Follow-Up:  Yes       Discharge recommendations:  nursing facility, skilled     Time Tracking:     SLP Treatment Date:   02/16/22  Speech Start Time:  1404  Speech Stop Time:  1419     Speech Total Time (min):  15 min    Billable Minutes: Speech Therapy Individual 15 min    02/16/2022  "

## 2022-02-16 NOTE — PROGRESS NOTES
BERKLEYW met with patient and patient has no additional needs. Patient discharging to [facility] care pending auth.    SUMMER Wilson

## 2022-02-16 NOTE — PT/OT/SLP PROGRESS
Physical Therapy Treatment    Patient Name:  Forest Lopez   MRN:  [id number]    Recommendations:     Discharge Recommendations:  nursing facility, skilled   Discharge Equipment Recommendations: bedside commode,shower chair,walker, rolling   Barriers to discharge: None    Assessment:     Forest Lopez is a 72 y.o. male admitted with a medical diagnosis of Acute deep vein thrombosis (DVT) of both lower extremities.  He presents with the following impairments/functional limitations:  weakness,impaired self care skills,impaired functional mobilty,gait instability,impaired balance,decreased upper extremity function,decreased lower extremity function,decreased safety awareness.    Patient with increased fatigue after SLP and OT earlier in day and sitting in bedside chair x30 min. Limited session to multiple sit<>stand and transfer back to bed. General improvement in all mobility, not requiring hands on assistance for transfers or bed mobility. Will continue to focus on higher level balance activities as pt with recent history of falls.     Rehab Prognosis: Good; patient would benefit from acute skilled PT services to address these deficits and reach maximum level of function.    Recent Surgery: * No surgery found *      Plan:     During this hospitalization, patient to be seen 3 x/week to address the identified rehab impairments via gait training,therapeutic activities,therapeutic exercises,neuromuscular re-education and progress toward the following goals:    · Plan of Care Expires:  03/02/22    Subjective     Chief Complaint: Tired from being in chair  Patient/Family Comments/goals: Looking forward to resting in bed and watching TV; Patient agreeable to PT treatment.  Pain/Comfort:  Pain Rating 1: 0/10  Pain Rating Post-Intervention 1: 0/10      Objective:     Communicated with MARIBEL Bailey prior to session.  Patient found up in chair with peripheral IV,chair check,Condom Catheter (tash sys) upon PT entry to room. RN  present.    General Precautions: Standard, fall   Orthopedic Precautions:N/A   Braces: N/A  Respiratory Status: Room air     Patient donned red socks for OOB mobility.    Functional Mobility:  · Bed Mobility:     · Sit to Supine: mod(I)  · Transfers:     · Sit to Stand:  Supervision-mod(I) with no AD  · 2x from bedside chair, 1x from EOB  · Chair to Bed: supervision with no AD  · No LOB noted      AM-PAC 6 CLICK MOBILITY  Turning over in bed (including adjusting bedclothes, sheets and blankets)?: 4  Sitting down on and standing up from a chair with arms (e.g., wheelchair, bedside commode, etc.): 4  Moving from lying on back to sitting on the side of the bed?: 3  Moving to and from a bed to a chair (including a wheelchair)?: 4  Need to walk in hospital room?: 3  Climbing 3-5 steps with a railing?: 3  Basic Mobility Total Score: 21       Therapeutic Activities and Exercises:  · Discussed POC to address balance and further gait more in depth tomorrow    Patient left HOB elevated with all lines intact, call button in reach, bed alarm on and [employer] telesitter present..    GOALS:   Multidisciplinary Problems     Physical Therapy Goals        Problem: Physical Therapy Goal    Goal Priority Disciplines Outcome Goal Variances Interventions   Physical Therapy Goal     PT, PT/OT Ongoing, Progressing     Description: Goals to be met by: 3/2/2022    Patient will increase functional independence with mobility by performin. Sit<>stand with SPV with LRAD.  2. Gait x 300 feet with SPV with LRAD.   3. Static standing in SLS with no UE support with SBA x10 sec.                      Time Tracking:     PT Received On: 22  PT Start Time: 1528     PT Stop Time: 1536  PT Total Time (min): 8 min     Billable Minutes: Therapeutic Activity 8    Treatment Type: Treatment  PT/PTA: PT     PTA Visit Number: 0     2022

## 2022-02-16 NOTE — ASSESSMENT & PLAN NOTE
Unclear cause.  Spoke to [facility] and patient has had previous syncopal episodes attributed to alcohol abuse.  Has been prescribed meclizine as well.  Tox positive for cocaine but not alcohol.  CTA brain/neck was done in ED, and per ED note the results were discussed with stroke neurologist Dr. Tovar who feels that CTA finding of a short segment of left vertebral artery stenosis was incidental.  He did not think stroke workup with MRI was indicated.    Since change of status per son compared to when he saw him early during the week a CT of the brain was ordered and no abnormalities seen.  MRI of the brain showed acute stroke .

## 2022-02-17 ENCOUNTER — PATIENT OUTREACH (OUTPATIENT)
Dept: ADMINISTRATIVE | Facility: OTHER | Age: 72
End: 2022-02-17
Payer: MEDICARE

## 2022-02-17 LAB
POCT GLUCOSE: 118 MG/DL (ref 70–110)
POCT GLUCOSE: 144 MG/DL (ref 70–110)
POCT GLUCOSE: 152 MG/DL (ref 70–110)
POCT GLUCOSE: 153 MG/DL (ref 70–110)
POCT GLUCOSE: 99 MG/DL (ref 70–110)

## 2022-02-17 PROCEDURE — 97116 GAIT TRAINING THERAPY: CPT | Mod: CQ

## 2022-02-17 PROCEDURE — 94761 N-INVAS EAR/PLS OXIMETRY MLT: CPT

## 2022-02-17 PROCEDURE — 25000003 PHARM REV CODE 250: Performed by: HOSPITALIST

## 2022-02-17 PROCEDURE — 99233 PR SUBSEQUENT HOSPITAL CARE,LEVL III: ICD-10-PCS | Mod: ,,, | Performed by: INTERNAL MEDICINE

## 2022-02-17 PROCEDURE — 25000003 PHARM REV CODE 250: Performed by: NURSE PRACTITIONER

## 2022-02-17 PROCEDURE — 97530 THERAPEUTIC ACTIVITIES: CPT | Mod: CQ

## 2022-02-17 PROCEDURE — 99233 SBSQ HOSP IP/OBS HIGH 50: CPT | Mod: ,,, | Performed by: INTERNAL MEDICINE

## 2022-02-17 PROCEDURE — 11000001 HC ACUTE MED/SURG PRIVATE ROOM

## 2022-02-17 RX ADMIN — TAMSULOSIN HYDROCHLORIDE 0.4 MG: 0.4 CAPSULE ORAL at 08:02

## 2022-02-17 RX ADMIN — APIXABAN 5 MG: 2.5 TABLET, FILM COATED ORAL at 08:02

## 2022-02-17 RX ADMIN — INSULIN ASPART 7 UNITS: 100 INJECTION, SOLUTION INTRAVENOUS; SUBCUTANEOUS at 05:02

## 2022-02-17 RX ADMIN — ATORVASTATIN CALCIUM 80 MG: 20 TABLET, FILM COATED ORAL at 08:02

## 2022-02-17 RX ADMIN — INSULIN ASPART 7 UNITS: 100 INJECTION, SOLUTION INTRAVENOUS; SUBCUTANEOUS at 08:02

## 2022-02-17 RX ADMIN — INSULIN DETEMIR 25 UNITS: 100 INJECTION, SOLUTION SUBCUTANEOUS at 08:02

## 2022-02-17 RX ADMIN — ESCITALOPRAM OXALATE 10 MG: 10 TABLET ORAL at 08:02

## 2022-02-17 RX ADMIN — INSULIN ASPART 7 UNITS: 100 INJECTION, SOLUTION INTRAVENOUS; SUBCUTANEOUS at 12:02

## 2022-02-17 NOTE — PROGRESS NOTES
[facility] Medicine  Progress Note    Patient Name: Forest Lopez  MRN: [id number]  Patient Class: IP- Inpatient   Admission Date: 1/31/2022  Length of Stay: 17 days  Attending Physician: Alma Elizalde MD  Primary Care Provider: Julian Escobar        Subjective:     Principal Problem:Acute deep vein thrombosis (DVT) of both lower extremities        HPI:  Mr. Lopez is a 72 year old man who presented after a syncopal episode.  He reports he had been feeling well prior to the episode but then had a prodrome prior to passing out.  EMS was called, report patient's BP was low normal on their arrival, improved after an IV fluids bolus.  Patient denies chest pain or shortness of breath and says he does not feel weak currently although is rather tired.  When seen in the ED this morning he could barely express himself audibly.     Vital signs were normal on presentation here.  He is on warfarin for recurrent DVT, but his INR was 1, and d-dimer markedly elevated at 21.7.  Tox screen was positive for cocaine.  He had a CTA of the brain and neck done on presentation so CTA of the chest for PE study could not be done for 48 hours.  Ultrasound of the lower extremities showed extensive bilateral DVT.  On the right there was thrombosis of the common femoral vein, femoral vein, popliteal vein, one of the paired posterior tibial veins and both visualized peroneal veins.  On the left there was thrombosis of the common femoral vein, femoral vein and popliteal vein.  Patient was started on full dose Lovenox and admitted.    Medical history is from the chart as he is unable to give me much information.  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  He smokes 5-6 cigarettes/day and is not interested in quitting.  He is currently homeless and staying with a friend.  Despite  the normal INR he is sure he is taking his warfarin and is not having difficulty taking his medications.  I discussed his care with [facility] staff on the [facility] and patient had been lost to follow up since November 2021.      Overview/Hospital Course:  Patient presented to the ED after a syncopal episode and was found to have bilateral lower extremity DVTs and a low INR.  Tox screen was positive for cocaine.  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  He was started back on his warfarin with bridging Lovenox.  His 2D echo showed grade I diastolic dysfunction.  Neurology evaluated him and recommended echo with bubble study, which was negative for a shunt.    PT/OT evaluated him and recommended SNF placement as he was not participating in therapy.  His INR was difficult to get therapeutic, and as he had no contraindication to a NOAC his warfarin was changed to apixaban, and the full dose Lovenox was discontinued.  As he appeared to be depressed and had no objection to starting an antidepressant Lexapro was started.  He had a fall in the hospital on 2/13, had gone to the bathroom for a BM with the nurse, and when she turned away while he was washing his hands he apparently lost his balance and fell.  He did not hit his head and did not lose consciousness, but his BP was low transiently and he was given a bolus of IV fluids.    Patient's mental status improved slowly, but he gradually became more talkative and was able to hold a brief conversation.      Interval History:  Talking better,eating ok.No acute issues.    Review of Systems   Constitutional: Negative for chills and fever.   Respiratory: Negative for cough and shortness of breath.    Cardiovascular: Negative for chest pain and palpitations.   Gastrointestinal: Negative for abdominal pain and nausea.   Genitourinary: Negative for hematuria.   Neurological: Positive for  weakness.     Objective:     Vital Signs (Most Recent):  Temp: 97.3 °F (36.3 °C) (02/17/22 1107)  Pulse: 90 (02/17/22 1107)  Resp: 18 (02/17/22 1107)  BP: (!) 111/58 (02/17/22 1107)  SpO2: 96 % (02/17/22 1107) Vital Signs (24h Range):  Temp:  [97.3 °F (36.3 °C)-98.8 °F (37.1 °C)] 97.3 °F (36.3 °C)  Pulse:  [] 90  Resp:  [16-18] 18  SpO2:  [94 %-99 %] 96 %  BP: (111-137)/(58-86) 111/58     Weight: 63 kg (139 lb)  Body mass index is 18.85 kg/m².    Intake/Output Summary (Last 24 hours) at 2/17/2022 1224  Last data filed at 2/17/2022 0845  Gross per 24 hour   Intake 1320 ml   Output 3175 ml   Net -1855 ml      Physical Exam  Constitutional:       General: He is not in acute distress.     Comments: Thin   HENT:      Mouth/Throat:      Comments: Edentulous  Cardiovascular:      Rate and Rhythm: Normal rate and regular rhythm.      Pulses: Normal pulses.      Heart sounds: Normal heart sounds. No murmur heard.  No gallop.    Pulmonary:      Effort: Pulmonary effort is normal.      Breath sounds: Normal breath sounds.   Abdominal:      General: Bowel sounds are normal.      Palpations: Abdomen is soft.   Skin:     General: Skin is warm and dry.   Neurological:      Mental Status: He is alert.      Comments: Not oriented to time.   Psychiatric:      Comments: Affect flat.         Significant Labs: All pertinent labs within the past 24 hours have been reviewed.    Significant Imaging: I have reviewed all pertinent imaging results/findings within the past 24 hours.      Assessment/Plan:      * Acute deep vein thrombosis (DVT) of both lower extremities  Last ultrasound showed partially occlusive DVTs, now completely occlusive DVT of multiple lower extremity veins on ultrasound.  Patient reports compliance with warfarin but his INR is only one.  He is homeless but says he does not have difficulty obtaining his medications.  Does not seem to care about anything.  D-dimer was markedly elevated on presentation and there was  a question of possible PE, but he had a CTA of the brain/neck on presentation so could not get another CTA for another 48 hours.    V/Q scan was done, showed low probability for PE.  2D echo showed grade I diastolic dysfunction.  Warfarin was restarted with bridging Lovenox, but INR was still low.  Given the difficulty of getting him to follow up changed to apixaban and discontinued Lovenox.    Abnormal imaging of thyroid  Tsh/t4 ok, thyroid ultrasound done, showed bilateral nodules which did not meet criteria for biopsy.    Acute stroke due to ischemia  - MRI showed areas of diffusion signal hyperintensity consistent with areas of acute ischemia/infarct involving the left cerebral hemisphere, the most prominent measuring approximately 1.4 cm, also a small focus of the right frontal lobe.  - Patient on full dose anticoagulation  - Sinus rhythm noted throughout hospital stay  - Risk factor modification - control diabetes, continue statin.  - RPR, HIV non reactive.  B12 low normal.  B1 pending.  - CTA showed a focal segment of 60-70% stenosis involving the proximal to mid left vertebral artery that was new when compared to the prior study of 09/25/2019.  No evidence of large vessel intracranial occlusion.   - Neurology noted symptoms do not correspond to stroke location.  - Echo with bubble study done, no shunt seen.  - Follow up with vascular neurology in 4 weeks.  -Therapy recommending SNF due to his low interest in participation.  - Started Lexapro due to suspicion for depression.      Advance care planning        Severe protein-calorie malnutrition  Diet as tolerated      Debility  Pt/ot efforts  Awaiting  Skilled placement.    Type 2 diabetes mellitus with hyperglycemia, without long-term current use of insulin  Reportedly he is on metformin and glipizide, both held.  Continue SSI for now.  He has 4+ sugar in urine and HbA1c is 10.3, and he is hyperglycemic here.  Will start levemir and continue ISS, increase  levemir to 25 units daily  Add scheduled novolog 5 units tid, increase to 7 units  Improving  Resume metformin in SNF  Sugars ok    Syncope and collapse  Unclear cause.  Spoke to [facility] and patient has had previous syncopal episodes attributed to alcohol abuse.  Has been prescribed meclizine as well.  Tox positive for cocaine but not alcohol.  CTA brain/neck was done in ED, and per ED note the results were discussed with stroke neurologist Dr. Tovar who feels that CTA finding of a short segment of left vertebral artery stenosis was incidental.  He did not think stroke workup with MRI was indicated.    Since change of status per son compared to when he saw him early during the week a CT of the brain was ordered and no abnormalities seen.  MRI of the brain showed acute stroke .      Cocaine abuse  As noted above.  He has not had any behavioral issues while here, and other than being disinterested in therapy he has been very cooperative.  Seems drug use was transient.      Hyperlipidemia  Resumed lipitor      Essential hypertension  Doubtful he was on anything at home.  Started losartan 25 mg daily  BP well controlled on low dose losartan.  BP transiently low today after he fell but recovered quickly.  IVF bolus given.  Will hold losartan as bp low again  bp ok without med    Tobacco dependence  He smokes 5-6 cigarettes/day.  Not trying to quit and not interested in a nicotine patch.  Benefits of smoking cessation were reviewed with patient in detail for for 8 minutes and patient was encouraged to quit. Nicotine replacement options were discussed.        VTE Risk Mitigation (From admission, onward)         Ordered     apixaban tablet 5 mg  2 times daily         02/10/22 0901     Place sequential compression device  Until discontinued         01/31/22 0533                Discharge Planning   EFRAÍN:      Code Status: Full Code   Is the patient medically ready for discharge?:     Reason for patient still in hospital  (select all that apply): Pending disposition  Discharge Plan A: Skilled Nursing Facility   Discharge Delays: (!) Post-Acute Set-up              lAma Elizalde MD  Department of Hospital Medicine   [facility] - Med Surg ([place])

## 2022-02-17 NOTE — PT/OT/SLP PROGRESS
Speech Language Pathology      Forest Lopez  MRN: [id number]    Patient not seen today secondary to Other (Comment),Patient fatigue. Pt requesting to defer SLP services at this time. Will follow-up later today of next available date 2/18/22.

## 2022-02-17 NOTE — PLAN OF CARE
02/16/22 1808   Post-Acute Status   Post-Acute Authorization Placement  (SNF unit then senior care.)   Post-Acute Placement Status Referrals Sent   Coverage [employer] MANAGED MEDICARE - [employer] SNP (SPECIAL NEEDS PLAN   Patient choice form signed by patient/caregiver List from CMS Compare   Discharge Delays (!) Post-Acute Set-up   Discharge Plan   Discharge Plan A Skilled Nursing Facility   Discharge Plan B New Nursing Home placement - senior care care facility     SW Reviewed feedback from facilities contacted for SNF placement. [facility] and [facility] said they are not in Network. [facility] stated that they couldn't meet the patient's needs/cocaine use. SHANICE sent referral to [facility], [facility], [facility], [facility] and [facility].   Visited patient today to discuss his d/c. He was pleasant.

## 2022-02-17 NOTE — PROGRESS NOTES
RSW contacted patient over the to check for any additional needs. Patient declined having any additional needs.    SUMMER Wilson

## 2022-02-17 NOTE — PT/OT/SLP PROGRESS
"Physical Therapy Treatment    Patient Name:  Forest Lopez   MRN:  [id number]    Recommendations:     Discharge Recommendations:  nursing facility, skilled   Discharge Equipment Recommendations: bedside commode,walker, rolling,shower chair   Barriers to discharge: Decreased caregiver support (pt reportedly is homeless at this time)    Assessment:     Forest Lopez is a 72 y.o. male admitted with a medical diagnosis of Acute deep vein thrombosis (DVT) of both lower extremities.  He presents with the following impairments/functional limitations:  weakness,impaired endurance,impaired self care skills,impaired functional mobilty,gait instability,impaired balance,decreased upper extremity function,decreased lower extremity function,decreased safety awareness,decreased coordination ;pt with good mobility, ambulating in hallway w/ CGA for safety w/st cane, cooperative in session.    Rehab Prognosis: Good; patient would benefit from acute skilled PT services to address these deficits and reach maximum level of function.    Recent Surgery: * No surgery found *      Plan:     During this hospitalization, patient to be seen 3 x/week to address the identified rehab impairments via gait training,therapeutic activities,therapeutic exercises,neuromuscular re-education and progress toward the following goals:    · Plan of Care Expires:  03/02/22    Subjective     Chief Complaint: none stated  Patient/Family Comments/goals: pt agreeable to session. Found soiled w/ BM, pt stating he didn't realize he had a BM. When asked to try to clean himself with a washcloth, he states "I don't do that".   Pain/Comfort:  · Pain Rating 1: 0/10  · Pain Rating Post-Intervention 1: 0/10      Objective:     Communicated with nurse prior to session.  Patient found supine with bed alarm,peripheral IV,telemetry,Condom Catheter ([employer] monitoring) upon PT entry to room.     General Precautions: Standard, fall,diabetic   Orthopedic Precautions:N/A   Braces: " N/A  Respiratory Status: Room air     Functional Mobility:  · Bed Mobility:     · Supine to Sit: stand by assistance  · Transfers:     · Sit to Stand:  contact guard assistance with straight cane  · Gait: amb'd ~200' w/ St cane and CGA; lots of cueing for inc step length and proper use of cane, pt performing head turns as well.      AM-PAC 6 CLICK MOBILITY  Turning over in bed (including adjusting bedclothes, sheets and blankets)?: 4  Sitting down on and standing up from a chair with arms (e.g., wheelchair, bedside commode, etc.): 4  Moving from lying on back to sitting on the side of the bed?: 3  Moving to and from a bed to a chair (including a wheelchair)?: 4  Need to walk in hospital room?: 3  Climbing 3-5 steps with a railing?: 3  Basic Mobility Total Score: 21       Therapeutic Activities and Exercises:   pt req'd totA for hygiene after having BM in bed, pt did perform some hygiene on the front side, with encouragement.     Patient left up in chair with all lines intact, chair alarm on, nurse and PCT notified and [employer] monitoring present..    GOALS:   Multidisciplinary Problems     Physical Therapy Goals        Problem: Physical Therapy Goal    Goal Priority Disciplines Outcome Goal Variances Interventions   Physical Therapy Goal     PT, PT/OT Ongoing, Progressing     Description: Goals to be met by: 3/2/2022    Patient will increase functional independence with mobility by performin. Sit<>stand with SPV with LRAD.  2. Gait x 300 feet with SPV with LRAD.   3. Static standing in SLS with no UE support with SBA x10 sec.                      Time Tracking:     PT Received On: 22  PT Start Time: 1505     PT Stop Time: 1528  PT Total Time (min): 23 min     Billable Minutes: Gait Training 15 and Therapeutic Activity 8    Treatment Type: Treatment  PT/PTA: PTA     PTA Visit Number: 1     2022

## 2022-02-17 NOTE — NURSING
POC reviewed. Pt is Alert and oriented x 4. No complaints of pain. Pt is incontinent of bowel and bladder. Pt has had two large bowel movements. Pt repositions independently. Pt was offered fluids.  Bed is in the lowest position, call light in reach, bed alarm on, [employer] camera in use, side rails up x 3. No other issues or concerns. Will continue to monitor.

## 2022-02-17 NOTE — SUBJECTIVE & OBJECTIVE
Interval History:  Talking better,eating ok.No acute issues.    Review of Systems   Constitutional: Negative for chills and fever.   Respiratory: Negative for cough and shortness of breath.    Cardiovascular: Negative for chest pain and palpitations.   Gastrointestinal: Negative for abdominal pain and nausea.   Genitourinary: Negative for hematuria.   Neurological: Positive for weakness.     Objective:     Vital Signs (Most Recent):  Temp: 97.3 °F (36.3 °C) (02/17/22 1107)  Pulse: 90 (02/17/22 1107)  Resp: 18 (02/17/22 1107)  BP: (!) 111/58 (02/17/22 1107)  SpO2: 96 % (02/17/22 1107) Vital Signs (24h Range):  Temp:  [97.3 °F (36.3 °C)-98.8 °F (37.1 °C)] 97.3 °F (36.3 °C)  Pulse:  [] 90  Resp:  [16-18] 18  SpO2:  [94 %-99 %] 96 %  BP: (111-137)/(58-86) 111/58     Weight: 63 kg (139 lb)  Body mass index is 18.85 kg/m².    Intake/Output Summary (Last 24 hours) at 2/17/2022 1224  Last data filed at 2/17/2022 0845  Gross per 24 hour   Intake 1320 ml   Output 3175 ml   Net -1855 ml      Physical Exam  Constitutional:       General: He is not in acute distress.     Comments: Thin   HENT:      Mouth/Throat:      Comments: Edentulous  Cardiovascular:      Rate and Rhythm: Normal rate and regular rhythm.      Pulses: Normal pulses.      Heart sounds: Normal heart sounds. No murmur heard.  No gallop.    Pulmonary:      Effort: Pulmonary effort is normal.      Breath sounds: Normal breath sounds.   Abdominal:      General: Bowel sounds are normal.      Palpations: Abdomen is soft.   Skin:     General: Skin is warm and dry.   Neurological:      Mental Status: He is alert.      Comments: Not oriented to time.   Psychiatric:      Comments: Affect flat.         Significant Labs: All pertinent labs within the past 24 hours have been reviewed.    Significant Imaging: I have reviewed all pertinent imaging results/findings within the past 24 hours.

## 2022-02-17 NOTE — PLAN OF CARE
Problem: Adult Inpatient Plan of Care  Goal: Plan of Care Review  Outcome: Ongoing, Progressing  Goal: Patient-Specific Goal (Individualized)  Outcome: Ongoing, Progressing  Goal: Absence of Hospital-Acquired Illness or Injury  Outcome: Ongoing, Progressing  Goal: Optimal Comfort and Wellbeing  Outcome: Ongoing, Progressing  Goal: Readiness for Transition of Care  Outcome: Ongoing, Progressing     Problem: Diabetes Comorbidity  Goal: Blood Glucose Level Within Targeted Range  Outcome: Ongoing, Progressing     Problem: Fall Injury Risk  Goal: Absence of Fall and Fall-Related Injury  Outcome: Ongoing, Progressing

## 2022-02-18 ENCOUNTER — PATIENT OUTREACH (OUTPATIENT)
Dept: ADMINISTRATIVE | Facility: OTHER | Age: 72
End: 2022-02-18
Payer: MEDICARE

## 2022-02-18 LAB
POCT GLUCOSE: 108 MG/DL (ref 70–110)
POCT GLUCOSE: 112 MG/DL (ref 70–110)
POCT GLUCOSE: 131 MG/DL (ref 70–110)
POCT GLUCOSE: 145 MG/DL (ref 70–110)

## 2022-02-18 PROCEDURE — 11000001 HC ACUTE MED/SURG PRIVATE ROOM

## 2022-02-18 PROCEDURE — 25000003 PHARM REV CODE 250: Performed by: HOSPITALIST

## 2022-02-18 PROCEDURE — 92507 TX SP LANG VOICE COMM INDIV: CPT

## 2022-02-18 PROCEDURE — 94761 N-INVAS EAR/PLS OXIMETRY MLT: CPT

## 2022-02-18 PROCEDURE — 25000003 PHARM REV CODE 250: Performed by: NURSE PRACTITIONER

## 2022-02-18 PROCEDURE — 99231 SBSQ HOSP IP/OBS SF/LOW 25: CPT | Mod: 95,,, | Performed by: STUDENT IN AN ORGANIZED HEALTH CARE EDUCATION/TRAINING PROGRAM

## 2022-02-18 PROCEDURE — 99231 PR SUBSEQUENT HOSPITAL CARE,LEVL I: ICD-10-PCS | Mod: 95,,, | Performed by: STUDENT IN AN ORGANIZED HEALTH CARE EDUCATION/TRAINING PROGRAM

## 2022-02-18 RX ADMIN — INSULIN ASPART 7 UNITS: 100 INJECTION, SOLUTION INTRAVENOUS; SUBCUTANEOUS at 12:02

## 2022-02-18 RX ADMIN — INSULIN ASPART 7 UNITS: 100 INJECTION, SOLUTION INTRAVENOUS; SUBCUTANEOUS at 08:02

## 2022-02-18 RX ADMIN — TAMSULOSIN HYDROCHLORIDE 0.4 MG: 0.4 CAPSULE ORAL at 09:02

## 2022-02-18 RX ADMIN — INSULIN ASPART 7 UNITS: 100 INJECTION, SOLUTION INTRAVENOUS; SUBCUTANEOUS at 05:02

## 2022-02-18 RX ADMIN — INSULIN DETEMIR 25 UNITS: 100 INJECTION, SOLUTION SUBCUTANEOUS at 08:02

## 2022-02-18 RX ADMIN — ESCITALOPRAM OXALATE 10 MG: 10 TABLET ORAL at 08:02

## 2022-02-18 RX ADMIN — APIXABAN 5 MG: 2.5 TABLET, FILM COATED ORAL at 08:02

## 2022-02-18 RX ADMIN — ATORVASTATIN CALCIUM 80 MG: 20 TABLET, FILM COATED ORAL at 08:02

## 2022-02-18 RX ADMIN — APIXABAN 5 MG: 2.5 TABLET, FILM COATED ORAL at 09:02

## 2022-02-18 NOTE — CONSULTS
Thank you for your consult to [facility]. We have reviewed the patient chart. This patient does meet criteria for [facility] service at this time. Will assume care on 02/18/22 at 6AM     Mary Carmen Barr MD  [facility]

## 2022-02-18 NOTE — PT/OT/SLP PROGRESS
Speech Language Pathology Treatment    Patient Name:  Forest Lopez   MRN:  [id number]  Admitting Diagnosis: Acute deep vein thrombosis (DVT) of both lower extremities    Recommendations:                  General Recommendations:   1. Speech pathology to continue to follow 3-5x/week for ongoing assessment of cognitive-communicative deficits s/p acute infarcts of L cerebral hemisphere     Diet recommendations: Solids: Regular, Liquids: Thin      Aspiration Precautions: Eliminate distractions, Feed only when awake/alert, Frequent oral care and HOB to 90 degrees      General Precautions: Standard,       Communication strategies:  Reduce informational content/context    Subjective     · Pt awake/alert, sitting upright in bed, self-feeding lunch tray.    Respiratory Status: Room air    Objective:     Has the patient been evaluated by SLP for swallowing?   Yes  Keep patient NPO? No   Current Respiratory Status:    Room air    Cognitive-Communicative Status:  Pt oriented to person and place. Pt continues to be highly distractible, requires MAX verbal cues to sustain attention for 2-5 minute increments. Pt followed simple, 1-step commands related to oral feeding with 75% acc, dcr acc secondary to difficulty sustaining attention. Pt made x1 relevant question this date, improved initiation. Asking when he will be able to leave for the next facility. Dcr ability to sustain conversation.    Swallowing:  Observed pt self-feeding regular/thin lunch tray.    Oral Phase:  · Dcr labial seal with consistent L anterior spillage onto chin/chest  · Prolonged up/down masticatory pattern  · Lingual pumping prior to a-p transport  · Mild-mod oral residue, cleared with spontaneous re-swallow    Pharyngeal Phase:  · Trigger of the pharyngeal swallow appears to be somewhat delayed  · No overt s/s of aspiration or airway threat during 100% of po intake- no coughing, choking, changes in breath stream or vocal quality    Education: Pt would  benefit from ongoing SLP services at next level of care.       Assessment:     Forest Lopez is a 72 y.o. 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    Goals:   Multidisciplinary Problems     SLP Goals        Problem: SLP Goal    Goal Priority Disciplines Outcome   SLP Goal     SLP Ongoing, Progressing   Description: 1. Pt will consume a regular/thin diet without overt s/s of aspiration or airway threat without assistance.  2. Pt will increase orientation to person, place, situation and date with 90% acc given min assist.  3. Pt will follow 3-4 step commands to increase functional IND with 75% acc given min assist.   4. Pt will complete immediate and delayed recall tasks with 75% acc given mod assist.  5. Targeting word finding, pt will complete divergent naming tasks given 1-minute time frame with 50% acc given mod assist.  6. Ongoing cognitive-communicative assessment.     Updated Goals 2/8:  7. Pt will sustain attention to topic/task without distraction in 5-10 minute increments given mod verbal cues.                   Plan:     · Patient to be seen:  3 x/week,5 x/week   · Plan of Care expires:  02/21/22  · Plan of Care reviewed with:  patient   · SLP Follow-Up:  Yes       Discharge recommendations:  nursing facility, skilled     Time Tracking:     SLP Treatment Date:   02/18/22  Speech Start Time:  1255  Speech Stop Time:  1305     Speech Total Time (min):  10 min    Billable Minutes: Speech Therapy Individual 10 min    02/18/2022

## 2022-02-18 NOTE — ASSESSMENT & PLAN NOTE
Unclear cause.  Spoke to [facility] and patient has had previous syncopal episodes attributed to alcohol abuse.  Has been prescribed meclizine as well.  Tox positive for cocaine but not alcohol.  CTA brain/neck was done in ED, and per ED note the results were discussed with stroke neurologist Dr. Tovar who feels that CTA finding of a short segment of left vertebral artery stenosis was incidental.  He did not think stroke workup with MRI was indicated.    Since change of status per son compared to when he saw him early during the week a CT of the brain was ordered and no abnormalities seen.  MRI of the brain showed acute stroke .

## 2022-02-18 NOTE — SUBJECTIVE & OBJECTIVE
Interval History:  No issues overnight, patient doing well, awaiting placement. SLP following.    Review of Systems   Constitutional: Negative for chills and fever.   Respiratory: Negative for cough and shortness of breath.    Cardiovascular: Negative for chest pain and palpitations.   Gastrointestinal: Negative for abdominal pain and nausea.   Genitourinary: Negative for hematuria.   Neurological: Positive for weakness.     Objective:     Vital Signs (Most Recent):  Temp: 98.2 °F (36.8 °C) (02/18/22 1126)  Pulse: 99 (02/18/22 1126)  Resp: 18 (02/18/22 1126)  BP: (!) 142/84 (02/18/22 1126)  SpO2: 98 % (02/18/22 1126) Vital Signs (24h Range):  Temp:  [97.2 °F (36.2 °C)-98.2 °F (36.8 °C)] 98.2 °F (36.8 °C)  Pulse:  [74-99] 99  Resp:  [16-18] 18  SpO2:  [97 %-98 %] 98 %  BP: (107-142)/(59-84) 142/84     Weight: 63 kg (139 lb)  Body mass index is 18.85 kg/m².    Intake/Output Summary (Last 24 hours) at 2/18/2022 1450  Last data filed at 2/18/2022 0803  Gross per 24 hour   Intake 360 ml   Output 3600 ml   Net -3240 ml      Physical Exam  Constitutional:       General: He is not in acute distress.     Comments: Thin   HENT:      Mouth/Throat:      Comments: Edentulous  Pulmonary:      Effort: Pulmonary effort is normal. No respiratory distress.   Neurological:      Mental Status: He is alert. Mental status is at baseline.   Psychiatric:      Comments: Affect flat.         Significant Labs: All pertinent labs within the past 24 hours have been reviewed.    Significant Imaging: I have reviewed all pertinent imaging results/findings within the past 24 hours.

## 2022-02-18 NOTE — PROGRESS NOTES
[facility] Medicine  Telemedicine Progress Note    Patient Name: Forest Lopez  MRN: [id number]  Patient Class: IP- Inpatient   Admission Date: 1/31/2022  Length of Stay: 18 days  Attending Physician: Mary Carmen Barr MD  Primary Care Provider: Julian Escobar          Subjective:     Principal Problem:Acute deep vein thrombosis (DVT) of both lower extremities        HPI:  Mr. Lopez is a 72 year old man who presented after a syncopal episode.  He reports he had been feeling well prior to the episode but then had a prodrome prior to passing out.  EMS was called, report patient's BP was low normal on their arrival, improved after an IV fluids bolus.  Patient denies chest pain or shortness of breath and says he does not feel weak currently although is rather tired.  When seen in the ED this morning he could barely express himself audibly.     Vital signs were normal on presentation here.  He is on warfarin for recurrent DVT, but his INR was 1, and d-dimer markedly elevated at 21.7.  Tox screen was positive for cocaine.  He had a CTA of the brain and neck done on presentation so CTA of the chest for PE study could not be done for 48 hours.  Ultrasound of the lower extremities showed extensive bilateral DVT.  On the right there was thrombosis of the common femoral vein, femoral vein, popliteal vein, one of the paired posterior tibial veins and both visualized peroneal veins.  On the left there was thrombosis of the common femoral vein, femoral vein and popliteal vein.  Patient was started on full dose Lovenox and admitted.    Medical history is from the chart as he is unable to give me much information.  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  He smokes 5-6 cigarettes/day and is not interested in quitting.  He is currently homeless and  staying with a friend.  Despite the normal INR he is sure he is taking his warfarin and is not having difficulty taking his medications.  I discussed his care with [facility] staff on the [facility] and patient had been lost to follow up since November 2021.      Overview/Hospital Course:  Patient presented to the ED after a syncopal episode and was found to have bilateral lower extremity DVTs and a low INR.  Tox screen was positive for cocaine.  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  He was started back on his warfarin with bridging Lovenox.  His 2D echo showed grade I diastolic dysfunction.  Neurology evaluated him and recommended echo with bubble study, which was negative for a shunt.    PT/OT evaluated him and recommended SNF placement as he was not participating in therapy.  His INR was difficult to get therapeutic, and as he had no contraindication to a NOAC his warfarin was changed to apixaban, and the full dose Lovenox was discontinued.  As he appeared to be depressed and had no objection to starting an antidepressant Lexapro was started.  He had a fall in the hospital on 2/13, had gone to the bathroom for a BM with the nurse, and when she turned away while he was washing his hands he apparently lost his balance and fell.  He did not hit his head and did not lose consciousness, but his BP was low transiently and he was given a bolus of IV fluids.    Patient's mental status improved slowly, but he gradually became more talkative and was able to hold a brief conversation.      Interval History:  No issues overnight, patient doing well, awaiting placement. SLP following.    Review of Systems   Constitutional: Negative for chills and fever.   Respiratory: Negative for cough and shortness of breath.    Cardiovascular: Negative for chest pain and palpitations.   Gastrointestinal: Negative for abdominal pain and nausea.    Genitourinary: Negative for hematuria.   Neurological: Positive for weakness.     Objective:     Vital Signs (Most Recent):  Temp: 98.2 °F (36.8 °C) (02/18/22 1126)  Pulse: 99 (02/18/22 1126)  Resp: 18 (02/18/22 1126)  BP: (!) 142/84 (02/18/22 1126)  SpO2: 98 % (02/18/22 1126) Vital Signs (24h Range):  Temp:  [97.2 °F (36.2 °C)-98.2 °F (36.8 °C)] 98.2 °F (36.8 °C)  Pulse:  [74-99] 99  Resp:  [16-18] 18  SpO2:  [97 %-98 %] 98 %  BP: (107-142)/(59-84) 142/84     Weight: 63 kg (139 lb)  Body mass index is 18.85 kg/m².    Intake/Output Summary (Last 24 hours) at 2/18/2022 1450  Last data filed at 2/18/2022 0803  Gross per 24 hour   Intake 360 ml   Output 3600 ml   Net -3240 ml      Physical Exam  Constitutional:       General: He is not in acute distress.     Comments: Thin   HENT:      Mouth/Throat:      Comments: Edentulous  Pulmonary:      Effort: Pulmonary effort is normal. No respiratory distress.   Neurological:      Mental Status: He is alert. Mental status is at baseline.   Psychiatric:      Comments: Affect flat.         Significant Labs: All pertinent labs within the past 24 hours have been reviewed.    Significant Imaging: I have reviewed all pertinent imaging results/findings within the past 24 hours.      Assessment/Plan:      * Acute deep vein thrombosis (DVT) of both lower extremities  Last ultrasound showed partially occlusive DVTs, now completely occlusive DVT of multiple lower extremity veins on ultrasound.  Patient reports compliance with warfarin but his INR is only one.  He is homeless but says he does not have difficulty obtaining his medications.  Does not seem to care about anything.  D-dimer was markedly elevated on presentation and there was a question of possible PE, but he had a CTA of the brain/neck on presentation so could not get another CTA for another 48 hours.    V/Q scan was done, showed low probability for PE.  2D echo showed grade I diastolic dysfunction.  Warfarin was restarted  with bridging Lovenox, but INR was still low.  Given the difficulty of getting him to follow up changed to apixaban and discontinued Lovenox.    Abnormal imaging of thyroid  Tsh/t4 ok, thyroid ultrasound done, showed bilateral nodules which did not meet criteria for biopsy.    Acute stroke due to ischemia  - MRI showed areas of diffusion signal hyperintensity consistent with areas of acute ischemia/infarct involving the left cerebral hemisphere, the most prominent measuring approximately 1.4 cm, also a small focus of the right frontal lobe.  - Patient on full dose anticoagulation  - Sinus rhythm noted throughout hospital stay  - Risk factor modification - control diabetes, continue statin.  - RPR, HIV non reactive.  B12 low normal.  B1 pending.  - CTA showed a focal segment of 60-70% stenosis involving the proximal to mid left vertebral artery that was new when compared to the prior study of 09/25/2019.  No evidence of large vessel intracranial occlusion.   - Neurology noted symptoms do not correspond to stroke location.  - Echo with bubble study done, no shunt seen.  - Follow up with vascular neurology in 4 weeks.  -Therapy recommending SNF due to his low interest in participation.  - Started Lexapro due to suspicion for depression.      Advance care planning        Severe protein-calorie malnutrition  Diet as tolerated      Debility  Pt/ot efforts  Awaiting  Skilled placement.    Type 2 diabetes mellitus with hyperglycemia, without long-term current use of insulin  Reportedly he is on metformin and glipizide, both held.  Continue SSI for now.  He has 4+ sugar in urine and HbA1c is 10.3, and he is hyperglycemic here.  Will start levemir and continue ISS, increase levemir to 25 units daily  Add scheduled novolog 5 units tid, increase to 7 units  Improving  Resume metformin in SNF  Sugars ok    Syncope and collapse  Unclear cause.  Spoke to [facility] and patient has had previous syncopal episodes attributed to alcohol  abuse.  Has been prescribed meclizine as well.  Tox positive for cocaine but not alcohol.  CTA brain/neck was done in ED, and per ED note the results were discussed with stroke neurologist Dr. Tovar who feels that CTA finding of a short segment of left vertebral artery stenosis was incidental.  He did not think stroke workup with MRI was indicated.    Since change of status per son compared to when he saw him early during the week a CT of the brain was ordered and no abnormalities seen.  MRI of the brain showed acute stroke .      Cocaine abuse  As noted above.  He has not had any behavioral issues while here, and other than being disinterested in therapy he has been very cooperative.  Seems drug use was transient.      Hyperlipidemia  Resumed lipitor      Essential hypertension  Doubtful he was on anything at home.  Started losartan 25 mg daily  BP well controlled on low dose losartan.  BP transiently low today after he fell but recovered quickly.  IVF bolus given.  Will hold losartan as bp low again  bp ok without med    Tobacco dependence  He smokes 5-6 cigarettes/day.  Not trying to quit and not interested in a nicotine patch.  Benefits of smoking cessation were reviewed with patient in detail for for 8 minutes and patient was encouraged to quit. Nicotine replacement options were discussed.        VTE Risk Mitigation (From admission, onward)         Ordered     apixaban tablet 5 mg  2 times daily         02/10/22 0901     Place sequential compression device  Until discontinued         01/31/22 0533                      I have assessed these finding virtually using telemed platform and with assistance of bedside nurse                 The attending portion of this evaluation, treatment, and documentation was performed per Mary Carmen Barr MD via Telemedicine AudioVisual using the secure [employer] software platform with 2 way audio/video. The provider was located off-site and the patient is located in the hospital.  The aforementioned video software was utilized to document the relevant history and physical exam    Mary Carmen Barr MD  Department of Hospital Medicine   [facility] Med Surg ([place])

## 2022-02-19 LAB
POCT GLUCOSE: 105 MG/DL (ref 70–110)
POCT GLUCOSE: 115 MG/DL (ref 70–110)
POCT GLUCOSE: 93 MG/DL (ref 70–110)

## 2022-02-19 PROCEDURE — 11000001 HC ACUTE MED/SURG PRIVATE ROOM

## 2022-02-19 PROCEDURE — 94761 N-INVAS EAR/PLS OXIMETRY MLT: CPT

## 2022-02-19 PROCEDURE — 99231 SBSQ HOSP IP/OBS SF/LOW 25: CPT | Mod: 95,,, | Performed by: STUDENT IN AN ORGANIZED HEALTH CARE EDUCATION/TRAINING PROGRAM

## 2022-02-19 PROCEDURE — 25000003 PHARM REV CODE 250: Performed by: NURSE PRACTITIONER

## 2022-02-19 PROCEDURE — 25000003 PHARM REV CODE 250: Performed by: HOSPITALIST

## 2022-02-19 PROCEDURE — 99231 PR SUBSEQUENT HOSPITAL CARE,LEVL I: ICD-10-PCS | Mod: 95,,, | Performed by: STUDENT IN AN ORGANIZED HEALTH CARE EDUCATION/TRAINING PROGRAM

## 2022-02-19 RX ADMIN — APIXABAN 5 MG: 2.5 TABLET, FILM COATED ORAL at 09:02

## 2022-02-19 RX ADMIN — TAMSULOSIN HYDROCHLORIDE 0.4 MG: 0.4 CAPSULE ORAL at 09:02

## 2022-02-19 RX ADMIN — INSULIN ASPART 7 UNITS: 100 INJECTION, SOLUTION INTRAVENOUS; SUBCUTANEOUS at 05:02

## 2022-02-19 RX ADMIN — ATORVASTATIN CALCIUM 80 MG: 20 TABLET, FILM COATED ORAL at 08:02

## 2022-02-19 RX ADMIN — ESCITALOPRAM OXALATE 10 MG: 10 TABLET ORAL at 08:02

## 2022-02-19 RX ADMIN — INSULIN ASPART 7 UNITS: 100 INJECTION, SOLUTION INTRAVENOUS; SUBCUTANEOUS at 08:02

## 2022-02-19 RX ADMIN — INSULIN ASPART 7 UNITS: 100 INJECTION, SOLUTION INTRAVENOUS; SUBCUTANEOUS at 12:02

## 2022-02-19 RX ADMIN — INSULIN DETEMIR 25 UNITS: 100 INJECTION, SOLUTION SUBCUTANEOUS at 08:02

## 2022-02-19 RX ADMIN — APIXABAN 5 MG: 2.5 TABLET, FILM COATED ORAL at 08:02

## 2022-02-19 NOTE — ASSESSMENT & PLAN NOTE
Unclear cause.  Spoke to [facility] and patient has had previous syncopal episodes attributed to alcohol abuse.  Has been prescribed meclizine as well.  Tox positive for cocaine but not alcohol.  CTA brain/neck was done in ED, and per ED note the results were discussed with stroke neurologist Dr. Tovar who feels that CTA finding of a short segment of left vertebral artery stenosis was incidental.  He did not think stroke workup with MRI was indicated.    Since change of status per son compared to when he saw him early during the week a CT of the brain was ordered and no abnormalities seen.  MRI of the brain showed acute stroke .

## 2022-02-19 NOTE — PLAN OF CARE
02/18/22 1912   Post-Acute Status   Post-Acute Authorization Placement  (SNF for PT&OT then care home.)   Post-Acute Placement Status Pending Bed Availability   Coverage [employer] MANAGED MEDICARE - [employer] (SPECIAL NEEDS PLAN)   Discharge Delays (!) Post-Acute Set-up   Discharge Plan   Discharge Plan A Skilled Nursing Facility   Discharge Plan B New Nursing Home placement - care home care facility     [facility]'t [facility] his needs.   SW sent information via fax (as requested) to Suzanne at [facility]  And to Lynn at [facility]. Both facilities are considering the patient. He has been   medically ready for d/c but has not been accepted in any SNF as of yet.

## 2022-02-19 NOTE — PROGRESS NOTES
[facility] Medicine  Telemedicine Progress Note    Patient Name: Froest Lopez  MRN: [id number]  Patient Class: IP- Inpatient   Admission Date: 1/31/2022  Length of Stay: 19 days  Attending Physician: Mary Carmen Barr MD  Primary Care Provider: Julian Escobar          Subjective:     Principal Problem:Acute deep vein thrombosis (DVT) of both lower extremities        HPI:  Mr. Lopez is a 72 year old man who presented after a syncopal episode.  He reports he had been feeling well prior to the episode but then had a prodrome prior to passing out.  EMS was called, report patient's BP was low normal on their arrival, improved after an IV fluids bolus.  Patient denies chest pain or shortness of breath and says he does not feel weak currently although is rather tired.  When seen in the ED this morning he could barely express himself audibly.     Vital signs were normal on presentation here.  He is on warfarin for recurrent DVT, but his INR was 1, and d-dimer markedly elevated at 21.7.  Tox screen was positive for cocaine.  He had a CTA of the brain and neck done on presentation so CTA of the chest for PE study could not be done for 48 hours.  Ultrasound of the lower extremities showed extensive bilateral DVT.  On the right there was thrombosis of the common femoral vein, femoral vein, popliteal vein, one of the paired posterior tibial veins and both visualized peroneal veins.  On the left there was thrombosis of the common femoral vein, femoral vein and popliteal vein.  Patient was started on full dose Lovenox and admitted.    Medical history is from the chart as he is unable to give me much information.  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  He smokes 5-6 cigarettes/day and is not interested in quitting.  He is currently homeless and  staying with a friend.  Despite the normal INR he is sure he is taking his warfarin and is not having difficulty taking his medications.  I discussed his care with [facility] staff on the [facility] and patient had been lost to follow up since November 2021.      Overview/Hospital Course:  Patient presented to the ED after a syncopal episode and was found to have bilateral lower extremity DVTs and a low INR.  Tox screen was positive for cocaine.  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  He was started back on his warfarin with bridging Lovenox.  His 2D echo showed grade I diastolic dysfunction.  Neurology evaluated him and recommended echo with bubble study, which was negative for a shunt.    PT/OT evaluated him and recommended SNF placement as he was not participating in therapy.  His INR was difficult to get therapeutic, and as he had no contraindication to a NOAC his warfarin was changed to apixaban, and the full dose Lovenox was discontinued.  As he appeared to be depressed and had no objection to starting an antidepressant Lexapro was started.  He had a fall in the hospital on 2/13, had gone to the bathroom for a BM with the nurse, and when she turned away while he was washing his hands he apparently lost his balance and fell.  He did not hit his head and did not lose consciousness, but his BP was low transiently and he was given a bolus of IV fluids.    Patient's mental status improved slowly, but he gradually became more talkative and was able to hold a brief conversation.      Interval History:  No issues overnight, patient doing well, awaiting placement. Working well with therapy    Review of Systems   Constitutional:  Negative for chills and fever.   Respiratory:  Negative for cough and shortness of breath.    Cardiovascular:  Negative for chest pain and palpitations.   Gastrointestinal:  Negative for abdominal pain  and nausea.   Genitourinary:  Negative for hematuria.   Neurological:  Positive for weakness.   Objective:     Vital Signs (Most Recent):  Temp: 97.3 °F (36.3 °C) (02/19/22 1134)  Pulse: 66 (02/19/22 1134)  Resp: 16 (02/19/22 1134)  BP: (!) 143/74 (02/19/22 1134)  SpO2: 99 % (02/19/22 1134) Vital Signs (24h Range):  Temp:  [97 °F (36.1 °C)-98.4 °F (36.9 °C)] 97.3 °F (36.3 °C)  Pulse:  [66-89] 66  Resp:  [16-18] 16  SpO2:  [98 %-100 %] 99 %  BP: (102-143)/(54-76) 143/74     Weight: 63 kg (139 lb)  Body mass index is 18.85 kg/m².    Intake/Output Summary (Last 24 hours) at 2/19/2022 1156  Last data filed at 2/19/2022 1127  Gross per 24 hour   Intake 180 ml   Output 2500 ml   Net -2320 ml        Physical Exam  Constitutional:       General: He is not in acute distress.     Comments: Thin   HENT:      Mouth/Throat:      Comments: Edentulous  Pulmonary:      Effort: Pulmonary effort is normal. No respiratory distress.   Neurological:      Mental Status: He is alert. Mental status is at baseline.   Psychiatric:      Comments: Affect flat.       Significant Labs: All pertinent labs within the past 24 hours have been reviewed.    Significant Imaging: I have reviewed all pertinent imaging results/findings within the past 24 hours.      Assessment/Plan:      * Acute deep vein thrombosis (DVT) of both lower extremities  Last ultrasound showed partially occlusive DVTs, now completely occlusive DVT of multiple lower extremity veins on ultrasound.  Patient reports compliance with warfarin but his INR is only one.  He is homeless but says he does not have difficulty obtaining his medications.  Does not seem to care about anything.  D-dimer was markedly elevated on presentation and there was a question of possible PE, but he had a CTA of the brain/neck on presentation so could not get another CTA for another 48 hours.    V/Q scan was done, showed low probability for PE.  2D echo showed grade I diastolic dysfunction.  Warfarin was  restarted with bridging Lovenox, but INR was still low.  Given the difficulty of getting him to follow up changed to apixaban and discontinued Lovenox.    Abnormal imaging of thyroid  Tsh/t4 ok, thyroid ultrasound done, showed bilateral nodules which did not meet criteria for biopsy.    Acute stroke due to ischemia  - MRI showed areas of diffusion signal hyperintensity consistent with areas of acute ischemia/infarct involving the left cerebral hemisphere, the most prominent measuring approximately 1.4 cm, also a small focus of the right frontal lobe.  - Patient on full dose anticoagulation  - Sinus rhythm noted throughout hospital stay  - Risk factor modification - control diabetes, continue statin.  - RPR, HIV non reactive.  B12 low normal.  B1 pending.  - CTA showed a focal segment of 60-70% stenosis involving the proximal to mid left vertebral artery that was new when compared to the prior study of 09/25/2019.  No evidence of large vessel intracranial occlusion.   - Neurology noted symptoms do not correspond to stroke location.  - Echo with bubble study done, no shunt seen.  - Follow up with vascular neurology in 4 weeks.  -Therapy recommending SNF due to his low interest in participation.  - Started Lexapro due to suspicion for depression.      Advance care planning        Severe protein-calorie malnutrition  Diet as tolerated      Debility  Pt/ot efforts  Awaiting  Skilled placement.    Type 2 diabetes mellitus with hyperglycemia, without long-term current use of insulin  Reportedly he is on metformin and glipizide, both held.  Continue SSI for now.  He has 4+ sugar in urine and HbA1c is 10.3, and he is hyperglycemic here.  Will start levemir and continue ISS, increase levemir to 25 units daily  Add scheduled novolog 5 units tid, increase to 7 units  Improving  Resume metformin in SNF  Sugars ok    Syncope and collapse  Unclear cause.  Spoke to [facility] and patient has had previous syncopal episodes attributed  to alcohol abuse.  Has been prescribed meclizine as well.  Tox positive for cocaine but not alcohol.  CTA brain/neck was done in ED, and per ED note the results were discussed with stroke neurologist Dr. Tovar who feels that CTA finding of a short segment of left vertebral artery stenosis was incidental.  He did not think stroke workup with MRI was indicated.    Since change of status per son compared to when he saw him early during the week a CT of the brain was ordered and no abnormalities seen.  MRI of the brain showed acute stroke .      Cocaine abuse  As noted above.  He has not had any behavioral issues while here, and other than being disinterested in therapy he has been very cooperative.  Seems drug use was transient.      Hyperlipidemia  Resumed lipitor      Essential hypertension  Doubtful he was on anything at home.  Started losartan 25 mg daily  BP well controlled on low dose losartan.  BP transiently low today after he fell but recovered quickly.  IVF bolus given.  Will hold losartan as bp low again  bp ok without med    Tobacco dependence  He smokes 5-6 cigarettes/day.  Not trying to quit and not interested in a nicotine patch.  Benefits of smoking cessation were reviewed with patient in detail for for 8 minutes and patient was encouraged to quit. Nicotine replacement options were discussed.        VTE Risk Mitigation (From admission, onward)         Ordered     apixaban tablet 5 mg  2 times daily         02/10/22 0901     Place sequential compression device  Until discontinued         01/31/22 0533                      I have assessed these finding virtually using telemed platform and with assistance of bedside nurse                 The attending portion of this evaluation, treatment, and documentation was performed per Mary Carmen Barr MD via Telemedicine AudioVisual using the secure [employer] software platform with 2 way audio/video. The provider was located off-site and the patient is located in the  \[facility]\"". The aforementioned video software was utilized to document the relevant history and physical exam    Mary Carmen Barr MD  Department of Hospital Medicine   [facility] - Med Surg ([place])

## 2022-02-19 NOTE — SUBJECTIVE & OBJECTIVE
Interval History:  No issues overnight, patient doing well, awaiting placement. Working well with therapy    Review of Systems   Constitutional:  Negative for chills and fever.   Respiratory:  Negative for cough and shortness of breath.    Cardiovascular:  Negative for chest pain and palpitations.   Gastrointestinal:  Negative for abdominal pain and nausea.   Genitourinary:  Negative for hematuria.   Neurological:  Positive for weakness.   Objective:     Vital Signs (Most Recent):  Temp: 97.3 °F (36.3 °C) (02/19/22 1134)  Pulse: 66 (02/19/22 1134)  Resp: 16 (02/19/22 1134)  BP: (!) 143/74 (02/19/22 1134)  SpO2: 99 % (02/19/22 1134) Vital Signs (24h Range):  Temp:  [97 °F (36.1 °C)-98.4 °F (36.9 °C)] 97.3 °F (36.3 °C)  Pulse:  [66-89] 66  Resp:  [16-18] 16  SpO2:  [98 %-100 %] 99 %  BP: (102-143)/(54-76) 143/74     Weight: 63 kg (139 lb)  Body mass index is 18.85 kg/m².    Intake/Output Summary (Last 24 hours) at 2/19/2022 1156  Last data filed at 2/19/2022 1127  Gross per 24 hour   Intake 180 ml   Output 2500 ml   Net -2320 ml        Physical Exam  Constitutional:       General: He is not in acute distress.     Comments: Thin   HENT:      Mouth/Throat:      Comments: Edentulous  Pulmonary:      Effort: Pulmonary effort is normal. No respiratory distress.   Neurological:      Mental Status: He is alert. Mental status is at baseline.   Psychiatric:      Comments: Affect flat.       Significant Labs: All pertinent labs within the past 24 hours have been reviewed.    Significant Imaging: I have reviewed all pertinent imaging results/findings within the past 24 hours.

## 2022-02-20 LAB
POCT GLUCOSE: 118 MG/DL (ref 70–110)
POCT GLUCOSE: 144 MG/DL (ref 70–110)
POCT GLUCOSE: 164 MG/DL (ref 70–110)
POCT GLUCOSE: 199 MG/DL (ref 70–110)
POCT GLUCOSE: 90 MG/DL (ref 70–110)

## 2022-02-20 PROCEDURE — 99231 SBSQ HOSP IP/OBS SF/LOW 25: CPT | Mod: 95,,, | Performed by: STUDENT IN AN ORGANIZED HEALTH CARE EDUCATION/TRAINING PROGRAM

## 2022-02-20 PROCEDURE — 99231 PR SUBSEQUENT HOSPITAL CARE,LEVL I: ICD-10-PCS | Mod: 95,,, | Performed by: STUDENT IN AN ORGANIZED HEALTH CARE EDUCATION/TRAINING PROGRAM

## 2022-02-20 PROCEDURE — 25000003 PHARM REV CODE 250: Performed by: HOSPITALIST

## 2022-02-20 PROCEDURE — 11000001 HC ACUTE MED/SURG PRIVATE ROOM

## 2022-02-20 PROCEDURE — 25000003 PHARM REV CODE 250: Performed by: NURSE PRACTITIONER

## 2022-02-20 PROCEDURE — 94761 N-INVAS EAR/PLS OXIMETRY MLT: CPT

## 2022-02-20 RX ADMIN — APIXABAN 5 MG: 2.5 TABLET, FILM COATED ORAL at 08:02

## 2022-02-20 RX ADMIN — INSULIN ASPART 7 UNITS: 100 INJECTION, SOLUTION INTRAVENOUS; SUBCUTANEOUS at 05:02

## 2022-02-20 RX ADMIN — INSULIN ASPART 7 UNITS: 100 INJECTION, SOLUTION INTRAVENOUS; SUBCUTANEOUS at 08:02

## 2022-02-20 RX ADMIN — ATORVASTATIN CALCIUM 80 MG: 20 TABLET, FILM COATED ORAL at 08:02

## 2022-02-20 RX ADMIN — ESCITALOPRAM OXALATE 10 MG: 10 TABLET ORAL at 08:02

## 2022-02-20 RX ADMIN — INSULIN DETEMIR 25 UNITS: 100 INJECTION, SOLUTION SUBCUTANEOUS at 08:02

## 2022-02-20 RX ADMIN — INSULIN ASPART 7 UNITS: 100 INJECTION, SOLUTION INTRAVENOUS; SUBCUTANEOUS at 12:02

## 2022-02-20 RX ADMIN — TAMSULOSIN HYDROCHLORIDE 0.4 MG: 0.4 CAPSULE ORAL at 08:02

## 2022-02-20 NOTE — NURSING
POC reviewed with pt. Questions and concerns addressed.  No acute events noted during shift. VSS. No complaints. Pt remains AAO x3.  Bed locked and in lowest position.  Side rails up x2.  Call light within reach. Bed alarm activated. Tele sitter remains at bedside. Bedside report given to oncoming RN.

## 2022-02-21 ENCOUNTER — PATIENT OUTREACH (OUTPATIENT)
Dept: ADMINISTRATIVE | Facility: OTHER | Age: 72
End: 2022-02-21
Payer: MEDICARE

## 2022-02-21 PROBLEM — R40.0 DROWSY: Status: ACTIVE | Noted: 2022-02-21

## 2022-02-21 PROBLEM — A41.9 SEPSIS: Status: ACTIVE | Noted: 2022-02-21

## 2022-02-21 LAB
ANION GAP SERPL CALC-SCNC: 12 MMOL/L (ref 8–16)
BACTERIA #/AREA URNS HPF: ABNORMAL /HPF
BASOPHILS # BLD AUTO: 0.04 K/UL (ref 0–0.2)
BASOPHILS NFR BLD: 0.2 % (ref 0–1.9)
BILIRUB UR QL STRIP: NEGATIVE
BUN SERPL-MCNC: 14 MG/DL (ref 8–23)
CALCIUM SERPL-MCNC: 9.7 MG/DL (ref 8.7–10.5)
CHLORIDE SERPL-SCNC: 101 MMOL/L (ref 95–110)
CLARITY UR: CLEAR
CO2 SERPL-SCNC: 19 MMOL/L (ref 23–29)
COLOR UR: YELLOW
CREAT SERPL-MCNC: 1 MG/DL (ref 0.5–1.4)
DIFFERENTIAL METHOD: ABNORMAL
EOSINOPHIL # BLD AUTO: 0 K/UL (ref 0–0.5)
EOSINOPHIL NFR BLD: 0.1 % (ref 0–8)
ERYTHROCYTE [DISTWIDTH] IN BLOOD BY AUTOMATED COUNT: 12.6 % (ref 11.5–14.5)
EST. GFR  (AFRICAN AMERICAN): >60 ML/MIN/1.73 M^2
EST. GFR  (NON AFRICAN AMERICAN): >60 ML/MIN/1.73 M^2
GLUCOSE SERPL-MCNC: 159 MG/DL (ref 70–110)
GLUCOSE UR QL STRIP: ABNORMAL
HCT VFR BLD AUTO: 35.8 % (ref 40–54)
HGB BLD-MCNC: 12 G/DL (ref 14–18)
HGB UR QL STRIP: ABNORMAL
IMM GRANULOCYTES # BLD AUTO: 0.1 K/UL (ref 0–0.04)
IMM GRANULOCYTES NFR BLD AUTO: 0.5 % (ref 0–0.5)
KETONES UR QL STRIP: NEGATIVE
LEUKOCYTE ESTERASE UR QL STRIP: ABNORMAL
LYMPHOCYTES # BLD AUTO: 2.1 K/UL (ref 1–4.8)
LYMPHOCYTES NFR BLD: 11.2 % (ref 18–48)
MCH RBC QN AUTO: 25.9 PG (ref 27–31)
MCHC RBC AUTO-ENTMCNC: 33.5 G/DL (ref 32–36)
MCV RBC AUTO: 77 FL (ref 82–98)
MICROSCOPIC COMMENT: ABNORMAL
MONOCYTES # BLD AUTO: 1.6 K/UL (ref 0.3–1)
MONOCYTES NFR BLD: 8.3 % (ref 4–15)
NEUTROPHILS # BLD AUTO: 15 K/UL (ref 1.8–7.7)
NEUTROPHILS NFR BLD: 79.7 % (ref 38–73)
NITRITE UR QL STRIP: NEGATIVE
NRBC BLD-RTO: 0 /100 WBC
PH UR STRIP: 7 [PH] (ref 5–8)
PLATELET # BLD AUTO: 228 K/UL (ref 150–450)
PMV BLD AUTO: 10.3 FL (ref 9.2–12.9)
POCT GLUCOSE: 164 MG/DL (ref 70–110)
POCT GLUCOSE: 207 MG/DL (ref 70–110)
POCT GLUCOSE: 229 MG/DL (ref 70–110)
POCT GLUCOSE: 310 MG/DL (ref 70–110)
POTASSIUM SERPL-SCNC: 4.1 MMOL/L (ref 3.5–5.1)
PROT UR QL STRIP: ABNORMAL
RBC # BLD AUTO: 4.64 M/UL (ref 4.6–6.2)
RBC #/AREA URNS HPF: 15 /HPF (ref 0–4)
SODIUM SERPL-SCNC: 132 MMOL/L (ref 136–145)
SP GR UR STRIP: 1.02 (ref 1–1.03)
URN SPEC COLLECT METH UR: ABNORMAL
UROBILINOGEN UR STRIP-ACNC: NEGATIVE EU/DL
WBC # BLD AUTO: 18.86 K/UL (ref 3.9–12.7)
WBC #/AREA URNS HPF: 70 /HPF (ref 0–5)
WBC CLUMPS URNS QL MICRO: ABNORMAL

## 2022-02-21 PROCEDURE — 87040 BLOOD CULTURE FOR BACTERIA: CPT | Performed by: STUDENT IN AN ORGANIZED HEALTH CARE EDUCATION/TRAINING PROGRAM

## 2022-02-21 PROCEDURE — 63600175 PHARM REV CODE 636 W HCPCS: Performed by: STUDENT IN AN ORGANIZED HEALTH CARE EDUCATION/TRAINING PROGRAM

## 2022-02-21 PROCEDURE — 87077 CULTURE AEROBIC IDENTIFY: CPT | Mod: 59 | Performed by: STUDENT IN AN ORGANIZED HEALTH CARE EDUCATION/TRAINING PROGRAM

## 2022-02-21 PROCEDURE — 87086 URINE CULTURE/COLONY COUNT: CPT | Performed by: STUDENT IN AN ORGANIZED HEALTH CARE EDUCATION/TRAINING PROGRAM

## 2022-02-21 PROCEDURE — 25000003 PHARM REV CODE 250: Performed by: HOSPITALIST

## 2022-02-21 PROCEDURE — 80048 BASIC METABOLIC PNL TOTAL CA: CPT | Performed by: STUDENT IN AN ORGANIZED HEALTH CARE EDUCATION/TRAINING PROGRAM

## 2022-02-21 PROCEDURE — 97530 THERAPEUTIC ACTIVITIES: CPT | Mod: CQ

## 2022-02-21 PROCEDURE — 85025 COMPLETE CBC W/AUTO DIFF WBC: CPT | Performed by: STUDENT IN AN ORGANIZED HEALTH CARE EDUCATION/TRAINING PROGRAM

## 2022-02-21 PROCEDURE — 25000003 PHARM REV CODE 250: Performed by: INTERNAL MEDICINE

## 2022-02-21 PROCEDURE — 36415 COLL VENOUS BLD VENIPUNCTURE: CPT | Performed by: STUDENT IN AN ORGANIZED HEALTH CARE EDUCATION/TRAINING PROGRAM

## 2022-02-21 PROCEDURE — 25000003 PHARM REV CODE 250: Performed by: NURSE PRACTITIONER

## 2022-02-21 PROCEDURE — 94761 N-INVAS EAR/PLS OXIMETRY MLT: CPT

## 2022-02-21 PROCEDURE — 99233 SBSQ HOSP IP/OBS HIGH 50: CPT | Mod: ,,, | Performed by: INTERNAL MEDICINE

## 2022-02-21 PROCEDURE — 11000001 HC ACUTE MED/SURG PRIVATE ROOM

## 2022-02-21 PROCEDURE — 87088 URINE BACTERIA CULTURE: CPT | Performed by: STUDENT IN AN ORGANIZED HEALTH CARE EDUCATION/TRAINING PROGRAM

## 2022-02-21 PROCEDURE — 81000 URINALYSIS NONAUTO W/SCOPE: CPT | Performed by: STUDENT IN AN ORGANIZED HEALTH CARE EDUCATION/TRAINING PROGRAM

## 2022-02-21 PROCEDURE — 97535 SELF CARE MNGMENT TRAINING: CPT

## 2022-02-21 PROCEDURE — 87186 SC STD MICRODIL/AGAR DIL: CPT | Mod: 59 | Performed by: STUDENT IN AN ORGANIZED HEALTH CARE EDUCATION/TRAINING PROGRAM

## 2022-02-21 PROCEDURE — 99233 PR SUBSEQUENT HOSPITAL CARE,LEVL III: ICD-10-PCS | Mod: ,,, | Performed by: INTERNAL MEDICINE

## 2022-02-21 RX ORDER — SODIUM CHLORIDE 9 MG/ML
INJECTION, SOLUTION INTRAVENOUS CONTINUOUS
Status: DISCONTINUED | OUTPATIENT
Start: 2022-02-21 | End: 2022-03-02

## 2022-02-21 RX ADMIN — INSULIN DETEMIR 25 UNITS: 100 INJECTION, SOLUTION SUBCUTANEOUS at 08:02

## 2022-02-21 RX ADMIN — ESCITALOPRAM OXALATE 10 MG: 10 TABLET ORAL at 08:02

## 2022-02-21 RX ADMIN — ATORVASTATIN CALCIUM 80 MG: 20 TABLET, FILM COATED ORAL at 08:02

## 2022-02-21 RX ADMIN — CEFTRIAXONE 1 G: 1 INJECTION, SOLUTION INTRAVENOUS at 11:02

## 2022-02-21 RX ADMIN — INSULIN ASPART 7 UNITS: 100 INJECTION, SOLUTION INTRAVENOUS; SUBCUTANEOUS at 04:02

## 2022-02-21 RX ADMIN — INSULIN ASPART 4 UNITS: 100 INJECTION, SOLUTION INTRAVENOUS; SUBCUTANEOUS at 04:02

## 2022-02-21 RX ADMIN — INSULIN ASPART 7 UNITS: 100 INJECTION, SOLUTION INTRAVENOUS; SUBCUTANEOUS at 08:02

## 2022-02-21 RX ADMIN — APIXABAN 5 MG: 2.5 TABLET, FILM COATED ORAL at 08:02

## 2022-02-21 RX ADMIN — SODIUM CHLORIDE 500 ML: 0.9 INJECTION, SOLUTION INTRAVENOUS at 04:02

## 2022-02-21 RX ADMIN — ACETAMINOPHEN 650 MG: 325 TABLET, FILM COATED ORAL at 06:02

## 2022-02-21 RX ADMIN — SODIUM CHLORIDE: 0.9 INJECTION, SOLUTION INTRAVENOUS at 05:02

## 2022-02-21 RX ADMIN — INSULIN ASPART 2 UNITS: 100 INJECTION, SOLUTION INTRAVENOUS; SUBCUTANEOUS at 11:02

## 2022-02-21 RX ADMIN — TAMSULOSIN HYDROCHLORIDE 0.4 MG: 0.4 CAPSULE ORAL at 08:02

## 2022-02-21 RX ADMIN — INSULIN ASPART 7 UNITS: 100 INJECTION, SOLUTION INTRAVENOUS; SUBCUTANEOUS at 11:02

## 2022-02-21 RX ADMIN — INSULIN ASPART 1 UNITS: 100 INJECTION, SOLUTION INTRAVENOUS; SUBCUTANEOUS at 10:02

## 2022-02-21 NOTE — PLAN OF CARE
Problem: Occupational Therapy Goal  Goal: Occupational Therapy Goal  Description: Goals to be met by: 2/15/2022    Patient will increase functional independence with ADLs by performing:    UE Dressing with [place].  LE Dressing with [place].  Grooming while standing at sink with Supervision.  Toileting from toilet with Supervision for hygiene and clothing management.   Toilet transfer to toilet with Supervision.    Outcome: Ongoing, Not Progressing  Required increased assist for mobility, initiation, follow through, and termination of tasks.  Found soiled in BM, declined transferring to toilet and required total assist for anne-marie care in standing and supine.  Continue OT services to maximize patient functioning.

## 2022-02-21 NOTE — NURSING
POC reviewed with pt. Questions and concerns addressed.  No acute events noted during shift. VSS. No complaints. Pt remains AAO x2.  Bed locked and in lowest position.  Side rails up x2.  Call light within reach. Bed alarm activated. Tele sitter remains at bedside. Condom cath intact. Bedside report given to oncoming RN.

## 2022-02-21 NOTE — PROGRESS NOTES
[facility] Medicine  Telemedicine Progress Note    Patient Name: Forest Lopez  MRN: [id number]  Patient Class: IP- Inpatient   Admission Date: 1/31/2022  Length of Stay: 20 days  Attending Physician: Mary Carmen Barr MD  Primary Care Provider: Julian Escobar          Subjective:     Principal Problem:Acute deep vein thrombosis (DVT) of both lower extremities        HPI:  Mr. Lopez is a 72 year old man who presented after a syncopal episode.  He reports he had been feeling well prior to the episode but then had a prodrome prior to passing out.  EMS was called, report patient's BP was low normal on their arrival, improved after an IV fluids bolus.  Patient denies chest pain or shortness of breath and says he does not feel weak currently although is rather tired.  When seen in the ED this morning he could barely express himself audibly.     Vital signs were normal on presentation here.  He is on warfarin for recurrent DVT, but his INR was 1, and d-dimer markedly elevated at 21.7.  Tox screen was positive for cocaine.  He had a CTA of the brain and neck done on presentation so CTA of the chest for PE study could not be done for 48 hours.  Ultrasound of the lower extremities showed extensive bilateral DVT.  On the right there was thrombosis of the common femoral vein, femoral vein, popliteal vein, one of the paired posterior tibial veins and both visualized peroneal veins.  On the left there was thrombosis of the common femoral vein, femoral vein and popliteal vein.  Patient was started on full dose Lovenox and admitted.    Medical history is from the chart as he is unable to give me much information.  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  He smokes 5-6 cigarettes/day and is not interested in quitting.  He is currently homeless and  staying with a friend.  Despite the normal INR he is sure he is taking his warfarin and is not having difficulty taking his medications.  I discussed his care with [facility] staff on the [facility] and patient had been lost to follow up since November 2021.      Overview/Hospital Course:  Patient presented to the ED after a syncopal episode and was found to have bilateral lower extremity DVTs and a low INR.  Tox screen was positive for cocaine.  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  He was started back on his warfarin with bridging Lovenox.  His 2D echo showed grade I diastolic dysfunction.  Neurology evaluated him and recommended echo with bubble study, which was negative for a shunt.    PT/OT evaluated him and recommended SNF placement as he was not participating in therapy.  His INR was difficult to get therapeutic, and as he had no contraindication to a NOAC his warfarin was changed to apixaban, and the full dose Lovenox was discontinued.  As he appeared to be depressed and had no objection to starting an antidepressant Lexapro was started.  He had a fall in the hospital on 2/13, had gone to the bathroom for a BM with the nurse, and when she turned away while he was washing his hands he apparently lost his balance and fell.  He did not hit his head and did not lose consciousness, but his BP was low transiently and he was given a bolus of IV fluids.    Patient's mental status improved slowly, but he gradually became more talkative and was able to hold a brief conversation.      Interval History:  No issues overnight, patient doing well, awaiting placement. Working well with therapy    Review of Systems   Constitutional:  Negative for chills and fever.   Respiratory:  Negative for cough and shortness of breath.    Cardiovascular:  Negative for chest pain and palpitations.   Gastrointestinal:  Negative for abdominal pain  and nausea.   Genitourinary:  Negative for hematuria.   Neurological:  Positive for weakness.   Objective:     Vital Signs (Most Recent):  Temp: 98.5 °F (36.9 °C) (02/20/22 1937)  Pulse: (!) 113 (02/20/22 1937)  Resp: 16 (02/20/22 1937)  BP: (!) 165/80 (02/20/22 1937)  SpO2: 98 % (02/20/22 1937) Vital Signs (24h Range):  Temp:  [96.8 °F (36 °C)-98.9 °F (37.2 °C)] 98.5 °F (36.9 °C)  Pulse:  [] 113  Resp:  [15-18] 16  SpO2:  [97 %-100 %] 98 %  BP: (125-165)/(64-86) 165/80     Weight: 63 kg (139 lb)  Body mass index is 18.85 kg/m².    Intake/Output Summary (Last 24 hours) at 2/20/2022 5808  Last data filed at 2/20/2022 0900  Gross per 24 hour   Intake 480 ml   Output 1700 ml   Net -1220 ml        Physical Exam  Constitutional:       General: He is not in acute distress.     Comments: Thin   HENT:      Mouth/Throat:      Comments: Edentulous  Pulmonary:      Effort: Pulmonary effort is normal. No respiratory distress.   Neurological:      Mental Status: He is alert. Mental status is at baseline.   Psychiatric:      Comments: Affect flat.       Significant Labs: All pertinent labs within the past 24 hours have been reviewed.    Significant Imaging: I have reviewed all pertinent imaging results/findings within the past 24 hours.      Assessment/Plan:      * Acute deep vein thrombosis (DVT) of both lower extremities  Last ultrasound showed partially occlusive DVTs, now completely occlusive DVT of multiple lower extremity veins on ultrasound.  Patient reports compliance with warfarin but his INR is only one.  He is homeless but says he does not have difficulty obtaining his medications.  Does not seem to care about anything.  D-dimer was markedly elevated on presentation and there was a question of possible PE, but he had a CTA of the brain/neck on presentation so could not get another CTA for another 48 hours.    V/Q scan was done, showed low probability for PE.  2D echo showed grade I diastolic dysfunction.   Warfarin was restarted with bridging Lovenox, but INR was still low.  Given the difficulty of getting him to follow up changed to apixaban and discontinued Lovenox.    Abnormal imaging of thyroid  Tsh/t4 ok, thyroid ultrasound done, showed bilateral nodules which did not meet criteria for biopsy.    Acute stroke due to ischemia  - MRI showed areas of diffusion signal hyperintensity consistent with areas of acute ischemia/infarct involving the left cerebral hemisphere, the most prominent measuring approximately 1.4 cm, also a small focus of the right frontal lobe.  - Patient on full dose anticoagulation  - Sinus rhythm noted throughout hospital stay  - Risk factor modification - control diabetes, continue statin.  - RPR, HIV non reactive.  B12 low normal.  B1 pending.  - CTA showed a focal segment of 60-70% stenosis involving the proximal to mid left vertebral artery that was new when compared to the prior study of 09/25/2019.  No evidence of large vessel intracranial occlusion.   - Neurology noted symptoms do not correspond to stroke location.  - Echo with bubble study done, no shunt seen.  - Follow up with vascular neurology in 4 weeks.  -Therapy recommending SNF due to his low interest in participation.  - Started Lexapro due to suspicion for depression.      Advance care planning        Severe protein-calorie malnutrition  Diet as tolerated      Debility  Pt/ot efforts  Awaiting  Skilled placement.    Type 2 diabetes mellitus with hyperglycemia, without long-term current use of insulin  Reportedly he is on metformin and glipizide, both held.  Continue SSI for now.  He has 4+ sugar in urine and HbA1c is 10.3, and he is hyperglycemic here.  Will start levemir and continue ISS, increase levemir to 25 units daily  Add scheduled novolog 5 units tid, increase to 7 units  Improving  Resume metformin in SNF  Sugars ok    Syncope and collapse  Unclear cause.  Spoke to [facility] and patient has had previous syncopal episodes  attributed to alcohol abuse.  Has been prescribed meclizine as well.  Tox positive for cocaine but not alcohol.  CTA brain/neck was done in ED, and per ED note the results were discussed with stroke neurologist Dr. Tovar who feels that CTA finding of a short segment of left vertebral artery stenosis was incidental.  He did not think stroke workup with MRI was indicated.    Since change of status per son compared to when he saw him early during the week a CT of the brain was ordered and no abnormalities seen.  MRI of the brain showed acute stroke .      Cocaine abuse  As noted above.  He has not had any behavioral issues while here, and other than being disinterested in therapy he has been very cooperative.  Seems drug use was transient.      Hyperlipidemia  Resumed lipitor      Essential hypertension  Doubtful he was on anything at home.  Started losartan 25 mg daily  BP well controlled on low dose losartan.  BP transiently low today after he fell but recovered quickly.  IVF bolus given.  Will hold losartan as bp low again  bp ok without med    Tobacco dependence  He smokes 5-6 cigarettes/day.  Not trying to quit and not interested in a nicotine patch.  Benefits of smoking cessation were reviewed with patient in detail for for 8 minutes and patient was encouraged to quit. Nicotine replacement options were discussed.        VTE Risk Mitigation (From admission, onward)         Ordered     apixaban tablet 5 mg  2 times daily         02/10/22 0901     Place sequential compression device  Until discontinued         01/31/22 0533                      I have assessed these finding virtually using telemed platform and with assistance of bedside nurse                 The attending portion of this evaluation, treatment, and documentation was performed per Mary Carmen Barr MD via Telemedicine AudioVisual using the secure [employer] software platform with 2 way audio/video. The provider was located off-site and the patient is  located in the hospital. The aforementioned video software was utilized to document the relevant history and physical exam    Mary Carmen Barr MD  Department of Hospital Medicine   [facility] - Med Surg ([place])

## 2022-02-21 NOTE — PT/OT/SLP PROGRESS
Speech Language Pathology      Forest Lopez  MRN: [id number]    Patient not seen today secondary to Other (Comment). Pt eating lunch, requesting to defer SLP session at this time. Will follow-up next available date 2/22/22.

## 2022-02-21 NOTE — PROGRESS NOTES
BERKLEYW met with patient and patient has no additional needs. Pending SNF placement.     SUMMER Wilson

## 2022-02-21 NOTE — SUBJECTIVE & OBJECTIVE
Interval History:  No issues overnight, patient doing well, awaiting placement. Working well with therapy    Review of Systems   Constitutional:  Negative for chills and fever.   Respiratory:  Negative for cough and shortness of breath.    Cardiovascular:  Negative for chest pain and palpitations.   Gastrointestinal:  Negative for abdominal pain and nausea.   Genitourinary:  Negative for hematuria.   Neurological:  Positive for weakness.   Objective:     Vital Signs (Most Recent):  Temp: 98.5 °F (36.9 °C) (02/20/22 1937)  Pulse: (!) 113 (02/20/22 1937)  Resp: 16 (02/20/22 1937)  BP: (!) 165/80 (02/20/22 1937)  SpO2: 98 % (02/20/22 1937) Vital Signs (24h Range):  Temp:  [96.8 °F (36 °C)-98.9 °F (37.2 °C)] 98.5 °F (36.9 °C)  Pulse:  [] 113  Resp:  [15-18] 16  SpO2:  [97 %-100 %] 98 %  BP: (125-165)/(64-86) 165/80     Weight: 63 kg (139 lb)  Body mass index is 18.85 kg/m².    Intake/Output Summary (Last 24 hours) at 2/20/2022 6996  Last data filed at 2/20/2022 0900  Gross per 24 hour   Intake 480 ml   Output 1700 ml   Net -1220 ml        Physical Exam  Constitutional:       General: He is not in acute distress.     Comments: Thin   HENT:      Mouth/Throat:      Comments: Edentulous  Pulmonary:      Effort: Pulmonary effort is normal. No respiratory distress.   Neurological:      Mental Status: He is alert. Mental status is at baseline.   Psychiatric:      Comments: Affect flat.       Significant Labs: All pertinent labs within the past 24 hours have been reviewed.    Significant Imaging: I have reviewed all pertinent imaging results/findings within the past 24 hours.

## 2022-02-21 NOTE — ASSESSMENT & PLAN NOTE
Unclear cause.  Spoke to [facility] and patient has had previous syncopal episodes attributed to alcohol abuse.  Has been prescribed meclizine as well.  Tox positive for cocaine but not alcohol.  CTA brain/neck was done in ED, and per ED note the results were discussed with stroke neurologist Dr. Tovar who feels that CTA finding of a short segment of left vertebral artery stenosis was incidental.  He did not think stroke workup with MRI was indicated.    Since change of status per son compared to when he saw him early during the week a CT of the brain was ordered and no abnormalities seen.  MRI of the brain showed acute stroke .

## 2022-02-21 NOTE — PLAN OF CARE
POC reviewed. Pt seemed really tired with low energy this morning. Reported it to MD. MD ordered a 500mL bolus of NC and pt improved.  No complaints of pain. Pt is incontinent and shifts with assist. Bed low and locked, side rails up x3, Avasys camera in place, call light within reach.    Problem: Adult Inpatient Plan of Care  Goal: Plan of Care Review  Outcome: Ongoing, Progressing  Goal: Patient-Specific Goal (Individualized)  Outcome: Ongoing, Progressing  Goal: Absence of Hospital-Acquired Illness or Injury  Outcome: Ongoing, Progressing  Goal: Optimal Comfort and Wellbeing  Outcome: Ongoing, Progressing  Goal: Readiness for Transition of Care  Outcome: Ongoing, Progressing     Problem: Diabetes Comorbidity  Goal: Blood Glucose Level Within Targeted Range  Outcome: Ongoing, Progressing     Problem: Skin Injury Risk Increased  Goal: Skin Health and Integrity  Outcome: Ongoing, Progressing     Problem: Coping Ineffective  Goal: Effective Coping  Outcome: Ongoing, Progressing     Problem: Fall Injury Risk  Goal: Absence of Fall and Fall-Related Injury  Outcome: Ongoing, Progressing

## 2022-02-21 NOTE — PT/OT/SLP PROGRESS
Occupational Therapy   Treatment    Name: Forest Lopez  MRN: [id number]  Admitting Diagnosis:  Acute deep vein thrombosis (DVT) of both lower extremities       Recommendations:     Discharge Recommendations: nursing facility, skilled  Discharge Equipment Recommendations:  bedside commode, shower chair, walker, rolling  Barriers to discharge:  Decreased caregiver support    Assessment:     Forest Lopez is a 72 y.o. male with a medical diagnosis of Acute deep vein thrombosis (DVT) of both lower extremities.  He presents with the following performance deficits affecting function: weakness, impaired functional mobilty, impaired cognition, decreased safety awareness, impaired endurance, gait instability, impaired balance, decreased upper extremity function, impaired self care skills, decreased lower extremity function, decreased coordination.     Required increased assist for mobility, initiation, follow through, and termination of tasks.  Found soiled in BM, declined transferring to toilet and required total assist for anne-marie care in standing and supine.  Continue OT services to maximize patient functioning.     Rehab Prognosis:  Fair; patient would benefit from acute skilled OT services to address these deficits and reach maximum level of function.       Plan:     Patient to be seen 3 x/week to address the above listed problems via self-care/home management, therapeutic activities, therapeutic exercises, cognitive retraining  · Plan of Care Expires: 03/03/22  · Plan of Care Reviewed with: patient    Subjective     Pain/Comfort:  · Pain Rating 1: 0/10  · Pain Rating Post-Intervention 1: 0/10    Objective:     Communicated with: MARIBEL Sherwood prior to session.  Patient found HOB elevated with bed alarm, peripheral IV, telemetry, Condom Catheter, Other (comments) ([employer]) upon OT entry to room.    General Precautions: Standard, anti-coagulation medicine, fall, other (see comments) (syncope)   Orthopedic Precautions:N/A    Braces: N/A  Respiratory Status: Room air     Occupational Performance:     Bed Mobility:    · Patient completed Scooting/Bridging with moderate assistance  · Patient completed Supine to Sit with moderate assistance  · Patient completed Sit to Supine with moderate assistance     Functional Mobility/Transfers:  · Patient completed Sit <> Stand Transfer with moderate assistance  with  rolling walker   · Functional Mobility: Min assist for side steps along EOB to HOB and vc for sequencing RW and feet    Activities of Daily Living:  · Bathing: moderate assistance sponge bath EOB  · Upper Body Dressing: moderate assistance donning gown at EOB  · Toileting: total assistance standing and bed level after found with BM      [facility] 6 Click ADL: 13    Treatment & Education:  Educated on having had BM (denied aware), constant cues to initiate/follow through and terminate tasks, RN button on call bell.  Verbalized understanding.    Patient left HOB elevated with all lines intact, call button in reach, bed alarm on and RN notifiedEducation:      GOALS:   Multidisciplinary Problems     Occupational Therapy Goals        Problem: Occupational Therapy Goal    Goal Priority Disciplines Outcome Interventions   Occupational Therapy Goal     OT, PT/OT Ongoing, Not Progressing    Description: Goals to be met by: 2/15/2022    Patient will increase functional independence with ADLs by performing:    UE Dressing with Union.  LE Dressing with Union.  Grooming while standing at sink with Supervision.  Toileting from toilet with Supervision for hygiene and clothing management.   Toilet transfer to toilet with Supervision.                     Time Tracking:     OT Date of Treatment: 02/21/22  OT Start Time: 1104  OT Stop Time: 1129  OT Total Time (min): 25 min    Billable Minutes:Self Care/Home Management 25    OT/DUTCH: OT          2/21/2022

## 2022-02-21 NOTE — PT/OT/SLP PROGRESS
Physical Therapy Treatment    Patient Name:  Forest Lopez   MRN:  [id number]    Recommendations:     Discharge Recommendations:  nursing facility, skilled   Discharge Equipment Recommendations: bedside commode, walker, rolling, shower chair   Barriers to discharge: Decreased caregiver support    Assessment:     Forest Lopez is a 72 y.o. male admitted with a medical diagnosis of Acute deep vein thrombosis (DVT) of both lower extremities.  He presents with the following impairments/functional limitations:  weakness, impaired endurance, impaired self care skills, impaired functional mobilty, gait instability, impaired balance, impaired cognition, decreased coordination, decreased upper extremity function, decreased lower extremity function, decreased safety awareness ;pt with fair mobility today, limited amb distance 2/2 pt not following commands well, staring into space, needing inc assistance for mobility.    Rehab Prognosis: Good/Fair; patient would benefit from acute skilled PT services to address these deficits and reach maximum level of function.    Recent Surgery: * No surgery found *      Plan:     During this hospitalization, patient to be seen 3 x/week to address the identified rehab impairments via gait training, therapeutic activities, therapeutic exercises, neuromuscular re-education and progress toward the following goals:    · Plan of Care Expires:  03/02/22    Subjective     Chief Complaint: none stated  Patient/Family Comments/goals: pt agreeable to session, mumbling, closing eyes at times.   Pain/Comfort:  · Pain Rating 1: 0/10  · Pain Rating Post-Intervention 1: 0/10      Objective:     Communicated with nurse prior to session.  Patient found HOB elevated with bed alarm, Condom Catheter, peripheral IV, telemetry (Avasys monitoring) upon PT entry to room.     General Precautions: Standard, anti-coagulation medicine, fall, diabetic   Orthopedic Precautions:N/A   Braces: N/A  Respiratory Status: Room  air     Functional Mobility:  · Bed Mobility:     · Supine to Sit: moderate assistance  · Transfers:     · Sit to Stand:  minimum assistance and moderate assistance with rolling walker  · Gait: amb'd 20' w/ RW and min/modA; max VC's req'd and maxA for RW mgmt      AM-PAC 6 CLICK MOBILITY  Turning over in bed (including adjusting bedclothes, sheets and blankets)?: 3  Sitting down on and standing up from a chair with arms (e.g., wheelchair, bedside commode, etc.): 3  Moving from lying on back to sitting on the side of the bed?: 3  Moving to and from a bed to a chair (including a wheelchair)?: 3  Need to walk in hospital room?: 2  Climbing 3-5 steps with a railing?: 2  Basic Mobility Total Score: 16       Therapeutic Activities and Exercises:   pt perf'd t/f's and OOB mobility as noted above, needing inc assistance and inc time to perform today. Lunch tray present, pt having difficulty w/ feeding himself. Nsg. Notified of pt decline.    Patient left up in chair with all lines intact, call button in reach, chair alarm on and nurse present.Lunch tray just arrived.     GOALS:   Multidisciplinary Problems     Physical Therapy Goals        Problem: Physical Therapy Goal    Goal Priority Disciplines Outcome Goal Variances Interventions   Physical Therapy Goal     PT, PT/OT Ongoing, Progressing     Description: Goals to be met by: 3/2/2022    Patient will increase functional independence with mobility by performin. Sit<>stand with SPV with LRAD.  2. Gait x 300 feet with SPV with LRAD.   3. Static standing in SLS with no UE support with SBA x10 sec.                      Time Tracking:     PT Received On: 22  PT Start Time: 1208     PT Stop Time: 1231  PT Total Time (min): 23 min     Billable Minutes: Therapeutic Activity 23    Treatment Type: Treatment  PT/PTA: PTA     PTA Visit Number: 2     2022

## 2022-02-21 NOTE — SUBJECTIVE & OBJECTIVE
Interval History: less responsive today, wbc high, transferred back from [facility] service.    Review of Systems   Unable to perform ROS: Mental status change   Objective:     Vital Signs (Most Recent):  Temp: 99.9 °F (37.7 °C) (02/21/22 1508)  Pulse: (!) 114 (02/21/22 1508)  Resp: 18 (02/21/22 1508)  BP: (!) 142/66 (02/21/22 1508)  SpO2: 98 % (02/21/22 1508)   Vital Signs (24h Range):  Temp:  [98 °F (36.7 °C)-99.9 °F (37.7 °C)] 99.9 °F (37.7 °C)  Pulse:  [105-132] 114  Resp:  [16-18] 18  SpO2:  [96 %-99 %] 98 %  BP: (130-165)/(66-80) 142/66     Weight: 63 kg (139 lb)  Body mass index is 18.85 kg/m².    Intake/Output Summary (Last 24 hours) at 2/21/2022 1702  Last data filed at 2/21/2022 0724  Gross per 24 hour   Intake --   Output 275 ml   Net -275 ml      Physical Exam  Vitals reviewed.   Constitutional:       General: He is not in acute distress.     Appearance: He is well-developed.      Comments: drowsy   HENT:      Head: Normocephalic and atraumatic.   Eyes:      Extraocular Movements: Extraocular movements intact.      Pupils: Pupils are equal, round, and reactive to light.   Cardiovascular:      Rate and Rhythm: Normal rate and regular rhythm.   Pulmonary:      Effort: Pulmonary effort is normal. No respiratory distress.      Breath sounds: Normal breath sounds.   Abdominal:      General: Bowel sounds are normal. There is no distension.      Palpations: Abdomen is soft.      Tenderness: There is no abdominal tenderness.   Musculoskeletal:         General: No swelling. Normal range of motion.      Cervical back: Normal range of motion and neck supple.   Skin:     General: Skin is warm.      Findings: No rash.   Neurological:      General: No focal deficit present.      Comments: Drowsy but arousable, able to tell his name   Psychiatric:         Behavior: Behavior normal.       Significant Labs: All pertinent labs within the past 24 hours have been reviewed.    Significant Imaging: I have reviewed all pertinent  imaging results/findings within the past 24 hours.

## 2022-02-22 LAB
ANION GAP SERPL CALC-SCNC: 12 MMOL/L (ref 8–16)
BASOPHILS # BLD AUTO: 0.05 K/UL (ref 0–0.2)
BASOPHILS NFR BLD: 0.2 % (ref 0–1.9)
BUN SERPL-MCNC: 12 MG/DL (ref 8–23)
CALCIUM SERPL-MCNC: 9.9 MG/DL (ref 8.7–10.5)
CHLORIDE SERPL-SCNC: 102 MMOL/L (ref 95–110)
CO2 SERPL-SCNC: 19 MMOL/L (ref 23–29)
CREAT SERPL-MCNC: 0.9 MG/DL (ref 0.5–1.4)
DIFFERENTIAL METHOD: ABNORMAL
EOSINOPHIL # BLD AUTO: 0 K/UL (ref 0–0.5)
EOSINOPHIL NFR BLD: 0 % (ref 0–8)
ERYTHROCYTE [DISTWIDTH] IN BLOOD BY AUTOMATED COUNT: 12.7 % (ref 11.5–14.5)
EST. GFR  (AFRICAN AMERICAN): >60 ML/MIN/1.73 M^2
EST. GFR  (NON AFRICAN AMERICAN): >60 ML/MIN/1.73 M^2
GLUCOSE SERPL-MCNC: 142 MG/DL (ref 70–110)
HCT VFR BLD AUTO: 33.7 % (ref 40–54)
HGB BLD-MCNC: 11 G/DL (ref 14–18)
IMM GRANULOCYTES # BLD AUTO: 0.23 K/UL (ref 0–0.04)
IMM GRANULOCYTES NFR BLD AUTO: 1.1 % (ref 0–0.5)
LACTATE SERPL-SCNC: 0.9 MMOL/L (ref 0.5–2.2)
LYMPHOCYTES # BLD AUTO: 1.2 K/UL (ref 1–4.8)
LYMPHOCYTES NFR BLD: 5.9 % (ref 18–48)
MAGNESIUM SERPL-MCNC: 1.6 MG/DL (ref 1.6–2.6)
MCH RBC QN AUTO: 25.8 PG (ref 27–31)
MCHC RBC AUTO-ENTMCNC: 32.6 G/DL (ref 32–36)
MCV RBC AUTO: 79 FL (ref 82–98)
MONOCYTES # BLD AUTO: 1.6 K/UL (ref 0.3–1)
MONOCYTES NFR BLD: 7.7 % (ref 4–15)
NEUTROPHILS # BLD AUTO: 17.2 K/UL (ref 1.8–7.7)
NEUTROPHILS NFR BLD: 85.1 % (ref 38–73)
NRBC BLD-RTO: 0 /100 WBC
PHOSPHATE SERPL-MCNC: 2.4 MG/DL (ref 2.7–4.5)
PLATELET # BLD AUTO: 200 K/UL (ref 150–450)
PMV BLD AUTO: 10.5 FL (ref 9.2–12.9)
POCT GLUCOSE: 124 MG/DL (ref 70–110)
POCT GLUCOSE: 169 MG/DL (ref 70–110)
POCT GLUCOSE: 193 MG/DL (ref 70–110)
POCT GLUCOSE: 201 MG/DL (ref 70–110)
POTASSIUM SERPL-SCNC: 4.2 MMOL/L (ref 3.5–5.1)
RBC # BLD AUTO: 4.26 M/UL (ref 4.6–6.2)
SODIUM SERPL-SCNC: 133 MMOL/L (ref 136–145)
WBC # BLD AUTO: 20.28 K/UL (ref 3.9–12.7)

## 2022-02-22 PROCEDURE — 83605 ASSAY OF LACTIC ACID: CPT | Performed by: INTERNAL MEDICINE

## 2022-02-22 PROCEDURE — 25000003 PHARM REV CODE 250: Performed by: HOSPITALIST

## 2022-02-22 PROCEDURE — 63600175 PHARM REV CODE 636 W HCPCS: Performed by: STUDENT IN AN ORGANIZED HEALTH CARE EDUCATION/TRAINING PROGRAM

## 2022-02-22 PROCEDURE — 25000003 PHARM REV CODE 250: Performed by: INTERNAL MEDICINE

## 2022-02-22 PROCEDURE — 80048 BASIC METABOLIC PNL TOTAL CA: CPT | Performed by: INTERNAL MEDICINE

## 2022-02-22 PROCEDURE — 25000003 PHARM REV CODE 250: Performed by: NURSE PRACTITIONER

## 2022-02-22 PROCEDURE — 99233 SBSQ HOSP IP/OBS HIGH 50: CPT | Mod: ,,, | Performed by: INTERNAL MEDICINE

## 2022-02-22 PROCEDURE — 36415 COLL VENOUS BLD VENIPUNCTURE: CPT | Performed by: INTERNAL MEDICINE

## 2022-02-22 PROCEDURE — 63600175 PHARM REV CODE 636 W HCPCS: Performed by: INTERNAL MEDICINE

## 2022-02-22 PROCEDURE — 84100 ASSAY OF PHOSPHORUS: CPT | Performed by: INTERNAL MEDICINE

## 2022-02-22 PROCEDURE — 11000001 HC ACUTE MED/SURG PRIVATE ROOM

## 2022-02-22 PROCEDURE — 85025 COMPLETE CBC W/AUTO DIFF WBC: CPT | Performed by: INTERNAL MEDICINE

## 2022-02-22 PROCEDURE — 83735 ASSAY OF MAGNESIUM: CPT | Performed by: INTERNAL MEDICINE

## 2022-02-22 PROCEDURE — 99233 PR SUBSEQUENT HOSPITAL CARE,LEVL III: ICD-10-PCS | Mod: ,,, | Performed by: INTERNAL MEDICINE

## 2022-02-22 RX ORDER — SODIUM,POTASSIUM PHOSPHATES 280-250MG
1 POWDER IN PACKET (EA) ORAL ONCE
Status: COMPLETED | OUTPATIENT
Start: 2022-02-22 | End: 2022-02-22

## 2022-02-22 RX ADMIN — INSULIN ASPART 7 UNITS: 100 INJECTION, SOLUTION INTRAVENOUS; SUBCUTANEOUS at 12:02

## 2022-02-22 RX ADMIN — APIXABAN 5 MG: 2.5 TABLET, FILM COATED ORAL at 09:02

## 2022-02-22 RX ADMIN — ESCITALOPRAM OXALATE 10 MG: 10 TABLET ORAL at 09:02

## 2022-02-22 RX ADMIN — ACETAMINOPHEN 650 MG: 325 TABLET, FILM COATED ORAL at 06:02

## 2022-02-22 RX ADMIN — TAMSULOSIN HYDROCHLORIDE 0.4 MG: 0.4 CAPSULE ORAL at 09:02

## 2022-02-22 RX ADMIN — ATORVASTATIN CALCIUM 80 MG: 20 TABLET, FILM COATED ORAL at 09:02

## 2022-02-22 RX ADMIN — POTASSIUM & SODIUM PHOSPHATES POWDER PACK 280-160-250 MG 1 PACKET: 280-160-250 PACK at 09:02

## 2022-02-22 RX ADMIN — INSULIN ASPART 7 UNITS: 100 INJECTION, SOLUTION INTRAVENOUS; SUBCUTANEOUS at 06:02

## 2022-02-22 RX ADMIN — VANCOMYCIN HYDROCHLORIDE 1500 MG: 1.5 INJECTION, POWDER, LYOPHILIZED, FOR SOLUTION INTRAVENOUS at 10:02

## 2022-02-22 RX ADMIN — CEFTRIAXONE 1 G: 1 INJECTION, SOLUTION INTRAVENOUS at 01:02

## 2022-02-22 RX ADMIN — INSULIN ASPART 7 UNITS: 100 INJECTION, SOLUTION INTRAVENOUS; SUBCUTANEOUS at 09:02

## 2022-02-22 RX ADMIN — INSULIN DETEMIR 25 UNITS: 100 INJECTION, SOLUTION SUBCUTANEOUS at 09:02

## 2022-02-22 RX ADMIN — SODIUM CHLORIDE: 0.9 INJECTION, SOLUTION INTRAVENOUS at 12:02

## 2022-02-22 NOTE — SUBJECTIVE & OBJECTIVE
Interval History: more  responsive today, febrile overnight, eating breakfast, 1 semi soft bm reported by nurse, no watery diarrhea  Review of Systems   HENT:  Negative for trouble swallowing.    Respiratory:  Negative for shortness of breath.    Cardiovascular:  Negative for chest pain.   Gastrointestinal:  Negative for abdominal pain, nausea and vomiting.   Genitourinary:  Negative for dysuria and hematuria.   Skin:  Negative for rash.   Neurological:  Negative for headaches.   Objective:     Vital Signs (Most Recent):  Temp: 98 °F (36.7 °C) (02/22/22 1122)  Pulse: 90 (02/22/22 1122)  Resp: 18 (02/22/22 1122)  BP: 131/63 (02/22/22 1122)  SpO2: 97 % (02/22/22 1122)   Vital Signs (24h Range):  Temp:  [98 °F (36.7 °C)-101.3 °F (38.5 °C)] 98 °F (36.7 °C)  Pulse:  [] 90  Resp:  [15-18] 18  SpO2:  [94 %-97 %] 97 %  BP: (112-136)/(58-69) 131/63     Weight: 63 kg (139 lb)  Body mass index is 18.85 kg/m².    Intake/Output Summary (Last 24 hours) at 2/22/2022 1521  Last data filed at 2/22/2022 0500  Gross per 24 hour   Intake 714.36 ml   Output 400 ml   Net 314.36 ml        Physical Exam  Vitals reviewed.   Constitutional:       General: He is not in acute distress.     Appearance: He is well-developed.   HENT:      Head: Normocephalic and atraumatic.   Eyes:      Extraocular Movements: Extraocular movements intact.      Pupils: Pupils are equal, round, and reactive to light.   Cardiovascular:      Rate and Rhythm: Normal rate and regular rhythm.   Pulmonary:      Effort: Pulmonary effort is normal. No respiratory distress.      Breath sounds: Normal breath sounds.   Abdominal:      General: Bowel sounds are normal. There is no distension.      Palpations: Abdomen is soft.      Tenderness: There is no abdominal tenderness.   Musculoskeletal:         General: No swelling. Normal range of motion.      Cervical back: Normal range of motion and neck supple.   Skin:     General: Skin is warm.      Findings: No rash.    Neurological:      General: No focal deficit present.      Mental Status: He is alert.      Comments: Oriented to person, place   Psychiatric:         Mood and Affect: Mood normal.         Behavior: Behavior normal.       Significant Labs: All pertinent labs within the past 24 hours have been reviewed.    Significant Imaging: I have reviewed all pertinent imaging results/findings within the past 24 hours.

## 2022-02-22 NOTE — PLAN OF CARE
"Pt remained free of falls and injuries throughout shift. Pt oriented to self. Pt quiet, not speaking much, occasionally will answer "yes" or "no." Purposeful hourly rounding performed. Pt swallows meds whole. NS infusing @ 75 mL/hr. Pt denies pain. Weight shift assistance provided q2h. Condom cath in place to gravity. VSS on room air. Pt sleeping in bed, even rise and fall of chest. Avasys monitoring maintained. Bed low and locked, bed alarm on, call light in reach. Side rails up x2. Will continue to monitor.   "

## 2022-02-22 NOTE — ASSESSMENT & PLAN NOTE
Labs show elevated wbc, low grade fever, normal  lactic acid  Give 500 ml fluid bolus and started ivf  Follow blood and urine cx, cxr ok, on room air  ua positive  Started on rocephin  Added vancomycin till full cx report  Follow labs

## 2022-02-22 NOTE — PROGRESS NOTES
Pharmacokinetic Initial Assessment: IV Vancomycin    Assessment/Plan:    Initiate intravenous vancomycin with loading dose of 1500 mg once followed by a maintenance dose of vancomycin 1500mg IV every 24 hours  Desired empiric serum trough concentration is 10 to 20 mcg/mL  Draw vancomycin trough level 30 min prior to 1000 dose on 2/24 at approximately 0930  Pharmacy will continue to follow and monitor vancomycin.      Please contact pharmacy at extension [phone] with any questions regarding this assessment.     Thank you for the consult,   Monse Otto       Patient brief summary:  Forest Lopez is a 72 y.o. male initiated on antimicrobial therapy with IV Vancomycin for treatment of suspected sepsis    Drug Allergies:   Review of patient's allergies indicates:  No Known Allergies    Actual Body Weight:   63kg    Renal Function:   Estimated Creatinine Clearance: 66.2 mL/min (based on SCr of 0.9 mg/dL).,     Dialysis Method (if applicable):  N/A    CBC (last 72 hours):  Recent Labs   Lab Result Units 02/21/22  0433 02/22/22  0435   WBC K/uL 18.86* 20.28*   Hemoglobin g/dL 12.0* 11.0*   Hematocrit % 35.8* 33.7*   Platelets K/uL 228 200   Gran % % 79.7* 85.1*   Lymph % % 11.2* 5.9*   Mono % % 8.3 7.7   Eosinophil % % 0.1 0.0   Basophil % % 0.2 0.2   Differential Method  Automated Automated       Metabolic Panel (last 72 hours):  Recent Labs   Lab Result Units 02/21/22  0433 02/21/22  1013 02/22/22  0435   Sodium mmol/L 132*  --  133*   Potassium mmol/L 4.1  --  4.2   Chloride mmol/L 101  --  102   CO2 mmol/L 19*  --  19*   Glucose mg/dL 159*  --  142*   Glucose, UA   --  Trace*  --    BUN mg/dL 14  --  12   Creatinine mg/dL 1.0  --  0.9   Magnesium mg/dL  --   --  1.6   Phosphorus mg/dL  --   --  2.4*       Drug levels (last 3 results):  No results for input(s): VANCOMYCINRA, VANCOMYCINPE, VANCOMYCINTR in the last 72 hours.    Microbiologic Results:  Microbiology Results (last 7 days)       Procedure Component Value Units  Date/Time    Blood culture [[id number]] Collected: 02/21/22 0757    Order Status: Completed Specimen: Blood Updated: 02/21/22 1915     Blood Culture, Routine No Growth to date    Blood culture [[id number]] Collected: 02/21/22 0758    Order Status: Completed Specimen: Blood from Antecubital, Right Arm Updated: 02/21/22 1915     Blood Culture, Routine No Growth to date    Urine culture [[id number]] Collected: 02/21/22 1013    Order Status: No result Specimen: Urine Updated: 02/21/22 1059

## 2022-02-22 NOTE — ASSESSMENT & PLAN NOTE
Likely due to infection  No focal deficits, will check ct head  Improved some after fluids and abx.

## 2022-02-22 NOTE — PROGRESS NOTES
[facility], operated by [facility]  Progress Note    Patient Name: Forest Lopez  MRN: [id number]  Patient Class: IP- Inpatient   Admission Date: 1/31/2022  Length of Stay: 22 days  Attending Physician: Alma Elizalde MD  Primary Care Provider: Julian Escobar        Subjective:     Principal Problem:Acute deep vein thrombosis (DVT) of both lower extremities        HPI:  Mr. Lopez is a 72 year old man who presented after a syncopal episode.  He reports he had been feeling well prior to the episode but then had a prodrome prior to passing out.  EMS was called, report patient's BP was low normal on their arrival, improved after an IV fluids bolus.  Patient denies chest pain or shortness of breath and says he does not feel weak currently although is rather tired.  When seen in the ED this morning he could barely express himself audibly.     Vital signs were normal on presentation here.  He is on warfarin for recurrent DVT, but his INR was 1, and d-dimer markedly elevated at 21.7.  Tox screen was positive for cocaine.  He had a CTA of the brain and neck done on presentation so CTA of the chest for PE study could not be done for 48 hours.  Ultrasound of the lower extremities showed extensive bilateral DVT.  On the right there was thrombosis of the common femoral vein, femoral vein, popliteal vein, one of the paired posterior tibial veins and both visualized peroneal veins.  On the left there was thrombosis of the common femoral vein, femoral vein and popliteal vein.  Patient was started on full dose Lovenox and admitted.    Medical history is from the chart as he is unable to give me much information.  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  He smokes 5-6 cigarettes/day and is not interested in quitting.  He is currently homeless and staying with a friend.  Despite  the normal INR he is sure he is taking his warfarin and is not having difficulty taking his medications.  I discussed his care with [facility] staff on the [facility] and patient had been lost to follow up since November 2021.      Overview/Hospital Course:  Patient presented to the ED after a syncopal episode and was found to have bilateral lower extremity DVTs and a low INR.  Tox screen was positive for cocaine.  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  He was started back on his warfarin with bridging Lovenox.  His 2D echo showed grade I diastolic dysfunction.  Neurology evaluated him and recommended echo with bubble study, which was negative for a shunt.    PT/OT evaluated him and recommended SNF placement as he was not participating in therapy.  His INR was difficult to get therapeutic, and as he had no contraindication to a NOAC his warfarin was changed to apixaban, and the full dose Lovenox was discontinued.  As he appeared to be depressed and had no objection to starting an antidepressant Lexapro was started.  He had a fall in the hospital on 2/13, had gone to the bathroom for a BM with the nurse, and when she turned away while he was washing his hands he apparently lost his balance and fell.  He did not hit his head and did not lose consciousness, but his BP was low transiently and he was given a bolus of IV fluids.    Patient's mental status improved slowly, but he gradually became more talkative and was able to hold a brief conversation.    Patient more lethargic on 2/21 with low grade fever and leukocytosis, work up looked like uti and started on abx, fluids.      Interval History: more  responsive today, febrile overnight, eating breakfast, 1 semi soft bm reported by nurse, no watery diarrhea  Review of Systems   HENT:  Negative for trouble swallowing.    Respiratory:  Negative for shortness of breath.     Cardiovascular:  Negative for chest pain.   Gastrointestinal:  Negative for abdominal pain, nausea and vomiting.   Genitourinary:  Negative for dysuria and hematuria.   Skin:  Negative for rash.   Neurological:  Negative for headaches.   Objective:     Vital Signs (Most Recent):  Temp: 98 °F (36.7 °C) (02/22/22 1122)  Pulse: 90 (02/22/22 1122)  Resp: 18 (02/22/22 1122)  BP: 131/63 (02/22/22 1122)  SpO2: 97 % (02/22/22 1122)   Vital Signs (24h Range):  Temp:  [98 °F (36.7 °C)-101.3 °F (38.5 °C)] 98 °F (36.7 °C)  Pulse:  [] 90  Resp:  [15-18] 18  SpO2:  [94 %-97 %] 97 %  BP: (112-136)/(58-69) 131/63     Weight: 63 kg (139 lb)  Body mass index is 18.85 kg/m².    Intake/Output Summary (Last 24 hours) at 2/22/2022 1521  Last data filed at 2/22/2022 0500  Gross per 24 hour   Intake 714.36 ml   Output 400 ml   Net 314.36 ml        Physical Exam  Vitals reviewed.   Constitutional:       General: He is not in acute distress.     Appearance: He is well-developed.   HENT:      Head: Normocephalic and atraumatic.   Eyes:      Extraocular Movements: Extraocular movements intact.      Pupils: Pupils are equal, round, and reactive to light.   Cardiovascular:      Rate and Rhythm: Normal rate and regular rhythm.   Pulmonary:      Effort: Pulmonary effort is normal. No respiratory distress.      Breath sounds: Normal breath sounds.   Abdominal:      General: Bowel sounds are normal. There is no distension.      Palpations: Abdomen is soft.      Tenderness: There is no abdominal tenderness.   Musculoskeletal:         General: No swelling. Normal range of motion.      Cervical back: Normal range of motion and neck supple.   Skin:     General: Skin is warm.      Findings: No rash.   Neurological:      General: No focal deficit present.      Mental Status: He is alert.      Comments: Oriented to person, place   Psychiatric:         Mood and Affect: Mood normal.         Behavior: Behavior normal.       Significant Labs: All  pertinent labs within the past 24 hours have been reviewed.    Significant Imaging: I have reviewed all pertinent imaging results/findings within the past 24 hours.      Assessment/Plan:      * Acute deep vein thrombosis (DVT) of both lower extremities  Last ultrasound showed partially occlusive DVTs, now completely occlusive DVT of multiple lower extremity veins on ultrasound.  Patient reports compliance with warfarin but his INR is only one.  He is homeless but says he does not have difficulty obtaining his medications.  Does not seem to care about anything.  D-dimer was markedly elevated on presentation and there was a question of possible PE, but he had a CTA of the brain/neck on presentation so could not get another CTA for another 48 hours.    V/Q scan was done, showed low probability for PE.  2D echo showed grade I diastolic dysfunction.  Warfarin was restarted with bridging Lovenox, but INR was still low.  Given the difficulty of getting him to follow up changed to apixaban and discontinued Lovenox.    Drowsy  Likely due to infection  No focal deficits, ct head no acute changes  Improved after fluids and abx.      Sepsis  Labs show elevated wbc, low grade fever, normal  lactic acid  Give 500 ml fluid bolus and started ivf  Follow blood and urine cx, cxr ok, on room air  ua positive  Started on rocephin  Added vancomycin till full cx report  Follow labs    Abnormal imaging of thyroid  Tsh/t4 ok, thyroid ultrasound done, showed bilateral nodules which did not meet criteria for biopsy.    Acute stroke due to ischemia  - MRI showed areas of diffusion signal hyperintensity consistent with areas of acute ischemia/infarct involving the left cerebral hemisphere, the most prominent measuring approximately 1.4 cm, also a small focus of the right frontal lobe.  - Patient on full dose anticoagulation  - Sinus rhythm noted throughout hospital stay  - Risk factor modification - control diabetes, continue statin.  - RPR,  HIV non reactive.  B12 low normal.   - CTA showed a focal segment of 60-70% stenosis involving the proximal to mid left vertebral artery that was new when compared to the prior study of 09/25/2019.  No evidence of large vessel intracranial occlusion.   - Neurology noted symptoms do not correspond to stroke location.  - Echo with bubble study done, no shunt seen.  - Follow up with vascular neurology in 4 weeks.  -Therapy recommending SNF due to his low interest in participation.  - Started Lexapro due to suspicion for depression.      Advance care planning        Severe protein-calorie malnutrition  Diet as tolerated      Debility  Pt/ot efforts  Awaiting  Skilled placement.    Type 2 diabetes mellitus with hyperglycemia, without long-term current use of insulin  Reportedly he is on metformin and glipizide, both held.  Continue SSI for now.  He has 4+ sugar in urine and HbA1c is 10.3, and he is hyperglycemic here.  Will start levemir and continue ISS, increase levemir to 25 units daily  Add scheduled novolog 5 units tid, increase to 7 units  Improving  Resume metformin in SNF  Sugars ok    Syncope and collapse  Unclear cause.  Spoke to [facility] and patient has had previous syncopal episodes attributed to alcohol abuse.  Has been prescribed meclizine as well.  Tox positive for cocaine but not alcohol.  CTA brain/neck was done in ED, and per ED note the results were discussed with stroke neurologist Dr. Tovar who feels that CTA finding of a short segment of left vertebral artery stenosis was incidental.  He did not think stroke workup with MRI was indicated.    Since change of status per son compared to when he saw him early during the week a CT of the brain was ordered and no abnormalities seen.  MRI of the brain showed acute stroke .      Cocaine abuse  As noted above.  He has not had any behavioral issues while here, and other than being disinterested in therapy he has been very cooperative.  Seems drug use was  transient.      Hyperlipidemia  Resumed lipitor      Essential hypertension  Doubtful he was on anything at home.  Started losartan 25 mg daily  BP well controlled on low dose losartan.  BP transiently low today after he fell but recovered quickly.  IVF bolus given.  Will hold losartan as bp low again  bp ok without med    Tobacco dependence  He smokes 5-6 cigarettes/day.  Not trying to quit and not interested in a nicotine patch.  Benefits of smoking cessation were reviewed with patient in detail for for 8 minutes and patient was encouraged to quit. Nicotine replacement options were discussed.        VTE Risk Mitigation (From admission, onward)         Ordered     apixaban tablet 5 mg  2 times daily         02/10/22 0901     Place sequential compression device  Until discontinued         01/31/22 0533                Discharge Planning   EFRAÍN:      Code Status: Full Code   Is the patient medically ready for discharge?:     Reason for patient still in hospital (select all that apply): Patient trending condition and Treatment  Discharge Plan A: Skilled Nursing Facility   Discharge Delays: (!) Post-Acute Set-up              Alma Elizalde MD  Department of Hospital Medicine   [facility] ([place])

## 2022-02-22 NOTE — ASSESSMENT & PLAN NOTE
Labs show elevated wbc, low grade fever, check lactic acid  Give 500 ml fluid bolus and start ivf  Follow blood and urine cx, cxr ok, on room air  ua positive  Started on rocephin

## 2022-02-22 NOTE — NURSING
Pt refusing lab draw for lactic acid. Educated pt on importance, still refusing, will attempt to collect with morning labs.

## 2022-02-22 NOTE — ASSESSMENT & PLAN NOTE
Likely due to infection  No focal deficits, ct head no acute changes  Improved after fluids and abx.

## 2022-02-22 NOTE — PROGRESS NOTES
[facility]  Progress Note    Patient Name: Forest Lopez  MRN: [id number]  Patient Class: IP- Inpatient   Admission Date: 1/31/2022  Length of Stay: 21 days  Attending Physician: Alma Elizalde MD  Primary Care Provider: Julian Escobar        Subjective:     Principal Problem:Acute deep vein thrombosis (DVT) of both lower extremities        HPI:  Mr. Lopez is a 72 year old man who presented after a syncopal episode.  He reports he had been feeling well prior to the episode but then had a prodrome prior to passing out.  EMS was called, report patient's BP was low normal on their arrival, improved after an IV fluids bolus.  Patient denies chest pain or shortness of breath and says he does not feel weak currently although is rather tired.  When seen in the ED this morning he could barely express himself audibly.     Vital signs were normal on presentation here.  He is on warfarin for recurrent DVT, but his INR was 1, and d-dimer markedly elevated at 21.7.  Tox screen was positive for cocaine.  He had a CTA of the brain and neck done on presentation so CTA of the chest for PE study could not be done for 48 hours.  Ultrasound of the lower extremities showed extensive bilateral DVT.  On the right there was thrombosis of the common femoral vein, femoral vein, popliteal vein, one of the paired posterior tibial veins and both visualized peroneal veins.  On the left there was thrombosis of the common femoral vein, femoral vein and popliteal vein.  Patient was started on full dose Lovenox and admitted.    Medical history is from the chart as he is unable to give me much information.  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  He smokes 5-6 cigarettes/day and is not interested in quitting.  He is currently homeless and staying with a friend.  Despite  the normal INR he is sure he is taking his warfarin and is not having difficulty taking his medications.  I discussed his care with [facility] staff on the [facility] and patient had been lost to follow up since November 2021.      Overview/Hospital Course:  Patient presented to the ED after a syncopal episode and was found to have bilateral lower extremity DVTs and a low INR.  Tox screen was positive for cocaine.  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  He was started back on his warfarin with bridging Lovenox.  His 2D echo showed grade I diastolic dysfunction.  Neurology evaluated him and recommended echo with bubble study, which was negative for a shunt.    PT/OT evaluated him and recommended SNF placement as he was not participating in therapy.  His INR was difficult to get therapeutic, and as he had no contraindication to a NOAC his warfarin was changed to apixaban, and the full dose Lovenox was discontinued.  As he appeared to be depressed and had no objection to starting an antidepressant Lexapro was started.  He had a fall in the hospital on 2/13, had gone to the bathroom for a BM with the nurse, and when she turned away while he was washing his hands he apparently lost his balance and fell.  He did not hit his head and did not lose consciousness, but his BP was low transiently and he was given a bolus of IV fluids.    Patient's mental status improved slowly, but he gradually became more talkative and was able to hold a brief conversation.      Interval History: less responsive today, wbc high, transferred back from [facility] service.    Review of Systems   Unable to perform ROS: Mental status change   Objective:     Vital Signs (Most Recent):  Temp: 99.9 °F (37.7 °C) (02/21/22 1508)  Pulse: (!) 114 (02/21/22 1508)  Resp: 18 (02/21/22 1508)  BP: (!) 142/66 (02/21/22 1508)  SpO2: 98 % (02/21/22 1508)   Vital Signs (24h  Range):  Temp:  [98 °F (36.7 °C)-99.9 °F (37.7 °C)] 99.9 °F (37.7 °C)  Pulse:  [105-132] 114  Resp:  [16-18] 18  SpO2:  [96 %-99 %] 98 %  BP: (130-165)/(66-80) 142/66     Weight: 63 kg (139 lb)  Body mass index is 18.85 kg/m².    Intake/Output Summary (Last 24 hours) at 2/21/2022 1702  Last data filed at 2/21/2022 0724  Gross per 24 hour   Intake --   Output 275 ml   Net -275 ml      Physical Exam  Vitals reviewed.   Constitutional:       General: He is not in acute distress.     Appearance: He is well-developed.      Comments: drowsy   HENT:      Head: Normocephalic and atraumatic.   Eyes:      Extraocular Movements: Extraocular movements intact.      Pupils: Pupils are equal, round, and reactive to light.   Cardiovascular:      Rate and Rhythm: Normal rate and regular rhythm.   Pulmonary:      Effort: Pulmonary effort is normal. No respiratory distress.      Breath sounds: Normal breath sounds.   Abdominal:      General: Bowel sounds are normal. There is no distension.      Palpations: Abdomen is soft.      Tenderness: There is no abdominal tenderness.   Musculoskeletal:         General: No swelling. Normal range of motion.      Cervical back: Normal range of motion and neck supple.   Skin:     General: Skin is warm.      Findings: No rash.   Neurological:      General: No focal deficit present.      Comments: Drowsy but arousable, able to tell his name   Psychiatric:         Behavior: Behavior normal.       Significant Labs: All pertinent labs within the past 24 hours have been reviewed.    Significant Imaging: I have reviewed all pertinent imaging results/findings within the past 24 hours.      Assessment/Plan:      * Acute deep vein thrombosis (DVT) of both lower extremities  Last ultrasound showed partially occlusive DVTs, now completely occlusive DVT of multiple lower extremity veins on ultrasound.  Patient reports compliance with warfarin but his INR is only one.  He is homeless but says he does not have  difficulty obtaining his medications.  Does not seem to care about anything.  D-dimer was markedly elevated on presentation and there was a question of possible PE, but he had a CTA of the brain/neck on presentation so could not get another CTA for another 48 hours.    V/Q scan was done, showed low probability for PE.  2D echo showed grade I diastolic dysfunction.  Warfarin was restarted with bridging Lovenox, but INR was still low.  Given the difficulty of getting him to follow up changed to apixaban and discontinued Lovenox.    Drowsy  Likely due to infection  No focal deficits, will check ct head  Improved some after fluids and abx.      Sepsis  Labs show elevated wbc, low grade fever, check lactic acid  Give 500 ml fluid bolus and start ivf  Follow blood and urine cx, cxr ok, on room air  ua positive  Started on rocephin      Abnormal imaging of thyroid  Tsh/t4 ok, thyroid ultrasound done, showed bilateral nodules which did not meet criteria for biopsy.    Acute stroke due to ischemia  - MRI showed areas of diffusion signal hyperintensity consistent with areas of acute ischemia/infarct involving the left cerebral hemisphere, the most prominent measuring approximately 1.4 cm, also a small focus of the right frontal lobe.  - Patient on full dose anticoagulation  - Sinus rhythm noted throughout hospital stay  - Risk factor modification - control diabetes, continue statin.  - RPR, HIV non reactive.  B12 low normal.   - CTA showed a focal segment of 60-70% stenosis involving the proximal to mid left vertebral artery that was new when compared to the prior study of 09/25/2019.  No evidence of large vessel intracranial occlusion.   - Neurology noted symptoms do not correspond to stroke location.  - Echo with bubble study done, no shunt seen.  - Follow up with vascular neurology in 4 weeks.  -Therapy recommending SNF due to his low interest in participation.  - Started Lexapro due to suspicion for  depression.      Advance care planning        Severe protein-calorie malnutrition  Diet as tolerated      Debility  Pt/ot efforts  Awaiting  Skilled placement.    Type 2 diabetes mellitus with hyperglycemia, without long-term current use of insulin  Reportedly he is on metformin and glipizide, both held.  Continue SSI for now.  He has 4+ sugar in urine and HbA1c is 10.3, and he is hyperglycemic here.  Will start levemir and continue ISS, increase levemir to 25 units daily  Add scheduled novolog 5 units tid, increase to 7 units  Improving  Resume metformin in SNF  Sugars ok    Syncope and collapse  Unclear cause.  Spoke to [facility] and patient has had previous syncopal episodes attributed to alcohol abuse.  Has been prescribed meclizine as well.  Tox positive for cocaine but not alcohol.  CTA brain/neck was done in ED, and per ED note the results were discussed with stroke neurologist Dr. Tovar who feels that CTA finding of a short segment of left vertebral artery stenosis was incidental.  He did not think stroke workup with MRI was indicated.    Since change of status per son compared to when he saw him early during the week a CT of the brain was ordered and no abnormalities seen.  MRI of the brain showed acute stroke .      Cocaine abuse  As noted above.  He has not had any behavioral issues while here, and other than being disinterested in therapy he has been very cooperative.  Seems drug use was transient.      Hyperlipidemia  Resumed lipitor      Essential hypertension  Doubtful he was on anything at home.  Started losartan 25 mg daily  BP well controlled on low dose losartan.  BP transiently low today after he fell but recovered quickly.  IVF bolus given.  Will hold losartan as bp low again  bp ok without med    Tobacco dependence  He smokes 5-6 cigarettes/day.  Not trying to quit and not interested in a nicotine patch.  Benefits of smoking cessation were reviewed with patient in detail for for 8 minutes and  patient was encouraged to quit. Nicotine replacement options were discussed.        VTE Risk Mitigation (From admission, onward)         Ordered     apixaban tablet 5 mg  2 times daily         02/10/22 0901     Place sequential compression device  Until discontinued         01/31/22 0533                Discharge Planning   EFRAÍN:      Code Status: Full Code   Is the patient medically ready for discharge?:     Reason for patient still in hospital (select all that apply): Patient trending condition and Treatment  Discharge Plan A: Skilled Nursing Facility   Discharge Delays: (!) Post-Acute Set-up              Alma Elizalde MD  Department of Hospital Medicine   [facility] Surg ([place])

## 2022-02-23 ENCOUNTER — PATIENT OUTREACH (OUTPATIENT)
Dept: ADMINISTRATIVE | Facility: OTHER | Age: 72
End: 2022-02-23
Payer: MEDICARE

## 2022-02-23 LAB
ANION GAP SERPL CALC-SCNC: 7 MMOL/L (ref 8–16)
BACTERIA UR CULT: ABNORMAL
BACTERIA UR CULT: ABNORMAL
BASOPHILS # BLD AUTO: 0.05 K/UL (ref 0–0.2)
BASOPHILS NFR BLD: 0.3 % (ref 0–1.9)
BUN SERPL-MCNC: 11 MG/DL (ref 8–23)
C DIFF GDH STL QL: NEGATIVE
C DIFF TOX A+B STL QL IA: NEGATIVE
CALCIUM SERPL-MCNC: 9.7 MG/DL (ref 8.7–10.5)
CHLORIDE SERPL-SCNC: 106 MMOL/L (ref 95–110)
CO2 SERPL-SCNC: 21 MMOL/L (ref 23–29)
CREAT SERPL-MCNC: 0.9 MG/DL (ref 0.5–1.4)
DIFFERENTIAL METHOD: ABNORMAL
EOSINOPHIL # BLD AUTO: 0.1 K/UL (ref 0–0.5)
EOSINOPHIL NFR BLD: 0.4 % (ref 0–8)
ERYTHROCYTE [DISTWIDTH] IN BLOOD BY AUTOMATED COUNT: 12.6 % (ref 11.5–14.5)
EST. GFR  (AFRICAN AMERICAN): >60 ML/MIN/1.73 M^2
EST. GFR  (NON AFRICAN AMERICAN): >60 ML/MIN/1.73 M^2
GLUCOSE SERPL-MCNC: 98 MG/DL (ref 70–110)
HCT VFR BLD AUTO: 32.6 % (ref 40–54)
HGB BLD-MCNC: 10.5 G/DL (ref 14–18)
IMM GRANULOCYTES # BLD AUTO: 0.12 K/UL (ref 0–0.04)
IMM GRANULOCYTES NFR BLD AUTO: 0.8 % (ref 0–0.5)
LYMPHOCYTES # BLD AUTO: 2.1 K/UL (ref 1–4.8)
LYMPHOCYTES NFR BLD: 13.3 % (ref 18–48)
MAGNESIUM SERPL-MCNC: 1.7 MG/DL (ref 1.6–2.6)
MCH RBC QN AUTO: 26.2 PG (ref 27–31)
MCHC RBC AUTO-ENTMCNC: 32.2 G/DL (ref 32–36)
MCV RBC AUTO: 81 FL (ref 82–98)
MONOCYTES # BLD AUTO: 1.2 K/UL (ref 0.3–1)
MONOCYTES NFR BLD: 7.7 % (ref 4–15)
NEUTROPHILS # BLD AUTO: 12.4 K/UL (ref 1.8–7.7)
NEUTROPHILS NFR BLD: 77.5 % (ref 38–73)
NRBC BLD-RTO: 0 /100 WBC
PHOSPHATE SERPL-MCNC: 2.4 MG/DL (ref 2.7–4.5)
PLATELET # BLD AUTO: 170 K/UL (ref 150–450)
PMV BLD AUTO: 10.3 FL (ref 9.2–12.9)
POCT GLUCOSE: 146 MG/DL (ref 70–110)
POCT GLUCOSE: 150 MG/DL (ref 70–110)
POCT GLUCOSE: 206 MG/DL (ref 70–110)
POCT GLUCOSE: 97 MG/DL (ref 70–110)
POTASSIUM SERPL-SCNC: 3.9 MMOL/L (ref 3.5–5.1)
RBC # BLD AUTO: 4.01 M/UL (ref 4.6–6.2)
SODIUM SERPL-SCNC: 134 MMOL/L (ref 136–145)
WBC # BLD AUTO: 15.95 K/UL (ref 3.9–12.7)

## 2022-02-23 PROCEDURE — 85025 COMPLETE CBC W/AUTO DIFF WBC: CPT | Performed by: INTERNAL MEDICINE

## 2022-02-23 PROCEDURE — 80048 BASIC METABOLIC PNL TOTAL CA: CPT | Performed by: INTERNAL MEDICINE

## 2022-02-23 PROCEDURE — 25000003 PHARM REV CODE 250: Performed by: INTERNAL MEDICINE

## 2022-02-23 PROCEDURE — 83735 ASSAY OF MAGNESIUM: CPT | Performed by: INTERNAL MEDICINE

## 2022-02-23 PROCEDURE — 11000001 HC ACUTE MED/SURG PRIVATE ROOM

## 2022-02-23 PROCEDURE — 87449 NOS EACH ORGANISM AG IA: CPT | Performed by: INTERNAL MEDICINE

## 2022-02-23 PROCEDURE — 25000003 PHARM REV CODE 250: Performed by: NURSE PRACTITIONER

## 2022-02-23 PROCEDURE — 36410 VNPNXR 3YR/> PHY/QHP DX/THER: CPT

## 2022-02-23 PROCEDURE — 92507 TX SP LANG VOICE COMM INDIV: CPT

## 2022-02-23 PROCEDURE — 25000003 PHARM REV CODE 250: Performed by: HOSPITALIST

## 2022-02-23 PROCEDURE — 63600175 PHARM REV CODE 636 W HCPCS: Performed by: INTERNAL MEDICINE

## 2022-02-23 PROCEDURE — 97530 THERAPEUTIC ACTIVITIES: CPT | Mod: CQ

## 2022-02-23 PROCEDURE — 27000207 HC ISOLATION

## 2022-02-23 PROCEDURE — 36415 COLL VENOUS BLD VENIPUNCTURE: CPT | Performed by: INTERNAL MEDICINE

## 2022-02-23 PROCEDURE — 84100 ASSAY OF PHOSPHORUS: CPT | Performed by: INTERNAL MEDICINE

## 2022-02-23 PROCEDURE — 94761 N-INVAS EAR/PLS OXIMETRY MLT: CPT

## 2022-02-23 RX ORDER — SODIUM CHLORIDE 0.9 % (FLUSH) 0.9 %
10 SYRINGE (ML) INJECTION
Status: DISCONTINUED | OUTPATIENT
Start: 2022-02-24 | End: 2022-04-27 | Stop reason: HOSPADM

## 2022-02-23 RX ORDER — SODIUM CHLORIDE 0.9 % (FLUSH) 0.9 %
10 SYRINGE (ML) INJECTION EVERY 6 HOURS
Status: DISCONTINUED | OUTPATIENT
Start: 2022-02-24 | End: 2022-03-30

## 2022-02-23 RX ADMIN — SODIUM CHLORIDE: 0.9 INJECTION, SOLUTION INTRAVENOUS at 01:02

## 2022-02-23 RX ADMIN — TAMSULOSIN HYDROCHLORIDE 0.4 MG: 0.4 CAPSULE ORAL at 10:02

## 2022-02-23 RX ADMIN — INSULIN ASPART 7 UNITS: 100 INJECTION, SOLUTION INTRAVENOUS; SUBCUTANEOUS at 11:02

## 2022-02-23 RX ADMIN — INSULIN ASPART 7 UNITS: 100 INJECTION, SOLUTION INTRAVENOUS; SUBCUTANEOUS at 04:02

## 2022-02-23 RX ADMIN — ACETAMINOPHEN 650 MG: 325 TABLET, FILM COATED ORAL at 10:02

## 2022-02-23 RX ADMIN — VANCOMYCIN HYDROCHLORIDE 1500 MG: 1.5 INJECTION, POWDER, LYOPHILIZED, FOR SOLUTION INTRAVENOUS at 12:02

## 2022-02-23 RX ADMIN — ATORVASTATIN CALCIUM 80 MG: 20 TABLET, FILM COATED ORAL at 09:02

## 2022-02-23 RX ADMIN — APIXABAN 5 MG: 2.5 TABLET, FILM COATED ORAL at 10:02

## 2022-02-23 RX ADMIN — INSULIN ASPART 7 UNITS: 100 INJECTION, SOLUTION INTRAVENOUS; SUBCUTANEOUS at 07:02

## 2022-02-23 RX ADMIN — ESCITALOPRAM OXALATE 10 MG: 10 TABLET ORAL at 09:02

## 2022-02-23 RX ADMIN — SODIUM CHLORIDE: 0.9 INJECTION, SOLUTION INTRAVENOUS at 12:02

## 2022-02-23 RX ADMIN — INSULIN DETEMIR 25 UNITS: 100 INJECTION, SOLUTION SUBCUTANEOUS at 09:02

## 2022-02-23 RX ADMIN — APIXABAN 5 MG: 2.5 TABLET, FILM COATED ORAL at 09:02

## 2022-02-23 NOTE — PLAN OF CARE
Pt remained free of falls and injuries throughout shift. Pt oriented to person, place, reoriented as needed. Pt calm and cooperative, talking and answering questions  more than yesterday. Purposeful hourly rounding performed. Pt swallows meds whole. NS infusing @ 100 mL/hr. Pt denies pain. Pt had temp of 100.1 last night, afebrile this morning. Weight shift assistance provided q2h. Pt keeps removing condom catheter, incontinence pad utilized, care provided. Pt had X2 large, loose BMs last night ,Dr. Pickens contacted, CDiff panel implemented, informed charge nurse. VSS on room air. Pt resting comfortably in bed, denies needs at this time. [employer] monitoring maintained. Bed low and locked, bed alarm on, call light in reach. Side rails up x3. Will continue to monitor.

## 2022-02-23 NOTE — PT/OT/SLP PROGRESS
Physical Therapy Treatment    Patient Name:  Forest Lopez   MRN:  [id number]    Recommendations:     Discharge Recommendations:  nursing facility, skilled   Discharge Equipment Recommendations: bedside commode, shower chair, walker, rolling   Barriers to discharge: Decreased caregiver support, inaccessible home (apparently pt is homeless)    Assessment:     Forest Lopez is a 72 y.o. male admitted with a medical diagnosis of Sepsis.  He presents with the following impairments/functional limitations:  weakness, impaired endurance, impaired self care skills, impaired functional mobilty, gait instability, impaired balance, impaired cognition, decreased lower extremity function, decreased safety awareness ;pt with improved mobility today (from 2/21 session), dec assistance req'd for mobility and pt following commands better.    Rehab Prognosis: Good; patient would benefit from acute skilled PT services to address these deficits and reach maximum level of function.    Recent Surgery: * No surgery found *      Plan:     During this hospitalization, patient to be seen 3 x/week to address the identified rehab impairments via gait training, therapeutic activities, therapeutic exercises, neuromuscular re-education and progress toward the following goals:    · Plan of Care Expires:  03/02/22    Subjective     Chief Complaint: pt had no c/o's  Patient/Family Comments/goals: pt agreeable to session, pleasant.   Pain/Comfort:  · Pain Rating 1: 0/10  · Pain Rating Post-Intervention 1: 0/10      Objective:     Communicated with nurse prior to session.  Patient found HOB elevated with peripheral IV ([employer] monitoring) upon PT entry to room.     General Precautions: Standard, anti-coagulation medicine, fall, diabetic CONTACT to r/o c-diff  Orthopedic Precautions:N/A   Braces: N/A  Respiratory Status: Room air     Functional Mobility:  · Bed Mobility:     · Supine to Sit: contact guard assistance  · Transfers:     · Sit to Stand:   contact guard assistance with rolling walker  · Gait: amb'd ~20' in room (2/2 contact isolation, r/o C-diff and ns reporting pt have loose BM's), CGA/min.A       AM-PAC 6 CLICK MOBILITY  Turning over in bed (including adjusting bedclothes, sheets and blankets)?: 3  Sitting down on and standing up from a chair with arms (e.g., wheelchair, bedside commode, etc.): 3  Moving from lying on back to sitting on the side of the bed?: 3  Moving to and from a bed to a chair (including a wheelchair)?: 3  Need to walk in hospital room?: 3  Climbing 3-5 steps with a railing?: 3  Basic Mobility Total Score: 18       Therapeutic Activities and Exercises:   pt found w/ IV leaking and arm swollen, nsg. Called into room.   Pt's gown changed w/ min.A, socks donned w/ max.A, brief donned w/ max.A (pt able to bridge well in bed for brief donning).     Patient left up in chair with all lines intact, call button in reach, chair alarm on, nurse notified and lunch tray present..    GOALS:   Multidisciplinary Problems     Physical Therapy Goals        Problem: Physical Therapy Goal    Goal Priority Disciplines Outcome Goal Variances Interventions   Physical Therapy Goal     PT, PT/OT Ongoing, Progressing     Description: Goals to be met by: 3/2/2022    Patient will increase functional independence with mobility by performin. Sit<>stand with SPV with LRAD.  2. Gait x 300 feet with SPV with LRAD.   3. Static standing in SLS with no UE support with SBA x10 sec.                      Time Tracking:     PT Received On: 22  PT Start Time: 1317     PT Stop Time: 1340  PT Total Time (min): 23 min     Billable Minutes: Therapeutic Activity 23    Treatment Type: Treatment  PT/PTA: PTA     PTA Visit Number: 3     2022

## 2022-02-23 NOTE — SUBJECTIVE & OBJECTIVE
Interval History:  Urine culture growing Proteus gram-negative jesus.  final identification and sensitivity pending.  White count trending down, Pt had X2 large, loose BMs last night.  Currently on C diff isolation.  Patient afebrile for the last 24 hours    Review of Systems   HENT:  Negative for trouble swallowing.    Respiratory:  Negative for shortness of breath.    Cardiovascular:  Negative for chest pain.   Gastrointestinal:  Negative for abdominal pain, nausea and vomiting.   Genitourinary:  Negative for dysuria and hematuria.   Skin:  Negative for rash.   Neurological:  Negative for headaches.   Objective:     Vital Signs (Most Recent):  Temp: 98.1 °F (36.7 °C) (02/23/22 0733)  Pulse: 65 (02/23/22 0733)  Resp: 18 (02/23/22 0733)  BP: 121/64 (02/23/22 0733)  SpO2: 98 % (02/23/22 0733) Vital Signs (24h Range):  Temp:  [98 °F (36.7 °C)-100.1 °F (37.8 °C)] 98.1 °F (36.7 °C)  Pulse:  [] 65  Resp:  [16-18] 18  SpO2:  [91 %-99 %] 98 %  BP: (121-136)/(58-78) 121/64     Weight: 63 kg (139 lb)  Body mass index is 18.85 kg/m².    Intake/Output Summary (Last 24 hours) at 2/23/2022 1009  Last data filed at 2/23/2022 0719  Gross per 24 hour   Intake 1430 ml   Output 600 ml   Net 830 ml      Physical Exam  Vitals and nursing note reviewed.   Constitutional:       General: He is not in acute distress.     Appearance: He is well-developed.   HENT:      Head: Normocephalic and atraumatic.      Mouth/Throat:      Mouth: Mucous membranes are moist.   Eyes:      Extraocular Movements: Extraocular movements intact.      Pupils: Pupils are equal, round, and reactive to light.   Cardiovascular:      Rate and Rhythm: Normal rate and regular rhythm.   Pulmonary:      Effort: Pulmonary effort is normal. No respiratory distress.      Breath sounds: Normal breath sounds.   Abdominal:      General: Bowel sounds are normal. There is no distension.      Palpations: Abdomen is soft.      Tenderness: There is no abdominal tenderness.    Musculoskeletal:         General: No swelling. Normal range of motion.      Cervical back: Normal range of motion and neck supple.   Skin:     General: Skin is warm.      Findings: No rash.   Neurological:      General: No focal deficit present.      Mental Status: He is alert.      Comments: Oriented to person, place   Psychiatric:         Mood and Affect: Mood normal.         Behavior: Behavior normal.       Significant Labs: All pertinent labs within the past 24 hours have been reviewed.  CBC:   Recent Labs   Lab 02/22/22  0435 02/23/22  0515   WBC 20.28* 15.95*   HGB 11.0* 10.5*   HCT 33.7* 32.6*    170     CMP:   Recent Labs   Lab 02/22/22  0435 02/23/22  0515   * 134*   K 4.2 3.9    106   CO2 19* 21*   * 98   BUN 12 11   CREATININE 0.9 0.9   CALCIUM 9.9 9.7   ANIONGAP 12 7*   EGFRNONAA >60 >60       Significant Imaging: I have reviewed all pertinent imaging results/findings within the past 24 hours.

## 2022-02-23 NOTE — ASSESSMENT & PLAN NOTE
"This patient does have evidence of infective focus  My overall impression is sepsis. Vital signs were reviewed and noted in progress note.  Antibiotics given-   Antibiotics (From admission, onward)            Start     Stop Route Frequency Ordered    02/22/22 1000  vancomycin 1.5 g in dextrose 5 % 250 mL IVPB (ready to mix)         -- IV Every 24 hours (non-standard times) 02/22/22 0836    02/22/22 0917  vancomycin - pharmacy to dose  (vancomycin IVPB)        "And" Linked Group Details    -- IV pharmacy to manage frequency 02/22/22 0817    02/21/22 1215  cefTRIAXone (ROCEPHIN) 1 g/50 mL D5W IVPB         -- IV Every 24 hours (non-standard times) 02/21/22 1113        Cultures were taken-   Microbiology Results (last 7 days)     Procedure Component Value Units Date/Time    Clostridium difficile EIA [[id number]] Collected: 02/23/22 0939    Order Status: Sent Specimen: Stool Updated: 02/23/22 0956    Urine culture [[id number]]  (Abnormal) Collected: 02/21/22 1013    Order Status: Completed Specimen: Urine Updated: 02/22/22 2101     Urine Culture, Routine GRAM NEGATIVE JOYCE  >100,000 cfu/ml  Identification and susceptibility pending        PRESUMPTIVE PROTEUS SPECIES  > 100,000 cfu/ml  Identification and susceptibility pending      Narrative:      Specimen Source->Urine    Blood culture [[id number]] Collected: 02/21/22 0758    Order Status: Completed Specimen: Blood from Antecubital, Right Arm Updated: 02/22/22 1412     Blood Culture, Routine No Growth to date      No Growth to date    Blood culture [[id number]] Collected: 02/21/22 0757    Order Status: Completed Specimen: Blood Updated: 02/22/22 1412     Blood Culture, Routine No Growth to date      No Growth to date        Latest lactate reviewed, they are-  Recent Labs   Lab 02/22/22  0435   LACTATE 0.9       Organ dysfunction indicated by Encephalopathy   Source- UTI      "

## 2022-02-23 NOTE — PLAN OF CARE
AAOX3. VS remains stable on room air. No complaints of pain or discomfort voiced. Incontinent of bowel and bladder. Condom catheter in use; total care with x2 bm provided per nursing team. IV antibiotics continued and ongoing; Vancomycin IV initiated this shift; no adverse reaction noted. Appetite good; meal-tray setup provided.Turned and repositioned every two hours per staff assist. Call light explained and in reach. Voiced understanding.

## 2022-02-23 NOTE — PROGRESS NOTES
Active pharmacy to dose consult:    Patient reviewed, renal function stable, cultures reviewed, no new levels, continue current therapy; Adjusted dose time to 1230.  Next levels due: 2/24 @1200

## 2022-02-23 NOTE — PT/OT/SLP PROGRESS
Speech Language Pathology Treatment    Patient Name:  Forest Lopez   MRN:  [id number]  Admitting Diagnosis: Sepsis    Recommendations:                  General Recommendations:   1. Speech pathology to continue to follow 2-3x/week for ongoing assessment of cognitive-communicative deficits s/p acute infarcts of L cerebral hemisphere     Diet recommendations: Solids: Mechanical soft solids, Liquids: Thin      Aspiration Precautions: Eliminate distractions, Feed only when awake/alert, Frequent oral care and HOB to 90 degrees      General Precautions: Standard,       Communication strategies:  Reduce informational content/context; frequent repetition and cues to redirect    Subjective     · Pt awake/alert, sitting upright in bedside chair.  · Agreeable to participate in SLP session.     Respiratory Status: Room air    Objective:     Has the patient been evaluated by SLP for swallowing?   Yes  Keep patient NPO? No   Current Respiratory Status:    Room air    Cognitive-Communicative Status:  Pt oriented to person, place, and general situation. Stated the month is February and the year is 2002. Able incr orientation to date with verbal cue of stating the holiday this month. Incr in overall engagement this date, however, continues to require MAX verbal cues and repetition of stimuli to elicit response during structured activity. Dcr initiation of task with pt unable to carry conversation this date. Frequently closing eyes when asked higher level questions. Pt complete divergent reasoning task with 50% acc given mod semantic and phonemic cues. Improved accuracy with phonemic cues. Pt completed convergent reasoning task with 25% acc given mod-max semantic cues. Dcr acc secondary to pt falling asleep, difficult to elicit response.     Pt would greatly benefit from ongoing SLP services at next level of care.       Assessment:     Forest Lopez is a 72 y.o. 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    Goals:   Multidisciplinary Problems     SLP Goals        Problem: SLP Goal    Goal Priority Disciplines Outcome   SLP Goal     SLP Ongoing, Progressing   Description: 1. Pt will consume a regular/thin diet without overt s/s of aspiration or airway threat without assistance.  2. Pt will increase orientation to person, place, situation and date with 90% acc given min assist.  3. Pt will follow 3-4 step commands to increase functional IND with 75% acc given min assist.   4. Pt will complete immediate and delayed recall tasks with 75% acc given mod assist.  5. Targeting word finding, pt will complete divergent naming tasks given 1-minute time frame with 50% acc given mod assist.  6. Ongoing cognitive-communicative assessment.     Updated Goals 2/8:  7. Pt will sustain attention to topic/task without distraction in 5-10 minute increments given mod verbal cues.                   Plan:     · Patient to be seen:  3 x/week, 2 x/week   · Plan of Care expires:  02/21/22  · Plan of Care reviewed with:  patient   · SLP Follow-Up:  Yes       Discharge recommendations:  nursing facility, skilled     Time Tracking:     SLP Treatment Date:   02/23/22  Speech Start Time:  1440  Speech Stop Time:  1500     Speech Total Time (min):  20 min    Billable Minutes: Speech Therapy Individual 20 min    02/23/2022

## 2022-02-23 NOTE — PROGRESS NOTES
[facility]  Adult Nutrition  Progress Note    SUMMARY       Recommendations    1.Continue DM/cardiac diet with coumadin restriction diet.   2.Encourage good PO intake as needed.    -if PO intake decreased Recommend Boost Plus BID.    Goals: Pt to meet % EEN/EPN via PO intake by RD f/u.  Nutrition Goal Status: progressing towards goal  Communication of RD Recs:  (POC)    Assessment and Plan    No nutrition dx at this time. Will continue to monitor.      Reason for Assessment    Reason For Assessment: RD follow-up  Diagnosis:  (acute DVT)  Relevant Medical History: HTN, HLD, T2DM, BPH    General Information Comments:   2/16: Mental status improving. Had BM. Eating ok per notes. On [facility] soft/ consistent carb diet. PO intake 100% per notes. Not talking much today. Reports good appetite. 100% PO intake for lunch at bedside. Will continue to monitor.  2/23: Pt consuming 100% of meals, no nutritional needs at this time    Nutrition Discharge Planning: Pt to follow a cardiac/ DM diet(vit K restriction as needed) as tolerated.    Nutrition Risk Screen    Nutrition Risk Screen: no indicators present    Nutrition/Diet History    Spiritual, Cultural Beliefs, Jain Practices, Values that Affect Care:  (None stated.)    Anthropometrics    Temp: 98.5 °F (36.9 °C)  Height: 6' (182.9 cm)  Height (inches): 72 in  Weight Method: Bed Scale  Weight: 63 kg (139 lb)  Weight (lb): 139 lb  Ideal Body Weight (IBW), Male: 178 lb  % Ideal Body Weight, Male (lb): 78.09 %  BMI (Calculated): 18.8  BMI Grade: 18.5-24.9 - normal     Lab/Procedures/Meds    Pertinent Labs Reviewed: reviewed  Pertinent Labs Comments: Na 134, phos 2.4  Pertinent Medications Reviewed: reviewed  Pertinent Medications Comments: apixaban, atorvastatin, escitalopram, insulin aspart, insulin detemir, tamsulosin    Estimated/Assessed Needs    Weight Used For Calorie Calculations: 63 kg (138 lb 14.2 oz)  Energy Calorie Requirements (kcal): 1980 kcal  day (MSJ X AF 1.4)  Energy Need Method: [place]- Tatiana  Protein Requirements: 50-63 g day (0.8-1.0 g/kg)  Weight Used For Protein Calculations: 63 kg (138 lb 14.2 oz)  Estimated Fluid Requirement Method: RDA Method  RDA Method (mL): 1980  CHO Requirement: 248 g day    Nutrition Prescription Ordered    Current Diet Order: [facility] soft consistent carb    Evaluation of Received Nutrient/Fluid Intake    Energy Calories Required: meeting needs  Protein Required: meeting needs  Fluid Required: meeting needs  % Intake of Estimated Energy Needs: 75 - 100 %  % Meal Intake: 75 - 100 %    Nutrition Risk    Level of Risk/Frequency of Follow-up:  (1 x weekly)     Monitor and Evaluation    Food and Nutrient Intake: energy intake,food and beverage intake  Food and Nutrient Adminstration: diet order  Knowledge/Beliefs/Attitudes: food and nutrition knowledge/skill  Physical Activity and Function: nutrition-related ADLs and IADLs  Anthropometric Measurements: weight,weight change,body mass index  Biochemical Data, Medical Tests and Procedures: glucose/endocrine profile,gastrointestinal profile,electrolyte and renal panel,inflammatory profile,lipid profile  Nutrition-Focused Physical Findings: overall appearance     Nutrition Follow-Up    RD Follow-up?: Yes

## 2022-02-23 NOTE — PLAN OF CARE
"   02/22/22 1934   Post-Acute Status   Post-Acute Authorization Placement   Post-Acute Placement Status Referrals Sent   Coverage [employer] MANAGED MEDICARE - [employer] SNP (SPECIAL NEEDS PLAN   Patient choice form signed by patient/caregiver List from CMS Compare   Discharge Delays (!) Post-Acute Set-up   Discharge Plan   Discharge Plan A Skilled Nursing Facility   Discharge Plan B New Nursing Home placement - [facility] care facility     SHANICE sent referrals to more facilities on 2/18/22, including [facility] and [facility].  [facility] stating, "They could not meet [facility] needs." He was also   turned down by [facility] and [facility]. SHANICE attempted to reach Suzanne   at  [facility], but she did not call SHANICE back today. Donnell Soto, CM Supervisor suggested  contacting facilities outside of the [place] area.  SHANICE will follow up with this.   "

## 2022-02-23 NOTE — PROGRESS NOTES
[facility] Medicine  Progress Note    Patient Name: Forest Lopez  MRN: [id number]  Patient Class: IP- Inpatient   Admission Date: 1/31/2022  Length of Stay: 23 days  Attending Physician: Bridgette Pickens MD  Primary Care Provider: Julian Escobar        Subjective:     Principal Problem:Sepsis        HPI:  Mr. Lopez is a 72 year old man who presented after a syncopal episode.  He reports he had been feeling well prior to the episode but then had a prodrome prior to passing out.  EMS was called, report patient's BP was low normal on their arrival, improved after an IV fluids bolus.  Patient denies chest pain or shortness of breath and says he does not feel weak currently although is rather tired.  When seen in the ED this morning he could barely express himself audibly.     Vital signs were normal on presentation here.  He is on warfarin for recurrent DVT, but his INR was 1, and d-dimer markedly elevated at 21.7.  Tox screen was positive for cocaine.  He had a CTA of the brain and neck done on presentation so CTA of the chest for PE study could not be done for 48 hours.  Ultrasound of the lower extremities showed extensive bilateral DVT.  On the right there was thrombosis of the common femoral vein, femoral vein, popliteal vein, one of the paired posterior tibial veins and both visualized peroneal veins.  On the left there was thrombosis of the common femoral vein, femoral vein and popliteal vein.  Patient was started on full dose Lovenox and admitted.    Medical history is from the chart as he is unable to give me much information.  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  He smokes 5-6 cigarettes/day and is not interested in quitting.  He is currently homeless and staying with a friend.  Despite the normal INR he is sure he is taking his  warfarin and is not having difficulty taking his medications.  I discussed his care with [facility] [place] [facility] and [place] had been lost to follow up since November 2021.      Overview/Hospital Course:  Patient presented to the ED after a syncopal episode and was found to have bilateral lower extremity DVTs and a low INR.  Tox screen was positive for cocaine.  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  He was started back on his warfarin with bridging Lovenox.  His 2D echo showed grade I diastolic dysfunction.  Neurology evaluated him and recommended echo with bubble study, which was negative for a shunt.    PT/OT evaluated him and recommended SNF placement as he was not participating in therapy.  His INR was difficult to get therapeutic, and as he had no contraindication to a NOAC his warfarin was changed to apixaban, and the full dose Lovenox was discontinued.  As he appeared to be depressed and had no objection to starting an antidepressant Lexapro was started.  He had a fall in the hospital on 2/13, had gone to the bathroom for a BM with the nurse, and when she turned away while he was washing his hands he apparently lost his balance and fell.  He did not hit his head and did not lose consciousness, but his BP was low transiently and he was given a bolus of IV fluids.    Patient's mental status improved slowly, but he gradually became more talkative and was able to hold a brief conversation.    Patient more lethargic on 2/21 with low grade fever and leukocytosis, work up looked like uti and started on abx, fluids.      Interval History:  Urine culture growing Proteus gram-negative jesus.  final identification and sensitivity pending.  White count trending down, Pt had X2 large, loose BMs last night.  Currently on C diff isolation.  Patient afebrile for the last 24 hours    Review of Systems   HENT:  Negative for  trouble swallowing.    Respiratory:  Negative for shortness of breath.    Cardiovascular:  Negative for chest pain.   Gastrointestinal:  Negative for abdominal pain, nausea and vomiting.   Genitourinary:  Negative for dysuria and hematuria.   Skin:  Negative for rash.   Neurological:  Negative for headaches.   Objective:     Vital Signs (Most Recent):  Temp: 98.1 °F (36.7 °C) (02/23/22 0733)  Pulse: 65 (02/23/22 0733)  Resp: 18 (02/23/22 0733)  BP: 121/64 (02/23/22 0733)  SpO2: 98 % (02/23/22 0733) Vital Signs (24h Range):  Temp:  [98 °F (36.7 °C)-100.1 °F (37.8 °C)] 98.1 °F (36.7 °C)  Pulse:  [] 65  Resp:  [16-18] 18  SpO2:  [91 %-99 %] 98 %  BP: (121-136)/(58-78) 121/64     Weight: 63 kg (139 lb)  Body mass index is 18.85 kg/m².    Intake/Output Summary (Last 24 hours) at 2/23/2022 1009  Last data filed at 2/23/2022 0719  Gross per 24 hour   Intake 1430 ml   Output 600 ml   Net 830 ml      Physical Exam  Vitals and nursing note reviewed.   Constitutional:       General: He is not in acute distress.     Appearance: He is well-developed.   HENT:      Head: Normocephalic and atraumatic.      Mouth/Throat:      Mouth: Mucous membranes are moist.   Eyes:      Extraocular Movements: Extraocular movements intact.      Pupils: Pupils are equal, round, and reactive to light.   Cardiovascular:      Rate and Rhythm: Normal rate and regular rhythm.   Pulmonary:      Effort: Pulmonary effort is normal. No respiratory distress.      Breath sounds: Normal breath sounds.   Abdominal:      General: Bowel sounds are normal. There is no distension.      Palpations: Abdomen is soft.      Tenderness: There is no abdominal tenderness.   Musculoskeletal:         General: No swelling. Normal range of motion.      Cervical back: Normal range of motion and neck supple.   Skin:     General: Skin is warm.      Findings: No rash.   Neurological:      General: No focal deficit present.      Mental Status: He is alert.      Comments:  "Oriented to person, place   Psychiatric:         Mood and Affect: Mood normal.         Behavior: Behavior normal.       Significant Labs: All pertinent labs within the past 24 hours have been reviewed.  CBC:   Recent Labs   Lab 02/22/22 0435 02/23/22 0515   WBC 20.28* 15.95*   HGB 11.0* 10.5*   HCT 33.7* 32.6*    170     CMP:   Recent Labs   Lab 02/22/22 0435 02/23/22  0515   * 134*   K 4.2 3.9    106   CO2 19* 21*   * 98   BUN 12 11   CREATININE 0.9 0.9   CALCIUM 9.9 9.7   ANIONGAP 12 7*   EGFRNONAA >60 >60       Significant Imaging: I have reviewed all pertinent imaging results/findings within the past 24 hours.      Assessment/Plan:      * Sepsis  This patient does have evidence of infective focus  My overall impression is sepsis. Vital signs were reviewed and noted in progress note.  Antibiotics given-   Antibiotics (From admission, onward)            Start     Stop Route Frequency Ordered    02/22/22 1000  vancomycin 1.5 g in dextrose 5 % 250 mL IVPB (ready to mix)         -- IV Every 24 hours (non-standard times) 02/22/22 0836    02/22/22 0917  vancomycin - pharmacy to dose  (vancomycin IVPB)        "And" Linked Group Details    -- IV pharmacy to manage frequency 02/22/22 0817    02/21/22 1215  cefTRIAXone (ROCEPHIN) 1 g/50 mL D5W IVPB         -- IV Every 24 hours (non-standard times) 02/21/22 1113        Cultures were taken-   Microbiology Results (last 7 days)     Procedure Component Value Units Date/Time    Clostridium difficile EIA [[id number]] Collected: 02/23/22 0939    Order Status: Sent Specimen: Stool Updated: 02/23/22 0956    Urine culture [[id number]]  (Abnormal) Collected: 02/21/22 1013    Order Status: Completed Specimen: Urine Updated: 02/22/22 2101     Urine Culture, Routine GRAM NEGATIVE JOYCE  >100,000 cfu/ml  Identification and susceptibility pending        PRESUMPTIVE PROTEUS SPECIES  > 100,000 cfu/ml  Identification and susceptibility pending      Narrative:      " Specimen Source->Urine    Blood culture [[id number]] Collected: 02/21/22 0758    Order Status: Completed Specimen: Blood from Antecubital, Right Arm Updated: 02/22/22 1412     Blood Culture, Routine No Growth to date      No Growth to date    Blood culture [[id number]] Collected: 02/21/22 0757    Order Status: Completed Specimen: Blood Updated: 02/22/22 1412     Blood Culture, Routine No Growth to date      No Growth to date        Latest lactate reviewed, they are-  Recent Labs   Lab 02/22/22  0435   LACTATE 0.9       Organ dysfunction indicated by Encephalopathy   Source- UTI        Drowsy  Likely due to infection  No focal deficits, ct head no acute changes  Improved after fluids and abx.      Abnormal imaging of thyroid  Tsh/t4 ok, thyroid ultrasound done, showed bilateral nodules which did not meet criteria for biopsy.    Acute stroke due to ischemia  - MRI showed areas of diffusion signal hyperintensity consistent with areas of acute ischemia/infarct involving the left cerebral hemisphere, the most prominent measuring approximately 1.4 cm, also a small focus of the right frontal lobe.  - Patient on full dose anticoagulation  - Sinus rhythm noted throughout hospital stay  - Risk factor modification - control diabetes, continue statin.  - RPR, HIV non reactive.  B12 low normal.   - CTA showed a focal segment of 60-70% stenosis involving the proximal to mid left vertebral artery that was new when compared to the prior study of 09/25/2019.  No evidence of large vessel intracranial occlusion.   - Neurology noted symptoms do not correspond to stroke location.  - Echo with bubble study done, no shunt seen.  - Follow up with vascular neurology in 4 weeks.  -Therapy recommending SNF due to his low interest in participation.  - Started Lexapro due to suspicion for depression.      Advance care planning        Severe protein-calorie malnutrition  Diet as tolerated      Debility  Pt/ot efforts  Awaiting  Skilled  placement.    Type 2 diabetes mellitus with hyperglycemia, without long-term current use of insulin  Reportedly he is on metformin and glipizide, both held.  Continue SSI for now.  He has 4+ sugar in urine and HbA1c is 10.3, and he is hyperglycemic here.  Will start levemir and continue ISS, increase levemir to 25 units daily  Add scheduled novolog 5 units tid, increase to 7 units  Improving  Resume metformin in SNF  Sugars ok    Acute deep vein thrombosis (DVT) of both lower extremities  Last ultrasound showed partially occlusive DVTs, now completely occlusive DVT of multiple lower extremity veins on ultrasound.  Patient reports compliance with warfarin but his INR is only one.  He is homeless but says he does not have difficulty obtaining his medications.  Does not seem to care about anything.  D-dimer was markedly elevated on presentation and there was a question of possible PE, but he had a CTA of the brain/neck on presentation so could not get another CTA for another 48 hours.    V/Q scan was done, showed low probability for PE.  2D echo showed grade I diastolic dysfunction.  Warfarin was restarted with bridging Lovenox, but INR was still low.  Given the difficulty of getting him to follow up changed to apixaban and discontinued Lovenox.    Syncope and collapse  Unclear cause.  Spoke to [place] and patient has had previous syncopal episodes attributed to alcohol abuse.  Has been prescribed meclizine as well.  Tox positive for cocaine but not alcohol.  CTA brain/neck was done in ED, and per ED note the results were discussed with stroke neurologist Dr. Tovar who feels that CTA finding of a short segment of left vertebral artery stenosis was incidental.  He did not think stroke workup with MRI was indicated.    Since change of status per son compared to when he saw him early during the week a CT of the brain was ordered and no abnormalities seen.  MRI of the brain showed acute stroke .      Cocaine abuse  As  noted above.  He has not had any behavioral issues while here, and other than being disinterested in therapy he has been very cooperative.  Seems drug use was transient.      Hyperlipidemia  Resumed lipitor      Essential hypertension  Doubtful he was on anything at home.  Started losartan 25 mg daily  BP well controlled on low dose losartan.  BP transiently low today after he fell but recovered quickly.  IVF bolus given.  Will hold losartan as bp low again  bp ok without med    Tobacco dependence  He smokes 5-6 cigarettes/day.  Not trying to quit and not interested in a nicotine patch.  Benefits of smoking cessation were reviewed with patient in detail for for 8 minutes and patient was encouraged to quit. Nicotine replacement options were discussed.        VTE Risk Mitigation (From admission, onward)         Ordered     apixaban tablet 5 mg  2 times daily         02/10/22 0901     Place sequential compression device  Until discontinued         01/31/22 0533                Discharge Planning   EFRAÍN:      Code Status: Full Code   Is the patient medically ready for discharge?:     Reason for patient still in hospital (select all that apply): Patient trending condition and Treatment  Discharge Plan A: Skilled Nursing Facility   Discharge Delays: (!) Post-Acute Set-up              Bridgette Pickens MD  Department of Hospital Medicine   [facility] ([place])

## 2022-02-24 LAB
POCT GLUCOSE: 123 MG/DL (ref 70–110)
POCT GLUCOSE: 158 MG/DL (ref 70–110)
POCT GLUCOSE: 164 MG/DL (ref 70–110)
POCT GLUCOSE: 210 MG/DL (ref 70–110)
VANCOMYCIN TROUGH SERPL-MCNC: 6.5 UG/ML (ref 10–22)

## 2022-02-24 PROCEDURE — 99233 PR SUBSEQUENT HOSPITAL CARE,LEVL III: ICD-10-PCS | Mod: ,,, | Performed by: INTERNAL MEDICINE

## 2022-02-24 PROCEDURE — 36415 COLL VENOUS BLD VENIPUNCTURE: CPT | Performed by: INTERNAL MEDICINE

## 2022-02-24 PROCEDURE — 80202 ASSAY OF VANCOMYCIN: CPT | Performed by: INTERNAL MEDICINE

## 2022-02-24 PROCEDURE — 25000003 PHARM REV CODE 250: Performed by: INTERNAL MEDICINE

## 2022-02-24 PROCEDURE — 36410 VNPNXR 3YR/> PHY/QHP DX/THER: CPT

## 2022-02-24 PROCEDURE — 99233 SBSQ HOSP IP/OBS HIGH 50: CPT | Mod: ,,, | Performed by: INTERNAL MEDICINE

## 2022-02-24 PROCEDURE — 25000003 PHARM REV CODE 250: Performed by: NURSE PRACTITIONER

## 2022-02-24 PROCEDURE — 76937 US GUIDE VASCULAR ACCESS: CPT

## 2022-02-24 PROCEDURE — 97116 GAIT TRAINING THERAPY: CPT | Mod: CQ

## 2022-02-24 PROCEDURE — 11000001 HC ACUTE MED/SURG PRIVATE ROOM

## 2022-02-24 PROCEDURE — C1751 CATH, INF, PER/CENT/MIDLINE: HCPCS

## 2022-02-24 PROCEDURE — 97535 SELF CARE MNGMENT TRAINING: CPT

## 2022-02-24 PROCEDURE — A4216 STERILE WATER/SALINE, 10 ML: HCPCS | Performed by: INTERNAL MEDICINE

## 2022-02-24 PROCEDURE — 25000003 PHARM REV CODE 250: Performed by: HOSPITALIST

## 2022-02-24 PROCEDURE — 63600175 PHARM REV CODE 636 W HCPCS: Performed by: STUDENT IN AN ORGANIZED HEALTH CARE EDUCATION/TRAINING PROGRAM

## 2022-02-24 RX ORDER — LOPERAMIDE HYDROCHLORIDE 2 MG/1
2 CAPSULE ORAL 4 TIMES DAILY PRN
Status: DISCONTINUED | OUTPATIENT
Start: 2022-02-24 | End: 2022-04-27 | Stop reason: HOSPADM

## 2022-02-24 RX ADMIN — INSULIN DETEMIR 25 UNITS: 100 INJECTION, SOLUTION SUBCUTANEOUS at 09:02

## 2022-02-24 RX ADMIN — SODIUM CHLORIDE: 0.9 INJECTION, SOLUTION INTRAVENOUS at 11:02

## 2022-02-24 RX ADMIN — TAMSULOSIN HYDROCHLORIDE 0.4 MG: 0.4 CAPSULE ORAL at 08:02

## 2022-02-24 RX ADMIN — ACETAMINOPHEN 650 MG: 325 TABLET, FILM COATED ORAL at 08:02

## 2022-02-24 RX ADMIN — Medication 10 ML: at 11:02

## 2022-02-24 RX ADMIN — APIXABAN 5 MG: 2.5 TABLET, FILM COATED ORAL at 09:02

## 2022-02-24 RX ADMIN — LOPERAMIDE HYDROCHLORIDE 2 MG: 2 CAPSULE ORAL at 08:02

## 2022-02-24 RX ADMIN — CEFTRIAXONE 1 G: 1 INJECTION, SOLUTION INTRAVENOUS at 01:02

## 2022-02-24 RX ADMIN — LOPERAMIDE HYDROCHLORIDE 2 MG: 2 CAPSULE ORAL at 09:02

## 2022-02-24 RX ADMIN — INSULIN ASPART 7 UNITS: 100 INJECTION, SOLUTION INTRAVENOUS; SUBCUTANEOUS at 09:02

## 2022-02-24 RX ADMIN — INSULIN ASPART 1 UNITS: 100 INJECTION, SOLUTION INTRAVENOUS; SUBCUTANEOUS at 08:02

## 2022-02-24 RX ADMIN — INSULIN ASPART 7 UNITS: 100 INJECTION, SOLUTION INTRAVENOUS; SUBCUTANEOUS at 05:02

## 2022-02-24 RX ADMIN — Medication 10 ML: at 05:02

## 2022-02-24 RX ADMIN — ATORVASTATIN CALCIUM 80 MG: 20 TABLET, FILM COATED ORAL at 09:02

## 2022-02-24 RX ADMIN — Medication 10 ML: at 12:02

## 2022-02-24 RX ADMIN — APIXABAN 5 MG: 2.5 TABLET, FILM COATED ORAL at 08:02

## 2022-02-24 RX ADMIN — ESCITALOPRAM OXALATE 10 MG: 10 TABLET ORAL at 09:02

## 2022-02-24 RX ADMIN — Medication 10 ML: at 07:02

## 2022-02-24 RX ADMIN — INSULIN ASPART 7 UNITS: 100 INJECTION, SOLUTION INTRAVENOUS; SUBCUTANEOUS at 12:02

## 2022-02-24 RX ADMIN — CEFTRIAXONE 1 G: 1 INJECTION, SOLUTION INTRAVENOUS at 12:02

## 2022-02-24 NOTE — NURSING
PIV to right forearm dislodged during shift; 20g to left hand inserted after two attempts. 22g to left hand dislodged by patient. Charge Nurse attempted reinsertion x2 unsuccessfully. House supervisor will be contacted via night charge nurse. Will inform oncoming nurse of today's events.

## 2022-02-24 NOTE — PT/OT/SLP PROGRESS
Physical Therapy Treatment    Patient Name:  Forest Lopez   MRN:  [id number]    Recommendations:     Discharge Recommendations:  nursing facility, skilled   Discharge Equipment Recommendations: bedside commode, shower chair, walker, rolling   Barriers to discharge: Decreased caregiver support    Assessment:     Forest Lopez is a 72 y.o. male admitted with a medical diagnosis of Sepsis.  He presents with the following impairments/functional limitations:  weakness, impaired endurance, impaired self care skills, impaired functional mobilty, gait instability, impaired balance, impaired cognition, decreased coordination, decreased upper extremity function, decreased lower extremity function, decreased safety awareness, impaired coordination ;pt with good mobility today, inc amb distance in [facility], though needing HHA/Varun for balance (did not use AD today 2/2 issues w/ RW wheels).    Rehab Prognosis: Good; patient would benefit from acute skilled PT services to address these deficits and reach maximum level of function.    Recent Surgery: * No surgery found *      Plan:     During this hospitalization, patient to be seen 3 x/week to address the identified rehab impairments via gait training, therapeutic activities, therapeutic exercises, neuromuscular re-education and progress toward the following goals:    · Plan of Care Expires:  03/02/22    Subjective     Chief Complaint: none stated  Patient/Family Comments/goals: pt agreeable to session.  Pain/Comfort:  · Pain Rating 1: 0/10  · Pain Rating Post-Intervention 1: 0/10      Objective:     Communicated with nurse prior to session.  Patient found up in chair with PICC line, chair check, condom catheter upon PT entry to room.     General Precautions: Standard, anti-coagulation medicine, fall, diabetic   Orthopedic Precautions:N/A   Braces: N/A  Respiratory Status: Room air     Functional Mobility:  · Transfers:     · Sit to Stand:  contact guard assistance with rolling  walker  · Gait: amb'd ~150' w/ HHA/min.A (no AD used today), pt needing cueing for inc step length, upright posture      AM-PAC 6 CLICK MOBILITY  Turning over in bed (including adjusting bedclothes, sheets and blankets)?: 3  Sitting down on and standing up from a chair with arms (e.g., wheelchair, bedside commode, etc.): 3  Moving from lying on back to sitting on the side of the bed?: 3  Moving to and from a bed to a chair (including a wheelchair)?: 3  Need to walk in hospital room?: 3  Climbing 3-5 steps with a railing?: 3  Basic Mobility Total Score: 18       Therapeutic Activities and Exercises:   pt perf'd seated LE ex's of LAQ's x 10 ea.     Patient left up in chair with all lines intact, call button in reach, chair alarm on and [employer] monitoring present..    GOALS:   Multidisciplinary Problems     Physical Therapy Goals        Problem: Physical Therapy Goal    Goal Priority Disciplines Outcome Goal Variances Interventions   Physical Therapy Goal     PT, PT/OT Ongoing, Progressing     Description: Goals to be met by: 3/2/2022    Patient will increase functional independence with mobility by performin. Sit<>stand with SPV with LRAD.  2. Gait x 300 feet with SPV with LRAD.   3. Static standing in SLS with no UE support with SBA x10 sec.                      Time Tracking:     PT Received On: 22  PT Start Time: 1210     PT Stop Time: 1222  PT Total Time (min): 12 min     Billable Minutes: Gait Training 12    Treatment Type: Treatment  PT/PTA: PTA     PTA Visit Number: 4     2022

## 2022-02-24 NOTE — PROCEDURES
Forest Lopez is a 72 y.o. male patient.    Temp: 98.2 °F (36.8 °C) (02/23/22 2024)  Pulse: 80 (02/23/22 2024)  Resp: 18 (02/23/22 2024)  BP: 125/62 (02/23/22 2024)  SpO2: 96 % (02/23/22 2024)  Weight: 63 kg (139 lb) (02/09/22 1231)  Height: 6' (182.9 cm) (02/09/22 1231)    PICC  Performed by: Wolf Skinner RN  Supervising provider: Wolf Skinner RN  Consent Done: Yes  Indications: vascular access  Anesthesia: local infiltration  Local anesthetic: lidocaine 1% without epinephrine  Anesthetic Total (mL): 3  Preparation: skin prepped with ChloraPrep  Skin prep agent dried: skin prep agent completely dried prior to procedure  Sterile barriers: all five maximum sterile barriers used - cap, mask, sterile gown, sterile gloves, and large sterile sheet  Hand hygiene: hand hygiene performed prior to central venous catheter insertion  Location details: left basilic  Catheter type: single lumen  Catheter size: 4 Fr  Catheter Length: 12cm    Ultrasound guidance: yes  Vessel Caliber: medium, compressibility normal  Vascular Doppler: not done  Needle advanced into vessel with real time Ultrasound guidance.  Guidewire confirmed in vessel.  Sterile sheath used.  no esophageal manometryNumber of attempts: 1  Post-procedure: blood return through all ports, chlorhexidine patch and sterile dressing applied  Estimated blood loss (mL): 0  Specimens: No  Implants: No          Name MARIBEL Bloom  2/23/2022

## 2022-02-24 NOTE — PROGRESS NOTES
[facility] Medicine  Progress Note    Patient Name: Forest Lopez  MRN: [id number]  Patient Class: IP- Inpatient   Admission Date: 1/31/2022  Length of Stay: 24 days  Attending Physician: Bridgette Pickens MD  Primary Care Provider: Julian Escobar        Subjective:     Principal Problem:Sepsis        HPI:  Mr. Lopez is a 72 year old man who presented after a syncopal episode.  He reports he had been feeling well prior to the episode but then had a prodrome prior to passing out.  EMS was called, report patient's BP was low normal on their arrival, improved after an IV fluids bolus.  Patient denies chest pain or shortness of breath and says he does not feel weak currently although is rather tired.  When seen in the ED this morning he could barely express himself audibly.     Vital signs were normal on presentation here.  He is on warfarin for recurrent DVT, but his INR was 1, and d-dimer markedly elevated at 21.7.  Tox screen was positive for cocaine.  He had a CTA of the brain and neck done on presentation so CTA of the chest for PE study could not be done for 48 hours.  Ultrasound of the lower extremities showed extensive bilateral DVT.  On the right there was thrombosis of the common femoral vein, femoral vein, popliteal vein, one of the paired posterior tibial veins and both visualized peroneal veins.  On the left there was thrombosis of the common femoral vein, femoral vein and popliteal vein.  Patient was started on full dose Lovenox and admitted.    Medical history is from the chart as he is unable to give me much information.  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  He smokes 5-6 cigarettes/day and is not interested in quitting.  He is currently homeless and staying with a friend.  Despite the normal INR he is sure he is taking his  warfarin and is not having difficulty taking his medications.  I discussed his care with [facility] staff on the [facility] and patient had been lost to follow up since November 2021.      Overview/Hospital Course:  Patient presented to the ED after a syncopal episode and was found to have bilateral lower extremity DVTs and a low INR.  Tox screen was positive for cocaine.  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  He was started back on his warfarin with bridging Lovenox.  His 2D echo showed grade I diastolic dysfunction.  Neurology evaluated him and recommended echo with bubble study, which was negative for a shunt.    PT/OT evaluated him and recommended SNF placement as he was not participating in therapy.  His INR was difficult to get therapeutic, and as he had no contraindication to a NOAC his warfarin was changed to apixaban, and the full dose Lovenox was discontinued.  As he appeared to be depressed and had no objection to starting an antidepressant Lexapro was started.  He had a fall in the hospital on 2/13, had gone to the bathroom for a BM with the nurse, and when she turned away while he was washing his hands he apparently lost his balance and fell.  He did not hit his head and did not lose consciousness, but his BP was low transiently and he was given a bolus of IV fluids.    Patient's mental status improved slowly, but he gradually became more talkative and was able to hold a brief conversation.    Patient more lethargic on 2/21 with low grade fever and leukocytosis, work up looked like uti and started on abx, fluids.      Interval History: Urine culture growing Proteus   and Klebseilla.  White count trending down,   Patient afebrile for the last 48 hours    Review of Systems   HENT:  Negative for trouble swallowing.    Respiratory:  Negative for shortness of breath.    Cardiovascular:  Negative for chest pain.    Gastrointestinal:  Negative for abdominal pain, nausea and vomiting.   Genitourinary:  Negative for dysuria and hematuria.   Skin:  Negative for rash.   Neurological:  Negative for headaches.   Objective:     Vital Signs (Most Recent):  Temp: 98.1 °F (36.7 °C) (02/24/22 1112)  Pulse: 81 (02/24/22 1112)  Resp: 16 (02/24/22 1112)  BP: 131/74 (02/24/22 1112)  SpO2: 97 % (02/24/22 1112) Vital Signs (24h Range):  Temp:  [97.6 °F (36.4 °C)-98.7 °F (37.1 °C)] 98.1 °F (36.7 °C)  Pulse:  [72-89] 81  Resp:  [16-18] 16  SpO2:  [92 %-99 %] 97 %  BP: (124-172)/(62-77) 131/74     Weight: 63 kg (139 lb)  Body mass index is 18.85 kg/m².    Intake/Output Summary (Last 24 hours) at 2/24/2022 1253  Last data filed at 2/24/2022 0900  Gross per 24 hour   Intake 1730.42 ml   Output --   Net 1730.42 ml      Physical Exam  Vitals and nursing note reviewed.   Constitutional:       General: He is not in acute distress.     Appearance: He is well-developed.   HENT:      Head: Normocephalic and atraumatic.      Mouth/Throat:      Mouth: Mucous membranes are moist.   Eyes:      Extraocular Movements: Extraocular movements intact.      Pupils: Pupils are equal, round, and reactive to light.   Cardiovascular:      Rate and Rhythm: Normal rate and regular rhythm.   Pulmonary:      Effort: Pulmonary effort is normal. No respiratory distress.      Breath sounds: Normal breath sounds.   Abdominal:      General: Bowel sounds are normal. There is no distension.      Palpations: Abdomen is soft.      Tenderness: There is no abdominal tenderness.   Musculoskeletal:         General: No swelling. Normal range of motion.      Cervical back: Normal range of motion and neck supple.   Skin:     General: Skin is warm.      Findings: No rash.   Neurological:      General: No focal deficit present.      Mental Status: He is alert.      Comments: Oriented to person, place   Psychiatric:         Mood and Affect: Mood normal.         Behavior: Behavior normal.        Significant Labs: All pertinent labs within the past 24 hours have been reviewed.  CBC:   Recent Labs   Lab 02/23/22  0515   WBC 15.95*   HGB 10.5*   HCT 32.6*        CMP:   Recent Labs   Lab 02/23/22  0515   *   K 3.9      CO2 21*   GLU 98   BUN 11   CREATININE 0.9   CALCIUM 9.7   ANIONGAP 7*   EGFRNONAA >60       Significant Imaging: I have reviewed all pertinent imaging results/findings within the past 24 hours.      Assessment/Plan:      * Sepsis  This patient does have evidence of infective focus  My overall impression is sepsis. Vital signs were reviewed and noted in progress note.  Antibiotics given-   Antibiotics (From admission, onward)            Start     Stop Route Frequency Ordered    02/21/22 1215  cefTRIAXone (ROCEPHIN) 1 g/50 mL D5W IVPB         -- IV Every 24 hours (non-standard times) 02/21/22 1113        Cultures were taken-   Microbiology Results (last 7 days)     Procedure Component Value Units Date/Time    Clostridium difficile EIA [[id number]] Collected: 02/23/22 0939    Order Status: Completed Specimen: Stool Updated: 02/23/22 2055     C. diff Antigen Negative     C difficile Toxins A+B, EIA Negative     Comment: Testing not recommended for children <24 months old.       Urine culture [[id number]]  (Abnormal)  (Susceptibility) Collected: 02/21/22 1013    Order Status: Completed Specimen: Urine Updated: 02/23/22 1840     Urine Culture, Routine KLEBSIELLA PNEUMONIAE  >100,000 cfu/ml        PROTEUS MIRABILIS  > 100,000 cfu/ml      Narrative:      Specimen Source->Urine    Blood culture [[id number]] Collected: 02/21/22 0758    Order Status: Completed Specimen: Blood from Antecubital, Right Arm Updated: 02/23/22 1412     Blood Culture, Routine No Growth to date      No Growth to date      No Growth to date    Blood culture [[id number]] Collected: 02/21/22 0757    Order Status: Completed Specimen: Blood Updated: 02/23/22 1412     Blood Culture, Routine No Growth to date      No  Growth to date      No Growth to date        Latest lactate reviewed, they are-  Recent Labs   Lab 02/22/22  0435   LACTATE 0.9       Organ dysfunction indicated by Encephalopathy   Source- UTI        Drowsy  Likely due to infection  No focal deficits, ct head no acute changes  Improved after fluids and abx.      Abnormal imaging of thyroid  Tsh/t4 ok, thyroid ultrasound done, showed bilateral nodules which did not meet criteria for biopsy.    Acute stroke due to ischemia  - MRI showed areas of diffusion signal hyperintensity consistent with areas of acute ischemia/infarct involving the left cerebral hemisphere, the most prominent measuring approximately 1.4 cm, also a small focus of the right frontal lobe.  - Patient on full dose anticoagulation  - Sinus rhythm noted throughout hospital stay  - Risk factor modification - control diabetes, continue statin.  - RPR, HIV non reactive.  B12 low normal.   - CTA showed a focal segment of 60-70% stenosis involving the proximal to mid left vertebral artery that was new when compared to the prior study of 09/25/2019.  No evidence of large vessel intracranial occlusion.   - Neurology noted symptoms do not correspond to stroke location.  - Echo with bubble study done, no shunt seen.  - Follow up with vascular neurology in 4 weeks.  -Therapy recommending SNF due to his low interest in participation.  - Started Lexapro due to suspicion for depression.      Advance care planning        Severe protein-calorie malnutrition  Diet as tolerated      Debility  Pt/ot efforts  Awaiting  Skilled placement.    Type 2 diabetes mellitus with hyperglycemia, without long-term current use of insulin  Reportedly he is on metformin and glipizide, both held.  Continue SSI for now.  He has 4+ sugar in urine and HbA1c is 10.3, and he is hyperglycemic here.  Will start levemir and continue ISS, increase levemir to 25 units daily  Add scheduled novolog 5 units tid, increase to 7  units  Improving  Resume metformin in SNF  Sugars ok    Acute deep vein thrombosis (DVT) of both lower extremities  Last ultrasound showed partially occlusive DVTs, now completely occlusive DVT of multiple lower extremity veins on ultrasound.  Patient reports compliance with warfarin but his INR is only one.  He is homeless but says he does not have difficulty obtaining his medications.  Does not seem to care about anything.  D-dimer was markedly elevated on presentation and there was a question of possible PE, but he had a CTA of the brain/neck on presentation so could not get another CTA for another 48 hours.    V/Q scan was done, showed low probability for PE.  2D echo showed grade I diastolic dysfunction.  Warfarin was restarted with bridging Lovenox, but INR was still low.  Given the difficulty of getting him to follow up changed to apixaban and discontinued Lovenox.    Syncope and collapse  Unclear cause.  Spoke to [facility] and patient has had previous syncopal episodes attributed to alcohol abuse.  Has been prescribed meclizine as well.  Tox positive for cocaine but not alcohol.  CTA brain/neck was done in ED, and per ED note the results were discussed with stroke neurologist Dr. Tovar who feels that CTA finding of a short segment of left vertebral artery stenosis was incidental.  He did not think stroke workup with MRI was indicated.    Since change of status per son compared to when he saw him early during the week a CT of the brain was ordered and no abnormalities seen.  MRI of the brain showed acute stroke .      Cocaine abuse  As noted above.  He has not had any behavioral issues while here, and other than being disinterested in therapy he has been very cooperative.  Seems drug use was transient.      Hyperlipidemia  Resumed lipitor      Essential hypertension  Doubtful he was on anything at home.  Started losartan 25 mg daily  BP well controlled on low dose losartan.  BP transiently low today after he  fell but recovered quickly.  IVF bolus given.  Will hold losartan as bp low again  bp ok without med    Tobacco dependence  He smokes 5-6 cigarettes/day.  Not trying to quit and not interested in a nicotine patch.  Benefits of smoking cessation were reviewed with patient in detail for for 8 minutes and patient was encouraged to quit. Nicotine replacement options were discussed.        VTE Risk Mitigation (From admission, onward)         Ordered     apixaban tablet 5 mg  2 times daily         02/10/22 0901     Place sequential compression device  Until discontinued         01/31/22 0533                Discharge Planning   EFRAÍN:      Code Status: Full Code   Is the patient medically ready for discharge?:     Reason for patient still in hospital (select all that apply): Patient trending condition and Treatment  Discharge Plan A: Skilled Nursing Facility   Discharge Delays: (!) Post-Acute Set-up              Bridgette Pickens MD  Department of Hospital Medicine   [facility] ([place])

## 2022-02-24 NOTE — PT/OT/SLP PROGRESS
Occupational Therapy   Treatment    Name: Forest Lopez  MRN: [id number]  Admitting Diagnosis:  Sepsis       Recommendations:     Discharge Recommendations: nursing facility, skilled  Discharge Equipment Recommendations:  bedside commode, shower chair, walker, rolling  Barriers to discharge:  Decreased caregiver support    Assessment:     Forest Lopez is a 72 y.o. male with a medical diagnosis of Sepsis.  He presents with the following performance deficits affecting function: weakness, impaired functional mobilty, impaired cognition, decreased safety awareness, impaired endurance, gait instability, impaired balance, decreased upper extremity function, impaired self care skills, decreased lower extremity function, decreased ROM.     Improved interactions with brighter affect.  Needs increased assist from low surfaces, thus BSC would be appropriate.  Unpredictable knee buckling, but no LOB.  Continue OT services to maximize patient functioning.     Rehab Prognosis:  Fair; patient would benefit from acute skilled OT services to address these deficits and reach maximum level of function.       Plan:     Patient to be seen 3 x/week to address the above listed problems via self-care/home management, therapeutic activities, therapeutic exercises, cognitive retraining  · Plan of Care Expires: 03/03/22  · Plan of Care Reviewed with: patient    Subjective     Pain/Comfort:  · Pain Rating 1: 0/10  · Pain Rating Post-Intervention 1: 0/10    Objective:     Communicated with: MARIBEL Weiss prior to session.  Patient found up in chair with PICC line upon OT entry to room.    General Precautions: Standard, anti-coagulation medicine, diabetic, fall   Orthopedic Precautions:N/A   Braces: N/A  Respiratory Status: Room air     Occupational Performance:     Bed Mobility:    · NT, found UIC     Functional Mobility/Transfers:  · Patient completed Sit <> Stand Transfer with contact guard assistance  with  rolling walker   · Patient completed  Toilet Transfer Step Transfer technique with moderate assistance with  rolling walker  · Functional Mobility: CGA to Min assist with RW within room    Activities of Daily Living:  · Grooming: minimum assistance for standing balance and RW at sink while washing hands, face and rinsing mouth  · Upper Body Dressing: moderate assistance donning gown as robe seated C  · Toileting: moderate assistance for anne-marie care in standing      [facility] 6 Click ADL: 14    Treatment & Education:  Educated on today's date, safe sit to stand from low surface, recommendation of BSC.  Patient verbalized understanding, will reinforce.    Patient left up in chair with all lines intact, call button in reach, chair alarm on and RN notifiedEducation:      GOALS:   Multidisciplinary Problems     Occupational Therapy Goals        Problem: Occupational Therapy Goal    Goal Priority Disciplines Outcome Interventions   Occupational Therapy Goal     OT, PT/OT Ongoing, Progressing    Description: Goals to be met by: 2/15/2022    Patient will increase functional independence with ADLs by performing:    UE Dressing with Gilmer.  LE Dressing with Gilmer.  Grooming while standing at sink with Supervision.  Toileting from toilet with Supervision for hygiene and clothing management.   Toilet transfer to toilet with Supervision.                     Time Tracking:     OT Date of Treatment: 02/24/22  OT Start Time: 0956  OT Stop Time: 1027  OT Total Time (min): 31 min    Billable Minutes:Self Care/Home Management 31    OT/DUTCH: OT          2/24/2022

## 2022-02-24 NOTE — PT/OT/SLP PROGRESS
Occupational Therapy      Patient Name:  Forest Lopez   MRN:  [id number]    Patient not seen today secondary to Nursing care due to leaking peripheral IV. Will follow-up later as time permits.    2/23/2022

## 2022-02-24 NOTE — PLAN OF CARE
Problem: Occupational Therapy Goal  Goal: Occupational Therapy Goal  Description: Goals to be met by: 2/15/2022    Patient will increase functional independence with ADLs by performing:    UE Dressing with Gage.  LE Dressing with Gage.  Grooming while standing at sink with Supervision.  Toileting from toilet with Supervision for hygiene and clothing management.   Toilet transfer to toilet with Supervision.    Outcome: Ongoing, Progressing  Improved interactions with brighter affect.  Needs increased assist from low surfaces, thus BSC would be appropriate.  Unpredictable knee buckling, but no LOB.  Continue OT services to maximize patient functioning.

## 2022-02-24 NOTE — ASSESSMENT & PLAN NOTE
This patient does have evidence of infective focus  My overall impression is sepsis. Vital signs were reviewed and noted in progress note.  Antibiotics given-   Antibiotics (From admission, onward)            Start     Stop Route Frequency Ordered    02/21/22 1215  cefTRIAXone (ROCEPHIN) 1 g/50 mL D5W IVPB         -- IV Every 24 hours (non-standard times) 02/21/22 1113        Cultures were taken-   Microbiology Results (last 7 days)     Procedure Component Value Units Date/Time    Clostridium difficile EIA [[id number]] Collected: 02/23/22 0939    Order Status: Completed Specimen: Stool Updated: 02/23/22 2055     C. diff Antigen Negative     C difficile Toxins A+B, EIA Negative     Comment: Testing not recommended for children <24 months old.       Urine culture [[id number]]  (Abnormal)  (Susceptibility) Collected: 02/21/22 1013    Order Status: Completed Specimen: Urine Updated: 02/23/22 1840     Urine Culture, Routine KLEBSIELLA PNEUMONIAE  >100,000 cfu/ml        PROTEUS MIRABILIS  > 100,000 cfu/ml      Narrative:      Specimen Source->Urine    Blood culture [[id number]] Collected: 02/21/22 0758    Order Status: Completed Specimen: Blood from Antecubital, Right Arm Updated: 02/23/22 1412     Blood Culture, Routine No Growth to date      No Growth to date      No Growth to date    Blood culture [[id number]] Collected: 02/21/22 0757    Order Status: Completed Specimen: Blood Updated: 02/23/22 1412     Blood Culture, Routine No Growth to date      No Growth to date      No Growth to date        Latest lactate reviewed, they are-  Recent Labs   Lab 02/22/22  0435   LACTATE 0.9       Organ dysfunction indicated by Encephalopathy   Source- UTI

## 2022-02-24 NOTE — SUBJECTIVE & OBJECTIVE
Interval History: Urine culture growing Proteus   and Klebseilla.  White count trending down,   Patient afebrile for the last 48 hours    Review of Systems   HENT:  Negative for trouble swallowing.    Respiratory:  Negative for shortness of breath.    Cardiovascular:  Negative for chest pain.   Gastrointestinal:  Negative for abdominal pain, nausea and vomiting.   Genitourinary:  Negative for dysuria and hematuria.   Skin:  Negative for rash.   Neurological:  Negative for headaches.   Objective:     Vital Signs (Most Recent):  Temp: 98.1 °F (36.7 °C) (02/24/22 1112)  Pulse: 81 (02/24/22 1112)  Resp: 16 (02/24/22 1112)  BP: 131/74 (02/24/22 1112)  SpO2: 97 % (02/24/22 1112) Vital Signs (24h Range):  Temp:  [97.6 °F (36.4 °C)-98.7 °F (37.1 °C)] 98.1 °F (36.7 °C)  Pulse:  [72-89] 81  Resp:  [16-18] 16  SpO2:  [92 %-99 %] 97 %  BP: (124-172)/(62-77) 131/74     Weight: 63 kg (139 lb)  Body mass index is 18.85 kg/m².    Intake/Output Summary (Last 24 hours) at 2/24/2022 1253  Last data filed at 2/24/2022 0900  Gross per 24 hour   Intake 1730.42 ml   Output --   Net 1730.42 ml      Physical Exam  Vitals and nursing note reviewed.   Constitutional:       General: He is not in acute distress.     Appearance: He is well-developed.   HENT:      Head: Normocephalic and atraumatic.      Mouth/Throat:      Mouth: Mucous membranes are moist.   Eyes:      Extraocular Movements: Extraocular movements intact.      Pupils: Pupils are equal, round, and reactive to light.   Cardiovascular:      Rate and Rhythm: Normal rate and regular rhythm.   Pulmonary:      Effort: Pulmonary effort is normal. No respiratory distress.      Breath sounds: Normal breath sounds.   Abdominal:      General: Bowel sounds are normal. There is no distension.      Palpations: Abdomen is soft.      Tenderness: There is no abdominal tenderness.   Musculoskeletal:         General: No swelling. Normal range of motion.      Cervical back: Normal range of motion and  neck supple.   Skin:     General: Skin is warm.      Findings: No rash.   Neurological:      General: No focal deficit present.      Mental Status: He is alert.      Comments: Oriented to person, place   Psychiatric:         Mood and Affect: Mood normal.         Behavior: Behavior normal.       Significant Labs: All pertinent labs within the past 24 hours have been reviewed.  CBC:   Recent Labs   Lab 02/23/22  0515   WBC 15.95*   HGB 10.5*   HCT 32.6*        CMP:   Recent Labs   Lab 02/23/22  0515   *   K 3.9      CO2 21*   GLU 98   BUN 11   CREATININE 0.9   CALCIUM 9.7   ANIONGAP 7*   EGFRNONAA >60       Significant Imaging: I have reviewed all pertinent imaging results/findings within the past 24 hours.

## 2022-02-25 PROBLEM — I82.413: Status: ACTIVE | Noted: 2022-01-31

## 2022-02-25 LAB
ALBUMIN SERPL BCP-MCNC: 2.8 G/DL (ref 3.5–5.2)
ALP SERPL-CCNC: 81 U/L (ref 55–135)
ALT SERPL W/O P-5'-P-CCNC: 46 U/L (ref 10–44)
ANION GAP SERPL CALC-SCNC: 5 MMOL/L (ref 8–16)
AST SERPL-CCNC: 22 U/L (ref 10–40)
BASOPHILS # BLD AUTO: 0.04 K/UL (ref 0–0.2)
BASOPHILS NFR BLD: 0.5 % (ref 0–1.9)
BILIRUB SERPL-MCNC: 0.3 MG/DL (ref 0.1–1)
BUN SERPL-MCNC: 9 MG/DL (ref 8–23)
CALCIUM SERPL-MCNC: 9.7 MG/DL (ref 8.7–10.5)
CHLORIDE SERPL-SCNC: 106 MMOL/L (ref 95–110)
CO2 SERPL-SCNC: 26 MMOL/L (ref 23–29)
CREAT SERPL-MCNC: 0.8 MG/DL (ref 0.5–1.4)
DIFFERENTIAL METHOD: ABNORMAL
EOSINOPHIL # BLD AUTO: 0.1 K/UL (ref 0–0.5)
EOSINOPHIL NFR BLD: 1.8 % (ref 0–8)
ERYTHROCYTE [DISTWIDTH] IN BLOOD BY AUTOMATED COUNT: 12.5 % (ref 11.5–14.5)
EST. GFR  (AFRICAN AMERICAN): >60 ML/MIN/1.73 M^2
EST. GFR  (NON AFRICAN AMERICAN): >60 ML/MIN/1.73 M^2
GLUCOSE SERPL-MCNC: 119 MG/DL (ref 70–110)
HCT VFR BLD AUTO: 34.7 % (ref 40–54)
HGB BLD-MCNC: 11.3 G/DL (ref 14–18)
IMM GRANULOCYTES # BLD AUTO: 0.03 K/UL (ref 0–0.04)
IMM GRANULOCYTES NFR BLD AUTO: 0.4 % (ref 0–0.5)
LYMPHOCYTES # BLD AUTO: 1.8 K/UL (ref 1–4.8)
LYMPHOCYTES NFR BLD: 22.2 % (ref 18–48)
MCH RBC QN AUTO: 25.7 PG (ref 27–31)
MCHC RBC AUTO-ENTMCNC: 32.6 G/DL (ref 32–36)
MCV RBC AUTO: 79 FL (ref 82–98)
MONOCYTES # BLD AUTO: 1.1 K/UL (ref 0.3–1)
MONOCYTES NFR BLD: 13.3 % (ref 4–15)
NEUTROPHILS # BLD AUTO: 4.9 K/UL (ref 1.8–7.7)
NEUTROPHILS NFR BLD: 61.8 % (ref 38–73)
NRBC BLD-RTO: 0 /100 WBC
PLATELET # BLD AUTO: 218 K/UL (ref 150–450)
PMV BLD AUTO: 9.7 FL (ref 9.2–12.9)
POCT GLUCOSE: 111 MG/DL (ref 70–110)
POCT GLUCOSE: 126 MG/DL (ref 70–110)
POCT GLUCOSE: 140 MG/DL (ref 70–110)
POCT GLUCOSE: 83 MG/DL (ref 70–110)
POTASSIUM SERPL-SCNC: 3.8 MMOL/L (ref 3.5–5.1)
PROT SERPL-MCNC: 6.4 G/DL (ref 6–8.4)
RBC # BLD AUTO: 4.4 M/UL (ref 4.6–6.2)
SODIUM SERPL-SCNC: 137 MMOL/L (ref 136–145)
WBC # BLD AUTO: 7.98 K/UL (ref 3.9–12.7)

## 2022-02-25 PROCEDURE — A4216 STERILE WATER/SALINE, 10 ML: HCPCS | Performed by: INTERNAL MEDICINE

## 2022-02-25 PROCEDURE — 36406 VNPNXR<3YRS PHY/QHP OTHER VN: CPT

## 2022-02-25 PROCEDURE — 25000003 PHARM REV CODE 250: Performed by: INTERNAL MEDICINE

## 2022-02-25 PROCEDURE — 63600175 PHARM REV CODE 636 W HCPCS: Performed by: STUDENT IN AN ORGANIZED HEALTH CARE EDUCATION/TRAINING PROGRAM

## 2022-02-25 PROCEDURE — 80053 COMPREHEN METABOLIC PANEL: CPT | Performed by: INTERNAL MEDICINE

## 2022-02-25 PROCEDURE — 11000001 HC ACUTE MED/SURG PRIVATE ROOM

## 2022-02-25 PROCEDURE — 25000003 PHARM REV CODE 250: Performed by: NURSE PRACTITIONER

## 2022-02-25 PROCEDURE — 85025 COMPLETE CBC W/AUTO DIFF WBC: CPT | Performed by: INTERNAL MEDICINE

## 2022-02-25 PROCEDURE — 36415 COLL VENOUS BLD VENIPUNCTURE: CPT | Performed by: INTERNAL MEDICINE

## 2022-02-25 PROCEDURE — 63600175 PHARM REV CODE 636 W HCPCS: Performed by: INTERNAL MEDICINE

## 2022-02-25 PROCEDURE — 25000003 PHARM REV CODE 250: Performed by: HOSPITALIST

## 2022-02-25 PROCEDURE — 94761 N-INVAS EAR/PLS OXIMETRY MLT: CPT

## 2022-02-25 RX ADMIN — INSULIN ASPART 7 UNITS: 100 INJECTION, SOLUTION INTRAVENOUS; SUBCUTANEOUS at 05:02

## 2022-02-25 RX ADMIN — ATORVASTATIN CALCIUM 80 MG: 20 TABLET, FILM COATED ORAL at 09:02

## 2022-02-25 RX ADMIN — TAMSULOSIN HYDROCHLORIDE 0.4 MG: 0.4 CAPSULE ORAL at 08:02

## 2022-02-25 RX ADMIN — INSULIN DETEMIR 25 UNITS: 100 INJECTION, SOLUTION SUBCUTANEOUS at 09:02

## 2022-02-25 RX ADMIN — APIXABAN 5 MG: 2.5 TABLET, FILM COATED ORAL at 08:02

## 2022-02-25 RX ADMIN — INSULIN ASPART 7 UNITS: 100 INJECTION, SOLUTION INTRAVENOUS; SUBCUTANEOUS at 01:02

## 2022-02-25 RX ADMIN — ESCITALOPRAM OXALATE 10 MG: 10 TABLET ORAL at 09:02

## 2022-02-25 RX ADMIN — Medication 10 ML: at 01:02

## 2022-02-25 RX ADMIN — INSULIN ASPART 7 UNITS: 100 INJECTION, SOLUTION INTRAVENOUS; SUBCUTANEOUS at 09:02

## 2022-02-25 RX ADMIN — Medication 10 ML: at 06:02

## 2022-02-25 RX ADMIN — CEFTRIAXONE 1 G: 1 INJECTION, SOLUTION INTRAVENOUS at 01:02

## 2022-02-25 RX ADMIN — SODIUM CHLORIDE: 0.9 INJECTION, SOLUTION INTRAVENOUS at 10:02

## 2022-02-25 RX ADMIN — APIXABAN 5 MG: 2.5 TABLET, FILM COATED ORAL at 09:02

## 2022-02-25 RX ADMIN — Medication 10 ML: at 05:02

## 2022-02-25 NOTE — PLAN OF CARE
02/24/22 1851   Post-Acute Status   Post-Acute Authorization Placement   Post-Acute Placement Status Referrals Sent   Coverage [employer] MANAGED MEDICARE - [employer] SNP (SPECIAL NEEDS PLAN   Discharge Delays (!) Post-Acute Set-up   Discharge Plan   Discharge Plan A Skilled Nursing Facility       SW contacted [facility], but the patient was turned down, SW is still waiting for feedback from [facility]. The patient is really not participating in therapy and may not qualify for SNF.

## 2022-02-25 NOTE — PROGRESS NOTES
[facility] Medicine  Progress Note    Patient Name: Forest Lopez  MRN: [id number]  Patient Class: IP- Inpatient   Admission Date: 1/31/2022  Length of Stay: 25 days  Attending Physician: Yuri Griffiths DO  Primary Care Provider: Julian Escobar        Subjective:     Principal Problem:Sepsis        HPI:  Mr. Lopez is a 72 year old man who presented after a syncopal episode.  He reports he had been feeling well prior to the episode but then had a prodrome prior to passing out.  EMS was called, report patient's BP was low normal on their arrival, improved after an IV fluids bolus.  Patient denies chest pain or shortness of breath and says he does not feel weak currently although is rather tired.  When seen in the ED this morning he could barely express himself audibly.     Vital signs were normal on presentation here.  He is on warfarin for recurrent DVT, but his INR was 1, and d-dimer markedly elevated at 21.7.  Tox screen was positive for cocaine.  He had a CTA of the brain and neck done on presentation so CTA of the chest for PE study could not be done for 48 hours.  Ultrasound of the lower extremities showed extensive bilateral DVT.  On the right there was thrombosis of the common femoral vein, femoral vein, popliteal vein, one of the paired posterior tibial veins and both visualized peroneal veins.  On the left there was thrombosis of the common femoral vein, femoral vein and popliteal vein.  Patient was started on full dose Lovenox and admitted.    Medical history is from the chart as he is unable to give me much information.  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  He smokes 5-6 cigarettes/day and is not interested in quitting.  He is currently homeless and staying with a friend.  Despite the normal INR he is sure he is taking his  warfarin and is not having difficulty taking his medications.  I discussed his care with [facility] staff on the [facility] and patient had been lost to follow up since November 2021.      Overview/Hospital Course:  Patient presented to the ED after a syncopal episode and was found to have bilateral lower extremity DVTs and a low INR.  Tox screen was positive for cocaine.  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  He was started back on his warfarin with bridging Lovenox.  His 2D echo showed grade I diastolic dysfunction.  Neurology evaluated him and recommended echo with bubble study, which was negative for a shunt.    PT/OT evaluated him and recommended SNF placement as he was not participating in therapy.  His INR was difficult to get therapeutic, and as he had no contraindication to a NOAC his warfarin was changed to apixaban, and the full dose Lovenox was discontinued.  As he appeared to be depressed and had no objection to starting an antidepressant Lexapro was started.  He had a fall in the hospital on 2/13, had gone to the bathroom for a BM with the nurse, and when she turned away while he was washing his hands he apparently lost his balance and fell.  He did not hit his head and did not lose consciousness, but his BP was low transiently and he was given a bolus of IV fluids.    Patient's mental status improved slowly, but he gradually became more talkative and was able to hold a brief conversation.    Patient more lethargic on 2/21 with low grade fever and leukocytosis, work up looked like uti and started on abx, fluids.      Interval History: No acute overnight events. Net negative volume over past 24 hrs.     Review of Systems   All other systems reviewed and are negative.  Objective:     Vital Signs (Most Recent):  Temp: 98 °F (36.7 °C) (02/25/22 1228)  Pulse: 82 (02/25/22 1228)  Resp: 16 (02/25/22 1228)  BP: (!)  147/70 (02/25/22 1228)  SpO2: 96 % (02/25/22 1228)   Vital Signs (24h Range):  Temp:  [98 °F (36.7 °C)-99.2 °F (37.3 °C)] 98 °F (36.7 °C)  Pulse:  [62-82] 82  Resp:  [12-20] 16  SpO2:  [94 %-99 %] 96 %  BP: (121-165)/(70-88) 147/70     Weight: 63 kg (139 lb)  Body mass index is 18.85 kg/m².    Intake/Output Summary (Last 24 hours) at 2/25/2022 1434  Last data filed at 2/25/2022 1427  Gross per 24 hour   Intake 2566.02 ml   Output 2800 ml   Net -233.98 ml      Physical Exam  HENT:      Head: Normocephalic.      Nose: Nose normal.   Eyes:      Pupils: Pupils are equal, round, and reactive to light.   Cardiovascular:      Rate and Rhythm: Normal rate.   Pulmonary:      Effort: Pulmonary effort is normal.   Abdominal:      General: Abdomen is flat.   Musculoskeletal:         General: Normal range of motion.      Cervical back: Normal range of motion.   Skin:     General: Skin is warm.   Neurological:      Mental Status: He is alert.   Psychiatric:         Mood and Affect: Mood normal.       Significant Labs: All pertinent labs within the past 24 hours have been reviewed.  CBC:   Recent Labs   Lab 02/25/22  0900   WBC 7.98   HGB 11.3*   HCT 34.7*        CMP:   Recent Labs   Lab 02/25/22  0900      K 3.8      CO2 26   *   BUN 9   CREATININE 0.8   CALCIUM 9.7   PROT 6.4   ALBUMIN 2.8*   BILITOT 0.3   ALKPHOS 81   AST 22   ALT 46*   ANIONGAP 5*   EGFRNONAA >60       Significant Imaging: I have reviewed all pertinent imaging results/findings within the past 24 hours.  I have reviewed and interpreted all pertinent imaging results/findings within the past 24 hours.      Assessment/Plan:      * Sepsis  This patient does have evidence of infective focus  My overall impression is sepsis. Vital signs were reviewed and noted in progress note.  Antibiotics given-   Antibiotics (From admission, onward)            Start     Stop Route Frequency Ordered    02/21/22 1215  cefTRIAXone (ROCEPHIN) 1 g/50 mL D5W  IVPB         -- IV Every 24 hours (non-standard times) 02/21/22 1113        Cultures were taken-   Microbiology Results (last 7 days)     Procedure Component Value Units Date/Time    Clostridium difficile EIA [[id number]] Collected: 02/23/22 0939    Order Status: Completed Specimen: Stool Updated: 02/23/22 2055     C. diff Antigen Negative     C difficile Toxins A+B, EIA Negative     Comment: Testing not recommended for children <24 months old.       Urine culture [[id number]]  (Abnormal)  (Susceptibility) Collected: 02/21/22 1013    Order Status: Completed Specimen: Urine Updated: 02/23/22 1840     Urine Culture, Routine KLEBSIELLA PNEUMONIAE  >100,000 cfu/ml        PROTEUS MIRABILIS  > 100,000 cfu/ml      Narrative:      Specimen Source->Urine    Blood culture [[id number]] Collected: 02/21/22 0758    Order Status: Completed Specimen: Blood from Antecubital, Right Arm Updated: 02/23/22 1412     Blood Culture, Routine No Growth to date      No Growth to date      No Growth to date    Blood culture [[id number]] Collected: 02/21/22 0757    Order Status: Completed Specimen: Blood Updated: 02/23/22 1412     Blood Culture, Routine No Growth to date      No Growth to date      No Growth to date        Latest lactate reviewed, they are-  Recent Labs   Lab 02/22/22  0435   LACTATE 0.9       Organ dysfunction indicated by Encephalopathy   Source- UTI        Drowsy  Likely due to infection  No focal deficits, ct head no acute changes  Improved after fluids and abx.      Abnormal imaging of thyroid  Tsh/t4 ok, thyroid ultrasound done, showed bilateral nodules which did not meet criteria for biopsy.    Acute stroke due to ischemia  - MRI showed areas of diffusion signal hyperintensity consistent with areas of acute ischemia/infarct involving the left cerebral hemisphere, the most prominent measuring approximately 1.4 cm, also a small focus of the right frontal lobe.  - Patient on full dose anticoagulation  - Sinus rhythm noted  throughout hospital stay  - Risk factor modification - control diabetes, continue statin.  - RPR, HIV non reactive.  B12 low normal.   - CTA showed a focal segment of 60-70% stenosis involving the proximal to mid left vertebral artery that was new when compared to the prior study of 09/25/2019.  No evidence of large vessel intracranial occlusion.   - Neurology noted symptoms do not correspond to stroke location.  - Echo with bubble study done, no shunt seen.  - Follow up with vascular neurology in 4 weeks.  -Therapy recommending SNF due to his low interest in participation.  - Started Lexapro due to suspicion for depression.      Advance care planning        Severe protein-calorie malnutrition  Diet as tolerated      Debility  Pt/ot efforts  Awaiting  Skilled placement.    Type 2 diabetes mellitus with hyperglycemia, without long-term current use of insulin  Reportedly he is on metformin and glipizide, both held.  Continue SSI for now.  He has 4+ sugar in urine and HbA1c is 10.3, and he is hyperglycemic here.  Will start levemir and continue ISS, increase levemir to 25 units daily  Add scheduled novolog 5 units tid, increase to 7 units  Improving  Resume metformin in SNF  Sugars ok    Acute deep vein thrombosis (DVT) of both lower extremities  Last ultrasound showed partially occlusive DVTs, now completely occlusive DVT of multiple lower extremity veins on ultrasound.  Patient reports compliance with warfarin but his INR is only one.  He is homeless but says he does not have difficulty obtaining his medications.  Does not seem to care about anything.  D-dimer was markedly elevated on presentation and there was a question of possible PE, but he had a CTA of the brain/neck on presentation so could not get another CTA for another 48 hours.    V/Q scan was done, showed low probability for PE.  2D echo showed grade I diastolic dysfunction.  Warfarin was restarted with bridging Lovenox, but INR was still low.  Given the  difficulty of getting him to follow up changed to apixaban and discontinued Lovenox.    Syncope and collapse  Unclear cause.  Spoke to [facility] and patient has had previous syncopal episodes attributed to alcohol abuse.  Has been prescribed meclizine as well.  Tox positive for cocaine but not alcohol.  CTA brain/neck was done in ED, and per ED note the results were discussed with stroke neurologist Dr. Tovar who feels that CTA finding of a short segment of left vertebral artery stenosis was incidental.  He did not think stroke workup with MRI was indicated.    Since change of status per son compared to when he saw him early during the week a CT of the brain was ordered and no abnormalities seen.  MRI of the brain showed acute stroke .      Cocaine abuse  As noted above.  He has not had any behavioral issues while here, and other than being disinterested in therapy he has been very cooperative.  Seems drug use was transient.      Hyperlipidemia  Resumed lipitor      Essential hypertension  Doubtful he was on anything at home.  Started losartan 25 mg daily  BP well controlled on low dose losartan.  BP transiently low today after he fell but recovered quickly.  IVF bolus given.  Will hold losartan as bp low again  bp ok without med    Tobacco dependence  He smokes 5-6 cigarettes/day.  Not trying to quit and not interested in a nicotine patch.  Benefits of smoking cessation were reviewed with patient in detail for for 8 minutes and patient was encouraged to quit. Nicotine replacement options were discussed.        VTE Risk Mitigation (From admission, onward)         Ordered     apixaban tablet 5 mg  2 times daily         02/10/22 0901     Place sequential compression device  Until discontinued         01/31/22 0533                Discharge Planning   EFRAÍN:      Code Status: Full Code   Is the patient medically ready for discharge?:     Reason for patient still in hospital (select all that apply): Treatment  Discharge Plan  A: Skilled Nursing Facility   Discharge Delays: (!) Post-Acute Set-up              Yuri Griffiths DO  Department of Hospital Medicine   [facility] ([place])

## 2022-02-26 LAB
BACTERIA BLD CULT: NORMAL
BACTERIA BLD CULT: NORMAL
D DIMER PPP IA.FEU-MCNC: 1.33 MG/L FEU
INR PPP: 1 (ref 0.8–1.2)
POCT GLUCOSE: 198 MG/DL (ref 70–110)
POCT GLUCOSE: 91 MG/DL (ref 70–110)
POCT GLUCOSE: 92 MG/DL (ref 70–110)
PROTHROMBIN TIME: 11.1 SEC (ref 9–12.5)

## 2022-02-26 PROCEDURE — 25000003 PHARM REV CODE 250: Performed by: NURSE PRACTITIONER

## 2022-02-26 PROCEDURE — 11000001 HC ACUTE MED/SURG PRIVATE ROOM

## 2022-02-26 PROCEDURE — 25000003 PHARM REV CODE 250: Performed by: INTERNAL MEDICINE

## 2022-02-26 PROCEDURE — 85379 FIBRIN DEGRADATION QUANT: CPT | Performed by: INTERNAL MEDICINE

## 2022-02-26 PROCEDURE — 25000003 PHARM REV CODE 250: Performed by: HOSPITALIST

## 2022-02-26 PROCEDURE — 63600175 PHARM REV CODE 636 W HCPCS: Performed by: STUDENT IN AN ORGANIZED HEALTH CARE EDUCATION/TRAINING PROGRAM

## 2022-02-26 PROCEDURE — 85610 PROTHROMBIN TIME: CPT | Performed by: INTERNAL MEDICINE

## 2022-02-26 PROCEDURE — 36415 COLL VENOUS BLD VENIPUNCTURE: CPT | Performed by: INTERNAL MEDICINE

## 2022-02-26 PROCEDURE — A4216 STERILE WATER/SALINE, 10 ML: HCPCS | Performed by: INTERNAL MEDICINE

## 2022-02-26 RX ADMIN — Medication 10 ML: at 06:02

## 2022-02-26 RX ADMIN — APIXABAN 5 MG: 2.5 TABLET, FILM COATED ORAL at 10:02

## 2022-02-26 RX ADMIN — ESCITALOPRAM OXALATE 10 MG: 10 TABLET ORAL at 10:02

## 2022-02-26 RX ADMIN — ATORVASTATIN CALCIUM 80 MG: 20 TABLET, FILM COATED ORAL at 10:02

## 2022-02-26 RX ADMIN — INSULIN DETEMIR 25 UNITS: 100 INJECTION, SOLUTION SUBCUTANEOUS at 11:02

## 2022-02-26 RX ADMIN — Medication 10 ML: at 12:02

## 2022-02-26 RX ADMIN — CEFTRIAXONE 1 G: 1 INJECTION, SOLUTION INTRAVENOUS at 01:02

## 2022-02-26 RX ADMIN — TAMSULOSIN HYDROCHLORIDE 0.4 MG: 0.4 CAPSULE ORAL at 10:02

## 2022-02-26 RX ADMIN — INSULIN ASPART 7 UNITS: 100 INJECTION, SOLUTION INTRAVENOUS; SUBCUTANEOUS at 06:02

## 2022-02-26 NOTE — PLAN OF CARE
Plan of care reviewed with pt. Pt verbalized understanding. Hourly rounding performed. VSS on RA. No complaints of pain during the shift. Pt very somnolent during the shift. Pt responds so questions appropriately. Pt using condom cath and able to position himself in bed. Personal items and call bell within reach. Bed lowered and locked.

## 2022-02-26 NOTE — SUBJECTIVE & OBJECTIVE
Interval History: No acute overnight events. Net negative volume over past 24 hrs.     Review of Systems   All other systems reviewed and are negative.  Objective:     Vital Signs (Most Recent):  Temp: 98 °F (36.7 °C) (02/26/22 0733)  Pulse: 72 (02/26/22 0733)  Resp: 18 (02/26/22 0733)  BP: 130/64 (02/26/22 0733)  SpO2: 98 % (02/26/22 0733)   Vital Signs (24h Range):  Temp:  [97.8 °F (36.6 °C)-98.7 °F (37.1 °C)] 98 °F (36.7 °C)  Pulse:  [62-82] 72  Resp:  [16-20] 18  SpO2:  [96 %-98 %] 98 %  BP: (130-163)/(64-79) 130/64     Weight: 63 kg (139 lb)  Body mass index is 18.85 kg/m².    Intake/Output Summary (Last 24 hours) at 2/26/2022 0941  Last data filed at 2/26/2022 0600  Gross per 24 hour   Intake 1489.07 ml   Output 1900 ml   Net -410.93 ml        Physical Exam  HENT:      Head: Normocephalic.      Nose: Nose normal.   Eyes:      Pupils: Pupils are equal, round, and reactive to light.   Cardiovascular:      Rate and Rhythm: Normal rate.   Pulmonary:      Effort: Pulmonary effort is normal.   Abdominal:      General: Abdomen is flat.   Musculoskeletal:         General: Normal range of motion.      Cervical back: Normal range of motion.   Skin:     General: Skin is warm.   Neurological:      Mental Status: He is alert.   Psychiatric:         Mood and Affect: Mood normal.       Significant Labs: All pertinent labs within the past 24 hours have been reviewed.  CBC:   Recent Labs   Lab 02/25/22  0900   WBC 7.98   HGB 11.3*   HCT 34.7*          CMP:   Recent Labs   Lab 02/25/22  0900      K 3.8      CO2 26   *   BUN 9   CREATININE 0.8   CALCIUM 9.7   PROT 6.4   ALBUMIN 2.8*   BILITOT 0.3   ALKPHOS 81   AST 22   ALT 46*   ANIONGAP 5*   EGFRNONAA >60         Significant Imaging: I have reviewed all pertinent imaging results/findings within the past 24 hours.  I have reviewed and interpreted all pertinent imaging results/findings within the past 24 hours.

## 2022-02-26 NOTE — PROGRESS NOTES
[facility] Medicine  Progress Note    Patient Name: Forest Lopez  MRN: [id number]  Patient Class: IP- Inpatient   Admission Date: 1/31/2022  Length of Stay: 26 days  Attending Physician: Yuri Griffiths DO  Primary Care Provider: Julian Escobar        Subjective:     Principal Problem:Sepsis        HPI:  Mr. Lopez is a 72 year old man who presented after a syncopal episode.  He reports he had been feeling well prior to the episode but then had a prodrome prior to passing out.  EMS was called, report patient's BP was low normal on their arrival, improved after an IV fluids bolus.  Patient denies chest pain or shortness of breath and says he does not feel weak currently although is rather tired.  When seen in the ED this morning he could barely express himself audibly.     Vital signs were normal on presentation here.  He is on warfarin for recurrent DVT, but his INR was 1, and d-dimer markedly elevated at 21.7.  Tox screen was positive for cocaine.  He had a CTA of the brain and neck done on presentation so CTA of the chest for PE study could not be done for 48 hours.  Ultrasound of the lower extremities showed extensive bilateral DVT.  On the right there was thrombosis of the common femoral vein, femoral vein, popliteal vein, one of the paired posterior tibial veins and both visualized peroneal veins.  On the left there was thrombosis of the common femoral vein, femoral vein and popliteal vein.  Patient was started on full dose Lovenox and admitted.    Medical history is from the chart as he is unable to give me much information.  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  He smokes 5-6 cigarettes/day and is not interested in quitting.  He is currently homeless and staying with a friend.  Despite the normal INR he is sure he is taking his  warfarin and is not having difficulty taking his medications.  I discussed his care with [facility] staff on the [facility] and patient had been lost to follow up since November 2021.      Overview/Hospital Course:  Patient presented to the ED after a syncopal episode and was found to have bilateral lower extremity DVTs and a low INR.  Tox screen was positive for cocaine.  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  He was started back on his warfarin with bridging Lovenox.  His 2D echo showed grade I diastolic dysfunction.  Neurology evaluated him and recommended echo with bubble study, which was negative for a shunt.    PT/OT evaluated him and recommended SNF placement as he was not participating in therapy.  His INR was difficult to get therapeutic, and as he had no contraindication to a NOAC his warfarin was changed to apixaban, and the full dose Lovenox was discontinued.  As he appeared to be depressed and had no objection to starting an antidepressant Lexapro was started.  He had a fall in the hospital on 2/13, had gone to the bathroom for a BM with the nurse, and when she turned away while he was washing his hands he apparently lost his balance and fell.  He did not hit his head and did not lose consciousness, but his BP was low transiently and he was given a bolus of IV fluids.    Patient's mental status improved slowly, but he gradually became more talkative and was able to hold a brief conversation.    Patient more lethargic on 2/21 with low grade fever and leukocytosis, work up looked like uti and started on abx, fluids.    2-26-22 CG:  Still working on SNF placement.  Mental status about the same but does not fully engage      Interval History: No acute overnight events. Net negative volume over past 24 hrs.     Review of Systems   All other systems reviewed and are negative.  Objective:     Vital Signs (Most  Recent):  Temp: 98 °F (36.7 °C) (02/26/22 0733)  Pulse: 72 (02/26/22 0733)  Resp: 18 (02/26/22 0733)  BP: 130/64 (02/26/22 0733)  SpO2: 98 % (02/26/22 0733)   Vital Signs (24h Range):  Temp:  [97.8 °F (36.6 °C)-98.7 °F (37.1 °C)] 98 °F (36.7 °C)  Pulse:  [62-82] 72  Resp:  [16-20] 18  SpO2:  [96 %-98 %] 98 %  BP: (130-163)/(64-79) 130/64     Weight: 63 kg (139 lb)  Body mass index is 18.85 kg/m².    Intake/Output Summary (Last 24 hours) at 2/26/2022 0941  Last data filed at 2/26/2022 0600  Gross per 24 hour   Intake 1489.07 ml   Output 1900 ml   Net -410.93 ml        Physical Exam  HENT:      Head: Normocephalic.      Nose: Nose normal.   Eyes:      Pupils: Pupils are equal, round, and reactive to light.   Cardiovascular:      Rate and Rhythm: Normal rate.   Pulmonary:      Effort: Pulmonary effort is normal.   Abdominal:      General: Abdomen is flat.   Musculoskeletal:         General: Normal range of motion.      Cervical back: Normal range of motion.   Skin:     General: Skin is warm.   Neurological:      Mental Status: He is alert.   Psychiatric:         Mood and Affect: Mood normal.       Significant Labs: All pertinent labs within the past 24 hours have been reviewed.  CBC:   Recent Labs   Lab 02/25/22  0900   WBC 7.98   HGB 11.3*   HCT 34.7*          CMP:   Recent Labs   Lab 02/25/22  0900      K 3.8      CO2 26   *   BUN 9   CREATININE 0.8   CALCIUM 9.7   PROT 6.4   ALBUMIN 2.8*   BILITOT 0.3   ALKPHOS 81   AST 22   ALT 46*   ANIONGAP 5*   EGFRNONAA >60         Significant Imaging: I have reviewed all pertinent imaging results/findings within the past 24 hours.  I have reviewed and interpreted all pertinent imaging results/findings within the past 24 hours.      Assessment/Plan:      * Sepsis  This patient does have evidence of infective focus  My overall impression is sepsis. Vital signs were reviewed and noted in progress note.  Antibiotics given-   Antibiotics (From admission,  onward)            Start     Stop Route Frequency Ordered    02/21/22 1215  cefTRIAXone (ROCEPHIN) 1 g/50 mL D5W IVPB         -- IV Every 24 hours (non-standard times) 02/21/22 1113        Cultures were taken-   Microbiology Results (last 7 days)     Procedure Component Value Units Date/Time    Clostridium difficile EIA [[id number]] Collected: 02/23/22 0939    Order Status: Completed Specimen: Stool Updated: 02/23/22 2055     C. diff Antigen Negative     C difficile Toxins A+B, EIA Negative     Comment: Testing not recommended for children <24 months old.       Urine culture [[id number]]  (Abnormal)  (Susceptibility) Collected: 02/21/22 1013    Order Status: Completed Specimen: Urine Updated: 02/23/22 1840     Urine Culture, Routine KLEBSIELLA PNEUMONIAE  >100,000 cfu/ml        PROTEUS MIRABILIS  > 100,000 cfu/ml      Narrative:      Specimen Source->Urine    Blood culture [[id number]] Collected: 02/21/22 0758    Order Status: Completed Specimen: Blood from Antecubital, Right Arm Updated: 02/23/22 1412     Blood Culture, Routine No Growth to date      No Growth to date      No Growth to date    Blood culture [[id number]] Collected: 02/21/22 0757    Order Status: Completed Specimen: Blood Updated: 02/23/22 1412     Blood Culture, Routine No Growth to date      No Growth to date      No Growth to date        Latest lactate reviewed, they are-  Recent Labs   Lab 02/22/22  0435   LACTATE 0.9       Organ dysfunction indicated by Encephalopathy   Source- UTI        Drowsy  Likely due to infection  No focal deficits, ct head no acute changes  Improved after fluids and abx.      Abnormal imaging of thyroid  Tsh/t4 ok, thyroid ultrasound done, showed bilateral nodules which did not meet criteria for biopsy.    Acute stroke due to ischemia  - MRI showed areas of diffusion signal hyperintensity consistent with areas of acute ischemia/infarct involving the left cerebral hemisphere, the most prominent measuring approximately 1.4 cm,  also a small focus of the right frontal lobe.  - Patient on full dose anticoagulation  - Sinus rhythm noted throughout hospital stay  - Risk factor modification - control diabetes, continue statin.  - RPR, HIV non reactive.  B12 low normal.   - CTA showed a focal segment of 60-70% stenosis involving the proximal to mid left vertebral artery that was new when compared to the prior study of 09/25/2019.  No evidence of large vessel intracranial occlusion.   - Neurology noted symptoms do not correspond to stroke location.  - Echo with bubble study done, no shunt seen.  - Follow up with vascular neurology in 4 weeks.  -Therapy recommending SNF due to his low interest in participation.  - Started Lexapro due to suspicion for depression.      Advance care planning        Severe protein-calorie malnutrition  Diet as tolerated      Debility  Pt/ot efforts  Awaiting  Skilled placement.    Type 2 diabetes mellitus with hyperglycemia, without long-term current use of insulin  Reportedly he is on metformin and glipizide, both held.  Continue SSI for now.  He has 4+ sugar in urine and HbA1c is 10.3, and he is hyperglycemic here.  Will start levemir and continue ISS, increase levemir to 25 units daily  Add scheduled novolog 5 units tid, increase to 7 units  Improving  Resume metformin in SNF  Sugars ok    Acute deep vein thrombosis (DVT) of both femoral veins  Last ultrasound showed partially occlusive DVTs, now completely occlusive DVT of multiple lower extremity veins on ultrasound.  Patient reports compliance with warfarin but his INR is only one.  He is homeless but says he does not have difficulty obtaining his medications.  Does not seem to care about anything.  D-dimer was markedly elevated on presentation and there was a question of possible PE, but he had a CTA of the brain/neck on presentation so could not get another CTA for another 48 hours.    V/Q scan was done, showed low probability for PE.  2D echo showed grade I  diastolic dysfunction.  Warfarin was restarted with bridging Lovenox, but INR was still low.  Given the difficulty of getting him to follow up changed to apixaban and discontinued Lovenox.    Syncope and collapse  Unclear cause.  [facility] and patient has had previous syncopal episodes attributed to alcohol abuse.  Has been prescribed meclizine as well.  Tox positive for cocaine but not alcohol.  CTA brain/neck was done in ED, and per ED note the results were discussed with stroke neurologist Dr. Tovar who feels that CTA finding of a short segment of left vertebral artery stenosis was incidental.  He did not think stroke workup with MRI was indicated.    Since change of status per son compared to when he saw him early during the week a CT of the brain was ordered and no abnormalities seen.  MRI of the brain showed acute stroke .      Cocaine abuse  As noted above.  He has not had any behavioral issues while here, and other than being disinterested in therapy he has been very cooperative.  Seems drug use was transient.      Hyperlipidemia  Resumed lipitor      Essential hypertension  Doubtful he was on anything at home.  Started losartan 25 mg daily  BP well controlled on low dose losartan.  BP transiently low today after he fell but recovered quickly.  IVF bolus given.  Will hold losartan as bp low again  bp ok without med    Tobacco dependence  He smokes 5-6 cigarettes/day.  Not trying to quit and not interested in a nicotine patch.  Benefits of smoking cessation were reviewed with patient in detail for for 8 minutes and patient was encouraged to quit. Nicotine replacement options were discussed.        VTE Risk Mitigation (From admission, onward)         Ordered     apixaban tablet 5 mg  2 times daily         02/10/22 0901     Place sequential compression device  Until discontinued         01/31/22 0506                Discharge Planning   EFRAÍN:      Code Status: Full Code   Is the patient medically ready for  discharge?:     Reason for patient still in hospital (select all that apply): Pending disposition  Discharge Plan A: Skilled Nursing Facility   Discharge Delays: (!) Post-Acute Set-up              Yuri Griffiths DO  Department of Hospital Medicine   [facility], [place] - [facility] S[facility] ([place])

## 2022-02-27 LAB
POCT GLUCOSE: 129 MG/DL (ref 70–110)
POCT GLUCOSE: 136 MG/DL (ref 70–110)
POCT GLUCOSE: 171 MG/DL (ref 70–110)
POCT GLUCOSE: 199 MG/DL (ref 70–110)
POCT GLUCOSE: 98 MG/DL (ref 70–110)

## 2022-02-27 PROCEDURE — 25000003 PHARM REV CODE 250: Performed by: INTERNAL MEDICINE

## 2022-02-27 PROCEDURE — 25000003 PHARM REV CODE 250: Performed by: HOSPITALIST

## 2022-02-27 PROCEDURE — 25000003 PHARM REV CODE 250: Performed by: NURSE PRACTITIONER

## 2022-02-27 PROCEDURE — 63600175 PHARM REV CODE 636 W HCPCS: Performed by: STUDENT IN AN ORGANIZED HEALTH CARE EDUCATION/TRAINING PROGRAM

## 2022-02-27 PROCEDURE — 11000001 HC ACUTE MED/SURG PRIVATE ROOM

## 2022-02-27 PROCEDURE — A4216 STERILE WATER/SALINE, 10 ML: HCPCS | Performed by: INTERNAL MEDICINE

## 2022-02-27 RX ORDER — CIPROFLOXACIN 250 MG/1
500 TABLET, FILM COATED ORAL EVERY 12 HOURS
Status: COMPLETED | OUTPATIENT
Start: 2022-02-27 | End: 2022-03-02

## 2022-02-27 RX ADMIN — Medication 10 ML: at 11:02

## 2022-02-27 RX ADMIN — SODIUM CHLORIDE: 0.9 INJECTION, SOLUTION INTRAVENOUS at 04:02

## 2022-02-27 RX ADMIN — Medication 10 ML: at 06:02

## 2022-02-27 RX ADMIN — APIXABAN 5 MG: 2.5 TABLET, FILM COATED ORAL at 08:02

## 2022-02-27 RX ADMIN — INSULIN DETEMIR 25 UNITS: 100 INJECTION, SOLUTION SUBCUTANEOUS at 08:02

## 2022-02-27 RX ADMIN — CEFTRIAXONE 1 G: 1 INJECTION, SOLUTION INTRAVENOUS at 11:02

## 2022-02-27 RX ADMIN — CIPROFLOXACIN HYDROCHLORIDE 500 MG: 250 TABLET, FILM COATED ORAL at 08:02

## 2022-02-27 RX ADMIN — Medication 10 ML: at 05:02

## 2022-02-27 RX ADMIN — TAMSULOSIN HYDROCHLORIDE 0.4 MG: 0.4 CAPSULE ORAL at 08:02

## 2022-02-27 RX ADMIN — ESCITALOPRAM OXALATE 10 MG: 10 TABLET ORAL at 08:02

## 2022-02-27 RX ADMIN — INSULIN ASPART 7 UNITS: 100 INJECTION, SOLUTION INTRAVENOUS; SUBCUTANEOUS at 11:02

## 2022-02-27 RX ADMIN — INSULIN ASPART 7 UNITS: 100 INJECTION, SOLUTION INTRAVENOUS; SUBCUTANEOUS at 08:02

## 2022-02-27 RX ADMIN — Medication 10 ML: at 12:02

## 2022-02-27 RX ADMIN — ATORVASTATIN CALCIUM 80 MG: 20 TABLET, FILM COATED ORAL at 08:02

## 2022-02-27 RX ADMIN — INSULIN ASPART 7 UNITS: 100 INJECTION, SOLUTION INTRAVENOUS; SUBCUTANEOUS at 05:02

## 2022-02-27 NOTE — PLAN OF CARE
Plan of care reviewed with patient.  Purposeful rounding q2h to address pt needs and safety.  Pt turned q2h to preserve skin integrity.  Pt is alert and oriented x 2.  Vital signs are stable. Pt on BNC @ 2L.  Pt voids via external catheter.  Pt remains free of fall during my shift.  Pt's bed in low, wheels locked, call light and personal belongings within reach. Pt monitored via [employer]as[employer]. NS infusing at 100ml/hr. Will continue to monitor until handoff to oncoming nurse

## 2022-02-27 NOTE — PROGRESS NOTES
[facility] Medicine  Progress Note    Patient Name: Forest Lopez  MRN: [id number]  Patient Class: IP- Inpatient   Admission Date: 1/31/2022  Length of Stay: 27 days  Attending Physician: Yuri Griffiths DO  Primary Care Provider: Julian Escobar        Subjective:     Principal Problem:Sepsis        HPI:  Mr. Lopez is a 72 year old man who presented after a syncopal episode.  He reports he had been feeling well prior to the episode but then had a prodrome prior to passing out.  EMS was called, report patient's BP was low normal on their arrival, improved after an IV fluids bolus.  Patient denies chest pain or shortness of breath and says he does not feel weak currently although is rather tired.  When seen in the ED this morning he could barely express himself audibly.     Vital signs were normal on presentation here.  He is on warfarin for recurrent DVT, but his INR was 1, and d-dimer markedly elevated at 21.7.  Tox screen was positive for cocaine.  He had a CTA of the brain and neck done on presentation so CTA of the chest for PE study could not be done for 48 hours.  Ultrasound of the lower extremities showed extensive bilateral DVT.  On the right there was thrombosis of the common femoral vein, femoral vein, popliteal vein, one of the paired posterior tibial veins and both visualized peroneal veins.  On the left there was thrombosis of the common femoral vein, femoral vein and popliteal vein.  Patient was started on full dose Lovenox and admitted.    Medical history is from the chart as he is unable to give me much information.  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  He smokes 5-6 cigarettes/day and is not interested in quitting.  He is currently homeless and staying with a friend.  Despite the normal INR he is sure he is taking his  warfarin and is not having difficulty taking his medications.  I discussed his care with [facility] staff on the [facility] and patient had been lost to follow up since November 2021.      Overview/Hospital Course:  Patient presented to the ED after a syncopal episode and was found to have bilateral lower extremity DVTs and a low INR.  Tox screen was positive for cocaine.  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  He was started back on his warfarin with bridging Lovenox.  His 2D echo showed grade I diastolic dysfunction.  Neurology evaluated him and recommended echo with bubble study, which was negative for a shunt.    PT/OT evaluated him and recommended SNF placement as he was not participating in therapy.  His INR was difficult to get therapeutic, and as he had no contraindication to a NOAC his warfarin was changed to apixaban, and the full dose Lovenox was discontinued.  As he appeared to be depressed and had no objection to starting an antidepressant Lexapro was started.  He had a fall in the hospital on 2/13, had gone to the bathroom for a BM with the nurse, and when she turned away while he was washing his hands he apparently lost his balance and fell.  He did not hit his head and did not lose consciousness, but his BP was low transiently and he was given a bolus of IV fluids.    Patient's mental status improved slowly, but he gradually became more talkative and was able to hold a brief conversation.    Patient more lethargic on 2/21 with low grade fever and leukocytosis, work up looked like uti and started on abx, fluids.    2-26-22 CG:  Still working on SNF placement.  Mental status about the same but does not fully engage  2-27-22 CG:  Mental status has remained stable, still does not fully engage. Transition to oral antibiotics today.      Interval History: No acute overnight events. Net negative volume over past 24  hrs.     Review of Systems   All other systems reviewed and are negative.  Objective:     Vital Signs (Most Recent):  Temp: 99.2 °F (37.3 °C) (02/27/22 1612)  Pulse: 79 (02/27/22 1612)  Resp: 18 (02/27/22 1612)  BP: 133/71 (02/27/22 1612)  SpO2: 98 % (02/27/22 1612)   Vital Signs (24h Range):  Temp:  [97.8 °F (36.6 °C)-99.2 °F (37.3 °C)] 99.2 °F (37.3 °C)  Pulse:  [64-79] 79  Resp:  [16-20] 18  SpO2:  [95 %-100 %] 98 %  BP: (133-173)/(71-86) 133/71     Weight: 63 kg (139 lb)  Body mass index is 18.85 kg/m².    Intake/Output Summary (Last 24 hours) at 2/27/2022 1625  Last data filed at 2/27/2022 1300  Gross per 24 hour   Intake 1940 ml   Output 1000 ml   Net 940 ml        Physical Exam  HENT:      Head: Normocephalic.      Nose: Nose normal.   Eyes:      Pupils: Pupils are equal, round, and reactive to light.   Cardiovascular:      Rate and Rhythm: Normal rate.   Pulmonary:      Effort: Pulmonary effort is normal.   Abdominal:      General: Abdomen is flat.   Musculoskeletal:         General: Normal range of motion.      Cervical back: Normal range of motion.   Skin:     General: Skin is warm.   Neurological:      Mental Status: He is alert.   Psychiatric:         Mood and Affect: Mood normal.       Significant Labs: All pertinent labs within the past 24 hours have been reviewed.  CBC:   No results for input(s): WBC, HGB, HCT, PLT in the last 48 hours.    CMP:   No results for input(s): NA, K, CL, CO2, GLU, BUN, CREATININE, CALCIUM, PROT, ALBUMIN, BILITOT, ALKPHOS, AST, ALT, ANIONGAP, EGFRNONAA in the last 48 hours.    Invalid input(s): ESTGFAFRICA      Significant Imaging: I have reviewed all pertinent imaging results/findings within the past 24 hours.  I have reviewed and interpreted all pertinent imaging results/findings within the past 24 hours.      Assessment/Plan:      * Sepsis  This patient does have evidence of infective focus  My overall impression is sepsis. Vital signs were reviewed and noted in progress  note.    Antibiotics (From admission, onward)            Start     Stop Route Frequency Ordered    02/27/22 2100  ciprofloxacin HCl tablet 500 mg         03/02 2059 Oral Every 12 hours 02/27/22 1626        Cultures were taken-   Microbiology Results (last 7 days)     Procedure Component Value Units Date/Time    Blood culture [[id number]] Collected: 02/21/22 0758    Order Status: Completed Specimen: Blood from Antecubital, Right Arm Updated: 02/26/22 1412     Blood Culture, Routine No growth after 5 days.    Blood culture [[id number]] Collected: 02/21/22 0757    Order Status: Completed Specimen: Blood Updated: 02/26/22 1412     Blood Culture, Routine No growth after 5 days.    Clostridium difficile EIA [[id number]] Collected: 02/23/22 0939    Order Status: Completed Specimen: Stool Updated: 02/23/22 2055     C. diff Antigen Negative     C difficile Toxins A+B, EIA Negative     Comment: Testing not recommended for children <24 months old.       Urine culture [[id number]]  (Abnormal)  (Susceptibility) Collected: 02/21/22 1013    Order Status: Completed Specimen: Urine Updated: 02/23/22 1840     Urine Culture, Routine KLEBSIELLA PNEUMONIAE  >100,000 cfu/ml        PROTEUS MIRABILIS  > 100,000 cfu/ml      Narrative:      Specimen Source->Urine        Latest lactate reviewed, they are-  No results for input(s): LACTATE in the last 72 hours.    Organ dysfunction indicated by Encephalopathy   Source- UTI  - transition from Rocephin IV to oral ciprofloxacin.  Urine cultures were evaluated and we are telling his antibiotic therapy.        Drowsy  Likely due to infection  No focal deficits, ct head no acute changes  Improved after fluids and abx.      Abnormal imaging of thyroid  Tsh/t4 ok, thyroid ultrasound done, showed bilateral nodules which did not meet criteria for biopsy.    Acute stroke due to ischemia  - MRI showed areas of diffusion signal hyperintensity consistent with areas of acute ischemia/infarct involving the left  cerebral hemisphere, the most prominent measuring approximately 1.4 cm, also a small focus of the right frontal lobe.  - Patient on full dose anticoagulation  - Sinus rhythm noted throughout hospital stay  - Risk factor modification - control diabetes, continue statin.  - RPR, HIV non reactive.  B12 low normal.   - CTA showed a focal segment of 60-70% stenosis involving the proximal to mid left vertebral artery that was new when compared to the prior study of 09/25/2019.  No evidence of large vessel intracranial occlusion.   - Neurology noted symptoms do not correspond to stroke location.  - Echo with bubble study done, no shunt seen.  - Follow up with vascular neurology in 4 weeks.  -Therapy recommending SNF due to his low interest in participation.  - Started Lexapro due to suspicion for depression.      Advance care planning        Severe protein-calorie malnutrition  Diet as tolerated      Debility  Pt/ot efforts  Awaiting  Skilled placement.    Type 2 diabetes mellitus with hyperglycemia, without long-term current use of insulin  Reportedly he is on metformin and glipizide, both held.  Continue SSI for now.  He has 4+ sugar in urine and HbA1c is 10.3, and he is hyperglycemic here.  Will start levemir and continue ISS, increase levemir to 25 units daily  Add scheduled novolog 5 units tid, increase to 7 units  Improving  Resume metformin in SNF  Sugars ok    Acute deep vein thrombosis (DVT) of both femoral veins  Last ultrasound showed partially occlusive DVTs, now completely occlusive DVT of multiple lower extremity veins on ultrasound.  Patient reports compliance with warfarin but his INR is only one.  He is homeless but says he does not have difficulty obtaining his medications.  Does not seem to care about anything.  D-dimer was markedly elevated on presentation and there was a question of possible PE, but he had a CTA of the brain/neck on presentation so could not get another CTA for another 48 hours.    V/Q  scan was done, showed low probability for PE.  2D echo showed grade I diastolic dysfunction.  Warfarin was restarted with bridging Lovenox, but INR was still low.  Given the difficulty of getting him to follow up changed to apixaban and discontinued Lovenox.    Syncope and collapse  Unclear cause.  Spoke to [facility] and patient has had previous syncopal episodes attributed to alcohol abuse.  Has been prescribed meclizine as well.  Tox positive for cocaine but not alcohol.  CTA brain/neck was done in ED, and per ED note the results were discussed with stroke neurologist Dr. Tovar who feels that CTA finding of a short segment of left vertebral artery stenosis was incidental.  He did not think stroke workup with MRI was indicated.    Since change of status per son compared to when he saw him early during the week a CT of the brain was ordered and no abnormalities seen.  MRI of the brain showed acute stroke .      Cocaine abuse  As noted above.  He has not had any behavioral issues while here, and other than being disinterested in therapy he has been very cooperative.  Seems drug use was transient.      Hyperlipidemia  Resumed lipitor      Essential hypertension  Doubtful he was on anything at home.  Started losartan 25 mg daily  BP well controlled on low dose losartan.  BP transiently low today after he fell but recovered quickly.  IVF bolus given.  Will hold losartan as bp low again  bp ok without med    Tobacco dependence  He smokes 5-6 cigarettes/day.  Not trying to quit and not interested in a nicotine patch.  Benefits of smoking cessation were reviewed with patient in detail for for 8 minutes and patient was encouraged to quit. Nicotine replacement options were discussed.        VTE Risk Mitigation (From admission, onward)         Ordered     apixaban tablet 5 mg  2 times daily         02/10/22 0901     Place sequential compression device  Until discontinued         01/31/22 0512                Discharge Planning    EFRAÍN:      Code Status: Full Code   Is the patient medically ready for discharge?:     Reason for patient still in hospital (select all that apply): Treatment and Pending disposition  Discharge Plan A: Skilled Nursing Facility   Discharge Delays: (!) Post-Acute Set-up              Yuri Griffiths DO  Department of Hospital Medicine   [facility] ([place])

## 2022-02-27 NOTE — ASSESSMENT & PLAN NOTE
This patient does have evidence of infective focus  My overall impression is sepsis. Vital signs were reviewed and noted in progress note.    Antibiotics (From admission, onward)            Start     Stop Route Frequency Ordered    02/27/22 2100  ciprofloxacin HCl tablet 500 mg         03/02 2059 Oral Every 12 hours 02/27/22 1626        Cultures were taken-   Microbiology Results (last 7 days)     Procedure Component Value Units Date/Time    Blood culture [[id number]] Collected: 02/21/22 0758    Order Status: Completed Specimen: Blood from Antecubital, Right Arm Updated: 02/26/22 1412     Blood Culture, Routine No growth after 5 days.    Blood culture [[id number]] Collected: 02/21/22 0757    Order Status: Completed Specimen: Blood Updated: 02/26/22 1412     Blood Culture, Routine No growth after 5 days.    Clostridium difficile EIA [[id number]] Collected: 02/23/22 0939    Order Status: Completed Specimen: Stool Updated: 02/23/22 2055     C. diff Antigen Negative     C difficile Toxins A+B, EIA Negative     Comment: Testing not recommended for children <24 months old.       Urine culture [[id number]]  (Abnormal)  (Susceptibility) Collected: 02/21/22 1013    Order Status: Completed Specimen: Urine Updated: 02/23/22 1840     Urine Culture, Routine KLEBSIELLA PNEUMONIAE  >100,000 cfu/ml        PROTEUS MIRABILIS  > 100,000 cfu/ml      Narrative:      Specimen Source->Urine        Latest lactate reviewed, they are-  No results for input(s): LACTATE in the last 72 hours.    Organ dysfunction indicated by Encephalopathy   Source- UTI  - transition from Rocephin IV to oral ciprofloxacin.  Urine cultures were evaluated and we are telling his antibiotic therapy.

## 2022-02-27 NOTE — PLAN OF CARE
Patient alert and oriented x 2 and able to make needs known. VSS. No complaints of pain. Continues with condom cath for urinary incontinence. Good UOP. Incontinent of bowels x 2. Continues on NaCl @ 100/hr. IV abx changed to PO abx. Hourly rounding completed. No needs at this time. Will continue to monitor.     Problem: Adult Inpatient Plan of Care  Goal: Plan of Care Review  Outcome: Ongoing, Progressing  Goal: Patient-Specific Goal (Individualized)  Outcome: Ongoing, Progressing  Goal: Absence of Hospital-Acquired Illness or Injury  Outcome: Ongoing, Progressing  Goal: Optimal Comfort and Wellbeing  Outcome: Ongoing, Progressing  Goal: Readiness for Transition of Care  Outcome: Ongoing, Progressing     Problem: Diabetes Comorbidity  Goal: Blood Glucose Level Within Targeted Range  Outcome: Ongoing, Progressing     Problem: Skin Injury Risk Increased  Goal: Skin Health and Integrity  Outcome: Ongoing, Progressing     Problem: Coping Ineffective  Goal: Effective Coping  Outcome: Ongoing, Progressing     Problem: Fall Injury Risk  Goal: Absence of Fall and Fall-Related Injury  Outcome: Ongoing, Progressing     Problem: Adjustment to Illness (Sepsis/Septic Shock)  Goal: Optimal Coping  Outcome: Ongoing, Progressing     Problem: Bleeding (Sepsis/Septic Shock)  Goal: Absence of Bleeding  Outcome: Ongoing, Progressing     Problem: Glycemic Control Impaired (Sepsis/Septic Shock)  Goal: Blood Glucose Level Within Desired Range  Outcome: Ongoing, Progressing     Problem: Infection Progression (Sepsis/Septic Shock)  Goal: Absence of Infection Signs and Symptoms  Outcome: Ongoing, Progressing     Problem: Nutrition Impaired (Sepsis/Septic Shock)  Goal: Optimal Nutrition Intake  Outcome: Ongoing, Progressing     Problem: Infection  Goal: Absence of Infection Signs and Symptoms  Outcome: Ongoing, Progressing

## 2022-02-27 NOTE — SUBJECTIVE & OBJECTIVE
Interval History: No acute overnight events. Net negative volume over past 24 hrs.     Review of Systems   All other systems reviewed and are negative.  Objective:     Vital Signs (Most Recent):  Temp: 99.2 °F (37.3 °C) (02/27/22 1612)  Pulse: 79 (02/27/22 1612)  Resp: 18 (02/27/22 1612)  BP: 133/71 (02/27/22 1612)  SpO2: 98 % (02/27/22 1612)   Vital Signs (24h Range):  Temp:  [97.8 °F (36.6 °C)-99.2 °F (37.3 °C)] 99.2 °F (37.3 °C)  Pulse:  [64-79] 79  Resp:  [16-20] 18  SpO2:  [95 %-100 %] 98 %  BP: (133-173)/(71-86) 133/71     Weight: 63 kg (139 lb)  Body mass index is 18.85 kg/m².    Intake/Output Summary (Last 24 hours) at 2/27/2022 1625  Last data filed at 2/27/2022 1300  Gross per 24 hour   Intake 1940 ml   Output 1000 ml   Net 940 ml        Physical Exam  HENT:      Head: Normocephalic.      Nose: Nose normal.   Eyes:      Pupils: Pupils are equal, round, and reactive to light.   Cardiovascular:      Rate and Rhythm: Normal rate.   Pulmonary:      Effort: Pulmonary effort is normal.   Abdominal:      General: Abdomen is flat.   Musculoskeletal:         General: Normal range of motion.      Cervical back: Normal range of motion.   Skin:     General: Skin is warm.   Neurological:      Mental Status: He is alert.   Psychiatric:         Mood and Affect: Mood normal.       Significant Labs: All pertinent labs within the past 24 hours have been reviewed.  CBC:   No results for input(s): WBC, HGB, HCT, PLT in the last 48 hours.    CMP:   No results for input(s): NA, K, CL, CO2, GLU, BUN, CREATININE, CALCIUM, PROT, ALBUMIN, BILITOT, ALKPHOS, AST, ALT, ANIONGAP, EGFRNONAA in the last 48 hours.    Invalid input(s): ESTGFAFRICA      Significant Imaging: I have reviewed all pertinent imaging results/findings within the past 24 hours.  I have reviewed and interpreted all pertinent imaging results/findings within the past 24 hours.

## 2022-02-28 ENCOUNTER — PATIENT OUTREACH (OUTPATIENT)
Dept: ADMINISTRATIVE | Facility: OTHER | Age: 72
End: 2022-02-28
Payer: MEDICARE

## 2022-02-28 LAB
POCT GLUCOSE: 106 MG/DL (ref 70–110)
POCT GLUCOSE: 152 MG/DL (ref 70–110)
POCT GLUCOSE: 213 MG/DL (ref 70–110)
POCT GLUCOSE: 227 MG/DL (ref 70–110)

## 2022-02-28 PROCEDURE — 97116 GAIT TRAINING THERAPY: CPT

## 2022-02-28 PROCEDURE — 99232 SBSQ HOSP IP/OBS MODERATE 35: CPT | Mod: ,,, | Performed by: INTERNAL MEDICINE

## 2022-02-28 PROCEDURE — 25000003 PHARM REV CODE 250: Performed by: INTERNAL MEDICINE

## 2022-02-28 PROCEDURE — 25000003 PHARM REV CODE 250: Performed by: HOSPITALIST

## 2022-02-28 PROCEDURE — C9399 UNCLASSIFIED DRUGS OR BIOLOG: HCPCS | Performed by: HOSPITALIST

## 2022-02-28 PROCEDURE — 94761 N-INVAS EAR/PLS OXIMETRY MLT: CPT

## 2022-02-28 PROCEDURE — 11000001 HC ACUTE MED/SURG PRIVATE ROOM

## 2022-02-28 PROCEDURE — A4216 STERILE WATER/SALINE, 10 ML: HCPCS | Performed by: INTERNAL MEDICINE

## 2022-02-28 PROCEDURE — 99232 PR SUBSEQUENT HOSPITAL CARE,LEVL II: ICD-10-PCS | Mod: ,,, | Performed by: INTERNAL MEDICINE

## 2022-02-28 PROCEDURE — 97530 THERAPEUTIC ACTIVITIES: CPT

## 2022-02-28 PROCEDURE — 25000003 PHARM REV CODE 250: Performed by: NURSE PRACTITIONER

## 2022-02-28 RX ADMIN — Medication 10 ML: at 12:02

## 2022-02-28 RX ADMIN — SODIUM CHLORIDE: 0.9 INJECTION, SOLUTION INTRAVENOUS at 12:02

## 2022-02-28 RX ADMIN — CIPROFLOXACIN HYDROCHLORIDE 500 MG: 250 TABLET, FILM COATED ORAL at 08:02

## 2022-02-28 RX ADMIN — INSULIN DETEMIR 25 UNITS: 100 INJECTION, SOLUTION SUBCUTANEOUS at 09:02

## 2022-02-28 RX ADMIN — INSULIN ASPART 7 UNITS: 100 INJECTION, SOLUTION INTRAVENOUS; SUBCUTANEOUS at 08:02

## 2022-02-28 RX ADMIN — SODIUM CHLORIDE: 0.9 INJECTION, SOLUTION INTRAVENOUS at 09:02

## 2022-02-28 RX ADMIN — ATORVASTATIN CALCIUM 80 MG: 20 TABLET, FILM COATED ORAL at 08:02

## 2022-02-28 RX ADMIN — SODIUM CHLORIDE: 0.9 INJECTION, SOLUTION INTRAVENOUS at 02:02

## 2022-02-28 RX ADMIN — INSULIN ASPART 7 UNITS: 100 INJECTION, SOLUTION INTRAVENOUS; SUBCUTANEOUS at 12:02

## 2022-02-28 RX ADMIN — INSULIN ASPART 2 UNITS: 100 INJECTION, SOLUTION INTRAVENOUS; SUBCUTANEOUS at 05:02

## 2022-02-28 RX ADMIN — ESCITALOPRAM OXALATE 10 MG: 10 TABLET ORAL at 08:02

## 2022-02-28 RX ADMIN — INSULIN ASPART 7 UNITS: 100 INJECTION, SOLUTION INTRAVENOUS; SUBCUTANEOUS at 05:02

## 2022-02-28 RX ADMIN — INSULIN ASPART 2 UNITS: 100 INJECTION, SOLUTION INTRAVENOUS; SUBCUTANEOUS at 12:02

## 2022-02-28 RX ADMIN — TAMSULOSIN HYDROCHLORIDE 0.4 MG: 0.4 CAPSULE ORAL at 09:02

## 2022-02-28 RX ADMIN — APIXABAN 5 MG: 2.5 TABLET, FILM COATED ORAL at 08:02

## 2022-02-28 RX ADMIN — Medication 10 ML: at 06:02

## 2022-02-28 RX ADMIN — APIXABAN 5 MG: 2.5 TABLET, FILM COATED ORAL at 09:02

## 2022-02-28 RX ADMIN — CIPROFLOXACIN HYDROCHLORIDE 500 MG: 250 TABLET, FILM COATED ORAL at 09:02

## 2022-02-28 NOTE — ASSESSMENT & PLAN NOTE
Unclear cause.  Spoke to [facility] and patient has had previous syncopal episodes attributed to alcohol abuse.  Has been prescribed meclizine as well.  Tox positive for cocaine but not alcohol.  CTA brain/neck was done in ED, and per ED note the results were discussed with stroke neurologist Dr. Tovar who feels that CTA finding of a short segment of left vertebral artery stenosis was incidental.  He did not think stroke workup with MRI was indicated.    Since change of status per son compared to when he saw him early during the week a CT of the brain was ordered and no abnormalities seen.  MRI of the brain showed acute stroke .

## 2022-02-28 NOTE — PLAN OF CARE
Patient alert and oriented x 2 and able to make needs known. More lethargic this shift. VS WNL. Ambulated with PT/OT. Condom cath in place and draining clear yellow urine. + bm. Continues on NaCl continuous infusion. Hourly rounding completed. No needs at this time. Will continue to monitor.     Problem: Adult Inpatient Plan of Care  Goal: Plan of Care Review  Outcome: Ongoing, Progressing  Goal: Patient-Specific Goal (Individualized)  Outcome: Ongoing, Progressing  Goal: Absence of Hospital-Acquired Illness or Injury  Outcome: Ongoing, Progressing  Goal: Optimal Comfort and Wellbeing  Outcome: Ongoing, Progressing  Goal: Readiness for Transition of Care  Outcome: Ongoing, Progressing     Problem: Diabetes Comorbidity  Goal: Blood Glucose Level Within Targeted Range  Outcome: Ongoing, Progressing     Problem: Skin Injury Risk Increased  Goal: Skin Health and Integrity  Outcome: Ongoing, Progressing     Problem: Coping Ineffective  Goal: Effective Coping  Outcome: Ongoing, Progressing     Problem: Fall Injury Risk  Goal: Absence of Fall and Fall-Related Injury  Outcome: Ongoing, Progressing     Problem: Adjustment to Illness (Sepsis/Septic Shock)  Goal: Optimal Coping  Outcome: Ongoing, Progressing     Problem: Bleeding (Sepsis/Septic Shock)  Goal: Absence of Bleeding  Outcome: Ongoing, Progressing     Problem: Glycemic Control Impaired (Sepsis/Septic Shock)  Goal: Blood Glucose Level Within Desired Range  Outcome: Ongoing, Progressing     Problem: Infection Progression (Sepsis/Septic Shock)  Goal: Absence of Infection Signs and Symptoms  Outcome: Ongoing, Progressing     Problem: Nutrition Impaired (Sepsis/Septic Shock)  Goal: Optimal Nutrition Intake  Outcome: Ongoing, Progressing     Problem: Infection  Goal: Absence of Infection Signs and Symptoms  Outcome: Ongoing, Progressing

## 2022-02-28 NOTE — PT/OT/SLP PROGRESS
Physical Therapy Treatment    Patient Name:  Forest Lopez   MRN:  [id number]    Recommendations:     Discharge Recommendations:  nursing facility, skilled   Discharge Equipment Recommendations: bedside commode, shower chair   Barriers to discharge: None    Assessment:     Forest Lopez is a 72 y.o. male admitted with a medical diagnosis of Sepsis.  He presents with the following impairments/functional limitations:  weakness, impaired self care skills, impaired functional mobilty, gait instability, impaired balance, decreased coordination, impaired cognition, decreased safety awareness.    Patient continues to demo significant impairments in balance and initiation of self care tasks - 2nd therapy session this date with pt found in bed soiled with stool. Progressing with OOB mobility - ambulated x80 ft with rollator and supervision. Extended time in standing during self care tasks. Rec for dc to SNF with transition to NH.    Rehab Prognosis: Good; patient would benefit from acute skilled PT services to address these deficits and reach maximum level of function.    Recent Surgery: * No surgery found *      Plan:     During this hospitalization, patient to be seen 3 x/week to address the identified rehab impairments via gait training, therapeutic activities, therapeutic exercises, neuromuscular re-education and progress toward the following goals:    · Plan of Care Expires:  03/02/22    Subjective     Chief Complaint: When asked why he didn't call for assistance, pt reports he didn't want to bother  Patient/Family Comments/goals: Agrees toileting is better on toilet; Patient agreeable to PT treatment.  Pain/Comfort:  Pain Rating 1: 0/10  Pain Rating Post-Intervention 1: 0/10      Objective:     Communicated with MARIBEL Bailey prior to session.  Patient found HOB elevated with PICC line, bed alarm, Condom Catheter (avs sys) upon PT entry to room.     General Precautions: Standard, anti-coagulation medicine, fall,  diabetic  Orthopedic Precautions:N/A   Braces: N/A  Respiratory Status: Room air     Patient donned yellow socks and gait belt for OOB mobility. Patient donned mask for hallway ambulation.    Functional Mobility:  · Bed Mobility:     · Supine to Sit: CGA - condom cath fell off during bed mobility  · Sit to Supine: supervision  · Bridging: CGA, assistance placing hands on bed railing  · Transfers:     · Sit to Stand:  Supervision-modA with no AD  · 2x from EOB  · 1x from low toilet requiring modA  · Gait: x80 ft with rollator with supervision  · Demo's safety awareness with engagement of brakes with minor LOB  · Slow gait with shuffling steps  · Balance: x10 min at toilet and sink for anne-marie hygiene, due to need for UE support requires assistance for anne-marie hygiene      AM-PAC 6 CLICK MOBILITY  Turning over in bed (including adjusting bedclothes, sheets and blankets)?: 3  Sitting down on and standing up from a chair with arms (e.g., wheelchair, bedside commode, etc.): 3  Moving from lying on back to sitting on the side of the bed?: 3  Moving to and from a bed to a chair (including a wheelchair)?: 3  Need to walk in hospital room?: 3  Climbing 3-5 steps with a railing?: 3  Basic Mobility Total Score: 18       Therapeutic Activities and Exercises:  · Slow mobility requiring increased time for all tasks    Patient left HOB elevated with all lines intact, call button in reach, bed alarm on and [employer] telesitter present. RN notified of loss of condom cath.    GOALS:   Multidisciplinary Problems     Physical Therapy Goals        Problem: Physical Therapy Goal    Goal Priority Disciplines Outcome Goal Variances Interventions   Physical Therapy Goal     PT, PT/OT Ongoing, Progressing     Description: Goals to be met by: 3/2/2022    Patient will increase functional independence with mobility by performin. Sit<>stand with SPV with LRAD.  2. Gait x 300 feet with SPV with LRAD.   3. Static standing in SLS with no UE support  with SBA x10 sec.                      Time Tracking:     PT Received On: 02/28/22  PT Start Time: 1545     PT Stop Time: 1632  PT Total Time (min): 47 min     Billable Minutes: Gait Training 13 and Therapeutic Activity 34    Treatment Type: Treatment  PT/PTA: PT     PTA Visit Number: 0     02/28/2022

## 2022-02-28 NOTE — ASSESSMENT & PLAN NOTE
- MRI showed areas of diffusion signal hyperintensity consistent with areas of acute ischemia/infarct involving the left cerebral hemisphere, the most prominent measuring approximately 1.4 cm, also a small focus of the right frontal lobe.  - Patient on full dose anticoagulation  - Sinus rhythm noted throughout hospital stay  - Risk factor modification - control diabetes, continue statin.  - RPR, HIV non reactive.  B12 low normal.   - CTA showed a focal segment of 60-70% stenosis involving the proximal to mid left vertebral artery that was new when compared to the prior study of 09/25/2019.  No evidence of large vessel intracranial occlusion.   - Neurology noted symptoms do not correspond to stroke location.  - Echo with bubble study done, no shunt seen.  - Follow up with vascular neurology in 4 weeks.  -Therapy recommending SNF due to his low interest in participation.  - Started Lexapro due to suspicion for depression.

## 2022-02-28 NOTE — SUBJECTIVE & OBJECTIVE
Interval History: NAEON    Review of Systems   All other systems reviewed and are negative.  Objective:     Vital Signs (Most Recent):  Temp: 98.7 °F (37.1 °C) (02/28/22 0709)  Pulse: 83 (02/28/22 0709)  Resp: 18 (02/28/22 0709)  BP: 133/79 (02/28/22 0709)  SpO2: 98 % (02/28/22 0709) Vital Signs (24h Range):  Temp:  [98 °F (36.7 °C)-99.2 °F (37.3 °C)] 98.7 °F (37.1 °C)  Pulse:  [65-83] 83  Resp:  [16-18] 18  SpO2:  [95 %-100 %] 98 %  BP: (133-151)/(69-80) 133/79     Weight: 63 kg (139 lb)  Body mass index is 18.85 kg/m².    Intake/Output Summary (Last 24 hours) at 2/28/2022 1057  Last data filed at 2/28/2022 0838  Gross per 24 hour   Intake 1800 ml   Output 3750 ml   Net -1950 ml      Physical Exam  Constitutional:       Appearance: Normal appearance.   HENT:      Head: Normocephalic and atraumatic.      Mouth/Throat:      Mouth: Mucous membranes are moist.   Cardiovascular:      Rate and Rhythm: Normal rate and regular rhythm.      Pulses: Normal pulses.      Heart sounds: Normal heart sounds. No murmur heard.    No gallop.   Pulmonary:      Effort: Pulmonary effort is normal.      Breath sounds: Normal breath sounds. No rales.   Abdominal:      General: Abdomen is flat. Bowel sounds are normal. There is no distension.      Palpations: Abdomen is soft.      Tenderness: There is no abdominal tenderness.   Musculoskeletal:         General: No swelling. Normal range of motion.      Cervical back: Normal range of motion.   Skin:     General: Skin is warm.   Neurological:      General: No focal deficit present.      Mental Status: He is alert. Mental status is at baseline.       Significant Labs: All pertinent labs within the past 24 hours have been reviewed.    Significant Imaging: I have reviewed all pertinent imaging results/findings within the past 24 hours.

## 2022-02-28 NOTE — PLAN OF CARE
Problem: Occupational Therapy Goal  Goal: Occupational Therapy Goal  Description: Goals to be met by: 3/7/2022    Patient will increase functional independence with ADLs by performing:    UE Dressing with Rembert.  LE Dressing with Rembert.  Grooming while standing at sink with Supervision.  Toileting from toilet with Supervision for hygiene and clothing management.   Toilet transfer to toilet with Supervision.    Outcome: Ongoing, Progressing     POC remains appropriate.  Pt with one instance of LOB this date while ambulating to sink with CGA/HHA.  SNF is recommended upon d/c from acute care to further address deficits and help pt improve overall functional independence.     MICHELLE Willett  2/28/2022

## 2022-02-28 NOTE — PROGRESS NOTES
[facility] Medicine  Progress Note    Patient Name: Forest Lopez  MRN: [id number]  Patient Class: IP- Inpatient   Admission Date: 1/31/2022  Length of Stay: 28 days  Attending Physician: Michell Andrews MD  Primary Care Provider: Julian Escobar        Subjective:     Principal Problem:Sepsis        HPI:  Mr. Lopez is a 72 year old man who presented after a syncopal episode.  He reports he had been feeling well prior to the episode but then had a prodrome prior to passing out.  EMS was called, report patient's BP was low normal on their arrival, improved after an IV fluids bolus.  Patient denies chest pain or shortness of breath and says he does not feel weak currently although is rather tired.  When seen in the ED this morning he could barely express himself audibly.     Vital signs were normal on presentation here.  He is on warfarin for recurrent DVT, but his INR was 1, and d-dimer markedly elevated at 21.7.  Tox screen was positive for cocaine.  He had a CTA of the brain and neck done on presentation so CTA of the chest for PE study could not be done for 48 hours.  Ultrasound of the lower extremities showed extensive bilateral DVT.  On the right there was thrombosis of the common femoral vein, femoral vein, popliteal vein, one of the paired posterior tibial veins and both visualized peroneal veins.  On the left there was thrombosis of the common femoral vein, femoral vein and popliteal vein.  Patient was started on full dose Lovenox and admitted.    Medical history is from the chart as he is unable to give me much information.  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  He smokes 5-6 cigarettes/day and is not interested in quitting.  He is currently homeless and staying with a friend.  Despite the normal INR he is sure he is taking his  warfarin and is not having difficulty taking his medications.  I discussed his care with [facility] staff on the [facility] and patient had been lost to follow up since November 2021.      Overview/Hospital Course:  Patient presented to the ED after a syncopal episode and was found to have bilateral lower extremity DVTs and a low INR.  Tox screen was positive for cocaine.  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  He was started back on his warfarin with bridging Lovenox.  His 2D echo showed grade I diastolic dysfunction.  Neurology evaluated him and recommended echo with bubble study, which was negative for a shunt.    PT/OT evaluated him and recommended SNF placement as he was not participating in therapy.  His INR was difficult to get therapeutic, and as he had no contraindication to a NOAC his warfarin was changed to apixaban, and the full dose Lovenox was discontinued.  As he appeared to be depressed and had no objection to starting an antidepressant Lexapro was started.  He had a fall in the hospital on 2/13, had gone to the bathroom for a BM with the nurse, and when she turned away while he was washing his hands he apparently lost his balance and fell.  He did not hit his head and did not lose consciousness, but his BP was low transiently and he was given a bolus of IV fluids.    Patient's mental status improved slowly, but he gradually became more talkative and was able to hold a brief conversation.    Patient more lethargic on 2/21 with low grade fever and leukocytosis, work up looked like uti and started on abx, fluids.    2-26-22 CG:  Still working on SNF placement.  Mental status about the same but does not fully engage  2-27-22 CG:  Mental status has remained stable, still does not fully engage. Transition to oral antibiotics today.  2-28-22 CG: BIANKA, currently on oral abx      Interval History: BIANKA    Review of  Systems   All other systems reviewed and are negative.  Objective:     Vital Signs (Most Recent):  Temp: 98.7 °F (37.1 °C) (02/28/22 0709)  Pulse: 83 (02/28/22 0709)  Resp: 18 (02/28/22 0709)  BP: 133/79 (02/28/22 0709)  SpO2: 98 % (02/28/22 0709) Vital Signs (24h Range):  Temp:  [98 °F (36.7 °C)-99.2 °F (37.3 °C)] 98.7 °F (37.1 °C)  Pulse:  [65-83] 83  Resp:  [16-18] 18  SpO2:  [95 %-100 %] 98 %  BP: (133-151)/(69-80) 133/79     Weight: 63 kg (139 lb)  Body mass index is 18.85 kg/m².    Intake/Output Summary (Last 24 hours) at 2/28/2022 1057  Last data filed at 2/28/2022 0838  Gross per 24 hour   Intake 1800 ml   Output 3750 ml   Net -1950 ml      Physical Exam  Constitutional:       Appearance: Normal appearance.   HENT:      Head: Normocephalic and atraumatic.      Mouth/Throat:      Mouth: Mucous membranes are moist.   Cardiovascular:      Rate and Rhythm: Normal rate and regular rhythm.      Pulses: Normal pulses.      Heart sounds: Normal heart sounds. No murmur heard.    No gallop.   Pulmonary:      Effort: Pulmonary effort is normal.      Breath sounds: Normal breath sounds. No rales.   Abdominal:      General: Abdomen is flat. Bowel sounds are normal. There is no distension.      Palpations: Abdomen is soft.      Tenderness: There is no abdominal tenderness.   Musculoskeletal:         General: No swelling. Normal range of motion.      Cervical back: Normal range of motion.   Skin:     General: Skin is warm.   Neurological:      General: No focal deficit present.      Mental Status: He is alert. Mental status is at baseline.       Significant Labs: All pertinent labs within the past 24 hours have been reviewed.    Significant Imaging: I have reviewed all pertinent imaging results/findings within the past 24 hours.      Assessment/Plan:      * Sepsis  This patient does have evidence of infective focus  My overall impression is sepsis. Vital signs were reviewed and noted in progress note.    Antibiotics (From  admission, onward)            Start     Stop Route Frequency Ordered    02/27/22 2100  ciprofloxacin HCl tablet 500 mg         03/02 2059 Oral Every 12 hours 02/27/22 1626        Cultures were taken-   Microbiology Results (last 7 days)     Procedure Component Value Units Date/Time    Blood culture [[id number]] Collected: 02/21/22 0758    Order Status: Completed Specimen: Blood from Antecubital, Right Arm Updated: 02/26/22 1412     Blood Culture, Routine No growth after 5 days.    Blood culture [[id number]] Collected: 02/21/22 0757    Order Status: Completed Specimen: Blood Updated: 02/26/22 1412     Blood Culture, Routine No growth after 5 days.    Clostridium difficile EIA [[id number]] Collected: 02/23/22 0939    Order Status: Completed Specimen: Stool Updated: 02/23/22 2055     C. diff Antigen Negative     C difficile Toxins A+B, EIA Negative     Comment: Testing not recommended for children <24 months old.       Urine culture [[id number]]  (Abnormal)  (Susceptibility) Collected: 02/21/22 1013    Order Status: Completed Specimen: Urine Updated: 02/23/22 1840     Urine Culture, Routine KLEBSIELLA PNEUMONIAE  >100,000 cfu/ml        PROTEUS MIRABILIS  > 100,000 cfu/ml      Narrative:      Specimen Source->Urine        Latest lactate reviewed, they are-  No results for input(s): LACTATE in the last 72 hours.    Organ dysfunction indicated by Encephalopathy   Source- UTI  - transitionED from Rocephin IV to oral ciprofloxacin.  Urine cultures were evaluated and we are telling his antibiotic therapy.        Drowsy  Likely due to infection  No focal deficits, ct head no acute changes  Improved after fluids and abx.      Abnormal imaging of thyroid  Tsh/t4 ok, thyroid ultrasound done, showed bilateral nodules which did not meet criteria for biopsy.    Acute stroke due to ischemia  - MRI showed areas of diffusion signal hyperintensity consistent with areas of acute ischemia/infarct involving the left cerebral hemisphere, the  most prominent measuring approximately 1.4 cm, also a small focus of the right frontal lobe.  - Patient on full dose anticoagulation  - Sinus rhythm noted throughout hospital stay  - Risk factor modification - control diabetes, continue statin.  - RPR, HIV non reactive.  B12 low normal.   - CTA showed a focal segment of 60-70% stenosis involving the proximal to mid left vertebral artery that was new when compared to the prior study of 09/25/2019.  No evidence of large vessel intracranial occlusion.   - Neurology noted symptoms do not correspond to stroke location.  - Echo with bubble study done, no shunt seen.  - Follow up with vascular neurology in 4 weeks.  -Therapy recommending SNF due to his low interest in participation.  - Started Lexapro due to suspicion for depression.      Advance care planning        Severe protein-calorie malnutrition  Diet as tolerated      Debility  Pt/ot efforts  Awaiting  Skilled placement.    Type 2 diabetes mellitus with hyperglycemia, without long-term current use of insulin  Reportedly he is on metformin and glipizide, both held.  Continue SSI for now.  He has 4+ sugar in urine and HbA1c is 10.3, and he is hyperglycemic here.  Will start levemir and continue ISS, increase levemir to 25 units daily  Add scheduled novolog 5 units tid, increase to 7 units  Improving  Resume metformin in SNF  Sugars ok    Acute deep vein thrombosis (DVT) of both femoral veins  Last ultrasound showed partially occlusive DVTs, now completely occlusive DVT of multiple lower extremity veins on ultrasound.  Patient reports compliance with warfarin but his INR is only one.  He is homeless but says he does not have difficulty obtaining his medications.  Does not seem to care about anything.  D-dimer was markedly elevated on presentation and there was a question of possible PE, but he had a CTA of the brain/neck on presentation so could not get another CTA for another 48 hours.    V/Q scan was done, showed  low probability for PE.  2D echo showed grade I diastolic dysfunction.  Warfarin was restarted with bridging Lovenox, but INR was still low.  Given the difficulty of getting him to follow up changed to apixaban and discontinued Lovenox.    Syncope and collapse  Unclear cause.  Spoke to Ciarra and patient has had previous syncopal episodes attributed to alcohol abuse.  Has been prescribed meclizine as well.  Tox positive for cocaine but not alcohol.  CTA brain/neck was done in ED, and per ED note the results were discussed with stroke neurologist Dr. Tovar who feels that CTA finding of a short segment of left vertebral artery stenosis was incidental.  He did not think stroke workup with MRI was indicated.    Since change of status per son compared to when he saw him early during the week a CT of the brain was ordered and no abnormalities seen.  MRI of the brain showed acute stroke .      Cocaine abuse  As noted above.  He has not had any behavioral issues while here, and other than being disinterested in therapy he has been very cooperative.  Seems drug use was transient.      Hyperlipidemia  Resumed lipitor      Essential hypertension  Doubtful he was on anything at home.  Started losartan 25 mg daily  BP well controlled on low dose losartan.  BP transiently low today after he fell but recovered quickly.  IVF bolus given.  Will hold losartan as bp low again  bp ok without med    Tobacco dependence  He smokes 5-6 cigarettes/day.  Not trying to quit and not interested in a nicotine patch.  Benefits of smoking cessation were reviewed with patient in detail for for 8 minutes and patient was encouraged to quit. Nicotine replacement options were discussed.        VTE Risk Mitigation (From admission, onward)         Ordered     apixaban tablet 5 mg  2 times daily         02/10/22 0901     Place sequential compression device  Until discontinued         01/31/22 0518                Discharge Planning   EFRAÍN:      Code  Status: Full Code   Is the patient medically ready for discharge?:     Reason for patient still in hospital (select all that apply): Pending disposition  Discharge Plan A: Skilled Nursing Facility   Discharge Delays: (!) Post-Acute Set-up              Michell Andrews MD  Department of Hospital Medicine   [facility] - Med S[facility] ([place])

## 2022-02-28 NOTE — ASSESSMENT & PLAN NOTE
This patient does have evidence of infective focus  My overall impression is sepsis. Vital signs were reviewed and noted in progress note.    Antibiotics (From admission, onward)            Start     Stop Route Frequency Ordered    02/27/22 2100  ciprofloxacin HCl tablet 500 mg         03/02 2059 Oral Every 12 hours 02/27/22 1626        Cultures were taken-   Microbiology Results (last 7 days)     Procedure Component Value Units Date/Time    Blood culture [[id number]] Collected: 02/21/22 0758    Order Status: Completed Specimen: Blood from Antecubital, Right Arm Updated: 02/26/22 1412     Blood Culture, Routine No growth after 5 days.    Blood culture [[id number]] Collected: 02/21/22 0757    Order Status: Completed Specimen: Blood Updated: 02/26/22 1412     Blood Culture, Routine No growth after 5 days.    Clostridium difficile EIA [[id number]] Collected: 02/23/22 0939    Order Status: Completed Specimen: Stool Updated: 02/23/22 2055     C. diff Antigen Negative     C difficile Toxins A+B, EIA Negative     Comment: Testing not recommended for children <24 months old.       Urine culture [[id number]]  (Abnormal)  (Susceptibility) Collected: 02/21/22 1013    Order Status: Completed Specimen: Urine Updated: 02/23/22 1840     Urine Culture, Routine KLEBSIELLA PNEUMONIAE  >100,000 cfu/ml        PROTEUS MIRABILIS  > 100,000 cfu/ml      Narrative:      Specimen Source->Urine        Latest lactate reviewed, they are-  No results for input(s): LACTATE in the last 72 hours.    Organ dysfunction indicated by Encephalopathy   Source- UTI  - transitionED from Rocephin IV to oral ciprofloxacin.  Urine cultures were evaluated and we are telling his antibiotic therapy.

## 2022-02-28 NOTE — PLAN OF CARE
02/28/22 1721   Post-Acute Status   Post-Acute Authorization Placement  (SNF)   Post-Acute Placement Status Referrals Sent   Coverage [facility] MEDICARE - [employer] (SPECIAL NEEDS PLAN   Discharge Delays (!) Other  (Unable to find an accepting SNF)   Discharge Plan   Discharge Plan A Skilled Nursing Facility  (SNF)     SW sent referrals via Careport to 20 facilities in the regional area.   No facilities in the greater [place] area have [place] the patient as of yet.

## 2022-03-01 LAB
ERYTHROCYTE [DISTWIDTH] IN BLOOD BY AUTOMATED COUNT: 12.7 % (ref 11.5–14.5)
HCT VFR BLD AUTO: 32.4 % (ref 40–54)
HGB BLD-MCNC: 10.5 G/DL (ref 14–18)
MCH RBC QN AUTO: 25.1 PG (ref 27–31)
MCHC RBC AUTO-ENTMCNC: 32.4 G/DL (ref 32–36)
MCV RBC AUTO: 77 FL (ref 82–98)
PLATELET # BLD AUTO: 298 K/UL (ref 150–450)
PMV BLD AUTO: 9.3 FL (ref 9.2–12.9)
POCT GLUCOSE: 124 MG/DL (ref 70–110)
POCT GLUCOSE: 162 MG/DL (ref 70–110)
POCT GLUCOSE: 194 MG/DL (ref 70–110)
RBC # BLD AUTO: 4.19 M/UL (ref 4.6–6.2)
WBC # BLD AUTO: 7.36 K/UL (ref 3.9–12.7)

## 2022-03-01 PROCEDURE — 25000003 PHARM REV CODE 250: Performed by: NURSE PRACTITIONER

## 2022-03-01 PROCEDURE — 85027 COMPLETE CBC AUTOMATED: CPT | Performed by: INTERNAL MEDICINE

## 2022-03-01 PROCEDURE — 11000001 HC ACUTE MED/SURG PRIVATE ROOM

## 2022-03-01 PROCEDURE — 25000003 PHARM REV CODE 250: Performed by: INTERNAL MEDICINE

## 2022-03-01 PROCEDURE — 36415 COLL VENOUS BLD VENIPUNCTURE: CPT | Performed by: INTERNAL MEDICINE

## 2022-03-01 PROCEDURE — 94761 N-INVAS EAR/PLS OXIMETRY MLT: CPT

## 2022-03-01 PROCEDURE — 25000003 PHARM REV CODE 250: Performed by: HOSPITALIST

## 2022-03-01 PROCEDURE — A4216 STERILE WATER/SALINE, 10 ML: HCPCS | Performed by: INTERNAL MEDICINE

## 2022-03-01 RX ADMIN — INSULIN ASPART 7 UNITS: 100 INJECTION, SOLUTION INTRAVENOUS; SUBCUTANEOUS at 05:03

## 2022-03-01 RX ADMIN — Medication 10 ML: at 11:03

## 2022-03-01 RX ADMIN — ESCITALOPRAM OXALATE 10 MG: 10 TABLET ORAL at 08:03

## 2022-03-01 RX ADMIN — INSULIN ASPART 7 UNITS: 100 INJECTION, SOLUTION INTRAVENOUS; SUBCUTANEOUS at 12:03

## 2022-03-01 RX ADMIN — INSULIN ASPART 7 UNITS: 100 INJECTION, SOLUTION INTRAVENOUS; SUBCUTANEOUS at 08:03

## 2022-03-01 RX ADMIN — Medication 10 ML: at 05:03

## 2022-03-01 RX ADMIN — SODIUM CHLORIDE: 0.9 INJECTION, SOLUTION INTRAVENOUS at 07:03

## 2022-03-01 RX ADMIN — APIXABAN 5 MG: 2.5 TABLET, FILM COATED ORAL at 08:03

## 2022-03-01 RX ADMIN — SODIUM CHLORIDE: 0.9 INJECTION, SOLUTION INTRAVENOUS at 05:03

## 2022-03-01 RX ADMIN — Medication 10 ML: at 12:03

## 2022-03-01 RX ADMIN — CIPROFLOXACIN HYDROCHLORIDE 500 MG: 250 TABLET, FILM COATED ORAL at 08:03

## 2022-03-01 RX ADMIN — INSULIN DETEMIR 25 UNITS: 100 INJECTION, SOLUTION SUBCUTANEOUS at 08:03

## 2022-03-01 RX ADMIN — Medication 10 ML: at 06:03

## 2022-03-01 RX ADMIN — TAMSULOSIN HYDROCHLORIDE 0.4 MG: 0.4 CAPSULE ORAL at 08:03

## 2022-03-01 RX ADMIN — ATORVASTATIN CALCIUM 80 MG: 20 TABLET, FILM COATED ORAL at 08:03

## 2022-03-01 NOTE — PLAN OF CARE
Plan of care reviewed with pt. Pt in bed no injuries or fall during shift. All vital signs stable. Blood sugar maintained and insulin administer as ordered. IVF infusing as ordered. Purposeful rounding complete, call bell and personal items within reach. Bed lowered and wheels locked. Pt denies needs at this time.

## 2022-03-01 NOTE — SUBJECTIVE & OBJECTIVE
Interval History: Doing ok, denies pain, tolerating diet.    Review of Systems   Constitutional:  Negative for chills and fever.   Respiratory:  Negative for cough and shortness of breath.    Cardiovascular:  Negative for chest pain.   Gastrointestinal:  Negative for abdominal pain, nausea and vomiting.   Genitourinary:  Negative for dysuria.   Neurological:  Negative for headaches.   All other systems reviewed and are negative.  Objective:     Vital Signs (Most Recent):  Temp: 97.6 °F (36.4 °C) (03/01/22 0716)  Pulse: (!) 58 (03/01/22 0747)  Resp: 20 (03/01/22 0747)  BP: (!) 140/77 (03/01/22 0716)  SpO2: 95 % (03/01/22 0747)   Vital Signs (24h Range):  Temp:  [97.4 °F (36.3 °C)-98.9 °F (37.2 °C)] 97.6 °F (36.4 °C)  Pulse:  [54-87] 58  Resp:  [15-20] 20  SpO2:  [94 %-98 %] 95 %  BP: (106-157)/(52-82) 140/77     Weight: 63 kg (139 lb)  Body mass index is 18.85 kg/m².    Intake/Output Summary (Last 24 hours) at 3/1/2022 1027  Last data filed at 3/1/2022 0900  Gross per 24 hour   Intake 3860.91 ml   Output 2625 ml   Net 1235.91 ml      Physical Exam  Vitals reviewed.   Constitutional:       General: He is not in acute distress.     Appearance: Normal appearance. He is well-developed.   HENT:      Head: Normocephalic and atraumatic.      Mouth/Throat:      Mouth: Mucous membranes are moist.   Eyes:      Extraocular Movements: Extraocular movements intact.      Pupils: Pupils are equal, round, and reactive to light.   Cardiovascular:      Rate and Rhythm: Normal rate and regular rhythm.      Pulses: Normal pulses.      Heart sounds: Normal heart sounds. No murmur heard.  Pulmonary:      Effort: Pulmonary effort is normal. No respiratory distress.      Breath sounds: Normal breath sounds.   Abdominal:      General: Abdomen is flat. Bowel sounds are normal. There is no distension.      Palpations: Abdomen is soft.      Tenderness: There is no abdominal tenderness.   Musculoskeletal:         General: No swelling. Normal  range of motion.      Cervical back: Normal range of motion and neck supple.   Skin:     General: Skin is warm.      Findings: No rash.   Neurological:      General: No focal deficit present.      Mental Status: He is alert. Mental status is at baseline.      Comments: Oriented to person, place knows in hospital   Psychiatric:         Mood and Affect: Mood normal.         Behavior: Behavior normal.       Significant Labs: All pertinent labs within the past 24 hours have been reviewed.    Significant Imaging: I have reviewed all pertinent imaging results/findings within the past 24 hours.

## 2022-03-02 LAB
POCT GLUCOSE: 116 MG/DL (ref 70–110)
POCT GLUCOSE: 125 MG/DL (ref 70–110)
POCT GLUCOSE: 189 MG/DL (ref 70–110)
POCT GLUCOSE: 200 MG/DL (ref 70–110)
POCT GLUCOSE: 229 MG/DL (ref 70–110)

## 2022-03-02 PROCEDURE — 97535 SELF CARE MNGMENT TRAINING: CPT

## 2022-03-02 PROCEDURE — 25000003 PHARM REV CODE 250: Performed by: INTERNAL MEDICINE

## 2022-03-02 PROCEDURE — 99233 SBSQ HOSP IP/OBS HIGH 50: CPT | Mod: ,,, | Performed by: INTERNAL MEDICINE

## 2022-03-02 PROCEDURE — 11000001 HC ACUTE MED/SURG PRIVATE ROOM

## 2022-03-02 PROCEDURE — 99233 PR SUBSEQUENT HOSPITAL CARE,LEVL III: ICD-10-PCS | Mod: ,,, | Performed by: INTERNAL MEDICINE

## 2022-03-02 PROCEDURE — 25000003 PHARM REV CODE 250: Performed by: HOSPITALIST

## 2022-03-02 PROCEDURE — A4216 STERILE WATER/SALINE, 10 ML: HCPCS | Performed by: INTERNAL MEDICINE

## 2022-03-02 PROCEDURE — 25000003 PHARM REV CODE 250: Performed by: NURSE PRACTITIONER

## 2022-03-02 RX ADMIN — ESCITALOPRAM OXALATE 10 MG: 10 TABLET ORAL at 08:03

## 2022-03-02 RX ADMIN — TAMSULOSIN HYDROCHLORIDE 0.4 MG: 0.4 CAPSULE ORAL at 08:03

## 2022-03-02 RX ADMIN — INSULIN DETEMIR 25 UNITS: 100 INJECTION, SOLUTION SUBCUTANEOUS at 09:03

## 2022-03-02 RX ADMIN — INSULIN ASPART 7 UNITS: 100 INJECTION, SOLUTION INTRAVENOUS; SUBCUTANEOUS at 09:03

## 2022-03-02 RX ADMIN — CIPROFLOXACIN HYDROCHLORIDE 500 MG: 250 TABLET, FILM COATED ORAL at 08:03

## 2022-03-02 RX ADMIN — INSULIN ASPART 1 UNITS: 100 INJECTION, SOLUTION INTRAVENOUS; SUBCUTANEOUS at 09:03

## 2022-03-02 RX ADMIN — Medication 10 ML: at 05:03

## 2022-03-02 RX ADMIN — INSULIN ASPART 7 UNITS: 100 INJECTION, SOLUTION INTRAVENOUS; SUBCUTANEOUS at 01:03

## 2022-03-02 RX ADMIN — APIXABAN 5 MG: 2.5 TABLET, FILM COATED ORAL at 08:03

## 2022-03-02 RX ADMIN — SODIUM CHLORIDE: 0.9 INJECTION, SOLUTION INTRAVENOUS at 03:03

## 2022-03-02 RX ADMIN — INSULIN ASPART 7 UNITS: 100 INJECTION, SOLUTION INTRAVENOUS; SUBCUTANEOUS at 06:03

## 2022-03-02 RX ADMIN — Medication 10 ML: at 06:03

## 2022-03-02 RX ADMIN — ATORVASTATIN CALCIUM 80 MG: 20 TABLET, FILM COATED ORAL at 08:03

## 2022-03-02 RX ADMIN — Medication 10 ML: at 12:03

## 2022-03-02 NOTE — ASSESSMENT & PLAN NOTE
Unclear cause.  Spoke to [facility] and patient has had previous syncopal episodes attributed to alcohol abuse.  Has been prescribed meclizine as well.  Tox positive for cocaine but not alcohol.  CTA brain/neck was done in ED, and per ED note the results were discussed with stroke neurologist Dr. Tovar who feels that CTA finding of a short segment of left vertebral artery stenosis was incidental.  He did not think stroke workup with MRI was indicated.    Since change of status per son compared to when he saw him early during the week a CT of the brain was ordered and no abnormalities seen.  MRI of the brain showed acute stroke .

## 2022-03-02 NOTE — PLAN OF CARE
Recommendations  1.Continue DM/cardiac diet with coumadin restriction diet.   2.Encourage good PO intake as needed.   -if PO intake decreased Recommend Boost Plus BID.    Goals: Pt to meet % EEN/EPN via PO intake by RD f/u.  Nutrition Goal Status: goal met  Communication of RD Recs:  (POC)

## 2022-03-02 NOTE — ASSESSMENT & PLAN NOTE
This patient does have evidence of infective focus  My overall impression is sepsis. Vital signs were reviewed and noted in progress note.    Antibiotics (From admission, onward)            None        Cultures were taken-   Microbiology Results (last 7 days)     Procedure Component Value Units Date/Time    Blood culture [[id number]] Collected: 02/21/22 0758    Order Status: Completed Specimen: Blood from Antecubital, Right Arm Updated: 02/26/22 1412     Blood Culture, Routine No growth after 5 days.    Blood culture [[id number]] Collected: 02/21/22 0757    Order Status: Completed Specimen: Blood Updated: 02/26/22 1412     Blood Culture, Routine No growth after 5 days.    Clostridium difficile EIA [[id number]] Collected: 02/23/22 0939    Order Status: Completed Specimen: Stool Updated: 02/23/22 2055     C. diff Antigen Negative     C difficile Toxins A+B, EIA Negative     Comment: Testing not recommended for children <24 months old.       Urine culture [[id number]]  (Abnormal)  (Susceptibility) Collected: 02/21/22 1013    Order Status: Completed Specimen: Urine Updated: 02/23/22 1840     Urine Culture, Routine KLEBSIELLA PNEUMONIAE  >100,000 cfu/ml        PROTEUS MIRABILIS  > 100,000 cfu/ml      Narrative:      Specimen Source->Urine        Latest lactate reviewed, they are-  No results for input(s): LACTATE in the last 72 hours.    Organ dysfunction indicated by Encephalopathy   Source- UTI  - transitionED from Rocephin IV to oral ciprofloxacin.  Urine cultures were evaluated and we are telling his antibiotic therapy.Completed abx.

## 2022-03-02 NOTE — PT/OT/SLP PROGRESS
Occupational Therapy   Treatment    Name: Forest Lopez  MRN: [id number]  Admitting Diagnosis:  Sepsis       Recommendations:     Discharge Recommendations: nursing facility, skilled  Discharge Equipment Recommendations:  bedside commode, shower chair  Barriers to discharge:  Decreased caregiver support    Assessment:     Forest Lopez is a 72 y.o. male with a medical diagnosis of Sepsis.  He presents with  weakness, impaired endurance, impaired self care skills, impaired functional mobilty, gait instability, impaired balance, decreased coordination, decreased safety awareness.   Pt presented sidelying and agreeable to OT session but required encouragement to participate in ADL activity. Pt declined oral care and most grooming activities but agreeable to stand at the sink to clean glasses and perform hand hygiene. Pt required SBA and max cues for supine to sit EOB, pt demo'd reluctance to mobility requiring max encouragement.  Pt then stood and ambulated to the sink with SBA and assist for lines. At the sink, pt required CGA-SBA to clean glasses and perform hand hygiene. Pt then ambulated to table in room to organize personal items, requiring CGA-SBA. Pt then returned to sitting EOB with SBA. Pt verbalized desire to sit EOB for as long as tolerable but declined further therapeutic activities and exercise. Pt thoroughly educated on call don't fall and fall prevention. Pt verbalized understanding. Pt left sitting EOB, bed alarm on, RN updated, all lines intact, all needs in reach.     Rehab Prognosis:  Good; patient would benefit from acute skilled OT services to address these deficits and reach maximum level of function.       Plan:     Patient to be seen 3 x/week to address the above listed problems via self-care/home management, therapeutic activities, therapeutic exercises  · Plan of Care Expires: 03/03/22  · Plan of Care Reviewed with: patient    Subjective     Pain/Comfort:  · Pain Rating 1: 0/10    Objective:      Communicated with: RN prior to session.  Patient found right sidelying with PICC line, bed alarm, Condom Catheter upon OT entry to room.    General Precautions: Standard, anti-coagulation medicine, fall, diabetic   Orthopedic Precautions:N/A   Braces: N/A  Respiratory Status: Room air     Occupational Performance:     Bed Mobility:    · Supine > sit: SBA with max cues for initiation and continuation of bed mobility tasks.    Functional Mobility/Transfers:  · Sit <> stand SBA no AD  · Functional Mobility: SBA-CGA for functional ambulation to sink and around room to table.    Activities of Daily Living:  · Grooming: SBA-CGA for hand hygiene in standing at sink, no overt LOB noted.       [facility] 6 Click ADL: 16    Treatment & Education:  OT role, plan of care, progression of goals, importance of continued OOB activity, safety precautions, safe mobility/transfer/ADL technique, call don't fall, fall prevention.    Patient left sitting edge of bed with all lines intact, call button in reach, bed alarm on and RN notifiedEducation:      GOALS:   Multidisciplinary Problems     Occupational Therapy Goals        Problem: Occupational Therapy Goal    Goal Priority Disciplines Outcome Interventions   Occupational Therapy Goal     OT, PT/OT Ongoing, Progressing    Description: Goals to be met by: 3/7/2022    Patient will increase functional independence with ADLs by performing:    UE Dressing with Rich.  LE Dressing with Rich.  Grooming while standing at sink with Supervision.  Toileting from toilet with Supervision for hygiene and clothing management.   Toilet transfer to toilet with Supervision.                     Time Tracking:     OT Date of Treatment: 03/02/22  OT Start Time: 1118  OT Stop Time: 1135  OT Total Time (min): 17 min    Billable Minutes:Self Care/Home Management 17 minutes    OT/DUTCH: OT          3/2/2022

## 2022-03-02 NOTE — PROGRESS NOTES
[facility] ([place])  Adult Nutrition  Progress Note    SUMMARY       Recommendations  1.Continue DM/cardiac diet with coumadin restriction diet.   2.Encourage good PO intake as needed.   -if PO intake decreased Recommend Boost Plus BID.    Goals: Pt to meet % EEN/EPN via PO intake by RD f/u.  Nutrition Goal Status: goal met  Communication of RD Recs:  (POC)    Assessment and Plan    No nutrition dx at this time. Will Continue to monitor.        Reason for Assessment    Reason For Assessment: RD follow-up  Diagnosis:  (acute DVT)  Relevant Medical History: HTN, HLD, T2DM, BPH  Interdisciplinary Rounds: attended  General Information Comments: 3/2- Continues Consistent Carb with mech soft. No ONS. PO intake remains 100%. Pending SNF. Will continue to monitor.    Nutrition Discharge Planning: Pt to follow a cardiac/ DM diet(vit K restriction as needed) as tolerated.    Nutrition Risk Screen    Nutrition Risk Screen: no indicators present    Nutrition/Diet History    Spiritual, Cultural Beliefs, Yarsani Practices, Values that Affect Care: no    Anthropometrics    Temp: 97.8 °F (36.6 °C)  Height: 6' (182.9 cm)  Height (inches): 72 in  Weight Method: Bed Scale  Weight: 63 kg (139 lb)  Weight (lb): 139 lb  Ideal Body Weight (IBW), Male: 178 lb  % Ideal Body Weight, Male (lb): 78.09 %  BMI (Calculated): 18.8  BMI Grade: 18.5-24.9 - normal       Lab/Procedures/Meds    Pertinent Labs Reviewed: reviewed  Pertinent Labs Comments: H/H 10.5/32.4  Pertinent Medications Reviewed: reviewed  Pertinent Medications Comments: apixaban, atorvastatin, escitalopram, insulin aspart, insulin detemir, Na Cl flush, tamsulosin    Estimated/Assessed Needs    Weight Used For Calorie Calculations: 63 kg (138 lb 14.2 oz)  Energy Calorie Requirements (kcal): 1980 kcal day (MSJ X AF 1.4)  Energy Need Method: [place]  Protein Requirements: 50-63 g day (0.8-1.0 g/kg)  Weight Used For Protein Calculations: 63 kg (138 lb 14.2  oz)  Estimated Fluid Requirement Method: RDA Method  RDA Method (mL): 1980  CHO Requirement: 248 g day    Nutrition Prescription Ordered    Current Diet Order: [facility] soft consistent carb    Evaluation of Received Nutrient/Fluid Intake    Energy Calories Required: meeting needs  Protein Required: meeting needs  Fluid Required: meeting needs  % Intake of Estimated Energy Needs: 75 - 100 %  % Meal Intake: 75 - 100 %    Nutrition Risk    Level of Risk/Frequency of Follow-up:  (1 x weekly)     Monitor and Evaluation    Food and Nutrient Intake: energy intake, food and beverage intake  Food and Nutrient Adminstration: diet order  Knowledge/Beliefs/Attitudes: food and nutrition knowledge/skill  Physical Activity and Function: nutrition-related ADLs and IADLs  Anthropometric Measurements: weight, weight change, body mass index  Biochemical Data, Medical Tests and Procedures: glucose/endocrine profile, gastrointestinal profile, electrolyte and renal panel, inflammatory profile, lipid profile  Nutrition-Focused Physical Findings: overall appearance     Nutrition Follow-Up    RD Follow-up?: Yes

## 2022-03-02 NOTE — PT/OT/SLP PROGRESS
Physical Therapy  Not Seen    Patient Name:  Forest Lopez   MRN:  [id number]    Patient not seen today secondary to Patient fatigue. Will follow-up tomorrow, 3/3.

## 2022-03-03 ENCOUNTER — PATIENT OUTREACH (OUTPATIENT)
Dept: ADMINISTRATIVE | Facility: OTHER | Age: 72
End: 2022-03-03
Payer: MEDICARE

## 2022-03-03 PROBLEM — A41.9 SEPSIS: Status: RESOLVED | Noted: 2022-02-21 | Resolved: 2022-03-03

## 2022-03-03 LAB
POCT GLUCOSE: 125 MG/DL (ref 70–110)
POCT GLUCOSE: 137 MG/DL (ref 70–110)
POCT GLUCOSE: 139 MG/DL (ref 70–110)
POCT GLUCOSE: 168 MG/DL (ref 70–110)

## 2022-03-03 PROCEDURE — G0427 INPT/ED TELECONSULT70: HCPCS | Mod: 95,,, | Performed by: PSYCHIATRY & NEUROLOGY

## 2022-03-03 PROCEDURE — 25000003 PHARM REV CODE 250: Performed by: HOSPITALIST

## 2022-03-03 PROCEDURE — 25000003 PHARM REV CODE 250: Performed by: NURSE PRACTITIONER

## 2022-03-03 PROCEDURE — 99233 PR SUBSEQUENT HOSPITAL CARE,LEVL III: ICD-10-PCS | Mod: ,,, | Performed by: INTERNAL MEDICINE

## 2022-03-03 PROCEDURE — 97110 THERAPEUTIC EXERCISES: CPT

## 2022-03-03 PROCEDURE — 99233 SBSQ HOSP IP/OBS HIGH 50: CPT | Mod: ,,, | Performed by: INTERNAL MEDICINE

## 2022-03-03 PROCEDURE — 11000001 HC ACUTE MED/SURG PRIVATE ROOM

## 2022-03-03 PROCEDURE — 25000003 PHARM REV CODE 250: Performed by: INTERNAL MEDICINE

## 2022-03-03 PROCEDURE — A4216 STERILE WATER/SALINE, 10 ML: HCPCS | Performed by: INTERNAL MEDICINE

## 2022-03-03 PROCEDURE — G0427 PR INPT TELEHEALTH CON 70/>M: ICD-10-PCS | Mod: 95,,, | Performed by: PSYCHIATRY & NEUROLOGY

## 2022-03-03 RX ORDER — LANOLIN ALCOHOL/MO/W.PET/CERES
100 CREAM (GRAM) TOPICAL DAILY
Status: DISCONTINUED | OUTPATIENT
Start: 2022-03-04 | End: 2022-04-27 | Stop reason: HOSPADM

## 2022-03-03 RX ORDER — FLUOXETINE HYDROCHLORIDE 20 MG/1
20 CAPSULE ORAL DAILY
Status: DISCONTINUED | OUTPATIENT
Start: 2022-03-04 | End: 2022-04-27 | Stop reason: HOSPADM

## 2022-03-03 RX ORDER — FOLIC ACID 1 MG/1
1 TABLET ORAL DAILY
Status: DISCONTINUED | OUTPATIENT
Start: 2022-03-04 | End: 2022-04-27 | Stop reason: HOSPADM

## 2022-03-03 RX ADMIN — INSULIN ASPART 7 UNITS: 100 INJECTION, SOLUTION INTRAVENOUS; SUBCUTANEOUS at 12:03

## 2022-03-03 RX ADMIN — INSULIN ASPART 7 UNITS: 100 INJECTION, SOLUTION INTRAVENOUS; SUBCUTANEOUS at 05:03

## 2022-03-03 RX ADMIN — INSULIN DETEMIR 25 UNITS: 100 INJECTION, SOLUTION SUBCUTANEOUS at 08:03

## 2022-03-03 RX ADMIN — Medication 10 ML: at 06:03

## 2022-03-03 RX ADMIN — ESCITALOPRAM OXALATE 10 MG: 10 TABLET ORAL at 08:03

## 2022-03-03 RX ADMIN — INSULIN ASPART 7 UNITS: 100 INJECTION, SOLUTION INTRAVENOUS; SUBCUTANEOUS at 08:03

## 2022-03-03 RX ADMIN — Medication 10 ML: at 12:03

## 2022-03-03 RX ADMIN — TAMSULOSIN HYDROCHLORIDE 0.4 MG: 0.4 CAPSULE ORAL at 08:03

## 2022-03-03 RX ADMIN — Medication 10 ML: at 05:03

## 2022-03-03 RX ADMIN — ATORVASTATIN CALCIUM 80 MG: 20 TABLET, FILM COATED ORAL at 08:03

## 2022-03-03 RX ADMIN — APIXABAN 5 MG: 2.5 TABLET, FILM COATED ORAL at 08:03

## 2022-03-03 NOTE — SUBJECTIVE & OBJECTIVE
Interval History: Doing ok, denies pain, tolerating diet.    Review of Systems   Constitutional:  Negative for chills and fever.   Respiratory:  Negative for cough and shortness of breath.    Cardiovascular:  Negative for chest pain.   Gastrointestinal:  Negative for abdominal pain, nausea and vomiting.   Genitourinary:  Negative for dysuria.   Neurological:  Negative for headaches.   All other systems reviewed and are negative.  Objective:     Vital Signs (Most Recent):  Temp: 98.1 °F (36.7 °C) (03/03/22 1115)  Pulse: 97 (03/03/22 1115)  Resp: 12 (03/03/22 1115)  BP: 109/66 (03/03/22 1115)  SpO2: (!) 94 % (03/03/22 1115)   Vital Signs (24h Range):  Temp:  [98.1 °F (36.7 °C)-99.4 °F (37.4 °C)] 98.1 °F (36.7 °C)  Pulse:  [77-97] 97  Resp:  [12-18] 12  SpO2:  [94 %-97 %] 94 %  BP: (109-164)/(60-86) 109/66     Weight: 63 kg (139 lb)  Body mass index is 18.85 kg/m².    Intake/Output Summary (Last 24 hours) at 3/3/2022 1510  Last data filed at 3/3/2022 1400  Gross per 24 hour   Intake 4056.17 ml   Output 1450 ml   Net 2606.17 ml        Physical Exam  Vitals reviewed.   Constitutional:       General: He is not in acute distress.     Appearance: Normal appearance. He is well-developed.   HENT:      Head: Normocephalic and atraumatic.      Mouth/Throat:      Mouth: Mucous membranes are moist.   Eyes:      Extraocular Movements: Extraocular movements intact.      Pupils: Pupils are equal, round, and reactive to light.   Cardiovascular:      Rate and Rhythm: Normal rate and regular rhythm.      Pulses: Normal pulses.      Heart sounds: Normal heart sounds. No murmur heard.  Pulmonary:      Effort: Pulmonary effort is normal. No respiratory distress.      Breath sounds: Normal breath sounds.   Abdominal:      General: Abdomen is flat. Bowel sounds are normal. There is no distension.      Palpations: Abdomen is soft.      Tenderness: There is no abdominal tenderness.   Musculoskeletal:         General: No swelling. Normal range  of motion.      Cervical back: Normal range of motion and neck supple.   Skin:     General: Skin is warm.      Findings: No rash.   Neurological:      General: No focal deficit present.      Mental Status: He is alert. Mental status is at baseline.      Comments: Oriented to person, place knows in hospital   Psychiatric:         Mood and Affect: Mood normal.         Behavior: Behavior normal.       Significant Labs: All pertinent labs within the past 24 hours have been reviewed.    Significant Imaging: I have reviewed all pertinent imaging results/findings within the past 24 hours.

## 2022-03-03 NOTE — ASSESSMENT & PLAN NOTE
This patient does have evidence of infective focus  My overall impression is sepsis. Vital signs were reviewed and noted in progress note.    Antibiotics (From admission, onward)            None        Cultures were taken-   Microbiology Results (last 7 days)     Procedure Component Value Units Date/Time    Blood culture [[id number]] Collected: 02/21/22 0758    Order Status: Completed Specimen: Blood from Antecubital, Right Arm Updated: 02/26/22 1412     Blood Culture, Routine No growth after 5 days.    Blood culture [[id number]] Collected: 02/21/22 0757    Order Status: Completed Specimen: Blood Updated: 02/26/22 1412     Blood Culture, Routine No growth after 5 days.        Latest lactate reviewed, they are-  No results for input(s): LACTATE in the last 72 hours.    Organ dysfunction indicated by Encephalopathy   Source- UTI  - transitionED from Rocephin IV to oral ciprofloxacin.  Urine cultures were evaluated and we are telling his antibiotic therapy.Completed abx.

## 2022-03-03 NOTE — PROGRESS NOTES
[facility] Medicine  Progress Note    Patient Name: Forest Lopez  MRN: [id number]  Patient Class: IP- Inpatient   Admission Date: 1/31/2022  Length of Stay: 31 days  Attending Physician: Alma Elizalde MD  Primary Care Provider: Julian Escobar        Subjective:     Principal Problem:Debility        HPI:  Mr. Lopez is a 72 year old man who presented after a syncopal episode.  He reports he had been feeling well prior to the episode but then had a prodrome prior to passing out.  EMS was called, report patient's BP was low normal on their arrival, improved after an IV fluids bolus.  Patient denies chest pain or shortness of breath and says he does not feel weak currently although is rather tired.  When seen in the ED this morning he could barely express himself audibly.     Vital signs were normal on presentation here.  He is on warfarin for recurrent DVT, but his INR was 1, and d-dimer markedly elevated at 21.7.  Tox screen was positive for cocaine.  He had a CTA of the brain and neck done on presentation so CTA of the chest for PE study could not be done for 48 hours.  Ultrasound of the lower extremities showed extensive bilateral DVT.  On the right there was thrombosis of the common femoral vein, femoral vein, popliteal vein, one of the paired posterior tibial veins and both visualized peroneal veins.  On the left there was thrombosis of the common femoral vein, femoral vein and popliteal vein.  Patient was started on full dose Lovenox and admitted.    Medical history is from the chart as he is unable to give me much information.  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  He smokes 5-6 cigarettes/day and is not interested in quitting.  He is currently homeless and staying with a friend.  Despite the normal INR he is sure he is taking his  warfarin and is not having difficulty taking his medications.  I discussed his care with [facility] staff on the [facility] and patient had been lost to follow up since November 2021.      Overview/Hospital Course:  Patient presented to the ED after a syncopal episode and was found to have bilateral lower extremity DVTs and a low INR.  Tox screen was positive for cocaine.  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  He was started back on his warfarin with bridging Lovenox.  His 2D echo showed grade I diastolic dysfunction.  Neurology evaluated him and recommended echo with bubble study, which was negative for a shunt.    PT/OT evaluated him and recommended SNF placement as he was not participating in therapy.  His INR was difficult to get therapeutic, and as he had no contraindication to a NOAC his warfarin was changed to apixaban, and the full dose Lovenox was discontinued.  As he appeared to be depressed and had no objection to starting an antidepressant Lexapro was started.  He had a fall in the hospital on 2/13, had gone to the bathroom for a BM with the nurse, and when she turned away while he was washing his hands he apparently lost his balance and fell.  He did not hit his head and did not lose consciousness, but his BP was low transiently and he was given a bolus of IV fluids.    Patient's mental status improved slowly, but he gradually became more talkative and was able to hold a brief conversation.    Patient more lethargic on 2/21 with low grade fever and leukocytosis, work up looked like uti and started on abx, fluids.Completed treatment.          Interval History: Doing ok, denies pain, tolerating diet.    Review of Systems   Constitutional:  Negative for chills and fever.   Respiratory:  Negative for cough and shortness of breath.    Cardiovascular:  Negative for chest pain.   Gastrointestinal:  Negative for  abdominal pain, nausea and vomiting.   Genitourinary:  Negative for dysuria.   Neurological:  Negative for headaches.   All other systems reviewed and are negative.  Objective:     Vital Signs (Most Recent):  Temp: 98.1 °F (36.7 °C) (03/03/22 1115)  Pulse: 97 (03/03/22 1115)  Resp: 12 (03/03/22 1115)  BP: 109/66 (03/03/22 1115)  SpO2: (!) 94 % (03/03/22 1115)   Vital Signs (24h Range):  Temp:  [98.1 °F (36.7 °C)-99.4 °F (37.4 °C)] 98.1 °F (36.7 °C)  Pulse:  [77-97] 97  Resp:  [12-18] 12  SpO2:  [94 %-97 %] 94 %  BP: (109-164)/(60-86) 109/66     Weight: 63 kg (139 lb)  Body mass index is 18.85 kg/m².    Intake/Output Summary (Last 24 hours) at 3/3/2022 1510  Last data filed at 3/3/2022 1400  Gross per 24 hour   Intake 4056.17 ml   Output 1450 ml   Net 2606.17 ml        Physical Exam  Vitals reviewed.   Constitutional:       General: He is not in acute distress.     Appearance: Normal appearance. He is well-developed.   HENT:      Head: Normocephalic and atraumatic.      Mouth/Throat:      Mouth: Mucous membranes are moist.   Eyes:      Extraocular Movements: Extraocular movements intact.      Pupils: Pupils are equal, round, and reactive to light.   Cardiovascular:      Rate and Rhythm: Normal rate and regular rhythm.      Pulses: Normal pulses.      Heart sounds: Normal heart sounds. No murmur heard.  Pulmonary:      Effort: Pulmonary effort is normal. No respiratory distress.      Breath sounds: Normal breath sounds.   Abdominal:      General: Abdomen is flat. Bowel sounds are normal. There is no distension.      Palpations: Abdomen is soft.      Tenderness: There is no abdominal tenderness.   Musculoskeletal:         General: No swelling. Normal range of motion.      Cervical back: Normal range of motion and neck supple.   Skin:     General: Skin is warm.      Findings: No rash.   Neurological:      General: No focal deficit present.      Mental Status: He is alert. Mental status is at baseline.      Comments:  Oriented to person, place knows in hospital   Psychiatric:         Mood and Affect: Mood normal.         Behavior: Behavior normal.       Significant Labs: All pertinent labs within the past 24 hours have been reviewed.    Significant Imaging: I have reviewed all pertinent imaging results/findings within the past 24 hours.      Assessment/Plan:      * Debility  Pt/ot efforts  Awaiting  Skilled placement, having difficulty, may need group home     Drowsy  Likely due to infection  No focal deficits, ct head no acute changes  Improved after fluids and abx.      Abnormal imaging of thyroid  Tsh/t4 ok, thyroid ultrasound done, showed bilateral nodules which did not meet criteria for biopsy.    Acute stroke due to ischemia  - MRI showed areas of diffusion signal hyperintensity consistent with areas of acute ischemia/infarct involving the left cerebral hemisphere, the most prominent measuring approximately 1.4 cm, also a small focus of the right frontal lobe.  - Patient on full dose anticoagulation  - Sinus rhythm noted throughout hospital stay  - Risk factor modification - control diabetes, continue statin.  - RPR, HIV non reactive.  B12 low normal.   - CTA showed a focal segment of 60-70% stenosis involving the proximal to mid left vertebral artery that was new when compared to the prior study of 09/25/2019.  No evidence of large vessel intracranial occlusion.   - Neurology noted symptoms do not correspond to stroke location.  - Echo with bubble study done, no shunt seen.  - Follow up with vascular neurology in 4 weeks.  -Therapy recommending SNF due to his low interest in participation.  - Started Lexapro due to suspicion for depression.      Advance care planning        Severe protein-calorie malnutrition  Diet as tolerated      Type 2 diabetes mellitus with hyperglycemia, without long-term current use of insulin  Reportedly he is on metformin and glipizide, both held.  Continue SSI for now.  He has 4+ sugar in urine  and HbA1c is 10.3, and he is hyperglycemic here.  Will start levemir and continue ISS, increase levemir to 25 units daily  Add scheduled novolog 5 units tid, increase to 7 units  Improving  Resume metformin in SNF  Sugars ok    Acute deep vein thrombosis (DVT) of both femoral veins  Last ultrasound showed partially occlusive DVTs, now completely occlusive DVT of multiple lower extremity veins on ultrasound.  Patient reports compliance with warfarin but his INR is only one.  He is homeless but says he does not have difficulty obtaining his medications.  Does not seem to care about anything.  D-dimer was markedly elevated on presentation and there was a question of possible PE, but he had a CTA of the brain/neck on presentation so could not get another CTA for another 48 hours.    V/Q scan was done, showed low probability for PE.  2D echo showed grade I diastolic dysfunction.  Warfarin was restarted with bridging Lovenox, but INR was still low.  Given the difficulty of getting him to follow up changed to apixaban and discontinued Lovenox.    Syncope and collapse  Unclear cause.  Spoke to [facility] and patient has had previous syncopal episodes attributed to alcohol abuse.  Has been prescribed meclizine as well.  Tox positive for cocaine but not alcohol.  CTA brain/neck was done in ED, and per ED note the results were discussed with stroke neurologist Dr. Tovar who feels that CTA finding of a short segment of left vertebral artery stenosis was incidental.  He did not think stroke workup with MRI was indicated.    Since change of status per son compared to when he saw him early during the week a CT of the brain was ordered and no abnormalities seen.  MRI of the brain showed acute stroke .      Cocaine abuse  As noted above.  He has not had any behavioral issues while here, and other than being disinterested in therapy he has been very cooperative.  Seems drug use was transient.      Hyperlipidemia  Resumed  lipitor      Essential hypertension  Doubtful he was on anything at home.  Started losartan 25 mg daily  BP well controlled on low dose losartan.  BP transiently low today after he fell but recovered quickly.  IVF bolus given.  Will hold losartan as bp low again  bp ok without med    Tobacco dependence  He smokes 5-6 cigarettes/day.  Not trying to quit and not interested in a nicotine patch.  Benefits of smoking cessation were reviewed with patient in detail for for 8 minutes and patient was encouraged to quit. Nicotine replacement options were discussed.        VTE Risk Mitigation (From admission, onward)         Ordered     apixaban tablet 5 mg  2 times daily         02/10/22 0901     Place sequential compression device  Until discontinued         01/31/22 0533                Discharge Planning   EFRAÍN:      Code Status: Full Code   Is the patient medically ready for discharge?:     Reason for patient still in hospital (select all that apply): Patient trending condition and Treatment  Discharge Plan A: Skilled Nursing Facility (SNF)   Discharge Delays: (!) Other (Unable to find an accepting SNF)              Alma Elizalde MD  Department of Hospital Medicine   [facility] ([place])

## 2022-03-03 NOTE — PT/OT/SLP PROGRESS
Occupational Therapy   Treatment    Name: Forest Lopez  MRN: [id number]  Admitting Diagnosis:  Sepsis       Recommendations:     Discharge Recommendations: nursing facility, skilled (pt required 24 hour care)  Discharge Equipment Recommendations:  bedside commode, shower chair  Barriers to discharge:  Decreased caregiver support    Assessment:     Forest Lopez is a 72 y.o. male with a medical diagnosis of Sepsis.  He presents with  weakness, impaired endurance, impaired self care skills, impaired functional mobilty, impaired balance, impaired cognition, decreased coordination, decreased upper extremity function, decreased safety awareness, impaired coordination.     Pt agreeable to OT session and EOB therex this date. Pt with decreased verbal interaction but very pleasant and agreeable.     Rehab Prognosis:  Good; patient would benefit from acute skilled OT services to address these deficits and reach maximum level of function.       Plan:     Patient to be seen 3 x/week to address the above listed problems via self-care/home management, therapeutic activities, therapeutic exercises  · Plan of Care Expires: 04/02/22  · Plan of Care Reviewed with: patient    Subjective     Pain/Comfort:  · Pain Rating 1: 0/10    Objective:     Communicated with: RN prior to session.  Patient found right sidelying with PICC line, bed alarm, Condom Catheter upon OT entry to room.    General Precautions: Standard, fall   Orthopedic Precautions:N/A   Braces: N/A  Respiratory Status: Room air     Occupational Performance:     Bed Mobility:    · Sit <> supine: SBA with cues for hand positioning. RLE positioned under R thigh during supine to sitting and pt required max cues for proper hand placement to facilitate transition.     Functional Mobility/Transfers:  · Sit <> stand CGA with no AD    Activities of Daily Living:  · Pt politely declined ADL activity, agreeable to therapeutic exercise     Therapeutic exercise  · Pt performed EOB BUE  therex with sit <> stand reps   · Sit <> stand 5 reps, required extra time   · Pt performed 10 reps of overhead reaching, cross lateral reaching, scapular retraction      AMPA 6 Click ADL: 16    Treatment & Education:  OT role, plan of care, progression of goals, importance of OOB activity, therapeutic exercise    Patient left right sidelying with all lines intact, AVASYS in room, call button in reach, bed alarm on and RN notifiedEducation:      GOALS:   Multidisciplinary Problems     Occupational Therapy Goals        Problem: Occupational Therapy Goal    Goal Priority Disciplines Outcome Interventions   Occupational Therapy Goal     OT, PT/OT Ongoing, Progressing    Description: Goals to be met by: 3/7/2022    Patient will increase functional independence with ADLs by performing:    UE Dressing with [place].  LE Dressing with [place].  Grooming while standing at sink with Supervision.  Toileting from toilet with Supervision for hygiene and clothing management.   Toilet transfer to toilet with Supervision.                     Time Tracking:     OT Date of Treatment: 03/03/22  OT Start Time: 1035  OT Stop Time: 1057  OT Total Time (min): 22 min    Billable Minutes:Therapeutic Exercise 22 minutes    OT/DUTCH: OT          3/3/2022

## 2022-03-04 ENCOUNTER — PATIENT OUTREACH (OUTPATIENT)
Dept: ADMINISTRATIVE | Facility: OTHER | Age: 72
End: 2022-03-04
Payer: MEDICARE

## 2022-03-04 LAB
POCT GLUCOSE: 111 MG/DL (ref 70–110)
POCT GLUCOSE: 137 MG/DL (ref 70–110)
POCT GLUCOSE: 273 MG/DL (ref 70–110)

## 2022-03-04 PROCEDURE — A4216 STERILE WATER/SALINE, 10 ML: HCPCS | Performed by: INTERNAL MEDICINE

## 2022-03-04 PROCEDURE — C1751 CATH, INF, PER/CENT/MIDLINE: HCPCS

## 2022-03-04 PROCEDURE — 25000003 PHARM REV CODE 250: Performed by: INTERNAL MEDICINE

## 2022-03-04 PROCEDURE — 94761 N-INVAS EAR/PLS OXIMETRY MLT: CPT

## 2022-03-04 PROCEDURE — 25000003 PHARM REV CODE 250: Performed by: NURSE PRACTITIONER

## 2022-03-04 PROCEDURE — 97530 THERAPEUTIC ACTIVITIES: CPT

## 2022-03-04 PROCEDURE — 97116 GAIT TRAINING THERAPY: CPT

## 2022-03-04 PROCEDURE — 11000001 HC ACUTE MED/SURG PRIVATE ROOM

## 2022-03-04 PROCEDURE — 25000003 PHARM REV CODE 250: Performed by: HOSPITALIST

## 2022-03-04 PROCEDURE — 99233 SBSQ HOSP IP/OBS HIGH 50: CPT | Mod: ,,, | Performed by: INTERNAL MEDICINE

## 2022-03-04 PROCEDURE — 99233 PR SUBSEQUENT HOSPITAL CARE,LEVL III: ICD-10-PCS | Mod: ,,, | Performed by: INTERNAL MEDICINE

## 2022-03-04 RX ADMIN — INSULIN ASPART 7 UNITS: 100 INJECTION, SOLUTION INTRAVENOUS; SUBCUTANEOUS at 08:03

## 2022-03-04 RX ADMIN — INSULIN DETEMIR 25 UNITS: 100 INJECTION, SOLUTION SUBCUTANEOUS at 08:03

## 2022-03-04 RX ADMIN — Medication 10 ML: at 05:03

## 2022-03-04 RX ADMIN — THIAMINE HCL TAB 100 MG 100 MG: 100 TAB at 08:03

## 2022-03-04 RX ADMIN — TAMSULOSIN HYDROCHLORIDE 0.4 MG: 0.4 CAPSULE ORAL at 08:03

## 2022-03-04 RX ADMIN — INSULIN ASPART 3 UNITS: 100 INJECTION, SOLUTION INTRAVENOUS; SUBCUTANEOUS at 12:03

## 2022-03-04 RX ADMIN — FOLIC ACID 1 MG: 1 TABLET ORAL at 08:03

## 2022-03-04 RX ADMIN — Medication 10 ML: at 01:03

## 2022-03-04 RX ADMIN — Medication 10 ML: at 12:03

## 2022-03-04 RX ADMIN — INSULIN ASPART 7 UNITS: 100 INJECTION, SOLUTION INTRAVENOUS; SUBCUTANEOUS at 12:03

## 2022-03-04 RX ADMIN — APIXABAN 5 MG: 2.5 TABLET, FILM COATED ORAL at 08:03

## 2022-03-04 RX ADMIN — THERA TABS 1 TABLET: TAB at 08:03

## 2022-03-04 RX ADMIN — ATORVASTATIN CALCIUM 80 MG: 20 TABLET, FILM COATED ORAL at 08:03

## 2022-03-04 RX ADMIN — FLUOXETINE 20 MG: 20 CAPSULE ORAL at 08:03

## 2022-03-04 RX ADMIN — Medication 10 ML: at 08:03

## 2022-03-04 RX ADMIN — INSULIN ASPART 7 UNITS: 100 INJECTION, SOLUTION INTRAVENOUS; SUBCUTANEOUS at 05:03

## 2022-03-04 NOTE — PLAN OF CARE
Problem: Physical Therapy Goal  Goal: Physical Therapy Goal  Description: Goals to be met by: 4/3/2022    Patient will increase functional independence with mobility by performin. Sit<>stand with SPV with LRAD.  2. Gait x 300 feet with SPV with LRAD.   3. Static standing in SLS with no UE support with SBA x10 sec.     Outcome: Ongoing, Progressing     Goals remain appropriate, patient limited by endurance, impaired balance, and impaired cognition. Rec for dc to SNF with transition to NH - if unable to secure SNF placement, most appropriate for NH placement.

## 2022-03-04 NOTE — PLAN OF CARE
"   03/03/22 1838   Post-Acute Status   Post-Acute Authorization Placement  (SNF)   Post-Acute Placement Status Pending Bed Availability   Coverage [employer] MANAGED MEDICARE - [employer] (SPECIAL NEEDS PLAN)   Discharge Delays (!) Post-Acute Set-up   Discharge Plan   Discharge Plan A Skilled Nursing Facility   Discharge Plan B Other  ([facility])     SHANICE sent referrals and called facilities, [place], [facility], [facility], and [facility].  No callbacks as of yet. SHANICE also called "[facility]" [facility] for Hom[facility] persons.  There were no beds available today, but SHANICE was encouraged to call back early tomorrow. They hold a bed open for a referred person for 24 hrs.  If the bed is available when they are called, you have the bed held, but there is no waiting list on which to place someone.  "

## 2022-03-04 NOTE — PT/OT/SLP PROGRESS
Physical Therapy Treatment    Patient Name:  Forest Lopez   MRN:  [id number]    Recommendations:     Discharge Recommendations:  nursing facility, skilled   Discharge Equipment Recommendations: bedside commode, shower chair   Barriers to discharge: None    Assessment:     Forest Lopez is a 72 y.o. male admitted with a medical diagnosis of Debility.  He presents with the following impairments/functional limitations:  weakness, impaired endurance, impaired self care skills, impaired functional mobilty, gait instability, impaired balance, impaired cognition, decreased upper extremity function, decreased lower extremity function, decreased safety awareness.    Patient continues to demo significant impairments in balance and initiation of self care tasks - just finishing bed bath with PCT after BM in bed. After encouragement, pt ambulated in nice with CGA with rollator. Significant fatigue after gait bout concluded. Rec for dc to SNF with transition to NH - if unable to secure SNF placement, most appropriate for NH placement.    Rehab Prognosis: Fair; patient would benefit from acute skilled PT services to address these deficits and reach maximum level of function.    Recent Surgery: * No surgery found *      Plan:     During this hospitalization, patient to be seen 3 x/week to address the identified rehab impairments via gait training, therapeutic activities, therapeutic exercises, neuromuscular re-education and progress toward the following goals:    · Plan of Care Expires:  04/03/22    Subjective     Chief Complaint: Feeling tired  Patient/Family Comments/goals: Engaged with therapist during ambulation bout, eager to return to bed despite staying all day in bed; Patient agreeable to PT treatment.  Pain/Comfort:  Pain Rating 1: 0/10  Pain Rating Post-Intervention 1: 0/10      Objective:     Communicated with RN MD prior to session.  Patient found supine with PICC line, Condom Catheter upon PT entry to room. PCT  present finishing bed bath.     General Precautions: Standard, fall  Orthopedic Precautions:N/A   Braces: N/A  Respiratory Status: Room air     Patient donned yellow socks and gait belt for OOB mobility. Patient donned mask for hallway ambulation.    Functional Mobility:  · Bed Mobility:     · Supine to Sit: CGA  · Transfers:     · Sit to Stand: modA with rolling walker  · 1x from EOB  · Cues for preparatory movements to increase independence including scoot to EOB, flexion of knees for feet placed below COG, and anterior flexion of trunk  · Gait: 4x50 ft with rollator with CGA with brief standing rest breaks between bouts  · Slow gait with shuffling steps - improved during gait bout      AM-PAC 6 CLICK MOBILITY  Turning over in bed (including adjusting bedclothes, sheets and blankets)?: 3  Sitting down on and standing up from a chair with arms (e.g., wheelchair, bedside commode, etc.): 2  Moving from lying on back to sitting on the side of the bed?: 3  Moving to and from a bed to a chair (including a wheelchair)?: 3  Need to walk in hospital room?: 3  Climbing 3-5 steps with a railing?: 3  Basic Mobility Total Score: 17       Therapeutic Activities and Exercises:  · Slow mobility requiring increased time for all tasks    Patient left up in chair with all lines intact, call button in reach, chair alarm on, RN MD notified and Crista telesitter present.     GOALS:   Multidisciplinary Problems     Physical Therapy Goals        Problem: Physical Therapy Goal    Goal Priority Disciplines Outcome Goal Variances Interventions   Physical Therapy Goal     PT, PT/OT Ongoing, Progressing     Description: Goals to be met by: 4/3/2022    Patient will increase functional independence with mobility by performin. Sit<>stand with SPV with LRAD.  2. Gait x 300 feet with SPV with LRAD.   3. Static standing in SLS with no UE support with SBA x10 sec.                      Time Tracking:     PT Received On: 22  PT Start  Time: 1612     PT Stop Time: 1635  PT Total Time (min): 23 min     Billable Minutes: Gait Training 15 and Therapeutic Activity 8    Treatment Type: Treatment  PT/PTA: PT     PTA Visit Number: 0     03/04/2022

## 2022-03-04 NOTE — ASSESSMENT & PLAN NOTE
- MRI showed areas of diffusion signal hyperintensity consistent with areas of acute ischemia/infarct involving the left cerebral hemisphere, the most prominent measuring approximately 1.4 cm, also a small focus of the right frontal lobe.  - Patient on full dose anticoagulation  - Sinus rhythm noted throughout hospital stay  - Risk factor modification - control diabetes, continue statin.  - RPR, HIV non reactive.  B12 low normal.   - CTA showed a focal segment of 60-70% stenosis involving the proximal to mid left vertebral artery that was new when compared to the prior study of 09/25/2019.  No evidence of large vessel intracranial occlusion.   - Neurology noted symptoms do not correspond to stroke location.  - Echo with bubble study done, no shunt seen.  - Follow up with vascular neurology in 4 weeks.  -Therapy recommending SNF due to his low interest in participation.  - Started Lexapro due to suspicion for depression.  Psych recs appreciated, lexapro switched to prozac, started thiamine, folic acid, mvi.

## 2022-03-04 NOTE — SUBJECTIVE & OBJECTIVE
Interval History: Doing ok, tolerating diet.    Review of Systems   Constitutional:  Negative for chills and fever.   Respiratory:  Negative for cough and shortness of breath.    Cardiovascular:  Negative for chest pain.   Gastrointestinal:  Negative for abdominal pain, nausea and vomiting.   Genitourinary:  Negative for dysuria.   Neurological:  Negative for headaches.   All other systems reviewed and are negative.  Objective:     Vital Signs (Most Recent):  Temp: 98.7 °F (37.1 °C) (03/04/22 1123)  Pulse: 91 (03/04/22 1123)  Resp: 18 (03/04/22 1123)  BP: 133/66 (03/04/22 1123)  SpO2: 98 % (03/04/22 1123)   Vital Signs (24h Range):  Temp:  [98 °F (36.7 °C)-99.2 °F (37.3 °C)] 98.7 °F (37.1 °C)  Pulse:  [77-91] 91  Resp:  [12-18] 18  SpO2:  [96 %-98 %] 98 %  BP: (109-139)/(56-69) 133/66     Weight: 63 kg (139 lb)  Body mass index is 18.85 kg/m².    Intake/Output Summary (Last 24 hours) at 3/4/2022 1432  Last data filed at 3/3/2022 1800  Gross per 24 hour   Intake --   Output 500 ml   Net -500 ml        Physical Exam  Vitals reviewed.   Constitutional:       General: He is not in acute distress.     Appearance: Normal appearance. He is well-developed.   HENT:      Head: Normocephalic and atraumatic.      Mouth/Throat:      Mouth: Mucous membranes are moist.   Eyes:      Extraocular Movements: Extraocular movements intact.      Pupils: Pupils are equal, round, and reactive to light.   Cardiovascular:      Rate and Rhythm: Normal rate and regular rhythm.      Pulses: Normal pulses.      Heart sounds: Normal heart sounds. No murmur heard.  Pulmonary:      Effort: Pulmonary effort is normal. No respiratory distress.      Breath sounds: Normal breath sounds.   Abdominal:      General: Abdomen is flat. Bowel sounds are normal. There is no distension.      Palpations: Abdomen is soft.      Tenderness: There is no abdominal tenderness.   Musculoskeletal:         General: No swelling. Normal range of motion.      Cervical back:  Normal range of motion and neck supple.   Skin:     General: Skin is warm.      Findings: No rash.   Neurological:      General: No focal deficit present.      Mental Status: He is alert. Mental status is at baseline.      Comments: Oriented to person, place knows in hospital   Psychiatric:      Comments: Flat affect       Significant Labs: All pertinent labs within the past 24 hours have been reviewed.    Significant Imaging: I have reviewed all pertinent imaging results/findings within the past 24 hours.

## 2022-03-04 NOTE — PROGRESS NOTES
[facility] Medicine  Progress Note    Patient Name: Forest Lopez  MRN: [id number]  Patient Class: IP- Inpatient   Admission Date: 1/31/2022  Length of Stay: 32 days  Attending Physician: Alma Elizalde MD  Primary Care Provider: Julian Escobar        Subjective:     Principal Problem:Debility        HPI:  Mr. Lopez is a 72 year old man who presented after a syncopal episode.  He reports he had been feeling well prior to the episode but then had a prodrome prior to passing out.  EMS was called, report patient's BP was low normal on their arrival, improved after an IV fluids bolus.  Patient denies chest pain or shortness of breath and says he does not feel weak currently although is rather tired.  When seen in the ED this morning he could barely express himself audibly.     Vital signs were normal on presentation here.  He is on warfarin for recurrent DVT, but his INR was 1, and d-dimer markedly elevated at 21.7.  Tox screen was positive for cocaine.  He had a CTA of the brain and neck done on presentation so CTA of the chest for PE study could not be done for 48 hours.  Ultrasound of the lower extremities showed extensive bilateral DVT.  On the right there was thrombosis of the common femoral vein, femoral vein, popliteal vein, one of the paired posterior tibial veins and both visualized peroneal veins.  On the left there was thrombosis of the common femoral vein, femoral vein and popliteal vein.  Patient was started on full dose Lovenox and admitted.    Medical history is from the chart as he is unable to give me much information.  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  He smokes 5-6 cigarettes/day and is not interested in quitting.  He is currently homeless and staying with a friend.  Despite the normal INR he is sure he is taking his  warfarin and is not having difficulty taking his medications.  I discussed his care with [facility] staff on the [facility] and patient had been lost to follow up since November 2021.      Overview/Hospital Course:  Patient presented to the ED after a syncopal episode and was found to have bilateral lower extremity DVTs and a low INR.  Tox screen was positive for cocaine.  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  He was started back on his warfarin with bridging Lovenox.  His 2D echo showed grade I diastolic dysfunction.  Neurology evaluated him and recommended echo with bubble study, which was negative for a shunt.    PT/OT evaluated him and recommended SNF placement as he was not participating in therapy.  His INR was difficult to get therapeutic, and as he had no contraindication to a NOAC his warfarin was changed to apixaban, and the full dose Lovenox was discontinued.  As he appeared to be depressed and had no objection to starting an antidepressant Lexapro was started.  He had a fall in the hospital on 2/13, had gone to the bathroom for a BM with the nurse, and when she turned away while he was washing his hands he apparently lost his balance and fell.  He did not hit his head and did not lose consciousness, but his BP was low transiently and he was given a bolus of IV fluids.    Patient's mental status improved slowly, but he gradually became more talkative and was able to hold a brief conversation.    Patient more lethargic on 2/21 with low grade fever and leukocytosis, work up looked like uti and started on abx, fluids.Completed treatment.    Psych consulted and meds changed to prozac.      Interval History: Doing ok, tolerating diet.    Review of Systems   Constitutional:  Negative for chills and fever.   Respiratory:  Negative for cough and shortness of breath.    Cardiovascular:  Negative for chest pain.    Gastrointestinal:  Negative for abdominal pain, nausea and vomiting.   Genitourinary:  Negative for dysuria.   Neurological:  Negative for headaches.   All other systems reviewed and are negative.  Objective:     Vital Signs (Most Recent):  Temp: 98.7 °F (37.1 °C) (03/04/22 1123)  Pulse: 91 (03/04/22 1123)  Resp: 18 (03/04/22 1123)  BP: 133/66 (03/04/22 1123)  SpO2: 98 % (03/04/22 1123)   Vital Signs (24h Range):  Temp:  [98 °F (36.7 °C)-99.2 °F (37.3 °C)] 98.7 °F (37.1 °C)  Pulse:  [77-91] 91  Resp:  [12-18] 18  SpO2:  [96 %-98 %] 98 %  BP: (109-139)/(56-69) 133/66     Weight: 63 kg (139 lb)  Body mass index is 18.85 kg/m².    Intake/Output Summary (Last 24 hours) at 3/4/2022 1432  Last data filed at 3/3/2022 1800  Gross per 24 hour   Intake --   Output 500 ml   Net -500 ml        Physical Exam  Vitals reviewed.   Constitutional:       General: He is not in acute distress.     Appearance: Normal appearance. He is well-developed.   HENT:      Head: Normocephalic and atraumatic.      Mouth/Throat:      Mouth: Mucous membranes are moist.   Eyes:      Extraocular Movements: Extraocular movements intact.      Pupils: Pupils are equal, round, and reactive to light.   Cardiovascular:      Rate and Rhythm: Normal rate and regular rhythm.      Pulses: Normal pulses.      Heart sounds: Normal heart sounds. No murmur heard.  Pulmonary:      Effort: Pulmonary effort is normal. No respiratory distress.      Breath sounds: Normal breath sounds.   Abdominal:      General: Abdomen is flat. Bowel sounds are normal. There is no distension.      Palpations: Abdomen is soft.      Tenderness: There is no abdominal tenderness.   Musculoskeletal:         General: No swelling. Normal range of motion.      Cervical back: Normal range of motion and neck supple.   Skin:     General: Skin is warm.      Findings: No rash.   Neurological:      General: No focal deficit present.      Mental Status: He is alert. Mental status is at baseline.       Comments: Oriented to person, place knows in hospital   Psychiatric:      Comments: Flat affect       Significant Labs: All pertinent labs within the past 24 hours have been reviewed.    Significant Imaging: I have reviewed all pertinent imaging results/findings within the past 24 hours.      Assessment/Plan:      * Debility  Pt/ot efforts  Awaiting  Skilled placement, having difficulty, may need long-term     Drowsy  Likely due to infection  No focal deficits, ct head no acute changes  Improved after fluids and abx.      Abnormal imaging of thyroid  Tsh/t4 ok, thyroid ultrasound done, showed bilateral nodules which did not meet criteria for biopsy.    Acute stroke due to ischemia  - MRI showed areas of diffusion signal hyperintensity consistent with areas of acute ischemia/infarct involving the left cerebral hemisphere, the most prominent measuring approximately 1.4 cm, also a small focus of the right frontal lobe.  - Patient on full dose anticoagulation  - Sinus rhythm noted throughout hospital stay  - Risk factor modification - control diabetes, continue statin.  - RPR, HIV non reactive.  B12 low normal.   - CTA showed a focal segment of 60-70% stenosis involving the proximal to mid left vertebral artery that was new when compared to the prior study of 09/25/2019.  No evidence of large vessel intracranial occlusion.   - Neurology noted symptoms do not correspond to stroke location.  - Echo with bubble study done, no shunt seen.  - Follow up with vascular neurology in 4 weeks.  -Therapy recommending SNF due to his low interest in participation.  - Started Lexapro due to suspicion for depression.  Psych recs appreciated, lexapro switched to prozac, started thiamine, folic acid, mvi.      Advance care planning        Severe protein-calorie malnutrition  Diet as tolerated      Type 2 diabetes mellitus with hyperglycemia, without long-term current use of insulin  Reportedly he is on metformin and glipizide, both  held.  Continue SSI for now.  He has 4+ sugar in urine and HbA1c is 10.3, and he is hyperglycemic here.  Will start levemir and continue ISS, increase levemir to 25 units daily  Add scheduled novolog 5 units tid, increase to 7 units  Improving  Resume metformin in SNF  Sugars ok    Acute deep vein thrombosis (DVT) of both femoral veins  Last ultrasound showed partially occlusive DVTs, now completely occlusive DVT of multiple lower extremity veins on ultrasound.  Patient reports compliance with warfarin but his INR is only one.  He is homeless but says he does not have difficulty obtaining his medications.  Does not seem to care about anything.  D-dimer was markedly elevated on presentation and there was a question of possible PE, but he had a CTA of the brain/neck on presentation so could not get another CTA for another 48 hours.    V/Q scan was done, showed low probability for PE.  2D echo showed grade I diastolic dysfunction.  Warfarin was restarted with bridging Lovenox, but INR was still low.  Given the difficulty of getting him to follow up changed to apixaban and discontinued Lovenox.    Syncope and collapse  Unclear cause.  Spoke to [facility] and patient has had previous syncopal episodes attributed to alcohol abuse.  Has been prescribed meclizine as well.  Tox positive for cocaine but not alcohol.  CTA brain/neck was done in ED, and per ED note the results were discussed with stroke neurologist Dr. Tovar who feels that CTA finding of a short segment of left vertebral artery stenosis was incidental.  He did not think stroke workup with MRI was indicated.    Since change of status per son compared to when he saw him early during the week a CT of the brain was ordered and no abnormalities seen.  MRI of the brain showed acute stroke .      Cocaine abuse  As noted above.  He has not had any behavioral issues while here, and other than being disinterested in therapy he has been very cooperative.  Seems drug use was  transient.      Hyperlipidemia  Resumed lipitor      Essential hypertension  Doubtful he was on anything at home.  Started losartan 25 mg daily  BP well controlled on low dose losartan.  BP transiently low today after he fell but recovered quickly.  IVF bolus given.  Will hold losartan as bp low again  bp ok without med    Tobacco dependence  He smokes 5-6 cigarettes/day.  Not trying to quit and not interested in a nicotine patch.  Benefits of smoking cessation were reviewed with patient in detail for for 8 minutes and patient was encouraged to quit. Nicotine replacement options were discussed.        VTE Risk Mitigation (From admission, onward)         Ordered     apixaban tablet 5 mg  2 times daily         02/10/22 0901     Place sequential compression device  Until discontinued         01/31/22 0533                Discharge Planning   EFRAÍN:      Code Status: Full Code   Is the patient medically ready for discharge?:     Reason for patient still in hospital (select all that apply): Pending disposition  Discharge Plan A: Skilled Nursing Facility   Discharge Delays: (!) Post-Acute Set-up              Alma Elizalde MD  Department of Hospital Medicine   [facility] - Med Surg ([place])

## 2022-03-04 NOTE — PROGRESS NOTES
RSW met with patient and patient has no additional needs.  having trouble placing patient at this time. RSW to follow up.     SUMMER Wilson

## 2022-03-04 NOTE — PLAN OF CARE
Pt is A&O*. disoriented to situations on occasion. Slept through the shift. Turned and repositioned Q2H. No distress or pain verbalized. Insulin given AC. Still waiting for Rehab placement. Will cont to monitor.

## 2022-03-05 PROBLEM — R40.0 DROWSY: Status: RESOLVED | Noted: 2022-02-21 | Resolved: 2022-03-05

## 2022-03-05 LAB
ERYTHROCYTE [DISTWIDTH] IN BLOOD BY AUTOMATED COUNT: 13.3 % (ref 11.5–14.5)
HCT VFR BLD AUTO: 36.4 % (ref 40–54)
HGB BLD-MCNC: 11.6 G/DL (ref 14–18)
MCH RBC QN AUTO: 25.4 PG (ref 27–31)
MCHC RBC AUTO-ENTMCNC: 31.9 G/DL (ref 32–36)
MCV RBC AUTO: 80 FL (ref 82–98)
PLATELET # BLD AUTO: 365 K/UL (ref 150–450)
PMV BLD AUTO: 9.8 FL (ref 9.2–12.9)
POCT GLUCOSE: 146 MG/DL (ref 70–110)
POCT GLUCOSE: 148 MG/DL (ref 70–110)
POCT GLUCOSE: 234 MG/DL (ref 70–110)
POCT GLUCOSE: 248 MG/DL (ref 70–110)
RBC # BLD AUTO: 4.56 M/UL (ref 4.6–6.2)
WBC # BLD AUTO: 12.31 K/UL (ref 3.9–12.7)

## 2022-03-05 PROCEDURE — 25000003 PHARM REV CODE 250: Performed by: NURSE PRACTITIONER

## 2022-03-05 PROCEDURE — 11000001 HC ACUTE MED/SURG PRIVATE ROOM

## 2022-03-05 PROCEDURE — 85027 COMPLETE CBC AUTOMATED: CPT | Performed by: INTERNAL MEDICINE

## 2022-03-05 PROCEDURE — A4216 STERILE WATER/SALINE, 10 ML: HCPCS | Performed by: INTERNAL MEDICINE

## 2022-03-05 PROCEDURE — 25000003 PHARM REV CODE 250: Performed by: HOSPITALIST

## 2022-03-05 PROCEDURE — 36406 VNPNXR<3YRS PHY/QHP OTHER VN: CPT

## 2022-03-05 PROCEDURE — 99232 SBSQ HOSP IP/OBS MODERATE 35: CPT | Mod: ,,, | Performed by: INTERNAL MEDICINE

## 2022-03-05 PROCEDURE — 25000003 PHARM REV CODE 250: Performed by: INTERNAL MEDICINE

## 2022-03-05 PROCEDURE — 36415 COLL VENOUS BLD VENIPUNCTURE: CPT | Performed by: INTERNAL MEDICINE

## 2022-03-05 PROCEDURE — 99232 PR SUBSEQUENT HOSPITAL CARE,LEVL II: ICD-10-PCS | Mod: ,,, | Performed by: INTERNAL MEDICINE

## 2022-03-05 RX ADMIN — Medication 10 ML: at 06:03

## 2022-03-05 RX ADMIN — THIAMINE HCL TAB 100 MG 100 MG: 100 TAB at 09:03

## 2022-03-05 RX ADMIN — INSULIN DETEMIR 25 UNITS: 100 INJECTION, SOLUTION SUBCUTANEOUS at 09:03

## 2022-03-05 RX ADMIN — INSULIN ASPART 1 UNITS: 100 INJECTION, SOLUTION INTRAVENOUS; SUBCUTANEOUS at 09:03

## 2022-03-05 RX ADMIN — INSULIN ASPART 2 UNITS: 100 INJECTION, SOLUTION INTRAVENOUS; SUBCUTANEOUS at 03:03

## 2022-03-05 RX ADMIN — FLUOXETINE 20 MG: 20 CAPSULE ORAL at 09:03

## 2022-03-05 RX ADMIN — INSULIN ASPART 7 UNITS: 100 INJECTION, SOLUTION INTRAVENOUS; SUBCUTANEOUS at 06:03

## 2022-03-05 RX ADMIN — INSULIN ASPART 7 UNITS: 100 INJECTION, SOLUTION INTRAVENOUS; SUBCUTANEOUS at 09:03

## 2022-03-05 RX ADMIN — APIXABAN 5 MG: 2.5 TABLET, FILM COATED ORAL at 09:03

## 2022-03-05 RX ADMIN — INSULIN ASPART 2 UNITS: 100 INJECTION, SOLUTION INTRAVENOUS; SUBCUTANEOUS at 06:03

## 2022-03-05 RX ADMIN — INSULIN ASPART 7 UNITS: 100 INJECTION, SOLUTION INTRAVENOUS; SUBCUTANEOUS at 03:03

## 2022-03-05 RX ADMIN — ATORVASTATIN CALCIUM 80 MG: 20 TABLET, FILM COATED ORAL at 09:03

## 2022-03-05 RX ADMIN — Medication 10 ML: at 12:03

## 2022-03-05 RX ADMIN — Medication 10 ML: at 05:03

## 2022-03-05 RX ADMIN — THERA TABS 1 TABLET: TAB at 09:03

## 2022-03-05 RX ADMIN — FOLIC ACID 1 MG: 1 TABLET ORAL at 09:03

## 2022-03-05 RX ADMIN — TAMSULOSIN HYDROCHLORIDE 0.4 MG: 0.4 CAPSULE ORAL at 08:03

## 2022-03-05 RX ADMIN — APIXABAN 5 MG: 2.5 TABLET, FILM COATED ORAL at 08:03

## 2022-03-05 NOTE — PROGRESS NOTES
[facility] Medicine  Progress Note    Patient Name: Forest Lopez  MRN: [id number]  Patient Class: IP- Inpatient   Admission Date: 1/31/2022  Length of Stay: 33 days  Attending Physician: Alma Elizalde MD  Primary Care Provider: Julian Escobar        Subjective:     Principal Problem:Debility        HPI:  Mr. Lopez is a 72 year old man who presented after a syncopal episode.  He reports he had been feeling well prior to the episode but then had a prodrome prior to passing out.  EMS was called, report patient's BP was low normal on their arrival, improved after an IV fluids bolus.  Patient denies chest pain or shortness of breath and says he does not feel weak currently although is rather tired.  When seen in the ED this morning he could barely express himself audibly.     Vital signs were normal on presentation here.  He is on warfarin for recurrent DVT, but his INR was 1, and d-dimer markedly elevated at 21.7.  Tox screen was positive for cocaine.  He had a CTA of the brain and neck done on presentation so CTA of the chest for PE study could not be done for 48 hours.  Ultrasound of the lower extremities showed extensive bilateral DVT.  On the right there was thrombosis of the common femoral vein, femoral vein, popliteal vein, one of the paired posterior tibial veins and both visualized peroneal veins.  On the left there was thrombosis of the common femoral vein, femoral vein and popliteal vein.  Patient was started on full dose Lovenox and admitted.    Medical history is from the chart as he is unable to give me much information.  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  He smokes 5-6 cigarettes/day and is not interested in quitting.  He is currently homeless and staying with a friend.  Despite the normal INR he is sure he is taking his  warfarin and is not having difficulty taking his medications.  I discussed his care with [facility] staff on the [facility] and patient had been lost to follow up since November 2021.      Overview/Hospital Course:  Patient presented to the ED after a syncopal episode and was found to have bilateral lower extremity DVTs and a low INR.  Tox screen was positive for cocaine.  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  He was started back on his warfarin with bridging Lovenox.  His 2D echo showed grade I diastolic dysfunction.  Neurology evaluated him and recommended echo with bubble study, which was negative for a shunt.    PT/OT evaluated him and recommended SNF placement as he was not participating in therapy.  His INR was difficult to get therapeutic, and as he had no contraindication to a NOAC his warfarin was changed to apixaban, and the full dose Lovenox was discontinued.  As he appeared to be depressed and had no objection to starting an antidepressant Lexapro was started.  He had a fall in the hospital on 2/13, had gone to the bathroom for a BM with the nurse, and when she turned away while he was washing his hands he apparently lost his balance and fell.  He did not hit his head and did not lose consciousness, but his BP was low transiently and he was given a bolus of IV fluids.    Patient's mental status improved slowly, but he gradually became more talkative and was able to hold a brief conversation.    Patient more lethargic on 2/21 with low grade fever and leukocytosis, work up looked like uti and started on abx, fluids.Completed treatment.    Psych consulted and meds changed to prozac.      Interval History: Doing ok, tolerating diet.    Review of Systems   Constitutional:  Negative for chills and fever.   Respiratory:  Negative for cough and shortness of breath.    Cardiovascular:  Negative for chest pain.    Gastrointestinal:  Negative for abdominal pain, nausea and vomiting.   Genitourinary:  Negative for dysuria.   Neurological:  Negative for headaches.   All other systems reviewed and are negative.  Objective:     Vital Signs (Most Recent):  Temp: 98.1 °F (36.7 °C) (03/05/22 1141)  Pulse: 108 (03/05/22 1141)  Resp: 18 (03/05/22 1141)  BP: 112/65 (03/05/22 1141)  SpO2: 96 % (03/05/22 1141)   Vital Signs (24h Range):  Temp:  [98 °F (36.7 °C)-98.8 °F (37.1 °C)] 98.1 °F (36.7 °C)  Pulse:  [] 108  Resp:  [18] 18  SpO2:  [96 %-97 %] 96 %  BP: (112-122)/(58-65) 112/65     Weight: 63 kg (139 lb)  Body mass index is 18.85 kg/m².    Intake/Output Summary (Last 24 hours) at 3/5/2022 1431  Last data filed at 3/4/2022 1800  Gross per 24 hour   Intake --   Output 350 ml   Net -350 ml        Physical Exam  Vitals reviewed.   Constitutional:       General: He is not in acute distress.     Appearance: Normal appearance. He is well-developed.   HENT:      Head: Normocephalic and atraumatic.      Mouth/Throat:      Mouth: Mucous membranes are moist.   Eyes:      Extraocular Movements: Extraocular movements intact.      Pupils: Pupils are equal, round, and reactive to light.   Cardiovascular:      Rate and Rhythm: Normal rate and regular rhythm.      Pulses: Normal pulses.      Heart sounds: Normal heart sounds. No murmur heard.  Pulmonary:      Effort: Pulmonary effort is normal. No respiratory distress.      Breath sounds: Normal breath sounds.   Abdominal:      General: Abdomen is flat. Bowel sounds are normal. There is no distension.      Palpations: Abdomen is soft.      Tenderness: There is no abdominal tenderness.   Musculoskeletal:         General: No swelling. Normal range of motion.      Cervical back: Normal range of motion and neck supple.   Skin:     General: Skin is warm.      Findings: No rash.   Neurological:      General: No focal deficit present.      Mental Status: He is alert. Mental status is at baseline.       Comments: Oriented to person, place knows in hospital   Psychiatric:      Comments: Flat affect       Significant Labs: All pertinent labs within the past 24 hours have been reviewed.    Significant Imaging: I have reviewed all pertinent imaging results/findings within the past 24 hours.      Assessment/Plan:      * Debility  Pt/ot efforts  Awaiting  Skilled placement, having difficulty, may need shelter     Abnormal imaging of thyroid  Tsh/t4 ok, thyroid ultrasound done, showed bilateral nodules which did not meet criteria for biopsy.    Acute stroke due to ischemia  - MRI showed areas of diffusion signal hyperintensity consistent with areas of acute ischemia/infarct involving the left cerebral hemisphere, the most prominent measuring approximately 1.4 cm, also a small focus of the right frontal lobe.  - Patient on full dose anticoagulation  - Sinus rhythm noted throughout hospital stay  - Risk factor modification - control diabetes, continue statin.  - RPR, HIV non reactive.  B12 low normal.   - CTA showed a focal segment of 60-70% stenosis involving the proximal to mid left vertebral artery that was new when compared to the prior study of 09/25/2019.  No evidence of large vessel intracranial occlusion.   - Neurology noted symptoms do not correspond to stroke location.  - Echo with bubble study done, no shunt seen.  - Follow up with vascular neurology in 4 weeks.  -Therapy recommending SNF due to his low interest in participation.  - Started Lexapro due to suspicion for depression.  Psych recs appreciated, lexapro switched to prozac, started thiamine, folic acid, mvi.      Advance care planning        Severe protein-calorie malnutrition  Diet as tolerated      Type 2 diabetes mellitus with hyperglycemia, without long-term current use of insulin  Reportedly he is on metformin and glipizide, both held.  Continue SSI for now.  He has 4+ sugar in urine and HbA1c is 10.3, and he is hyperglycemic here.  Will start  levemir and continue ISS, increase levemir to 25 units daily  Add scheduled novolog 5 units tid, increase to 7 units  Improving  Resume metformin in SNF  Sugars ok    Acute deep vein thrombosis (DVT) of both femoral veins  Last ultrasound showed partially occlusive DVTs, now completely occlusive DVT of multiple lower extremity veins on ultrasound.  Patient reports compliance with warfarin but his INR is only one.  He is homeless but says he does not have difficulty obtaining his medications.  Does not seem to care about anything.  D-dimer was markedly elevated on presentation and there was a question of possible PE, but he had a CTA of the brain/neck on presentation so could not get another CTA for another 48 hours.    V/Q scan was done, showed low probability for PE.  2D echo showed grade I diastolic dysfunction.  Warfarin was restarted with bridging Lovenox, but INR was still low.  Given the difficulty of getting him to follow up changed to apixaban and discontinued Lovenox.    Syncope and collapse  Unclear cause.  Spoke to [facility] and patient has had previous syncopal episodes attributed to alcohol abuse.  Has been prescribed meclizine as well.  Tox positive for cocaine but not alcohol.  CTA brain/neck was done in ED, and per ED note the results were discussed with stroke neurologist Dr. Tovar who feels that CTA finding of a short segment of left vertebral artery stenosis was incidental.  He did not think stroke workup with MRI was indicated.    Since change of status per son compared to when he saw him early during the week a CT of the brain was ordered and no abnormalities seen.  MRI of the brain showed acute stroke .      Cocaine abuse  As noted above.  He has not had any behavioral issues while here, and other than being disinterested in therapy he has been very cooperative.  Seems drug use was transient.      Hyperlipidemia  Resumed lipitor      Essential hypertension  Doubtful he was on anything at  home.  Started losartan 25 mg daily  BP well controlled on low dose losartan.  BP transiently low today after he fell but recovered quickly.  IVF bolus given.  Will hold losartan as bp low again  bp ok without med    Tobacco dependence  He smokes 5-6 cigarettes/day.  Not trying to quit and not interested in a nicotine patch.  Benefits of smoking cessation were reviewed with patient in detail for for 8 minutes and patient was encouraged to quit. Nicotine replacement options were discussed, not needed.        VTE Risk Mitigation (From admission, onward)         Ordered     apixaban tablet 5 mg  2 times daily         02/10/22 0901     Place sequential compression device  Until discontinued         01/31/22 0533                Discharge Planning   EFRAÍN:      Code Status: Full Code   Is the patient medically ready for discharge?:     Reason for patient still in hospital (select all that apply): Pending disposition  Discharge Plan A: Skilled Nursing Facility   Discharge Delays: (!) Post-Acute Set-up              Alma Elizalde MD  Department of Hospital Medicine   [facility] - Med Surg ([place])

## 2022-03-05 NOTE — PLAN OF CARE
POC reviewed with patient, questions and concerns addressed. No acute events through the night.  All Vital signs stable. No complaints.  AAOx2. Safety maintained.  Bed locked, in lowest position, call light within reach, side rails x2. Mobilized to their highest function. See doc flow sheets for further information. Will continue to monitor.

## 2022-03-05 NOTE — ASSESSMENT & PLAN NOTE
He smokes 5-6 cigarettes/day.  Not trying to quit and not interested in a nicotine patch.  Benefits of smoking cessation were reviewed with patient in detail for for 8 minutes and patient was encouraged to quit. Nicotine replacement options were discussed, not needed.

## 2022-03-05 NOTE — PLAN OF CARE
03/04/22 2004   Post-Acute Status   Post-Acute Authorization Placement   Post-Acute Placement Status Referrals Sent   Coverage [employer] MANAGED MEDICARE - [employer] (SPECIAL NEEDS PLAN)   Discharge Delays (!) Post-Acute Set-up   Discharge Plan   Discharge Plan A Skilled Nursing Facility   Discharge Plan B Shelter     Patient was still not accepted into a SNF yet. Referrals have been sent to multiple facilities in the   surrounding area. No beds were available at [facility] today. SW called multiple times today.

## 2022-03-05 NOTE — SUBJECTIVE & OBJECTIVE
Interval History: Doing ok, tolerating diet.    Review of Systems   Constitutional:  Negative for chills and fever.   Respiratory:  Negative for cough and shortness of breath.    Cardiovascular:  Negative for chest pain.   Gastrointestinal:  Negative for abdominal pain, nausea and vomiting.   Genitourinary:  Negative for dysuria.   Neurological:  Negative for headaches.   All other systems reviewed and are negative.  Objective:     Vital Signs (Most Recent):  Temp: 98.1 °F (36.7 °C) (03/05/22 1141)  Pulse: 108 (03/05/22 1141)  Resp: 18 (03/05/22 1141)  BP: 112/65 (03/05/22 1141)  SpO2: 96 % (03/05/22 1141)   Vital Signs (24h Range):  Temp:  [98 °F (36.7 °C)-98.8 °F (37.1 °C)] 98.1 °F (36.7 °C)  Pulse:  [] 108  Resp:  [18] 18  SpO2:  [96 %-97 %] 96 %  BP: (112-122)/(58-65) 112/65     Weight: 63 kg (139 lb)  Body mass index is 18.85 kg/m².    Intake/Output Summary (Last 24 hours) at 3/5/2022 1431  Last data filed at 3/4/2022 1800  Gross per 24 hour   Intake --   Output 350 ml   Net -350 ml        Physical Exam  Vitals reviewed.   Constitutional:       General: He is not in acute distress.     Appearance: Normal appearance. He is well-developed.   HENT:      Head: Normocephalic and atraumatic.      Mouth/Throat:      Mouth: Mucous membranes are moist.   Eyes:      Extraocular Movements: Extraocular movements intact.      Pupils: Pupils are equal, round, and reactive to light.   Cardiovascular:      Rate and Rhythm: Normal rate and regular rhythm.      Pulses: Normal pulses.      Heart sounds: Normal heart sounds. No murmur heard.  Pulmonary:      Effort: Pulmonary effort is normal. No respiratory distress.      Breath sounds: Normal breath sounds.   Abdominal:      General: Abdomen is flat. Bowel sounds are normal. There is no distension.      Palpations: Abdomen is soft.      Tenderness: There is no abdominal tenderness.   Musculoskeletal:         General: No swelling. Normal range of motion.      Cervical back:  Normal range of motion and neck supple.   Skin:     General: Skin is warm.      Findings: No rash.   Neurological:      General: No focal deficit present.      Mental Status: He is alert. Mental status is at baseline.      Comments: Oriented to person, place knows in hospital   Psychiatric:      Comments: Flat affect       Significant Labs: All pertinent labs within the past 24 hours have been reviewed.    Significant Imaging: I have reviewed all pertinent imaging results/findings within the past 24 hours.

## 2022-03-06 LAB
POCT GLUCOSE: 140 MG/DL (ref 70–110)
POCT GLUCOSE: 153 MG/DL (ref 70–110)
POCT GLUCOSE: 172 MG/DL (ref 70–110)
POCT GLUCOSE: 233 MG/DL (ref 70–110)
POCT GLUCOSE: 260 MG/DL (ref 70–110)

## 2022-03-06 PROCEDURE — 99233 PR SUBSEQUENT HOSPITAL CARE,LEVL III: ICD-10-PCS | Mod: ,,, | Performed by: INTERNAL MEDICINE

## 2022-03-06 PROCEDURE — 25000003 PHARM REV CODE 250: Performed by: NURSE PRACTITIONER

## 2022-03-06 PROCEDURE — 25000003 PHARM REV CODE 250: Performed by: INTERNAL MEDICINE

## 2022-03-06 PROCEDURE — 11000001 HC ACUTE MED/SURG PRIVATE ROOM

## 2022-03-06 PROCEDURE — 25000003 PHARM REV CODE 250: Performed by: HOSPITALIST

## 2022-03-06 PROCEDURE — 36406 VNPNXR<3YRS PHY/QHP OTHER VN: CPT

## 2022-03-06 PROCEDURE — C1751 CATH, INF, PER/CENT/MIDLINE: HCPCS

## 2022-03-06 PROCEDURE — A4216 STERILE WATER/SALINE, 10 ML: HCPCS | Performed by: INTERNAL MEDICINE

## 2022-03-06 PROCEDURE — 94761 N-INVAS EAR/PLS OXIMETRY MLT: CPT

## 2022-03-06 PROCEDURE — 99233 SBSQ HOSP IP/OBS HIGH 50: CPT | Mod: ,,, | Performed by: INTERNAL MEDICINE

## 2022-03-06 PROCEDURE — 92507 TX SP LANG VOICE COMM INDIV: CPT

## 2022-03-06 RX ADMIN — INSULIN DETEMIR 25 UNITS: 100 INJECTION, SOLUTION SUBCUTANEOUS at 08:03

## 2022-03-06 RX ADMIN — Medication 10 ML: at 05:03

## 2022-03-06 RX ADMIN — INSULIN ASPART 7 UNITS: 100 INJECTION, SOLUTION INTRAVENOUS; SUBCUTANEOUS at 08:03

## 2022-03-06 RX ADMIN — APIXABAN 5 MG: 2.5 TABLET, FILM COATED ORAL at 08:03

## 2022-03-06 RX ADMIN — APIXABAN 5 MG: 2.5 TABLET, FILM COATED ORAL at 09:03

## 2022-03-06 RX ADMIN — FOLIC ACID 1 MG: 1 TABLET ORAL at 08:03

## 2022-03-06 RX ADMIN — FLUOXETINE 20 MG: 20 CAPSULE ORAL at 08:03

## 2022-03-06 RX ADMIN — Medication 10 ML: at 12:03

## 2022-03-06 RX ADMIN — TAMSULOSIN HYDROCHLORIDE 0.4 MG: 0.4 CAPSULE ORAL at 09:03

## 2022-03-06 RX ADMIN — THIAMINE HCL TAB 100 MG 100 MG: 100 TAB at 08:03

## 2022-03-06 RX ADMIN — ATORVASTATIN CALCIUM 80 MG: 20 TABLET, FILM COATED ORAL at 08:03

## 2022-03-06 RX ADMIN — INSULIN ASPART 7 UNITS: 100 INJECTION, SOLUTION INTRAVENOUS; SUBCUTANEOUS at 01:03

## 2022-03-06 RX ADMIN — INSULIN ASPART 7 UNITS: 100 INJECTION, SOLUTION INTRAVENOUS; SUBCUTANEOUS at 05:03

## 2022-03-06 RX ADMIN — Medication 10 ML: at 02:03

## 2022-03-06 RX ADMIN — Medication 10 ML: at 01:03

## 2022-03-06 RX ADMIN — THERA TABS 1 TABLET: TAB at 08:03

## 2022-03-06 RX ADMIN — INSULIN ASPART 3 UNITS: 100 INJECTION, SOLUTION INTRAVENOUS; SUBCUTANEOUS at 01:03

## 2022-03-06 NOTE — PT/OT/SLP PROGRESS
"Speech Language Pathology Treatment    Patient Name:  Forest Lopez   MRN:  [id number]  Admitting Diagnosis: Debility    Recommendations:                 General Recommendations:   1. Speech pathology to continue to follow 2-3x/week for ongoing assessment of cognitive-communicative deficits s/p acute infarcts of L cerebral hemisphere     Diet recommendations: Solids: Mechanical soft solids, Liquids: Thin      Aspiration Precautions: Eliminate distractions, Feed only when awake/alert, Frequent oral care and HOB to 90 degrees      General Precautions: Standard,       Communication strategies:  Reduce informational content/context; frequent repetition and cues to redirect    Subjective     Pt seen at the bedside for ongoing cognitive-communication tx. Pt asleep upon SLP entry, but easily awakened to verbal stimuli. He was agreeable to tx and pleasant.   Patient goals: none stated     Pain/Comfort:  · Pain Rating 1: 0/10    Respiratory Status: Room air    Objective:     Has the patient been evaluated by SLP for swallowing?   Yes  Keep patient NPO? No   Current Respiratory Status:  Room Air      Cognitive-communication tx: pt oriented to self, city, and state ind'ly. He initially stated "I'm not nowhere" when asked spatial orientation questions, though given mod A pt oriented to [facility]. He reported month as "February" and year as "2020." Given mod A, pt oriented to current month and year. Extra time needed for responses as well as multiple repetitions of orientation ?s. Speech initially appeared dysarthric with reduced speech intelligibility; however, as session lengthened speech clarity improved--likely combination of missing dentition and fatigue. He partially recalled reason for admit with associated drug abuse, but was unaware of DVTs. Pt recalled prior occupation as a  for a school, but was unclear with specific school and location. He reported having two children, Camilo and Forest. Pt " reported neither son lived in [place], but he could not recall their location despite max A. After a filled 4 min delay, pt recalled orientation information with 75% acc. Given 1 min time constraint and concrete category cue, pt named 5 items within category with mod A.     Pt did appear mildly SOB during tx, frequently taking deep breaths. He initially endorsed SOB, but soon after denied trouble breathing. PCT present to take vitals with O2 saturation levels stable at 97%. Pt frequently inconsistent in report and easily distracted. All environmental distractions removed during session. Pt left upright in bed with call light in reach. Time allowed for questions/concerns which were all answered within SLP scope of practice.     Assessment:     Forest Lopez is a 72 y.o. 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    Goals:   Multidisciplinary Problems     SLP Goals        Problem: SLP Goal    Goal Priority Disciplines Outcome   SLP Goal     SLP Ongoing, Progressing   Description: 1. Pt will consume a regular/thin diet without overt s/s of aspiration or airway threat without assistance.  2. Pt will increase orientation to person, place, situation and date with 90% acc given min assist.  3. Pt will follow 3-4 step commands to increase functional IND with 75% acc given min assist.   4. Pt will complete immediate and delayed recall tasks with 75% acc given mod assist.  5. Targeting word finding, pt will complete divergent naming tasks given 1-minute time frame with 50% acc given mod assist.  6. Ongoing cognitive-communicative assessment.     Updated Goals 2/8:  7. Pt will sustain attention to topic/task without distraction in 5-10 minute increments given mod verbal cues.                   Plan:     · Patient to be seen:  3 x/week, 2 x/week   · Plan of Care expires:  03/21/22  · Plan of Care reviewed with:  patient    · SLP Follow-Up:  Yes       Discharge recommendations:  nursing facility, skilled     Time Tracking:     SLP Treatment Date:   03/06/22  Speech Start Time:  1132  Speech Stop Time:  1151     Speech Total Time (min):  19 min    Billable Minutes: Speech Therapy Individual 19 mins    03/06/2022

## 2022-03-06 NOTE — SUBJECTIVE & OBJECTIVE
Interval History: Doing ok, tolerating diet.    Review of Systems   Constitutional:  Negative for chills and fever.   Respiratory:  Negative for cough and shortness of breath.    Cardiovascular:  Negative for chest pain.   Gastrointestinal:  Negative for abdominal pain, nausea and vomiting.   Neurological:  Negative for headaches.   Objective:     Vital Signs (Most Recent):  Temp: 98 °F (36.7 °C) (03/06/22 1148)  Pulse: 82 (03/06/22 1148)  Resp: 12 (03/06/22 1148)  BP: 128/77 (03/06/22 1148)  SpO2: 97 % (03/06/22 1148)   Vital Signs (24h Range):  Temp:  [97.6 °F (36.4 °C)-98.9 °F (37.2 °C)] 98 °F (36.7 °C)  Pulse:  [72-92] 82  Resp:  [12-19] 12  SpO2:  [97 %-98 %] 97 %  BP: (104-128)/(62-77) 128/77     Weight: 63 kg (139 lb)  Body mass index is 18.85 kg/m².    Intake/Output Summary (Last 24 hours) at 3/6/2022 1358  Last data filed at 3/6/2022 0900  Gross per 24 hour   Intake 400 ml   Output 3700 ml   Net -3300 ml        Physical Exam  Vitals reviewed.   Constitutional:       General: He is not in acute distress.     Appearance: Normal appearance. He is well-developed.   HENT:      Head: Normocephalic and atraumatic.      Mouth/Throat:      Mouth: Mucous membranes are moist.   Eyes:      Extraocular Movements: Extraocular movements intact.      Pupils: Pupils are equal, round, and reactive to light.   Cardiovascular:      Rate and Rhythm: Normal rate and regular rhythm.      Pulses: Normal pulses.      Heart sounds: Normal heart sounds. No murmur heard.  Pulmonary:      Effort: Pulmonary effort is normal. No respiratory distress.      Breath sounds: Normal breath sounds.   Abdominal:      General: Abdomen is flat. Bowel sounds are normal. There is no distension.      Palpations: Abdomen is soft.      Tenderness: There is no abdominal tenderness.   Musculoskeletal:         General: No swelling. Normal range of motion.      Cervical back: Normal range of motion and neck supple.   Skin:     General: Skin is warm.       Findings: No rash.   Neurological:      General: No focal deficit present.      Mental Status: He is alert. Mental status is at baseline.      Comments: Oriented to person, place knows in hospital   Psychiatric:      Comments: Flat affect       Significant Labs: All pertinent labs within the past 24 hours have been reviewed.    Significant Imaging: I have reviewed all pertinent imaging results/findings within the past 24 hours.

## 2022-03-06 NOTE — PLAN OF CARE
Patient is AAO x 3.  He is forgetful of the situation at times and is easily reoriented. His demeanor has been pleasant and cooperative.  Skin is warm and dry without breakdown observed. No fever or complaints of pain voiced or observed.  No signs of infection.  Left upper arm midline does not draw back but it does flush.  Midline flushed multiple times during the shift.  No bowel movement on night shift but one noted yesterday on dayshift.  Condom catheter came off once during the night due to patient trying to get out of bed.  New condom catheter applied. 950 ml yellow urine emptied from collection bag.  Side rails up x two with bed alarm activated.  [employer] camera #3 also in use for patient safety.   Call light left within reach of patient.  Room close to the nurses station.  Rounding maintained per protocol.       Problem: Adult Inpatient Plan of Care  Goal: Plan of Care Review  Outcome: Ongoing, Progressing  Flowsheets (Taken 3/6/2022 0513)  Plan of Care Reviewed With: patient     Problem: Adult Inpatient Plan of Care  Goal: Absence of Hospital-Acquired Illness or Injury  Intervention: Prevent Skin Injury  Flowsheets (Taken 3/6/2022 0513)  Body Position: position changed independently     Problem: Diabetes Comorbidity  Goal: Blood Glucose Level Within Targeted Range  Outcome: Ongoing, Progressing  Intervention: Monitor and Manage Glycemia  Flowsheets (Taken 3/6/2022 0513)  Glycemic Management: blood glucose monitored     Problem: Skin Injury Risk Increased  Goal: Skin Health and Integrity  Outcome: Ongoing, Progressing  Intervention: Optimize Skin Protection  Flowsheets (Taken 3/6/2022 0513)  Pressure Reduction Techniques:   frequent weight shift encouraged   weight shift assistance provided  Pressure Reduction Devices: pressure-redistributing mattress utilized  Head of Bed (HOB) Positioning: HOB elevated     Problem: Coping Ineffective  Goal: Effective Coping  Outcome: Ongoing, Progressing  Intervention:  Support and Enhance Coping Strategies  Flowsheets (Taken 3/6/2022 0513)  Supportive Measures:   positive reinforcement provided   self-responsibility promoted   self-reflection promoted   self-care encouraged   verbalization of feelings encouraged  Family/Support System Care:   involvement promoted   self-care encouraged   support provided  Environmental Support:   calm environment promoted   environmental consistency promoted     Problem: Fall Injury Risk  Goal: Absence of Fall and Fall-Related Injury  Outcome: Ongoing, Progressing  Intervention: Identify and Manage Contributors  Flowsheets (Taken 3/6/2022 0513)  Self-Care Promotion: independence encouraged  Medication Review/Management:   medications reviewed   high-risk medications identified  Intervention: Promote Injury-Free Environment  Flowsheets (Taken 3/6/2022 0513)  Safety Promotion/Fall Prevention:   assistive device/personal item within reach   bed alarm set   diversional activities provided   Fall Risk signage in place   Fall Risk reviewed with patient/family   medications reviewed   lighting adjusted   high risk medications identified   nonskid shoes/socks when out of bed   /camera at bedside   room near unit station   side rails raised x 2   instructed to call staff for mobility     Problem: Bleeding (Sepsis/Septic Shock)  Goal: Absence of Bleeding  Outcome: Ongoing, Progressing  Intervention: Monitor and Manage Bleeding  Flowsheets (Taken 3/6/2022 0513)  Bleeding Precautions:   blood pressure closely monitored   monitored for signs of bleeding     Problem: Glycemic Control Impaired (Sepsis/Septic Shock)  Goal: Blood Glucose Level Within Desired Range  Outcome: Ongoing, Progressing  Intervention: Optimize Glycemic Control  Flowsheets (Taken 3/6/2022 0513)  Glycemic Management: blood glucose monitored     Problem: Infection Progression (Sepsis/Septic Shock)  Goal: Absence of Infection Signs and Symptoms  Outcome: Ongoing,  Progressing  Intervention: Initiate Sepsis Management  Flowsheets (Taken 3/6/2022 0513)  Infection Prevention: single patient room provided  Intervention: Promote Stabilization  Flowsheets (Taken 3/6/2022 0513)  Fluid/Electrolyte Management: fluids provided  Intervention: Promote Recovery  Flowsheets (Taken 3/6/2022 0513)  Activity Management:   Rolling - L1   Sitting at edge of bed - L2

## 2022-03-06 NOTE — PROGRESS NOTES
[facility] Medicine  Progress Note    Patient Name: Forest Lopez  MRN: [id number]  Patient Class: IP- Inpatient   Admission Date: 1/31/2022  Length of Stay: 34 days  Attending Physician: Alma Elizalde MD  Primary Care Provider: Julian Escobar        Subjective:     Principal Problem:Debility        HPI:  Mr. Lpoez is a 72 year old man who presented after a syncopal episode.  He reports he had been feeling well prior to the episode but then had a prodrome prior to passing out.  EMS was called, report patient's BP was low normal on their arrival, improved after an IV fluids bolus.  Patient denies chest pain or shortness of breath and says he does not feel weak currently although is rather tired.  When seen in the ED this morning he could barely express himself audibly.     Vital signs were normal on presentation here.  He is on warfarin for recurrent DVT, but his INR was 1, and d-dimer markedly elevated at 21.7.  Tox screen was positive for cocaine.  He had a CTA of the brain and neck done on presentation so CTA of the chest for PE study could not be done for 48 hours.  Ultrasound of the lower extremities showed extensive bilateral DVT.  On the right there was thrombosis of the common femoral vein, femoral vein, popliteal vein, one of the paired posterior tibial veins and both visualized peroneal veins.  On the left there was thrombosis of the common femoral vein, femoral vein and popliteal vein.  Patient was started on full dose Lovenox and admitted.    Medical history is from the chart as he is unable to give me much information.  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  He smokes 5-6 cigarettes/day and is not interested in quitting.  He is currently homeless and staying with a friend.  Despite the normal INR he is sure he is taking his  warfarin and is not having difficulty taking his medications.  I discussed his care with [facility] staff on the [facility] and patient had been lost to follow up since November 2021.      Overview/Hospital Course:  Patient presented to the ED after a syncopal episode and was found to have bilateral lower extremity DVTs and a low INR.  Tox screen was positive for cocaine.  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  He was started back on his warfarin with bridging Lovenox.  His 2D echo showed grade I diastolic dysfunction.  Neurology evaluated him and recommended echo with bubble study, which was negative for a shunt.    PT/OT evaluated him and recommended SNF placement as he was not participating in therapy.  His INR was difficult to get therapeutic, and as he had no contraindication to a NOAC his warfarin was changed to apixaban, and the full dose Lovenox was discontinued.  As he appeared to be depressed and had no objection to starting an antidepressant Lexapro was started.  He had a fall in the hospital on 2/13, had gone to the bathroom for a BM with the nurse, and when she turned away while he was washing his hands he apparently lost his balance and fell.  He did not hit his head and did not lose consciousness, but his BP was low transiently and he was given a bolus of IV fluids.    Patient's mental status improved slowly, but he gradually became more talkative and was able to hold a brief conversation.    Patient more lethargic on 2/21 with low grade fever and leukocytosis, work up looked like uti and started on abx, fluids.Completed treatment.    Psych consulted and meds changed to prozac.      Interval History: Doing ok, tolerating diet.    Review of Systems   Constitutional:  Negative for chills and fever.   Respiratory:  Negative for cough and shortness of breath.    Cardiovascular:  Negative for chest pain.    Gastrointestinal:  Negative for abdominal pain, nausea and vomiting.   Neurological:  Negative for headaches.   Objective:     Vital Signs (Most Recent):  Temp: 98 °F (36.7 °C) (03/06/22 1148)  Pulse: 82 (03/06/22 1148)  Resp: 12 (03/06/22 1148)  BP: 128/77 (03/06/22 1148)  SpO2: 97 % (03/06/22 1148)   Vital Signs (24h Range):  Temp:  [97.6 °F (36.4 °C)-98.9 °F (37.2 °C)] 98 °F (36.7 °C)  Pulse:  [72-92] 82  Resp:  [12-19] 12  SpO2:  [97 %-98 %] 97 %  BP: (104-128)/(62-77) 128/77     Weight: 63 kg (139 lb)  Body mass index is 18.85 kg/m².    Intake/Output Summary (Last 24 hours) at 3/6/2022 1358  Last data filed at 3/6/2022 0900  Gross per 24 hour   Intake 400 ml   Output 3700 ml   Net -3300 ml        Physical Exam  Vitals reviewed.   Constitutional:       General: He is not in acute distress.     Appearance: Normal appearance. He is well-developed.   HENT:      Head: Normocephalic and atraumatic.      Mouth/Throat:      Mouth: Mucous membranes are moist.   Eyes:      Extraocular Movements: Extraocular movements intact.      Pupils: Pupils are equal, round, and reactive to light.   Cardiovascular:      Rate and Rhythm: Normal rate and regular rhythm.      Pulses: Normal pulses.      Heart sounds: Normal heart sounds. No murmur heard.  Pulmonary:      Effort: Pulmonary effort is normal. No respiratory distress.      Breath sounds: Normal breath sounds.   Abdominal:      General: Abdomen is flat. Bowel sounds are normal. There is no distension.      Palpations: Abdomen is soft.      Tenderness: There is no abdominal tenderness.   Musculoskeletal:         General: No swelling. Normal range of motion.      Cervical back: Normal range of motion and neck supple.   Skin:     General: Skin is warm.      Findings: No rash.   Neurological:      General: No focal deficit present.      Mental Status: He is alert. Mental status is at baseline.      Comments: Oriented to person, place knows in hospital   Psychiatric:       Comments: Flat affect       Significant Labs: All pertinent labs within the past 24 hours have been reviewed.    Significant Imaging: I have reviewed all pertinent imaging results/findings within the past 24 hours.      Assessment/Plan:      * Debility  Pt/ot efforts  Awaiting  Skilled placement, having difficulty, may need detention     Abnormal imaging of thyroid  Tsh/t4 ok, thyroid ultrasound done, showed bilateral nodules which did not meet criteria for biopsy.    Acute stroke due to ischemia  - MRI showed areas of diffusion signal hyperintensity consistent with areas of acute ischemia/infarct involving the left cerebral hemisphere, the most prominent measuring approximately 1.4 cm, also a small focus of the right frontal lobe.  - Patient on full dose anticoagulation  - Sinus rhythm noted throughout hospital stay  - Risk factor modification - control diabetes, continue statin.  - RPR, HIV non reactive.  B12 low normal.   - CTA showed a focal segment of 60-70% stenosis involving the proximal to mid left vertebral artery that was new when compared to the prior study of 09/25/2019.  No evidence of large vessel intracranial occlusion.   - Neurology noted symptoms do not correspond to stroke location.  - Echo with bubble study done, no shunt seen.  - Follow up with vascular neurology in 4 weeks.  -Therapy recommending SNF due to his low interest in participation.  - Started Lexapro due to suspicion for depression.  Psych recs appreciated, lexapro switched to prozac, started thiamine, folic acid, mvi.      Advance care planning        Severe protein-calorie malnutrition  Diet as tolerated      Type 2 diabetes mellitus with hyperglycemia, without long-term current use of insulin  Reportedly he is on metformin and glipizide, both held.  Continue SSI for now.  He has 4+ sugar in urine and HbA1c is 10.3, and he is hyperglycemic here.  Will start levemir and continue ISS, increase levemir to 25 units daily  Add scheduled  novolog 5 units tid, increase to 7 units  Improving  Resume metformin in SNF  Sugars high, increase levemir to 28 units daily    Acute deep vein thrombosis (DVT) of both femoral veins  Last ultrasound showed partially occlusive DVTs, now completely occlusive DVT of multiple lower extremity veins on ultrasound.  Patient reports compliance with warfarin but his INR is only one.  He is homeless but says he does not have difficulty obtaining his medications.  Does not seem to care about anything.  D-dimer was markedly elevated on presentation and there was a question of possible PE, but he had a CTA of the brain/neck on presentation so could not get another CTA for another 48 hours.    V/Q scan was done, showed low probability for PE.  2D echo showed grade I diastolic dysfunction.  Warfarin was restarted with bridging Lovenox, but INR was still low.  Given the difficulty of getting him to follow up changed to apixaban and discontinued Lovenox.    Syncope and collapse  Unclear cause.  Spoke to [facility] and patient has had previous syncopal episodes attributed to alcohol abuse.  Has been prescribed meclizine as well.  Tox positive for cocaine but not alcohol.  CTA brain/neck was done in ED, and per ED note the results were discussed with stroke neurologist Dr. Tovar who feels that CTA finding of a short segment of left vertebral artery stenosis was incidental.  He did not think stroke workup with MRI was indicated.    Since change of status per son compared to when he saw him early during the week a CT of the brain was ordered and no abnormalities seen.  MRI of the brain showed acute stroke .      Cocaine abuse  As noted above.  He has not had any behavioral issues while here, and other than being disinterested in therapy he has been very cooperative.  Seems drug use was transient.      Hyperlipidemia  Resumed lipitor      Essential hypertension  Doubtful he was on anything at home.  Started losartan 25 mg daily  BP well  controlled on low dose losartan.  BP transiently low today after he fell but recovered quickly.  IVF bolus given.  Will hold losartan as bp low again  bp ok without med    Tobacco dependence  He smokes 5-6 cigarettes/day.  Not trying to quit and not interested in a nicotine patch.  Benefits of smoking cessation were reviewed with patient in detail for for 8 minutes and patient was encouraged to quit. Nicotine replacement options were discussed, not needed.        VTE Risk Mitigation (From admission, onward)         Ordered     apixaban tablet 5 mg  2 times daily         02/10/22 0901     Place sequential compression device  Until discontinued         01/31/22 0533                Discharge Planning   EFRAÍN:      Code Status: Full Code   Is the patient medically ready for discharge?:     Reason for patient still in hospital (select all that apply): Patient trending condition and Treatment  Discharge Plan A: Skilled Nursing Facility   Discharge Delays: (!) Post-Acute Set-up              Alma Elizalde MD  Department of Hospital Medicine   [facility] ([place])

## 2022-03-06 NOTE — NURSING
Pt resting in bed throughout day.  Did not attempt to stand without assistance.  Bed alarm and avasys in place.  Pleasant and cooperative.  Oriented x2.  Incontinent of bowel x1, condom cathteter in place.  NAD noted.  VSS.  BG <260.  Fair appetite.  Call light within reach.

## 2022-03-06 NOTE — PLAN OF CARE
Problem: SLP Goal  Goal: SLP Goal  Description: 1. Pt will consume a regular/thin diet without overt s/s of aspiration or airway threat without assistance.  2. Pt will increase orientation to person, place, situation and date with 90% acc given min assist.  3. Pt will follow 3-4 step commands to increase functional IND with 75% acc given min assist.   4. Pt will complete immediate and delayed recall tasks with 75% acc given mod assist.  5. Targeting word finding, pt will complete divergent naming tasks given 1-minute time frame with 50% acc given mod assist.  6. Ongoing cognitive-communicative assessment.     Updated Goals 2/8:  7. Pt will sustain attention to topic/task without distraction in 5-10 minute increments given mod verbal cues.  Outcome: Ongoing, Progressing   Pt seen for ongoing cognitive-communication tx. Continues with confusion and reduced thought organization, though improved recall of taught information after a filled delay. Cont'd SLP tx needed.

## 2022-03-07 ENCOUNTER — PATIENT OUTREACH (OUTPATIENT)
Dept: ADMINISTRATIVE | Facility: OTHER | Age: 72
End: 2022-03-07
Payer: MEDICARE

## 2022-03-07 LAB
POCT GLUCOSE: 151 MG/DL (ref 70–110)
POCT GLUCOSE: 204 MG/DL (ref 70–110)
POCT GLUCOSE: 255 MG/DL (ref 70–110)
POCT GLUCOSE: 329 MG/DL (ref 70–110)

## 2022-03-07 PROCEDURE — 94761 N-INVAS EAR/PLS OXIMETRY MLT: CPT

## 2022-03-07 PROCEDURE — 25000003 PHARM REV CODE 250: Performed by: HOSPITALIST

## 2022-03-07 PROCEDURE — 97530 THERAPEUTIC ACTIVITIES: CPT

## 2022-03-07 PROCEDURE — 25000003 PHARM REV CODE 250: Performed by: INTERNAL MEDICINE

## 2022-03-07 PROCEDURE — 97535 SELF CARE MNGMENT TRAINING: CPT

## 2022-03-07 PROCEDURE — 99233 PR SUBSEQUENT HOSPITAL CARE,LEVL III: ICD-10-PCS | Mod: ,,, | Performed by: INTERNAL MEDICINE

## 2022-03-07 PROCEDURE — 99233 SBSQ HOSP IP/OBS HIGH 50: CPT | Mod: ,,, | Performed by: INTERNAL MEDICINE

## 2022-03-07 PROCEDURE — 25000003 PHARM REV CODE 250: Performed by: NURSE PRACTITIONER

## 2022-03-07 PROCEDURE — A4216 STERILE WATER/SALINE, 10 ML: HCPCS | Performed by: INTERNAL MEDICINE

## 2022-03-07 PROCEDURE — 97116 GAIT TRAINING THERAPY: CPT

## 2022-03-07 PROCEDURE — 11000001 HC ACUTE MED/SURG PRIVATE ROOM

## 2022-03-07 RX ADMIN — INSULIN ASPART 1 UNITS: 100 INJECTION, SOLUTION INTRAVENOUS; SUBCUTANEOUS at 09:03

## 2022-03-07 RX ADMIN — Medication 10 ML: at 11:03

## 2022-03-07 RX ADMIN — THIAMINE HCL TAB 100 MG 100 MG: 100 TAB at 08:03

## 2022-03-07 RX ADMIN — INSULIN ASPART 4 UNITS: 100 INJECTION, SOLUTION INTRAVENOUS; SUBCUTANEOUS at 12:03

## 2022-03-07 RX ADMIN — FLUOXETINE 20 MG: 20 CAPSULE ORAL at 08:03

## 2022-03-07 RX ADMIN — APIXABAN 5 MG: 2.5 TABLET, FILM COATED ORAL at 08:03

## 2022-03-07 RX ADMIN — ATORVASTATIN CALCIUM 80 MG: 20 TABLET, FILM COATED ORAL at 08:03

## 2022-03-07 RX ADMIN — INSULIN ASPART 7 UNITS: 100 INJECTION, SOLUTION INTRAVENOUS; SUBCUTANEOUS at 08:03

## 2022-03-07 RX ADMIN — Medication 10 ML: at 06:03

## 2022-03-07 RX ADMIN — THERA TABS 1 TABLET: TAB at 08:03

## 2022-03-07 RX ADMIN — INSULIN ASPART 7 UNITS: 100 INJECTION, SOLUTION INTRAVENOUS; SUBCUTANEOUS at 11:03

## 2022-03-07 RX ADMIN — FOLIC ACID 1 MG: 1 TABLET ORAL at 08:03

## 2022-03-07 RX ADMIN — INSULIN DETEMIR 28 UNITS: 100 INJECTION, SOLUTION SUBCUTANEOUS at 08:03

## 2022-03-07 RX ADMIN — Medication 10 ML: at 12:03

## 2022-03-07 RX ADMIN — Medication 10 ML: at 05:03

## 2022-03-07 RX ADMIN — INSULIN ASPART 7 UNITS: 100 INJECTION, SOLUTION INTRAVENOUS; SUBCUTANEOUS at 04:03

## 2022-03-07 RX ADMIN — TAMSULOSIN HYDROCHLORIDE 0.4 MG: 0.4 CAPSULE ORAL at 08:03

## 2022-03-07 NOTE — PLAN OF CARE
Oriented to person and place.  Still forgetful of time and situation.  Easily reoriented.  Awake for much of the night.  Small naps noted. No complaints of pain or distress.  Turns self frequently in bed but prefers to lay mostly on his right side.  No skin breakdown noted.  Condom cath came off this AM.  Patient cleansed with wipes.  Also CHG bath treatment given at this time.  Fresh linen and gown applied.  New condom catheter placed.  Left midline flushed three times during the shift but still does not draw back.  Call light within reach.  Pitcher of water and cup left within reach.  Bed alarm and Avasys camera in use.  Rounding maintained per protocol.    Problem: Adult Inpatient Plan of Care  Goal: Plan of Care Review  Outcome: Ongoing, Progressing  Flowsheets (Taken 3/7/2022 0506)  Plan of Care Reviewed With: patient     Problem: Adult Inpatient Plan of Care  Goal: Absence of Hospital-Acquired Illness or Injury  Intervention: Prevent Skin Injury  Flowsheets (Taken 3/7/2022 0506)  Body Position: position changed independently     Problem: Diabetes Comorbidity  Goal: Blood Glucose Level Within Targeted Range  Outcome: Ongoing, Progressing  Intervention: Monitor and Manage Glycemia  Flowsheets (Taken 3/7/2022 0506)  Glycemic Management: blood glucose monitored     Problem: Skin Injury Risk Increased  Goal: Skin Health and Integrity  Outcome: Ongoing, Progressing  Intervention: Optimize Skin Protection  Flowsheets (Taken 3/7/2022 0506)  Pressure Reduction Devices: pressure-redistributing mattress utilized  Head of Bed (HOB) Positioning: HOB elevated     Problem: Coping Ineffective  Goal: Effective Coping  Outcome: Ongoing, Progressing  Intervention: Support and Enhance Coping Strategies  Flowsheets (Taken 3/7/2022 0506)  Supportive Measures:   positive reinforcement provided   self-care encouraged   self-reflection promoted   self-responsibility promoted   verbalization of feelings encouraged  Environmental  Support: calm environment promoted     Problem: Fall Injury Risk  Goal: Absence of Fall and Fall-Related Injury  Outcome: Ongoing, Progressing  Intervention: Identify and Manage Contributors  Flowsheets (Taken 3/7/2022 0506)  Self-Care Promotion: independence encouraged  Medication Review/Management:   medications reviewed   high-risk medications identified  Intervention: Promote Injury-Free Environment  Flowsheets (Taken 3/7/2022 0506)  Safety Promotion/Fall Prevention:   bed alarm set   Fall Risk signage in place   Fall Risk reviewed with patient/family   high risk medications identified   lighting adjusted   medications reviewed   nonskid shoes/socks when out of bed   side rails raised x 2   room near unit station   instructed to call staff for mobility   /camera at bedside     Problem: Glycemic Control Impaired (Sepsis/Septic Shock)  Goal: Blood Glucose Level Within Desired Range  Outcome: Ongoing, Progressing  Intervention: Optimize Glycemic Control  Flowsheets (Taken 3/7/2022 0506)  Glycemic Management: blood glucose monitored     Problem: Infection Progression (Sepsis/Septic Shock)  Goal: Absence of Infection Signs and Symptoms  Outcome: Ongoing, Progressing  Intervention: Initiate Sepsis Management  Flowsheets (Taken 3/7/2022 0506)  Infection Prevention: single patient room provided  Infection Management: aseptic technique maintained     Problem: Infection  Goal: Absence of Infection Signs and Symptoms  Outcome: Ongoing, Progressing  Intervention: Prevent or Manage Infection  Flowsheets (Taken 3/7/2022 0506)  Infection Management: aseptic technique maintained

## 2022-03-07 NOTE — PT/OT/SLP PROGRESS
Speech Language Pathology      Forest Lopez  MRN: [id number]    Patient not seen today secondary to Other (Comment). Second attempt at SLP session thsi date. First attempt, pt working with OT. On secondary attempt, pt asleep, unable to awake to participate in session. Will follow-up next available date 3/8/22.

## 2022-03-07 NOTE — PROGRESS NOTES
BERKLEYW met with patient and patient has no additional needs at this time, still waiting on placement.     SUMMER Wilson

## 2022-03-07 NOTE — PT/OT/SLP PROGRESS
Occupational Therapy   Treatment    Name: Forest Lopez  MRN: [id number]  Admitting Diagnosis:  Debility       Recommendations:     Discharge Recommendations: nursing facility, skilled  Discharge Equipment Recommendations:  bedside commode, shower chair  Barriers to discharge:  Decreased caregiver support    Assessment:     Forest Lopez is a 72 y.o. male with a medical diagnosis of Debility.  He presents with  weakness, impaired endurance, impaired self care skills, impaired functional mobilty, gait instability, impaired balance, decreased safety awareness, impaired coordination.     Rehab Prognosis:  Good; patient would benefit from acute skilled OT services to address these deficits and reach maximum level of function.       Plan:     Patient to be seen 3 x/week to address the above listed problems via self-care/home management, therapeutic activities, therapeutic exercises  · Plan of Care Expires: 04/02/22  · Plan of Care Reviewed with: patient    Subjective     Pain/Comfort:  · Pain Rating 1: 0/10    Objective:     Communicated with: RN prior to session.  Patient found up in chair with PICC line, Condom Catheter upon OT entry to room.    General Precautions: Standard, fall   Orthopedic Precautions:N/A   Braces: N/A  Respiratory Status: Room air     Occupational Performance:     Bed Mobility:    · Sit to supine: SBA with assist for line management     Functional Mobility/Transfers:  · Sit <> stand: CGA with rollator. Pt required cues for safe mobility technique and use of brakes. Pt often forgetting to lock rollator brakes during transitions.  · Functional Mobility: Pt required CGA and rollator for functional ambulation from chair to sink approx 6 feet. At sink, pt sat on rollator and performed grooming and feeding tasks. Pt requested to return to supine upon completion of therapeutic activities 2/2 fatigue.     Activities of Daily Living:  · Feeding:  supervision for liquids to mouth while seated on rollator at  sink  · Grooming: supervision for face washing while seated on rollator at sink      [facility] 6 Click ADL: 16    Treatment & Education:  OT, plan of care, importance of OOB activity, safety, fall prevention, promotion of occupational independence, ADL retraining and benefits of participation with therapy    Patient left HOB elevated with all lines intact, call button in reach, bed alarm on and RN notifiedEducation:      GOALS:   Multidisciplinary Problems     Occupational Therapy Goals        Problem: Occupational Therapy Goal    Goal Priority Disciplines Outcome Interventions   Occupational Therapy Goal     OT, PT/OT Ongoing, Progressing    Description: Goals to be met by: 3/7/2022    Patient will increase functional independence with ADLs by performing:    UE Dressing with [place].  LE Dressing with [place].  Grooming while standing at sink with Supervision.  Toileting from toilet with Supervision for hygiene and clothing management.   Toilet transfer to toilet with Supervision.                     Time Tracking:     OT Date of Treatment: 03/07/22  OT Start Time: 1009  OT Stop Time: 1037  OT Total Time (min): 28 min    Billable Minutes:Self Care/Home Management 20 minutes  Therapeutic Activity 8 minutes    OT/DUTCH: OT          3/7/2022

## 2022-03-07 NOTE — PLAN OF CARE
Problem: Occupational Therapy Goal  Goal: Occupational Therapy Goal  Description: Goals to be met by: 3/7/2022    Patient will increase functional independence with ADLs by performing:    UE Dressing with [place].  LE Dressing with [place].  Grooming while standing at sink with Supervision.  Toileting from toilet with Supervision for hygiene and clothing management.   Toilet transfer to toilet with Supervision.    Outcome: Ongoing, Progressing     Pt limited by fatigue but pleasant and participated well with encouragement.

## 2022-03-07 NOTE — PT/OT/SLP PROGRESS
Physical Therapy Treatment    Patient Name:  Forest Lopez   MRN:  [id number]    Recommendations:     Discharge Recommendations:  nursing facility, skilled   Discharge Equipment Recommendations: bedside commode, shower chair   Barriers to discharge: None    Assessment:     Forest Lopez is a 72 y.o. male admitted with a medical diagnosis of Debility.  He presents with the following impairments/functional limitations:  weakness, impaired endurance, gait instability, impaired balance, impaired functional mobilty, decreased safety awareness.    PT offered therapy session on patio for fresh air - pt declines but agreeable to ambulation on unit. Patient more interactive with environment including greeting people in nice and washing hands at sink in nice. Significant fatigue after gait bout concluded. Rec for dc to SNF with transition to NH - if unable to secure SNF placement, most appropriate for NH placement.    Rehab Prognosis: Fair; patient would benefit from acute skilled PT services to address these deficits and reach maximum level of function.    Recent Surgery: * No surgery found *      Plan:     During this hospitalization, patient to be seen 3 x/week to address the identified rehab impairments via gait training, therapeutic activities, therapeutic exercises, neuromuscular re-education and progress toward the following goals:    · Plan of Care Expires:  04/03/22    Subjective     Chief Complaint: Feeling tired after gait bout  Patient/Family Comments/goals: Goal to be less tired after therapy; Patient agreeable to PT treatment.  Pain/Comfort:  Pain Rating 1: 0/10  Pain Rating Post-Intervention 1: 0/10      Objective:     Communicated with MARIBEL Ruano prior to session.  Patient found HOB elevated with PICC line, Condom Catheter, bed alarm upon PT entry to room. PCT present finishing bed bath.     General Precautions: Standard, fall  Orthopedic Precautions:N/A   Braces: N/A  Respiratory Status: Room air     Patient  donned non slip socks and gait belt for OOB mobility. Patient donned mask for hallway ambulation.    Functional Mobility:  · Bed Mobility:     · Supine to Sit: SBA - use of hospital bed features  · Transfers:     · Sit to Stand: CGA with rolling walker  · 1x from EOB, 1x from bedside chair  · Gait: 4x50 ft with rollator with CGA with brief standing rest breaks between bouts  · Slow gait with shuffling steps - improved during gait bout  · Pt greeting people in nice on walk and requested to wash hands at hallway sink  · Balance:  · x1 min at sink with crouched posture and no UE support during hand hygiene task, no overt LOB      AM-PAC 6 CLICK MOBILITY  Turning over in bed (including adjusting bedclothes, sheets and blankets)?: 3  Sitting down on and standing up from a chair with arms (e.g., wheelchair, bedside commode, etc.): 3  Moving from lying on back to sitting on the side of the bed?: 3  Moving to and from a bed to a chair (including a wheelchair)?: 3  Need to walk in hospital room?: 3  Climbing 3-5 steps with a railing?: 3  Basic Mobility Total Score: 18       Therapeutic Activities and Exercises:  · Slow mobility requiring increased time for all tasks    Patient left up in chair with all lines intact, call button in reach, chair alarm on, MARIBEL Ruano notified and Crista telesitter and OT present.     GOALS:   Multidisciplinary Problems     Physical Therapy Goals        Problem: Physical Therapy Goal    Goal Priority Disciplines Outcome Goal Variances Interventions   Physical Therapy Goal     PT, PT/OT Ongoing, Progressing     Description: Goals to be met by: 4/3/2022    Patient will increase functional independence with mobility by performin. Sit<>stand with SPV with LRAD.  2. Gait x 300 feet with SPV with LRAD.   3. Static standing in SLS with no UE support with SBA x10 sec.                      Time Tracking:     PT Received On: 22  PT Start Time: 942     PT Stop Time: 1008  PT Total Time  (min): 26 min     Billable Minutes: Gait Training 15 and Therapeutic Activity 11    Treatment Type: Treatment  PT/PTA: PT     PTA Visit Number: 0     03/07/2022

## 2022-03-07 NOTE — PROGRESS NOTES
[facility], [place] [facility]  Progress Note    Patient Name: Forest Lopez  MRN: [id number]  Patient Class: IP- Inpatient   Admission Date: 1/31/2022  Length of Stay: 35 days  Attending Physician: Alma Elizalde MD  Primary Care Provider: Julian Escobar        Subjective:     Principal Problem:Debility        HPI:  Mr. Lopez is a 72 year old man who presented after a syncopal episode.  He reports he had been feeling well prior to the episode but then had a prodrome prior to passing out.  EMS was called, report patient's BP was low normal on their arrival, improved after an IV fluids bolus.  Patient denies chest pain or shortness of breath and says he does not feel weak currently although is rather tired.  When seen in the ED this morning he could barely express himself audibly.     Vital signs were normal on presentation here.  He is on warfarin for recurrent DVT, but his INR was 1, and d-dimer markedly elevated at 21.7.  Tox screen was positive for cocaine.  He had a CTA of the brain and neck done on presentation so CTA of the chest for PE study could not be done for 48 hours.  Ultrasound of the lower extremities showed extensive bilateral DVT.  On the right there was thrombosis of the common femoral vein, femoral vein, popliteal vein, one of the paired posterior tibial veins and both visualized peroneal veins.  On the left there was thrombosis of the common femoral vein, femoral vein and popliteal vein.  Patient was started on full dose Lovenox and admitted.    Medical history is from the chart as he is unable to give me much information.  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  He smokes 5-6 cigarettes/day and is not interested in quitting.  He is currently homeless and staying with a friend.  Despite the normal INR he is sure he is taking his  warfarin and is not having difficulty taking his medications.  I discussed his care with [facility] staff on the [facility] and patient had been lost to follow up since November 2021.      Overview/Hospital Course:  Patient presented to the ED after a syncopal episode and was found to have bilateral lower extremity DVTs and a low INR.  Tox screen was positive for cocaine.  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  He was started back on his warfarin with bridging Lovenox.  His 2D echo showed grade I diastolic dysfunction.  Neurology evaluated him and recommended echo with bubble study, which was negative for a shunt.    PT/OT evaluated him and recommended SNF placement as he was not participating in therapy.  His INR was difficult to get therapeutic, and as he had no contraindication to a NOAC his warfarin was changed to apixaban, and the full dose Lovenox was discontinued.  As he appeared to be depressed and had no objection to starting an antidepressant Lexapro was started.  He had a fall in the hospital on 2/13, had gone to the bathroom for a BM with the nurse, and when she turned away while he was washing his hands he apparently lost his balance and fell.  He did not hit his head and did not lose consciousness, but his BP was low transiently and he was given a bolus of IV fluids.    Patient's mental status improved slowly, but he gradually became more talkative and was able to hold a brief conversation.    Patient more lethargic on 2/21 with low grade fever and leukocytosis, work up looked like uti and started on abx, fluids.Completed treatment.    Psych consulted and meds changed to prozac.      Interval History: Doing ok, tolerating diet, answers questions, no new issues.    Review of Systems   Constitutional:  Negative for chills and fever.   Respiratory:  Negative for cough and shortness of breath.    Cardiovascular:   Negative for chest pain.   Gastrointestinal:  Negative for abdominal pain, nausea and vomiting.   Neurological:  Negative for headaches.   Objective:     Vital Signs (Most Recent):  Temp: 97.6 °F (36.4 °C) (03/07/22 1130)  Pulse: 94 (03/07/22 1130)  Resp: 12 (03/07/22 1130)  BP: 119/66 (03/07/22 1130)  SpO2: 98 % (03/07/22 1130)   Vital Signs (24h Range):  Temp:  [97.5 °F (36.4 °C)-98.6 °F (37 °C)] 97.6 °F (36.4 °C)  Pulse:  [68-94] 94  Resp:  [12-20] 12  SpO2:  [97 %-98 %] 98 %  BP: (116-133)/(63-78) 119/66     Weight: 63 kg (139 lb)  Body mass index is 18.85 kg/m².    Intake/Output Summary (Last 24 hours) at 3/7/2022 1148  Last data filed at 3/7/2022 0800  Gross per 24 hour   Intake 690 ml   Output 2176 ml   Net -1486 ml        Physical Exam  Vitals reviewed.   Constitutional:       General: He is not in acute distress.     Appearance: Normal appearance. He is well-developed.   HENT:      Head: Normocephalic and atraumatic.      Mouth/Throat:      Mouth: Mucous membranes are moist.   Eyes:      Extraocular Movements: Extraocular movements intact.      Pupils: Pupils are equal, round, and reactive to light.   Cardiovascular:      Rate and Rhythm: Normal rate and regular rhythm.      Pulses: Normal pulses.      Heart sounds: Normal heart sounds. No murmur heard.  Pulmonary:      Effort: Pulmonary effort is normal. No respiratory distress.      Breath sounds: Normal breath sounds.   Abdominal:      General: Abdomen is flat. Bowel sounds are normal. There is no distension.      Palpations: Abdomen is soft.      Tenderness: There is no abdominal tenderness.   Musculoskeletal:         General: No swelling. Normal range of motion.      Cervical back: Normal range of motion and neck supple.   Skin:     General: Skin is warm.      Findings: No rash.   Neurological:      General: No focal deficit present.      Mental Status: He is alert. Mental status is at baseline.      Comments: Oriented to person, place knows in  hospital   Psychiatric:      Comments: Flat affect       Significant Labs: All pertinent labs within the past 24 hours have been reviewed.    Significant Imaging: I have reviewed all pertinent imaging results/findings within the past 24 hours.      Assessment/Plan:      * Debility  Pt/ot efforts  Awaiting  Skilled placement, having difficulty, may need FDC     Abnormal imaging of thyroid  Tsh/t4 ok, thyroid ultrasound done, showed bilateral nodules which did not meet criteria for biopsy.    Acute stroke due to ischemia  - MRI showed areas of diffusion signal hyperintensity consistent with areas of acute ischemia/infarct involving the left cerebral hemisphere, the most prominent measuring approximately 1.4 cm, also a small focus of the right frontal lobe.  - Patient on full dose anticoagulation  - Sinus rhythm noted throughout hospital stay  - Risk factor modification - control diabetes, continue statin.  - RPR, HIV non reactive.  B12 low normal.   - CTA showed a focal segment of 60-70% stenosis involving the proximal to mid left vertebral artery that was new when compared to the prior study of 09/25/2019.  No evidence of large vessel intracranial occlusion.   - Neurology noted symptoms do not correspond to stroke location.  - Echo with bubble study done, no shunt seen.  - Follow up with vascular neurology in 4 weeks.  -Therapy recommending SNF due to his low interest in participation.  - Started Lexapro due to suspicion for depression.  Psych recs appreciated, lexapro switched to prozac, started thiamine, folic acid, mvi.      Advance care planning        Severe protein-calorie malnutrition  Diet as tolerated      Type 2 diabetes mellitus with hyperglycemia, without long-term current use of insulin  Reportedly he is on metformin and glipizide, both held.  Continue SSI for now.  He has 4+ sugar in urine and HbA1c is 10.3, and he is hyperglycemic here.  Will start levemir and continue ISS, increase levemir to 25  units daily  Add scheduled novolog 5 units tid, increase to 7 units  Improving  Resume metformin in SNF  Sugars high, increase levemir to 28 units daily    Acute deep vein thrombosis (DVT) of both femoral veins  Last ultrasound showed partially occlusive DVTs, now completely occlusive DVT of multiple lower extremity veins on ultrasound.  Patient reports compliance with warfarin but his INR is only one.  He is homeless but says he does not have difficulty obtaining his medications.  Does not seem to care about anything.  D-dimer was markedly elevated on presentation and there was a question of possible PE, but he had a CTA of the brain/neck on presentation so could not get another CTA for another 48 hours.    V/Q scan was done, showed low probability for PE.  2D echo showed grade I diastolic dysfunction.  Warfarin was restarted with bridging Lovenox, but INR was still low.  Given the difficulty of getting him to follow up changed to apixaban and discontinued Lovenox.    Syncope and collapse  Unclear cause.  Spoke to [facility] and patient has had previous syncopal episodes attributed to alcohol abuse.  Has been prescribed meclizine as well.  Tox positive for cocaine but not alcohol.  CTA brain/neck was done in ED, and per ED note the results were discussed with stroke neurologist Dr. Tovar who feels that CTA finding of a short segment of left vertebral artery stenosis was incidental.  He did not think stroke workup with MRI was indicated.    Since change of status per son compared to when he saw him early during the week a CT of the brain was ordered and no abnormalities seen.  MRI of the brain showed acute stroke .      Cocaine abuse  As noted above.  He has not had any behavioral issues while here, and other than being disinterested in therapy he has been very cooperative.  Seems drug use was transient.      Hyperlipidemia  Resumed lipitor      Essential hypertension  Doubtful he was on anything at home.  Started  losartan 25 mg daily  BP well controlled on low dose losartan.  BP transiently low today after he fell but recovered quickly.  IVF bolus given.  Will hold losartan as bp low again  bp ok without med    Tobacco dependence  He smokes 5-6 cigarettes/day.  Not trying to quit and not interested in a nicotine patch.  Benefits of smoking cessation were reviewed with patient in detail for for 8 minutes and patient was encouraged to quit. Nicotine replacement options were discussed, not needed.        VTE Risk Mitigation (From admission, onward)         Ordered     apixaban tablet 5 mg  2 times daily         02/10/22 0901     Place sequential compression device  Until discontinued         01/31/22 0533                Discharge Planning   EFRAÍN:      Code Status: Full Code   Is the patient medically ready for discharge?:     Reason for patient still in hospital (select all that apply): Pending disposition.  Discharge Plan A: Skilled Nursing Facility   Discharge Delays: (!) Post-Acute Set-up              Alma Elizalde MD  Department of Hospital Medicine   [facility] - Med Surg ([place])

## 2022-03-07 NOTE — ASSESSMENT & PLAN NOTE
Reportedly he is on metformin and glipizide, both held.  Continue SSI for now.  He has 4+ sugar in urine and HbA1c is 10.3, and he is hyperglycemic here.  Will start levemir and continue ISS, increase levemir to 25 units daily  Add scheduled novolog 5 units tid, increase to 7 units  Improving  Resume metformin in SNF  Sugars high, increase levemir to 28 units daily

## 2022-03-07 NOTE — SUBJECTIVE & OBJECTIVE
Interval History: Doing ok, tolerating diet, answers questions, no new issues.    Review of Systems   Constitutional:  Negative for chills and fever.   Respiratory:  Negative for cough and shortness of breath.    Cardiovascular:  Negative for chest pain.   Gastrointestinal:  Negative for abdominal pain, nausea and vomiting.   Neurological:  Negative for headaches.   Objective:     Vital Signs (Most Recent):  Temp: 97.6 °F (36.4 °C) (03/07/22 1130)  Pulse: 94 (03/07/22 1130)  Resp: 12 (03/07/22 1130)  BP: 119/66 (03/07/22 1130)  SpO2: 98 % (03/07/22 1130)   Vital Signs (24h Range):  Temp:  [97.5 °F (36.4 °C)-98.6 °F (37 °C)] 97.6 °F (36.4 °C)  Pulse:  [68-94] 94  Resp:  [12-20] 12  SpO2:  [97 %-98 %] 98 %  BP: (116-133)/(63-78) 119/66     Weight: 63 kg (139 lb)  Body mass index is 18.85 kg/m².    Intake/Output Summary (Last 24 hours) at 3/7/2022 1148  Last data filed at 3/7/2022 0800  Gross per 24 hour   Intake 690 ml   Output 2176 ml   Net -1486 ml        Physical Exam  Vitals reviewed.   Constitutional:       General: He is not in acute distress.     Appearance: Normal appearance. He is well-developed.   HENT:      Head: Normocephalic and atraumatic.      Mouth/Throat:      Mouth: Mucous membranes are moist.   Eyes:      Extraocular Movements: Extraocular movements intact.      Pupils: Pupils are equal, round, and reactive to light.   Cardiovascular:      Rate and Rhythm: Normal rate and regular rhythm.      Pulses: Normal pulses.      Heart sounds: Normal heart sounds. No murmur heard.  Pulmonary:      Effort: Pulmonary effort is normal. No respiratory distress.      Breath sounds: Normal breath sounds.   Abdominal:      General: Abdomen is flat. Bowel sounds are normal. There is no distension.      Palpations: Abdomen is soft.      Tenderness: There is no abdominal tenderness.   Musculoskeletal:         General: No swelling. Normal range of motion.      Cervical back: Normal range of motion and neck supple.    Skin:     General: Skin is warm.      Findings: No rash.   Neurological:      General: No focal deficit present.      Mental Status: He is alert. Mental status is at baseline.      Comments: Oriented to person, place knows in hospital   Psychiatric:      Comments: Flat affect       Significant Labs: All pertinent labs within the past 24 hours have been reviewed.    Significant Imaging: I have reviewed all pertinent imaging results/findings within the past 24 hours.

## 2022-03-08 LAB
POCT GLUCOSE: 164 MG/DL (ref 70–110)
POCT GLUCOSE: 169 MG/DL (ref 70–110)
POCT GLUCOSE: 194 MG/DL (ref 70–110)
POCT GLUCOSE: 207 MG/DL (ref 70–110)

## 2022-03-08 PROCEDURE — 99232 PR SUBSEQUENT HOSPITAL CARE,LEVL II: ICD-10-PCS | Mod: ,,, | Performed by: HOSPITALIST

## 2022-03-08 PROCEDURE — 25000003 PHARM REV CODE 250: Performed by: HOSPITALIST

## 2022-03-08 PROCEDURE — 25000003 PHARM REV CODE 250: Performed by: NURSE PRACTITIONER

## 2022-03-08 PROCEDURE — 99232 SBSQ HOSP IP/OBS MODERATE 35: CPT | Mod: ,,, | Performed by: HOSPITALIST

## 2022-03-08 PROCEDURE — 94761 N-INVAS EAR/PLS OXIMETRY MLT: CPT

## 2022-03-08 PROCEDURE — 92526 ORAL FUNCTION THERAPY: CPT

## 2022-03-08 PROCEDURE — 63600175 PHARM REV CODE 636 W HCPCS: Performed by: INTERNAL MEDICINE

## 2022-03-08 PROCEDURE — 25000003 PHARM REV CODE 250: Performed by: INTERNAL MEDICINE

## 2022-03-08 PROCEDURE — 11000001 HC ACUTE MED/SURG PRIVATE ROOM

## 2022-03-08 PROCEDURE — 92507 TX SP LANG VOICE COMM INDIV: CPT

## 2022-03-08 PROCEDURE — A4216 STERILE WATER/SALINE, 10 ML: HCPCS | Performed by: INTERNAL MEDICINE

## 2022-03-08 RX ADMIN — FLUOXETINE 20 MG: 20 CAPSULE ORAL at 08:03

## 2022-03-08 RX ADMIN — INSULIN DETEMIR 28 UNITS: 100 INJECTION, SOLUTION SUBCUTANEOUS at 08:03

## 2022-03-08 RX ADMIN — INSULIN ASPART 2 UNITS: 100 INJECTION, SOLUTION INTRAVENOUS; SUBCUTANEOUS at 05:03

## 2022-03-08 RX ADMIN — Medication 10 ML: at 11:03

## 2022-03-08 RX ADMIN — FOLIC ACID 1 MG: 1 TABLET ORAL at 08:03

## 2022-03-08 RX ADMIN — Medication 10 ML: at 12:03

## 2022-03-08 RX ADMIN — INSULIN ASPART 7 UNITS: 100 INJECTION, SOLUTION INTRAVENOUS; SUBCUTANEOUS at 05:03

## 2022-03-08 RX ADMIN — APIXABAN 5 MG: 2.5 TABLET, FILM COATED ORAL at 08:03

## 2022-03-08 RX ADMIN — INSULIN ASPART 7 UNITS: 100 INJECTION, SOLUTION INTRAVENOUS; SUBCUTANEOUS at 08:03

## 2022-03-08 RX ADMIN — Medication 10 ML: at 05:03

## 2022-03-08 RX ADMIN — ATORVASTATIN CALCIUM 80 MG: 20 TABLET, FILM COATED ORAL at 08:03

## 2022-03-08 RX ADMIN — Medication 10 ML: at 06:03

## 2022-03-08 RX ADMIN — INSULIN ASPART 7 UNITS: 100 INJECTION, SOLUTION INTRAVENOUS; SUBCUTANEOUS at 12:03

## 2022-03-08 RX ADMIN — TAMSULOSIN HYDROCHLORIDE 0.4 MG: 0.4 CAPSULE ORAL at 08:03

## 2022-03-08 RX ADMIN — THERA TABS 1 TABLET: TAB at 08:03

## 2022-03-08 RX ADMIN — THIAMINE HCL TAB 100 MG 100 MG: 100 TAB at 08:03

## 2022-03-08 NOTE — PLAN OF CARE
POC discussed with patient. Verbalizes understanding. Alert and oriented x3. Easily reoriented. Pleasantly confused. Blood glucose monitored. SSI given per MAR. Condom catheter in place. Avasys at bedside. No falls, accidents, or traumas at this time. RA. PICC line dressing clean, dry, and intact. No PRNS given this shift. Bed in lowest position, SR up x 2 for patient safety/mobility. VSS. Call light in reach. Will continue to monitor until report is given to oncoming nurse.

## 2022-03-08 NOTE — PT/OT/SLP PROGRESS
Physical Therapy      Patient Name:  Forest Lopez   MRN:  [id number]    Patient not seen today secondary to Patient unwilling to participate at this time. Will follow-up next treatment day.

## 2022-03-08 NOTE — SUBJECTIVE & OBJECTIVE
Interval History:   Patient has no complaints.  He smiles, converses briefly but is not very talkative (appears at baseline).    Review of Systems   Constitutional:  Negative for chills and fever.   Respiratory:  Negative for cough and shortness of breath.    Cardiovascular:  Negative for chest pain and palpitations.   Objective:     Vital Signs (Most Recent):  Temp: 98.1 °F (36.7 °C) (03/08/22 0715)  Pulse: 74 (03/08/22 0715)  Resp: 17 (03/08/22 0715)  BP: 122/66 (03/08/22 0715)  SpO2: 97 % (03/08/22 0715) Vital Signs (24h Range):  Temp:  [97.6 °F (36.4 °C)-98.7 °F (37.1 °C)] 98.1 °F (36.7 °C)  Pulse:  [74-96] 74  Resp:  [12-20] 17  SpO2:  [97 %-99 %] 97 %  BP: (119-131)/(66-83) 122/66     Weight: 63 kg (139 lb)  Body mass index is 18.85 kg/m².    Intake/Output Summary (Last 24 hours) at 3/8/2022 1046  Last data filed at 3/8/2022 0700  Gross per 24 hour   Intake --   Output 1300 ml   Net -1300 ml      Physical Exam  Constitutional:       General: He is not in acute distress.     Comments: Thin   HENT:      Mouth/Throat:      Comments: Edentulous  Cardiovascular:      Rate and Rhythm: Normal rate and regular rhythm.      Pulses: Normal pulses.      Heart sounds: Normal heart sounds. No murmur heard.    No gallop.   Pulmonary:      Effort: Pulmonary effort is normal.      Breath sounds: Normal breath sounds.   Abdominal:      General: Bowel sounds are normal.      Palpations: Abdomen is soft.   Skin:     General: Skin is warm and dry.   Neurological:      Mental Status: He is alert.      Comments: Not oriented to time, more conversant today.       Significant Labs: All pertinent labs within the past 24 hours have been reviewed.    Significant Imaging: I have reviewed all pertinent imaging results/findings within the past 24 hours.

## 2022-03-08 NOTE — ASSESSMENT & PLAN NOTE
Reportedly he is on metformin and glipizide, both held.  Continue SSI for now.  He has 4+ sugar in urine and HbA1c is 10.3, and he is hyperglycemic here.  Will start levemir and continue ISS, increase levemir to 25 units daily  Add scheduled novolog 5 units tid, increase to 7 units  Improving  Resume metformin in SNF  Sugars high, increased levemir to 28 units daily

## 2022-03-08 NOTE — ASSESSMENT & PLAN NOTE
PT/OT recommending SNF after stroke with debility/poor motivation  Awaiting SNF, having difficulty finding placement for unclear reasons.

## 2022-03-08 NOTE — ASSESSMENT & PLAN NOTE
Doubtful he was on anything at home.  Started losartan 25 mg daily  BP well controlled on low dose losartan.  BP transiently low after he fell but recovered quickly.  IVF bolus given.  Losartan discontinued as BP persistently low-normal.

## 2022-03-08 NOTE — ASSESSMENT & PLAN NOTE
- MRI showed areas of diffusion signal hyperintensity consistent with areas of acute ischemia/infarct involving the left cerebral hemisphere, the most prominent measuring approximately 1.4 cm, also a small focus of the right frontal lobe.  - Patient on full dose anticoagulation  - Sinus rhythm noted throughout hospital stay  - Risk factor modification - control diabetes, continue statin.  - RPR, HIV non reactive.  B12 low normal.   - CTA showed a focal segment of 60-70% stenosis involving the proximal to mid left vertebral artery that was new when compared to the prior study of 09/25/2019.  No evidence of large vessel intracranial occlusion.   - Neurology noted symptoms do not correspond to stroke location.  - Echo with bubble study done, no shunt seen.  - Follow up with vascular neurology in 4 weeks.  -Therapy recommending SNF due to his low interest in participation.  - Started Lexapro due to suspicion for depression.  - Psych consulted, changed to Prozac and started thiamine, folic acid, MVI due to previous history of alcohol abuse.  Note - symptoms not consistent with Wernicke.

## 2022-03-08 NOTE — PLAN OF CARE
03/07/22 1801   Discharge Reassessment   Assessment Type Discharge Planning Assessment   Did the patient's condition or plan change since previous assessment? No   Discharge Plan discussed with: Adult children   Communicated EFRAÍN with patient/caregiver Yes   Discharge Plan A Skilled Nursing Facility   Discharge Plan B Group home   DME Needed Upon Discharge  none   Discharge Barriers Identified Nursing Home rejection   Post-Acute Status   Coverage [employer] MANAGED MEDICARE - [employer] SNP (SPECIAL NEEDS PLAN)   Discharge Delays (!) Post-Acute Set-up     SW contacted the patient's son, Forest Lopez   to discuss his father's d/c plans. He was very upset that his father was not accepted in any facility in the area. SW wanted to discuss a possible group home placement with him. He did not like the idea as he didn't really understand that there are group homes especially for the elderly that provide care and ADL assistance. He stated that he really didn't want his father to go out of [place] because he would not be able to see his family. He would be too far away to visit. SHANICE stated that she understood this was not optimal, but that the options are limited. He asked about having his dad go to a facility near [place], [place], as that is where he now lives. He asked if the hospital would pay to transport him there. SW stated that she would look into the idea of placement near his son's home. SHANICE did not state that the hospital would pay for transportation.  SW agreed to keep him informed.

## 2022-03-08 NOTE — PROGRESS NOTES
[facility] Medicine  Progress Note    Patient Name: Forest Lopez  MRN: [id number]  Patient Class: IP- Inpatient   Admission Date: 1/31/2022  Length of Stay: 36 days  Attending Physician: Mojgan Bosch MD  Primary Care Provider: Julian Escobar        Subjective:     Principal Problem:Debility        HPI:  Mr. Lopez is a 72 year old man who presented after a syncopal episode.  He reports he had been feeling well prior to the episode but then had a prodrome prior to passing out.  EMS was called, report patient's BP was low normal on their arrival, improved after an IV fluids bolus.  Patient denies chest pain or shortness of breath and says he does not feel weak currently although is rather tired.  When seen in the ED this morning he could barely express himself audibly.     Vital signs were normal on presentation here.  He is on warfarin for recurrent DVT, but his INR was 1, and d-dimer markedly elevated at 21.7.  Tox screen was positive for cocaine.  He had a CTA of the brain and neck done on presentation so CTA of the chest for PE study could not be done for 48 hours.  Ultrasound of the lower extremities showed extensive bilateral DVT.  On the right there was thrombosis of the common femoral vein, femoral vein, popliteal vein, one of the paired posterior tibial veins and both visualized peroneal veins.  On the left there was thrombosis of the common femoral vein, femoral vein and popliteal vein.  Patient was started on full dose Lovenox and admitted.    Medical history is from the chart as he is unable to give me much information.  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  He smokes 5-6 cigarettes/day and is not interested in quitting.  He is currently homeless and staying with a friend.  Despite the normal INR he is sure he is taking his  warfarin and is not having difficulty taking his medications.  I discussed his care with [facility] staff on the [facility] and patient had been lost to follow up since November 2021.      Overview/Hospital Course:  Patient presented to the ED after a syncopal episode and was found to have bilateral lower extremity DVTs and a low INR.  Tox screen was positive for cocaine.  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  He was started back on his warfarin with bridging Lovenox.  His 2D echo showed grade I diastolic dysfunction.  Neurology evaluated him and recommended echo with bubble study, which was negative for a shunt.    PT/OT evaluated him and recommended SNF placement.  His INR was difficult to get therapeutic, and as he had no contraindication to a NOAC his warfarin was changed to apixaban, and the full dose Lovenox was discontinued.  As he appeared to be depressed and had no objection to starting an antidepressant Lexapro was started.  He had a fall in the hospital on 2/13, had gone to the bathroom for a BM with the nurse, and when she turned away while he was washing his hands he apparently lost his balance and fell.  He did not hit his head and did not lose consciousness, but his BP was low transiently and he was given a bolus of IV fluids.    Patient's mental status improved slowly, but he gradually became more talkative and was able to hold a brief conversation.  He was diagnosed with a polymicrobial UTI on 2/21 and completed treatment with antibiotics.  Psychiatry evaluated him on 3/3 and changed his antidepressant to Prozac as it can be more activating and patient was having difficulty with his level of motivation.        Interval History:   Patient has no complaints.  He smiles, converses briefly but is not very talkative (appears at baseline).    Review of Systems   Constitutional:  Negative for chills and fever.    Respiratory:  Negative for cough and shortness of breath.    Cardiovascular:  Negative for chest pain and palpitations.   Objective:     Vital Signs (Most Recent):  Temp: 98.1 °F (36.7 °C) (03/08/22 0715)  Pulse: 74 (03/08/22 0715)  Resp: 17 (03/08/22 0715)  BP: 122/66 (03/08/22 0715)  SpO2: 97 % (03/08/22 0715) Vital Signs (24h Range):  Temp:  [97.6 °F (36.4 °C)-98.7 °F (37.1 °C)] 98.1 °F (36.7 °C)  Pulse:  [74-96] 74  Resp:  [12-20] 17  SpO2:  [97 %-99 %] 97 %  BP: (119-131)/(66-83) 122/66     Weight: 63 kg (139 lb)  Body mass index is 18.85 kg/m².    Intake/Output Summary (Last 24 hours) at 3/8/2022 1046  Last data filed at 3/8/2022 0700  Gross per 24 hour   Intake --   Output 1300 ml   Net -1300 ml      Physical Exam  Constitutional:       General: He is not in acute distress.     Comments: Thin   HENT:      Mouth/Throat:      Comments: Edentulous  Cardiovascular:      Rate and Rhythm: Normal rate and regular rhythm.      Pulses: Normal pulses.      Heart sounds: Normal heart sounds. No murmur heard.    No gallop.   Pulmonary:      Effort: Pulmonary effort is normal.      Breath sounds: Normal breath sounds.   Abdominal:      General: Bowel sounds are normal.      Palpations: Abdomen is soft.   Skin:     General: Skin is warm and dry.   Neurological:      Mental Status: He is alert.      Comments: Not oriented to time, more conversant today.       Significant Labs: All pertinent labs within the past 24 hours have been reviewed.    Significant Imaging: I have reviewed all pertinent imaging results/findings within the past 24 hours.      Assessment/Plan:      * Debility  PT/OT recommending SNF after stroke with debility/poor motivation  Awaiting SNF, having difficulty finding placement for unclear reasons.    Acute deep vein thrombosis (DVT) of both femoral veins  Last ultrasound showed partially occlusive DVTs, now completely occlusive DVT of multiple lower extremity veins on ultrasound.  Patient reports  compliance with warfarin but his INR is only one.  He is homeless but says he does not have difficulty obtaining his medications.  Does not seem to care about anything.  D-dimer was markedly elevated on presentation and there was a question of possible PE, but he had a CTA of the brain/neck on presentation so could not get another CTA for another 48 hours.    V/Q scan was done, showed low probability for PE.  2D echo showed grade I diastolic dysfunction.  Warfarin was restarted with bridging Lovenox, but INR was still low.  Given the difficulty of getting him to follow up changed to apixaban and discontinued Lovenox.    Acute stroke due to ischemia  - MRI showed areas of diffusion signal hyperintensity consistent with areas of acute ischemia/infarct involving the left cerebral hemisphere, the most prominent measuring approximately 1.4 cm, also a small focus of the right frontal lobe.  - Patient on full dose anticoagulation  - Sinus rhythm noted throughout hospital stay  - Risk factor modification - control diabetes, continue statin.  - RPR, HIV non reactive.  B12 low normal.   - CTA showed a focal segment of 60-70% stenosis involving the proximal to mid left vertebral artery that was new when compared to the prior study of 09/25/2019.  No evidence of large vessel intracranial occlusion.   - Neurology noted symptoms do not correspond to stroke location.  - Echo with bubble study done, no shunt seen.  - Follow up with vascular neurology in 4 weeks.  -Therapy recommending SNF due to his low interest in participation.  - Started Lexapro due to suspicion for depression.  - Psych consulted, changed to Prozac and started thiamine, folic acid, MVI due to previous history of alcohol abuse.  Note - symptoms not consistent with Wernicke.      Abnormal imaging of thyroid  Tsh/t4 ok, thyroid ultrasound done, showed bilateral nodules which did not meet criteria for biopsy.    Advance care planning        Severe protein-calorie  malnutrition  Diet as tolerated      Type 2 diabetes mellitus with hyperglycemia, without long-term current use of insulin  Reportedly he is on metformin and glipizide, both held.  Continue SSI for now.  He has 4+ sugar in urine and HbA1c is 10.3, and he is hyperglycemic here.  Will start levemir and continue ISS, increase levemir to 25 units daily  Add scheduled novolog 5 units tid, increase to 7 units  Improving  Resume metformin in SNF  Sugars high, increased levemir to 28 units daily    Syncope and collapse  Unclear cause.  Spoke to [facility] and patient has had previous syncopal episodes attributed to alcohol abuse.  Has been prescribed meclizine as well.  Tox positive for cocaine but not alcohol.  CTA brain/neck was done in ED, and per ED note the results were discussed with stroke neurologist Dr. Tovar who feels that CTA finding of a short segment of left vertebral artery stenosis was incidental.  He did not think stroke workup with MRI was indicated.    Since change of status per son compared to when he saw him early during the week a CT of the brain was ordered and no abnormalities seen.  MRI of the brain showed acute stroke .      Cocaine abuse  As noted above.  He has not had any behavioral issues while here, and other than being disinterested in therapy he has been very cooperative.  Psychiatry evaluated him 3/3, did not recommend PEC, might benefit from outpatient treatment.    Hyperlipidemia  Resumed lipitor      Essential hypertension  Doubtful he was on anything at home.  Started losartan 25 mg daily  BP well controlled on low dose losartan.  BP transiently low after he fell but recovered quickly.  IVF bolus given.  Losartan discontinued as BP persistently low-normal.    Tobacco dependence  He smokes 5-6 cigarettes/day.  Not trying to quit and not interested in a nicotine patch.  Benefits of smoking cessation were reviewed with patient in detail for for 8 minutes and patient was encouraged to quit.  Nicotine replacement options were discussed, not needed.        VTE Risk Mitigation (From admission, onward)         Ordered     apixaban tablet 5 mg  2 times daily         02/10/22 0901     Place sequential compression device  Until discontinued         01/31/22 0572                  Mojgan Meza MD  Department of Hospital Medicine   [facility] [place] [facility] ([place])

## 2022-03-08 NOTE — PLAN OF CARE
POC discussed with patient. Verbalizes understanding. Alert and oriented to person and place. Easily reoriented. Pleasantly confused. Blood glucose monitored. SSI given per MAR. Condom catheter in place. BM x 1 this shift. Avasys at bedside. Ambulated in nice and room with PT assistance. No falls, accidents, or traumas at this time. RA. PICC line dressing clean, dry, and intact. No PRNS given this shift. Bed in lowest position, SR up x 2 for patient safety/mobility. VSS. Call light in reach. Will continue to monitor until report is given to oncoming nurse.

## 2022-03-08 NOTE — ASSESSMENT & PLAN NOTE
Unclear cause.  Spoke to [facility] and patient has had previous syncopal episodes attributed to alcohol abuse.  Has been prescribed meclizine as well.  Tox positive for cocaine but not alcohol.  CTA brain/neck was done in ED, and per ED note the results were discussed with stroke neurologist Dr. Tovar who feels that CTA finding of a short segment of left vertebral artery stenosis was incidental.  He did not think stroke workup with MRI was indicated.    Since change of status per son compared to when he saw him early during the week a CT of the brain was ordered and no abnormalities seen.  MRI of the brain showed acute stroke .

## 2022-03-08 NOTE — ASSESSMENT & PLAN NOTE
As noted above.  He has not had any behavioral issues while here, and other than being disinterested in therapy he has been very cooperative.  Psychiatry evaluated him 3/3, did not recommend PEC, might benefit from outpatient treatment.

## 2022-03-08 NOTE — PT/OT/SLP PROGRESS
Speech Language Pathology Treatment    Patient Name:  Forest Lopez   MRN:  [id number]  Admitting Diagnosis: Debility    Recommendations:                  General Recommendations:   1. Speech pathology to continue to follow 2-3x/week for ongoing assessment of cognitive-communicative deficits s/p acute infarcts of L cerebral hemisphere     Diet recommendations: Solids: Mechanical soft solids, Liquids: Thin      Aspiration Precautions: Eliminate distractions, Feed only when awake/alert, Frequent oral care and HOB to 90 degrees      General Precautions: Standard,       Communication strategies:  Reduce informational content/context; frequent repetition and cues to redirect    Subjective     · Pt awake/alert, sitting upright in bed.  · Feeding self lunch.     Respiratory Status: Room air    Objective:     Has the patient been evaluated by SLP for swallowing?   Yes  Keep patient NPO? No   Current Respiratory Status:    Room air    Cognitive-Communicative Status:  Pt oriented to person, place, and general situation. Able to state date with MAX verbal cues. Pt continues to require MAX verbal cues and repetition of stimuli to elicit response during structured activity. Dcr initiation of task with pt unable to carry conversation this date. Required incr time for processing during more abstract thought process. Frequently closing eyes or diverting attention when asked higher level questions.     Swallowing:  Pt self-feeding mechanical soft solids lunch tray. SLP completed ongoing assessment of swallow function.     Oral Phase:  · WFL for labial seal, bolus prep/mastication, and a-p transport  · Required incr time for chewing due to pt being edentulous    Pharyngeal Phase:  · Trigger of the pharyngeal swallow appears to be somewhat delayed  · Noted x1 throat clear following bolus of mechanical soft solids  · No further overt s/s of aspiration or airway threat- no coughing, choking, changes in breath stream or vocal  quality    Education: Pt would benefit from ongoing SLP services at next level of care.     Assessment:     Forest Lopez is a 72 y.o. 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    Goals:   Multidisciplinary Problems     SLP Goals        Problem: SLP Goal    Goal Priority Disciplines Outcome   SLP Goal     SLP Ongoing, Progressing   Description: 1. Pt will consume a regular/thin diet without overt s/s of aspiration or airway threat without assistance.  2. Pt will increase orientation to person, place, situation and date with 90% acc given min assist.  3. Pt will follow 3-4 step commands to increase functional IND with 75% acc given min assist.   4. Pt will complete immediate and delayed recall tasks with 75% acc given mod assist.  5. Targeting word finding, pt will complete divergent naming tasks given 1-minute time frame with 50% acc given mod assist.  6. Ongoing cognitive-communicative assessment.     Updated Goals 2/8:  7. Pt will sustain attention to topic/task without distraction in 5-10 minute increments given mod verbal cues.                   Plan:     · Patient to be seen:  2 x/week, 3 x/week   · Plan of Care expires:  03/21/22  · Plan of Care reviewed with:  patient   · SLP Follow-Up:  Yes       Discharge recommendations:  nursing facility, skilled     Time Tracking:     SLP Treatment Date:   03/08/22  Speech Start Time:  1335  Speech Stop Time:  1355     Speech Total Time (min):  20 min    Billable Minutes: Speech Therapy Individual 10 min; treatment swallowing dysfunction 10 min    03/08/2022

## 2022-03-08 NOTE — PLAN OF CARE
Problem: Adult Inpatient Plan of Care  Goal: Plan of Care Review  Outcome: Ongoing, Progressing     Problem: Adult Inpatient Plan of Care  Goal: Patient-Specific Goal (Individualized)  Outcome: Ongoing, Progressing     Problem: Adult Inpatient Plan of Care  Goal: Absence of Hospital-Acquired Illness or Injury  Outcome: Ongoing, Progressing     Problem: Adult Inpatient Plan of Care  Goal: Optimal Comfort and Wellbeing  Outcome: Ongoing, Progressing     Problem: Adult Inpatient Plan of Care  Goal: Readiness for Transition of Care  Outcome: Ongoing, Progressing     Problem: Diabetes Comorbidity  Goal: Blood Glucose Level Within Targeted Range  Outcome: Ongoing, Progressing     Problem: Skin Injury Risk Increased  Goal: Skin Health and Integrity  Outcome: Ongoing, Progressing     Problem: Coping Ineffective  Goal: Effective Coping  Outcome: Ongoing, Progressing     Problem: Fall Injury Risk  Goal: Absence of Fall and Fall-Related Injury  Outcome: Ongoing, Progressing     Problem: Adjustment to Illness (Sepsis/Septic Shock)  Goal: Optimal Coping  Outcome: Ongoing, Progressing     Problem: Bleeding (Sepsis/Septic Shock)  Goal: Absence of Bleeding  Outcome: Ongoing, Progressing     Problem: Glycemic Control Impaired (Sepsis/Septic Shock)  Goal: Blood Glucose Level Within Desired Range  Outcome: Ongoing, Progressing     Problem: Infection Progression (Sepsis/Septic Shock)  Goal: Absence of Infection Signs and Symptoms  Outcome: Ongoing, Progressing     Problem: Nutrition Impaired (Sepsis/Septic Shock)  Goal: Optimal Nutrition Intake  Outcome: Ongoing, Progressing     Problem: Infection  Goal: Absence of Infection Signs and Symptoms  Outcome: Ongoing, Progressing

## 2022-03-09 LAB
POCT GLUCOSE: 155 MG/DL (ref 70–110)
POCT GLUCOSE: 157 MG/DL (ref 70–110)
POCT GLUCOSE: 234 MG/DL (ref 70–110)

## 2022-03-09 PROCEDURE — 99232 PR SUBSEQUENT HOSPITAL CARE,LEVL II: ICD-10-PCS | Mod: ,,, | Performed by: HOSPITALIST

## 2022-03-09 PROCEDURE — 25000003 PHARM REV CODE 250: Performed by: INTERNAL MEDICINE

## 2022-03-09 PROCEDURE — 25000003 PHARM REV CODE 250: Performed by: NURSE PRACTITIONER

## 2022-03-09 PROCEDURE — 97530 THERAPEUTIC ACTIVITIES: CPT

## 2022-03-09 PROCEDURE — A4216 STERILE WATER/SALINE, 10 ML: HCPCS | Performed by: INTERNAL MEDICINE

## 2022-03-09 PROCEDURE — 94761 N-INVAS EAR/PLS OXIMETRY MLT: CPT

## 2022-03-09 PROCEDURE — 99232 SBSQ HOSP IP/OBS MODERATE 35: CPT | Mod: ,,, | Performed by: HOSPITALIST

## 2022-03-09 PROCEDURE — 25000003 PHARM REV CODE 250: Performed by: HOSPITALIST

## 2022-03-09 PROCEDURE — 11000001 HC ACUTE MED/SURG PRIVATE ROOM

## 2022-03-09 PROCEDURE — 36406 VNPNXR<3YRS PHY/QHP OTHER VN: CPT

## 2022-03-09 RX ORDER — METFORMIN HYDROCHLORIDE 500 MG/1
500 TABLET ORAL 2 TIMES DAILY WITH MEALS
Status: DISCONTINUED | OUTPATIENT
Start: 2022-03-09 | End: 2022-03-20

## 2022-03-09 RX ADMIN — FLUOXETINE 20 MG: 20 CAPSULE ORAL at 08:03

## 2022-03-09 RX ADMIN — INSULIN ASPART 7 UNITS: 100 INJECTION, SOLUTION INTRAVENOUS; SUBCUTANEOUS at 05:03

## 2022-03-09 RX ADMIN — FOLIC ACID 1 MG: 1 TABLET ORAL at 08:03

## 2022-03-09 RX ADMIN — INSULIN ASPART 7 UNITS: 100 INJECTION, SOLUTION INTRAVENOUS; SUBCUTANEOUS at 08:03

## 2022-03-09 RX ADMIN — METFORMIN HYDROCHLORIDE 500 MG: 500 TABLET ORAL at 08:03

## 2022-03-09 RX ADMIN — Medication 10 ML: at 11:03

## 2022-03-09 RX ADMIN — TAMSULOSIN HYDROCHLORIDE 0.4 MG: 0.4 CAPSULE ORAL at 08:03

## 2022-03-09 RX ADMIN — Medication 10 ML: at 05:03

## 2022-03-09 RX ADMIN — INSULIN DETEMIR 28 UNITS: 100 INJECTION, SOLUTION SUBCUTANEOUS at 08:03

## 2022-03-09 RX ADMIN — APIXABAN 5 MG: 2.5 TABLET, FILM COATED ORAL at 08:03

## 2022-03-09 RX ADMIN — METFORMIN HYDROCHLORIDE 500 MG: 500 TABLET ORAL at 05:03

## 2022-03-09 RX ADMIN — INSULIN ASPART 2 UNITS: 100 INJECTION, SOLUTION INTRAVENOUS; SUBCUTANEOUS at 12:03

## 2022-03-09 RX ADMIN — INSULIN ASPART 7 UNITS: 100 INJECTION, SOLUTION INTRAVENOUS; SUBCUTANEOUS at 12:03

## 2022-03-09 RX ADMIN — ATORVASTATIN CALCIUM 80 MG: 20 TABLET, FILM COATED ORAL at 08:03

## 2022-03-09 RX ADMIN — THERA TABS 1 TABLET: TAB at 08:03

## 2022-03-09 RX ADMIN — THIAMINE HCL TAB 100 MG 100 MG: 100 TAB at 08:03

## 2022-03-09 RX ADMIN — Medication 10 ML: at 12:03

## 2022-03-09 NOTE — ASSESSMENT & PLAN NOTE
Unclear cause.  Spoke to [facility] and patient has had previous syncopal episodes attributed to alcohol abuse.  Has been prescribed meclizine as well.  Tox positive for cocaine but not alcohol, and he denies current use of both.  CTA brain/neck was done in ED, and per ED note the results were discussed with stroke neurologist Dr. Tovar who mary ellen that CTA finding of a short segment of left vertebral artery stenosis was incidental.  He did not think stroke workup with MRI was indicated.    Since change of status per son compared to when he saw him early during the week a CT of the brain was ordered and no abnormalities seen.  MRI of the brain showed acute strokes of the left cerebral hemisphere and right frontal lobe.

## 2022-03-09 NOTE — PROGRESS NOTES
[facility] - Med Surg ([place])  Adult Nutrition  Progress Note    SUMMARY       Recommendations  1.Update weight, please.  2.Continue mech soft consistent carb diet as tolerated.    -continue Boost Plus TID.   3.Encourage good PO intake as needed    Goals: Pt to meet % EEN/EPN via PO intake by RD f/u.  Nutrition Goal Status: goal met  Communication of RD Recs:  (POC)    Assessment and Plan    No nutrition dx at this time. Will continue to monitor.     Reason for Assessment    Reason For Assessment: RD follow-up  Diagnosis:  (acute DVT)  Relevant Medical History: HTN, HLD, T2DM, BPH  Interdisciplinary Rounds: attended  General Information Comments: 3/9- RD f/u. Converses briefly- not very talkative (baseline). Confused at times. Mech soft diet consistent carb. PO intake 100%. Reports eating all of meals started with ONS- to promote weight kylie. No complaints of n/v/c/abdominal pain. Will continue to monitor. Continues to pend placement.    Nutrition Discharge Planning: Pt to follow a cardiac/ DM diet(vit K restriction as needed) as tolerated.    Nutrition Risk Screen    Nutrition Risk Screen: no indicators present    Nutrition/Diet History    Spiritual, Cultural Beliefs, Hinduism Practices, Values that Affect Care: no    Anthropometrics    Temp: 98 °F (36.7 °C)  Height: 6' (182.9 cm)  Height (inches): 72 in  Weight Method: Bed Scale  Weight: 63 kg (139 lb)  Weight (lb): 139 lb  Ideal Body Weight (IBW), Male: 178 lb  % Ideal Body Weight, Male (lb): 78.09 %  BMI (Calculated): 18.8  BMI Grade: 18.5-24.9 - normal     Lab/Procedures/Meds    Pertinent Labs Reviewed: reviewed  Pertinent Labs Comments: none  Pertinent Medications Reviewed: reviewed  Pertinent Medications Comments: apixaban, atorvastatin, fluoxetine, folic acid, insulin aspart, insulin detemir, metformin, MV, Na Cl flush, tamsulosin, thiamine    Estimated/Assessed Needs    Weight Used For Calorie Calculations: 63 kg (138 lb 14.2 oz)  Energy Calorie  Requirements (kcal): 1980 kcal day (MSJ X AF 1.4)  Energy Need Method: [place]  Protein Requirements: 50-63 g day (0.8-1.0 g/kg)  Weight Used For Protein Calculations: 63 kg (138 lb 14.2 oz)  Estimated Fluid Requirement Method: RDA Method  RDA Method (mL): 1980  CHO Requirement: 248 g day    Nutrition Prescription Ordered    Current Diet Order: [facility] soft consistent carb    Evaluation of Received Nutrient/Fluid Intake    Energy Calories Required: meeting needs  Protein Required: meeting needs  Fluid Required: meeting needs  Tolerance: tolerating  % Intake of Estimated Energy Needs: 75 - 100 %  % Meal Intake: 75 - 100 %    Nutrition Risk    Level of Risk/Frequency of Follow-up:  (1 x weekly)     Monitor and Evaluation    Food and Nutrient Intake: energy intake, food and beverage intake  Food and Nutrient Adminstration: diet order  Knowledge/Beliefs/Attitudes: food and nutrition knowledge/skill  Physical Activity and Function: nutrition-related ADLs and IADLs  Anthropometric Measurements: weight, weight change, body mass index  Biochemical Data, Medical Tests and Procedures: glucose/endocrine profile, gastrointestinal profile, electrolyte and renal panel, inflammatory profile, lipid profile  Nutrition-Focused Physical Findings: overall appearance     Nutrition Follow-Up    RD Follow-up?: Yes

## 2022-03-09 NOTE — ASSESSMENT & PLAN NOTE
Reportedly he is on metformin and glipizide, both held.  Continue SSI for now.  He has 4+ sugar in urine and HbA1c is 10.3, and he is hyperglycemic here.  Will start levemir and continue ISS, increase levemir to 25 units daily  Add scheduled novolog 5 units tid, increase to 7 units  Improving  OK to resume metformin now.  Sugars high, increased levemir to 28 units daily

## 2022-03-09 NOTE — PROGRESS NOTES
[facility] Medicine  Progress Note    Patient Name: Forest Lopez  MRN: [id number]  Patient Class: IP- Inpatient   Admission Date: 1/31/2022  Length of Stay: 37 days  Attending Physician: Mojgan Bosch MD  Primary Care Provider: Julian Escobar        Subjective:     Principal Problem:Acute stroke due to ischemia        HPI:  Mr. Lopez is a 72 year old man who presented after a syncopal episode.  He reports he had been feeling well prior to the episode but then had a prodrome prior to passing out.  EMS was called, report patient's BP was low normal on their arrival, improved after an IV fluids bolus.  Patient denies chest pain or shortness of breath and says he does not feel weak currently although is rather tired.  When seen in the ED this morning he could barely express himself audibly.     Vital signs were normal on presentation here.  He is on warfarin for recurrent DVT, but his INR was 1, and d-dimer markedly elevated at 21.7.  Tox screen was positive for cocaine.  He had a CTA of the brain and neck done on presentation so CTA of the chest for PE study could not be done for 48 hours.  Ultrasound of the lower extremities showed extensive bilateral DVT.  On the right there was thrombosis of the common femoral vein, femoral vein, popliteal vein, one of the paired posterior tibial veins and both visualized peroneal veins.  On the left there was thrombosis of the common femoral vein, femoral vein and popliteal vein.  Patient was started on full dose Lovenox and admitted.    Medical history is from the chart as he is unable to give me much information.  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  He smokes 5-6 cigarettes/day and is not interested in quitting.  He is currently homeless and staying with a friend.  Despite the normal INR he is sure  he is taking his warfarin and is not having difficulty taking his medications.  I discussed his care with [facility] staff on the [facility] and patient had been lost to follow up since November 2021.      Overview/Hospital Course:  Patient presented to the ED after a syncopal episode and was found to have bilateral lower extremity DVTs and a low INR.  Tox screen was positive for cocaine.  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  He was started back on his warfarin with bridging Lovenox.  His 2D echo showed grade I diastolic dysfunction.  Neurology evaluated him and recommended echo with bubble study, which was negative for a shunt.    PT/OT evaluated him and recommended SNF placement.  His INR was difficult to get therapeutic, and as he had no contraindication to a NOAC his warfarin was changed to apixaban, and the full dose Lovenox was discontinued.  As he appeared to be depressed and had no objection to starting an antidepressant Lexapro was started.  He had a fall in the hospital on 2/13, had gone to the bathroom for a BM with the nurse, and when she turned away while he was washing his hands he apparently lost his balance and fell.  He did not hit his head and did not lose consciousness, but his BP was low transiently and he was given a bolus of IV fluids.    Patient's mental status improved slowly, but he gradually became more talkative and was able to hold a brief conversation.  He was diagnosed with a polymicrobial UTI on 2/21 and completed treatment with antibiotics.  Psychiatry evaluated him on 3/3 and changed his antidepressant to Prozac as it can be more activating and patient was having difficulty with his level of motivation.      Patient's discharge was delayed as he was not accepted by any SNF facilities in the area.  Reasons for the denials are unclear as he has a clear rehab diagnosis.      Interval History:   Patient without complaints, wondering about placement and why it is taking so long.  He is polite and cooperative.    Review of Systems   Constitutional:  Negative for chills and fever.   Respiratory:  Negative for cough and shortness of breath.    Cardiovascular:  Negative for chest pain and palpitations.   Objective:     Vital Signs (Most Recent):  Temp: 98.1 °F (36.7 °C) (03/09/22 1603)  Pulse: 75 (03/09/22 1603)  Resp: 18 (03/09/22 1603)  BP: (!) 107/57 (03/09/22 1603)  SpO2: 98 % (03/09/22 1603)   Vital Signs (24h Range):  Temp:  [96.5 °F (35.8 °C)-98.8 °F (37.1 °C)] 98.1 °F (36.7 °C)  Pulse:  [70-83] 75  Resp:  [18] 18  SpO2:  [96 %-99 %] 98 %  BP: (107-136)/(57-79) 107/57     Weight: 63 kg (139 lb)  Body mass index is 18.85 kg/m².    Intake/Output Summary (Last 24 hours) at 3/9/2022 1705  Last data filed at 3/9/2022 1000  Gross per 24 hour   Intake --   Output 1050 ml   Net -1050 ml      Physical Exam  Constitutional:       General: He is not in acute distress.     Comments: Thin   HENT:      Mouth/Throat:      Comments: Edentulous  Cardiovascular:      Rate and Rhythm: Normal rate and regular rhythm.      Pulses: Normal pulses.      Heart sounds: Normal heart sounds. No murmur heard.    No gallop.   Pulmonary:      Effort: Pulmonary effort is normal.      Breath sounds: Normal breath sounds.   Abdominal:      General: Bowel sounds are normal.      Palpations: Abdomen is soft.   Skin:     General: Skin is warm and dry.   Neurological:      Mental Status: He is alert.      Comments: Not oriented to time.       Significant Labs: All pertinent labs within the past 24 hours have been reviewed.    Significant Imaging: I have reviewed all pertinent imaging results/findings within the past 24 hours.      Assessment/Plan:      * Acute stroke due to ischemia  - MRI showed areas of diffusion signal hyperintensity consistent with areas of acute ischemia/infarct involving the left cerebral hemisphere, the most prominent  measuring approximately 1.4 cm, also a small focus of the right frontal lobe.  - Patient on full dose anticoagulation currently due to acute bilateral DVT.  - Sinus rhythm noted throughout hospital stay  - Risk factor modification - control diabetes, continue statin.  - RPR, HIV non reactive.  B12 low normal.   - CTA showed a focal segment of 60-70% stenosis involving the proximal to mid left vertebral artery that was new when compared to the prior study of 09/25/2019.  No evidence of large vessel intracranial occlusion.   - Neurology noted symptoms do not correspond to stroke location, although the frontal lobe stroke might have something to do with his lack of motivation, and Speech has noted he has had delayed swallowing.  - Echo with bubble study done, no shunt seen.  - Follow up with vascular neurology in 4 weeks.  - Therapy recommending SNF due to his low interest in participation.  - Started Lexapro due to suspicion for depression.  - Psych consulted, changed to Prozac and started thiamine, folic acid, MVI due to previous history of alcohol abuse, although Wernicke encephalopathy is not suspected.    Acute deep vein thrombosis (DVT) of both femoral veins  Last ultrasound showed partially occlusive DVTs, now completely occlusive DVT of multiple lower extremity veins on ultrasound.  Patient reports compliance with warfarin but his INR is only one.  He is homeless but says he does not have difficulty obtaining his medications.  Does not seem to care about anything, which again might be due to the frontal lobe stroke.  D-dimer was markedly elevated on presentation and there was a question of possible PE, but he had a CTA of the brain/neck on presentation so could not get another CTA for another 48 hours.    V/Q scan was done, showed low probability for PE.  2D echo showed grade I diastolic dysfunction.  Warfarin was restarted with bridging Lovenox, but INR was still low.  Given the difficulty of getting him to  follow up changed to apixaban and discontinued Lovenox.    Abnormal imaging of thyroid  Tsh/t4 ok, thyroid ultrasound done, showed bilateral nodules which did not meet criteria for biopsy.    Advance care planning        Severe protein-calorie malnutrition  Diet as tolerated      Debility  PT/OT recommending SNF after stroke with debility/poor motivation  Awaiting SNF, having difficulty finding placement for unclear reasons.  If SNF will not accept him might try inpatient rehab.  He does have an appropriate rehab diagnosis.  Will discuss with neurologist.    Type 2 diabetes mellitus with hyperglycemia, without long-term current use of insulin  Reportedly he is on metformin and glipizide, both held.  Continue SSI for now.  He has 4+ sugar in urine and HbA1c is 10.3, and he is hyperglycemic here.  Will start levemir and continue ISS, increase levemir to 25 units daily  Add scheduled novolog 5 units tid, increase to 7 units  Improving  OK to resume metformin now.  Sugars high, increased levemir to 28 units daily    Syncope and collapse  Unclear cause.  Spoke to [facility] and patient has had previous syncopal episodes attributed to alcohol abuse.  Has been prescribed meclizine as well.  Tox positive for cocaine but not alcohol, and he denies current use of both.  CTA brain/neck was done in ED, and per ED note the results were discussed with stroke neurologist Dr. Tovar who mary ellen that CTA finding of a short segment of left vertebral artery stenosis was incidental.  He did not think stroke workup with MRI was indicated.    Since change of status per son compared to when he saw him early during the week a CT of the brain was ordered and no abnormalities seen.  MRI of the brain showed acute strokes of the left cerebral hemisphere and right frontal lobe.      Cocaine abuse  As noted above.  He has not had any behavioral issues while here, and other than being disinterested in therapy he has been very cooperative.  In addition  he has had no outside visitors and we do not feel cocaine use will be an issue for him in the future during rehab.  Psychiatry evaluated him 3/3, did not recommend PEC, might benefit from outpatient treatment.  His motivation to do therapy has been intermittent.  Notably, one of the stroke foci is in the frontal lobe, which might have affected his motivation level and personality.    Hyperlipidemia  Resumed lipitor      Essential hypertension  Doubtful he was on anything at home.  Started losartan 25 mg daily  BP well controlled on low dose losartan.  BP transiently low after he fell but recovered quickly.  IVF bolus given.  Losartan discontinued as BP persistently low-normal.    Tobacco dependence  He smokes 5-6 cigarettes/day.  Not trying to quit and not interested in a nicotine patch.  Benefits of smoking cessation were reviewed with patient in detail for for 8 minutes and patient was encouraged to quit. Nicotine replacement options were discussed, not needed.        VTE Risk Mitigation (From admission, onward)         Ordered     apixaban tablet 5 mg  2 times daily         02/10/22 0901     Place sequential compression device  Until discontinued         01/31/22 6466                          Mojgan Meza MD  Department of Hospital Medicine   [facility] ([place])

## 2022-03-09 NOTE — SUBJECTIVE & OBJECTIVE
Interval History:  Patient without complaints, wondering about placement and why it is taking so long.  He is polite and cooperative.    Review of Systems   Constitutional:  Negative for chills and fever.   Respiratory:  Negative for cough and shortness of breath.    Cardiovascular:  Negative for chest pain and palpitations.   Objective:     Vital Signs (Most Recent):  Temp: 98.1 °F (36.7 °C) (03/09/22 1603)  Pulse: 75 (03/09/22 1603)  Resp: 18 (03/09/22 1603)  BP: (!) 107/57 (03/09/22 1603)  SpO2: 98 % (03/09/22 1603)   Vital Signs (24h Range):  Temp:  [96.5 °F (35.8 °C)-98.8 °F (37.1 °C)] 98.1 °F (36.7 °C)  Pulse:  [70-83] 75  Resp:  [18] 18  SpO2:  [96 %-99 %] 98 %  BP: (107-136)/(57-79) 107/57     Weight: 63 kg (139 lb)  Body mass index is 18.85 kg/m².    Intake/Output Summary (Last 24 hours) at 3/9/2022 1705  Last data filed at 3/9/2022 1000  Gross per 24 hour   Intake --   Output 1050 ml   Net -1050 ml      Physical Exam  Constitutional:       General: He is not in acute distress.     Comments: Thin   HENT:      Mouth/Throat:      Comments: Edentulous  Cardiovascular:      Rate and Rhythm: Normal rate and regular rhythm.      Pulses: Normal pulses.      Heart sounds: Normal heart sounds. No murmur heard.    No gallop.   Pulmonary:      Effort: Pulmonary effort is normal.      Breath sounds: Normal breath sounds.   Abdominal:      General: Bowel sounds are normal.      Palpations: Abdomen is soft.   Skin:     General: Skin is warm and dry.   Neurological:      Mental Status: He is alert.      Comments: Not oriented to time.       Significant Labs: All pertinent labs within the past 24 hours have been reviewed.    Significant Imaging: I have reviewed all pertinent imaging results/findings within the past 24 hours.

## 2022-03-09 NOTE — ASSESSMENT & PLAN NOTE
As noted above.  He has not had any behavioral issues while here, and other than being disinterested in therapy he has been very cooperative.  In addition he has had no outside visitors and we do not feel cocaine use will be an issue for him in the future during rehab.  Psychiatry evaluated him 3/3, did not recommend PEC, might benefit from outpatient treatment.  His motivation to do therapy has been intermittent.  Notably, one of the stroke foci is in the frontal lobe, which might have affected his motivation level and personality.

## 2022-03-09 NOTE — PT/OT/SLP PROGRESS
Occupational Therapy   Treatment    Name: Forest Lopez  MRN: [id number]  Admitting Diagnosis:  Acute stroke due to ischemia       Recommendations:     Discharge Recommendations: nursing facility, skilled (needs 24/7 supervision and assist 2/2 fall risk)  Discharge Equipment Recommendations:  bedside commode, shower chair  Barriers to discharge:  Decreased caregiver support    Assessment:     Forest Lopez is a 72 y.o. male with a medical diagnosis of Acute stroke due to ischemia.  He presents weakness, impaired endurance, impaired self care skills, impaired functional mobilty, impaired balance, impaired cognition, decreased safety awareness.     Pt pleasant and agreeable to therapy. Pt declined ADL and BUE therex this date, desiring to perform functional ambulation. Functional ambulation performed to progress activity tolerance/endurance for increased independence with ADLs. Dc rec SNF, pt with decreased activity tolerance for 3 hours of therapy a day and does not demonstrate a need for aggressive rehabilitation efforts. Pt appears to be close to baseline.     Rehab Prognosis:  Good; patient would benefit from acute skilled OT services to address these deficits and reach maximum level of function.       Plan:     Patient to be seen 3 x/week to address the above listed problems via self-care/home management, therapeutic activities, therapeutic exercises  · Plan of Care Expires: 04/02/22  · Plan of Care Reviewed with: patient    Subjective     Pain/Comfort:  · Pain Rating 1: 0/10    Objective:     Communicated with: RN prior to session.  Patient found supine with PICC line, Condom Catheter upon OT entry to room.    General Precautions: Standard, fall   Orthopedic Precautions:N/A   Braces: N/A  Respiratory Status: Room air     Occupational Performance:     Bed Mobility:    · Supine > sit: SBA with cues for initiation of bed mobility tasks     Functional Mobility/Transfers:  · Sit <> Stand: CGA with and without  rollator, cues for use of rollator brakes   · Bed > chair: CGA and cues for safe mobility techniques  · Functional Mobility: Pt required CGA and rollator for functional ambulation in the hallway. Pt with decreased spatial awareness and required physical cues to avoid obstacles in the hallway.     Activities of Daily Living:  · Pt politely declined ADL activity this date.       [facility] 6 Click ADL: 16    Treatment & Education:  OT role, plan of care, progression of goals, importance of continued OOB activity     Patient left up in chair with all lines intact, call button in reach, chair alarm on and RN notifiedEducation:      GOALS:   Multidisciplinary Problems     Occupational Therapy Goals        Problem: Occupational Therapy Goal    Goal Priority Disciplines Outcome Interventions   Occupational Therapy Goal     OT, PT/OT Ongoing, Progressing    Description: Goals to be met by: 3/7/2022    Patient will increase functional independence with ADLs by performing:    UE Dressing with [place].  LE Dressing with [place].  Grooming while standing at sink with Supervision.  Toileting from toilet with Supervision for hygiene and clothing management.   Toilet transfer to toilet with Supervision.                     Time Tracking:     OT Date of Treatment: 03/09/22  OT Start Time: 1026  OT Stop Time: 1054  OT Total Time (min): 28 min    Billable Minutes:Therapeutic Activity 28 minutes    OT/DUTCH: OT          3/9/2022

## 2022-03-09 NOTE — ASSESSMENT & PLAN NOTE
PT/OT recommending SNF after stroke with debility/poor motivation  Awaiting SNF, having difficulty finding placement for unclear reasons.  If SNF will not accept him might try inpatient rehab.  He does have an appropriate rehab diagnosis.  Will discuss with neurologist.

## 2022-03-09 NOTE — PLAN OF CARE
Problem: Occupational Therapy Goal  Goal: Occupational Therapy Goal  Description: Goals to be met by: 3/7/2022    Patient will increase functional independence with ADLs by performing:    UE Dressing with [place].  LE Dressing with [place].  Grooming while standing at sink with Supervision.  Toileting from toilet with Supervision for hygiene and clothing management.   Toilet transfer to toilet with Supervision.    Outcome: Ongoing, Progressing     Pt pleasant and agreeable to therapy. Pt declined ADL and BUE therex this date, desiring to perform functional ambulation. Functional ambulation performed to progress activity tolerance/endurance for increased independence with ADLs. Dc rec SNF, pt with decreased activity tolerance for 3 hours of therapy a day and does not demonstrate a need for aggressive rehabilitation efforts. Pt appears to be close to baseline.

## 2022-03-09 NOTE — PLAN OF CARE
Recommendations  1.Update weight, please.  2.Continue mech soft consistent carb diet as tolerated.    -continue Boost Plus TID.   3.Encourage good PO intake as needed    Goals: Pt to meet % EEN/EPN via PO intake by RD f/u.  Nutrition Goal Status: goal met  Communication of RD Recs:  (POC)

## 2022-03-09 NOTE — ASSESSMENT & PLAN NOTE
Last ultrasound showed partially occlusive DVTs, now completely occlusive DVT of multiple lower extremity veins on ultrasound.  Patient reports compliance with warfarin but his INR is only one.  He is homeless but says he does not have difficulty obtaining his medications.  Does not seem to care about anything, which again might be due to the frontal lobe stroke.  D-dimer was markedly elevated on presentation and there was a question of possible PE, but he had a CTA of the brain/neck on presentation so could not get another CTA for another 48 hours.    V/Q scan was done, showed low probability for PE.  2D echo showed grade I diastolic dysfunction.  Warfarin was restarted with bridging Lovenox, but INR was still low.  Given the difficulty of getting him to follow up changed to apixaban and discontinued Lovenox.

## 2022-03-09 NOTE — ASSESSMENT & PLAN NOTE
- MRI showed areas of diffusion signal hyperintensity consistent with areas of acute ischemia/infarct involving the left cerebral hemisphere, the most prominent measuring approximately 1.4 cm, also a small focus of the right frontal lobe.  - Patient on full dose anticoagulation currently due to acute bilateral DVT.  - Sinus rhythm noted throughout hospital stay  - Risk factor modification - control diabetes, continue statin.  - RPR, HIV non reactive.  B12 low normal.   - CTA showed a focal segment of 60-70% stenosis involving the proximal to mid left vertebral artery that was new when compared to the prior study of 09/25/2019.  No evidence of large vessel intracranial occlusion.   - Neurology noted symptoms do not correspond to stroke location, although the frontal lobe stroke might have something to do with his lack of motivation, and Speech has noted he has had delayed swallowing.  - Echo with bubble study done, no shunt seen.  - Follow up with vascular neurology in 4 weeks.  - Therapy recommending SNF due to his low interest in participation.  - Started Lexapro due to suspicion for depression.  - Psych consulted, changed to Prozac and started thiamine, folic acid, MVI due to previous history of alcohol abuse, although Wernicke encephalopathy is not suspected.

## 2022-03-10 ENCOUNTER — PATIENT OUTREACH (OUTPATIENT)
Dept: ADMINISTRATIVE | Facility: OTHER | Age: 72
End: 2022-03-10
Payer: MEDICARE

## 2022-03-10 LAB
ERYTHROCYTE [DISTWIDTH] IN BLOOD BY AUTOMATED COUNT: 13.6 % (ref 11.5–14.5)
HCT VFR BLD AUTO: 36.3 % (ref 40–54)
HGB BLD-MCNC: 11.7 G/DL (ref 14–18)
MCH RBC QN AUTO: 25.8 PG (ref 27–31)
MCHC RBC AUTO-ENTMCNC: 32.2 G/DL (ref 32–36)
MCV RBC AUTO: 80 FL (ref 82–98)
PLATELET # BLD AUTO: 368 K/UL (ref 150–450)
PMV BLD AUTO: 9.9 FL (ref 9.2–12.9)
POCT GLUCOSE: 125 MG/DL (ref 70–110)
POCT GLUCOSE: 140 MG/DL (ref 70–110)
POCT GLUCOSE: 204 MG/DL (ref 70–110)
POCT GLUCOSE: 85 MG/DL (ref 70–110)
RBC # BLD AUTO: 4.54 M/UL (ref 4.6–6.2)
WBC # BLD AUTO: 12.67 K/UL (ref 3.9–12.7)

## 2022-03-10 PROCEDURE — 36415 COLL VENOUS BLD VENIPUNCTURE: CPT | Performed by: HOSPITALIST

## 2022-03-10 PROCEDURE — 25000003 PHARM REV CODE 250: Performed by: HOSPITALIST

## 2022-03-10 PROCEDURE — 94761 N-INVAS EAR/PLS OXIMETRY MLT: CPT

## 2022-03-10 PROCEDURE — 11000001 HC ACUTE MED/SURG PRIVATE ROOM

## 2022-03-10 PROCEDURE — 99232 SBSQ HOSP IP/OBS MODERATE 35: CPT | Mod: ,,, | Performed by: HOSPITALIST

## 2022-03-10 PROCEDURE — 99232 PR SUBSEQUENT HOSPITAL CARE,LEVL II: ICD-10-PCS | Mod: ,,, | Performed by: HOSPITALIST

## 2022-03-10 PROCEDURE — 25000003 PHARM REV CODE 250: Performed by: INTERNAL MEDICINE

## 2022-03-10 PROCEDURE — 25000003 PHARM REV CODE 250: Performed by: NURSE PRACTITIONER

## 2022-03-10 PROCEDURE — 36406 VNPNXR<3YRS PHY/QHP OTHER VN: CPT

## 2022-03-10 PROCEDURE — 85027 COMPLETE CBC AUTOMATED: CPT | Performed by: HOSPITALIST

## 2022-03-10 PROCEDURE — A4216 STERILE WATER/SALINE, 10 ML: HCPCS | Performed by: INTERNAL MEDICINE

## 2022-03-10 RX ADMIN — Medication 10 ML: at 09:03

## 2022-03-10 RX ADMIN — Medication 10 ML: at 05:03

## 2022-03-10 RX ADMIN — Medication 10 ML: at 12:03

## 2022-03-10 RX ADMIN — METFORMIN HYDROCHLORIDE 500 MG: 500 TABLET ORAL at 09:03

## 2022-03-10 RX ADMIN — APIXABAN 5 MG: 2.5 TABLET, FILM COATED ORAL at 09:03

## 2022-03-10 RX ADMIN — TAMSULOSIN HYDROCHLORIDE 0.4 MG: 0.4 CAPSULE ORAL at 09:03

## 2022-03-10 RX ADMIN — Medication 10 ML: at 11:03

## 2022-03-10 RX ADMIN — INSULIN ASPART 7 UNITS: 100 INJECTION, SOLUTION INTRAVENOUS; SUBCUTANEOUS at 09:03

## 2022-03-10 RX ADMIN — ATORVASTATIN CALCIUM 80 MG: 20 TABLET, FILM COATED ORAL at 09:03

## 2022-03-10 RX ADMIN — INSULIN DETEMIR 28 UNITS: 100 INJECTION, SOLUTION SUBCUTANEOUS at 09:03

## 2022-03-10 RX ADMIN — FLUOXETINE 20 MG: 20 CAPSULE ORAL at 09:03

## 2022-03-10 RX ADMIN — THERA TABS 1 TABLET: TAB at 09:03

## 2022-03-10 RX ADMIN — INSULIN ASPART 7 UNITS: 100 INJECTION, SOLUTION INTRAVENOUS; SUBCUTANEOUS at 05:03

## 2022-03-10 RX ADMIN — THIAMINE HCL TAB 100 MG 100 MG: 100 TAB at 09:03

## 2022-03-10 RX ADMIN — FOLIC ACID 1 MG: 1 TABLET ORAL at 09:03

## 2022-03-10 NOTE — ASSESSMENT & PLAN NOTE
As noted above.  He has not had any behavioral issues while here, and other than being disinterested in therapy he has been very cooperative.  In addition he has had no outside visitors and we do not feel cocaine use will be an issue for him in the future during rehab.  Psychiatry evaluated him 3/3, did not recommend PEC, might benefit from outpatient treatment.  His motivation to do therapy has been intermittent.  Notably, one of the stroke foci is in the frontal lobe, which might have affected his motivation level and personality.  Discussed with therapy and requested they be more aggressive in working with him.

## 2022-03-10 NOTE — ASSESSMENT & PLAN NOTE
PT/OT recommending SNF after stroke with debility/poor motivation  Awaiting SNF, having difficulty finding placement for unclear reasons.  Will continue to pursue this as above.

## 2022-03-10 NOTE — ASSESSMENT & PLAN NOTE
- MRI showed areas of diffusion signal hyperintensity consistent with areas of acute ischemia/infarct involving the left cerebral hemisphere, the most prominent measuring approximately 1.4 cm, also a small focus of the right frontal lobe.  - Patient on full dose anticoagulation currently due to acute bilateral DVT.  - Sinus rhythm noted throughout hospital stay  - Risk factor modification - control diabetes, continue statin.  - RPR, HIV non reactive.  B12 low normal.   - CTA showed a focal segment of 60-70% stenosis involving the proximal to mid left vertebral artery that was new when compared to the prior study of 09/25/2019.  No evidence of large vessel intracranial occlusion.   - Neurology noted symptoms do not correspond to stroke location, although the frontal lobe stroke might have something to do with his lack of motivation, and Speech has noted he has had delayed swallowing.  - Echo with bubble study done, no shunt seen.  - Follow up with vascular neurology in 4 weeks.  - Therapy recommending SNF due to his low interest in participation.  - Started Lexapro due to suspicion for depression.  - Psych consulted, changed to Prozac and started thiamine, folic acid, MVI due to previous history of alcohol abuse, although Wernicke encephalopathy is not suspected.  - Will be re-consulting SNF for rehab from the stroke, as discharging him to a shelter would be inappropriate and unsafe for him.  Discussed with his son, Forest, at length today.  Forest is out of town on a job but will be returning to [place] eventually.  He agrees his father will eventually need nursing home care as he is unable to care for himself.

## 2022-03-10 NOTE — NURSING
Pt resting in bed during day.  NAD noted.  VSS.  Pleasant and cooperative.  No c/o pain.  Call light within reach.  Condom catheter in place, draining clear yellow urine.  No BM noted.  Turning self in bed.  Up with Pt with walker.  Awaiting placement.

## 2022-03-10 NOTE — PROGRESS NOTES
RSW called patient and patient is very anxious to know when he is being discharged. RSW assured CM is working on placement. Patient has no additional needs.    SUMMER Wilson

## 2022-03-10 NOTE — PLAN OF CARE
POC reviewed with patient, questions and concerns addressed. No acute events through the night.  All Vital signs stable. No complaints.  AAOx3. Safety maintained.  Bed locked, in lowest position, call light within reach, side rails x2. Mobilized to their highest function. See doc flow sheets for further information. Will continue to monitor.      Problem: Adult Inpatient Plan of Care  Goal: Plan of Care Review  Outcome: Ongoing, Progressing  Goal: Patient-Specific Goal (Individualized)  Outcome: Ongoing, Progressing  Goal: Absence of Hospital-Acquired Illness or Injury  Outcome: Ongoing, Progressing  Goal: Optimal Comfort and Wellbeing  Outcome: Ongoing, Progressing  Goal: Readiness for Transition of Care  Outcome: Ongoing, Progressing     Problem: Diabetes Comorbidity  Goal: Blood Glucose Level Within Targeted Range  Outcome: Ongoing, Progressing     Problem: Skin Injury Risk Increased  Goal: Skin Health and Integrity  Outcome: Ongoing, Progressing     Problem: Coping Ineffective  Goal: Effective Coping  Outcome: Ongoing, Progressing     Problem: Fall Injury Risk  Goal: Absence of Fall and Fall-Related Injury  Outcome: Ongoing, Progressing     Problem: Adjustment to Illness (Sepsis/Septic Shock)  Goal: Optimal Coping  Outcome: Ongoing, Progressing     Problem: Bleeding (Sepsis/Septic Shock)  Goal: Absence of Bleeding  Outcome: Ongoing, Progressing     Problem: Glycemic Control Impaired (Sepsis/Septic Shock)  Goal: Blood Glucose Level Within Desired Range  Outcome: Ongoing, Progressing     Problem: Infection Progression (Sepsis/Septic Shock)  Goal: Absence of Infection Signs and Symptoms  Outcome: Ongoing, Progressing     Problem: Nutrition Impaired (Sepsis/Septic Shock)  Goal: Optimal Nutrition Intake  Outcome: Ongoing, Progressing     Problem: Infection  Goal: Absence of Infection Signs and Symptoms  Outcome: Ongoing, Progressing

## 2022-03-10 NOTE — PT/OT/SLP PROGRESS
Speech Language Pathology      Forest Lopez  MRN: [id number]    Patient not seen today secondary to Other (Comment). Pt declined SLP services at this time. Requesting to defer tx session for tomorrow, stated he would like to rest. Will follow-up next available date 3/11/22.

## 2022-03-10 NOTE — ASSESSMENT & PLAN NOTE
Tsh/t4 ok, thyroid ultrasound done, showed bilateral nodules which did not meet criteria for biopsy.   [Parents] : parents [Breast milk] : breast milk [Hours between feeds ___] : Child is fed every [unfilled] hours [Vitamins ___] : Patient takes [unfilled] vitamins daily [Normal] : Normal [___ voids per day] : [unfilled] voids per day [Frequency of stools: ___] : Frequency of stools: [unfilled]  stools [per day] : per day. [In Bassinette/Crib] : sleeps in bassinette/crib [On back] : sleeps on back [Water heater temperature set at <120 degrees F] : Water heater temperature set at <120 degrees F [Rear facing car seat in back seat] : Rear facing car seat in back seat [Carbon Monoxide Detectors] : Carbon monoxide detectors at home [Smoke Detectors] : Smoke detectors at home. [Co-sleeping] : no co-sleeping [Exposure to electronic nicotine delivery system] : No exposure to electronic nicotine delivery system [Gun in Home] : No gun in home [At risk for exposure to TB] : Not at risk for exposure to Tuberculosis  [FreeTextEntry7] : Doing well. Belly button fell off 1 week ago, still some scant discharge, no odor or redness. [FreeTextEntry9] : wakes to feed [FreeTextEntry1] : 1 month old baby boy here for PE.\par Doing well.\par Breastfeeding ad loreta with good uop/bm. Wakes to feed. Good weight gain.\par Umbilicus fell off last week, still some scant drainage, no redness or odor.\par Dryness to scalp and scattered red pimply rash. Mom using emollients.\par NBN screen wnl.

## 2022-03-10 NOTE — PROGRESS NOTES
[facility] Medicine  Progress Note    Patient Name: Forest Lopez  MRN: [id number]  Patient Class: IP- Inpatient   Admission Date: 1/31/2022  Length of Stay: 38 days  Attending Physician: Mojgan Bosch MD  Primary Care Provider: Julian Escobar        Subjective:     Principal Problem:Acute stroke due to ischemia        HPI:  Mr. Lopez is a 72 year old man who presented after a syncopal episode.  He reports he had been feeling well prior to the episode but then had a prodrome prior to passing out.  EMS was called, report patient's BP was low normal on their arrival, improved after an IV fluids bolus.  Patient denies chest pain or shortness of breath and says he does not feel weak currently although is rather tired.  When seen in the ED this morning he could barely express himself audibly.     Vital signs were normal on presentation here.  He is on warfarin for recurrent DVT, but his INR was 1, and d-dimer markedly elevated at 21.7.  Tox screen was positive for cocaine.  He had a CTA of the brain and neck done on presentation so CTA of the chest for PE study could not be done for 48 hours.  Ultrasound of the lower extremities showed extensive bilateral DVT.  On the right there was thrombosis of the common femoral vein, femoral vein, popliteal vein, one of the paired posterior tibial veins and both visualized peroneal veins.  On the left there was thrombosis of the common femoral vein, femoral vein and popliteal vein.  Patient was started on full dose Lovenox and admitted.    Medical history is from the chart as he is unable to give me much information.  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  He smokes 5-6 cigarettes/day and is not interested in quitting.  He is currently homeless and staying with a friend.  Despite the normal INR he is sure  he is taking his warfarin and is not having difficulty taking his medications.  I discussed his care with [facility] staff on the [facility] and patient had been lost to follow up since November 2021.      Overview/Hospital Course:  Patient presented to the ED after a syncopal episode and was found to have bilateral lower extremity DVTs and a low INR.  Tox screen was positive for cocaine.  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  He was started back on his warfarin with bridging Lovenox.  His 2D echo showed grade I diastolic dysfunction.  Neurology evaluated him and recommended echo with bubble study, which was negative for a shunt.    PT/OT evaluated him and recommended SNF placement.  His INR was difficult to get therapeutic, and as he had no contraindication to a NOAC his warfarin was changed to apixaban, and the full dose Lovenox was discontinued.  As he appeared to be depressed and had no objection to starting an antidepressant Lexapro was started.  He had a fall in the hospital on 2/13, had gone to the bathroom for a BM with the nurse, and when she turned away while he was washing his hands he apparently lost his balance and fell.  He did not hit his head and did not lose consciousness, but his BP was low transiently and he was given a bolus of IV fluids.    Patient's mental status improved slowly, but he gradually became more talkative and was able to hold a brief conversation.  He was diagnosed with a polymicrobial UTI on 2/21 and completed treatment with antibiotics.  Psychiatry evaluated him on 3/3 and changed his antidepressant to Prozac as it can be more activating and patient was having difficulty with his level of motivation.      Patient's discharge was delayed as he was not accepted by any SNF facilities in the area.  Reasons for the denials are unclear as he has a clear rehab diagnosis.  He has a history of drug  use but has not shown any interest in getting any illicit or legal drugs since he has been here, and he has had no visitors that could have provided drugs to him.  He has been pleasant and cooperative while here although lacks motivation, likely due to the nature of the stroke affecting his frontal lobe.  He requires considerable encouragement to participate in therapy.  Discharging him to live on the street or shelter would not be appropriate as he would not be able to take care of himself and likely will need group home nursing home care eventually after he gets the chance to improve with rehab.      Interval History:  No complaints, awaiting placement in SNF.    Review of Systems   Constitutional:  Negative for chills and fever.   Respiratory:  Negative for cough and shortness of breath.    Cardiovascular:  Negative for chest pain and palpitations.   Objective:     Vital Signs (Most Recent):  Temp: 98.2 °F (36.8 °C) (03/10/22 1622)  Pulse: 80 (03/10/22 1622)  Resp: 18 (03/10/22 1622)  BP: 118/68 (03/10/22 1622)  SpO2: 99 % (03/10/22 1622) Vital Signs (24h Range):  Temp:  [97.5 °F (36.4 °C)-98.4 °F (36.9 °C)] 98.2 °F (36.8 °C)  Pulse:  [78-88] 80  Resp:  [16-20] 18  SpO2:  [94 %-100 %] 99 %  BP: (118-151)/(68-93) 118/68     Weight: 63 kg (139 lb)  Body mass index is 18.85 kg/m².    Intake/Output Summary (Last 24 hours) at 3/10/2022 1646  Last data filed at 3/10/2022 0600  Gross per 24 hour   Intake --   Output 900 ml   Net -900 ml      Physical Exam  Constitutional:       General: He is not in acute distress.     Comments: Thin   HENT:      Mouth/Throat:      Comments: Edentulous  Cardiovascular:      Rate and Rhythm: Normal rate and regular rhythm.      Pulses: Normal pulses.      Heart sounds: Normal heart sounds. No murmur heard.    No gallop.   Pulmonary:      Effort: Pulmonary effort is normal.      Breath sounds: Normal breath sounds.   Abdominal:      General: Bowel sounds are normal.      Palpations:  Abdomen is soft.   Skin:     General: Skin is warm and dry.   Neurological:      Mental Status: He is alert.      Comments: Not oriented to time.       Significant Labs: All pertinent labs within the past 24 hours have been reviewed.    Significant Imaging: I have reviewed all pertinent imaging results/findings within the past 24 hours.      Assessment/Plan:      * Acute stroke due to ischemia  - MRI showed areas of diffusion signal hyperintensity consistent with areas of acute ischemia/infarct involving the left cerebral hemisphere, the most prominent measuring approximately 1.4 cm, also a small focus of the right frontal lobe.  - Patient on full dose anticoagulation currently due to acute bilateral DVT.  - Sinus rhythm noted throughout hospital stay  - Risk factor modification - control diabetes, continue statin.  - RPR, HIV non reactive.  B12 low normal.   - CTA showed a focal segment of 60-70% stenosis involving the proximal to mid left vertebral artery that was new when compared to the prior study of 09/25/2019.  No evidence of large vessel intracranial occlusion.   - Neurology noted symptoms do not correspond to stroke location, although the frontal lobe stroke might have something to do with his lack of motivation, and Speech has noted he has had delayed swallowing.  - Echo with bubble study done, no shunt seen.  - Follow up with vascular neurology in 4 weeks.  - Therapy recommending SNF due to his low interest in participation.  - Started Lexapro due to suspicion for depression.  - Psych consulted, changed to Prozac and started thiamine, folic acid, MVI due to previous history of alcohol abuse, although Wernicke encephalopathy is not suspected.  - Will be re-consulting SNF for rehab from the stroke, as discharging him to a shelter would be inappropriate and unsafe for him.  Discussed with his son, Forest, at length today.  Forest is out of town on a job but will be returning to [place] eventually.  He  agrees his father will eventually need nursing home care as he is unable to care for himself.      Acute deep vein thrombosis (DVT) of both femoral veins  Last ultrasound showed partially occlusive DVTs, now completely occlusive DVT of multiple lower extremity veins on ultrasound.  Patient reports compliance with warfarin but his INR is only one.  He is homeless but says he does not have difficulty obtaining his medications.  Does not seem to care about anything, which again might be due to the frontal lobe stroke.  D-dimer was markedly elevated on presentation and there was a question of possible PE, but he had a CTA of the brain/neck on presentation so could not get another CTA for another 48 hours.    V/Q scan was done, showed low probability for PE.  2D echo showed grade I diastolic dysfunction.  Warfarin was restarted with bridging Lovenox, but INR was still low.  Given the difficulty of getting him to follow up changed to apixaban and discontinued Lovenox.    Abnormal imaging of thyroid  Tsh/t4 ok, thyroid ultrasound done, showed bilateral nodules which did not meet criteria for biopsy.    Advance care planning        Severe protein-calorie malnutrition  Diet as tolerated      Debility  PT/OT recommending SNF after stroke with debility/poor motivation  Awaiting SNF, having difficulty finding placement for unclear reasons.  Will continue to pursue this as above.      Type 2 diabetes mellitus with hyperglycemia, without long-term current use of insulin  Reportedly he is on metformin and glipizide, both held.  Continue SSI for now.  He has 4+ sugar in urine and HbA1c is 10.3, and he is hyperglycemic here.  Will start levemir and continue ISS, increase levemir to 25 units daily  Add scheduled novolog 5 units tid, increase to 7 units  Improving  OK to resume metformin now.  Sugars high, increased levemir to 28 units daily    Syncope and collapse  Unclear cause.  Spoke to [employer] and patient has had previous  syncopal episodes attributed to alcohol abuse.  Has been prescribed meclizine as well.  Tox positive for cocaine but not alcohol, and he denies current use of both.  CTA brain/neck was done in ED, and per ED note the results were discussed with stroke neurologist Dr. Tovar who mary ellen that CTA finding of a short segment of left vertebral artery stenosis was incidental.  He did not think stroke workup with MRI was indicated.    Since change of status per son compared to when he saw him early during the week a CT of the brain was ordered and no abnormalities seen.  MRI of the brain showed acute strokes of the left cerebral hemisphere and right frontal lobe.      Cocaine abuse  As noted above.  He has not had any behavioral issues while here, and other than being disinterested in therapy he has been very cooperative.  In addition he has had no outside visitors and we do not feel cocaine use will be an issue for him in the future during rehab.  Psychiatry evaluated him 3/3, did not recommend PEC, might benefit from outpatient treatment.  His motivation to do therapy has been intermittent.  Notably, one of the stroke foci is in the frontal lobe, which might have affected his motivation level and personality.  Discussed with therapy and requested they be more aggressive in working with him.          Hyperlipidemia  Resumed lipitor      Essential hypertension  Doubtful he was on anything at home.  Started losartan 25 mg daily  BP well controlled on low dose losartan.  BP transiently low after he fell but recovered quickly.  IVF bolus given.  Losartan discontinued as BP persistently low-normal.    Tobacco dependence  He smokes 5-6 cigarettes/day.  Not trying to quit and not interested in a nicotine patch.  Benefits of smoking cessation were reviewed with patient in detail for for 8 minutes and patient was encouraged to quit. Nicotine replacement options were discussed, not needed.        VTE Risk Mitigation (From admission,  onward)         Ordered     apixaban tablet 5 mg  2 times daily         02/10/22 0901     Place sequential compression device  Until discontinued         01/31/22 0505                      Mojgan Meza MD  Department of Hospital Medicine   [facility] ([place])

## 2022-03-10 NOTE — SUBJECTIVE & OBJECTIVE
Interval History:  No complaints, awaiting placement in SNF.    Review of Systems   Constitutional:  Negative for chills and fever.   Respiratory:  Negative for cough and shortness of breath.    Cardiovascular:  Negative for chest pain and palpitations.   Objective:     Vital Signs (Most Recent):  Temp: 98.2 °F (36.8 °C) (03/10/22 1622)  Pulse: 80 (03/10/22 1622)  Resp: 18 (03/10/22 1622)  BP: 118/68 (03/10/22 1622)  SpO2: 99 % (03/10/22 1622) Vital Signs (24h Range):  Temp:  [97.5 °F (36.4 °C)-98.4 °F (36.9 °C)] 98.2 °F (36.8 °C)  Pulse:  [78-88] 80  Resp:  [16-20] 18  SpO2:  [94 %-100 %] 99 %  BP: (118-151)/(68-93) 118/68     Weight: 63 kg (139 lb)  Body mass index is 18.85 kg/m².    Intake/Output Summary (Last 24 hours) at 3/10/2022 1646  Last data filed at 3/10/2022 0600  Gross per 24 hour   Intake --   Output 900 ml   Net -900 ml      Physical Exam  Constitutional:       General: He is not in acute distress.     Comments: Thin   HENT:      Mouth/Throat:      Comments: Edentulous  Cardiovascular:      Rate and Rhythm: Normal rate and regular rhythm.      Pulses: Normal pulses.      Heart sounds: Normal heart sounds. No murmur heard.    No gallop.   Pulmonary:      Effort: Pulmonary effort is normal.      Breath sounds: Normal breath sounds.   Abdominal:      General: Bowel sounds are normal.      Palpations: Abdomen is soft.   Skin:     General: Skin is warm and dry.   Neurological:      Mental Status: He is alert.      Comments: Not oriented to time.       Significant Labs: All pertinent labs within the past 24 hours have been reviewed.    Significant Imaging: I have reviewed all pertinent imaging results/findings within the past 24 hours.

## 2022-03-10 NOTE — ASSESSMENT & PLAN NOTE
Unclear cause.  Spoke to [facility] and patient has had previous syncopal episodes attributed to alcohol abuse.  Has been prescribed meclizine as well.  Tox positive for cocaine but not alcohol, and he denies current use of both.  CTA brain/neck was done in ED, and per ED note the results were discussed with stroke neurologist Dr. Tovar who mary ellen that CTA finding of a short segment of left vertebral artery stenosis was incidental.  He did not think stroke workup with MRI was indicated.    Since change of status per son compared to when he saw him early during the week a CT of the brain was ordered and no abnormalities seen.  MRI of the brain showed acute strokes of the left cerebral hemisphere and right frontal lobe.

## 2022-03-10 NOTE — PT/OT/SLP PROGRESS
Physical Therapy      Patient Name:  Forest Lopez   MRN:  [id number]    Patient not seen today secondary to Patient unwilling to participate at this time despite strong encouragement. Will follow-up next treatment day.

## 2022-03-11 LAB
POCT GLUCOSE: 117 MG/DL (ref 70–110)
POCT GLUCOSE: 131 MG/DL (ref 70–110)
POCT GLUCOSE: 139 MG/DL (ref 70–110)
POCT GLUCOSE: 152 MG/DL (ref 70–110)
POCT GLUCOSE: 156 MG/DL (ref 70–110)

## 2022-03-11 PROCEDURE — A4216 STERILE WATER/SALINE, 10 ML: HCPCS | Performed by: INTERNAL MEDICINE

## 2022-03-11 PROCEDURE — 97535 SELF CARE MNGMENT TRAINING: CPT

## 2022-03-11 PROCEDURE — 25000003 PHARM REV CODE 250: Performed by: HOSPITALIST

## 2022-03-11 PROCEDURE — 11000001 HC ACUTE MED/SURG PRIVATE ROOM

## 2022-03-11 PROCEDURE — 92526 ORAL FUNCTION THERAPY: CPT

## 2022-03-11 PROCEDURE — 92507 TX SP LANG VOICE COMM INDIV: CPT

## 2022-03-11 PROCEDURE — C9399 UNCLASSIFIED DRUGS OR BIOLOG: HCPCS | Performed by: INTERNAL MEDICINE

## 2022-03-11 PROCEDURE — 99232 SBSQ HOSP IP/OBS MODERATE 35: CPT | Mod: ,,, | Performed by: HOSPITALIST

## 2022-03-11 PROCEDURE — 25000003 PHARM REV CODE 250: Performed by: INTERNAL MEDICINE

## 2022-03-11 PROCEDURE — 25000003 PHARM REV CODE 250: Performed by: NURSE PRACTITIONER

## 2022-03-11 PROCEDURE — 99232 PR SUBSEQUENT HOSPITAL CARE,LEVL II: ICD-10-PCS | Mod: ,,, | Performed by: HOSPITALIST

## 2022-03-11 RX ADMIN — Medication 10 ML: at 12:03

## 2022-03-11 RX ADMIN — APIXABAN 5 MG: 2.5 TABLET, FILM COATED ORAL at 09:03

## 2022-03-11 RX ADMIN — METFORMIN HYDROCHLORIDE 500 MG: 500 TABLET ORAL at 09:03

## 2022-03-11 RX ADMIN — METFORMIN HYDROCHLORIDE 500 MG: 500 TABLET ORAL at 06:03

## 2022-03-11 RX ADMIN — INSULIN ASPART 7 UNITS: 100 INJECTION, SOLUTION INTRAVENOUS; SUBCUTANEOUS at 12:03

## 2022-03-11 RX ADMIN — THERA TABS 1 TABLET: TAB at 09:03

## 2022-03-11 RX ADMIN — Medication 10 ML: at 05:03

## 2022-03-11 RX ADMIN — FOLIC ACID 1 MG: 1 TABLET ORAL at 09:03

## 2022-03-11 RX ADMIN — INSULIN DETEMIR 28 UNITS: 100 INJECTION, SOLUTION SUBCUTANEOUS at 10:03

## 2022-03-11 RX ADMIN — Medication 10 ML: at 06:03

## 2022-03-11 RX ADMIN — THIAMINE HCL TAB 100 MG 100 MG: 100 TAB at 09:03

## 2022-03-11 RX ADMIN — FLUOXETINE 20 MG: 20 CAPSULE ORAL at 09:03

## 2022-03-11 RX ADMIN — INSULIN ASPART 7 UNITS: 100 INJECTION, SOLUTION INTRAVENOUS; SUBCUTANEOUS at 09:03

## 2022-03-11 RX ADMIN — ATORVASTATIN CALCIUM 80 MG: 20 TABLET, FILM COATED ORAL at 09:03

## 2022-03-11 RX ADMIN — TAMSULOSIN HYDROCHLORIDE 0.4 MG: 0.4 CAPSULE ORAL at 09:03

## 2022-03-11 NOTE — ASSESSMENT & PLAN NOTE
Unclear cause.  Spoke to [facility] and patient has had previous syncopal episodes attributed to alcohol abuse.  Has been prescribed meclizine as well.  Tox positive for cocaine but not alcohol, and he denies current use of both.  CTA brain/neck was done in ED, and per ED note the results were discussed with stroke neurologist Dr. Tovar who felt that CTA finding of a short segment of left vertebral artery stenosis was incidental.  He did not think stroke workup with MRI was indicated.    Since change of status per son compared to when he saw him early during the week a CT of the brain was ordered and no abnormalities seen.  MRI of the brain showed acute strokes of the left cerebral hemisphere and right frontal lobe.

## 2022-03-11 NOTE — NURSING
Pt resting in bed throughout day.  NAD noted.  VSS.  No new needs expressed.  Good appetite.  Awaiting discharge.  Condom cath in place, draining clear yellow urine. No BM noted.

## 2022-03-11 NOTE — SUBJECTIVE & OBJECTIVE
Interval History:  Participation in therapy improving.  Patient is awaiting placement and is anxious to start SNF.    Review of Systems   Constitutional:  Negative for chills and fever.   Respiratory:  Negative for cough and shortness of breath.    Cardiovascular:  Negative for chest pain and palpitations.   Objective:     Vital Signs (Most Recent):  Temp: 98.3 °F (36.8 °C) (03/11/22 1638)  Pulse: 88 (03/11/22 1638)  Resp: 12 (03/11/22 1638)  BP: 124/75 (03/11/22 1638)  SpO2: 99 % (03/11/22 1638) Vital Signs (24h Range):  Temp:  [97.8 °F (36.6 °C)-98.8 °F (37.1 °C)] 98.3 °F (36.8 °C)  Pulse:  [] 88  Resp:  [] 12  SpO2:  [94 %-99 %] 99 %  BP: (114-149)/(70-86) 124/75     Weight: 63 kg (139 lb)  Body mass index is 18.85 kg/m².    Intake/Output Summary (Last 24 hours) at 3/11/2022 1705  Last data filed at 3/11/2022 0500  Gross per 24 hour   Intake 550 ml   Output 500 ml   Net 50 ml      Physical Exam  Constitutional:       General: He is not in acute distress.     Comments: Thin   HENT:      Mouth/Throat:      Comments: Edentulous  Cardiovascular:      Rate and Rhythm: Normal rate and regular rhythm.      Pulses: Normal pulses.      Heart sounds: Normal heart sounds. No murmur heard.    No gallop.   Pulmonary:      Effort: Pulmonary effort is normal.      Breath sounds: Normal breath sounds.   Abdominal:      General: Bowel sounds are normal.      Palpations: Abdomen is soft.   Skin:     General: Skin is warm and dry.   Neurological:      Mental Status: He is alert.      Comments: Not oriented to time.       Significant Labs: All pertinent labs within the past 24 hours have been reviewed.    Significant Imaging: I have reviewed all pertinent imaging results/findings within the past 24 hours.

## 2022-03-11 NOTE — PLAN OF CARE
Patient is disoriented to situation.  He has gotten out of bed three times and set off the alarm.  [employer] camera noted in use.  Skin is warm/dry without breakdown observed.  Patient declines to wear a foam dressing on his sacrum currently.  Left arm Midline flushes easily but does not draw blood.  Incontinence noted so a condom catheter has been in use all night ( except when he tugs on it to make it come off.  Call light within reach and bed alarm also activated.  Door left open to room near the nurses station.  HS glucose 134...no coverage required.  Rounding maintained per protocol.      Problem: Adult Inpatient Plan of Care  Goal: Plan of Care Review  Outcome: Ongoing, Progressing  Flowsheets (Taken 3/11/2022 0520)  Plan of Care Reviewed With: patient     Problem: Diabetes Comorbidity  Goal: Blood Glucose Level Within Targeted Range  Outcome: Ongoing, Progressing  Intervention: Monitor and Manage Glycemia  Flowsheets (Taken 3/11/2022 0520)  Glycemic Management: blood glucose monitored     Problem: Skin Injury Risk Increased  Goal: Skin Health and Integrity  Outcome: Ongoing, Progressing  Intervention: Optimize Skin Protection  Flowsheets (Taken 3/11/2022 0520)  Pressure Reduction Techniques: frequent weight shift encouraged  Pressure Reduction Devices: pressure-redistributing mattress utilized  Head of Bed (HOB) Positioning: HOB elevated     Problem: Coping Ineffective  Goal: Effective Coping  Outcome: Ongoing, Progressing  Intervention: Support and Enhance Coping Strategies  Flowsheets (Taken 3/11/2022 0520)  Supportive Measures:   positive reinforcement provided   self-responsibility promoted   self-reflection promoted   self-care encouraged   relaxation techniques promoted   verbalization of feelings encouraged  Family/Support System Care:   self-care encouraged   support provided  Environmental Support: calm environment promoted     Problem: Fall Injury Risk  Goal: Absence of Fall and Fall-Related  Injury  Outcome: Ongoing, Progressing  Intervention: Identify and Manage Contributors  Flowsheets (Taken 3/11/2022 0520)  Self-Care Promotion: independence encouraged  Medication Review/Management: medications reviewed  Intervention: Promote Injury-Free Environment  Flowsheets (Taken 3/11/2022 0520)  Safety Promotion/Fall Prevention:   bed alarm set   Fall Risk reviewed with patient/family   Fall Risk signage in place   medications reviewed   lighting adjusted   high risk medications identified   nonskid shoes/socks when out of bed   /camera at bedside   room near unit station   side rails raised x 2   instructed to call staff for mobility     Problem: Infection Progression (Sepsis/Septic Shock)  Goal: Absence of Infection Signs and Symptoms  Outcome: Ongoing, Progressing  Intervention: Initiate Sepsis Management  Flowsheets (Taken 3/11/2022 0520)  Infection Prevention: single patient room provided     Problem: Infection  Goal: Absence of Infection Signs and Symptoms  Outcome: Ongoing, Progressing

## 2022-03-11 NOTE — ASSESSMENT & PLAN NOTE
He smokes 5-6 cigarettes/day.  Not trying to quit and not interested in a nicotine patch.  Benefits of smoking cessation were reviewed with patient in detail for for 8 minutes and patient was encouraged to quit. Nicotine replacement options were discussed, not needed.  He still does not want a patch and has been here more than a month without cigarettes.

## 2022-03-11 NOTE — PLAN OF CARE
Problem: Occupational Therapy Goal  Goal: Occupational Therapy Goal  Description: Goals to be met by: 3/18/2022    Patient will increase functional independence with ADLs by performing:    UE Dressing with [place].  LE Dressing with [place].  Grooming while standing at sink with Supervision.  Toileting from toilet with Supervision for hygiene and clothing management.   Toilet transfer to toilet with Supervision.    Outcome: Ongoing, Progressing     POC remains appropriate.  SNF is recommended upon d/c from acute care to further address deficits and help pt improve overall functional independence.     MICHELLE Willett  3/11/2022

## 2022-03-11 NOTE — PT/OT/SLP PROGRESS
Occupational Therapy   Treatment    Name: Forest Lopez  MRN: [id number]  Admitting Diagnosis:  Acute stroke due to ischemia       Recommendations:     Discharge Recommendations: nursing facility, skilled  Discharge Equipment Recommendations:  bedside commode, shower chair  Barriers to discharge:  Decreased caregiver support    Assessment:     Forest Lopez is a 72 y.o. male with a medical diagnosis of Acute stroke due to ischemia.  He presents with no pain. Performance deficits affecting function are weakness, impaired endurance, impaired self care skills, impaired functional mobilty, decreased safety awareness, impaired balance, impaired cognition, gait instability.  Pt agreeable to participating in therapy upon arrival to room.  Pt demonstrated improvement with LB dressing (socks) and UB dressing (gown) performing with SBA while seated at EOB.  Pt able to partially assist with anne-marie care in seated position after having bowel movement at bed level.  Once standing Total A required to clean backside with pt requiring CGA to maintain standing balance.  While ambulating in room impaired balance, decreased safety awareness, and postural instability noted requiring HHA/CGA.  Pt able to perform grooming task while standing at sink with CGA.      Overall, pt tolerated therapy well but reported SOB upon completion of session.  Encouragement required for task initiation with some delayed processing observed.  He would continue to benefit from skilled OT services to address problems listed above and increase independence with ADLs.  SNF is recommended upon d/c from acute care to further address deficits and help pt improve overall functional independence.         Rehab Prognosis:  Good; patient would benefit from acute skilled OT services to address these deficits and reach maximum level of function.       Plan:     Patient to be seen 3 x/week to address the above listed problems via self-care/home management, therapeutic  "activities, therapeutic exercises  · Plan of Care Expires: 04/02/22  · Plan of Care Reviewed with: patient    Subjective     Pain/Comfort:  · Pain Rating 1: 0/10  · Pain Rating Post-Intervention 1: 0/10    Objective:     Communicated with: RN (Marilee) prior to session.  Patient found HOB elevated with PICC line, Condom Catheter, bed alarm upon OT entry to room.  Upon arrival to room pt found to be soiled.  *OT asked pt if he is aware of when he has to have a bowel movement.  Pt responded "Yes".  OT discussed that pt has shown that he is able to ambulate to bathroom and use toilet with assist.  OT encouraged pt to utilize call button and ask for assist when he needs to use the restroom.  Pt verbalized understanding.    General Precautions: Standard, fall   Orthopedic Precautions:N/A   Braces: N/A  Respiratory Status: Room air     Occupational Performance:     Bed Mobility:    · Supine <> sit: SBA  · Scooting: SBA  · Rolling to right: SBA  · Sit <> Supine: CGA    Functional Mobility/Transfers:  · Sit <> Stand: CGA, HHA x 1 trial from EOB  · Functional Mobility: Pt ambulated ~20 ft in room with CGA/HHA.  · Impaired balance, postural instability, and decreased safety awareness noted    Activities of Daily Living:  · Grooming: CGA for washing hands while standing at sink.  · Mild impaired balance noted.  · Upper Body Dressing: SBA for doffing/donning gown while seated at EOB.  · Lower Body Dressing: SBA for donning socks while supine with HOB elevated.  · Pt flexed knee and brought leg towards chest  · Toileting: Total A for performing anne-marie care in standing position after bowel movement at bed level.  · Pt able to maintain balance with SBA-CGA; postural sway noted.  · Pt able to assist with cleaning front while supine with HOB elevated with SBA.      [facility] 6 Click ADL: 16    Treatment & Education:  *Pt educated on role of OT in acute care setting  *Pt performed LB dressing, UB dressing, toileting, and grooming; cues " provided for technique, balance, and safety.  *Pt ambulated within room to address coordination, endurance, and balance needed for functional mobility   *POC reviewed with pt    Patient left HOB elevated with all lines intact, call button in reach, bed alarm on, [employer] camera, clean pads underneath and RN (Yun) notifiedEducation:  .  -Condom catheter removed at start of session 2* soiled from BM; PCT present in room.  RN notified at end of session.    GOALS:   Multidisciplinary Problems     Occupational Therapy Goals        Problem: Occupational Therapy Goal    Goal Priority Disciplines Outcome Interventions   Occupational Therapy Goal     OT, PT/OT Ongoing, Progressing    Description: Goals to be met by: 3/18/2022    Patient will increase functional independence with ADLs by performing:    UE Dressing with [place].  LE Dressing with [place].  Grooming while standing at sink with Supervision.  Toileting from toilet with Supervision for hygiene and clothing management.   Toilet transfer to toilet with Supervision.                     Time Tracking:     OT Date of Treatment: 03/11/22  OT Start Time: 1322  OT Stop Time: 1353  OT Total Time (min): 31 min    Billable Minutes:Self Care/Home Management 31    OT/DUTCH: OT          3/11/2022

## 2022-03-11 NOTE — ASSESSMENT & PLAN NOTE
As noted above.  He has not had any behavioral issues while here, and other than being disinterested in therapy he has been very cooperative.  In addition he has had no outside visitors and we do not feel cocaine use will be an issue for him in the future during rehab.  Psychiatry evaluated him 3/3, did not recommend PEC, might benefit from outpatient treatment.  His motivation to do therapy has been intermittent.  Notably, one of the stroke foci is in the frontal lobe, which might have affected his motivation level and personality.  Discussed with therapy and requested they be more aggressive in working with him.  Participation improving.

## 2022-03-11 NOTE — PT/OT/SLP PROGRESS
Speech Language Pathology Treatment    Patient Name:  Forest Lopez   MRN:  [id number]  Admitting Diagnosis: Acute stroke due to ischemia    Recommendations:                  General Recommendations:   1. Speech pathology to continue to follow 2-3x/week for ongoing assessment of cognitive-communicative deficits s/p acute infarcts of L cerebral hemisphere     Diet recommendations: Solids: Mechanical soft solids, Liquids: Thin      Aspiration Precautions: Eliminate distractions, Feed only when awake/alert, Frequent oral care and HOB to 90 degrees      General Precautions: Standard,       Communication strategies:  Reduce informational content/context; frequent repetition and cues to redirect    Subjective     · Pt awake/alert, reclined in bed  · Agreeable to participate in SLP session without motivational cues this date    Respiratory Status: Room air    Objective:     Has the patient been evaluated by SLP for swallowing?   Yes  Keep patient NPO? No   Current Respiratory Status:    Room air    Cognitive-Communicative and Language Status:  SLP repositioned pt to EOB to participate in tx session. Pt oriented to person, place, and general situation. All environmental distractor removed prior to beginning session. Pt continued with highly distractible state during session, fidgeting with blankets and therapy materials. Required mod-MAX cues to sustain attention for 1-3 minute increments.     Targeting expressive aphasia and word fluency, pt complete word formation and sentence generation task. Pt able to re-arrange 4 letters to create a word with 25% acc IND and 75% acc given mod-MAX verbal and semantic cues. Pt able to formulate a sentence given word with 50% acc given MAX verbal, semantic, redirectional and motivational cues.     Swallowing:  SLP set-up pt with breakfast tray at end of session. RN at bedside for med administration given whole with sips of thin liquids. WFL for labial seal, bolus prep/mastication, and a-p  transport. No overt s/s of aspiration or airway threat during po intake or med administration- no coughing, choking, changes in breath stream or vocal quality    Education: Pt would benefit from ongoing SLP services at next level of care.     Assessment:     Forest Lopez is a 72 y.o. 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    Goals:   Multidisciplinary Problems     SLP Goals        Problem: SLP Goal    Goal Priority Disciplines Outcome   SLP Goal     SLP Ongoing, Progressing   Description: 1. Pt will consume a regular/thin diet without overt s/s of aspiration or airway threat without assistance.  2. Pt will increase orientation to person, place, situation and date with 90% acc given min assist.  3. Pt will follow 3-4 step commands to increase functional IND with 75% acc given min assist.   4. Pt will complete immediate and delayed recall tasks with 75% acc given mod assist.  5. Targeting word finding, pt will complete divergent naming tasks given 1-minute time frame with 50% acc given mod assist.  6. Ongoing cognitive-communicative assessment.     Updated Goals 2/8:  7. Pt will sustain attention to topic/task without distraction in 5-10 minute increments given mod verbal cues.                   Plan:     · Patient to be seen:  2 x/week, 3 x/week   · Plan of Care expires:  03/21/22  · Plan of Care reviewed with:  patient   · SLP Follow-Up:  Yes       Discharge recommendations:  nursing facility, skilled     Time Tracking:     SLP Treatment Date:   03/11/22  Speech Start Time:  0846  Speech Stop Time:  0926     Speech Total Time (min):  40 min    Billable Minutes: Speech Therapy Individual 10 min; treatment swallowing dysfunction 30 min    03/11/2022

## 2022-03-11 NOTE — PROGRESS NOTES
[facility] Medicine  Progress Note    Patient Name: Forest Lopez  MRN: [id number]  Patient Class: IP- Inpatient   Admission Date: 1/31/2022  Length of Stay: 39 days  Attending Physician: Mojgan Bosch MD  Primary Care Provider: Julian Escobar        Subjective:     Principal Problem:Acute stroke due to ischemia        HPI:  Mr. Lopez is a 72 year old man who presented after a syncopal episode.  He reports he had been feeling well prior to the episode but then had a prodrome prior to passing out.  EMS was called, report patient's BP was low normal on their arrival, improved after an IV fluids bolus.  Patient denies chest pain or shortness of breath and says he does not feel weak currently although is rather tired.  When seen in the ED this morning he could barely express himself audibly.     Vital signs were normal on presentation here.  He is on warfarin for recurrent DVT, but his INR was 1, and d-dimer markedly elevated at 21.7.  Tox screen was positive for cocaine.  He had a CTA of the brain and neck done on presentation so CTA of the chest for PE study could not be done for 48 hours.  Ultrasound of the lower extremities showed extensive bilateral DVT.  On the right there was thrombosis of the common femoral vein, femoral vein, popliteal vein, one of the paired posterior tibial veins and both visualized peroneal veins.  On the left there was thrombosis of the common femoral vein, femoral vein and popliteal vein.  Patient was started on full dose Lovenox and admitted.    Medical history is from the chart as he is unable to give me much information.  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  He smokes 5-6 cigarettes/day and is not interested in quitting.  He is currently homeless and staying with a friend.  Despite the normal INR he is sure  he is taking his warfarin and is not having difficulty taking his medications.  I discussed his care with [facility] staff on the [facility] and patient had been lost to follow up since November 2021.      Overview/Hospital Course:  Patient presented to the ED after a syncopal episode and was found to have bilateral lower extremity DVTs and a low INR.  Tox screen was positive for cocaine.  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  He was started back on his warfarin with bridging Lovenox.  His 2D echo showed grade I diastolic dysfunction.  Neurology evaluated him and recommended echo with bubble study, which was negative for a shunt.    PT/OT evaluated him and recommended SNF placement.  His INR was difficult to get therapeutic, and as he had no contraindication to a NOAC his warfarin was changed to apixaban, and the full dose Lovenox was discontinued.  As he appeared to be depressed and had no objection to starting an antidepressant Lexapro was started.  He had a fall in the hospital on 2/13, had gone to the bathroom for a BM with the nurse, and when she turned away while he was washing his hands he apparently lost his balance and fell.  He did not hit his head and did not lose consciousness, but his BP was low transiently and he was given a bolus of IV fluids.    Patient's mental status improved slowly, but he gradually became more talkative and was able to hold a brief conversation.  He was diagnosed with a polymicrobial UTI on 2/21 and completed treatment with antibiotics.  Psychiatry evaluated him on 3/3 and changed his antidepressant to Prozac as it can be more activating and patient was having difficulty with his level of motivation.      Patient's discharge was delayed as he was not accepted by any SNF facilities in the area.  Reasons for the denials are unclear as he has a clear rehab diagnosis.  He has a history of drug  use but has not shown any interest in getting any illicit or legal drugs since he has been here, and he has had no visitors that could have provided drugs to him.  He has been pleasant and cooperative while here although lacks motivation, likely due to the nature of the stroke affecting his frontal lobe.  He requires considerable encouragement to participate in therapy.  Discharging him to live on the street or shelter would not be appropriate as he would not be able to take care of himself and likely will need senior living nursing home care eventually after he gets the chance to improve with rehab.      Interval History:  Participation in therapy improving.  Patient is awaiting placement and is anxious to start SNF.    Review of Systems   Constitutional:  Negative for chills and fever.   Respiratory:  Negative for cough and shortness of breath.    Cardiovascular:  Negative for chest pain and palpitations.   Objective:     Vital Signs (Most Recent):  Temp: 98.3 °F (36.8 °C) (03/11/22 1638)  Pulse: 88 (03/11/22 1638)  Resp: 12 (03/11/22 1638)  BP: 124/75 (03/11/22 1638)  SpO2: 99 % (03/11/22 1638) Vital Signs (24h Range):  Temp:  [97.8 °F (36.6 °C)-98.8 °F (37.1 °C)] 98.3 °F (36.8 °C)  Pulse:  [] 88  Resp:  [] 12  SpO2:  [94 %-99 %] 99 %  BP: (114-149)/(70-86) 124/75     Weight: 63 kg (139 lb)  Body mass index is 18.85 kg/m².    Intake/Output Summary (Last 24 hours) at 3/11/2022 1705  Last data filed at 3/11/2022 0500  Gross per 24 hour   Intake 550 ml   Output 500 ml   Net 50 ml      Physical Exam  Constitutional:       General: He is not in acute distress.     Comments: Thin   HENT:      Mouth/Throat:      Comments: Edentulous  Cardiovascular:      Rate and Rhythm: Normal rate and regular rhythm.      Pulses: Normal pulses.      Heart sounds: Normal heart sounds. No murmur heard.    No gallop.   Pulmonary:      Effort: Pulmonary effort is normal.      Breath sounds: Normal breath sounds.   Abdominal:       General: Bowel sounds are normal.      Palpations: Abdomen is soft.   Skin:     General: Skin is warm and dry.   Neurological:      Mental Status: He is alert.      Comments: Not oriented to time.       Significant Labs: All pertinent labs within the past 24 hours have been reviewed.    Significant Imaging: I have reviewed all pertinent imaging results/findings within the past 24 hours.      Assessment/Plan:      * Acute stroke due to ischemia  - MRI showed areas of diffusion signal hyperintensity consistent with areas of acute ischemia/infarct involving the left cerebral hemisphere, the most prominent measuring approximately 1.4 cm, also a small focus of the right frontal lobe.  - Patient on full dose anticoagulation currently due to acute bilateral DVT.  - Sinus rhythm noted throughout hospital stay  - Risk factor modification - control diabetes, continue statin.  - RPR, HIV non reactive.  B12 low normal.   - CTA showed a focal segment of 60-70% stenosis involving the proximal to mid left vertebral artery that was new when compared to the prior study of 09/25/2019.  No evidence of large vessel intracranial occlusion.   - Neurology noted symptoms do not correspond to stroke location, although the frontal lobe stroke might have something to do with his lack of motivation, and Speech has noted he has had delayed swallowing.  - Echo with bubble study done, no shunt seen.  - Follow up with vascular neurology in 4 weeks.  - Therapy recommending SNF due to his low interest in participation.  - Started Lexapro due to suspicion for depression.  - Psych consulted, changed to Prozac and started thiamine, folic acid, MVI due to previous history of alcohol abuse, although Wernicke encephalopathy is not suspected.  - Re-consulted SNF for rehab from the stroke, as discharging him to a shelter would be inappropriate and unsafe for him.  Discussed with his son, Forest, at length on 3/10.  Forest is out of town on a job but will be  returning to [place] eventually.  He agrees his father will eventually need nursing home care as he is unable to care for himself.      Acute deep vein thrombosis (DVT) of both femoral veins  Last ultrasound showed partially occlusive DVTs, now completely occlusive DVT of multiple lower extremity veins on ultrasound.  Patient reports compliance with warfarin but his INR is only one.  He is homeless but says he does not have difficulty obtaining his medications.  Does not seem to care about anything, which again might be due to the frontal lobe stroke.  D-dimer was markedly elevated on presentation and there was a question of possible PE, but he had a CTA of the brain/neck on presentation so could not get another CTA for another 48 hours.    V/Q scan was done, showed low probability for PE.  2D echo showed grade I diastolic dysfunction.  Warfarin was restarted with bridging Lovenox, but INR was still low.  Given the difficulty of getting him to follow up changed to apixaban and discontinued Lovenox.    Abnormal imaging of thyroid  Tsh/t4 ok, thyroid ultrasound done, showed bilateral nodules which did not meet criteria for biopsy.    Advance care planning        Severe protein-calorie malnutrition  Diet as tolerated      Debility  PT/OT recommending SNF after stroke with debility/poor motivation  Awaiting SNF, having difficulty finding placement for unclear reasons.  Will continue to pursue this as above.      Type 2 diabetes mellitus with hyperglycemia, without long-term current use of insulin  Reportedly he is on metformin and glipizide, both held.  Continue SSI for now.  He has 4+ sugar in urine and HbA1c is 10.3, and he is hyperglycemic here.  Will start levemir and continue ISS, increase levemir to 25 units daily  Add scheduled novolog 5 units tid, increase to 7 units  Improving  OK to resume metformin now.  Increased levemir to 28 units daily with good improvement.    Syncope and collapse  Unclear cause.   Spoke to [facility] and patient has had previous syncopal episodes attributed to alcohol abuse.  Has been prescribed meclizine as well.  Tox positive for cocaine but not alcohol, and he denies current use of both.  CTA brain/neck was done in ED, and per ED note the results were discussed with stroke neurologist Dr. Tovar who felt that CTA finding of a short segment of left vertebral artery stenosis was incidental.  He did not think stroke workup with MRI was indicated.    Since change of status per son compared to when he saw him early during the week a CT of the brain was ordered and no abnormalities seen.  MRI of the brain showed acute strokes of the left cerebral hemisphere and right frontal lobe.      Cocaine abuse  As noted above.  He has not had any behavioral issues while here, and other than being disinterested in therapy he has been very cooperative.  In addition he has had no outside visitors and we do not feel cocaine use will be an issue for him in the future during rehab.  Psychiatry evaluated him 3/3, did not recommend PEC, might benefit from outpatient treatment.  His motivation to do therapy has been intermittent.  Notably, one of the stroke foci is in the frontal lobe, which might have affected his motivation level and personality.  Discussed with therapy and requested they be more aggressive in working with him.  Participation improving.        Hyperlipidemia  Resumed lipitor      Essential hypertension  Doubtful he was on anything at home.  Started losartan 25 mg daily  BP well controlled on low dose losartan.  BP transiently low after he fell but recovered quickly.  IVF bolus given.  Losartan discontinued as BP persistently low-normal.    Tobacco dependence  He smokes 5-6 cigarettes/day.  Not trying to quit and not interested in a nicotine patch.  Benefits of smoking cessation were reviewed with patient in detail for for 8 minutes and patient was encouraged to quit. Nicotine replacement options were  discussed, not needed.  He still does not want a patch and has been here more than a month without cigarettes.      VTE Risk Mitigation (From admission, onward)         Ordered     apixaban tablet 5 mg  2 times daily         02/10/22 0901     Place sequential compression device  Until discontinued         01/31/22 0534                        Mojgan Meza MD  Department of Hospital Medicine   [facility] ([place])

## 2022-03-11 NOTE — ASSESSMENT & PLAN NOTE
Reportedly he is on metformin and glipizide, both held.  Continue SSI for now.  He has 4+ sugar in urine and HbA1c is 10.3, and he is hyperglycemic here.  Will start levemir and continue ISS, increase levemir to 25 units daily  Add scheduled novolog 5 units tid, increase to 7 units  Improving  OK to resume metformin now.  Increased levemir to 28 units daily with good improvement.

## 2022-03-12 LAB
POCT GLUCOSE: 138 MG/DL (ref 70–110)
POCT GLUCOSE: 139 MG/DL (ref 70–110)
POCT GLUCOSE: 145 MG/DL (ref 70–110)
POCT GLUCOSE: 192 MG/DL (ref 70–110)

## 2022-03-12 PROCEDURE — 25000003 PHARM REV CODE 250: Performed by: INTERNAL MEDICINE

## 2022-03-12 PROCEDURE — 25000003 PHARM REV CODE 250: Performed by: HOSPITALIST

## 2022-03-12 PROCEDURE — A4216 STERILE WATER/SALINE, 10 ML: HCPCS | Performed by: INTERNAL MEDICINE

## 2022-03-12 PROCEDURE — 99231 PR SUBSEQUENT HOSPITAL CARE,LEVL I: ICD-10-PCS | Mod: ,,, | Performed by: HOSPITALIST

## 2022-03-12 PROCEDURE — 94761 N-INVAS EAR/PLS OXIMETRY MLT: CPT

## 2022-03-12 PROCEDURE — 25000003 PHARM REV CODE 250: Performed by: NURSE PRACTITIONER

## 2022-03-12 PROCEDURE — 11000001 HC ACUTE MED/SURG PRIVATE ROOM

## 2022-03-12 PROCEDURE — 99231 SBSQ HOSP IP/OBS SF/LOW 25: CPT | Mod: ,,, | Performed by: HOSPITALIST

## 2022-03-12 RX ADMIN — THIAMINE HCL TAB 100 MG 100 MG: 100 TAB at 09:03

## 2022-03-12 RX ADMIN — METFORMIN HYDROCHLORIDE 500 MG: 500 TABLET ORAL at 06:03

## 2022-03-12 RX ADMIN — INSULIN ASPART 7 UNITS: 100 INJECTION, SOLUTION INTRAVENOUS; SUBCUTANEOUS at 12:03

## 2022-03-12 RX ADMIN — INSULIN ASPART 7 UNITS: 100 INJECTION, SOLUTION INTRAVENOUS; SUBCUTANEOUS at 06:03

## 2022-03-12 RX ADMIN — ATORVASTATIN CALCIUM 80 MG: 20 TABLET, FILM COATED ORAL at 09:03

## 2022-03-12 RX ADMIN — APIXABAN 5 MG: 2.5 TABLET, FILM COATED ORAL at 09:03

## 2022-03-12 RX ADMIN — FLUOXETINE 20 MG: 20 CAPSULE ORAL at 09:03

## 2022-03-12 RX ADMIN — METFORMIN HYDROCHLORIDE 500 MG: 500 TABLET ORAL at 09:03

## 2022-03-12 RX ADMIN — THERA TABS 1 TABLET: TAB at 09:03

## 2022-03-12 RX ADMIN — Medication 10 ML: at 06:03

## 2022-03-12 RX ADMIN — Medication 10 ML: at 12:03

## 2022-03-12 RX ADMIN — Medication 10 ML: at 11:03

## 2022-03-12 RX ADMIN — INSULIN ASPART 7 UNITS: 100 INJECTION, SOLUTION INTRAVENOUS; SUBCUTANEOUS at 09:03

## 2022-03-12 RX ADMIN — FOLIC ACID 1 MG: 1 TABLET ORAL at 09:03

## 2022-03-12 RX ADMIN — TAMSULOSIN HYDROCHLORIDE 0.4 MG: 0.4 CAPSULE ORAL at 09:03

## 2022-03-12 RX ADMIN — INSULIN DETEMIR 28 UNITS: 100 INJECTION, SOLUTION SUBCUTANEOUS at 09:03

## 2022-03-12 NOTE — PLAN OF CARE
Plan of care reviewed with pt. Hourly rounding performed. No complaints of pain during the shift. Pt able to swallow his meds whole. Insulin given according to MAR. No sliding scale insulin needed. Pt removed condom cath twice during shift. No BM today. Bed lowered and locked. Call bell and personal items within reach.

## 2022-03-12 NOTE — PROGRESS NOTES
[facility] Medicine  Progress Note    Patient Name: Forest Lopez  MRN: [id number]  Patient Class: IP- Inpatient   Admission Date: 1/31/2022  Length of Stay: 40 days  Attending Physician: Mojgan Bosch MD  Primary Care Provider: Julian Escobar        Subjective:     Principal Problem:Acute stroke due to ischemia        HPI:  Mr. Lopez is a 72 year old man who presented after a syncopal episode.  He reports he had been feeling well prior to the episode but then had a prodrome prior to passing out.  EMS was called, report patient's BP was low normal on their arrival, improved after an IV fluids bolus.  Patient denies chest pain or shortness of breath and says he does not feel weak currently although is rather tired.  When seen in the ED this morning he could barely express himself audibly.     Vital signs were normal on presentation here.  He is on warfarin for recurrent DVT, but his INR was 1, and d-dimer markedly elevated at 21.7.  Tox screen was positive for cocaine.  He had a CTA of the brain and neck done on presentation so CTA of the chest for PE study could not be done for 48 hours.  Ultrasound of the lower extremities showed extensive bilateral DVT.  On the right there was thrombosis of the common femoral vein, femoral vein, popliteal vein, one of the paired posterior tibial veins and both visualized peroneal veins.  On the left there was thrombosis of the common femoral vein, femoral vein and popliteal vein.  Patient was started on full dose Lovenox and admitted.    Medical history is from the chart as he is unable to give me much information.  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  He smokes 5-6 cigarettes/day and is not interested in quitting.  He is currently homeless and staying with a friend.  Despite the normal INR he is sure  he is taking his warfarin and is not having difficulty taking his medications.  I discussed his care with [facility] staff on the [facility] and patient had been lost to follow up since November 2021.      Overview/Hospital Course:  Patient presented to the ED after a syncopal episode and was found to have bilateral lower extremity DVTs and a low INR.  Tox screen was positive for cocaine.  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  He was started back on his warfarin with bridging Lovenox.  His 2D echo showed grade I diastolic dysfunction.  Neurology evaluated him and recommended echo with bubble study, which was negative for a shunt.    PT/OT evaluated him and recommended SNF placement.  His INR was difficult to get therapeutic, and as he had no contraindication to a NOAC his warfarin was changed to apixaban, and the full dose Lovenox was discontinued.  As he appeared to be depressed and had no objection to starting an antidepressant Lexapro was started.  He had a fall in the hospital on 2/13, had gone to the bathroom for a BM with the nurse, and when she turned away while he was washing his hands he apparently lost his balance and fell.  He did not hit his head and did not lose consciousness, but his BP was low transiently and he was given a bolus of IV fluids.    Patient's mental status improved slowly, but he gradually became more talkative and was able to hold a brief conversation.  He was diagnosed with a polymicrobial UTI on 2/21 and completed treatment with antibiotics.  Psychiatry evaluated him on 3/3 and changed his antidepressant to Prozac as it can be more activating and patient was having difficulty with his level of motivation.      Patient's discharge was delayed as he was not accepted by any SNF facilities in the area.  Reasons for the denials are unclear as he has a clear rehab diagnosis.  He has a history of drug  use but has not shown any interest in getting any illicit or legal drugs since he has been here, and he has had no visitors that could have provided drugs to him.  He has been pleasant and cooperative while here although lacks motivation, likely due to the nature of the stroke affecting his frontal lobe.  He requires considerable encouragement to participate in therapy.  Discharging him to live on the street or shelter would not be appropriate as he would not be able to take care of himself and likely will need long-term nursing home care eventually after he gets the chance to improve with rehab.      Interval History:  Doing well with therapy, waiting for SNF placement.    Review of Systems   Constitutional:  Negative for chills and fever.   Respiratory:  Negative for cough and shortness of breath.    Cardiovascular:  Negative for chest pain and palpitations.   Objective:     Vital Signs (Most Recent):  Temp: 98.7 °F (37.1 °C) (03/12/22 1143)  Pulse: 82 (03/12/22 1143)  Resp: 12 (03/12/22 1143)  BP: 125/68 (03/12/22 1143)  SpO2: 97 % (03/12/22 1143) Vital Signs (24h Range):  Temp:  [98.1 °F (36.7 °C)-98.7 °F (37.1 °C)] 98.7 °F (37.1 °C)  Pulse:  [82-93] 82  Resp:  [12-18] 12  SpO2:  [97 %-99 %] 97 %  BP: (105-136)/(63-81) 125/68     Weight: 63 kg (139 lb)  Body mass index is 18.85 kg/m².    Intake/Output Summary (Last 24 hours) at 3/12/2022 1525  Last data filed at 3/12/2022 0600  Gross per 24 hour   Intake 240 ml   Output 600 ml   Net -360 ml      Physical Exam  Constitutional:       General: He is not in acute distress.     Comments: Thin   HENT:      Mouth/Throat:      Comments: Edentulous  Cardiovascular:      Rate and Rhythm: Normal rate and regular rhythm.      Pulses: Normal pulses.      Heart sounds: Normal heart sounds. No murmur heard.    No gallop.   Pulmonary:      Effort: Pulmonary effort is normal.      Breath sounds: Normal breath sounds.   Abdominal:      General: Bowel sounds are normal.       Palpations: Abdomen is soft.   Skin:     General: Skin is warm and dry.   Neurological:      Mental Status: He is alert.      Comments: Not oriented to time.       Significant Labs: All pertinent labs within the past 24 hours have been reviewed.    Significant Imaging: I have reviewed all pertinent imaging results/findings within the past 24 hours.      Assessment/Plan:      * Acute stroke due to ischemia  - MRI showed areas of diffusion signal hyperintensity consistent with areas of acute ischemia/infarct involving the left cerebral hemisphere, the most prominent measuring approximately 1.4 cm, also a small focus of the right frontal lobe.  - Patient on full dose anticoagulation currently due to acute bilateral DVT.  - Sinus rhythm noted throughout hospital stay  - Risk factor modification - control diabetes, continue statin.  - RPR, HIV non reactive.  B12 low normal.   - CTA showed a focal segment of 60-70% stenosis involving the proximal to mid left vertebral artery that was new when compared to the prior study of 09/25/2019.  No evidence of large vessel intracranial occlusion.   - Neurology noted symptoms do not correspond to stroke location, although the frontal lobe stroke might have something to do with his lack of motivation, and Speech has noted he has had delayed swallowing.  - Echo with bubble study done, no shunt seen.  - Follow up with vascular neurology in 4 weeks.  - Therapy recommending SNF due to his low interest in participation.  - Started Lexapro due to suspicion for depression.  - Psych consulted, changed to Prozac and started thiamine, folic acid, MVI due to previous history of alcohol abuse, although Wernicke encephalopathy is not suspected.  - Re-consulted SNF for rehab from the stroke, as discharging him to a shelter would be inappropriate and unsafe for him.  Discussed with his son, Forest, at length on 3/10.  Forest is out of town on a job but will be returning to [place] eventually.   He agrees his father will eventually need nursing home care as he is unable to care for himself.      Acute deep vein thrombosis (DVT) of both femoral veins  Last ultrasound showed partially occlusive DVTs, now completely occlusive DVT of multiple lower extremity veins on ultrasound.  Patient reports compliance with warfarin but his INR is only one.  He is homeless but says he does not have difficulty obtaining his medications.  Does not seem to care about anything, which again might be due to the frontal lobe stroke.  D-dimer was markedly elevated on presentation and there was a question of possible PE, but he had a CTA of the brain/neck on presentation so could not get another CTA for another 48 hours.    V/Q scan was done, showed low probability for PE.  2D echo showed grade I diastolic dysfunction.  Warfarin was restarted with bridging Lovenox, but INR was still low.  Given the difficulty of getting him to follow up changed to apixaban and discontinued Lovenox.    Abnormal imaging of thyroid  Tsh/t4 ok, thyroid ultrasound done, showed bilateral nodules which did not meet criteria for biopsy.    Advance care planning        Severe protein-calorie malnutrition  Diet as tolerated      Debility  PT/OT recommending SNF after stroke with debility/poor motivation  Awaiting SNF, having difficulty finding placement for unclear reasons.  Will continue to pursue this as above.      Type 2 diabetes mellitus with hyperglycemia, without long-term current use of insulin  Reportedly he is on metformin and glipizide, both held.  Continue SSI for now.  He has 4+ sugar in urine and HbA1c is 10.3, and he is hyperglycemic here.  Will start levemir and continue ISS, increase levemir to 25 units daily  Add scheduled novolog 5 units tid, increase to 7 units  Improving  OK to resume metformin now.  Increased levemir to 28 units daily with good improvement.    Syncope and collapse  Unclear cause.  Spoke to [facility] and patient has had  previous syncopal episodes attributed to alcohol abuse.  Has been prescribed meclizine as well.  Tox positive for cocaine but not alcohol, and he denies current use of both.  CTA brain/neck was done in ED, and per ED note the results were discussed with stroke neurologist Dr. Tovar who felt that CTA finding of a short segment of left vertebral artery stenosis was incidental.  He did not think stroke workup with MRI was indicated.    Since change of status per son compared to when he saw him early during the week a CT of the brain was ordered and no abnormalities seen.  MRI of the brain showed acute strokes of the left cerebral hemisphere and right frontal lobe.      Cocaine abuse  As noted above.  He has not had any behavioral issues while here, and other than being disinterested in therapy he has been very cooperative.  In addition he has had no outside visitors and we do not feel cocaine use will be an issue for him in the future during rehab.  Psychiatry evaluated him 3/3, did not recommend PEC, might benefit from outpatient treatment.  His motivation to do therapy has been intermittent.  Notably, one of the stroke foci is in the frontal lobe, which might have affected his motivation level and personality.  Discussed with therapy and requested they be more aggressive in working with him.  Participation improving.        Hyperlipidemia  Resumed lipitor      Essential hypertension  Doubtful he was on anything at home.  Started losartan 25 mg daily  BP well controlled on low dose losartan.  BP transiently low after he fell but recovered quickly.  IVF bolus given.  Losartan discontinued as BP persistently low-normal.    Tobacco dependence  He smokes 5-6 cigarettes/day.  Not trying to quit and not interested in a nicotine patch.  Benefits of smoking cessation were reviewed with patient in detail for for 8 minutes and patient was encouraged to quit. Nicotine replacement options were discussed, not needed.  He still  does not want a patch and has been here more than a month without cigarettes.      VTE Risk Mitigation (From admission, onward)         Ordered     apixaban tablet 5 mg  2 times daily         02/10/22 0901     Place sequential compression device  Until discontinued         01/31/22 0533                Discharge Planning   EFRAÍN:      Code Status: Full Code   Is the patient medically ready for discharge?:     Reason for patient still in hospital (select all that apply): Pending disposition  Discharge Plan A: Skilled Nursing Facility   Discharge Delays: (!) Other (No facility has accepted the patient as of yet.)              Mojgan Meza MD  Department of Hospital Medicine   [facility] ([place])

## 2022-03-12 NOTE — ASSESSMENT & PLAN NOTE
- MRI showed areas of diffusion signal hyperintensity consistent with areas of acute ischemia/infarct involving the left cerebral hemisphere, the most prominent measuring approximately 1.4 cm, also a small focus of the right frontal lobe.  - Patient on full dose anticoagulation currently due to acute bilateral DVT.  - Sinus rhythm noted throughout hospital stay  - Risk factor modification - control diabetes, continue statin.  - RPR, HIV non reactive.  B12 low normal.   - CTA showed a focal segment of 60-70% stenosis involving the proximal to mid left vertebral artery that was new when compared to the prior study of 09/25/2019.  No evidence of large vessel intracranial occlusion.   - Neurology noted symptoms do not correspond to stroke location, although the frontal lobe stroke might have something to do with his lack of motivation, and Speech has noted he has had delayed swallowing.  - Echo with bubble study done, no shunt seen.  - Follow up with vascular neurology in 4 weeks.  - Therapy recommending SNF due to his low interest in participation.  - Started Lexapro due to suspicion for depression.  - Psych consulted, changed to Prozac and started thiamine, folic acid, MVI due to previous history of alcohol abuse, although Wernicke encephalopathy is not suspected.  - Re-consulted SNF for rehab from the stroke, as discharging him to a shelter would be inappropriate and unsafe for him.  Discussed with his son, Forest, at length on 3/10.  Forest is out of town on a job but will be returning to [place] eventually.  He agrees his father will eventually need nursing home care as he is unable to care for himself.

## 2022-03-12 NOTE — SUBJECTIVE & OBJECTIVE
Interval History:  Doing well with therapy, waiting for SNF placement.    Review of Systems   Constitutional:  Negative for chills and fever.   Respiratory:  Negative for cough and shortness of breath.    Cardiovascular:  Negative for chest pain and palpitations.   Objective:     Vital Signs (Most Recent):  Temp: 98.7 °F (37.1 °C) (03/12/22 1143)  Pulse: 82 (03/12/22 1143)  Resp: 12 (03/12/22 1143)  BP: 125/68 (03/12/22 1143)  SpO2: 97 % (03/12/22 1143) Vital Signs (24h Range):  Temp:  [98.1 °F (36.7 °C)-98.7 °F (37.1 °C)] 98.7 °F (37.1 °C)  Pulse:  [82-93] 82  Resp:  [12-18] 12  SpO2:  [97 %-99 %] 97 %  BP: (105-136)/(63-81) 125/68     Weight: 63 kg (139 lb)  Body mass index is 18.85 kg/m².    Intake/Output Summary (Last 24 hours) at 3/12/2022 1525  Last data filed at 3/12/2022 0600  Gross per 24 hour   Intake 240 ml   Output 600 ml   Net -360 ml      Physical Exam  Constitutional:       General: He is not in acute distress.     Comments: Thin   HENT:      Mouth/Throat:      Comments: Edentulous  Cardiovascular:      Rate and Rhythm: Normal rate and regular rhythm.      Pulses: Normal pulses.      Heart sounds: Normal heart sounds. No murmur heard.    No gallop.   Pulmonary:      Effort: Pulmonary effort is normal.      Breath sounds: Normal breath sounds.   Abdominal:      General: Bowel sounds are normal.      Palpations: Abdomen is soft.   Skin:     General: Skin is warm and dry.   Neurological:      Mental Status: He is alert.      Comments: Not oriented to time.       Significant Labs: All pertinent labs within the past 24 hours have been reviewed.    Significant Imaging: I have reviewed all pertinent imaging results/findings within the past 24 hours.

## 2022-03-12 NOTE — PROGRESS NOTES
Pt sleeping and not wanting to eat dinner at this time. His scheduled insulin was held. MARIBEL Olson advised to give insulin if pt decides to eat dinner.

## 2022-03-12 NOTE — ASSESSMENT & PLAN NOTE
Unclear cause.  Spoke to [facility] and patient has had previous syncopal episodes attributed to alcohol abuse.  Has been prescribed meclizine as well.  Tox positive for cocaine but not alcohol, and he denies current use of both.  CTA brain/neck was done in ED, and per ED note the results were discussed with stroke neurologist Dr. Tovar who felt that CTA finding of a short segment of left vertebral artery stenosis was incidental.  He did not think stroke workup with MRI was indicated.    Since change of status per son compared to when he saw him early during the week a CT of the brain was ordered and no abnormalities seen.  MRI of the brain showed acute strokes of the left cerebral hemisphere and right frontal lobe.

## 2022-03-12 NOTE — PLAN OF CARE
03/11/22 1913   Post-Acute Status   Post-Acute Authorization Placement  (SNF)   Post-Acute Placement Status Referrals Sent   [facility] (SPECIAL NEEDS PLAN   Patient choice form signed by patient/caregiver List from CMS Compare   Discharge Delays (!) Other  (No facility has accepted the patient as of yet.)   Discharge Plan   Discharge Plan A Skilled Nursing Facility   Discharge Plan B New Nursing Home placement - nursing home care facility      SHANICE discussed difficulty of placing the patient w/ Segundo Espinal, from [facility], who can   assists with SNF placements.  He suggested that at times an LTAC may accept someone when a SNF would not. He did not suggest any SNF that might accept the patient.    [facility], who MAY be interested in accepting the patient.  SHANICE spoke with Thomas, in admissions there. She said that there would be beds available at the   beginning of next week. The patient's updated referral information will be reviewed then.

## 2022-03-12 NOTE — PLAN OF CARE
Pt in bed, no noted acute distress, no complaints of pain, AAO x 3, pt asleep and did not eat dinner, blood sugar monitored, no supplemental insulin needed, no injuries or falls during shift, x 1 incontinent BM during shift, condom cath replaced, bed in low locked position, call light in reach, hourly rounds complete, will continue to assess

## 2022-03-13 LAB
POCT GLUCOSE: 108 MG/DL (ref 70–110)
POCT GLUCOSE: 127 MG/DL (ref 70–110)
POCT GLUCOSE: 168 MG/DL (ref 70–110)
POCT GLUCOSE: 173 MG/DL (ref 70–110)

## 2022-03-13 PROCEDURE — A4216 STERILE WATER/SALINE, 10 ML: HCPCS | Performed by: EMERGENCY MEDICINE

## 2022-03-13 PROCEDURE — 36406 VNPNXR<3YRS PHY/QHP OTHER VN: CPT

## 2022-03-13 PROCEDURE — 25000003 PHARM REV CODE 250: Performed by: INTERNAL MEDICINE

## 2022-03-13 PROCEDURE — 99231 SBSQ HOSP IP/OBS SF/LOW 25: CPT | Mod: ,,, | Performed by: HOSPITALIST

## 2022-03-13 PROCEDURE — 99231 PR SUBSEQUENT HOSPITAL CARE,LEVL I: ICD-10-PCS | Mod: ,,, | Performed by: HOSPITALIST

## 2022-03-13 PROCEDURE — 25000003 PHARM REV CODE 250: Performed by: EMERGENCY MEDICINE

## 2022-03-13 PROCEDURE — 25000003 PHARM REV CODE 250: Performed by: NURSE PRACTITIONER

## 2022-03-13 PROCEDURE — 25000003 PHARM REV CODE 250: Performed by: HOSPITALIST

## 2022-03-13 PROCEDURE — 11000001 HC ACUTE MED/SURG PRIVATE ROOM

## 2022-03-13 PROCEDURE — A4216 STERILE WATER/SALINE, 10 ML: HCPCS | Performed by: INTERNAL MEDICINE

## 2022-03-13 RX ADMIN — Medication 10 ML: at 05:03

## 2022-03-13 RX ADMIN — THIAMINE HCL TAB 100 MG 100 MG: 100 TAB at 09:03

## 2022-03-13 RX ADMIN — Medication 10 ML: at 11:03

## 2022-03-13 RX ADMIN — Medication 10 ML: at 12:03

## 2022-03-13 RX ADMIN — TAMSULOSIN HYDROCHLORIDE 0.4 MG: 0.4 CAPSULE ORAL at 08:03

## 2022-03-13 RX ADMIN — INSULIN ASPART 7 UNITS: 100 INJECTION, SOLUTION INTRAVENOUS; SUBCUTANEOUS at 01:03

## 2022-03-13 RX ADMIN — METFORMIN HYDROCHLORIDE 500 MG: 500 TABLET ORAL at 09:03

## 2022-03-13 RX ADMIN — APIXABAN 5 MG: 2.5 TABLET, FILM COATED ORAL at 08:03

## 2022-03-13 RX ADMIN — FLUOXETINE 20 MG: 20 CAPSULE ORAL at 09:03

## 2022-03-13 RX ADMIN — INSULIN ASPART 7 UNITS: 100 INJECTION, SOLUTION INTRAVENOUS; SUBCUTANEOUS at 09:03

## 2022-03-13 RX ADMIN — FOLIC ACID 1 MG: 1 TABLET ORAL at 09:03

## 2022-03-13 RX ADMIN — ATORVASTATIN CALCIUM 80 MG: 20 TABLET, FILM COATED ORAL at 09:03

## 2022-03-13 RX ADMIN — THERA TABS 1 TABLET: TAB at 09:03

## 2022-03-13 RX ADMIN — Medication 10 ML: at 01:03

## 2022-03-13 RX ADMIN — INSULIN DETEMIR 28 UNITS: 100 INJECTION, SOLUTION SUBCUTANEOUS at 09:03

## 2022-03-13 RX ADMIN — METFORMIN HYDROCHLORIDE 500 MG: 500 TABLET ORAL at 05:03

## 2022-03-13 RX ADMIN — APIXABAN 5 MG: 2.5 TABLET, FILM COATED ORAL at 09:03

## 2022-03-13 NOTE — ASSESSMENT & PLAN NOTE
Unclear cause.  Spoke to [facility] and patient has had previous syncopal episodes attributed to alcohol abuse.  Has been prescribed meclizine as well.  Tox positive for cocaine but not alcohol, and he denies current use of both.  CTA brain/neck was done in ED, and per ED note the results were discussed with stroke neurologist Dr. Tovar who felt that CTA finding of a short segment of left vertebral artery stenosis was incidental.  He did not think stroke workup with MRI was indicated.    Since change of status per son compared to when he saw him early during the week a CT of the brain was ordered and no abnormalities seen.  MRI of the brain showed acute strokes of the left cerebral hemisphere and right frontal lobe.

## 2022-03-13 NOTE — ASSESSMENT & PLAN NOTE
- MRI showed areas of diffusion signal hyperintensity consistent with areas of acute ischemia/infarct involving the left cerebral hemisphere, the most prominent measuring approximately 1.4 cm, also a small focus of the right frontal lobe.  - Patient on full dose anticoagulation currently due to acute bilateral DVT.  - Sinus rhythm noted throughout hospital stay  - Risk factor modification - control diabetes, continue statin.  - RPR, HIV non reactive.  B12 low normal.   - CTA showed a focal segment of 60-70% stenosis involving the proximal to mid left vertebral artery that was new when compared to the prior study of 09/25/2019.  No evidence of large vessel intracranial occlusion.   - Neurology noted symptoms do not correspond to stroke location, although the frontal lobe stroke might have something to do with his lack of motivation, and Speech has noted he has had delayed swallowing.  - Echo with bubble study done, no shunt seen.  - Follow up with vascular neurology in 4 weeks.  - Therapy recommending SNF due to his low interest in participation.  - Started Lexapro due to suspicion for depression.  - Psych consulted, changed to Prozac and started thiamine, folic acid, MVI due to previous history of alcohol abuse, although Wernicke encephalopathy is not suspected.  - Re-consulted SNF for rehab from the stroke, as discharging him to a shelter would be inappropriate and unsafe for him.  Discussed with his son, Forest, at length on 3/10.  Forest is out of town on a job but will be returning to [place] eventually.  He agrees his father will eventually need nursing home care as he is unable to care for himself.

## 2022-03-13 NOTE — NURSING
"Patient with emesis on gown and bedding.  States it just happened "fast".  Denies being nauseated now.  Fresh bedding and gown applied. Will continue to monitor for return of nausea.  "

## 2022-03-13 NOTE — NURSING
"Patient asleep.  No v/v complaints noted.  Bed alarm in use.  [employer] camera also in use. Daylight Saving Time "jumped forward" one hour (from 2 am to 3 am) at this time.  "

## 2022-03-13 NOTE — PLAN OF CARE
Patient oriented to person, place and time but fuzzy on his situation at times.  Skin is warm/dry without breakdown.  Sacral foam dressing is still intact though he has tried twice during the night to remove it.  Condom catheter patent with clear yellow urine noted to catheter bag.  No temps or other signs of infection observed.  Patient ate 100% of his dinner tray but became nauseated later in the shift and vomited onto his bedspread.  He voiced relief after the emesis occurred.  Suspect he ate too much.  HS accucheck at 145 so no coverage required.  Up to ambulate to toilet with standby assist for BM but only passed gas. Bed alarm and [employer] camera are both in use.  Call light within reach.  Room near the nurses station.  Rounding maintained per protocol.      Problem: Adult Inpatient Plan of Care  Goal: Plan of Care Review  Outcome: Ongoing, Progressing  Flowsheets (Taken 3/13/2022 0538)  Plan of Care Reviewed With: patient     Problem: Diabetes Comorbidity  Goal: Blood Glucose Level Within Targeted Range  Outcome: Ongoing, Progressing  Intervention: Monitor and Manage Glycemia  Flowsheets (Taken 3/13/2022 0538)  Glycemic Management: blood glucose monitored     Problem: Skin Injury Risk Increased  Goal: Skin Health and Integrity  Outcome: Ongoing, Progressing  Intervention: Optimize Skin Protection  Flowsheets (Taken 3/13/2022 0538)  Pressure Reduction Techniques: frequent weight shift encouraged  Pressure Reduction Devices: foam padding utilized  Skin Protection: silicone foam dressing in place  Head of Bed (HOB) Positioning: HOB elevated     Problem: Coping Ineffective  Goal: Effective Coping  Outcome: Ongoing, Progressing  Intervention: Support and Enhance Coping Strategies  Flowsheets (Taken 3/13/2022 0538)  Supportive Measures:   positive reinforcement provided   self-care encouraged   self-reflection promoted   self-responsibility promoted   verbalization of feelings encouraged  Environmental Support:  calm environment promoted     Problem: Fall Injury Risk  Goal: Absence of Fall and Fall-Related Injury  Outcome: Ongoing, Progressing  Intervention: Identify and Manage Contributors  Flowsheets (Taken 3/13/2022 0538)  Self-Care Promotion:   independence encouraged   BADL personal objects within reach  Medication Review/Management:   medications reviewed   high-risk medications identified  Intervention: Promote Injury-Free Environment  Flowsheets (Taken 3/13/2022 0538)  Safety Promotion/Fall Prevention:   bed alarm set   Fall Risk reviewed with patient/family   Fall Risk signage in place   medications reviewed   lighting adjusted   high risk medications identified   nonskid shoes/socks when out of bed   /camera at bedside   room near unit station   side rails raised x 2   instructed to call staff for mobility     Problem: Bleeding (Sepsis/Septic Shock)  Goal: Absence of Bleeding  Outcome: Ongoing, Progressing  Intervention: Monitor and Manage Bleeding  Flowsheets (Taken 3/13/2022 0538)  Bleeding Precautions: blood pressure closely monitored     Problem: Glycemic Control Impaired (Sepsis/Septic Shock)  Goal: Blood Glucose Level Within Desired Range  Outcome: Ongoing, Progressing  Intervention: Optimize Glycemic Control  Flowsheets (Taken 3/13/2022 0538)  Glycemic Management: blood glucose monitored     Problem: Infection Progression (Sepsis/Septic Shock)  Goal: Absence of Infection Signs and Symptoms  Outcome: Ongoing, Progressing  Intervention: Initiate Sepsis Management  Flowsheets (Taken 3/13/2022 0538)  Infection Prevention: single patient room provided  Infection Management: aseptic technique maintained

## 2022-03-13 NOTE — NURSING
Pt resting in bed throughout day.  Pleasant and cooperative.  NAD noted.  VSS.  Scheduled insulin given with meals.  No c/o pain.  Oriented x4 in am.  Good appetite.  Eating 100% of meals with tray set up.  Condom catheter in place, clear yellow urine noted.  Up to bathroom with SBA, no BM noted.  Call light within reach.

## 2022-03-13 NOTE — SUBJECTIVE & OBJECTIVE
Interval History:  Patient seen early in the morning, still sleeping and has difficulty waking up.  Not talkative today.  Awaiting placement.    Review of Systems   Constitutional:  Negative for chills and fever.   Respiratory:  Negative for cough and shortness of breath.    Cardiovascular:  Negative for chest pain and palpitations.   Objective:     Vital Signs (Most Recent):  Temp: 98 °F (36.7 °C) (03/13/22 0920)  Pulse: 78 (03/13/22 0920)  Resp: 18 (03/13/22 0920)  BP: 125/69 (03/13/22 0920)  SpO2: 95 % (03/13/22 0920) Vital Signs (24h Range):  Temp:  [97.6 °F (36.4 °C)-98.7 °F (37.1 °C)] 98 °F (36.7 °C)  Pulse:  [] 78  Resp:  [12-20] 18  SpO2:  [95 %-100 %] 95 %  BP: (114-144)/(67-95) 125/69     Weight: 63 kg (139 lb)  Body mass index is 18.85 kg/m².    Intake/Output Summary (Last 24 hours) at 3/13/2022 0953  Last data filed at 3/13/2022 0554  Gross per 24 hour   Intake 550 ml   Output 1601 ml   Net -1051 ml      Physical Exam  Constitutional:       General: He is not in acute distress.     Comments: Thin   HENT:      Mouth/Throat:      Comments: Edentulous  Cardiovascular:      Rate and Rhythm: Normal rate and regular rhythm.      Pulses: Normal pulses.      Heart sounds: Normal heart sounds. No murmur heard.    No gallop.   Pulmonary:      Effort: Pulmonary effort is normal.      Breath sounds: Normal breath sounds.   Abdominal:      General: Bowel sounds are normal.      Palpations: Abdomen is soft.   Skin:     General: Skin is warm and dry.   Neurological:      Mental Status: He is alert.      Comments: Not oriented to time.       Significant Labs: All pertinent labs within the past 24 hours have been reviewed.    Significant Imaging: I have reviewed all pertinent imaging results/findings within the past 24 hours.

## 2022-03-13 NOTE — PROGRESS NOTES
[facility] Medicine  Progress Note    Patient Name: Forest Lopez  MRN: [id number]  Patient Class: IP- Inpatient   Admission Date: 1/31/2022  Length of Stay: 41 days  Attending Physician: Mojgan Bosch MD  Primary Care Provider: Julian Escobar        Subjective:     Principal Problem:Acute stroke due to ischemia        HPI:  Mr. Lopez is a 72 year old man who presented after a syncopal episode.  He reports he had been feeling well prior to the episode but then had a prodrome prior to passing out.  EMS was called, report patient's BP was low normal on their arrival, improved after an IV fluids bolus.  Patient denies chest pain or shortness of breath and says he does not feel weak currently although is rather tired.  When seen in the ED this morning he could barely express himself audibly.     Vital signs were normal on presentation here.  He is on warfarin for recurrent DVT, but his INR was 1, and d-dimer markedly elevated at 21.7.  Tox screen was positive for cocaine.  He had a CTA of the brain and neck done on presentation so CTA of the chest for PE study could not be done for 48 hours.  Ultrasound of the lower extremities showed extensive bilateral DVT.  On the right there was thrombosis of the common femoral vein, femoral vein, popliteal vein, one of the paired posterior tibial veins and both visualized peroneal veins.  On the left there was thrombosis of the common femoral vein, femoral vein and popliteal vein.  Patient was started on full dose Lovenox and admitted.    Medical history is from the chart as he is unable to give me much information.  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  He smokes 5-6 cigarettes/day and is not interested in quitting.  He is currently homeless and staying with a friend.  Despite the normal INR he is sure  he is taking his warfarin and is not having difficulty taking his medications.  I discussed his care with [facility] staff on the [facility] and patient had been lost to follow up since November 2021.      Overview/Hospital Course:  Patient presented to the ED after a syncopal episode and was found to have bilateral lower extremity DVTs and a low INR.  Tox screen was positive for cocaine.  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  He was started back on his warfarin with bridging Lovenox.  His 2D echo showed grade I diastolic dysfunction.  Neurology evaluated him and recommended echo with bubble study, which was negative for a shunt.    PT/OT evaluated him and recommended SNF placement.  His INR was difficult to get therapeutic, and as he had no contraindication to a NOAC his warfarin was changed to apixaban, and the full dose Lovenox was discontinued.  As he appeared to be depressed and had no objection to starting an antidepressant Lexapro was started.  He had a fall in the hospital on 2/13, had gone to the bathroom for a BM with the nurse, and when she turned away while he was washing his hands he apparently lost his balance and fell.  He did not hit his head and did not lose consciousness, but his BP was low transiently and he was given a bolus of IV fluids.    Patient's mental status improved slowly, but he gradually became more talkative and was able to hold a brief conversation.  He was diagnosed with a polymicrobial UTI on 2/21 and completed treatment with antibiotics.  Psychiatry evaluated him on 3/3 and changed his antidepressant to Prozac as it can be more activating and patient was having difficulty with his level of motivation.      Patient's discharge was delayed as he was not accepted by any SNF facilities in the area.  Reasons for the denials are unclear as he has a clear rehab diagnosis.  He has a history of drug  use but has not shown any interest in getting any illicit or legal drugs since he has been here, and he has had no visitors that could have provided drugs to him.  He has been pleasant and cooperative while here although lacks motivation, likely due to the nature of the stroke affecting his frontal lobe.  He requires considerable encouragement to participate in therapy.  Discharging him to live on the street or shelter would not be appropriate as he would not be able to take care of himself and likely will need nursing home nursing home care eventually after he gets the chance to improve with rehab.      Interval History:  Patient seen early in the morning, still sleeping and has difficulty waking up.  Not talkative today.  Awaiting placement.    Review of Systems   Constitutional:  Negative for chills and fever.   Respiratory:  Negative for cough and shortness of breath.    Cardiovascular:  Negative for chest pain and palpitations.   Objective:     Vital Signs (Most Recent):  Temp: 98 °F (36.7 °C) (03/13/22 0920)  Pulse: 78 (03/13/22 0920)  Resp: 18 (03/13/22 0920)  BP: 125/69 (03/13/22 0920)  SpO2: 95 % (03/13/22 0920) Vital Signs (24h Range):  Temp:  [97.6 °F (36.4 °C)-98.7 °F (37.1 °C)] 98 °F (36.7 °C)  Pulse:  [] 78  Resp:  [12-20] 18  SpO2:  [95 %-100 %] 95 %  BP: (114-144)/(67-95) 125/69     Weight: 63 kg (139 lb)  Body mass index is 18.85 kg/m².    Intake/Output Summary (Last 24 hours) at 3/13/2022 0953  Last data filed at 3/13/2022 0554  Gross per 24 hour   Intake 550 ml   Output 1601 ml   Net -1051 ml      Physical Exam  Constitutional:       General: He is not in acute distress.     Comments: Thin   HENT:      Mouth/Throat:      Comments: Edentulous  Cardiovascular:      Rate and Rhythm: Normal rate and regular rhythm.      Pulses: Normal pulses.      Heart sounds: Normal heart sounds. No murmur heard.    No gallop.   Pulmonary:      Effort: Pulmonary effort is normal.      Breath sounds: Normal breath  sounds.   Abdominal:      General: Bowel sounds are normal.      Palpations: Abdomen is soft.   Skin:     General: Skin is warm and dry.   Neurological:      Mental Status: He is alert.      Comments: Not oriented to time.       Significant Labs: All pertinent labs within the past 24 hours have been reviewed.    Significant Imaging: I have reviewed all pertinent imaging results/findings within the past 24 hours.      Assessment/Plan:      * Acute stroke due to ischemia  - MRI showed areas of diffusion signal hyperintensity consistent with areas of acute ischemia/infarct involving the left cerebral hemisphere, the most prominent measuring approximately 1.4 cm, also a small focus of the right frontal lobe.  - Patient on full dose anticoagulation currently due to acute bilateral DVT.  - Sinus rhythm noted throughout hospital stay  - Risk factor modification - control diabetes, continue statin.  - RPR, HIV non reactive.  B12 low normal.   - CTA showed a focal segment of 60-70% stenosis involving the proximal to mid left vertebral artery that was new when compared to the prior study of 09/25/2019.  No evidence of large vessel intracranial occlusion.   - Neurology noted symptoms do not correspond to stroke location, although the frontal lobe stroke might have something to do with his lack of motivation, and Speech has noted he has had delayed swallowing.  - Echo with bubble study done, no shunt seen.  - Follow up with vascular neurology in 4 weeks.  - Therapy recommending SNF due to his low interest in participation.  - Started Lexapro due to suspicion for depression.  - Psych consulted, changed to Prozac and started thiamine, folic acid, MVI due to previous history of alcohol abuse, although Wernicke encephalopathy is not suspected.  - Re-consulted SNF for rehab from the stroke, as discharging him to a shelter would be inappropriate and unsafe for him.  Discussed with his son, Forest, at length on 3/10.  Forest is out of  town on a job but will be returning to [place] eventually.  He agrees his father will eventually need nursing home care as he is unable to care for himself.      Acute deep vein thrombosis (DVT) of both femoral veins  Last ultrasound showed partially occlusive DVTs, now completely occlusive DVT of multiple lower extremity veins on ultrasound.  Patient reports compliance with warfarin but his INR is only one.  He is homeless but says he does not have difficulty obtaining his medications.  Does not seem to care about anything, which again might be due to the frontal lobe stroke.  D-dimer was markedly elevated on presentation and there was a question of possible PE, but he had a CTA of the brain/neck on presentation so could not get another CTA for another 48 hours.    V/Q scan was done, showed low probability for PE.  2D echo showed grade I diastolic dysfunction.  Warfarin was restarted with bridging Lovenox, but INR was still low.  Given the difficulty of getting him to follow up changed to apixaban and discontinued Lovenox.    Abnormal imaging of thyroid  Tsh/t4 ok, thyroid ultrasound done, showed bilateral nodules which did not meet criteria for biopsy.    Advance care planning        Severe protein-calorie malnutrition  Diet as tolerated      Debility  PT/OT recommending SNF after stroke with debility/poor motivation  Awaiting SNF, having difficulty finding placement for unclear reasons.  Will continue to pursue this as above.      Type 2 diabetes mellitus with hyperglycemia, without long-term current use of insulin  Reportedly he is on metformin and glipizide, both held.  Continue SSI for now.  He has 4+ sugar in urine and HbA1c is 10.3, and he is hyperglycemic here.  Will start levemir and continue ISS, increase levemir to 25 units daily  Add scheduled novolog 5 units tid, increase to 7 units  Improving  OK to resume metformin now.  Increased levemir to 28 units daily with good improvement.    Syncope and  collapse  Unclear cause.  Spoke to [facility] and patient has had previous syncopal episodes attributed to alcohol abuse.  Has been prescribed meclizine as well.  Tox positive for cocaine but not alcohol, and he denies current use of both.  CTA brain/neck was done in ED, and per ED note the results were discussed with stroke neurologist Dr. Tovar who felt that CTA finding of a short segment of left vertebral artery stenosis was incidental.  He did not think stroke workup with MRI was indicated.    Since change of status per son compared to when he saw him early during the week a CT of the brain was ordered and no abnormalities seen.  MRI of the brain showed acute strokes of the left cerebral hemisphere and right frontal lobe.      Cocaine abuse  As noted above.  He has not had any behavioral issues while here, and other than being disinterested in therapy he has been very cooperative.  In addition he has had no outside visitors and we do not feel cocaine use will be an issue for him in the future during rehab.  Psychiatry evaluated him 3/3, did not recommend PEC, might benefit from outpatient treatment.  His motivation to do therapy has been intermittent.  Notably, one of the stroke foci is in the frontal lobe, which might have affected his motivation level and personality.  Discussed with therapy and requested they be more aggressive in working with him.  Participation improving.        Hyperlipidemia  Resumed lipitor      Essential hypertension  Doubtful he was on anything at home.  Started losartan 25 mg daily  BP well controlled on low dose losartan.  BP transiently low after he fell but recovered quickly.  IVF bolus given.  Losartan discontinued as BP persistently low-normal.    Tobacco dependence  He smokes 5-6 cigarettes/day.  Not trying to quit and not interested in a nicotine patch.  Benefits of smoking cessation were reviewed with patient in detail for for 8 minutes and patient was encouraged to quit.  Nicotine replacement options were discussed, not needed.  He still does not want a patch and has been here more than a month without cigarettes.      VTE Risk Mitigation (From admission, onward)         Ordered     apixaban tablet 5 mg  2 times daily         02/10/22 0901     Place sequential compression device  Until discontinued         01/31/22 0533                Discharge Planning   EFRAÍN:      Code Status: Full Code   Is the patient medically ready for discharge?:     Reason for patient still in hospital (select all that apply): Pending disposition  Discharge Plan A: Skilled Nursing Facility   Discharge Delays: (!) Other (No facility has accepted the patient as of yet.)              Mojgan Meza MD  Department of Hospital Medicine   [facility] ([place])

## 2022-03-14 PROBLEM — F14.10 COCAINE ABUSE: Chronic | Status: RESOLVED | Noted: 2019-09-25 | Resolved: 2022-03-14

## 2022-03-14 LAB
POCT GLUCOSE: 133 MG/DL (ref 70–110)
POCT GLUCOSE: 136 MG/DL (ref 70–110)
POCT GLUCOSE: 179 MG/DL (ref 70–110)

## 2022-03-14 PROCEDURE — 25000003 PHARM REV CODE 250: Performed by: INTERNAL MEDICINE

## 2022-03-14 PROCEDURE — 94761 N-INVAS EAR/PLS OXIMETRY MLT: CPT

## 2022-03-14 PROCEDURE — 25000003 PHARM REV CODE 250: Performed by: NURSE PRACTITIONER

## 2022-03-14 PROCEDURE — 25000003 PHARM REV CODE 250: Performed by: HOSPITALIST

## 2022-03-14 PROCEDURE — 99232 SBSQ HOSP IP/OBS MODERATE 35: CPT | Mod: ,,, | Performed by: HOSPITALIST

## 2022-03-14 PROCEDURE — 97530 THERAPEUTIC ACTIVITIES: CPT

## 2022-03-14 PROCEDURE — 36406 VNPNXR<3YRS PHY/QHP OTHER VN: CPT

## 2022-03-14 PROCEDURE — 99232 PR SUBSEQUENT HOSPITAL CARE,LEVL II: ICD-10-PCS | Mod: ,,, | Performed by: HOSPITALIST

## 2022-03-14 PROCEDURE — A4216 STERILE WATER/SALINE, 10 ML: HCPCS | Performed by: INTERNAL MEDICINE

## 2022-03-14 PROCEDURE — 11000001 HC ACUTE MED/SURG PRIVATE ROOM

## 2022-03-14 RX ADMIN — ONDANSETRON 8 MG: 8 TABLET, ORALLY DISINTEGRATING ORAL at 01:03

## 2022-03-14 RX ADMIN — METFORMIN HYDROCHLORIDE 500 MG: 500 TABLET ORAL at 08:03

## 2022-03-14 RX ADMIN — FLUOXETINE 20 MG: 20 CAPSULE ORAL at 08:03

## 2022-03-14 RX ADMIN — METFORMIN HYDROCHLORIDE 500 MG: 500 TABLET ORAL at 05:03

## 2022-03-14 RX ADMIN — Medication 10 ML: at 05:03

## 2022-03-14 RX ADMIN — THERA TABS 1 TABLET: TAB at 08:03

## 2022-03-14 RX ADMIN — INSULIN ASPART 7 UNITS: 100 INJECTION, SOLUTION INTRAVENOUS; SUBCUTANEOUS at 12:03

## 2022-03-14 RX ADMIN — FOLIC ACID 1 MG: 1 TABLET ORAL at 08:03

## 2022-03-14 RX ADMIN — TAMSULOSIN HYDROCHLORIDE 0.4 MG: 0.4 CAPSULE ORAL at 09:03

## 2022-03-14 RX ADMIN — INSULIN ASPART 7 UNITS: 100 INJECTION, SOLUTION INTRAVENOUS; SUBCUTANEOUS at 08:03

## 2022-03-14 RX ADMIN — ATORVASTATIN CALCIUM 80 MG: 20 TABLET, FILM COATED ORAL at 08:03

## 2022-03-14 RX ADMIN — APIXABAN 5 MG: 2.5 TABLET, FILM COATED ORAL at 08:03

## 2022-03-14 RX ADMIN — INSULIN DETEMIR 28 UNITS: 100 INJECTION, SOLUTION SUBCUTANEOUS at 10:03

## 2022-03-14 RX ADMIN — THIAMINE HCL TAB 100 MG 100 MG: 100 TAB at 08:03

## 2022-03-14 RX ADMIN — Medication 10 ML: at 12:03

## 2022-03-14 RX ADMIN — APIXABAN 5 MG: 2.5 TABLET, FILM COATED ORAL at 09:03

## 2022-03-14 NOTE — PLAN OF CARE
Problem: Adult Inpatient Plan of Care  Goal: Plan of Care Review  Outcome: Ongoing, Progressing     Problem: Diabetes Comorbidity  Goal: Blood Glucose Level Within Targeted Range  Outcome: Ongoing, Progressing     Problem: Skin Injury Risk Increased  Goal: Skin Health and Integrity  Outcome: Ongoing, Progressing     Problem: Fall Injury Risk  Goal: Absence of Fall and Fall-Related Injury  Outcome: Ongoing, Progressing   POC reviewed with pt who verbalized understanding. VSS on RA. Remains free of falls and injury. BG monitored at bedtime with no coverage needed. Incont care provided PRN. Emesis x1. No complaints of pain. Turns independently. Resting between care. Bed alarm on and Avasys in use. Call light in reach and rounds made for safety. Will continue to monitor.

## 2022-03-14 NOTE — ASSESSMENT & PLAN NOTE
Unclear cause.  Spoke to [facility] and patient has had previous syncopal episodes attributed to alcohol abuse.  Has been prescribed meclizine as well.  Tox positive for cocaine but not alcohol, and he denies current use of both.  CTA brain/neck was done in ED, and per ED note the results were discussed with stroke neurologist Dr. Tovar who felt that CTA finding of a short segment of left vertebral artery stenosis was incidental.  He did not think stroke workup with MRI was indicated.    Since change of status per son compared to when he saw him early during the week a CT of the brain was ordered and no abnormalities seen.  MRI of the brain showed acute strokes of the left cerebral hemisphere and right frontal lobe.

## 2022-03-14 NOTE — PLAN OF CARE
03/14/22 1848   Post-Acute Status   Post-Acute Authorization   (SNF)   Coverage [employer] MANAGED MEDICARE - [employer] SNP (SPECIAL NEEDS PLAN)   Discharge Delays (!) Post-Acute Set-up   Discharge Plan   Discharge Plan A Skilled Nursing Facility     SHANICE re-sent referral information to 12+ facilities requesting that they take another look   at the patient's paperwork hoping that they would accept the patient. SHANICE [facility], but got no answer after leaving two messages.

## 2022-03-14 NOTE — PROGRESS NOTES
Pt had an episode of projectile emesis. PRN Zofran given and patient cleaned up. FRANNIE Gomez notified. No new orders at this time. Will continue to monitor.

## 2022-03-14 NOTE — PROGRESS NOTES
[facility] Medicine  Progress Note    Patient Name: Forest Lopez  MRN: [id number]  Patient Class: IP- Inpatient   Admission Date: 1/31/2022  Length of Stay: 42 days  Attending Physician: Mojgan Bosch MD  Primary Care Provider: Julian Escobar        Subjective:     Principal Problem:Acute stroke due to ischemia        HPI:  Mr. Lopez is a 72 year old man who presented after a syncopal episode.  He reports he had been feeling well prior to the episode but then had a prodrome prior to passing out.  EMS was called, report patient's BP was low normal on their arrival, improved after an IV fluids bolus.  Patient denies chest pain or shortness of breath and says he does not feel weak currently although is rather tired.  When seen in the ED this morning he could barely express himself audibly.     Vital signs were normal on presentation here.  He is on warfarin for recurrent DVT, but his INR was 1, and d-dimer markedly elevated at 21.7.  Tox screen was positive for cocaine.  He had a CTA of the brain and neck done on presentation so CTA of the chest for PE study could not be done for 48 hours.  Ultrasound of the lower extremities showed extensive bilateral DVT.  On the right there was thrombosis of the common femoral vein, femoral vein, popliteal vein, one of the paired posterior tibial veins and both visualized peroneal veins.  On the left there was thrombosis of the common femoral vein, femoral vein and popliteal vein.  Patient was started on full dose Lovenox and admitted.    Medical history is from the chart as he is unable to give me much information.  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  He smokes 5-6 cigarettes/day and is not interested in quitting.  He is currently homeless and staying with a friend.  Despite the normal INR he is sure  he is taking his warfarin and is not having difficulty taking his medications.  I discussed his care with [facility] staff on the [facility] and patient had been lost to follow up since November 2021.      Overview/Hospital Course:  Patient presented to the ED after a syncopal episode and was found to have bilateral lower extremity DVTs and a low INR.  Tox screen was positive for cocaine.  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  He was started back on his warfarin with bridging Lovenox.  His 2D echo showed grade I diastolic dysfunction.  Neurology evaluated him and recommended echo with bubble study, which was negative for a shunt.    PT/OT evaluated him and recommended SNF placement.  His INR was difficult to get therapeutic, and as he had no contraindication to a NOAC his warfarin was changed to apixaban, and the full dose Lovenox was discontinued.  As he appeared to be depressed and had no objection to starting an antidepressant Lexapro was started.  He had a fall in the hospital on 2/13, had gone to the bathroom for a BM with the nurse, and when she turned away while he was washing his hands he apparently lost his balance and fell.  He did not hit his head and did not lose consciousness, but his BP was low transiently and he was given a bolus of IV fluids.    Patient's mental status improved slowly, but he gradually became more talkative and was able to hold a brief conversation.  He was diagnosed with a polymicrobial UTI on 2/21 and completed treatment with antibiotics.  Psychiatry evaluated him on 3/3 and changed his antidepressant to Prozac as it can be more activating and patient was having difficulty with his level of motivation.      Patient's discharge was delayed as he was not accepted by any SNF facilities in the area.  Reasons for the denials are unclear as he has a clear rehab diagnosis.  He has a history of drug  use but has not shown any interest in getting any illicit or legal drugs since he has been here, and he has had no visitors that could have provided drugs to him.  He has been pleasant and cooperative while here although lacks motivation, likely due to the nature of the stroke affecting his frontal lobe.  He requires considerable encouragement to participate in therapy.  Discharging him to live on the street or shelter would not be appropriate as he would not be able to take care of himself and likely will need group home nursing home care eventually after he gets the chance to improve with rehab.      Interval History:  He vomited last night.  Flat/erect film done, shows some abdominal distension, unclear whether gastroparesis but he has been eating well overall and BG well controlled.  He says he feels fine currently.    Review of Systems   Constitutional:  Negative for chills and fever.   Respiratory:  Negative for cough and shortness of breath.    Cardiovascular:  Negative for chest pain and palpitations.   Objective:     Vital Signs (Most Recent):  Temp: 98.7 °F (37.1 °C) (03/14/22 1111)  Pulse: 88 (03/14/22 1111)  Resp: 18 (03/14/22 1111)  BP: 122/69 (03/14/22 1111)  SpO2: 97 % (03/14/22 1111) Vital Signs (24h Range):  Temp:  [97.7 °F (36.5 °C)-98.7 °F (37.1 °C)] 98.7 °F (37.1 °C)  Pulse:  [77-97] 88  Resp:  [12-18] 18  SpO2:  [95 %-99 %] 97 %  BP: (104-124)/(55-80) 122/69     Weight: 63 kg (139 lb)  Body mass index is 18.85 kg/m².    Intake/Output Summary (Last 24 hours) at 3/14/2022 1351  Last data filed at 3/14/2022 0000  Gross per 24 hour   Intake --   Output 800 ml   Net -800 ml      Physical Exam  Constitutional:       General: He is not in acute distress.     Comments: Thin   HENT:      Mouth/Throat:      Comments: Edentulous  Cardiovascular:      Rate and Rhythm: Normal rate and regular rhythm.      Pulses: Normal pulses.      Heart sounds: Normal heart sounds. No murmur heard.    No gallop.    Pulmonary:      Effort: Pulmonary effort is normal.      Breath sounds: Normal breath sounds.   Abdominal:      General: Bowel sounds are normal.      Palpations: Abdomen is soft.      Comments: Hyperactive bowel sounds.   Skin:     General: Skin is warm and dry.   Neurological:      Mental Status: He is alert.      Comments: Not oriented to time.       Significant Labs: All pertinent labs within the past 24 hours have been reviewed.    Significant Imaging: I have reviewed all pertinent imaging results/findings within the past 24 hours.      Assessment/Plan:      * Acute stroke due to ischemia  - MRI showed areas of diffusion signal hyperintensity consistent with areas of acute ischemia/infarct involving the left cerebral hemisphere, the most prominent measuring approximately 1.4 cm, also a small focus of the right frontal lobe.  - Patient on full dose anticoagulation currently due to acute bilateral DVT.  - Sinus rhythm noted throughout hospital stay  - Risk factor modification - control diabetes, continue statin.  - RPR, HIV non reactive.  B12 low normal.   - CTA showed a focal segment of 60-70% stenosis involving the proximal to mid left vertebral artery that was new when compared to the prior study of 09/25/2019.  No evidence of large vessel intracranial occlusion.   - Neurology noted symptoms do not correspond to stroke location, although the frontal lobe stroke might have something to do with his lack of motivation, and Speech has noted he has had delayed swallowing.  - Echo with bubble study done, no shunt seen.  - Follow up with vascular neurology in 4 weeks.  - Therapy recommending SNF due to his low interest in participation.  - Started Lexapro due to suspicion for depression.  - Psych consulted, changed to Prozac and started thiamine, folic acid, MVI due to previous history of alcohol abuse, although Wernicke encephalopathy is not suspected.  - Re-consulted SNF for rehab from the stroke, as discharging  him to a shelter would be inappropriate and unsafe for him.  Discussed with his son, Forest, at length on 3/10.  Forest is out of town on a job but will be returning to [place] eventually.  He agrees his father will eventually need nursing home care as he is unable to care for himself.      Acute deep vein thrombosis (DVT) of both femoral veins  Last ultrasound showed partially occlusive DVTs, now completely occlusive DVT of multiple lower extremity veins on ultrasound.  Patient reports compliance with warfarin but his INR is only one.  He is homeless but says he does not have difficulty obtaining his medications.  Does not seem to care about anything, which again might be due to the frontal lobe stroke.  D-dimer was markedly elevated on presentation and there was a question of possible PE, but he had a CTA of the brain/neck on presentation so could not get another CTA for another 48 hours.    V/Q scan was done, showed low probability for PE.  2D echo showed grade I diastolic dysfunction.  Warfarin was restarted with bridging Lovenox, but INR was still low.  Given the difficulty of getting him to follow up changed to apixaban and discontinued Lovenox.    Abnormal imaging of thyroid  Tsh/t4 ok, thyroid ultrasound done, showed bilateral nodules which did not meet criteria for biopsy.    Advance care planning        Severe protein-calorie malnutrition  Diet as tolerated      Debility  PT/OT recommending SNF after stroke with debility/poor motivation  Awaiting SNF, having difficulty finding placement for unclear reasons.  Will continue to pursue this as above.      Type 2 diabetes mellitus with hyperglycemia, without long-term current use of insulin  Reportedly he is on metformin and glipizide, both held.  Continue SSI for now.  He has 4+ sugar in urine and HbA1c is 10.3, and he is hyperglycemic here.  Will start levemir and continue ISS, increase levemir to 25 units daily  Add scheduled novolog 5 units tid,  increase to 7 units  Improving  OK to resume metformin.  Increased levemir to 28 units daily with good improvement.    Syncope and collapse  Unclear cause.  Spoke to [facility] and patient has had previous syncopal episodes attributed to alcohol abuse.  Has been prescribed meclizine as well.  Tox positive for cocaine but not alcohol, and he denies current use of both.  CTA brain/neck was done in ED, and per ED note the results were discussed with stroke neurologist Dr. Tovar who felt that CTA finding of a short segment of left vertebral artery stenosis was incidental.  He did not think stroke workup with MRI was indicated.    Since change of status per son compared to when he saw him early during the week a CT of the brain was ordered and no abnormalities seen.  MRI of the brain showed acute strokes of the left cerebral hemisphere and right frontal lobe.      Hyperlipidemia  Resumed lipitor      Essential hypertension  Doubtful he was on anything at home.  Started losartan 25 mg daily  BP well controlled on low dose losartan.  BP transiently low after he fell but recovered quickly.  IVF bolus given.  Losartan discontinued as BP persistently low-normal.    Tobacco dependence  He smokes 5-6 cigarettes/day.  Not trying to quit and not interested in a nicotine patch.  Benefits of smoking cessation were reviewed with patient in detail for for 8 minutes and patient was encouraged to quit. Nicotine replacement options were discussed, not needed.  He still does not want a patch and has been here more than a month without cigarettes.      VTE Risk Mitigation (From admission, onward)         Ordered     apixaban tablet 5 mg  2 times daily         02/10/22 0901     Place sequential compression device  Until discontinued         01/31/22 0533                Discharge Planning   EFRAÍN:      Code Status: Full Code   Is the patient medically ready for discharge?:     Reason for patient still in hospital (select all that apply): Pending  disposition  Discharge Plan A: Skilled Nursing Facility   Discharge Delays: (!) Other (No facility has accepted the patient as of yet.)              Mojgan Meza MD  Department of Hospital Medicine   [facility] ([place])

## 2022-03-14 NOTE — ASSESSMENT & PLAN NOTE
Reportedly he is on metformin and glipizide, both held.  Continue SSI for now.  He has 4+ sugar in urine and HbA1c is 10.3, and he is hyperglycemic here.  Will start levemir and continue ISS, increase levemir to 25 units daily  Add scheduled novolog 5 units tid, increase to 7 units  Improving  OK to resume metformin.  Increased levemir to 28 units daily with good improvement.

## 2022-03-14 NOTE — SUBJECTIVE & OBJECTIVE
Interval History:  He vomited last night.  Flat/erect film done, shows some abdominal distension, unclear whether gastroparesis but he has been eating well overall and BG well controlled.  He says he feels fine currently.    Review of Systems   Constitutional:  Negative for chills and fever.   Respiratory:  Negative for cough and shortness of breath.    Cardiovascular:  Negative for chest pain and palpitations.   Objective:     Vital Signs (Most Recent):  Temp: 98.7 °F (37.1 °C) (03/14/22 1111)  Pulse: 88 (03/14/22 1111)  Resp: 18 (03/14/22 1111)  BP: 122/69 (03/14/22 1111)  SpO2: 97 % (03/14/22 1111) Vital Signs (24h Range):  Temp:  [97.7 °F (36.5 °C)-98.7 °F (37.1 °C)] 98.7 °F (37.1 °C)  Pulse:  [77-97] 88  Resp:  [12-18] 18  SpO2:  [95 %-99 %] 97 %  BP: (104-124)/(55-80) 122/69     Weight: 63 kg (139 lb)  Body mass index is 18.85 kg/m².    Intake/Output Summary (Last 24 hours) at 3/14/2022 1351  Last data filed at 3/14/2022 0000  Gross per 24 hour   Intake --   Output 800 ml   Net -800 ml      Physical Exam  Constitutional:       General: He is not in acute distress.     Comments: Thin   HENT:      Mouth/Throat:      Comments: Edentulous  Cardiovascular:      Rate and Rhythm: Normal rate and regular rhythm.      Pulses: Normal pulses.      Heart sounds: Normal heart sounds. No murmur heard.    No gallop.   Pulmonary:      Effort: Pulmonary effort is normal.      Breath sounds: Normal breath sounds.   Abdominal:      General: Bowel sounds are normal.      Palpations: Abdomen is soft.      Comments: Hyperactive bowel sounds.   Skin:     General: Skin is warm and dry.   Neurological:      Mental Status: He is alert.      Comments: Not oriented to time.       Significant Labs: All pertinent labs within the past 24 hours have been reviewed.    Significant Imaging: I have reviewed all pertinent imaging results/findings within the past 24 hours.

## 2022-03-14 NOTE — ASSESSMENT & PLAN NOTE
- MRI showed areas of diffusion signal hyperintensity consistent with areas of acute ischemia/infarct involving the left cerebral hemisphere, the most prominent measuring approximately 1.4 cm, also a small focus of the right frontal lobe.  - Patient on full dose anticoagulation currently due to acute bilateral DVT.  - Sinus rhythm noted throughout hospital stay  - Risk factor modification - control diabetes, continue statin.  - RPR, HIV non reactive.  B12 low normal.   - CTA showed a focal segment of 60-70% stenosis involving the proximal to mid left vertebral artery that was new when compared to the prior study of 09/25/2019.  No evidence of large vessel intracranial occlusion.   - Neurology noted symptoms do not correspond to stroke location, although the frontal lobe stroke might have something to do with his lack of motivation, and Speech has noted he has had delayed swallowing.  - Echo with bubble study done, no shunt seen.  - Follow up with vascular neurology in 4 weeks.  - Therapy recommending SNF due to his low interest in participation.  - Started Lexapro due to suspicion for depression.  - Psych consulted, changed to Prozac and started thiamine, folic acid, MVI due to previous history of alcohol abuse, although Wernicke encephalopathy is not suspected.  - Re-consulted SNF for rehab from the stroke, as discharging him to a shelter would be inappropriate and unsafe for him.  Discussed with his son, Forest, at length on 3/10.  Forest is out of town on a job but will be returning to [place] eventually.  He agrees his father will eventually need nursing home care as he is unable to care for himself.

## 2022-03-14 NOTE — NURSING
Pt resting off and on throughout day.  NAD noted.  VSS.  Good appetite.  No nausea or vomiting noted.  No BM noted during day.  Condom cath in place, draining clear yellow urine.  Call light within place.

## 2022-03-14 NOTE — PT/OT/SLP PROGRESS
Occupational Therapy   Treatment    Name: Forest Lopez  MRN: [id number]  Admitting Diagnosis:  Acute stroke due to ischemia       Recommendations:     Discharge Recommendations: nursing facility, skilled  Discharge Equipment Recommendations:  bedside commode, shower chair  Barriers to discharge:  Decreased caregiver support    Assessment:     Forest Lopez is a 72 y.o. male with a medical diagnosis of Acute stroke due to ischemia.  He presents with fatigue. Performance deficits affecting function are weakness, impaired endurance, impaired self care skills, impaired functional mobilty, decreased safety awareness, impaired balance, impaired cognition, decreased ROM, decreased lower extremity function, decreased upper extremity function.  Pt agreeable to participating in therapy upon arrival to room on second attempt by therapist.  Pt was open to performing bed level exercises, but declined EOB/OOB activity 2* feeling fatigued.  Decreased ROM observed in (B) UE during stretches.      Pt would continue to benefit from skilled OT services to address problems listed above and increase independence with ADLs.  SNF is recommended upon d/c from acute care to further address deficits and help pt improve overall functional independence.     .    Rehab Prognosis:  Fair; patient would benefit from acute skilled OT services to address these deficits and reach maximum level of function.       Plan:     Patient to be seen 3 x/week to address the above listed problems via self-care/home management, therapeutic activities, therapeutic exercises  · Plan of Care Expires: 04/02/22  · Plan of Care Reviewed with: patient    Subjective     Pain/Comfort:  · Pain Rating 1: 0/10  · Pain Rating Post-Intervention 1: 0/10    Objective:     Communicated with: Patient found HOB elevated with PICC line, Condom Catheter, bed alarm, AVASYS upon OT entry to room.    General Precautions: Standard, fall   Orthopedic Precautions:N/A   Braces:  N/A  Respiratory Status: Room air     Occupational Performance:     Bed Mobility:    · Not assessed 2* session performed at bed level in one position    Functional Mobility/Transfers:  · Sit <> Stand:  Unable to assess 2* pt declined EOB/OOB activity because of fatigue  · Functional Mobility: To be assessed in upcoming sessions    Activities of Daily Living:  · Feeding:  Set up while supine with HOB elevated.      [facility] 6 Click ADL: 16    Treatment & Education:  *Pt educated on role of OT in acute care setting  *Pt performed UB exercises to provide stretch and promote increased endurance and ROM needed for ADLs: 2 sets x 10 reps per exercise; supine with HOB elevated  -Chest press  -Forward circular rows  *POC and exercises reviewed with pt    Patient left HOB elevated with all lines intact, call button in reach and AVASYS camera presentEducation:      GOALS:   Multidisciplinary Problems     Occupational Therapy Goals        Problem: Occupational Therapy Goal    Goal Priority Disciplines Outcome Interventions   Occupational Therapy Goal     OT, PT/OT Ongoing, Progressing    Description: Goals to be met by: 3/18/2022    Patient will increase functional independence with ADLs by performing:    UE Dressing with Starr.  LE Dressing with Starr.  Grooming while standing at sink with Supervision.  Toileting from toilet with Supervision for hygiene and clothing management.   Toilet transfer to toilet with Supervision.                     Time Tracking:     OT Date of Treatment: 03/14/22  OT Start Time: 1417  OT Stop Time: 1425  OT Total Time (min): 8 min    Billable Minutes:Therapeutic Activity 8    OT/DUTCH: OT          3/14/2022

## 2022-03-15 ENCOUNTER — PATIENT OUTREACH (OUTPATIENT)
Dept: ADMINISTRATIVE | Facility: OTHER | Age: 72
End: 2022-03-15
Payer: MEDICARE

## 2022-03-15 PROBLEM — R11.10 VOMITING: Status: ACTIVE | Noted: 2022-03-15

## 2022-03-15 LAB
ERYTHROCYTE [DISTWIDTH] IN BLOOD BY AUTOMATED COUNT: 13.6 % (ref 11.5–14.5)
HCT VFR BLD AUTO: 39.9 % (ref 40–54)
HGB BLD-MCNC: 12.8 G/DL (ref 14–18)
MCH RBC QN AUTO: 25.2 PG (ref 27–31)
MCHC RBC AUTO-ENTMCNC: 32.1 G/DL (ref 32–36)
MCV RBC AUTO: 79 FL (ref 82–98)
PLATELET # BLD AUTO: 312 K/UL (ref 150–450)
PMV BLD AUTO: 10.3 FL (ref 9.2–12.9)
POCT GLUCOSE: 128 MG/DL (ref 70–110)
POCT GLUCOSE: 143 MG/DL (ref 70–110)
POCT GLUCOSE: 178 MG/DL (ref 70–110)
POCT GLUCOSE: 193 MG/DL (ref 70–110)
POCT GLUCOSE: 92 MG/DL (ref 70–110)
RBC # BLD AUTO: 5.07 M/UL (ref 4.6–6.2)
WBC # BLD AUTO: 13.69 K/UL (ref 3.9–12.7)

## 2022-03-15 PROCEDURE — 25000003 PHARM REV CODE 250: Performed by: INTERNAL MEDICINE

## 2022-03-15 PROCEDURE — 36406 VNPNXR<3YRS PHY/QHP OTHER VN: CPT

## 2022-03-15 PROCEDURE — 25000003 PHARM REV CODE 250: Performed by: HOSPITALIST

## 2022-03-15 PROCEDURE — 36415 COLL VENOUS BLD VENIPUNCTURE: CPT | Performed by: HOSPITALIST

## 2022-03-15 PROCEDURE — A4216 STERILE WATER/SALINE, 10 ML: HCPCS | Performed by: INTERNAL MEDICINE

## 2022-03-15 PROCEDURE — 11000001 HC ACUTE MED/SURG PRIVATE ROOM

## 2022-03-15 PROCEDURE — 25000003 PHARM REV CODE 250: Performed by: NURSE PRACTITIONER

## 2022-03-15 PROCEDURE — 97116 GAIT TRAINING THERAPY: CPT | Mod: CQ

## 2022-03-15 PROCEDURE — 99233 PR SUBSEQUENT HOSPITAL CARE,LEVL III: ICD-10-PCS | Mod: ,,, | Performed by: INTERNAL MEDICINE

## 2022-03-15 PROCEDURE — 99233 SBSQ HOSP IP/OBS HIGH 50: CPT | Mod: ,,, | Performed by: INTERNAL MEDICINE

## 2022-03-15 PROCEDURE — 94761 N-INVAS EAR/PLS OXIMETRY MLT: CPT

## 2022-03-15 PROCEDURE — 85027 COMPLETE CBC AUTOMATED: CPT | Performed by: HOSPITALIST

## 2022-03-15 RX ORDER — BISACODYL 10 MG
10 SUPPOSITORY, RECTAL RECTAL ONCE
Status: COMPLETED | OUTPATIENT
Start: 2022-03-15 | End: 2022-03-15

## 2022-03-15 RX ADMIN — INSULIN ASPART 7 UNITS: 100 INJECTION, SOLUTION INTRAVENOUS; SUBCUTANEOUS at 11:03

## 2022-03-15 RX ADMIN — Medication 10 ML: at 05:03

## 2022-03-15 RX ADMIN — METFORMIN HYDROCHLORIDE 500 MG: 500 TABLET ORAL at 08:03

## 2022-03-15 RX ADMIN — BISACODYL 10 MG: 10 SUPPOSITORY RECTAL at 02:03

## 2022-03-15 RX ADMIN — THIAMINE HCL TAB 100 MG 100 MG: 100 TAB at 08:03

## 2022-03-15 RX ADMIN — INSULIN DETEMIR 28 UNITS: 100 INJECTION, SOLUTION SUBCUTANEOUS at 08:03

## 2022-03-15 RX ADMIN — FLUOXETINE 20 MG: 20 CAPSULE ORAL at 08:03

## 2022-03-15 RX ADMIN — INSULIN ASPART 7 UNITS: 100 INJECTION, SOLUTION INTRAVENOUS; SUBCUTANEOUS at 07:03

## 2022-03-15 RX ADMIN — Medication 10 ML: at 12:03

## 2022-03-15 RX ADMIN — ATORVASTATIN CALCIUM 80 MG: 20 TABLET, FILM COATED ORAL at 08:03

## 2022-03-15 RX ADMIN — INSULIN ASPART 7 UNITS: 100 INJECTION, SOLUTION INTRAVENOUS; SUBCUTANEOUS at 05:03

## 2022-03-15 RX ADMIN — TAMSULOSIN HYDROCHLORIDE 0.4 MG: 0.4 CAPSULE ORAL at 08:03

## 2022-03-15 RX ADMIN — FOLIC ACID 1 MG: 1 TABLET ORAL at 08:03

## 2022-03-15 RX ADMIN — APIXABAN 5 MG: 2.5 TABLET, FILM COATED ORAL at 08:03

## 2022-03-15 RX ADMIN — THERA TABS 1 TABLET: TAB at 08:03

## 2022-03-15 RX ADMIN — METFORMIN HYDROCHLORIDE 500 MG: 500 TABLET ORAL at 05:03

## 2022-03-15 NOTE — ASSESSMENT & PLAN NOTE
kub showed gastric distention, moderate stool  No further episodes  Will monitor  Suppository once

## 2022-03-15 NOTE — PROGRESS NOTES
[facility] Medicine  Progress Note    Patient Name: Forest Lopez  MRN: [id number]  Patient Class: IP- Inpatient   Admission Date: 1/31/2022  Length of Stay: 43 days  Attending Physician: Alma Elizalde MD  Primary Care Provider: Julian Escobar        Subjective:     Principal Problem:Acute stroke due to ischemia        HPI:  Mr. Lopez is a 72 year old man who presented after a syncopal episode.  He reports he had been feeling well prior to the episode but then had a prodrome prior to passing out.  EMS was called, report patient's BP was low normal on their arrival, improved after an IV fluids bolus.  Patient denies chest pain or shortness of breath and says he does not feel weak currently although is rather tired.  When seen in the ED this morning he could barely express himself audibly.     Vital signs were normal on presentation here.  He is on warfarin for recurrent DVT, but his INR was 1, and d-dimer markedly elevated at 21.7.  Tox screen was positive for cocaine.  He had a CTA of the brain and neck done on presentation so CTA of the chest for PE study could not be done for 48 hours.  Ultrasound of the lower extremities showed extensive bilateral DVT.  On the right there was thrombosis of the common femoral vein, femoral vein, popliteal vein, one of the paired posterior tibial veins and both visualized peroneal veins.  On the left there was thrombosis of the common femoral vein, femoral vein and popliteal vein.  Patient was started on full dose Lovenox and admitted.    Medical history is from the chart as he is unable to give me much information.  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  He smokes 5-6 cigarettes/day and is not interested in quitting.  He is currently homeless and staying with a friend.  Despite the normal INR he is sure he  is taking his warfarin and is not having difficulty taking his medications.  I discussed his care with [facility] staff on the [facility] and patient had been lost to follow up since November 2021.      Overview/Hospital Course:  Patient presented to the ED after a syncopal episode and was found to have bilateral lower extremity DVTs and a low INR.  Tox screen was positive for cocaine.  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  He was started back on his warfarin with bridging Lovenox.  His 2D echo showed grade I diastolic dysfunction.  Neurology evaluated him and recommended echo with bubble study, which was negative for a shunt.    PT/OT evaluated him and recommended SNF placement.  His INR was difficult to get therapeutic, and as he had no contraindication to a NOAC his warfarin was changed to apixaban, and the full dose Lovenox was discontinued.  As he appeared to be depressed and had no objection to starting an antidepressant Lexapro was started.  He had a fall in the hospital on 2/13, had gone to the bathroom for a BM with the nurse, and when she turned away while he was washing his hands he apparently lost his balance and fell.  He did not hit his head and did not lose consciousness, but his BP was low transiently and he was given a bolus of IV fluids.    Patient's mental status improved slowly, but he gradually became more talkative and was able to hold a brief conversation.  He was diagnosed with a polymicrobial UTI on 2/21 and completed treatment with antibiotics.  Psychiatry evaluated him on 3/3 and changed his antidepressant to Prozac as it can be more activating and patient was having difficulty with his level of motivation.      Patient's discharge was delayed as he was not accepted by any SNF facilities in the area.  Reasons for the denials are unclear as he has a clear rehab diagnosis.  He has a history of drug  use but has not shown any interest in getting any illicit or legal drugs since he has been here, and he has had no visitors that could have provided drugs to him.  He has been pleasant and cooperative while here although lacks motivation, likely due to the nature of the stroke affecting his frontal lobe.  He requires considerable encouragement to participate in therapy.  Discharging him to live on the street or shelter would not be appropriate as he would not be able to take care of himself and likely will need skilled nursing nursing home care eventually after he gets the chance to improve with rehab.      Interval History: no further vomiting, no bm today yet.    Review of Systems   Constitutional:  Negative for chills and fever.   Respiratory:  Negative for cough and shortness of breath.    Cardiovascular:  Negative for chest pain and palpitations.   Gastrointestinal:  Negative for abdominal pain, nausea and vomiting.   Objective:     Vital Signs (Most Recent):  Temp: 98.1 °F (36.7 °C) (03/15/22 1158)  Pulse: 92 (03/15/22 1158)  Resp: 18 (03/15/22 1158)  BP: 122/77 (03/15/22 1158)  SpO2: 97 % (03/15/22 1158) Vital Signs (24h Range):  Temp:  [97.5 °F (36.4 °C)-98.5 °F (36.9 °C)] 98.1 °F (36.7 °C)  Pulse:  [] 92  Resp:  [16-18] 18  SpO2:  [97 %-99 %] 97 %  BP: (118-146)/(77-91) 122/77     Weight: 63 kg (139 lb)  Body mass index is 18.85 kg/m².    Intake/Output Summary (Last 24 hours) at 3/15/2022 1630  Last data filed at 3/14/2022 1700  Gross per 24 hour   Intake --   Output 300 ml   Net -300 ml      Physical Exam  Constitutional:       General: He is not in acute distress.     Comments: Thin   HENT:      Mouth/Throat:      Comments: Edentulous  Cardiovascular:      Rate and Rhythm: Normal rate and regular rhythm.      Pulses: Normal pulses.      Heart sounds: Normal heart sounds. No murmur heard.    No gallop.   Pulmonary:      Effort: Pulmonary effort is normal.      Breath sounds: Normal breath sounds.    Abdominal:      General: Bowel sounds are normal. There is no distension.      Palpations: Abdomen is soft.      Tenderness: There is no abdominal tenderness.      Comments: \   Skin:     General: Skin is warm and dry.   Neurological:      Mental Status: He is alert.      Comments: Not oriented to time.       Significant Labs: All pertinent labs within the past 24 hours have been reviewed.    Significant Imaging: I have reviewed all pertinent imaging results/findings within the past 24 hours.      Assessment/Plan:      * Acute stroke due to ischemia  - MRI showed areas of diffusion signal hyperintensity consistent with areas of acute ischemia/infarct involving the left cerebral hemisphere, the most prominent measuring approximately 1.4 cm, also a small focus of the right frontal lobe.  - Patient on full dose anticoagulation currently due to acute bilateral DVT.  - Sinus rhythm noted throughout hospital stay  - Risk factor modification - control diabetes, continue statin.  - RPR, HIV non reactive.  B12 low normal.   - CTA showed a focal segment of 60-70% stenosis involving the proximal to mid left vertebral artery that was new when compared to the prior study of 09/25/2019.  No evidence of large vessel intracranial occlusion.   - Neurology noted symptoms do not correspond to stroke location, although the frontal lobe stroke might have something to do with his lack of motivation, and Speech has noted he has had delayed swallowing.  - Echo with bubble study done, no shunt seen.  - Follow up with vascular neurology in 4 weeks.  - Therapy recommending SNF due to his low interest in participation.  - Started Lexapro due to suspicion for depression.  - Psych consulted, changed to Prozac and started thiamine, folic acid, MVI due to previous history of alcohol abuse, although Wernicke encephalopathy is not suspected.  - Re-consulted SNF for rehab from the stroke, as discharging him to a shelter would be inappropriate and  unsafe for him.  Discussed with his son, Forest, at length on 3/10.  Forest is out of town on a job but will be returning to [place] eventually.  He agrees his father will eventually need nursing home care as he is unable to care for himself.      Vomiting  kub showed gastric distention, moderate stool  No further episodes  Will monitor  Suppository once      Abnormal imaging of thyroid  Tsh/t4 ok, thyroid ultrasound done, showed bilateral nodules which did not meet criteria for biopsy.    Advance care planning        Severe protein-calorie malnutrition  Diet as tolerated      Debility  PT/OT recommending SNF after stroke with debility/poor motivation  Awaiting SNF, having difficulty finding placement for unclear reasons.  Will continue to pursue this as above.      Type 2 diabetes mellitus with hyperglycemia, without long-term current use of insulin  Reportedly he is on metformin and glipizide, both held.  Continue SSI for now.  He has 4+ sugar in urine and HbA1c is 10.3, and he is hyperglycemic here.  Will start levemir and continue ISS, increase levemir to 25 units daily  Add scheduled novolog 5 units tid, increase to 7 units  Improving  OK to resume metformin.  Increased levemir to 28 units daily with good improvement.    Acute deep vein thrombosis (DVT) of both femoral veins  Last ultrasound showed partially occlusive DVTs, now completely occlusive DVT of multiple lower extremity veins on ultrasound.  Patient reports compliance with warfarin but his INR is only one.  He is homeless but says he does not have difficulty obtaining his medications.  Does not seem to care about anything, which again might be due to the frontal lobe stroke.  D-dimer was markedly elevated on presentation and there was a question of possible PE, but he had a CTA of the brain/neck on presentation so could not get another CTA for another 48 hours.    V/Q scan was done, showed low probability for PE.  2D echo showed grade I diastolic  dysfunction.  Warfarin was restarted with bridging Lovenox, but INR was still low.  Given the difficulty of getting him to follow up changed to apixaban and discontinued Lovenox.    Syncope and collapse  Unclear cause.  Spoke to [facility] and patient has had previous syncopal episodes attributed to alcohol abuse.  Has been prescribed meclizine as well.  Tox positive for cocaine but not alcohol, and he denies current use of both.  CTA brain/neck was done in ED, and per ED note the results were discussed with stroke neurologist Dr. Tovar who felt that CTA finding of a short segment of left vertebral artery stenosis was incidental.  He did not think stroke workup with MRI was indicated.    Since change of status per son compared to when he saw him early during the week a CT of the brain was ordered and no abnormalities seen.  MRI of the brain showed acute strokes of the left cerebral hemisphere and right frontal lobe.      Hyperlipidemia  Resumed lipitor      Essential hypertension  Doubtful he was on anything at home.  Started losartan 25 mg daily  BP well controlled on low dose losartan.  BP transiently low after he fell but recovered quickly.  IVF bolus given.  Losartan discontinued as BP persistently low-normal.    Tobacco dependence  He smokes 5-6 cigarettes/day.  Not trying to quit and not interested in a nicotine patch.  Benefits of smoking cessation were reviewed with patient in detail for for 8 minutes and patient was encouraged to quit. Nicotine replacement options were discussed, not needed.  He still does not want a patch and has been here more than a month without cigarettes.      VTE Risk Mitigation (From admission, onward)         Ordered     apixaban tablet 5 mg  2 times daily         02/10/22 0901     Place sequential compression device  Until discontinued         01/31/22 0533                Discharge Planning   EFRAÍN:      Code Status: Full Code   Is the patient medically ready for discharge?:     Reason  for patient still in hospital (select all that apply): Pending disposition  Discharge Plan A: Skilled Nursing Facility   Discharge Delays: (!) Post-Acute Set-up              Alma Elizalde MD  Department of Hospital Medicine   [facility] Med [facility] ([place])

## 2022-03-15 NOTE — PT/OT/SLP PROGRESS
Speech Language Pathology      Forest Lopez  MRN: [id number]    Patient not seen today secondary to Patient fatigue. Pt declined participating in SLP session this date despite MAX motivational cues provided. Will follow-up next available date 3/16/21.

## 2022-03-15 NOTE — SUBJECTIVE & OBJECTIVE
Interval History: no further vomiting, no bm today yet.    Review of Systems   Constitutional:  Negative for chills and fever.   Respiratory:  Negative for cough and shortness of breath.    Cardiovascular:  Negative for chest pain and palpitations.   Gastrointestinal:  Negative for abdominal pain, nausea and vomiting.   Objective:     Vital Signs (Most Recent):  Temp: 98.1 °F (36.7 °C) (03/15/22 1158)  Pulse: 92 (03/15/22 1158)  Resp: 18 (03/15/22 1158)  BP: 122/77 (03/15/22 1158)  SpO2: 97 % (03/15/22 1158) Vital Signs (24h Range):  Temp:  [97.5 °F (36.4 °C)-98.5 °F (36.9 °C)] 98.1 °F (36.7 °C)  Pulse:  [] 92  Resp:  [16-18] 18  SpO2:  [97 %-99 %] 97 %  BP: (118-146)/(77-91) 122/77     Weight: 63 kg (139 lb)  Body mass index is 18.85 kg/m².    Intake/Output Summary (Last 24 hours) at 3/15/2022 1630  Last data filed at 3/14/2022 1700  Gross per 24 hour   Intake --   Output 300 ml   Net -300 ml      Physical Exam  Constitutional:       General: He is not in acute distress.     Comments: Thin   HENT:      Mouth/Throat:      Comments: Edentulous  Cardiovascular:      Rate and Rhythm: Normal rate and regular rhythm.      Pulses: Normal pulses.      Heart sounds: Normal heart sounds. No murmur heard.    No gallop.   Pulmonary:      Effort: Pulmonary effort is normal.      Breath sounds: Normal breath sounds.   Abdominal:      General: Bowel sounds are normal. There is no distension.      Palpations: Abdomen is soft.      Tenderness: There is no abdominal tenderness.      Comments: \   Skin:     General: Skin is warm and dry.   Neurological:      Mental Status: He is alert.      Comments: Not oriented to time.       Significant Labs: All pertinent labs within the past 24 hours have been reviewed.    Significant Imaging: I have reviewed all pertinent imaging results/findings within the past 24 hours.

## 2022-03-15 NOTE — PT/OT/SLP PROGRESS
Physical Therapy Treatment    Patient Name:  Forest Lopez   MRN:  [id number]    Recommendations:     Discharge Recommendations:  nursing facility, skilled   Discharge Equipment Recommendations: bedside commode, shower chair   Barriers to discharge: Decreased caregiver support    Assessment:     Forest Lopez is a 72 y.o. male admitted with a medical diagnosis of Acute stroke due to ischemia.  He presents with the following impairments/functional limitations:  weakness, impaired endurance, impaired self care skills, impaired functional mobilty, gait instability, impaired balance, impaired cognition, decreased coordination, decreased lower extremity function, decreased upper extremity function, decreased safety awareness, decreased ROM, impaired cardiopulmonary response to activity ;pt with good mobility today, amb'd w/ rollator, though mild SOB noted upon return.    Rehab Prognosis: Good; patient would benefit from acute skilled PT services to address these deficits and reach maximum level of function.    Recent Surgery: * No surgery found *      Plan:     During this hospitalization, patient to be seen 3 x/week to address the identified rehab impairments via gait training, therapeutic activities, therapeutic exercises, neuromuscular re-education and progress toward the following goals:    · Plan of Care Expires:  04/03/22    Subjective     Chief Complaint: none stated  Patient/Family Comments/goals: pt agreeable to session.  Pain/Comfort:  · Pain Rating 1: 0/10  · Pain Rating Post-Intervention 1: 0/10      Objective:     Communicated with nurse prior to session.  Patient found HOB elevated with Condom Catheter, PICC line ([employer] monitoring) upon PT entry to room.     General Precautions: Standard, fall   Orthopedic Precautions:N/A   Braces: N/A  Respiratory Status: Room air     Functional Mobility:  · Bed Mobility:     · Supine to Sit: stand by assistance  · Transfers:     · Sit to Stand:  contact guard assistance  with 4 wheeled walker  · Gait: amb'd 75' x 3 w/ rollator and CGA, cueing for safety, posture, and inc step length      AM-PAC 6 CLICK MOBILITY  Turning over in bed (including adjusting bedclothes, sheets and blankets)?: 3  Sitting down on and standing up from a chair with arms (e.g., wheelchair, bedside commode, etc.): 3  Moving from lying on back to sitting on the side of the bed?: 3  Moving to and from a bed to a chair (including a wheelchair)?: 3  Need to walk in hospital room?: 3  Climbing 3-5 steps with a railing?: 3  Basic Mobility Total Score: 18       Therapeutic Activities and Exercises:       Patient left HOB elevated with all lines intact, call button in reach, bed alarm on, nurse notified, [employer]  present and lunch tray delivered..    GOALS:   Multidisciplinary Problems     Physical Therapy Goals        Problem: Physical Therapy Goal    Goal Priority Disciplines Outcome Goal Variances Interventions   Physical Therapy Goal     PT, PT/OT Ongoing, Progressing     Description: Goals to be met by: 4/3/2022    Patient will increase functional independence with mobility by performin. Sit<>stand with SPV with LRAD.  2. Gait x 300 feet with SPV with LRAD.   3. Static standing in SLS with no UE support with SBA x10 sec.                      Time Tracking:     PT Received On: 03/15/22  PT Start Time: 1200     PT Stop Time: 1215  PT Total Time (min): 15 min     Billable Minutes: Gait Training 15    Treatment Type: Treatment  PT/PTA: PTA     PTA Visit Number: 1     03/15/2022

## 2022-03-15 NOTE — NURSING
Pt sitting up in bed watching TV and eating dinner. VSS . No nausea and vomiting noted. Condom cath secure and in p[lace draining clear yellow urine . In NAD safety maintained. Call bell within reach bed in low position

## 2022-03-15 NOTE — PROGRESS NOTES
RSW called patient and patient stated he has no needs, just ready to discharge. RSW assured patient that CM/SW is working hard to find placement.    SUMMER Wilson

## 2022-03-16 ENCOUNTER — PATIENT OUTREACH (OUTPATIENT)
Dept: ADMINISTRATIVE | Facility: OTHER | Age: 72
End: 2022-03-16
Payer: MEDICARE

## 2022-03-16 LAB
POCT GLUCOSE: 109 MG/DL (ref 70–110)
POCT GLUCOSE: 113 MG/DL (ref 70–110)
POCT GLUCOSE: 113 MG/DL (ref 70–110)
POCT GLUCOSE: 182 MG/DL (ref 70–110)

## 2022-03-16 PROCEDURE — 11000001 HC ACUTE MED/SURG PRIVATE ROOM

## 2022-03-16 PROCEDURE — 25000003 PHARM REV CODE 250: Performed by: INTERNAL MEDICINE

## 2022-03-16 PROCEDURE — 99233 PR SUBSEQUENT HOSPITAL CARE,LEVL III: ICD-10-PCS | Mod: ,,, | Performed by: INTERNAL MEDICINE

## 2022-03-16 PROCEDURE — 99233 SBSQ HOSP IP/OBS HIGH 50: CPT | Mod: ,,, | Performed by: INTERNAL MEDICINE

## 2022-03-16 PROCEDURE — 25000003 PHARM REV CODE 250: Performed by: HOSPITALIST

## 2022-03-16 PROCEDURE — A4216 STERILE WATER/SALINE, 10 ML: HCPCS | Performed by: INTERNAL MEDICINE

## 2022-03-16 PROCEDURE — 25000003 PHARM REV CODE 250: Performed by: NURSE PRACTITIONER

## 2022-03-16 PROCEDURE — 94761 N-INVAS EAR/PLS OXIMETRY MLT: CPT

## 2022-03-16 RX ADMIN — INSULIN DETEMIR 28 UNITS: 100 INJECTION, SOLUTION SUBCUTANEOUS at 08:03

## 2022-03-16 RX ADMIN — METFORMIN HYDROCHLORIDE 500 MG: 500 TABLET ORAL at 04:03

## 2022-03-16 RX ADMIN — FOLIC ACID 1 MG: 1 TABLET ORAL at 08:03

## 2022-03-16 RX ADMIN — THIAMINE HCL TAB 100 MG 100 MG: 100 TAB at 08:03

## 2022-03-16 RX ADMIN — INSULIN ASPART 7 UNITS: 100 INJECTION, SOLUTION INTRAVENOUS; SUBCUTANEOUS at 11:03

## 2022-03-16 RX ADMIN — Medication 10 ML: at 05:03

## 2022-03-16 RX ADMIN — Medication 10 ML: at 11:03

## 2022-03-16 RX ADMIN — Medication 10 ML: at 12:03

## 2022-03-16 RX ADMIN — APIXABAN 5 MG: 2.5 TABLET, FILM COATED ORAL at 08:03

## 2022-03-16 RX ADMIN — TAMSULOSIN HYDROCHLORIDE 0.4 MG: 0.4 CAPSULE ORAL at 08:03

## 2022-03-16 RX ADMIN — METFORMIN HYDROCHLORIDE 500 MG: 500 TABLET ORAL at 08:03

## 2022-03-16 RX ADMIN — INSULIN ASPART 7 UNITS: 100 INJECTION, SOLUTION INTRAVENOUS; SUBCUTANEOUS at 04:03

## 2022-03-16 RX ADMIN — ATORVASTATIN CALCIUM 80 MG: 20 TABLET, FILM COATED ORAL at 09:03

## 2022-03-16 RX ADMIN — INSULIN ASPART 7 UNITS: 100 INJECTION, SOLUTION INTRAVENOUS; SUBCUTANEOUS at 07:03

## 2022-03-16 RX ADMIN — THERA TABS 1 TABLET: TAB at 08:03

## 2022-03-16 RX ADMIN — FLUOXETINE 20 MG: 20 CAPSULE ORAL at 08:03

## 2022-03-16 NOTE — PROGRESS NOTES
[facility] Medicine  Progress Note    Patient Name: Forest Lopez  MRN: [id number]  Patient Class: IP- Inpatient   Admission Date: 1/31/2022  Length of Stay: 44 days  Attending Physician: Alma Elizalde MD  Primary Care Provider: Julian Escobar        Subjective:     Principal Problem:Acute stroke due to ischemia        HPI:  Mr. Lopez is a 72 year old man who presented after a syncopal episode.  He reports he had been feeling well prior to the episode but then had a prodrome prior to passing out.  EMS was called, report patient's BP was low normal on their arrival, improved after an IV fluids bolus.  Patient denies chest pain or shortness of breath and says he does not feel weak currently although is rather tired.  When seen in the ED this morning he could barely express himself audibly.     Vital signs were normal on presentation here.  He is on warfarin for recurrent DVT, but his INR was 1, and d-dimer markedly elevated at 21.7.  Tox screen was positive for cocaine.  He had a CTA of the brain and neck done on presentation so CTA of the chest for PE study could not be done for 48 hours.  Ultrasound of the lower extremities showed extensive bilateral DVT.  On the right there was thrombosis of the common femoral vein, femoral vein, popliteal vein, one of the paired posterior tibial veins and both visualized peroneal veins.  On the left there was thrombosis of the common femoral vein, femoral vein and popliteal vein.  Patient was started on full dose Lovenox and admitted.    Medical history is from the chart as he is unable to give me much information.  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  He smokes 5-6 cigarettes/day and is not interested in quitting.  He is currently homeless and staying with a friend.  Despite the normal INR he is sure he  is taking his warfarin and is not having difficulty taking his medications.  I discussed his care with [facility] staff on the [facility] and patient had been lost to follow up since November 2021.      Overview/Hospital Course:  Patient presented to the ED after a syncopal episode and was found to have bilateral lower extremity DVTs and a low INR.  Tox screen was positive for cocaine.  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  He was started back on his warfarin with bridging Lovenox.  His 2D echo showed grade I diastolic dysfunction.  Neurology evaluated him and recommended echo with bubble study, which was negative for a shunt.    PT/OT evaluated him and recommended SNF placement.  His INR was difficult to get therapeutic, and as he had no contraindication to a NOAC his warfarin was changed to apixaban, and the full dose Lovenox was discontinued.  As he appeared to be depressed and had no objection to starting an antidepressant Lexapro was started.  He had a fall in the hospital on 2/13, had gone to the bathroom for a BM with the nurse, and when she turned away while he was washing his hands he apparently lost his balance and fell.  He did not hit his head and did not lose consciousness, but his BP was low transiently and he was given a bolus of IV fluids.    Patient's mental status improved slowly, but he gradually became more talkative and was able to hold a brief conversation.  He was diagnosed with a polymicrobial UTI on 2/21 and completed treatment with antibiotics.  Psychiatry evaluated him on 3/3 and changed his antidepressant to Prozac as it can be more activating and patient was having difficulty with his level of motivation.      Patient's discharge was delayed as he was not accepted by any SNF facilities in the area.  Reasons for the denials are unclear as he has a clear rehab diagnosis.  He has a history of drug  use but has not shown any interest in getting any illicit or legal drugs since he has been here, and he has had no visitors that could have provided drugs to him.  He has been pleasant and cooperative while here although lacks motivation, likely due to the nature of the stroke affecting his frontal lobe.  He requires considerable encouragement to participate in therapy.  Discharging him to live on the street or shelter would not be appropriate as he would not be able to take care of himself and likely will need residential nursing home care eventually after he gets the chance to improve with rehab.      Interval History: no further vomiting, per patient had bm after suppository yesterday.    Review of Systems   Constitutional:  Negative for chills and fever.   Respiratory:  Negative for cough and shortness of breath.    Cardiovascular:  Negative for chest pain and palpitations.   Gastrointestinal:  Negative for abdominal pain, nausea and vomiting.   Objective:     Vital Signs (Most Recent):  Temp: 97.8 °F (36.6 °C) (03/16/22 1146)  Pulse: 103 (03/16/22 1146)  Resp: 16 (03/16/22 1146)  BP: 131/73 (03/16/22 1146)  SpO2: 99 % (03/16/22 1146) Vital Signs (24h Range):  Temp:  [97.8 °F (36.6 °C)-98.4 °F (36.9 °C)] 97.8 °F (36.6 °C)  Pulse:  [] 103  Resp:  [16-18] 16  SpO2:  [96 %-99 %] 99 %  BP: (113-139)/(65-91) 131/73     Weight: 66.2 kg (145 lb 15.1 oz)  Body mass index is 19.79 kg/m².    Intake/Output Summary (Last 24 hours) at 3/16/2022 1503  Last data filed at 3/16/2022 0600  Gross per 24 hour   Intake --   Output 2100 ml   Net -2100 ml        Physical Exam  Constitutional:       General: He is not in acute distress.     Comments: Thin   HENT:      Mouth/Throat:      Comments: Edentulous  Cardiovascular:      Rate and Rhythm: Normal rate and regular rhythm.      Pulses: Normal pulses.      Heart sounds: Normal heart sounds. No murmur heard.    No gallop.   Pulmonary:      Effort: Pulmonary effort is normal.       Breath sounds: Normal breath sounds.   Abdominal:      General: Bowel sounds are normal. There is no distension.      Palpations: Abdomen is soft.      Tenderness: There is no abdominal tenderness.      Comments: \   Skin:     General: Skin is warm and dry.   Neurological:      Mental Status: He is alert.      Comments: Not oriented to time.       Significant Labs: All pertinent labs within the past 24 hours have been reviewed.    Significant Imaging: I have reviewed all pertinent imaging results/findings within the past 24 hours.      Assessment/Plan:      * Acute stroke due to ischemia  - MRI showed areas of diffusion signal hyperintensity consistent with areas of acute ischemia/infarct involving the left cerebral hemisphere, the most prominent measuring approximately 1.4 cm, also a small focus of the right frontal lobe.  - Patient on full dose anticoagulation currently due to acute bilateral DVT.  - Sinus rhythm noted throughout hospital stay  - Risk factor modification - control diabetes, continue statin.  - RPR, HIV non reactive.  B12 low normal.   - CTA showed a focal segment of 60-70% stenosis involving the proximal to mid left vertebral artery that was new when compared to the prior study of 09/25/2019.  No evidence of large vessel intracranial occlusion.   - Neurology noted symptoms do not correspond to stroke location, although the frontal lobe stroke might have something to do with his lack of motivation, and Speech has noted he has had delayed swallowing.  - Echo with bubble study done, no shunt seen.  - Follow up with vascular neurology in 4 weeks.  - Therapy recommending SNF due to his low interest in participation.  - Started Lexapro due to suspicion for depression.  - Psych consulted, changed to Prozac and started thiamine, folic acid, MVI due to previous history of alcohol abuse, although Wernicke encephalopathy is not suspected.  - Re-consulted SNF for rehab from the stroke, as discharging him to a  shelter would be inappropriate and unsafe for him.  Discussed with his son, Forest, at length on 3/10.  Forest is out of town on a job but will be returning to [place] eventually.  He agrees his father will eventually need nursing home care as he is unable to care for himself.      Vomiting  kub showed gastric distention, moderate stool  No further episodes  Will monitor  Suppository once      Abnormal imaging of thyroid  Tsh/t4 ok, thyroid ultrasound done, showed bilateral nodules which did not meet criteria for biopsy.    Advance care planning        Severe protein-calorie malnutrition  Diet as tolerated      Debility  PT/OT recommending SNF after stroke with debility/poor motivation  Awaiting SNF, having difficulty finding placement for unclear reasons.  Will continue to pursue this as above.      Type 2 diabetes mellitus with hyperglycemia, without long-term current use of insulin  Reportedly he is on metformin and glipizide, both held.  Continue SSI for now.  He has 4+ sugar in urine and HbA1c is 10.3, and he is hyperglycemic here.  Will start levemir and continue ISS, increase levemir to 25 units daily  Add scheduled novolog 5 units tid, increase to 7 units  Improving  OK to resume metformin.  Increased levemir to 28 units daily with good improvement.    Acute deep vein thrombosis (DVT) of both femoral veins  Last ultrasound showed partially occlusive DVTs, now completely occlusive DVT of multiple lower extremity veins on ultrasound.  Patient reports compliance with warfarin but his INR is only one.  He is homeless but says he does not have difficulty obtaining his medications.  Does not seem to care about anything, which again might be due to the frontal lobe stroke.  D-dimer was markedly elevated on presentation and there was a question of possible PE, but he had a CTA of the brain/neck on presentation so could not get another CTA for another 48 hours.    V/Q scan was done, showed low probability for PE.   2D echo showed grade I diastolic dysfunction.  Warfarin was restarted with bridging Lovenox, but INR was still low.  Given the difficulty of getting him to follow up changed to apixaban and discontinued Lovenox.    Syncope and collapse  Unclear cause.  Spoke to Ciarra and patient has had previous syncopal episodes attributed to alcohol abuse.  Has been prescribed meclizine as well.  Tox positive for cocaine but not alcohol, and he denies current use of both.  CTA brain/neck was done in ED, and per ED note the results were discussed with stroke neurologist Dr. Tovar who felt that CTA finding of a short segment of left vertebral artery stenosis was incidental.  He did not think stroke workup with MRI was indicated.    Since change of status per son compared to when he saw him early during the week a CT of the brain was ordered and no abnormalities seen.  MRI of the brain showed acute strokes of the left cerebral hemisphere and right frontal lobe.      Hyperlipidemia  Resumed lipitor      Essential hypertension  Doubtful he was on anything at home.  Started losartan 25 mg daily  BP well controlled on low dose losartan.  BP transiently low after he fell but recovered quickly.  IVF bolus given.  Losartan discontinued as BP persistently low-normal.    Tobacco dependence  He smokes 5-6 cigarettes/day.  Not trying to quit and not interested in a nicotine patch.  Benefits of smoking cessation were reviewed with patient in detail for for 8 minutes and patient was encouraged to quit. Nicotine replacement options were discussed, not needed.  He still does not want a patch and has been here more than a month without cigarettes.      VTE Risk Mitigation (From admission, onward)         Ordered     apixaban tablet 5 mg  2 times daily         02/10/22 0901     Place sequential compression device  Until discontinued         01/31/22 0526                Discharge Planning   EFRAÍN:      Code Status: Full Code   Is the patient  medically ready for discharge?:     Reason for patient still in hospital (select all that apply): Pending disposition  Discharge Plan A: Skilled Nursing Facility   Discharge Delays: (!) Post-Acute Set-up              Alma Elizalde MD  Department of Hospital Medicine   [facility] ([place])

## 2022-03-16 NOTE — SUBJECTIVE & OBJECTIVE
Interval History: no further vomiting, per patient had bm after suppository yesterday.    Review of Systems   Constitutional:  Negative for chills and fever.   Respiratory:  Negative for cough and shortness of breath.    Cardiovascular:  Negative for chest pain and palpitations.   Gastrointestinal:  Negative for abdominal pain, nausea and vomiting.   Objective:     Vital Signs (Most Recent):  Temp: 97.8 °F (36.6 °C) (03/16/22 1146)  Pulse: 103 (03/16/22 1146)  Resp: 16 (03/16/22 1146)  BP: 131/73 (03/16/22 1146)  SpO2: 99 % (03/16/22 1146) Vital Signs (24h Range):  Temp:  [97.8 °F (36.6 °C)-98.4 °F (36.9 °C)] 97.8 °F (36.6 °C)  Pulse:  [] 103  Resp:  [16-18] 16  SpO2:  [96 %-99 %] 99 %  BP: (113-139)/(65-91) 131/73     Weight: 66.2 kg (145 lb 15.1 oz)  Body mass index is 19.79 kg/m².    Intake/Output Summary (Last 24 hours) at 3/16/2022 1503  Last data filed at 3/16/2022 0600  Gross per 24 hour   Intake --   Output 2100 ml   Net -2100 ml        Physical Exam  Constitutional:       General: He is not in acute distress.     Comments: Thin   HENT:      Mouth/Throat:      Comments: Edentulous  Cardiovascular:      Rate and Rhythm: Normal rate and regular rhythm.      Pulses: Normal pulses.      Heart sounds: Normal heart sounds. No murmur heard.    No gallop.   Pulmonary:      Effort: Pulmonary effort is normal.      Breath sounds: Normal breath sounds.   Abdominal:      General: Bowel sounds are normal. There is no distension.      Palpations: Abdomen is soft.      Tenderness: There is no abdominal tenderness.      Comments: \   Skin:     General: Skin is warm and dry.   Neurological:      Mental Status: He is alert.      Comments: Not oriented to time.       Significant Labs: All pertinent labs within the past 24 hours have been reviewed.    Significant Imaging: I have reviewed all pertinent imaging results/findings within the past 24 hours.

## 2022-03-16 NOTE — PLAN OF CARE
Recommendations  1 Obtain new wt  2.Continue Level 5 Minced and Moist, consistent carb diet as tolerated. With Boost Plus TID.   3.Encourage good PO intake as needed    Goals: Pt to meet % EEN/EPN via PO intake by RD f/u.  Nutrition Goal Status: goal met  Communication of RD Recs:  (POC)    Assessment and Plan  Nutrition Problem  swallowing difficulty    Related to (etiology):   stroke    Signs and Symptoms (as evidenced by):   Pt requiring texture modified diet and speech therapy services for difficulty swallowing    Interventions(treatment strategy):  Collaboration with other providers  Commercial beverage  Carbohydrate modified diet (diabetic)  Texture modified diet (IDDSI L5 MM)    Nutrition Diagnosis Status:   Continues

## 2022-03-16 NOTE — PROGRESS NOTES
[facility] ([place])  Adult Nutrition  Progress Note    SUMMARY     Recommendations  1 Obtain new wt  2.Continue Level 5 Minced and Moist, consistent carb diet as tolerated. With Boost Plus TID.   3.Encourage good PO intake as needed    Goals: Pt to meet % EEN/EPN via PO intake by RD f/u.  Nutrition Goal Status: goal met  Communication of RD Recs:  (POC)    Assessment and Plan  Nutrition Problem  swallowing difficulty    Related to (etiology):   stroke    Signs and Symptoms (as evidenced by):   Pt requiring texture modified diet and speech therapy services for difficulty swallowing    Interventions(treatment strategy):  Collaboration with other providers  Commercial beverage  Carbohydrate modified diet (diabetic)  Texture modified diet (IDDSI L5 MM)    Nutrition Diagnosis Status:   Continues    Malnutrition Assessment  JARRET NFPE due to remote assessment    Reason for Assessment  Reason For Assessment: RD follow-up  Diagnosis:  (acute DVT)  Relevant Medical History: HTN, HLD, T2DM, BPH  Interdisciplinary Rounds: did not attend  General Information Comments: Remote assessment for coverage, spoke with RN via secure chat. Pt eating 100% meals, enjoys meals here, no GI symptoms noted, LBM 3/15. Last wt was taken > 30 days ago, requested new wt - RN agreed to take today. ST continues to follow pt for dysphagia.   Nutrition Discharge Planning: Pt to follow a cardiac/ DM diet(vit K restriction as needed) as tolerated.    Nutrition Risk Screen  Nutrition Risk Screen: no indicators present    Nutrition/Diet History  Spiritual, Cultural Beliefs, Latter day Practices, Values that Affect Care: no    Anthropometrics  Temp: 97.9 °F (36.6 °C)  Height: 6' (182.9 cm)  Height (inches): 72 in  Weight Method: Bed Scale  Weight: 63 kg (139 lb)  Weight (lb): 139 lb  Ideal Body Weight (IBW), Male: 178 lb  % Ideal Body Weight, Male (lb): 78.09 %  BMI (Calculated): 18.8  BMI Grade: 18.5-24.9 - normal      Lab/Procedures/Meds  Pertinent Labs Reviewed: reviewed  Pertinent Labs Comments: Glu 119, Al 2.8, ALT 46  Pertinent Medications Reviewed: reviewed  Pertinent Medications Comments: apixaban, atoravastatin, folic acid, insulin, metformin, MVI, NaCl, thiamine    Estimated/Assessed Needs  Weight Used For Calorie Calculations: 63 kg (138 lb 14.2 oz)  Energy Calorie Requirements (kcal): 1980 kcal day (MSJ X AF 1.4)  Energy Need Method: [place] Jeor  Protein Requirements: 50-63 g day (0.8-1.0 g/kg)  Weight Used For Protein Calculations: 63 kg (138 lb 14.2 oz)  Estimated Fluid Requirement Method: RDA Method  RDA Method (mL): 1980  CHO Requirement: 248 g day    Nutrition Prescription Ordered  Current Diet Order: Level 5 Minced and Moist, Consistent carb  Oral Nutrition Supplement: Boost Plus TID    Evaluation of Received Nutrient/Fluid Intake  Energy Calories Required: meeting needs  Protein Required: meeting needs  Fluid Required: meeting needs  Comments: LBM 3/15  Tolerance: tolerating  % Intake of Estimated Energy Needs: 75 - 100 %  % Meal Intake: 75 - 100 %    Nutrition Risk  Level of Risk/Frequency of Follow-up: low (1x weekly)     Monitor and Evaluation  Food and Nutrient Intake: energy intake, food and beverage intake  Food and Nutrient Adminstration: diet order  Knowledge/Beliefs/Attitudes: food and nutrition knowledge/skill  Physical Activity and Function: nutrition-related ADLs and IADLs  Anthropometric Measurements: weight, weight change, body mass index  Biochemical Data, Medical Tests and Procedures: glucose/endocrine profile, gastrointestinal profile, electrolyte and renal panel, inflammatory profile, lipid profile  Nutrition-Focused Physical Findings: overall appearance     Nutrition Follow-Up  RD Follow-up?: Yes

## 2022-03-16 NOTE — PT/OT/SLP PROGRESS
Occupational Therapy   Treatment    Name: Forest Lopez  MRN: [id number]  Admitting Diagnosis:  Acute stroke due to ischemia       Recommendations:     Discharge Recommendations: nursing facility, skilled  Discharge Equipment Recommendations:  bedside commode, shower chair (currently needed)  Barriers to discharge:  Decreased caregiver support (current functional level)    Assessment:   Initially reporting not wanting to participate with OT requiring encouragement for participation this day.  Rest breaks and consistent cuing required for follow through of BUE exercise as Pt. Requiring increased time for initiation of task.  CGA for steadying/safety with very short steps noted during step transfer bed<>bed and assist needed for managing condom catheter lines.  Progressing towards goals.  Continued recommendation of post acute OT in SNF.  To benefit from continued acute care OT services to increase independence in self-care/functional transfers.  Continue POC.     Forest Lopez is a 72 y.o. male with a medical diagnosis of Acute stroke due to ischemia.  He presents with below deficits decreasing independence in self-care/functional transfers. Performance deficits affecting function are weakness, impaired endurance, impaired self care skills, impaired functional mobilty, gait instability, impaired balance, decreased safety awareness, decreased upper extremity function, decreased lower extremity function, impaired cognition, decreased coordination, decreased ROM, impaired coordination.     Rehab Prognosis:  Good; patient would benefit from acute skilled OT services to address these deficits and reach maximum level of function.       Plan:     Patient to be seen 3 x/week to address the above listed problems via self-care/home management, therapeutic exercises, therapeutic activities  · Plan of Care Expires: 04/02/22  · Plan of Care Reviewed with: patient    Subjective     Pain/Comfort:  · Pain Rating 1: 0/10  · Pain  Rating Post-Intervention 1: 0/10    Objective:     Communicated with: Gurdeep PANCHAL RN prior to session.  Patient found HOB elevated with bed alarm, Condom Catheter, PICC line ([employer]/telesitter) upon OT entry to room.    General Precautions: Standard, fall   Orthopedic Precautions:N/A   Braces: N/A  Respiratory Status: Room air     Occupational Performance:     Bed Mobility:    · Supine<>sit with SBA for safety.      Functional Mobility/Transfers:  · Step transfer bed<>bedside chair with CGA for steadying/safety without ADL and very short steps during task noted.      Activities of Daily Living:  · Condom catheter in place for voiding.        [facility] 6 Click ADL: 15    Treatment & Education:  · Educated on importance of participation with OT and OOB activity.  · Supine<>sit with SBA for safety.    · Step transfer bed<>bedside chair with CGA for steadying/safety without ADL and very short steps during task noted.    · Condom catheter in place for voiding.    · Participated in BUE exercise for shoulder flex/ext X 8, overhead claps X 6, forward punches X 8, and forward rows with scapular retraction X 8 seated in bedside chair with cuing for upright trunk with back away from chair to require increased trunk control during task, verbal instruction/demos for initial form, verbal cuing with tactile cuing throughout for proper form, and seated rest breaks between tasks; for BUE strengthening, endurance, and coordination needed to increase independence in self-care/functional transfers.    · Review of call light and importance of calling for assist for OOB activity and any needs.     Patient left HOB elevated with all lines intact, call button in reach, bed alarm on, nursing notified and [employer]/telesitter Education:      GOALS:   Multidisciplinary Problems     Occupational Therapy Goals        Problem: Occupational Therapy Goal    Goal Priority Disciplines Outcome Interventions   Occupational Therapy Goal     OT, PT/OT Ongoing,  Progressing    Description: Goals to be met by: 3/18/2022    Patient will increase functional independence with ADLs by performing:    UE Dressing with [place].  LE Dressing with [place].  Grooming while standing at sink with Supervision.  Toileting from toilet with Supervision for hygiene and clothing management.   Toilet transfer to toilet with Supervision.                     Time Tracking:     OT Date of Treatment: 03/16/22  OT Start Time: 1614  OT Stop Time: 1643  OT Total Time (min): 29 min    Billable Minutes:Therapeutic Exercise 29    OT/DUTCH: OT          3/16/2022

## 2022-03-17 ENCOUNTER — PATIENT OUTREACH (OUTPATIENT)
Dept: ADMINISTRATIVE | Facility: OTHER | Age: 72
End: 2022-03-17
Payer: MEDICARE

## 2022-03-17 LAB
CREAT SERPL-MCNC: 0.9 MG/DL (ref 0.5–1.4)
EST. GFR  (AFRICAN AMERICAN): >60 ML/MIN/1.73 M^2
EST. GFR  (NON AFRICAN AMERICAN): >60 ML/MIN/1.73 M^2
POCT GLUCOSE: 141 MG/DL (ref 70–110)
POCT GLUCOSE: 145 MG/DL (ref 70–110)
POCT GLUCOSE: 153 MG/DL (ref 70–110)
POCT GLUCOSE: 172 MG/DL (ref 70–110)

## 2022-03-17 PROCEDURE — 82565 ASSAY OF CREATININE: CPT | Performed by: INTERNAL MEDICINE

## 2022-03-17 PROCEDURE — 97116 GAIT TRAINING THERAPY: CPT | Mod: CQ

## 2022-03-17 PROCEDURE — 25000003 PHARM REV CODE 250: Performed by: INTERNAL MEDICINE

## 2022-03-17 PROCEDURE — 92507 TX SP LANG VOICE COMM INDIV: CPT

## 2022-03-17 PROCEDURE — A4216 STERILE WATER/SALINE, 10 ML: HCPCS | Performed by: INTERNAL MEDICINE

## 2022-03-17 PROCEDURE — 11000001 HC ACUTE MED/SURG PRIVATE ROOM

## 2022-03-17 PROCEDURE — 25000003 PHARM REV CODE 250: Performed by: HOSPITALIST

## 2022-03-17 PROCEDURE — 36415 COLL VENOUS BLD VENIPUNCTURE: CPT | Performed by: INTERNAL MEDICINE

## 2022-03-17 PROCEDURE — 99232 PR SUBSEQUENT HOSPITAL CARE,LEVL II: ICD-10-PCS | Mod: ,,, | Performed by: INTERNAL MEDICINE

## 2022-03-17 PROCEDURE — 36406 VNPNXR<3YRS PHY/QHP OTHER VN: CPT

## 2022-03-17 PROCEDURE — 94761 N-INVAS EAR/PLS OXIMETRY MLT: CPT

## 2022-03-17 PROCEDURE — 25000003 PHARM REV CODE 250: Performed by: NURSE PRACTITIONER

## 2022-03-17 PROCEDURE — 99232 SBSQ HOSP IP/OBS MODERATE 35: CPT | Mod: ,,, | Performed by: INTERNAL MEDICINE

## 2022-03-17 PROCEDURE — 97110 THERAPEUTIC EXERCISES: CPT | Mod: CQ

## 2022-03-17 RX ADMIN — INSULIN DETEMIR 28 UNITS: 100 INJECTION, SOLUTION SUBCUTANEOUS at 09:03

## 2022-03-17 RX ADMIN — METFORMIN HYDROCHLORIDE 500 MG: 500 TABLET ORAL at 05:03

## 2022-03-17 RX ADMIN — APIXABAN 5 MG: 2.5 TABLET, FILM COATED ORAL at 08:03

## 2022-03-17 RX ADMIN — THIAMINE HCL TAB 100 MG 100 MG: 100 TAB at 08:03

## 2022-03-17 RX ADMIN — Medication 10 ML: at 05:03

## 2022-03-17 RX ADMIN — Medication 10 ML: at 12:03

## 2022-03-17 RX ADMIN — FOLIC ACID 1 MG: 1 TABLET ORAL at 08:03

## 2022-03-17 RX ADMIN — INSULIN ASPART 7 UNITS: 100 INJECTION, SOLUTION INTRAVENOUS; SUBCUTANEOUS at 07:03

## 2022-03-17 RX ADMIN — INSULIN ASPART 7 UNITS: 100 INJECTION, SOLUTION INTRAVENOUS; SUBCUTANEOUS at 04:03

## 2022-03-17 RX ADMIN — TAMSULOSIN HYDROCHLORIDE 0.4 MG: 0.4 CAPSULE ORAL at 08:03

## 2022-03-17 RX ADMIN — THERA TABS 1 TABLET: TAB at 08:03

## 2022-03-17 RX ADMIN — METFORMIN HYDROCHLORIDE 500 MG: 500 TABLET ORAL at 08:03

## 2022-03-17 RX ADMIN — FLUOXETINE 20 MG: 20 CAPSULE ORAL at 09:03

## 2022-03-17 RX ADMIN — ATORVASTATIN CALCIUM 80 MG: 20 TABLET, FILM COATED ORAL at 08:03

## 2022-03-17 RX ADMIN — INSULIN ASPART 7 UNITS: 100 INJECTION, SOLUTION INTRAVENOUS; SUBCUTANEOUS at 12:03

## 2022-03-17 NOTE — PROGRESS NOTES
[facility] Medicine  Progress Note    Patient Name: Forest Lopez  MRN: [id number]  Patient Class: IP- Inpatient   Admission Date: 1/31/2022  Length of Stay: 45 days  Attending Physician: Alma Elizalde MD  Primary Care Provider: Julian Escobar        Subjective:     Principal Problem:Acute stroke due to ischemia        HPI:  Mr. Lopez is a 72 year old man who presented after a syncopal episode.  He reports he had been feeling well prior to the episode but then had a prodrome prior to passing out.  EMS was called, report patient's BP was low normal on their arrival, improved after an IV fluids bolus.  Patient denies chest pain or shortness of breath and says he does not feel weak currently although is rather tired.  When seen in the ED this morning he could barely express himself audibly.     Vital signs were normal on presentation here.  He is on warfarin for recurrent DVT, but his INR was 1, and d-dimer markedly elevated at 21.7.  Tox screen was positive for cocaine.  He had a CTA of the brain and neck done on presentation so CTA of the chest for PE study could not be done for 48 hours.  Ultrasound of the lower extremities showed extensive bilateral DVT.  On the right there was thrombosis of the common femoral vein, femoral vein, popliteal vein, one of the paired posterior tibial veins and both visualized peroneal veins.  On the left there was thrombosis of the common femoral vein, femoral vein and popliteal vein.  Patient was started on full dose Lovenox and admitted.    Medical history is from the chart as he is unable to give me much information.  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  He smokes 5-6 cigarettes/day and is not interested in quitting.  He is currently homeless and staying with a friend.  Despite the normal INR he is sure he  is taking his warfarin and is not having difficulty taking his medications.  I discussed his care with [facility] staff on the [facility] and patient had been lost to follow up since November 2021.      Overview/Hospital Course:  Patient presented to the ED after a syncopal episode and was found to have bilateral lower extremity DVTs and a low INR.  Tox screen was positive for cocaine.  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  He was started back on his warfarin with bridging Lovenox.  His 2D echo showed grade I diastolic dysfunction.  Neurology evaluated him and recommended echo with bubble study, which was negative for a shunt.    PT/OT evaluated him and recommended SNF placement.  His INR was difficult to get therapeutic, and as he had no contraindication to a NOAC his warfarin was changed to apixaban, and the full dose Lovenox was discontinued.  As he appeared to be depressed and had no objection to starting an antidepressant Lexapro was started.  He had a fall in the hospital on 2/13, had gone to the bathroom for a BM with the nurse, and when she turned away while he was washing his hands he apparently lost his balance and fell.  He did not hit his head and did not lose consciousness, but his BP was low transiently and he was given a bolus of IV fluids.    Patient's mental status improved slowly, but he gradually became more talkative and was able to hold a brief conversation.  He was diagnosed with a polymicrobial UTI on 2/21 and completed treatment with antibiotics.  Psychiatry evaluated him on 3/3 and changed his antidepressant to Prozac as it can be more activating and patient was having difficulty with his level of motivation.      Patient's discharge was delayed as he was not accepted by any SNF facilities in the area.  Reasons for the denials are unclear as he has a clear rehab diagnosis.  He has a history of drug  use but has not shown any interest in getting any illicit or legal drugs since he has been here, and he has had no visitors that could have provided drugs to him.  He has been pleasant and cooperative while here although lacks motivation, likely due to the nature of the stroke affecting his frontal lobe.  He requires considerable encouragement to participate in therapy.  Discharging him to live on the street or shelter would not be appropriate as he would not be able to take care of himself and likely will need care home nursing home care eventually after he gets the chance to improve with rehab.      Interval History: no further vomiting, per patient had bm after suppository , tolerating diet.    Review of Systems   Constitutional:  Negative for chills and fever.   Respiratory:  Negative for cough and shortness of breath.    Cardiovascular:  Negative for chest pain and palpitations.   Gastrointestinal:  Negative for abdominal pain, nausea and vomiting.   Objective:     Vital Signs (Most Recent):  Temp: 97.7 °F (36.5 °C) (03/17/22 1626)  Pulse: 103 (03/17/22 1626)  Resp: 16 (03/17/22 1626)  BP: (!) 157/99 (03/17/22 1626)  SpO2: 97 % (03/17/22 1626) Vital Signs (24h Range):  Temp:  [97.3 °F (36.3 °C)-98.9 °F (37.2 °C)] 97.7 °F (36.5 °C)  Pulse:  [] 103  Resp:  [16-18] 16  SpO2:  [97 %-98 %] 97 %  BP: (125-160)/(63-99) 157/99     Weight: 66.2 kg (145 lb 15.1 oz)  Body mass index is 19.79 kg/m².    Intake/Output Summary (Last 24 hours) at 3/17/2022 1700  Last data filed at 3/17/2022 1100  Gross per 24 hour   Intake --   Output 2150 ml   Net -2150 ml        Physical Exam  Constitutional:       General: He is not in acute distress.     Comments: Thin   HENT:      Mouth/Throat:      Comments: Edentulous  Cardiovascular:      Rate and Rhythm: Normal rate and regular rhythm.      Pulses: Normal pulses.      Heart sounds: Normal heart sounds. No murmur heard.    No gallop.   Pulmonary:      Effort: Pulmonary effort is  normal.      Breath sounds: Normal breath sounds.   Abdominal:      General: Bowel sounds are normal. There is no distension.      Palpations: Abdomen is soft.      Tenderness: There is no abdominal tenderness.      Comments: \   Skin:     General: Skin is warm and dry.   Neurological:      Mental Status: He is alert.      Comments: Not oriented to time.       Significant Labs: All pertinent labs within the past 24 hours have been reviewed.    Significant Imaging: I have reviewed all pertinent imaging results/findings within the past 24 hours.      Assessment/Plan:      * Acute stroke due to ischemia  - MRI showed areas of diffusion signal hyperintensity consistent with areas of acute ischemia/infarct involving the left cerebral hemisphere, the most prominent measuring approximately 1.4 cm, also a small focus of the right frontal lobe.  - Patient on full dose anticoagulation currently due to acute bilateral DVT.  - Sinus rhythm noted throughout hospital stay  - Risk factor modification - control diabetes, continue statin.  - RPR, HIV non reactive.  B12 low normal.   - CTA showed a focal segment of 60-70% stenosis involving the proximal to mid left vertebral artery that was new when compared to the prior study of 09/25/2019.  No evidence of large vessel intracranial occlusion.   - Neurology noted symptoms do not correspond to stroke location, although the frontal lobe stroke might have something to do with his lack of motivation, and Speech has noted he has had delayed swallowing.  - Echo with bubble study done, no shunt seen.  - Follow up with vascular neurology in 4 weeks.  - Therapy recommending SNF due to his low interest in participation.  - Started Lexapro due to suspicion for depression.  - Psych consulted, changed to Prozac and started thiamine, folic acid, MVI due to previous history of alcohol abuse, although Wernicke encephalopathy is not suspected.  - Re-consulted SNF for rehab from the stroke, as  discharging him to a shelter would be inappropriate and unsafe for him.  Discussed with his son, Forest, at length on 3/10.  Forest is out of town on a job but will be returning to [place] eventually.  He agrees his father will eventually need nursing home care as he is unable to care for himself.      Vomiting  kub showed gastric distention, moderate stool  No further episodes  Will monitor  Suppository once      Abnormal imaging of thyroid  Tsh/t4 ok, thyroid ultrasound done, showed bilateral nodules which did not meet criteria for biopsy.    Advance care planning        Severe protein-calorie malnutrition  Diet as tolerated      Debility  PT/OT recommending SNF after stroke with debility/poor motivation  Awaiting SNF, having difficulty finding placement for unclear reasons.  Will continue to pursue this as above.      Type 2 diabetes mellitus with hyperglycemia, without long-term current use of insulin  Reportedly he is on metformin and glipizide, both held.  Continue SSI for now.  He has 4+ sugar in urine and HbA1c is 10.3, and he is hyperglycemic here.  Will start levemir and continue ISS, increase levemir to 25 units daily  Add scheduled novolog 5 units tid, increase to 7 units  Improving  OK to resume metformin.  Increased levemir to 28 units daily with good improvement.    Acute deep vein thrombosis (DVT) of both femoral veins  Last ultrasound showed partially occlusive DVTs, now completely occlusive DVT of multiple lower extremity veins on ultrasound.  Patient reports compliance with warfarin but his INR is only one.  He is homeless but says he does not have difficulty obtaining his medications.  Does not seem to care about anything, which again might be due to the frontal lobe stroke.  D-dimer was markedly elevated on presentation and there was a question of possible PE, but he had a CTA of the brain/neck on presentation so could not get another CTA for another 48 hours.    V/Q scan was done, showed  low probability for PE.  2D echo showed grade I diastolic dysfunction.  Warfarin was restarted with bridging Lovenox, but INR was still low.  Given the difficulty of getting him to follow up changed to apixaban and discontinued Lovenox.    Syncope and collapse  Unclear cause.  Spoke to Ciarra and patient has had previous syncopal episodes attributed to alcohol abuse.  Has been prescribed meclizine as well.  Tox positive for cocaine but not alcohol, and he denies current use of both.  CTA brain/neck was done in ED, and per ED note the results were discussed with stroke neurologist Dr. Tovar who felt that CTA finding of a short segment of left vertebral artery stenosis was incidental.  He did not think stroke workup with MRI was indicated.    Since change of status per son compared to when he saw him early during the week a CT of the brain was ordered and no abnormalities seen.  MRI of the brain showed acute strokes of the left cerebral hemisphere and right frontal lobe.      Hyperlipidemia  Resumed lipitor      Essential hypertension  Doubtful he was on anything at home.  Started losartan 25 mg daily  BP well controlled on low dose losartan.  BP transiently low after he fell but recovered quickly.  IVF bolus given.  Losartan discontinued as BP persistently low-normal.    Tobacco dependence  He smokes 5-6 cigarettes/day.  Not trying to quit and not interested in a nicotine patch.  Benefits of smoking cessation were reviewed with patient in detail for for 8 minutes and patient was encouraged to quit. Nicotine replacement options were discussed, not needed.  He still does not want a patch and has been here more than a month without cigarettes.      VTE Risk Mitigation (From admission, onward)         Ordered     apixaban tablet 5 mg  2 times daily         02/10/22 0901     Place sequential compression device  Until discontinued         01/31/22 0578                Discharge Planning   EFRAÍN:      Code Status: Full Code    Is the patient medically ready for discharge?:     Reason for patient still in hospital (select all that apply): Pending disposition  Discharge Plan A: Skilled Nursing Facility   Discharge Delays: (!) Post-Acute Set-up              Alma Elizalde MD  Department of Hospital Medicine   [facility] ([place])

## 2022-03-17 NOTE — PT/OT/SLP PROGRESS
Physical Therapy Treatment    Patient Name:  Forest Lopez   MRN:  [id number]    Recommendations:     Discharge Recommendations:  nursing facility, skilled   Discharge Equipment Recommendations: bedside commode, shower chair   Barriers to discharge: Decreased caregiver support    Assessment:     Forest Lopez is a 72 y.o. male admitted with a medical diagnosis of Acute stroke due to ischemia.  He presents with the following impairments/functional limitations:  weakness, impaired endurance, impaired self care skills, impaired functional mobilty, gait instability, impaired balance, impaired cognition, decreased coordination, decreased upper extremity function, decreased lower extremity function, decreased safety awareness ;pt with fair mobility today, trial use of a cane w/ min,A for balance, pt leans to R side a lot, very distracted in hallway..    Rehab Prognosis: Good and Fair; patient would benefit from acute skilled PT services to address these deficits and reach maximum level of function.    Recent Surgery: * No surgery found *      Plan:     During this hospitalization, patient to be seen 3 x/week to address the identified rehab impairments via gait training, therapeutic activities, therapeutic exercises, neuromuscular re-education and progress toward the following goals:    · Plan of Care Expires:  04/03/22    Subjective     Chief Complaint: none stated  Patient/Family Comments/goals: pt said nothing throughout session, though agreeable.   Pain/Comfort:  · Pain Rating 1: 0/10      Objective:     Communicated with nurse prior to session.  Patient found left sidelying with Condom Catheter, PICC line (Avasys monitoring) upon PT entry to room.     General Precautions: Standard, fall, diabetic   Orthopedic Precautions:N/A   Braces: N/A  Respiratory Status: Room air     Functional Mobility:  · Bed Mobility:     · Supine to Sit: contact guard assistance  · Transfers:     · Sit to Stand:  contact guard assistance and  "minimum assistance with straight cane  · Gait: amb'd 150' w/ st cane and min.A; cueing for inc step length and to stay focused on ambulation, pt distracted in hallway       AM-PAC 6 CLICK MOBILITY  Turning over in bed (including adjusting bedclothes, sheets and blankets)?: 3  Sitting down on and standing up from a chair with arms (e.g., wheelchair, bedside commode, etc.): 3  Moving from lying on back to sitting on the side of the bed?: 3  Moving to and from a bed to a chair (including a wheelchair)?: 3  Need to walk in hospital room?: 3  Climbing 3-5 steps with a railing?: 3  Basic Mobility Total Score: 18       Therapeutic Activities and Exercises:   perf'd seated LE exs' of heel/toe raises, LAQ"s x 10 ea.   (set pt lunch tray up, he was able to feed himself, just very slow movements noted)    Patient left up in chair with all lines intact, call button in reach, chair alarm on, nurse notified and Avasys present..    GOALS:   Multidisciplinary Problems     Physical Therapy Goals        Problem: Physical Therapy Goal    Goal Priority Disciplines Outcome Goal Variances Interventions   Physical Therapy Goal     PT, PT/OT Ongoing, Progressing     Description: Goals to be met by: 4/3/2022    Patient will increase functional independence with mobility by performin. Sit<>stand with SPV with LRAD.  2. Gait x 300 feet with SPV with LRAD.   3. Static standing in SLS with no UE support with SBA x10 sec.                      Time Tracking:     PT Received On: 22  PT Start Time: 1227     PT Stop Time: 1250  PT Total Time (min): 23 min     Billable Minutes: Gait Training 13 and Therapeutic Exercise 10    Treatment Type: Treatment  PT/PTA: PTA     PTA Visit Number: 2     2022  "

## 2022-03-17 NOTE — SUBJECTIVE & OBJECTIVE
Interval History: no further vomiting, per patient had bm after suppository , tolerating diet.    Review of Systems   Constitutional:  Negative for chills and fever.   Respiratory:  Negative for cough and shortness of breath.    Cardiovascular:  Negative for chest pain and palpitations.   Gastrointestinal:  Negative for abdominal pain, nausea and vomiting.   Objective:     Vital Signs (Most Recent):  Temp: 97.7 °F (36.5 °C) (03/17/22 1626)  Pulse: 103 (03/17/22 1626)  Resp: 16 (03/17/22 1626)  BP: (!) 157/99 (03/17/22 1626)  SpO2: 97 % (03/17/22 1626) Vital Signs (24h Range):  Temp:  [97.3 °F (36.3 °C)-98.9 °F (37.2 °C)] 97.7 °F (36.5 °C)  Pulse:  [] 103  Resp:  [16-18] 16  SpO2:  [97 %-98 %] 97 %  BP: (125-160)/(63-99) 157/99     Weight: 66.2 kg (145 lb 15.1 oz)  Body mass index is 19.79 kg/m².    Intake/Output Summary (Last 24 hours) at 3/17/2022 1700  Last data filed at 3/17/2022 1100  Gross per 24 hour   Intake --   Output 2150 ml   Net -2150 ml        Physical Exam  Constitutional:       General: He is not in acute distress.     Comments: Thin   HENT:      Mouth/Throat:      Comments: Edentulous  Cardiovascular:      Rate and Rhythm: Normal rate and regular rhythm.      Pulses: Normal pulses.      Heart sounds: Normal heart sounds. No murmur heard.    No gallop.   Pulmonary:      Effort: Pulmonary effort is normal.      Breath sounds: Normal breath sounds.   Abdominal:      General: Bowel sounds are normal. There is no distension.      Palpations: Abdomen is soft.      Tenderness: There is no abdominal tenderness.      Comments: \   Skin:     General: Skin is warm and dry.   Neurological:      Mental Status: He is alert.      Comments: Not oriented to time.       Significant Labs: All pertinent labs within the past 24 hours have been reviewed.    Significant Imaging: I have reviewed all pertinent imaging results/findings within the past 24 hours.

## 2022-03-17 NOTE — PT/OT/SLP PROGRESS
Speech Language Pathology Treatment    Patient Name:  Forest Lopez   MRN:  [id number]  Admitting Diagnosis: Acute stroke due to ischemia    Recommendations:                  General Recommendations:   1. Speech pathology to continue to follow 2-3x/week for ongoing assessment of cognitive-communicative deficits s/p acute infarcts of L cerebral hemisphere     Diet recommendations: Solids: Mechanical soft solids, Liquids: Thin      Aspiration Precautions: Eliminate distractions, Feed only when awake/alert, Frequent oral care and HOB to 90 degrees      General Precautions: Standard,       Communication strategies:  Reduce informational content/context; frequent repetition and cues to redirect    Subjective     · Pt awake/alert, resting in bed.  · Pt initially declining to participate in SLP session. Agreeable to participate with mod-MAX motivational cues.    Respiratory Status: Room air    Objective:     Has the patient been evaluated by SLP for swallowing?   Yes  Keep patient NPO? No   Current Respiratory Status:    Room air    Cognitive-Communicative and Language Status:  SLP repositioned pt upright in bed to participate in tx session. Pt oriented to person, place, and general situation. All environmental distractors removed prior to beginning session. Pt continued with highly distractible state during session. Required MAX cues to redirect to tasks and questions. Pt oriented to month only. Required mod verbal cues to orient to year. SLP re-oriented pt to date. Without a delay, pt with immediate recall deficits. Unable to repeat today's date. Sustained attention deficits limited accuracy. SLP facilitated discussion on importance in participating in tx in order to move to next facility. Pt voiced understanding.     Swallowing:  SLP set-up pt with breakfast tray at end of session. R WFL for labial seal, bolus prep/mastication, and a-p transport. No overt s/s of aspiration or airway threat during po intake- no coughing,  choking, changes in breath stream or vocal quality    Education: Pt would benefit from ongoing SLP services at next level of care.     Assessment:     Forest Lopez is a 72 y.o. 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    Goals:   Multidisciplinary Problems     SLP Goals        Problem: SLP Goal    Goal Priority Disciplines Outcome   SLP Goal     SLP Ongoing, Progressing   Description: 1. Pt will consume a regular/thin diet without overt s/s of aspiration or airway threat without assistance.  2. Pt will increase orientation to person, place, situation and date with 90% acc given min assist.  3. Pt will follow 3-4 step commands to increase functional IND with 75% acc given min assist.   4. Pt will complete immediate and delayed recall tasks with 75% acc given mod assist.  5. Targeting word finding, pt will complete divergent naming tasks given 1-minute time frame with 50% acc given mod assist.  6. Ongoing cognitive-communicative assessment.     Updated Goals 2/8:  7. Pt will sustain attention to topic/task without distraction in 5-10 minute increments given mod verbal cues.                   Plan:     · Patient to be seen:  2 x/week, 3 x/week   · Plan of Care expires:  03/21/22  · Plan of Care reviewed with:  patient   · SLP Follow-Up:  Yes       Discharge recommendations:  nursing facility, skilled     Time Tracking:     SLP Treatment Date:   03/17/22  Speech Start Time:  0810  Speech Stop Time:  0827     Speech Total Time (min):  17 min    Billable Minutes: Speech Therapy Individual 17 min    03/17/2022

## 2022-03-17 NOTE — NURSING
Report given care assumed . VSS. AAOX4 in NAD  Assessment per flow sheet.Call bel;l within reach . Bed in low locked position. Safety maintained will continue to monitor

## 2022-03-17 NOTE — PT/OT/SLP PROGRESS
Occupational Therapy  Missed Visit      Patient Name:  Forest Lopez   MRN:  [id number]    Patient not seen today secondary to Other (Comment) (pt eating lunch and requested OT to return at a later time). Will follow-up later this pm as time allows.    3/17/2022

## 2022-03-17 NOTE — PLAN OF CARE
POC reviewed w/ pt and purposeful rounding complete. Meds administered per MAR. Pt denies pain and SOB. Pt AAOx4 and ambulates w/ 1 assist. Condom catheter on and draining clear yellow urine. Blood glucose monitored and no PRN insulin required this shift. Bed alarm and avasys in use. Safety precautions intact; no injuries or falls this shift. Pt denies needs at this time; will continue to monitor.

## 2022-03-17 NOTE — PLAN OF CARE
"   03/16/22 2057   Discharge Reassessment   Assessment Type Discharge Planning Reassessment   Did the patient's condition or plan change since previous assessment? No   Discharge Plan discussed with: Patient   Communicated EFRAÍN with patient/caregiver Yes   Discharge Plan A Skilled Nursing Facility   Discharge Plan B New Nursing Home placement - half-way care facility   DME Needed Upon Discharge  none   Discharge Barriers Identified Other (see comments)  (Patient has been turned down by multiple facilities.)   Why the patient remains in the hospital Placement issues  (Patient has been turned down by multiple facilities.)   Post-Acute Status   Coverage [employer] MANAGED MEDICARE - [employer] SNP (SPECIAL NEEDS PLAN   Discharge Delays (!) Post-Acute Set-up     Very difficult to place patient. SHANICE met with the patient earlier today to discuss the length of time it has taken to find a SNF for him. SHANICE talked with him about a facility outside of the area, called [facility] in the [facility] area. He agreed to placement there if he is accepted.    SHANICE spoke with Sumi at "[facility]" in [place], [place].  SHANICE faxed all important paperwork to Ms. Jonas for review.    "

## 2022-03-18 LAB
ERYTHROCYTE [DISTWIDTH] IN BLOOD BY AUTOMATED COUNT: 13.9 % (ref 11.5–14.5)
HCT VFR BLD AUTO: 39.5 % (ref 40–54)
HGB BLD-MCNC: 12.6 G/DL (ref 14–18)
MCH RBC QN AUTO: 25.4 PG (ref 27–31)
MCHC RBC AUTO-ENTMCNC: 31.9 G/DL (ref 32–36)
MCV RBC AUTO: 80 FL (ref 82–98)
PLATELET # BLD AUTO: 272 K/UL (ref 150–450)
PMV BLD AUTO: 9.9 FL (ref 9.2–12.9)
POCT GLUCOSE: 113 MG/DL (ref 70–110)
POCT GLUCOSE: 118 MG/DL (ref 70–110)
POCT GLUCOSE: 130 MG/DL (ref 70–110)
POCT GLUCOSE: 208 MG/DL (ref 70–110)
RBC # BLD AUTO: 4.97 M/UL (ref 4.6–6.2)
WBC # BLD AUTO: 8.84 K/UL (ref 3.9–12.7)

## 2022-03-18 PROCEDURE — 25000003 PHARM REV CODE 250: Performed by: HOSPITALIST

## 2022-03-18 PROCEDURE — 99232 SBSQ HOSP IP/OBS MODERATE 35: CPT | Mod: ,,, | Performed by: INTERNAL MEDICINE

## 2022-03-18 PROCEDURE — 97110 THERAPEUTIC EXERCISES: CPT | Mod: CQ

## 2022-03-18 PROCEDURE — 97116 GAIT TRAINING THERAPY: CPT | Mod: CQ

## 2022-03-18 PROCEDURE — 25000003 PHARM REV CODE 250: Performed by: INTERNAL MEDICINE

## 2022-03-18 PROCEDURE — 85027 COMPLETE CBC AUTOMATED: CPT | Performed by: INTERNAL MEDICINE

## 2022-03-18 PROCEDURE — 36406 VNPNXR<3YRS PHY/QHP OTHER VN: CPT

## 2022-03-18 PROCEDURE — 99232 PR SUBSEQUENT HOSPITAL CARE,LEVL II: ICD-10-PCS | Mod: ,,, | Performed by: INTERNAL MEDICINE

## 2022-03-18 PROCEDURE — 94761 N-INVAS EAR/PLS OXIMETRY MLT: CPT

## 2022-03-18 PROCEDURE — 11000001 HC ACUTE MED/SURG PRIVATE ROOM

## 2022-03-18 PROCEDURE — 25000003 PHARM REV CODE 250: Performed by: NURSE PRACTITIONER

## 2022-03-18 PROCEDURE — 36415 COLL VENOUS BLD VENIPUNCTURE: CPT | Performed by: INTERNAL MEDICINE

## 2022-03-18 PROCEDURE — A4216 STERILE WATER/SALINE, 10 ML: HCPCS | Performed by: INTERNAL MEDICINE

## 2022-03-18 PROCEDURE — 97535 SELF CARE MNGMENT TRAINING: CPT

## 2022-03-18 RX ADMIN — FOLIC ACID 1 MG: 1 TABLET ORAL at 10:03

## 2022-03-18 RX ADMIN — Medication 10 ML: at 12:03

## 2022-03-18 RX ADMIN — Medication 10 ML: at 11:03

## 2022-03-18 RX ADMIN — ATORVASTATIN CALCIUM 80 MG: 20 TABLET, FILM COATED ORAL at 10:03

## 2022-03-18 RX ADMIN — METFORMIN HYDROCHLORIDE 500 MG: 500 TABLET ORAL at 05:03

## 2022-03-18 RX ADMIN — INSULIN ASPART 2 UNITS: 100 INJECTION, SOLUTION INTRAVENOUS; SUBCUTANEOUS at 01:03

## 2022-03-18 RX ADMIN — INSULIN ASPART 7 UNITS: 100 INJECTION, SOLUTION INTRAVENOUS; SUBCUTANEOUS at 05:03

## 2022-03-18 RX ADMIN — METFORMIN HYDROCHLORIDE 500 MG: 500 TABLET ORAL at 07:03

## 2022-03-18 RX ADMIN — FLUOXETINE 20 MG: 20 CAPSULE ORAL at 10:03

## 2022-03-18 RX ADMIN — INSULIN ASPART 7 UNITS: 100 INJECTION, SOLUTION INTRAVENOUS; SUBCUTANEOUS at 07:03

## 2022-03-18 RX ADMIN — APIXABAN 5 MG: 2.5 TABLET, FILM COATED ORAL at 08:03

## 2022-03-18 RX ADMIN — THIAMINE HCL TAB 100 MG 100 MG: 100 TAB at 10:03

## 2022-03-18 RX ADMIN — INSULIN DETEMIR 28 UNITS: 100 INJECTION, SOLUTION SUBCUTANEOUS at 10:03

## 2022-03-18 RX ADMIN — TAMSULOSIN HYDROCHLORIDE 0.4 MG: 0.4 CAPSULE ORAL at 08:03

## 2022-03-18 RX ADMIN — APIXABAN 5 MG: 2.5 TABLET, FILM COATED ORAL at 10:03

## 2022-03-18 RX ADMIN — THERA TABS 1 TABLET: TAB at 10:03

## 2022-03-18 RX ADMIN — INSULIN ASPART 7 UNITS: 100 INJECTION, SOLUTION INTRAVENOUS; SUBCUTANEOUS at 01:03

## 2022-03-18 RX ADMIN — Medication 10 ML: at 06:03

## 2022-03-18 NOTE — PT/OT/SLP PROGRESS
Occupational Therapy   Treatment    Name: Forest Lopez  MRN: [id number]  Admitting Diagnosis:  Acute stroke due to ischemia       Recommendations:     Discharge Recommendations: nursing facility, skilled  Discharge Equipment Recommendations:  bedside commode, shower chair  Barriers to discharge:  Decreased caregiver support    Assessment:     Forest Lopez is a 72 y.o. male with a medical diagnosis of Acute stroke due to ischemia.  He presents with  weakness, impaired endurance, impaired functional mobilty, impaired self care skills, gait instability, impaired balance, impaired cognition, decreased coordination, decreased safety awareness.     Pt agreeable to chair level grooming only this session despite max encouragement and education on benefits and importance of out of bed/chair ADL activity.     Rehab Prognosis:  Fair; patient would benefit from acute skilled OT services to address these deficits and reach maximum level of function.       Plan:     Patient to be seen 3 x/week to address the above listed problems via self-care/home management, therapeutic activities, therapeutic exercises  · Plan of Care Expires: 04/02/22  · Plan of Care Reviewed with: patient    Subjective     Pain/Comfort:  · Pain Rating 1: 0/10    Objective:     Communicated with: RN prior to session.  Patient found up in chair with Condom Catheter, PICC line upon OT entry to room.    General Precautions: Standard, fall, diabetic   Orthopedic Precautions:N/A   Braces: N/A  Respiratory Status: Room air     Occupational Performance:     Bed Mobility:    · Not tested; pt received and left sitting in chair      Functional Mobility/Transfers:  · Not tested: pt declined out of chair activity this date    Activities of Daily Living:  · Grooming: Supervision with set up assistance to wash face and perform oral hygiene while seated in chair       AMPA 6 Click ADL: 15    Treatment & Education:  OT role, plan of care, progression of goals, importance  of continued OOB activity, ADL retraining     Patient left up in chair with all lines intact and call button in reachEducation:      GOALS:   Multidisciplinary Problems     Occupational Therapy Goals        Problem: Occupational Therapy Goal    Goal Priority Disciplines Outcome Interventions   Occupational Therapy Goal     OT, PT/OT Ongoing, Progressing    Description: Goals to be met by: 3/18/2022    Patient will increase functional independence with ADLs by performing:    UE Dressing with Brule.  LE Dressing with Brule.  Grooming while standing at sink with Supervision.  Toileting from toilet with Supervision for hygiene and clothing management.   Toilet transfer to toilet with Supervision.                     Time Tracking:     OT Date of Treatment: 03/18/22  OT Start Time: 1412  OT Stop Time: 1420  OT Total Time (min): 8 min    Billable Minutes:Self Care/Home Management 8 minutes    OT/DUTCH: OT          3/18/2022

## 2022-03-18 NOTE — PLAN OF CARE
[facility] Med [facility] ([place])  Discharge Reassessment       Primary Care Provider: Julian Escobar    Admission Diagnosis: Syncope [R55]  Acute deep vein thrombosis (DVT) of both femoral veins [I82.413]    Admission Date: 1/31/2022  Expected Discharge Date: TBD  Length of stay: 45 days    Pending accepting SNF    Discharge Recommendations:  skilled nursing facility  Barriers to discharge: Decreased caregiver support  Updated clinical documentation sent to  [facility] [phone] via epic right fax as per facility request; pending review  Right fax sent to [facility] Phone: [phone] Fax: [phone]; pending review      Discharge Barriers Identified: Other (see comments) (Patient has been turned down by multiple facilities.)    Payor: [employer] (Inspect Manager) MEDICARE / Plan: [employer] SNP (SPECIAL NEEDS PLAN) / Product Type: Medicare Advantage /     Extended Emergency Contact Information  Primary Emergency Contact: Forest Silva Jr  Address: [address] [place] [address] [address]           [place] [address] [facility]  Home Phone: [phone]  Mobile Phone: [phone]  Relation: Son  Secondary Emergency Contact: bertrand silva  Mobile Phone: [phone]  Relation: Son  Preferred language: English   needed? No    Discharge Plan A: Skilled Nursing Facility  Discharge Plan B: New Nursing Home placement - USP care facility      [facility] - [place] - [address]  [address]  [facility] [address]  Phone: [phone] Fax: [phone]    [facility] - [facility] [address]  [address] [address]  Phone: [phone] Fax: [phone]    [id number] - [place], [place] - [address]  [address] [address]  Phone: [phone] Fax: [phone]      Initial Assessment (most recent)     Adult  Discharge Assessment - 02/01/22 1925        Discharge Assessment    Assessment Type Discharge Planning Assessment     Confirmed/corrected address, phone number and insurance No     Reason No family to provide information/patient unable to answer     Source of Information patient     If unable to respond/provide information was family/caregiver contacted? Other (see comments)   SW left messages on jordan ph# from face sheet    When was your last doctors appointment? --   Patient unsure    Communicated EFRAÍN with patient/caregiver Yes     Reason For Admission Acute deep vein thrombosis (DVT) of both lower extremities     Lives With friend(s)     Do you expect to return to your current living situation? No     Do you have help at home or someone to help you manage your care at home? No     Prior to hospitilization cognitive status: Alert/Oriented     Current cognitive status: Alert/Oriented     Walking or Climbing Stairs Difficulty none     Dressing/Bathing Difficulty none     Equipment Currently Used at Home none     Readmission within 30 days? No     Patient currently being followed by outpatient case management? No     Do you currently have service(s) that help you manage your care at home? No     Do you take prescription medications? Yes     Do you have prescription coverage? Yes     Coverage [employer] MANAGED MEDICARE - [employer] SNP (SPECIAL NEEDS PLAN)     Do you have any problems affording any of your prescribed medications? No     Is the patient taking medications as prescribed? --   Mostly. Thought he felt bad due to meds so he stopped taking some of them recently..    Who is going to help you get home at discharge? Unknown as of yet.     How do you get to doctors appointments? health plan transportation     Are you on dialysis? No     Do you take coumadin? Yes     Who monitors your labs? He is on coumadin now for bilateral DVTs.     Discharge Plan A --   The patient states he is homeless.   working on his D/c plan.     DME Needed Upon Discharge  none     Discharge Plan discussed with: Patient   SW attempted to reach the patient's sons, but there were  no answers to her calls . LM on VM requesting a callback.

## 2022-03-18 NOTE — SUBJECTIVE & OBJECTIVE
Interval History: no further vomiting, had 2 bm yesterday, tolerating diet.    Review of Systems   Constitutional:  Negative for chills and fever.   Respiratory:  Negative for cough and shortness of breath.    Cardiovascular:  Negative for chest pain and palpitations.   Gastrointestinal:  Negative for abdominal pain, nausea and vomiting.   Objective:     Vital Signs (Most Recent):  Temp: 98.6 °F (37 °C) (03/18/22 1127)  Pulse: 93 (03/18/22 1127)  Resp: 16 (03/18/22 1127)  BP: 121/72 (03/18/22 1127)  SpO2: 97 % (03/18/22 1127) Vital Signs (24h Range):  Temp:  [97.7 °F (36.5 °C)-98.6 °F (37 °C)] 98.6 °F (37 °C)  Pulse:  [] 93  Resp:  [12-18] 16  SpO2:  [97 %-100 %] 97 %  BP: (114-157)/(57-99) 121/72     Weight: 66.2 kg (145 lb 15.1 oz)  Body mass index is 19.79 kg/m².    Intake/Output Summary (Last 24 hours) at 3/18/2022 1517  Last data filed at 3/18/2022 1200  Gross per 24 hour   Intake 480 ml   Output 1025 ml   Net -545 ml        Physical Exam  Constitutional:       General: He is not in acute distress.     Comments: Thin   HENT:      Mouth/Throat:      Comments: Edentulous  Cardiovascular:      Rate and Rhythm: Normal rate and regular rhythm.      Pulses: Normal pulses.      Heart sounds: Normal heart sounds. No murmur heard.    No gallop.   Pulmonary:      Effort: Pulmonary effort is normal.      Breath sounds: Normal breath sounds.   Abdominal:      General: Bowel sounds are normal. There is no distension.      Palpations: Abdomen is soft.      Tenderness: There is no abdominal tenderness.      Comments: \   Skin:     General: Skin is warm and dry.   Neurological:      Mental Status: He is alert.      Comments: Not oriented to time.       Significant Labs: All pertinent labs within the past 24 hours have been reviewed.    Significant Imaging: I have reviewed all pertinent imaging results/findings within the past 24 hours.

## 2022-03-18 NOTE — ASSESSMENT & PLAN NOTE
kub showed gastric distention, moderate stool  No further episodes  Will monitor  Suppository once  Having bm

## 2022-03-18 NOTE — PROGRESS NOTES
[facility] Medicine  Progress Note    Patient Name: Forest Lopez  MRN: [id number]  Patient Class: IP- Inpatient   Admission Date: 1/31/2022  Length of Stay: 46 days  Attending Physician: Alma Elizalde MD  Primary Care Provider: Julian Escobar        Subjective:     Principal Problem:Acute stroke due to ischemia        HPI:  Mr. Lopez is a 72 year old man who presented after a syncopal episode.  He reports he had been feeling well prior to the episode but then had a prodrome prior to passing out.  EMS was called, report patient's BP was low normal on their arrival, improved after an IV fluids bolus.  Patient denies chest pain or shortness of breath and says he does not feel weak currently although is rather tired.  When seen in the ED this morning he could barely express himself audibly.     Vital signs were normal on presentation here.  He is on warfarin for recurrent DVT, but his INR was 1, and d-dimer markedly elevated at 21.7.  Tox screen was positive for cocaine.  He had a CTA of the brain and neck done on presentation so CTA of the chest for PE study could not be done for 48 hours.  Ultrasound of the lower extremities showed extensive bilateral DVT.  On the right there was thrombosis of the common femoral vein, femoral vein, popliteal vein, one of the paired posterior tibial veins and both visualized peroneal veins.  On the left there was thrombosis of the common femoral vein, femoral vein and popliteal vein.  Patient was started on full dose Lovenox and admitted.    Medical history is from the chart as he is unable to give me much information.  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  He smokes 5-6 cigarettes/day and is not interested in quitting.  He is currently homeless and staying with a friend.  Despite the normal INR he is sure he  is taking his warfarin and is not having difficulty taking his medications.  I discussed his care with [facility] staff on the [facility] and patient had been lost to follow up since November 2021.      Overview/Hospital Course:  Patient presented to the ED after a syncopal episode and was found to have bilateral lower extremity DVTs and a low INR.  Tox screen was positive for cocaine.  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  He was started back on his warfarin with bridging Lovenox.  His 2D echo showed grade I diastolic dysfunction.  Neurology evaluated him and recommended echo with bubble study, which was negative for a shunt.    PT/OT evaluated him and recommended SNF placement.  His INR was difficult to get therapeutic, and as he had no contraindication to a NOAC his warfarin was changed to apixaban, and the full dose Lovenox was discontinued.  As he appeared to be depressed and had no objection to starting an antidepressant Lexapro was started.  He had a fall in the hospital on 2/13, had gone to the bathroom for a BM with the nurse, and when she turned away while he was washing his hands he apparently lost his balance and fell.  He did not hit his head and did not lose consciousness, but his BP was low transiently and he was given a bolus of IV fluids.    Patient's mental status improved slowly, but he gradually became more talkative and was able to hold a brief conversation.  He was diagnosed with a polymicrobial UTI on 2/21 and completed treatment with antibiotics.  Psychiatry evaluated him on 3/3 and changed his antidepressant to Prozac as it can be more activating and patient was having difficulty with his level of motivation.      Patient's discharge was delayed as he was not accepted by any SNF facilities in the area.  Reasons for the denials are unclear as he has a clear rehab diagnosis.  He has a history of drug  use but has not shown any interest in getting any illicit or legal drugs since he has been here, and he has had no visitors that could have provided drugs to him.  He has been pleasant and cooperative while here although lacks motivation, likely due to the nature of the stroke affecting his frontal lobe.  He requires considerable encouragement to participate in therapy.  Discharging him to live on the street or shelter would not be appropriate as he would not be able to take care of himself and likely will need intermediate nursing home care eventually after he gets the chance to improve with rehab.      Interval History: no further vomiting, had 2 bm yesterday, tolerating diet.    Review of Systems   Constitutional:  Negative for chills and fever.   Respiratory:  Negative for cough and shortness of breath.    Cardiovascular:  Negative for chest pain and palpitations.   Gastrointestinal:  Negative for abdominal pain, nausea and vomiting.   Objective:     Vital Signs (Most Recent):  Temp: 98.6 °F (37 °C) (03/18/22 1127)  Pulse: 93 (03/18/22 1127)  Resp: 16 (03/18/22 1127)  BP: 121/72 (03/18/22 1127)  SpO2: 97 % (03/18/22 1127) Vital Signs (24h Range):  Temp:  [97.7 °F (36.5 °C)-98.6 °F (37 °C)] 98.6 °F (37 °C)  Pulse:  [] 93  Resp:  [12-18] 16  SpO2:  [97 %-100 %] 97 %  BP: (114-157)/(57-99) 121/72     Weight: 66.2 kg (145 lb 15.1 oz)  Body mass index is 19.79 kg/m².    Intake/Output Summary (Last 24 hours) at 3/18/2022 1517  Last data filed at 3/18/2022 1200  Gross per 24 hour   Intake 480 ml   Output 1025 ml   Net -545 ml        Physical Exam  Constitutional:       General: He is not in acute distress.     Comments: Thin   HENT:      Mouth/Throat:      Comments: Edentulous  Cardiovascular:      Rate and Rhythm: Normal rate and regular rhythm.      Pulses: Normal pulses.      Heart sounds: Normal heart sounds. No murmur heard.    No gallop.   Pulmonary:      Effort: Pulmonary effort is normal.      Breath sounds:  Normal breath sounds.   Abdominal:      General: Bowel sounds are normal. There is no distension.      Palpations: Abdomen is soft.      Tenderness: There is no abdominal tenderness.      Comments: \   Skin:     General: Skin is warm and dry.   Neurological:      Mental Status: He is alert.      Comments: Not oriented to time.       Significant Labs: All pertinent labs within the past 24 hours have been reviewed.    Significant Imaging: I have reviewed all pertinent imaging results/findings within the past 24 hours.      Assessment/Plan:      * Acute stroke due to ischemia  - MRI showed areas of diffusion signal hyperintensity consistent with areas of acute ischemia/infarct involving the left cerebral hemisphere, the most prominent measuring approximately 1.4 cm, also a small focus of the right frontal lobe.  - Patient on full dose anticoagulation currently due to acute bilateral DVT.  - Sinus rhythm noted throughout hospital stay  - Risk factor modification - control diabetes, continue statin.  - RPR, HIV non reactive.  B12 low normal.   - CTA showed a focal segment of 60-70% stenosis involving the proximal to mid left vertebral artery that was new when compared to the prior study of 09/25/2019.  No evidence of large vessel intracranial occlusion.   - Neurology noted symptoms do not correspond to stroke location, although the frontal lobe stroke might have something to do with his lack of motivation, and Speech has noted he has had delayed swallowing.  - Echo with bubble study done, no shunt seen.  - Follow up with vascular neurology in 4 weeks.  - Therapy recommending SNF due to his low interest in participation.  - Started Lexapro due to suspicion for depression.  - Psych consulted, changed to Prozac and started thiamine, folic acid, MVI due to previous history of alcohol abuse, although Wernicke encephalopathy is not suspected.  - Re-consulted SNF for rehab from the stroke, as discharging him to a shelter would  be inappropriate and unsafe for him.  Discussed with his son, Forest, at length on 3/10.  Forest is out of town on a job but will be returning to [place] eventually.  He agrees his father will eventually need nursing home care as he is unable to care for himself.      Vomiting  kub showed gastric distention, moderate stool  No further episodes  Will monitor  Suppository once  Having bm      Abnormal imaging of thyroid  Tsh/t4 ok, thyroid ultrasound done, showed bilateral nodules which did not meet criteria for biopsy.    Advance care planning        Severe protein-calorie malnutrition  Diet as tolerated      Debility  PT/OT recommending SNF after stroke with debility/poor motivation  Awaiting SNF, having difficulty finding placement for unclear reasons.  Will continue to pursue this as above.      Type 2 diabetes mellitus with hyperglycemia, without long-term current use of insulin  Reportedly he is on metformin and glipizide, both held.  Continue SSI for now.  He has 4+ sugar in urine and HbA1c is 10.3, and he is hyperglycemic here.  Will start levemir and continue ISS, increase levemir to 25 units daily  Add scheduled novolog 5 units tid, increase to 7 units  Improving  OK to resume metformin.  Increased levemir to 28 units daily with good improvement.    Acute deep vein thrombosis (DVT) of both femoral veins  Last ultrasound showed partially occlusive DVTs, now completely occlusive DVT of multiple lower extremity veins on ultrasound.  Patient reports compliance with warfarin but his INR is only one.  He is homeless but says he does not have difficulty obtaining his medications.  Does not seem to care about anything, which again might be due to the frontal lobe stroke.  D-dimer was markedly elevated on presentation and there was a question of possible PE, but he had a CTA of the brain/neck on presentation so could not get another CTA for another 48 hours.    V/Q scan was done, showed low probability for PE.   2D echo showed grade I diastolic dysfunction.  Warfarin was restarted with bridging Lovenox, but INR was still low.  Given the difficulty of getting him to follow up changed to apixaban and discontinued Lovenox.    Syncope and collapse  Unclear cause.  [facility] and patient has had previous syncopal episodes attributed to alcohol abuse.  Has been prescribed meclizine as well.  Tox positive for cocaine but not alcohol, and he denies current use of both.  CTA brain/neck was done in ED, and per ED note the results were discussed with stroke neurologist Dr. Tovar who felt that CTA finding of a short segment of left vertebral artery stenosis was incidental.  He did not think stroke workup with MRI was indicated.    Since change of status per son compared to when he saw him early during the week a CT of the brain was ordered and no abnormalities seen.  MRI of the brain showed acute strokes of the left cerebral hemisphere and right frontal lobe.      Hyperlipidemia  Resumed lipitor      Essential hypertension  Doubtful he was on anything at home.  Started losartan 25 mg daily  BP well controlled on low dose losartan.  BP transiently low after he fell but recovered quickly.  IVF bolus given.  Losartan discontinued as BP persistently low-normal.    Tobacco dependence  He smokes 5-6 cigarettes/day.  Not trying to quit and not interested in a nicotine patch.  Benefits of smoking cessation were reviewed with patient in detail for for 8 minutes and patient was encouraged to quit. Nicotine replacement options were discussed, not needed.  He still does not want a patch and has been here more than a month without cigarettes.      VTE Risk Mitigation (From admission, onward)         Ordered     apixaban tablet 5 mg  2 times daily         02/10/22 0901     Place sequential compression device  Until discontinued         01/31/22 0586                Discharge Planning   EFRAÍN:      Code Status: Full Code   Is the patient medically  ready for discharge?:     Reason for patient still in hospital (select all that apply): Pending disposition  Discharge Plan A: Skilled Nursing Facility   Discharge Delays: (!) Post-Acute Set-up              Alma Elizalde MD  Department of Hospital Medicine   [facility] [place] [facility] ([place])

## 2022-03-18 NOTE — PT/OT/SLP PROGRESS
Physical Therapy Treatment    Patient Name:  Forest Lopez   MRN:  [id number]    Recommendations:     Discharge Recommendations:  nursing facility, skilled   Discharge Equipment Recommendations: bedside commode, shower chair   Barriers to discharge: Inaccessible home and Decreased caregiver support    Assessment:     Forest Lopez is a 72 y.o. male admitted with a medical diagnosis of Acute stroke due to ischemia.  He presents with the following impairments/functional limitations:  weakness, impaired endurance, impaired self care skills, impaired functional mobilty, gait instability, impaired balance, impaired cognition, decreased coordination, decreased upper extremity function, decreased lower extremity function, decreased safety awareness, impaired coordination ,pt with improved mobility today w/ rollator (vs st cane).    Rehab Prognosis: Good; patient would benefit from acute skilled PT services to address these deficits and reach maximum level of function.    Recent Surgery: * No surgery found *      Plan:     During this hospitalization, patient to be seen 3 x/week to address the identified rehab impairments via gait training, therapeutic activities, therapeutic exercises, neuromuscular re-education and progress toward the following goals:    · Plan of Care Expires:  04/03/22    Subjective     Chief Complaint: none stated  Patient/Family Comments/goals: pt agreeable to session, pleasant, more talkative today.  Pain/Comfort:  · Pain Rating 1: 0/10  · Pain Rating Post-Intervention 1: 0/10      Objective:     Communicated with nurse prior to session.  Patient found HOB elevated with Condom Catheter, PICC line (Avasys monitoring) upon PT entry to room.     General Precautions: Standard, fall, diabetic   Orthopedic Precautions:N/A   Braces: N/A  Respiratory Status: Room air     Functional Mobility:  · Bed Mobility:     · Supine to Sit: stand by assistance  · Transfers:     · Sit to Stand:  contact guard assistance  with 4 wheeled walker  · Gait: amb'd ~200' w/ rollator and CGA/SBA, cueing for safety       AM-PAC 6 CLICK MOBILITY  Turning over in bed (including adjusting bedclothes, sheets and blankets)?: 3  Sitting down on and standing up from a chair with arms (e.g., wheelchair, bedside commode, etc.): 3  Moving from lying on back to sitting on the side of the bed?: 3  Moving to and from a bed to a chair (including a wheelchair)?: 3  Need to walk in hospital room?: 3  Climbing 3-5 steps with a railing?: 3  Basic Mobility Total Score: 18       Therapeutic Activities and Exercises:   O2:99% on RA during amb. (though pt w/ mild SOB noted), HR:85  perf'd seated LE ex's of heel/toe raises, hip flex, LAQ's x 10 ea.    Patient left up in chair with all lines intact, call button in reach, chair alarm on and nurse and PCT notified..LE's elevated.    GOALS:   Multidisciplinary Problems     Physical Therapy Goals        Problem: Physical Therapy Goal    Goal Priority Disciplines Outcome Goal Variances Interventions   Physical Therapy Goal     PT, PT/OT Ongoing, Progressing     Description: Goals to be met by: 4/3/2022    Patient will increase functional independence with mobility by performin. Sit<>stand with SPV with LRAD.  2. Gait x 300 feet with SPV with LRAD.   3. Static standing in SLS with no UE support with SBA x10 sec.                      Time Tracking:     PT Received On: 22  PT Start Time: 1120     PT Stop Time: 1151  PT Total Time (min): 31 min     Billable Minutes: Gait Training 18 and Therapeutic Exercise 13    Treatment Type: Treatment  PT/PTA: PTA     PTA Visit Number: 3     2022

## 2022-03-18 NOTE — PLAN OF CARE
03/18/22 1706   Post-Acute Status   Post-Acute Authorization Placement   Post-Acute Placement Status Pending Bed Availability   Coverage [employer] MANAGED MEDICARE - [employer] SNP (SPECIAL NEEDS PLAN)   Discharge Delays (!) Post-Acute Set-up  (SEE below)   Discharge Plan   Discharge Plan A Skilled Nursing Facility   Discharge Plan B New Nursing Home placement - half-way care facility     SHANICE attempted to reach the SNF in [place], [place]. SHANICE called Sumi's number x 2, and left messages.   The calls were not returned.

## 2022-03-18 NOTE — PT/OT/SLP PROGRESS
Speech Language Pathology      Forest Lopez  MRN: [id number]    Patient not seen today secondary to Other (Comment). x2 attempts at SLP session today. Pt declined to participate in speech tx this date. Will follow-up next available date.

## 2022-03-19 LAB
POCT GLUCOSE: 118 MG/DL (ref 70–110)
POCT GLUCOSE: 159 MG/DL (ref 70–110)
POCT GLUCOSE: 185 MG/DL (ref 70–110)
POCT GLUCOSE: 80 MG/DL (ref 70–110)

## 2022-03-19 PROCEDURE — 25000003 PHARM REV CODE 250: Performed by: INTERNAL MEDICINE

## 2022-03-19 PROCEDURE — 36406 VNPNXR<3YRS PHY/QHP OTHER VN: CPT

## 2022-03-19 PROCEDURE — A4216 STERILE WATER/SALINE, 10 ML: HCPCS | Performed by: INTERNAL MEDICINE

## 2022-03-19 PROCEDURE — 99232 SBSQ HOSP IP/OBS MODERATE 35: CPT | Mod: ,,, | Performed by: INTERNAL MEDICINE

## 2022-03-19 PROCEDURE — 25000003 PHARM REV CODE 250: Performed by: NURSE PRACTITIONER

## 2022-03-19 PROCEDURE — 11000001 HC ACUTE MED/SURG PRIVATE ROOM

## 2022-03-19 PROCEDURE — 99232 PR SUBSEQUENT HOSPITAL CARE,LEVL II: ICD-10-PCS | Mod: ,,, | Performed by: INTERNAL MEDICINE

## 2022-03-19 PROCEDURE — 25000003 PHARM REV CODE 250: Performed by: HOSPITALIST

## 2022-03-19 RX ADMIN — Medication 10 ML: at 12:03

## 2022-03-19 RX ADMIN — INSULIN ASPART 7 UNITS: 100 INJECTION, SOLUTION INTRAVENOUS; SUBCUTANEOUS at 09:03

## 2022-03-19 RX ADMIN — THIAMINE HCL TAB 100 MG 100 MG: 100 TAB at 09:03

## 2022-03-19 RX ADMIN — FOLIC ACID 1 MG: 1 TABLET ORAL at 09:03

## 2022-03-19 RX ADMIN — APIXABAN 5 MG: 2.5 TABLET, FILM COATED ORAL at 09:03

## 2022-03-19 RX ADMIN — Medication 10 ML: at 11:03

## 2022-03-19 RX ADMIN — TAMSULOSIN HYDROCHLORIDE 0.4 MG: 0.4 CAPSULE ORAL at 09:03

## 2022-03-19 RX ADMIN — ATORVASTATIN CALCIUM 80 MG: 20 TABLET, FILM COATED ORAL at 09:03

## 2022-03-19 RX ADMIN — Medication 10 ML: at 06:03

## 2022-03-19 RX ADMIN — THERA TABS 1 TABLET: TAB at 09:03

## 2022-03-19 RX ADMIN — INSULIN DETEMIR 28 UNITS: 100 INJECTION, SOLUTION SUBCUTANEOUS at 09:03

## 2022-03-19 RX ADMIN — INSULIN ASPART 7 UNITS: 100 INJECTION, SOLUTION INTRAVENOUS; SUBCUTANEOUS at 05:03

## 2022-03-19 RX ADMIN — METFORMIN HYDROCHLORIDE 500 MG: 500 TABLET ORAL at 09:03

## 2022-03-19 RX ADMIN — METFORMIN HYDROCHLORIDE 500 MG: 500 TABLET ORAL at 05:03

## 2022-03-19 RX ADMIN — Medication 10 ML: at 05:03

## 2022-03-19 RX ADMIN — FLUOXETINE 20 MG: 20 CAPSULE ORAL at 09:03

## 2022-03-19 RX ADMIN — INSULIN ASPART 7 UNITS: 100 INJECTION, SOLUTION INTRAVENOUS; SUBCUTANEOUS at 12:03

## 2022-03-19 NOTE — PLAN OF CARE
POC reviewed w/ pt and purposeful rounding complete. Meds administered per MAR. Blood glucose monitored and no PRN insulin required this shift. VSS on RA. Pt AAOx4 and stand by assist to ambulate. Avasys and bed alarm in use. Safety precautions intact; no injuries or falls this shift. Pt denies needs at this time; will continue to monitor.

## 2022-03-19 NOTE — SUBJECTIVE & OBJECTIVE
Interval History: no further vomiting, tolerating diet.    Review of Systems   Constitutional:  Negative for chills and fever.   Respiratory:  Negative for cough and shortness of breath.    Cardiovascular:  Negative for chest pain and palpitations.   Gastrointestinal:  Negative for abdominal pain, nausea and vomiting.   Objective:     Vital Signs (Most Recent):  Temp: 98 °F (36.7 °C) (03/19/22 1212)  Pulse: 94 (03/19/22 1212)  Resp: 16 (03/19/22 1212)  BP: (!) 102/59 (03/19/22 1212)  SpO2: 95 % (03/19/22 1212) Vital Signs (24h Range):  Temp:  [97.6 °F (36.4 °C)-98.2 °F (36.8 °C)] 98 °F (36.7 °C)  Pulse:  [] 94  Resp:  [12-18] 16  SpO2:  [94 %-100 %] 95 %  BP: (102-121)/(58-75) 102/59     Weight: 66.2 kg (145 lb 15.1 oz)  Body mass index is 19.79 kg/m².    Intake/Output Summary (Last 24 hours) at 3/19/2022 1602  Last data filed at 3/19/2022 1125  Gross per 24 hour   Intake 720 ml   Output 275 ml   Net 445 ml        Physical Exam  Constitutional:       General: He is not in acute distress.     Comments: Thin   HENT:      Mouth/Throat:      Comments: Edentulous  Cardiovascular:      Rate and Rhythm: Normal rate and regular rhythm.      Pulses: Normal pulses.      Heart sounds: Normal heart sounds. No murmur heard.    No gallop.   Pulmonary:      Effort: Pulmonary effort is normal.      Breath sounds: Normal breath sounds.   Abdominal:      General: Bowel sounds are normal. There is no distension.      Palpations: Abdomen is soft.      Tenderness: There is no abdominal tenderness.      Comments: \   Skin:     General: Skin is warm and dry.   Neurological:      Mental Status: He is alert.      Comments: Not oriented to time.       Significant Labs: All pertinent labs within the past 24 hours have been reviewed.    Significant Imaging: I have reviewed all pertinent imaging results/findings within the past 24 hours.

## 2022-03-19 NOTE — PROGRESS NOTES
[facility] Medicine  Progress Note    Patient Name: Forest Lopez  MRN: [id number]  Patient Class: IP- Inpatient   Admission Date: 1/31/2022  Length of Stay: 47 days  Attending Physician: Alma Elizalde MD  Primary Care Provider: Julian Escobar        Subjective:     Principal Problem:Acute stroke due to ischemia        HPI:  Mr. Lopez is a 72 year old man who presented after a syncopal episode.  He reports he had been feeling well prior to the episode but then had a prodrome prior to passing out.  EMS was called, report patient's BP was low normal on their arrival, improved after an IV fluids bolus.  Patient denies chest pain or shortness of breath and says he does not feel weak currently although is rather tired.  When seen in the ED this morning he could barely express himself audibly.     Vital signs were normal on presentation here.  He is on warfarin for recurrent DVT, but his INR was 1, and d-dimer markedly elevated at 21.7.  Tox screen was positive for cocaine.  He had a CTA of the brain and neck done on presentation so CTA of the chest for PE study could not be done for 48 hours.  Ultrasound of the lower extremities showed extensive bilateral DVT.  On the right there was thrombosis of the common femoral vein, femoral vein, popliteal vein, one of the paired posterior tibial veins and both visualized peroneal veins.  On the left there was thrombosis of the common femoral vein, femoral vein and popliteal vein.  Patient was started on full dose Lovenox and admitted.    Medical history is from the chart as he is unable to give me much information.  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  He smokes 5-6 cigarettes/day and is not interested in quitting.  He is currently homeless and staying with a friend.  Despite the normal INR he is sure he  is taking his warfarin and is not having difficulty taking his medications.  I discussed his care with [facility] staff on the [facility] and patient had been lost to follow up since November 2021.      Overview/Hospital Course:  Patient presented to the ED after a syncopal episode and was found to have bilateral lower extremity DVTs and a low INR.  Tox screen was positive for cocaine.  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  He was started back on his warfarin with bridging Lovenox.  His 2D echo showed grade I diastolic dysfunction.  Neurology evaluated him and recommended echo with bubble study, which was negative for a shunt.    PT/OT evaluated him and recommended SNF placement.  His INR was difficult to get therapeutic, and as he had no contraindication to a NOAC his warfarin was changed to apixaban, and the full dose Lovenox was discontinued.  As he appeared to be depressed and had no objection to starting an antidepressant Lexapro was started.  He had a fall in the hospital on 2/13, had gone to the bathroom for a BM with the nurse, and when she turned away while he was washing his hands he apparently lost his balance and fell.  He did not hit his head and did not lose consciousness, but his BP was low transiently and he was given a bolus of IV fluids.    Patient's mental status improved slowly, but he gradually became more talkative and was able to hold a brief conversation.  He was diagnosed with a polymicrobial UTI on 2/21 and completed treatment with antibiotics.  Psychiatry evaluated him on 3/3 and changed his antidepressant to Prozac as it can be more activating and patient was having difficulty with his level of motivation.      Patient's discharge was delayed as he was not accepted by any SNF facilities in the area.  Reasons for the denials are unclear as he has a clear rehab diagnosis.  He has a history of drug  use but has not shown any interest in getting any illicit or legal drugs since he has been here, and he has had no visitors that could have provided drugs to him.  He has been pleasant and cooperative while here although lacks motivation, likely due to the nature of the stroke affecting his frontal lobe.  He requires considerable encouragement to participate in therapy.  Discharging him to live on the street or shelter would not be appropriate as he would not be able to take care of himself and likely will need detention nursing home care eventually after he gets the chance to improve with rehab.      Interval History: no further vomiting, tolerating diet.    Review of Systems   Constitutional:  Negative for chills and fever.   Respiratory:  Negative for cough and shortness of breath.    Cardiovascular:  Negative for chest pain and palpitations.   Gastrointestinal:  Negative for abdominal pain, nausea and vomiting.   Objective:     Vital Signs (Most Recent):  Temp: 98 °F (36.7 °C) (03/19/22 1212)  Pulse: 94 (03/19/22 1212)  Resp: 16 (03/19/22 1212)  BP: (!) 102/59 (03/19/22 1212)  SpO2: 95 % (03/19/22 1212) Vital Signs (24h Range):  Temp:  [97.6 °F (36.4 °C)-98.2 °F (36.8 °C)] 98 °F (36.7 °C)  Pulse:  [] 94  Resp:  [12-18] 16  SpO2:  [94 %-100 %] 95 %  BP: (102-121)/(58-75) 102/59     Weight: 66.2 kg (145 lb 15.1 oz)  Body mass index is 19.79 kg/m².    Intake/Output Summary (Last 24 hours) at 3/19/2022 1602  Last data filed at 3/19/2022 1125  Gross per 24 hour   Intake 720 ml   Output 275 ml   Net 445 ml        Physical Exam  Constitutional:       General: He is not in acute distress.     Comments: Thin   HENT:      Mouth/Throat:      Comments: Edentulous  Cardiovascular:      Rate and Rhythm: Normal rate and regular rhythm.      Pulses: Normal pulses.      Heart sounds: Normal heart sounds. No murmur heard.    No gallop.   Pulmonary:      Effort: Pulmonary effort is normal.      Breath sounds: Normal breath  sounds.   Abdominal:      General: Bowel sounds are normal. There is no distension.      Palpations: Abdomen is soft.      Tenderness: There is no abdominal tenderness.      Comments: \   Skin:     General: Skin is warm and dry.   Neurological:      Mental Status: He is alert.      Comments: Not oriented to time.       Significant Labs: All pertinent labs within the past 24 hours have been reviewed.    Significant Imaging: I have reviewed all pertinent imaging results/findings within the past 24 hours.      Assessment/Plan:      * Acute stroke due to ischemia  - MRI showed areas of diffusion signal hyperintensity consistent with areas of acute ischemia/infarct involving the left cerebral hemisphere, the most prominent measuring approximately 1.4 cm, also a small focus of the right frontal lobe.  - Patient on full dose anticoagulation currently due to acute bilateral DVT.  - Sinus rhythm noted throughout hospital stay  - Risk factor modification - control diabetes, continue statin.  - RPR, HIV non reactive.  B12 low normal.   - CTA showed a focal segment of 60-70% stenosis involving the proximal to mid left vertebral artery that was new when compared to the prior study of 09/25/2019.  No evidence of large vessel intracranial occlusion.   - Neurology noted symptoms do not correspond to stroke location, although the frontal lobe stroke might have something to do with his lack of motivation, and Speech has noted he has had delayed swallowing.  - Echo with bubble study done, no shunt seen.  - Follow up with vascular neurology in 4 weeks.  - Therapy recommending SNF due to his low interest in participation.  - Started Lexapro due to suspicion for depression.  - Psych consulted, changed to Prozac and started thiamine, folic acid, MVI due to previous history of alcohol abuse, although Wernicke encephalopathy is not suspected.  - Re-consulted SNF for rehab from the stroke, as discharging him to a shelter would be  inappropriate and unsafe for him.  Discussed with his son, Forest, at length on 3/10.  Forest is out of town on a job but will be returning to [place] eventually.  He agrees his father will eventually need nursing home care as he is unable to care for himself.      Vomiting  kub showed gastric distention, moderate stool  No further episodes  Will monitor  Suppository once  Having bm      Abnormal imaging of thyroid  Tsh/t4 ok, thyroid ultrasound done, showed bilateral nodules which did not meet criteria for biopsy.    Advance care planning        Severe protein-calorie malnutrition  Diet as tolerated      Debility  PT/OT recommending SNF after stroke with debility/poor motivation  Awaiting SNF, having difficulty finding placement for unclear reasons.  Will continue to pursue this as above.      Type 2 diabetes mellitus with hyperglycemia, without long-term current use of insulin  Reportedly he is on metformin and glipizide, both held.  Continue SSI for now.  He has 4+ sugar in urine and HbA1c is 10.3, and he is hyperglycemic here.  Will start levemir and continue ISS, increase levemir to 25 units daily  Add scheduled novolog 5 units tid, increase to 7 units  Improving  OK to resume metformin.  Increased levemir to 28 units daily with good improvement.    Acute deep vein thrombosis (DVT) of both femoral veins  Last ultrasound showed partially occlusive DVTs, now completely occlusive DVT of multiple lower extremity veins on ultrasound.  Patient reports compliance with warfarin but his INR is only one.  He is homeless but says he does not have difficulty obtaining his medications.  Does not seem to care about anything, which again might be due to the frontal lobe stroke.  D-dimer was markedly elevated on presentation and there was a question of possible PE, but he had a CTA of the brain/neck on presentation so could not get another CTA for another 48 hours.    V/Q scan was done, showed low probability for PE.  2D  echo showed grade I diastolic dysfunction.  Warfarin was restarted with bridging Lovenox, but INR was still low.  Given the difficulty of getting him to follow up changed to apixaban and discontinued Lovenox.    Syncope and collapse  Unclear cause.  Spoke to Ciarra and patient has had previous syncopal episodes attributed to alcohol abuse.  Has been prescribed meclizine as well.  Tox positive for cocaine but not alcohol, and he denies current use of both.  CTA brain/neck was done in ED, and per ED note the results were discussed with stroke neurologist Dr. Tovar who felt that CTA finding of a short segment of left vertebral artery stenosis was incidental.  He did not think stroke workup with MRI was indicated.    Since change of status per son compared to when he saw him early during the week a CT of the brain was ordered and no abnormalities seen.  MRI of the brain showed acute strokes of the left cerebral hemisphere and right frontal lobe.      Hyperlipidemia  Resumed lipitor      Essential hypertension  Doubtful he was on anything at home.  Started losartan 25 mg daily  BP well controlled on low dose losartan.  BP transiently low after he fell but recovered quickly.  IVF bolus given.  Losartan discontinued as BP persistently low-normal.    Tobacco dependence  He smokes 5-6 cigarettes/day.  Not trying to quit and not interested in a nicotine patch.  Benefits of smoking cessation were reviewed with patient in detail for for 8 minutes and patient was encouraged to quit. Nicotine replacement options were discussed, not needed.  He still does not want a patch and has been here more than a month without cigarettes.      VTE Risk Mitigation (From admission, onward)         Ordered     apixaban tablet 5 mg  2 times daily         02/10/22 0901     Place sequential compression device  Until discontinued         01/31/22 0536                Discharge Planning   EFRAÍN:      Code Status: Full Code   Is the patient medically  ready for discharge?:     Reason for patient still in hospital (select all that apply): Pending disposition  Discharge Plan A: Skilled Nursing Facility   Discharge Delays: (!) Post-Acute Set-up (SEE below)              Alma Elizalde MD  Department of Hospital Medicine   [facility] - Med Surg ([place])   133

## 2022-03-19 NOTE — PLAN OF CARE
Pt is A&O*4. No SOB or any other acute distress. BS monitored and Insulin Given. Slept through the shift. No other distress at this moment. Will continue to monitor.   Problem: Adult Inpatient Plan of Care  Goal: Plan of Care Review  Outcome: Ongoing, Progressing  Goal: Patient-Specific Goal (Individualized)  Outcome: Ongoing, Progressing  Goal: Absence of Hospital-Acquired Illness or Injury  Outcome: Ongoing, Progressing  Goal: Optimal Comfort and Wellbeing  Outcome: Ongoing, Progressing  Goal: Readiness for Transition of Care  Outcome: Ongoing, Progressing     Problem: Diabetes Comorbidity  Goal: Blood Glucose Level Within Targeted Range  Outcome: Ongoing, Progressing     Problem: Skin Injury Risk Increased  Goal: Skin Health and Integrity  Outcome: Ongoing, Progressing     Problem: Coping Ineffective  Goal: Effective Coping  Outcome: Ongoing, Progressing     Problem: Fall Injury Risk  Goal: Absence of Fall and Fall-Related Injury  Outcome: Ongoing, Progressing     Problem: Adjustment to Illness (Sepsis/Septic Shock)  Goal: Optimal Coping  Outcome: Ongoing, Progressing     Problem: Bleeding (Sepsis/Septic Shock)  Goal: Absence of Bleeding  Outcome: Ongoing, Progressing     Problem: Glycemic Control Impaired (Sepsis/Septic Shock)  Goal: Blood Glucose Level Within Desired Range  Outcome: Ongoing, Progressing     Problem: Infection Progression (Sepsis/Septic Shock)  Goal: Absence of Infection Signs and Symptoms  Outcome: Ongoing, Progressing     Problem: Nutrition Impaired (Sepsis/Septic Shock)  Goal: Optimal Nutrition Intake  Outcome: Ongoing, Progressing     Problem: Infection  Goal: Absence of Infection Signs and Symptoms  Outcome: Ongoing, Progressing     Problem: Spiritual Distress Risk or Actual  Goal: Spiritual Wellbeing  Outcome: Ongoing, Progressing

## 2022-03-20 LAB
ANION GAP SERPL CALC-SCNC: 12 MMOL/L (ref 8–16)
BUN SERPL-MCNC: 24 MG/DL (ref 8–23)
CALCIUM SERPL-MCNC: 10.2 MG/DL (ref 8.7–10.5)
CHLORIDE SERPL-SCNC: 103 MMOL/L (ref 95–110)
CO2 SERPL-SCNC: 22 MMOL/L (ref 23–29)
CREAT SERPL-MCNC: 0.9 MG/DL (ref 0.5–1.4)
ERYTHROCYTE [DISTWIDTH] IN BLOOD BY AUTOMATED COUNT: 13.8 % (ref 11.5–14.5)
EST. GFR  (AFRICAN AMERICAN): >60 ML/MIN/1.73 M^2
EST. GFR  (NON AFRICAN AMERICAN): >60 ML/MIN/1.73 M^2
GLUCOSE SERPL-MCNC: 132 MG/DL (ref 70–110)
HCT VFR BLD AUTO: 40.7 % (ref 40–54)
HGB BLD-MCNC: 13 G/DL (ref 14–18)
MCH RBC QN AUTO: 25.5 PG (ref 27–31)
MCHC RBC AUTO-ENTMCNC: 31.9 G/DL (ref 32–36)
MCV RBC AUTO: 80 FL (ref 82–98)
PLATELET # BLD AUTO: 268 K/UL (ref 150–450)
PMV BLD AUTO: 11.2 FL (ref 9.2–12.9)
POCT GLUCOSE: 109 MG/DL (ref 70–110)
POCT GLUCOSE: 115 MG/DL (ref 70–110)
POCT GLUCOSE: 120 MG/DL (ref 70–110)
POCT GLUCOSE: 131 MG/DL (ref 70–110)
POTASSIUM SERPL-SCNC: 4 MMOL/L (ref 3.5–5.1)
RBC # BLD AUTO: 5.09 M/UL (ref 4.6–6.2)
SODIUM SERPL-SCNC: 137 MMOL/L (ref 136–145)
WBC # BLD AUTO: 9.79 K/UL (ref 3.9–12.7)

## 2022-03-20 PROCEDURE — 99233 SBSQ HOSP IP/OBS HIGH 50: CPT | Mod: ,,, | Performed by: INTERNAL MEDICINE

## 2022-03-20 PROCEDURE — A9698 NON-RAD CONTRAST MATERIALNOC: HCPCS | Performed by: INTERNAL MEDICINE

## 2022-03-20 PROCEDURE — 36406 VNPNXR<3YRS PHY/QHP OTHER VN: CPT

## 2022-03-20 PROCEDURE — 80048 BASIC METABOLIC PNL TOTAL CA: CPT | Performed by: INTERNAL MEDICINE

## 2022-03-20 PROCEDURE — 25000003 PHARM REV CODE 250: Performed by: NURSE PRACTITIONER

## 2022-03-20 PROCEDURE — 25500020 PHARM REV CODE 255: Performed by: INTERNAL MEDICINE

## 2022-03-20 PROCEDURE — 92507 TX SP LANG VOICE COMM INDIV: CPT

## 2022-03-20 PROCEDURE — A4216 STERILE WATER/SALINE, 10 ML: HCPCS | Performed by: INTERNAL MEDICINE

## 2022-03-20 PROCEDURE — 85027 COMPLETE CBC AUTOMATED: CPT | Performed by: INTERNAL MEDICINE

## 2022-03-20 PROCEDURE — 99233 PR SUBSEQUENT HOSPITAL CARE,LEVL III: ICD-10-PCS | Mod: ,,, | Performed by: INTERNAL MEDICINE

## 2022-03-20 PROCEDURE — 25000003 PHARM REV CODE 250: Performed by: INTERNAL MEDICINE

## 2022-03-20 PROCEDURE — 25000003 PHARM REV CODE 250: Performed by: HOSPITALIST

## 2022-03-20 PROCEDURE — 36415 COLL VENOUS BLD VENIPUNCTURE: CPT | Performed by: INTERNAL MEDICINE

## 2022-03-20 PROCEDURE — 11000001 HC ACUTE MED/SURG PRIVATE ROOM

## 2022-03-20 RX ORDER — POLYETHYLENE GLYCOL 3350 17 G/17G
17 POWDER, FOR SOLUTION ORAL DAILY
Status: DISCONTINUED | OUTPATIENT
Start: 2022-03-20 | End: 2022-03-21

## 2022-03-20 RX ORDER — BISACODYL 10 MG
10 SUPPOSITORY, RECTAL RECTAL ONCE
Status: COMPLETED | OUTPATIENT
Start: 2022-03-20 | End: 2022-03-20

## 2022-03-20 RX ORDER — SODIUM CHLORIDE 9 MG/ML
INJECTION, SOLUTION INTRAVENOUS CONTINUOUS
Status: DISCONTINUED | OUTPATIENT
Start: 2022-03-20 | End: 2022-03-21

## 2022-03-20 RX ADMIN — METFORMIN HYDROCHLORIDE 500 MG: 500 TABLET ORAL at 08:03

## 2022-03-20 RX ADMIN — FLUOXETINE 20 MG: 20 CAPSULE ORAL at 08:03

## 2022-03-20 RX ADMIN — Medication 10 ML: at 06:03

## 2022-03-20 RX ADMIN — POLYETHYLENE GLYCOL 3350 17 G: 17 POWDER, FOR SOLUTION ORAL at 04:03

## 2022-03-20 RX ADMIN — FOLIC ACID 1 MG: 1 TABLET ORAL at 08:03

## 2022-03-20 RX ADMIN — INSULIN ASPART 7 UNITS: 100 INJECTION, SOLUTION INTRAVENOUS; SUBCUTANEOUS at 08:03

## 2022-03-20 RX ADMIN — IOHEXOL 1000 ML: 9 SOLUTION ORAL at 12:03

## 2022-03-20 RX ADMIN — IOHEXOL 75 ML: 350 INJECTION, SOLUTION INTRAVENOUS at 03:03

## 2022-03-20 RX ADMIN — APIXABAN 5 MG: 2.5 TABLET, FILM COATED ORAL at 08:03

## 2022-03-20 RX ADMIN — APIXABAN 5 MG: 2.5 TABLET, FILM COATED ORAL at 09:03

## 2022-03-20 RX ADMIN — Medication 10 ML: at 05:03

## 2022-03-20 RX ADMIN — THERA TABS 1 TABLET: TAB at 08:03

## 2022-03-20 RX ADMIN — THIAMINE HCL TAB 100 MG 100 MG: 100 TAB at 08:03

## 2022-03-20 RX ADMIN — Medication 10 ML: at 12:03

## 2022-03-20 RX ADMIN — TAMSULOSIN HYDROCHLORIDE 0.4 MG: 0.4 CAPSULE ORAL at 09:03

## 2022-03-20 RX ADMIN — INSULIN DETEMIR 28 UNITS: 100 INJECTION, SOLUTION SUBCUTANEOUS at 08:03

## 2022-03-20 RX ADMIN — ATORVASTATIN CALCIUM 80 MG: 20 TABLET, FILM COATED ORAL at 08:03

## 2022-03-20 RX ADMIN — BISACODYL 10 MG: 10 SUPPOSITORY RECTAL at 04:03

## 2022-03-20 RX ADMIN — INSULIN ASPART 7 UNITS: 100 INJECTION, SOLUTION INTRAVENOUS; SUBCUTANEOUS at 04:03

## 2022-03-20 RX ADMIN — SODIUM CHLORIDE: 0.9 INJECTION, SOLUTION INTRAVENOUS at 12:03

## 2022-03-20 NOTE — PROGRESS NOTES
[facility] Medicine  Progress Note    Patient Name: Forest Lopez  MRN: [id number]  Patient Class: IP- Inpatient   Admission Date: 1/31/2022  Length of Stay: 48 days  Attending Physician: Alma Elizalde MD  Primary Care Provider: Julian Escobar        Subjective:     Principal Problem:Acute stroke due to ischemia        HPI:  Mr. Lopez is a 72 year old man who presented after a syncopal episode.  He reports he had been feeling well prior to the episode but then had a prodrome prior to passing out.  EMS was called, report patient's BP was low normal on their arrival, improved after an IV fluids bolus.  Patient denies chest pain or shortness of breath and says he does not feel weak currently although is rather tired.  When seen in the ED this morning he could barely express himself audibly.     Vital signs were normal on presentation here.  He is on warfarin for recurrent DVT, but his INR was 1, and d-dimer markedly elevated at 21.7.  Tox screen was positive for cocaine.  He had a CTA of the brain and neck done on presentation so CTA of the chest for PE study could not be done for 48 hours.  Ultrasound of the lower extremities showed extensive bilateral DVT.  On the right there was thrombosis of the common femoral vein, femoral vein, popliteal vein, one of the paired posterior tibial veins and both visualized peroneal veins.  On the left there was thrombosis of the common femoral vein, femoral vein and popliteal vein.  Patient was started on full dose Lovenox and admitted.    Medical history is from the chart as he is unable to give me much information.  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  He smokes 5-6 cigarettes/day and is not interested in quitting.  He is currently homeless and staying with a friend.  Despite the normal INR he is sure he  is taking his warfarin and is not having difficulty taking his medications.  I discussed his care with [facility] staff on the [facility] and patient had been lost to follow up since November 2021.      Overview/Hospital Course:  Patient presented to the ED after a syncopal episode and was found to have bilateral lower extremity DVTs and a low INR.  Tox screen was positive for cocaine.  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  He was started back on his warfarin with bridging Lovenox.  His 2D echo showed grade I diastolic dysfunction.  Neurology evaluated him and recommended echo with bubble study, which was negative for a shunt.    PT/OT evaluated him and recommended SNF placement.  His INR was difficult to get therapeutic, and as he had no contraindication to a NOAC his warfarin was changed to apixaban, and the full dose Lovenox was discontinued.  As he appeared to be depressed and had no objection to starting an antidepressant Lexapro was started.  He had a fall in the hospital on 2/13, had gone to the bathroom for a BM with the nurse, and when she turned away while he was washing his hands he apparently lost his balance and fell.  He did not hit his head and did not lose consciousness, but his BP was low transiently and he was given a bolus of IV fluids.    Patient's mental status improved slowly, but he gradually became more talkative and was able to hold a brief conversation.  He was diagnosed with a polymicrobial UTI on 2/21 and completed treatment with antibiotics.  Psychiatry evaluated him on 3/3 and changed his antidepressant to Prozac as it can be more activating and patient was having difficulty with his level of motivation.      Patient's discharge was delayed as he was not accepted by any SNF facilities in the area.  Reasons for the denials are unclear as he has a clear rehab diagnosis.  He has a history of drug  use but has not shown any interest in getting any illicit or legal drugs since he has been here, and he has had no visitors that could have provided drugs to him.  He has been pleasant and cooperative while here although lacks motivation, likely due to the nature of the stroke affecting his frontal lobe.  He requires considerable encouragement to participate in therapy.  Discharging him to live on the street or shelter would not be appropriate as he would not be able to take care of himself and likely will need senior living nursing home care eventually after he gets the chance to improve with rehab.      Interval History: patient had a bm last night per report but had vomiting this am, ate all his breakfast and states doing ok.    Review of Systems   Constitutional:  Negative for chills and fever.   Respiratory:  Negative for cough and shortness of breath.    Cardiovascular:  Negative for chest pain and palpitations.   Gastrointestinal:  Positive for vomiting. Negative for abdominal pain and nausea.   Objective:     Vital Signs (Most Recent):  Temp: 97.8 °F (36.6 °C) (03/20/22 1253)  Pulse: 89 (03/20/22 1253)  Resp: 18 (03/20/22 1253)  BP: 107/75 (03/20/22 1253)  SpO2: 100 % (03/20/22 1253) Vital Signs (24h Range):  Temp:  [97.5 °F (36.4 °C)-98.3 °F (36.8 °C)] 97.8 °F (36.6 °C)  Pulse:  [] 89  Resp:  [12-20] 18  SpO2:  [97 %-100 %] 100 %  BP: (107-127)/(73-81) 107/75     Weight: 66.2 kg (145 lb 15.1 oz)  Body mass index is 19.79 kg/m².    Intake/Output Summary (Last 24 hours) at 3/20/2022 1412  Last data filed at 3/20/2022 1045  Gross per 24 hour   Intake 720 ml   Output 1300 ml   Net -580 ml      Physical Exam  Constitutional:       General: He is not in acute distress.     Comments: Thin   HENT:      Mouth/Throat:      Comments: Edentulous  Cardiovascular:      Rate and Rhythm: Normal rate and regular rhythm.      Pulses: Normal pulses.      Heart sounds: Normal heart sounds. No murmur heard.    No gallop.    Pulmonary:      Effort: Pulmonary effort is normal.      Breath sounds: Normal breath sounds.   Abdominal:      General: Bowel sounds are normal. There is no distension.      Palpations: Abdomen is soft.      Tenderness: There is no abdominal tenderness.      Comments: \   Skin:     General: Skin is warm and dry.   Neurological:      Mental Status: He is alert.      Comments: Not oriented to time.       Significant Labs: All pertinent labs within the past 24 hours have been reviewed.    Significant Imaging: I have reviewed all pertinent imaging results/findings within the past 24 hours.      Assessment/Plan:      * Acute stroke due to ischemia  - MRI showed areas of diffusion signal hyperintensity consistent with areas of acute ischemia/infarct involving the left cerebral hemisphere, the most prominent measuring approximately 1.4 cm, also a small focus of the right frontal lobe.  - Patient on full dose anticoagulation currently due to acute bilateral DVT.  - Sinus rhythm noted throughout hospital stay  - Risk factor modification - control diabetes, continue statin.  - RPR, HIV non reactive.  B12 low normal.   - CTA showed a focal segment of 60-70% stenosis involving the proximal to mid left vertebral artery that was new when compared to the prior study of 09/25/2019.  No evidence of large vessel intracranial occlusion.   - Neurology noted symptoms do not correspond to stroke location, although the frontal lobe stroke might have something to do with his lack of motivation, and Speech has noted he has had delayed swallowing.  - Echo with bubble study done, no shunt seen.  - Follow up with vascular neurology in 4 weeks.  - Therapy recommending SNF due to his low interest in participation.  - Started Lexapro due to suspicion for depression.  - Psych consulted, changed to Prozac and started thiamine, folic acid, MVI due to previous history of alcohol abuse, although Wernicke encephalopathy is not suspected.  -  Re-consulted SNF for rehab from the stroke, as discharging him to a shelter would be inappropriate and unsafe for him.  Discussed with his son, Forest, at length on 3/10.  Forest is out of town on a job but will be returning to [place] eventually.  He agrees his father will eventually need nursing home care as he is unable to care for himself.      Vomiting  kub showed gastric distention, moderate stool  No further episodes  Will monitor  Suppository once  Having bm  Vomited again, will check ct abdomen    Abnormal imaging of thyroid  Tsh/t4 ok, thyroid ultrasound done, showed bilateral nodules which did not meet criteria for biopsy.    Advance care planning        Severe protein-calorie malnutrition  Diet as tolerated      Debility  PT/OT recommending SNF after stroke with debility/poor motivation  Awaiting SNF, having difficulty finding placement for unclear reasons.  Will continue to pursue this as above.      Type 2 diabetes mellitus with hyperglycemia, without long-term current use of insulin  Reportedly he is on metformin and glipizide, both held.  Continue SSI for now.  He has 4+ sugar in urine and HbA1c is 10.3, and he is hyperglycemic here.  Will start levemir and continue ISS, increase levemir to 25 units daily  Add scheduled novolog 5 units tid, increase to 7 units  Improving  OK to resume metformin.  Increased levemir to 28 units daily with good improvement.  Hold metformin with contrast with ct    Acute deep vein thrombosis (DVT) of both femoral veins  Last ultrasound showed partially occlusive DVTs, now completely occlusive DVT of multiple lower extremity veins on ultrasound.  Patient reports compliance with warfarin but his INR is only one.  He is homeless but says he does not have difficulty obtaining his medications.  Does not seem to care about anything, which again might be due to the frontal lobe stroke.  D-dimer was markedly elevated on presentation and there was a question of possible PE,  but he had a CTA of the brain/neck on presentation so could not get another CTA for another 48 hours.    V/Q scan was done, showed low probability for PE.  2D echo showed grade I diastolic dysfunction.  Warfarin was restarted with bridging Lovenox, but INR was still low.  Given the difficulty of getting him to follow up changed to apixaban and discontinued Lovenox.    Syncope and collapse  Unclear cause.  Spoke to [facility] and patient has had previous syncopal episodes attributed to alcohol abuse.  Has been prescribed meclizine as well.  Tox positive for cocaine but not alcohol, and he denies current use of both.  CTA brain/neck was done in ED, and per ED note the results were discussed with stroke neurologist Dr. Tovar who felt that CTA finding of a short segment of left vertebral artery stenosis was incidental.  He did not think stroke workup with MRI was indicated.    Since change of status per son compared to when he saw him early during the week a CT of the brain was ordered and no abnormalities seen.  MRI of the brain showed acute strokes of the left cerebral hemisphere and right frontal lobe.      Hyperlipidemia  Resumed lipitor      Essential hypertension  Doubtful he was on anything at home.  Started losartan 25 mg daily  BP well controlled on low dose losartan.  BP transiently low after he fell but recovered quickly.  IVF bolus given.  Losartan discontinued as BP persistently low-normal.    Tobacco dependence  He smokes 5-6 cigarettes/day.  Not trying to quit and not interested in a nicotine patch.  Benefits of smoking cessation were reviewed with patient in detail for for 8 minutes and patient was encouraged to quit. Nicotine replacement options were discussed, not needed.  He still does not want a patch and has been here more than a month without cigarettes.      VTE Risk Mitigation (From admission, onward)         Ordered     apixaban tablet 5 mg  2 times daily         02/10/22 0901     [facility] sequential  compression device  Until discontinued         01/31/22 0533                Discharge Planning   EFRAÍN:      Code Status: Full Code   Is the patient medically ready for discharge?:     Reason for patient still in hospital (select all that apply): Patient trending condition and Treatment  Discharge Plan A: Skilled Nursing Facility   Discharge Delays: (!) Post-Acute Set-up (SEE below)              Alma Elizalde MD  Department of Hospital Medicine   [facility] ([place])

## 2022-03-20 NOTE — ASSESSMENT & PLAN NOTE
Reportedly he is on metformin and glipizide, both held.  Continue SSI for now.  He has 4+ sugar in urine and HbA1c is 10.3, and he is hyperglycemic here.  Will start levemir and continue ISS, increase levemir to 25 units daily  Add scheduled novolog 5 units tid, increase to 7 units  Improving  OK to resume metformin.  Increased levemir to 28 units daily with good improvement.  Hold metformin with contrast with ct

## 2022-03-20 NOTE — PLAN OF CARE
POC reviewed w/ pt and purposeful rounding complete. Meds administered per MAR. VSS on RA. Pt AAOx4. Safety precautions intact; no injuries or falls this shift. Pt denies needs at this time; will continue to monitor.

## 2022-03-20 NOTE — NURSING
MD ordered CT scan with contrast because of vomiting at night shift. ED CT called and ordered contrast. Given to pt. Drinking Omnipaque. Says doesn't like the taste. However, agrees to drink anyway in order to get good result of the CT scan.     --HOLD metformin for CT scan.   -- Drank 1 bottles of contrast.     --CT done. MD ordered Miralax and Bisacodyl 10mg suppository. Given. Had a large BM.   - No other distress at this moment. Will cont to monitor.

## 2022-03-20 NOTE — SUBJECTIVE & OBJECTIVE
Interval History: patient had a bm last night per report but had vomiting this am, ate all his breakfast and states doing ok.    Review of Systems   Constitutional:  Negative for chills and fever.   Respiratory:  Negative for cough and shortness of breath.    Cardiovascular:  Negative for chest pain and palpitations.   Gastrointestinal:  Positive for vomiting. Negative for abdominal pain and nausea.   Objective:     Vital Signs (Most Recent):  Temp: 97.8 °F (36.6 °C) (03/20/22 1253)  Pulse: 89 (03/20/22 1253)  Resp: 18 (03/20/22 1253)  BP: 107/75 (03/20/22 1253)  SpO2: 100 % (03/20/22 1253) Vital Signs (24h Range):  Temp:  [97.5 °F (36.4 °C)-98.3 °F (36.8 °C)] 97.8 °F (36.6 °C)  Pulse:  [] 89  Resp:  [12-20] 18  SpO2:  [97 %-100 %] 100 %  BP: (107-127)/(73-81) 107/75     Weight: 66.2 kg (145 lb 15.1 oz)  Body mass index is 19.79 kg/m².    Intake/Output Summary (Last 24 hours) at 3/20/2022 1412  Last data filed at 3/20/2022 1045  Gross per 24 hour   Intake 720 ml   Output 1300 ml   Net -580 ml      Physical Exam  Constitutional:       General: He is not in acute distress.     Comments: Thin   HENT:      Mouth/Throat:      Comments: Edentulous  Cardiovascular:      Rate and Rhythm: Normal rate and regular rhythm.      Pulses: Normal pulses.      Heart sounds: Normal heart sounds. No murmur heard.    No gallop.   Pulmonary:      Effort: Pulmonary effort is normal.      Breath sounds: Normal breath sounds.   Abdominal:      General: Bowel sounds are normal. There is no distension.      Palpations: Abdomen is soft.      Tenderness: There is no abdominal tenderness.      Comments: \   Skin:     General: Skin is warm and dry.   Neurological:      Mental Status: He is alert.      Comments: Not oriented to time.       Significant Labs: All pertinent labs within the past 24 hours have been reviewed.    Significant Imaging: I have reviewed all pertinent imaging results/findings within the past 24 hours.

## 2022-03-20 NOTE — PT/OT/SLP PROGRESS
"Speech Language Pathology Treatment    Patient Name:  Forest Lopez   MRN:  [id number]  Admitting Diagnosis: Acute stroke due to ischemia    Recommendations:                 General Recommendations:  Dysphagia therapy, Speech/language therapy and Cognitive-linguistic therapy  Diet recommendations:  Mechanical soft, Finely chopped meat, Liquid Diet Level: Thin   Aspiration Precautions: 1 bite/sip at a time, HOB to 90 degrees, Small bites/sips and Standard aspiration precautions   General Precautions: Standard, diabetic, fall  Communication strategies:  provide increased time to answer and go to room if call light pushed, reduced information content/context    Subjective     Patient awake/alert, sitting upright in bed working on breakfast tray. He reports that he "thre up" this morning after eating solid foods on meal tray. He was agreeable to ST, though required constant curing to maintain attention to task.     Pain/Comfort:  · Pain Rating 1: 0/10    Respiratory Status: Room air    Objective:     Has the patient been evaluated by SLP for swallowing?   Yes  Keep patient NPO? No     Provided skilled speech therapy addressing cognitive-linguistic deficits. Patient was able to orient x2 to person & place. He was able to immediately recall 2/3 items after verbal presentation and 1/3 items after 5 minutes following category and description cues. Worked on word finding and processing time with divergent/generative naming. He was able to name 3 food items, 1 animal, 4 colors and 2 names with in 1 minute following mod cues from clinician. He required redirection approximately 3x during each 1 minute interval to maintain attention to task. Observed patient with intake of puree solids and thin liquids with meal tray. He was able to tolerate without any overt s/sx of airway compromise and had consumed all solid food from meal tray. Recommend he continue his current diet. ST services with continue to follow to ensure safety and " tolerance of least restcitive PO diet and address ongoing cognitive-linguistic deficits. Educated patient on observed deficits, diet recommendations and ST POC. He verbalized understanding and was in agreement.     Assessment:     Forest Lopez is a 72 y.o. male with an SLP diagnosis of Dysphagia and Cognitive-Linguistic Impairment. ST services will continue to follow.     Goals:   Multidisciplinary Problems     SLP Goals        Problem: SLP Goal    Goal Priority Disciplines Outcome   SLP Goal     SLP Ongoing, Progressing   Description: 1. Pt will consume a regular/thin diet without overt s/s of aspiration or airway threat without assistance.  2. Pt will increase orientation to person, place, situation and date with 90% acc given min assist.  3. Pt will follow 3-4 step commands to increase functional IND with 75% acc given min assist.   4. Pt will complete immediate and delayed recall tasks with 75% acc given mod assist.  5. Targeting word finding, pt will complete divergent naming tasks given 1-minute time frame with 50% acc given mod assist.  6. Ongoing cognitive-communicative assessment.     Updated Goals 2/8:  7. Pt will sustain attention to topic/task without distraction in 5-10 minute increments given mod verbal cues.                   Plan:     · Patient to be seen:  2 x/week, 3 x/week   · Plan of Care expires:  03/21/22  · Plan of Care reviewed with:  patient   · SLP Follow-Up:  Yes       Discharge recommendations:  nursing facility, skilled   Barriers to Discharge:  Level of Skilled Assistance Needed      Time Tracking:     SLP Treatment Date:   03/20/22  Speech Start Time:  0934  Speech Stop Time:  0950     Speech Total Time (min):  16 min    Billable Minutes: Speech Therapy Individual 16    03/20/2022

## 2022-03-20 NOTE — ASSESSMENT & PLAN NOTE
kub showed gastric distention, moderate stool  No further episodes  Will monitor  Suppository once  Having bm  Vomited again, will check ct abdomen

## 2022-03-21 LAB
AMPHET+METHAMPHET UR QL: NEGATIVE
ANION GAP SERPL CALC-SCNC: 9 MMOL/L (ref 8–16)
BARBITURATES UR QL SCN>200 NG/ML: NEGATIVE
BENZODIAZ UR QL SCN>200 NG/ML: NEGATIVE
BUN SERPL-MCNC: 23 MG/DL (ref 8–23)
BZE UR QL SCN: NEGATIVE
CALCIUM SERPL-MCNC: 10 MG/DL (ref 8.7–10.5)
CANNABINOIDS UR QL SCN: NEGATIVE
CHLORIDE SERPL-SCNC: 106 MMOL/L (ref 95–110)
CO2 SERPL-SCNC: 24 MMOL/L (ref 23–29)
CREAT SERPL-MCNC: 0.9 MG/DL (ref 0.5–1.4)
CREAT UR-MCNC: 75.7 MG/DL (ref 23–375)
EST. GFR  (AFRICAN AMERICAN): >60 ML/MIN/1.73 M^2
EST. GFR  (NON AFRICAN AMERICAN): >60 ML/MIN/1.73 M^2
ETHANOL UR-MCNC: <10 MG/DL
GLUCOSE SERPL-MCNC: 96 MG/DL (ref 70–110)
METHADONE UR QL SCN>300 NG/ML: NEGATIVE
OPIATES UR QL SCN: NEGATIVE
PCP UR QL SCN>25 NG/ML: NEGATIVE
POCT GLUCOSE: 110 MG/DL (ref 70–110)
POCT GLUCOSE: 120 MG/DL (ref 70–110)
POCT GLUCOSE: 158 MG/DL (ref 70–110)
POCT GLUCOSE: 167 MG/DL (ref 70–110)
POTASSIUM SERPL-SCNC: 4 MMOL/L (ref 3.5–5.1)
SODIUM SERPL-SCNC: 139 MMOL/L (ref 136–145)
TOXICOLOGY INFORMATION: NORMAL

## 2022-03-21 PROCEDURE — 92507 TX SP LANG VOICE COMM INDIV: CPT

## 2022-03-21 PROCEDURE — 99233 SBSQ HOSP IP/OBS HIGH 50: CPT | Mod: ,,, | Performed by: INTERNAL MEDICINE

## 2022-03-21 PROCEDURE — 25000003 PHARM REV CODE 250: Performed by: HOSPITALIST

## 2022-03-21 PROCEDURE — 25000003 PHARM REV CODE 250: Performed by: PHYSICIAN ASSISTANT

## 2022-03-21 PROCEDURE — A4216 STERILE WATER/SALINE, 10 ML: HCPCS | Performed by: INTERNAL MEDICINE

## 2022-03-21 PROCEDURE — 99233 PR SUBSEQUENT HOSPITAL CARE,LEVL III: ICD-10-PCS | Mod: ,,, | Performed by: INTERNAL MEDICINE

## 2022-03-21 PROCEDURE — 97530 THERAPEUTIC ACTIVITIES: CPT

## 2022-03-21 PROCEDURE — 11000001 HC ACUTE MED/SURG PRIVATE ROOM

## 2022-03-21 PROCEDURE — 36415 COLL VENOUS BLD VENIPUNCTURE: CPT | Performed by: INTERNAL MEDICINE

## 2022-03-21 PROCEDURE — 36406 VNPNXR<3YRS PHY/QHP OTHER VN: CPT

## 2022-03-21 PROCEDURE — 25000003 PHARM REV CODE 250: Performed by: NURSE PRACTITIONER

## 2022-03-21 PROCEDURE — 94761 N-INVAS EAR/PLS OXIMETRY MLT: CPT

## 2022-03-21 PROCEDURE — 25000003 PHARM REV CODE 250: Performed by: INTERNAL MEDICINE

## 2022-03-21 PROCEDURE — 80048 BASIC METABOLIC PNL TOTAL CA: CPT | Performed by: INTERNAL MEDICINE

## 2022-03-21 PROCEDURE — 80307 DRUG TEST PRSMV CHEM ANLYZR: CPT | Performed by: INTERNAL MEDICINE

## 2022-03-21 RX ORDER — CALCIUM CARBONATE 200(500)MG
500 TABLET,CHEWABLE ORAL ONCE
Status: COMPLETED | OUTPATIENT
Start: 2022-03-21 | End: 2022-03-21

## 2022-03-21 RX ORDER — POLYETHYLENE GLYCOL 3350, SODIUM SULFATE ANHYDROUS, SODIUM BICARBONATE, SODIUM CHLORIDE, POTASSIUM CHLORIDE 236; 22.74; 6.74; 5.86; 2.97 G/4L; G/4L; G/4L; G/4L; G/4L
4000 POWDER, FOR SOLUTION ORAL ONCE
Status: COMPLETED | OUTPATIENT
Start: 2022-03-21 | End: 2022-03-21

## 2022-03-21 RX ADMIN — THIAMINE HCL TAB 100 MG 100 MG: 100 TAB at 08:03

## 2022-03-21 RX ADMIN — CALCIUM CARBONATE (ANTACID) CHEW TAB 500 MG 500 MG: 500 CHEW TAB at 06:03

## 2022-03-21 RX ADMIN — Medication 10 ML: at 11:03

## 2022-03-21 RX ADMIN — THERA TABS 1 TABLET: TAB at 08:03

## 2022-03-21 RX ADMIN — APIXABAN 5 MG: 2.5 TABLET, FILM COATED ORAL at 08:03

## 2022-03-21 RX ADMIN — SODIUM CHLORIDE: 0.9 INJECTION, SOLUTION INTRAVENOUS at 01:03

## 2022-03-21 RX ADMIN — POLYETHYLENE GLYCOL 3350, SODIUM SULFATE ANHYDROUS, SODIUM BICARBONATE, SODIUM CHLORIDE, POTASSIUM CHLORIDE 4000 ML: 236; 22.74; 6.74; 5.86; 2.97 POWDER, FOR SOLUTION ORAL at 04:03

## 2022-03-21 RX ADMIN — Medication 10 ML: at 12:03

## 2022-03-21 RX ADMIN — APIXABAN 5 MG: 2.5 TABLET, FILM COATED ORAL at 09:03

## 2022-03-21 RX ADMIN — POLYETHYLENE GLYCOL 3350 17 G: 17 POWDER, FOR SOLUTION ORAL at 08:03

## 2022-03-21 RX ADMIN — Medication 10 ML: at 05:03

## 2022-03-21 RX ADMIN — ATORVASTATIN CALCIUM 80 MG: 20 TABLET, FILM COATED ORAL at 08:03

## 2022-03-21 RX ADMIN — INSULIN ASPART 7 UNITS: 100 INJECTION, SOLUTION INTRAVENOUS; SUBCUTANEOUS at 08:03

## 2022-03-21 RX ADMIN — Medication 10 ML: at 06:03

## 2022-03-21 RX ADMIN — INSULIN ASPART 7 UNITS: 100 INJECTION, SOLUTION INTRAVENOUS; SUBCUTANEOUS at 04:03

## 2022-03-21 RX ADMIN — INSULIN ASPART 7 UNITS: 100 INJECTION, SOLUTION INTRAVENOUS; SUBCUTANEOUS at 12:03

## 2022-03-21 RX ADMIN — FLUOXETINE 20 MG: 20 CAPSULE ORAL at 08:03

## 2022-03-21 RX ADMIN — FOLIC ACID 1 MG: 1 TABLET ORAL at 08:03

## 2022-03-21 RX ADMIN — INSULIN DETEMIR 28 UNITS: 100 INJECTION, SOLUTION SUBCUTANEOUS at 08:03

## 2022-03-21 RX ADMIN — TAMSULOSIN HYDROCHLORIDE 0.4 MG: 0.4 CAPSULE ORAL at 09:03

## 2022-03-21 NOTE — ASSESSMENT & PLAN NOTE
kub showed gastric distention, moderate stool  No further episodes  Will monitor  Suppository once  Having bm  Vomited again, ct abdomen showed Moderate colonic stool retention in the left colon.  Liquid stool in the right colon and transverse colon.Focal region of thickening of the walls of the rectum versus under distension/peristalsis.    Given suppository and started on miralax  Discussed with gi, due to ct findings will try for c scope in am if able to clear, clear liquid diet today.  Colonoscopy today canceled as not cleaned out yet.  Trying again tomorrow (3/23).    Last cscope per chart in 2013 at [facility].

## 2022-03-21 NOTE — SUBJECTIVE & OBJECTIVE
Interval History: patient had a bm last night , no further nausea,vomiting today, eats well.    Review of Systems   Constitutional:  Negative for chills and fever.   Respiratory:  Negative for cough and shortness of breath.    Cardiovascular:  Negative for chest pain and palpitations.   Gastrointestinal:  Negative for abdominal pain, nausea and vomiting.   Objective:     Vital Signs (Most Recent):  Temp: 98.4 °F (36.9 °C) (03/21/22 1119)  Pulse: 90 (03/21/22 1119)  Resp: 12 (03/21/22 1119)  BP: (!) 108/55 (03/21/22 1119)  SpO2: 95 % (03/21/22 1119) Vital Signs (24h Range):  Temp:  [97.8 °F (36.6 °C)-98.4 °F (36.9 °C)] 98.4 °F (36.9 °C)  Pulse:  [76-92] 90  Resp:  [12-18] 12  SpO2:  [95 %-98 %] 95 %  BP: (108-141)/(55-78) 108/55     Weight: 66.2 kg (145 lb 15.1 oz)  Body mass index is 19.79 kg/m².    Intake/Output Summary (Last 24 hours) at 3/21/2022 1335  Last data filed at 3/21/2022 1137  Gross per 24 hour   Intake 2244.7 ml   Output 575 ml   Net 1669.7 ml        Physical Exam  Constitutional:       General: He is not in acute distress.     Comments: Thin   HENT:      Mouth/Throat:      Comments: Edentulous  Cardiovascular:      Rate and Rhythm: Normal rate and regular rhythm.      Pulses: Normal pulses.      Heart sounds: Normal heart sounds. No murmur heard.    No gallop.   Pulmonary:      Effort: Pulmonary effort is normal.      Breath sounds: Normal breath sounds.   Abdominal:      General: Bowel sounds are normal. There is no distension.      Palpations: Abdomen is soft.      Tenderness: There is no abdominal tenderness.   Skin:     General: Skin is warm and dry.   Neurological:      Mental Status: He is alert.      Comments: Not oriented to time.       Significant Labs: All pertinent labs within the past 24 hours have been reviewed.    Significant Imaging: I have reviewed all pertinent imaging results/findings within the past 24 hours.

## 2022-03-21 NOTE — PLAN OF CARE
Plan is for colonoscopy in the morning. GoLYTELY on the bedside. Pt is to drink as much as possible for the colonoscopy in the morning(Possible).     -- Keep NPO after midnight for Colonoscopy.   -- IV Fluids discontinued.   -- Urine toxicology sample collected. Results back. No abnormalities.   -- Pending rehab placement.   -- Keep Holding Metformin.     No other distress at this moment. Will cont to monitor.

## 2022-03-21 NOTE — PT/OT/SLP PROGRESS
Speech Language Pathology Treatment    Patient Name:  Forest Lopez   MRN:  [id number]  Admitting Diagnosis: Acute stroke due to ischemia    Recommendations:                  General Recommendations:   1. Speech pathology to continue to follow 2-3x/week for ongoing assessment of cognitive-communicative deficits s/p acute infarcts of L cerebral hemisphere     Diet recommendations: Solids: Mechanical soft solids, Liquids: Thin      Aspiration Precautions: Eliminate distractions, Feed only when awake/alert, Frequent oral care and HOB to 90 degrees      General Precautions: Standard,       Communication strategies:  Reduce informational content/context; frequent repetition and cues to redirect    Subjective     · Pt awake/alert, resting in bed.  · Pt initially declining to participate in SLP session. Agreeable to participate with mod-MAX motivational cues.    Respiratory Status: Room air    Objective:     Has the patient been evaluated by SLP for swallowing?   Yes  Keep patient NPO? No   Current Respiratory Status:    Room air    Cognitive-Communicative and Language Status:  SLP repositioned pt upright in bed to participate in tx session. Pt initially declining tx. However, SLP began playing music and pt with incr in motivation to participate in tx.  Targeting divided and sustained attention, as well as, visual scanning, pt asked to [place] target stimuli of a letter in a long sequences of various letters. Divided attention targeted by SLP playing music during task. Following 3 trials, on average, pt able to complete task of simple complexity within 5-minutes and required x9 verbal cues to re-focus pt's attention.   Pt motivated by task, voiced he enjoyed therapy this date.     Education: Pt would benefit from ongoing SLP services at next level of care.     Assessment:     Forest Lopez is a 72 y.o. 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    Goals:   Multidisciplinary Problems     SLP Goals        Problem: SLP Goal    Goal Priority Disciplines Outcome   SLP Goal     SLP Ongoing, Progressing   Description: 1. Pt will consume a regular/thin diet without overt s/s of aspiration or airway threat without assistance.  2. Pt will increase orientation to person, place, situation and date with 90% acc given min assist.  3. Pt will follow 3-4 step commands to increase functional IND with 75% acc given min assist.   4. Pt will complete immediate and delayed recall tasks with 75% acc given mod assist.  5. Targeting word finding, pt will complete divergent naming tasks given 1-minute time frame with 50% acc given mod assist.  6. Ongoing cognitive-communicative assessment.     Updated Goals 2/8:  7. Pt will sustain attention to topic/task without distraction in 5-10 minute increments given mod verbal cues.                   Plan:     · Patient to be seen:  2 x/week, 3 x/week   · Plan of Care expires:  03/21/22  · Plan of Care reviewed with:  patient   · SLP Follow-Up:  Yes       Discharge recommendations:  nursing facility, skilled     Time Tracking:     SLP Treatment Date:   03/21/22  Speech Start Time:  1355  Speech Stop Time:  1422     Speech Total Time (min):  27 min    Billable Minutes: Speech Therapy Individual 27 min    03/21/2022

## 2022-03-21 NOTE — ASSESSMENT & PLAN NOTE
kub showed gastric distention, moderate stool  No further episodes  Will monitor  Suppository once  Having bm  Vomited again, ct abdomen showed Moderate colonic stool retention in the left colon.  Liquid stool in the right colon and transverse colon.Focal region of thickening of the walls of the rectum versus under distension/peristalsis.    Given suppository and started on miralax

## 2022-03-21 NOTE — PLAN OF CARE
03/21/22 1851   Discharge Reassessment   Assessment Type Discharge Planning Reassessment   Did the patient's condition or plan change since previous assessment? No   Discharge Plan discussed with: Adult children   Communicated EFRAÍN with patient/caregiver Yes   Discharge Plan A Rehab   Discharge Plan B Skilled Nursing Facility   DME Needed Upon Discharge  none   Why the patient remains in the hospital Placement issues   Post-Acute Status   Post-Acute Placement Status Referrals Sent   Coverage [employer] MANAGED MEDICARE - [employer] SNP (SPECIAL NEEDS PLAN     SHANICE spoke to Odalis from [facility] about the patient. She stated that since the   patient has had so many in-network denials that they may be able to get an auth. Even though they are out of network for his insurance. He has been refused for Rehab facilities, SNF and  LTAC. SHANICE [facility]. Patient also had a negative drug urine test run today.  SHANICE will speak to Odalis in the am tomorrow.

## 2022-03-21 NOTE — PLAN OF CARE
Pt remained free of falls and injuries throughout shift. AAOx4. Pt calm and pleasant. Purposeful hourly rounding performed. Pt swallows meds whole. NS infusing to midline @ 75 mL/hr.  Managed c/o heartburn with one time dose Tums per PENELOPE Collins. Patient ambulates with X1 assist, condom catheter in place to gravity, X1 moderate liquid BM this shift. VSS on room air. Pt resting watching tv in bed, denies pain, NADN. [employer] monitoring maintained. Bed low and locked, bed alarm on, call light in reach. Side rails up x2. Will continue to monitor.

## 2022-03-21 NOTE — CONSULTS
Consult Note  Gastroenterology    Consult Requested By: Dr Elizalde  Reason for Consult: Abnormal Ct scan , constipation, Pt is on Anticoagulation    SUBJECTIVE:     History of Present Illness:  Patient is a 72 y.o. male who presents with syncopal epsiode, asked to see secondary to abnormal ct scan and constipation Onset of symptoms was gradual starting a few days ago with unchanged course since that time. Patient denies abdominal pain. Symptoms are aggravated by none. Symptoms improve with none. Past history includes CVA, DVT. Previous studies include colonoscopy 2013    Review of patient's allergies indicates:  No Known Allergies    Scheduled Meds:   apixaban  5 mg Oral BID    atorvastatin  80 mg Oral Daily    FLUoxetine  20 mg Oral Daily    folic acid  1 mg Oral Daily    insulin aspart U-100  7 Units Subcutaneous TIDWM    insulin detemir U-100  28 Units Subcutaneous Daily    multivitamin  1 tablet Oral Daily    polyethylene glycol  4,000 mL Oral Once    sodium chloride 0.9%  10 mL Intravenous Q6H    tamsulosin  0.4 mg Oral QHS    thiamine  100 mg Oral Daily       Continuous Infusions:      PRN Meds:.  acetaminophen, dextrose 10%, dextrose 10%, glucagon (human recombinant), glucose, glucose, hydrALAZINE, insulin aspart U-100, loperamide, naloxone, ondansetron, sodium chloride 0.9%, Flushing PICC Protocol **AND** sodium chloride 0.9% **AND** sodium chloride 0.9%    Past Medical History:   Diagnosis Date    Blind left eye     able to see, but poorly    BPH (benign prostatic hyperplasia)     Diabetes mellitus, type 2     Hyperlipidemia     Hypertension     Left rib fracture 03/30/2017    Stroke-like symptoms 09/25/2019    L facial droop, slurred speech & L arm weakness       Past Surgical History:   Procedure Laterality Date    NO PAST SURGERIES  09/25/2019       Family History   Problem Relation Age of Onset    Heart disease Mother         heart surgery    Heart disease Father         heart attack  "   Stroke Sister     Cancer Sister         Unknown type of cancer    No Known Problems Son     No Known Problems Son        Social History     Socioeconomic History    Marital status: Single    Number of children: 2   Tobacco Use    Smoking status: Light Tobacco Smoker     Types: Cigarettes, Cigars    Smokeless tobacco: Never Used    Tobacco comment: Smokes 5-6 cigarettes/day   Substance and Sexual Activity    Alcohol use: Yes     Alcohol/week: 0.0 standard drinks     Comment: drinks beer socially     Drug use: Not Currently     Types: Marijuana, "Crack" cocaine    Sexual activity: Not Currently     Social Determinants of Health     Financial Resource Strain: High Risk    Difficulty of Paying Living Expenses: Hard   Food Insecurity: No Food Insecurity    Worried About Running Out of Food in the Last Year: Never true    Ran Out of Food in the Last Year: Never true   Transportation Needs: No Transportation Needs    Lack of Transportation (Medical): No    Lack of Transportation (Non-Medical): No   Physical Activity: Inactive    Days of Exercise per Week: 0 days    Minutes of Exercise per Session: 0 min   Stress: No Stress Concern Present    Feeling of Stress : Not at all   Social Connections: Socially Isolated    Frequency of Communication with Friends and Family: Twice a week    Frequency of Social Gatherings with Friends and Family: Never    Attends Taoist Services: Never    Active Member of Clubs or Organizations: No    Attends Club or Organization Meetings: Never    Marital Status:    Housing Stability: High Risk    Unable to Pay for Housing in the Last Year: Yes    Number of Places Lived in the Last Year: 2    Unstable Housing in the Last Year: Yes       Review of Systems:  Constitutional: no fever or chills  Eyes: no visual changes  ENT: no nasal congestion or sore throat  Respiratory: no cough or shorness of breath  Cardiovascular: no chest pain or " palpitations  Gastrointestinal: no nausea or vomiting, no abdominal pain or change in bowel habits  Genitourinary: no hematuria or dysuria    OBJECTIVE:   Physical Exam:  General: Well developed, well nourished, no apparent distress  Vital Signs Range (Last 24H):  Temp:  [97.8 °F (36.6 °C)-98.4 °F (36.9 °C)]   Pulse:  [76-92]   Resp:  [12-18]   BP: (108-141)/(55-78)   SpO2:  [95 %-98 %]   HEENT: Anicteric, PERRLA  CVS: S1, S2, no murmers/rubs  Lungs: CTA-B, normal excursion  Abdomen: Soft, NT, ND, normal BS's  Extremities: No c/c/e, 1+ DP pulses to BLE's  Skin: No rashes/lesions.      Laboratory:    CBC:   Recent Labs   Lab 03/20/22  1241   WBC 9.79   RBC 5.09   HGB 13.0*   HCT 40.7      MCV 80*   MCH 25.5*   MCHC 31.9*     CMP:   Recent Labs   Lab 03/21/22  0511   GLU 96   CALCIUM 10.0      K 4.0   CO2 24      BUN 23   CREATININE 0.9     LFTs: No results for input(s): ALT, AST, ALKPHOS, BILITOT, PROT, ALBUMIN in the last 168 hours.    No results found for this or any previous visit (from the past 336 hour(s)).   No results for input(s): APTT, INR, PTT in the last 168 hours.  Recent Labs   Lab 03/17/22  0453 03/20/22  1241 03/21/22  0511   NA  --  137 139   K  --  4.0 4.0   CL  --  103 106   CO2  --  22* 24   BUN  --  24* 23   CREATININE 0.9 0.9 0.9   CALCIUM  --  10.2 10.0    No results found for: HAV, HEPAIGM, HEPBIGM, HEPBCAB, HBEAG, HEPCAB No results found for: AFP   No results found for: CEA       Diagnostic Results:  CT: I have personally reviewed the report    ASSESSMENT/PLAN:     1. Syncope    2. Tobacco dependence    3. Acute deep vein thrombosis (DVT) of femoral vein of both lower extremities    4. Cocaine abuse    5. Syncope and collapse    6. Type 2 diabetes mellitus with hyperglycemia, without long-term current use of insulin    7. Acute deep vein thrombosis (DVT) of both femoral veins    8. Stroke    9. Sepsis    10. Sepsis, due to unspecified organism, unspecified whether acute  organ dysfunction present    11. Abnormal imaging of thyroid    12. Advance care planning        Plan: 1) Clear liquid diet, prep for colon  2)pt on anticoagulants

## 2022-03-21 NOTE — PROGRESS NOTES
[facility] Medicine  Progress Note    Patient Name: Forest Lopez  MRN: [id number]  Patient Class: IP- Inpatient   Admission Date: 1/31/2022  Length of Stay: 49 days  Attending Physician: Alma Elizalde MD  Primary Care Provider: Julian Escobar        Subjective:     Principal Problem:Acute stroke due to ischemia        HPI:  Mr. Lopez is a 72 year old man who presented after a syncopal episode.  He reports he had been feeling well prior to the episode but then had a prodrome prior to passing out.  EMS was called, report patient's BP was low normal on their arrival, improved after an IV fluids bolus.  Patient denies chest pain or shortness of breath and says he does not feel weak currently although is rather tired.  When seen in the ED this morning he could barely express himself audibly.     Vital signs were normal on presentation here.  He is on warfarin for recurrent DVT, but his INR was 1, and d-dimer markedly elevated at 21.7.  Tox screen was positive for cocaine.  He had a CTA of the brain and neck done on presentation so CTA of the chest for PE study could not be done for 48 hours.  Ultrasound of the lower extremities showed extensive bilateral DVT.  On the right there was thrombosis of the common femoral vein, femoral vein, popliteal vein, one of the paired posterior tibial veins and both visualized peroneal veins.  On the left there was thrombosis of the common femoral vein, femoral vein and popliteal vein.  Patient was started on full dose Lovenox and admitted.    Medical history is from the chart as he is unable to give me much information.  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  He smokes 5-6 cigarettes/day and is not interested in quitting.  He is currently homeless and staying with a friend.  Despite the normal INR he is sure he  is taking his warfarin and is not having difficulty taking his medications.  I discussed his care with [facility] staff on the [facility] and patient had been lost to follow up since November 2021.      Overview/Hospital Course:  Patient presented to the ED after a syncopal episode and was found to have bilateral lower extremity DVTs and a low INR.  Tox screen was positive for cocaine.  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  He was started back on his warfarin with bridging Lovenox.  His 2D echo showed grade I diastolic dysfunction.  Neurology evaluated him and recommended echo with bubble study, which was negative for a shunt.    PT/OT evaluated him and recommended SNF placement.  His INR was difficult to get therapeutic, and as he had no contraindication to a NOAC his warfarin was changed to apixaban, and the full dose Lovenox was discontinued.  As he appeared to be depressed and had no objection to starting an antidepressant Lexapro was started.  He had a fall in the hospital on 2/13, had gone to the bathroom for a BM with the nurse, and when she turned away while he was washing his hands he apparently lost his balance and fell.  He did not hit his head and did not lose consciousness, but his BP was low transiently and he was given a bolus of IV fluids.    Patient's mental status improved slowly, but he gradually became more talkative and was able to hold a brief conversation.  He was diagnosed with a polymicrobial UTI on 2/21 and completed treatment with antibiotics.  Psychiatry evaluated him on 3/3 and changed his antidepressant to Prozac as it can be more activating and patient was having difficulty with his level of motivation.      Patient's discharge was delayed as he was not accepted by any SNF facilities in the area.  Reasons for the denials are unclear as he has a clear rehab diagnosis.  He has a history of drug  use but has not shown any interest in getting any illicit or legal drugs since he has been here, and he has had no visitors that could have provided drugs to him.  He has been pleasant and cooperative while here although lacks motivation, likely due to the nature of the stroke affecting his frontal lobe.  He requires considerable encouragement to participate in therapy.  Discharging him to live on the street or shelter would not be appropriate as he would not be able to take care of himself and likely will need prison nursing home care eventually after he gets the chance to improve with rehab.      Interval History: patient had a bm last night , no further nausea,vomiting today, eats well.    Review of Systems   Constitutional:  Negative for chills and fever.   Respiratory:  Negative for cough and shortness of breath.    Cardiovascular:  Negative for chest pain and palpitations.   Gastrointestinal:  Negative for abdominal pain, nausea and vomiting.   Objective:     Vital Signs (Most Recent):  Temp: 98.4 °F (36.9 °C) (03/21/22 1119)  Pulse: 90 (03/21/22 1119)  Resp: 12 (03/21/22 1119)  BP: (!) 108/55 (03/21/22 1119)  SpO2: 95 % (03/21/22 1119) Vital Signs (24h Range):  Temp:  [97.8 °F (36.6 °C)-98.4 °F (36.9 °C)] 98.4 °F (36.9 °C)  Pulse:  [76-92] 90  Resp:  [12-18] 12  SpO2:  [95 %-98 %] 95 %  BP: (108-141)/(55-78) 108/55     Weight: 66.2 kg (145 lb 15.1 oz)  Body mass index is 19.79 kg/m².    Intake/Output Summary (Last 24 hours) at 3/21/2022 1354  Last data filed at 3/21/2022 1137  Gross per 24 hour   Intake 2244.7 ml   Output 575 ml   Net 1669.7 ml        Physical Exam  Constitutional:       General: He is not in acute distress.     Comments: Thin   HENT:      Mouth/Throat:      Comments: Edentulous  Cardiovascular:      Rate and Rhythm: Normal rate and regular rhythm.      Pulses: Normal pulses.      Heart sounds: Normal heart sounds. No murmur heard.    No gallop.   Pulmonary:      Effort: Pulmonary effort  is normal.      Breath sounds: Normal breath sounds.   Abdominal:      General: Bowel sounds are normal. There is no distension.      Palpations: Abdomen is soft.      Tenderness: There is no abdominal tenderness.   Skin:     General: Skin is warm and dry.   Neurological:      Mental Status: He is alert.      Comments: Not oriented to time.       Significant Labs: All pertinent labs within the past 24 hours have been reviewed.    Significant Imaging: I have reviewed all pertinent imaging results/findings within the past 24 hours.      Assessment/Plan:      * Acute stroke due to ischemia  - MRI showed areas of diffusion signal hyperintensity consistent with areas of acute ischemia/infarct involving the left cerebral hemisphere, the most prominent measuring approximately 1.4 cm, also a small focus of the right frontal lobe.  - Patient on full dose anticoagulation currently due to acute bilateral DVT.  - Sinus rhythm noted throughout hospital stay  - Risk factor modification - control diabetes, continue statin.  - RPR, HIV non reactive.  B12 low normal.   - CTA showed a focal segment of 60-70% stenosis involving the proximal to mid left vertebral artery that was new when compared to the prior study of 09/25/2019.  No evidence of large vessel intracranial occlusion.   - Neurology noted symptoms do not correspond to stroke location, although the frontal lobe stroke might have something to do with his lack of motivation, and Speech has noted he has had delayed swallowing.  - Echo with bubble study done, no shunt seen.  - Follow up with vascular neurology in 4 weeks.  - Therapy recommending SNF due to his low interest in participation.  - Started Lexapro due to suspicion for depression.  - Psych consulted, changed to Prozac and started thiamine, folic acid, MVI due to previous history of alcohol abuse, although Wernicke encephalopathy is not suspected.  - Re-consulted SNF for rehab from the stroke, as discharging him to a  shelter would be inappropriate and unsafe for him.  Discussed with his son, Forest, at length on 3/10.  Forest is out of town on a job but will be returning to [place] eventually.  He agrees his father will eventually need nursing home care as he is unable to care for himself.      Vomiting  kub showed gastric distention, moderate stool  No further episodes  Will monitor  Suppository once  Having bm  Vomited again, ct abdomen showed Moderate colonic stool retention in the left colon.  Liquid stool in the right colon and transverse colon.Focal region of thickening of the walls of the rectum versus under distension/peristalsis.    Given suppository and started on miralax  Discussed with gi, due to ct findings will try for c scope in am if able to clear, clear liquid diet today.    Last cscope per chart in 2013 at [facility].    Abnormal imaging of thyroid  Tsh/t4 ok, thyroid ultrasound done, showed bilateral nodules which did not meet criteria for biopsy.    Advance care planning        Severe protein-calorie malnutrition  Diet as tolerated      Debility  PT/OT recommending SNF after stroke with debility/poor motivation  Awaiting SNF, having difficulty finding placement for unclear reasons.  Will continue to pursue this as above.      Type 2 diabetes mellitus with hyperglycemia, without long-term current use of insulin  Reportedly he is on metformin and glipizide, both held.  Continue SSI for now.  He has 4+ sugar in urine and HbA1c is 10.3, and he is hyperglycemic here.  Will start levemir and continue ISS, increase levemir to 25 units daily  Add scheduled novolog 5 units tid, increase to 7 units  Improving  OK to resume metformin.  Increased levemir to 28 units daily with good improvement.  Hold metformin with contrast with ct    Acute deep vein thrombosis (DVT) of both femoral veins  Last ultrasound showed partially occlusive DVTs, now completely occlusive DVT of multiple lower extremity veins on ultrasound.   Patient reports compliance with warfarin but his INR is only one.  He is homeless but says he does not have difficulty obtaining his medications.  Does not seem to care about anything, which again might be due to the frontal lobe stroke.  D-dimer was markedly elevated on presentation and there was a question of possible PE, but he had a CTA of the brain/neck on presentation so could not get another CTA for another 48 hours.    V/Q scan was done, showed low probability for PE.  2D echo showed grade I diastolic dysfunction.  Warfarin was restarted with bridging Lovenox, but INR was still low.  Given the difficulty of getting him to follow up changed to apixaban and discontinued Lovenox.    Syncope and collapse  Unclear cause.  Spoke to [facility] and patient has had previous syncopal episodes attributed to alcohol abuse.  Has been prescribed meclizine as well.  Tox positive for cocaine but not alcohol, and he denies current use of both.  CTA brain/neck was done in ED, and per ED note the results were discussed with stroke neurologist Dr. Tovar who felt that CTA finding of a short segment of left vertebral artery stenosis was incidental.  He did not think stroke workup with MRI was indicated.    Since change of status per son compared to when he saw him early during the week a CT of the brain was ordered and no abnormalities seen.  MRI of the brain showed acute strokes of the left cerebral hemisphere and right frontal lobe.      Hyperlipidemia  Resumed lipitor      Essential hypertension  Doubtful he was on anything at home.  Started losartan 25 mg daily  BP well controlled on low dose losartan.  BP transiently low after he fell but recovered quickly.  IVF bolus given.  Losartan discontinued as BP persistently low-normal.    Tobacco dependence  He smokes 5-6 cigarettes/day.  Not trying to quit and not interested in a nicotine patch.  Benefits of smoking cessation were reviewed with patient in detail for for 8 minutes and  patient was encouraged to quit. Nicotine replacement options were discussed, not needed.  He still does not want a patch and has been here more than a month without cigarettes.    VTE Risk Mitigation (From admission, onward)         Ordered     apixaban tablet 5 mg  2 times daily         02/10/22 0901     Place sequential compression device  Until discontinued         01/31/22 0533                Discharge Planning   EFRAÍN:      Code Status: Full Code   Is the patient medically ready for discharge?:     Reason for patient still in hospital (select all that apply): Patient trending condition and Treatment  Discharge Plan A: Skilled Nursing Facility   Discharge Delays: (!) Post-Acute Set-up (SEE below)              Alma Elizalde MD  Department of Hospital Medicine   [facility] ([place])

## 2022-03-21 NOTE — PT/OT/SLP PROGRESS
Occupational Therapy   Treatment    Name: Forest Lopez  MRN: [id number]  Admitting Diagnosis:  Acute stroke due to ischemia       Recommendations:     Discharge Recommendations: nursing facility, skilled  Discharge Equipment Recommendations:  bedside commode, shower chair  Barriers to discharge:  Decreased caregiver support    Assessment:     Forest Lopez is a 72 y.o. male with a medical diagnosis of Acute stroke due to ischemia.  He presents with no pain. Performance deficits affecting function are impaired endurance, impaired functional mobilty, impaired self care skills, gait instability, impaired cognition, decreased coordination, decreased safety awareness, impaired balance, weakness.  Pt agreeable to participating in therapy doing EOB activity upon arrival to room.  Pt tolerated UB exercises well; however, at end of last exercise pt became increasingly fatigued and requested to return to supine position.    He would continue to benefit from skilled OT services to address problems listed above and increase independence with ADLs.  SNF is recommended upon d/c from acute care to further address deficits and help pt improve overall functional independence.           Rehab Prognosis:  Fair; patient would benefit from acute skilled OT services to address these deficits and reach maximum level of function.       Plan:     Patient to be seen 3 x/week to address the above listed problems via self-care/home management, therapeutic activities, therapeutic exercises  · Plan of Care Expires: 04/02/22  · Plan of Care Reviewed with: patient    Subjective     Pain/Comfort:  · Pain Rating 1: 0/10  · Pain Rating Post-Intervention 1: 0/10    Objective:     Communicated with: RN (MD) prior to session.  Patient found HOB elevated with Condom Catheter, PICC line, AVASYS upon OT entry to room.    General Precautions: Standard, diabetic, fall   Orthopedic Precautions:N/A   Braces: N/A  Respiratory Status: Room air     Occupational  Performance:     Bed Mobility:    · Supine <> sit: SBA  · Scooting: SBA  · Sit <> Supine: SBA    Functional Mobility/Transfers:  · Sit <> Stand: Unable to assess; pt requested to lie down after final exercise   · Functional Mobility: To be assessed in upcoming sessions    Activities of Daily Living:  · Feeding: Set up while supine with HOB elevated      AMPAC 6 Click ADL: 16    Treatment & Education:  *Pt educated on role of OT in acute care setting.  *Pt performed UB exercises to promote increased endurance and ROM needed for ADLs: 2 sets x 10 reps, seated at EOB  -Horizontal shoulder abduction/adduction  -Chest press  -Shoulder flexion  *POC reviewed with pt        Patient left right sidelying with all lines intact, call button in reach, AVASYS present and RN (MD) notifiedEducation:      GOALS:   Multidisciplinary Problems     Occupational Therapy Goals        Problem: Occupational Therapy Goal    Goal Priority Disciplines Outcome Interventions   Occupational Therapy Goal     OT, PT/OT Ongoing, Progressing    Description: Goals to be met by: 3/28/2022    Patient will increase functional independence with ADLs by performing:    UE Dressing with [place].  LE Dressing with [place].  Grooming while standing at sink with Supervision.  Toileting from toilet with Supervision for hygiene and clothing management.   Toilet transfer to toilet with Supervision.                     Time Tracking:     OT Date of Treatment: 03/21/22  OT Start Time: 1056  OT Stop Time: 1112  OT Total Time (min): 16 min    Billable Minutes:Therapeutic Activity 16    OT/DUTCH: OT          3/21/2022

## 2022-03-21 NOTE — PLAN OF CARE
Problem: Occupational Therapy Goal  Goal: Occupational Therapy Goal  Description: Goals to be met by: 3/28/2022    Patient will increase functional independence with ADLs by performing:    UE Dressing with [place].  LE Dressing with [place].  Grooming while standing at sink with Supervision.  Toileting from toilet with Supervision for hygiene and clothing management.   Toilet transfer to toilet with Supervision.    Outcome: Ongoing, Progressing     POC remains appropriate.  SNF is recommended upon d/c from acute care to further address deficits and help pt improve overall functional independence.     MICHELLE Willett  3/21/2022

## 2022-03-22 ENCOUNTER — PATIENT OUTREACH (OUTPATIENT)
Dept: ADMINISTRATIVE | Facility: OTHER | Age: 72
End: 2022-03-22
Payer: MEDICARE

## 2022-03-22 LAB
POCT GLUCOSE: 125 MG/DL (ref 70–110)
POCT GLUCOSE: 142 MG/DL (ref 70–110)
POCT GLUCOSE: 153 MG/DL (ref 70–110)
POCT GLUCOSE: 64 MG/DL (ref 70–110)
POCT GLUCOSE: 82 MG/DL (ref 70–110)

## 2022-03-22 PROCEDURE — A4216 STERILE WATER/SALINE, 10 ML: HCPCS | Performed by: INTERNAL MEDICINE

## 2022-03-22 PROCEDURE — 97530 THERAPEUTIC ACTIVITIES: CPT

## 2022-03-22 PROCEDURE — 11000001 HC ACUTE MED/SURG PRIVATE ROOM

## 2022-03-22 PROCEDURE — 92507 TX SP LANG VOICE COMM INDIV: CPT

## 2022-03-22 PROCEDURE — 25000003 PHARM REV CODE 250: Performed by: NURSE PRACTITIONER

## 2022-03-22 PROCEDURE — 25000003 PHARM REV CODE 250: Performed by: INTERNAL MEDICINE

## 2022-03-22 PROCEDURE — 99232 SBSQ HOSP IP/OBS MODERATE 35: CPT | Mod: ,,, | Performed by: HOSPITALIST

## 2022-03-22 PROCEDURE — 99232 PR SUBSEQUENT HOSPITAL CARE,LEVL II: ICD-10-PCS | Mod: ,,, | Performed by: HOSPITALIST

## 2022-03-22 PROCEDURE — 25000003 PHARM REV CODE 250: Performed by: HOSPITALIST

## 2022-03-22 PROCEDURE — 97116 GAIT TRAINING THERAPY: CPT

## 2022-03-22 RX ORDER — BISACODYL 5 MG
10 TABLET, DELAYED RELEASE (ENTERIC COATED) ORAL ONCE
Status: COMPLETED | OUTPATIENT
Start: 2022-03-22 | End: 2022-03-22

## 2022-03-22 RX ORDER — SYRING-NEEDL,DISP,INSUL,0.3 ML 29 G X1/2"
296 SYRINGE, EMPTY DISPOSABLE MISCELLANEOUS ONCE
Status: COMPLETED | OUTPATIENT
Start: 2022-03-22 | End: 2022-03-22

## 2022-03-22 RX ADMIN — ATORVASTATIN CALCIUM 80 MG: 20 TABLET, FILM COATED ORAL at 09:03

## 2022-03-22 RX ADMIN — Medication 10 ML: at 12:03

## 2022-03-22 RX ADMIN — THIAMINE HCL TAB 100 MG 100 MG: 100 TAB at 09:03

## 2022-03-22 RX ADMIN — Medication 10 ML: at 05:03

## 2022-03-22 RX ADMIN — INSULIN ASPART 7 UNITS: 100 INJECTION, SOLUTION INTRAVENOUS; SUBCUTANEOUS at 06:03

## 2022-03-22 RX ADMIN — APIXABAN 5 MG: 2.5 TABLET, FILM COATED ORAL at 09:03

## 2022-03-22 RX ADMIN — Medication 10 ML: at 11:03

## 2022-03-22 RX ADMIN — INSULIN ASPART 7 UNITS: 100 INJECTION, SOLUTION INTRAVENOUS; SUBCUTANEOUS at 12:03

## 2022-03-22 RX ADMIN — FLUOXETINE 20 MG: 20 CAPSULE ORAL at 09:03

## 2022-03-22 RX ADMIN — BISACODYL 10 MG: 5 TABLET, COATED ORAL at 09:03

## 2022-03-22 RX ADMIN — FOLIC ACID 1 MG: 1 TABLET ORAL at 09:03

## 2022-03-22 RX ADMIN — THERA TABS 1 TABLET: TAB at 09:03

## 2022-03-22 RX ADMIN — Medication 10 ML: at 07:03

## 2022-03-22 RX ADMIN — Medication 296 ML: at 07:03

## 2022-03-22 RX ADMIN — TAMSULOSIN HYDROCHLORIDE 0.4 MG: 0.4 CAPSULE ORAL at 07:03

## 2022-03-22 NOTE — PROGRESS NOTES
[facility] ([place])  Gastroenterology  Progress Note    Patient Name: Forest Lopez  MRN: [id number]  Admission Date: 1/31/2022  Hospital Length of Stay: 50 days  Code Status: Full Code   Attending Provider: Mojgan Bosch MD  Consulting Provider: Carlos Uriarte MD  Primary Care Physician: Julian Escobar  Principal Problem: Acute stroke due to ischemia    Subjective: no acute distress, no recurrence of blood loss. He has no acute cardiac symptoms or GI complaints.     Interval History: This gent was set for a colon exam. Although his stool is loose, it is very brown and some clumps. He could only tolerate 1/3 of the prep and as such he will need to be re-prepped for an exam tomorrow.    Review of Systems   Constitutional: Positive for activity change and unexpected weight change.   HENT: Positive for dental problem. Negative for congestion and trouble swallowing.    Eyes: Negative for pain, discharge and itching.   Respiratory: Negative for apnea, choking and wheezing.    Cardiovascular: Negative for chest pain and palpitations.   Gastrointestinal: Positive for anal bleeding, blood in stool and diarrhea. Negative for abdominal distention, abdominal pain, constipation, nausea, rectal pain and vomiting.   Endocrine: Negative for cold intolerance and heat intolerance.   Genitourinary: Negative for difficulty urinating and flank pain.   Musculoskeletal: Positive for arthralgias, gait problem and neck stiffness.   Allergic/Immunologic: Negative for environmental allergies and food allergies.   Neurological: Negative for dizziness and light-headedness.   Hematological: Negative for adenopathy. Does not bruise/bleed easily.   Psychiatric/Behavioral: Negative for agitation, behavioral problems and confusion.     Objective:     Vital Signs (Most Recent):  Temp: 98 °F (36.7 °C) (03/22/22 0414)  Pulse: 81 (03/22/22 0414)  Resp: 17 (03/22/22 0414)  BP: (!) 155/82 (03/22/22 0414)  SpO2: 99 % (03/22/22 0414) Vital  Signs (24h Range):  Temp:  [97.9 °F (36.6 °C)-98.4 °F (36.9 °C)] 98 °F (36.7 °C)  Pulse:  [77-90] 81  Resp:  [12-18] 17  SpO2:  [95 %-100 %] 99 %  BP: (108-155)/(55-82) 155/82     Weight: 66.2 kg (145 lb 15.1 oz) (03/16/22 1238)  Body mass index is 19.79 kg/m².      Intake/Output Summary (Last 24 hours) at 3/22/2022 0724  Last data filed at 3/22/2022 0600  Gross per 24 hour   Intake 960 ml   Output 1500 ml   Net -540 ml       Lines/Drains/Airways     Drain  Duration           Male External Urinary Catheter 02/03/22 Medium 47 days          Peripheral Intravenous Line  Duration                Midline Catheter Insertion/Assessment  - Single Lumen 02/23/22 2231 Left basilic vein (medial side of arm) 26 days                Physical Exam  Vitals and nursing note reviewed. Exam conducted with a chaperone present.   Constitutional:       Appearance: He is ill-appearing. He is not toxic-appearing or diaphoretic.   HENT:      Head: Normocephalic and atraumatic.      Nose: Nose normal. No congestion or rhinorrhea.      Mouth/Throat:      Mouth: Mucous membranes are moist.      Pharynx: No oropharyngeal exudate or posterior oropharyngeal erythema.   Eyes:      General: No scleral icterus.     Extraocular Movements: Extraocular movements intact.      Pupils: Pupils are equal, round, and reactive to light.   Cardiovascular:      Rate and Rhythm: Normal rate and regular rhythm.      Pulses: Normal pulses.      Heart sounds:     No gallop.   Pulmonary:      Effort: Pulmonary effort is normal.      Breath sounds: Normal breath sounds.   Abdominal:      General: Abdomen is flat. Bowel sounds are normal. There is no distension.      Palpations: Abdomen is soft. There is no mass.      Tenderness: There is no abdominal tenderness. There is no guarding or rebound.      Hernia: No hernia is present.   Musculoskeletal:         General: No swelling or tenderness. Normal range of motion.      Cervical back: Normal range of motion and neck  supple.   Skin:     General: Skin is warm and dry.      Coloration: Skin is not jaundiced.   Neurological:      General: No focal deficit present.      Mental Status: He is alert and oriented to person, place, and time.   Psychiatric:         Mood and Affect: Mood normal.         Behavior: Behavior normal.         Thought Content: Thought content normal.         Judgment: Judgment normal.         Significant Labs:  As reported     Significant Imaging:  reviewed    Assessment/Plan: This gent was set for a colon exam and he could only consume 1/3 of the prep. His stool is brown and as such will offer another prep that should be easier and reschedule the colon exam for tomorrow.     Active Diagnoses:    Diagnosis Date Noted POA    PRINCIPAL PROBLEM:  Acute stroke due to ischemia [I63.9] 02/06/2022 Yes    Vomiting [R11.10] 03/15/2022 No    Abnormal imaging of thyroid [R93.89] 02/07/2022 Yes    Severe protein-calorie malnutrition [E43] 02/04/2022 Yes    Advance care planning [Z71.89] 02/04/2022 Not Applicable    Debility [R53.81] 02/02/2022 Yes    Syncope and collapse [R55] 01/31/2022 Yes    Acute deep vein thrombosis (DVT) of both femoral veins [I82.413] 01/31/2022 Yes    Type 2 diabetes mellitus with hyperglycemia, without long-term current use of insulin [E11.65] 01/31/2022 Yes    Tobacco dependence [F17.200] 03/03/2014 Yes     Chronic    Hyperlipidemia [E78.5] 03/03/2014 Yes     Chronic    Essential hypertension [I10] 03/03/2014 Yes     Chronic      Problems Resolved During this Admission:    Diagnosis Date Noted Date Resolved POA    Sepsis [A41.9] 02/21/2022 03/03/2022 No    Drowsy [R40.0] 02/21/2022 03/05/2022 No    Fever [R50.9] 02/04/2022 02/08/2022 No    Cocaine abuse [F14.10] 09/25/2019 03/14/2022 Yes     Chronic           Thank you for your consult.     Carlos Uriarte MD  Gastroenterology  [facility] ([place])

## 2022-03-22 NOTE — PLAN OF CARE
03/22/22 1647   Post-Acute Status   Post-Acute Authorization Placement  (Patient' information sent to [facility])   Coverage [employer] MANAGED MEDICARE - [employer] SNP (SPECIAL NEEDS PLAN)   Other Status   (Waiting for acceptance at [facility].)   Discharge Plan   Discharge Plan A Skilled Nursing Facility   Discharge Plan B Long-term acute care facility (LTAC)     SHANICE spoke with Odalis from [facility], who is checking if  Mr. Lopez might be approved for Rehab or LTAC for therapy at their facilities.     SHANICE spoke to Mr.Russell Espinal, who authorizes  SNF placements for [employer], regarding the patient. SHANICE explained that several SNFs said that his h/o drug use was a problem that made his acceptance more difficult. Mr. Espinal suggested that since many SNFs refused to accept the patient for this reason, an LTAC might be more willing to accept him. He asked SHANICE to send him a list of facilities that have denied the patient.  Mr. Espinal said that [facility] handle LTAC auths.

## 2022-03-22 NOTE — PT/OT/SLP PROGRESS
Speech Language Pathology Treatment    Patient Name:  Forest Lopez   MRN:  [id number]  Admitting Diagnosis: Acute stroke due to ischemia    Recommendations:                  General Recommendations:   1. Speech pathology to continue to follow 2-3x/week for ongoing assessment of cognitive-communicative deficits s/p acute infarcts of L cerebral hemisphere     Diet recommendations: Solids: Mechanical soft solids, Liquids: Thin      Aspiration Precautions: Eliminate distractions, Feed only when awake/alert, Frequent oral care and HOB to 90 degrees      General Precautions: Standard,       Communication strategies:  Reduce informational content/context; frequent repetition and cues to redirect    Subjective     · Pt awake/alert, sitting upright in bed. Agreeable to participate in SLP session.     Respiratory Status: Room air    Objective:     Has the patient been evaluated by SLP for swallowing?   Yes  Keep patient NPO? No   Current Respiratory Status:    Room air    Cognitive-Communicative and Language Status:  Improved participation and motivation noted this date. During previous session, SLP left pt with word search to complete yesterday. Pt completed ~50% of word search- improving initiation skills.   Targeting divided and sustained attention, as well as, visual scanning, pt completed remainder of word search. Divided attention targeted by SLP playing music during task. Improving ability to sustain attention observed. Pt required less frequent verbal cues to attend to task. Pt able to complete word search task with moderate assist. Pt voiced enjoyment over activity, requested to complete another next session.     Education: Pt would benefit from ongoing SLP services at next level of care.     Assessment:     Forest Lopez is a 72 y.o. 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    Goals:   Multidisciplinary Problems      SLP Goals        Problem: SLP Goal    Goal Priority Disciplines Outcome   SLP Goal     SLP Ongoing, Progressing   Description: 1. Pt will consume a regular/thin diet without overt s/s of aspiration or airway threat without assistance.  2. Pt will increase orientation to person, place, situation and date with 90% acc given min assist.  3. Pt will follow 3-4 step commands to increase functional IND with 75% acc given min assist.   4. Pt will complete immediate and delayed recall tasks with 75% acc given mod assist.  5. Targeting word finding, pt will complete divergent naming tasks given 1-minute time frame with 50% acc given mod assist.  6. Ongoing cognitive-communicative assessment.     Updated Goals 2/8:  7. Pt will sustain attention to topic/task without distraction in 5-10 minute increments given mod verbal cues.                   Plan:     · Patient to be seen:  2 x/week, 3 x/week   · Plan of Care expires:  03/21/22  · Plan of Care reviewed with:  patient   · SLP Follow-Up:  Yes       Discharge recommendations:  nursing facility, skilled     Time Tracking:     SLP Treatment Date:   03/22/22  Speech Start Time:  1040  Speech Stop Time:  1107     Speech Total Time (min):  27 min    Billable Minutes: Speech Therapy Individual 27 min    03/22/2022

## 2022-03-22 NOTE — PROGRESS NOTES
[facility] Medicine  Progress Note    Patient Name: Forest Lopez  MRN: [id number]  Patient Class: IP- Inpatient   Admission Date: 1/31/2022  Length of Stay: 50 days  Attending Physician: Mojgan Bosch MD  Primary Care Provider: Julian Escobar        Subjective:     Principal Problem:Acute stroke due to ischemia        HPI:  Mr. Lopez is a 72 year old man who presented after a syncopal episode.  He reports he had been feeling well prior to the episode but then had a prodrome prior to passing out.  EMS was called, report patient's BP was low normal on their arrival, improved after an IV fluids bolus.  Patient denies chest pain or shortness of breath and says he does not feel weak currently although is rather tired.  When seen in the ED this morning he could barely express himself audibly.     Vital signs were normal on presentation here.  He is on warfarin for recurrent DVT, but his INR was 1, and d-dimer markedly elevated at 21.7.  Tox screen was positive for cocaine.  He had a CTA of the brain and neck done on presentation so CTA of the chest for PE study could not be done for 48 hours.  Ultrasound of the lower extremities showed extensive bilateral DVT.  On the right there was thrombosis of the common femoral vein, femoral vein, popliteal vein, one of the paired posterior tibial veins and both visualized peroneal veins.  On the left there was thrombosis of the common femoral vein, femoral vein and popliteal vein.  Patient was started on full dose Lovenox and admitted.    Medical history is from the chart as he is unable to give me much information.  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  He smokes 5-6 cigarettes/day and is not interested in quitting.  He is currently homeless and staying with a friend.  Despite the normal INR he is sure  he is taking his warfarin and is not having difficulty taking his medications.  I discussed his care with [facility] staff on the [facility] and patient had been lost to follow up since November 2021.      Overview/Hospital Course:  Patient presented to the ED after a syncopal episode and was found to have bilateral lower extremity DVTs and a low INR.  Tox screen was positive for cocaine.  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  He was started back on his warfarin with bridging Lovenox.  His 2D echo showed grade I diastolic dysfunction.  Neurology evaluated him and recommended echo with bubble study, which was negative for a shunt.    PT/OT evaluated him and recommended SNF placement.  His INR was difficult to get therapeutic, and as he had no contraindication to a NOAC his warfarin was changed to apixaban, and the full dose Lovenox was discontinued.  As he appeared to be depressed and had no objection to starting an antidepressant Lexapro was started.  He had a fall in the hospital on 2/13, had gone to the bathroom for a BM with the nurse, and when she turned away while he was washing his hands he apparently lost his balance and fell.  He did not hit his head and did not lose consciousness, but his BP was low transiently and he was given a bolus of IV fluids.    Patient's mental status improved slowly, but he gradually became more talkative and was able to hold a brief conversation.  He was diagnosed with a polymicrobial UTI on 2/21 and completed treatment with antibiotics.  Psychiatry evaluated him on 3/3 and changed his antidepressant to Prozac as it can be more activating and patient was having difficulty with his level of motivation.      Patient's discharge was delayed as he was not accepted by any SNF facilities in the area.  Reasons for the denials are unclear as he has a clear rehab diagnosis.  He has a history of drug  use but has not shown any interest in getting any illicit or legal drugs since he has been here, and he has had no visitors that could have provided drugs to him.  He has been pleasant and cooperative while here although lacks motivation, likely due to the nature of the stroke affecting his frontal lobe.  He requires considerable encouragement to participate in therapy.  Discharging him to live on the street or shelter would not be appropriate as he would not be able to take care of himself and likely will need [facility] nursing home care eventually after he gets the chance to improve with rehab.      Interval History:  Colonoscopy not done, patient only drank 1/3 GoLytely and stool was still brown this morning.  Planning to clear out again this evening to try again tomorrow.  He has no complaints, is a little tired today.    Review of Systems   Constitutional:  Negative for chills and fever.   Respiratory:  Negative for cough and shortness of breath.    Cardiovascular:  Negative for chest pain and palpitations.   Gastrointestinal:  Negative for abdominal pain, nausea and vomiting.   Objective:     Vital Signs (Most Recent):  Temp: 98.1 °F (36.7 °C) (03/22/22 1138)  Pulse: 80 (03/22/22 1138)  Resp: 12 (03/22/22 1138)  BP: (!) 128/58 (03/22/22 1138)  SpO2: 97 % (03/22/22 1138)   Vital Signs (24h Range):  Temp:  [97.9 °F (36.6 °C)-98.2 °F (36.8 °C)] 98.1 °F (36.7 °C)  Pulse:  [70-83] 80  Resp:  [12-18] 12  SpO2:  [96 %-100 %] 97 %  BP: (113-155)/(58-82) 128/58     Weight: 66.2 kg (145 lb 15.1 oz)  Body mass index is 19.79 kg/m².    Intake/Output Summary (Last 24 hours) at 3/22/2022 1528  Last data filed at 3/22/2022 0800  Gross per 24 hour   Intake 240 ml   Output 1500 ml   Net -1260 ml      Physical Exam  Constitutional:       General: He is not in acute distress.     Comments: Thin   HENT:      Mouth/Throat:      Comments: Edentulous  Cardiovascular:      Rate and Rhythm: Normal rate and regular rhythm.      Pulses:  Normal pulses.      Heart sounds: Normal heart sounds. No murmur heard.    No gallop.   Pulmonary:      Effort: Pulmonary effort is normal.      Breath sounds: Normal breath sounds.   Abdominal:      General: Bowel sounds are normal. There is no distension.      Palpations: Abdomen is soft.      Tenderness: There is no abdominal tenderness.   Skin:     General: Skin is warm and dry.   Neurological:      Mental Status: He is alert.      Comments: Not oriented to time.       Significant Labs: All pertinent labs within the past 24 hours have been reviewed.    Significant Imaging: I have reviewed all pertinent imaging results/findings within the past 24 hours.      Assessment/Plan:      * Acute stroke due to ischemia  - MRI showed areas of diffusion signal hyperintensity consistent with areas of acute ischemia/infarct involving the left cerebral hemisphere, the most prominent measuring approximately 1.4 cm, also a small focus of the right frontal lobe.  - Patient on full dose anticoagulation currently due to acute bilateral DVT.  - Sinus rhythm noted throughout hospital stay  - Risk factor modification - control diabetes, continue statin.  - RPR, HIV non reactive.  B12 low normal.   - CTA showed a focal segment of 60-70% stenosis involving the proximal to mid left vertebral artery that was new when compared to the prior study of 09/25/2019.  No evidence of large vessel intracranial occlusion.   - Neurology noted symptoms do not correspond to stroke location, although the frontal lobe stroke might have something to do with his lack of motivation, and Speech has noted he has had delayed swallowing.  - Echo with bubble study done, no shunt seen.  - Follow up with vascular neurology in 4 weeks.  - Therapy recommending SNF due to his low interest in participation.  - Started Lexapro due to suspicion for depression.  - Psych consulted, changed to Prozac and started thiamine, folic acid, MVI due to previous history of alcohol  abuse, although Wernicke encephalopathy is not suspected.  - Re-consulted SNF for rehab from the stroke, as discharging him to a shelter would be inappropriate and unsafe for him.  Discussed with his son, Forest, at length on 3/10.  Forest is out of town on a job but will be returning to [place] eventually.  He agrees his father will eventually need nursing home care as he is unable to care for himself.      Acute deep vein thrombosis (DVT) of both femoral veins  Last ultrasound showed partially occlusive DVTs, now completely occlusive DVT of multiple lower extremity veins on ultrasound.  Patient reports compliance with warfarin but his INR is only one.  He is homeless but says he does not have difficulty obtaining his medications.  Does not seem to care about anything, which again might be due to the frontal lobe stroke.  D-dimer was markedly elevated on presentation and there was a question of possible PE, but he had a CTA of the brain/neck on presentation so could not get another CTA for another 48 hours.    V/Q scan was done, showed low probability for PE.  2D echo showed grade I diastolic dysfunction.  Warfarin was restarted with bridging Lovenox, but INR was still low.  Given the difficulty of getting him to follow up changed to apixaban and discontinued Lovenox.    Vomiting  kub showed gastric distention, moderate stool  No further episodes  Will monitor  Suppository once  Having bm  Vomited again, ct abdomen showed Moderate colonic stool retention in the left colon.  Liquid stool in the right colon and transverse colon.Focal region of thickening of the walls of the rectum versus under distension/peristalsis.    Given suppository and started on miralax  Discussed with gi, due to ct findings will try for c scope in am if able to clear, clear liquid diet today.  Colonoscopy today canceled as not cleaned out yet.  Trying again tomorrow (3/23).    Last cscope per chart in 2013 at [place].    Abnormal  imaging of thyroid  Tsh/t4 ok, thyroid ultrasound done, showed bilateral nodules which did not meet criteria for biopsy.    Advance care planning        Severe protein-calorie malnutrition  Diet as tolerated      Debility  PT/OT recommending SNF after stroke with debility/poor motivation  Awaiting SNF, having difficulty finding placement for unclear reasons.  Will continue to pursue this as above.      Type 2 diabetes mellitus with hyperglycemia, without long-term current use of insulin  Reportedly he is on metformin and glipizide, both held.  Continue SSI for now.  He has 4+ sugar in urine and HbA1c is 10.3, and he is hyperglycemic here.  Will start levemir and continue ISS, increase levemir to 25 units daily  Add scheduled novolog 5 units tid, increase to 7 units  Improving  OK to resume metformin.  Increased levemir to 28 units daily with good improvement.  Hold metformin with contrast with ct    Syncope and collapse  Unclear cause.  Spoke to [facility] and patient has had previous syncopal episodes attributed to alcohol abuse.  Has been prescribed meclizine as well.  Tox positive for cocaine but not alcohol, and he denies current use of both.  CTA brain/neck was done in ED, and per ED note the results were discussed with stroke neurologist Dr. Tovar who felt that CTA finding of a short segment of left vertebral artery stenosis was incidental.  He did not think stroke workup with MRI was indicated.    Since change of status per son compared to when he saw him early during the week a CT of the brain was ordered and no abnormalities seen.  MRI of the brain showed acute strokes of the left cerebral hemisphere and right frontal lobe.      Hyperlipidemia  Resumed lipitor      Essential hypertension  Doubtful he was on anything at home.  Started losartan 25 mg daily  BP well controlled on low dose losartan.  BP transiently low after he fell but recovered quickly.  IVF bolus given.  Losartan discontinued as BP persistently  low-normal.    Tobacco dependence  He smokes 5-6 cigarettes/day.  Not trying to quit and not interested in a nicotine patch.  Benefits of smoking cessation were reviewed with patient in detail for for 8 minutes and patient was encouraged to quit. Nicotine replacement options were discussed, not needed.  He still does not want a patch and has been here more than a month without cigarettes.      VTE Risk Mitigation (From admission, onward)         Ordered     Place sequential compression device  Until discontinued         01/31/22 6312                      Mojgan Meza MD  Department of Hospital Medicine   [facility] Surg ([place])

## 2022-03-22 NOTE — ASSESSMENT & PLAN NOTE
- MRI showed areas of diffusion signal hyperintensity consistent with areas of acute ischemia/infarct involving the left cerebral hemisphere, the most prominent measuring approximately 1.4 cm, also a small focus of the right frontal lobe.  - Patient on full dose anticoagulation currently due to acute bilateral DVT.  - Sinus rhythm noted throughout hospital stay  - Risk factor modification - control diabetes, continue statin.  - RPR, HIV non reactive.  B12 low normal.   - CTA showed a focal segment of 60-70% stenosis involving the proximal to mid left vertebral artery that was new when compared to the prior study of 09/25/2019.  No evidence of large vessel intracranial occlusion.   - Neurology noted symptoms do not correspond to stroke location, although the frontal lobe stroke might have something to do with his lack of motivation, and Speech has noted he has had delayed swallowing.  - Echo with bubble study done, no shunt seen.  - Follow up with vascular neurology in 4 weeks.  - Therapy recommending SNF due to his low interest in participation.  - Started Lexapro due to suspicion for depression.  - Psych consulted, changed to Prozac and started thiamine, folic acid, MVI due to previous history of alcohol abuse, although Wernicke encephalopathy is not suspected.  - Re-consulted SNF for rehab from the stroke, as discharging him to a shelter would be inappropriate and unsafe for him.  Discussed with his son, Forest, at length on 3/10.  Forest is out of town on a job but will be returning to [place] eventually.  He agrees his father will eventually need nursing home care as he is unable to care for himself.

## 2022-03-22 NOTE — PLAN OF CARE
POC reviewed w/ pt and purposeful rounding complete. Meds administered per MAR. Pt denies pain this shift. Blood glucose monitored and no PRN insulin required this shift. VSS on RA. Pt AAOx4 and standby by assist to ambulate. Avasys in use. Safety precautions intact; no injuries or falls this shift. Pt denies needs at this time; will continue to monitor.

## 2022-03-22 NOTE — PT/OT/SLP PROGRESS
"Physical Therapy Treatment    Patient Name:  Forest Lopez   MRN:  [id number]    Recommendations:     Discharge Recommendations:  nursing facility, skilled (progressing to NH)   Discharge Equipment Recommendations: bedside commode, shower chair  Barriers to discharge: None    Assessment:     Forest Lopez is a 72 y.o. male admitted with a medical diagnosis of Acute stroke due to ischemia.  He presents with the following impairments/functional limitations:  weakness, impaired endurance, impaired self care skills, impaired functional mobilty, gait instability, impaired balance, impaired cognition, decreased coordination, decreased upper extremity function, decreased lower extremity function, decreased safety awareness.    Patient with generalized decreased tolerance to OOB mobility, able to perform mobility with supervision and occasional CGA with rollator. Unable to tolerate more than 15 min of activity before exhausted. Rec for dc to SNF progressing to NH - pt approaching new baseline.    Rehab Prognosis: Good; patient would benefit from acute skilled PT services to address these deficits and reach maximum level of function.    Recent Surgery: Procedure(s) (LRB):  COLONOSCOPY (N/A)      Plan:     During this hospitalization, patient to be seen 3 x/week to address the identified rehab impairments via gait training, therapeutic exercises, therapeutic activities, neuromuscular re-education and progress toward the following goals:    · Plan of Care Expires:  04/03/22    Subjective     Chief Complaint: Feeling tired, exhausted after BM on commode  Patient/Family Comments/goals: "You and the other therapist [Sol] don't quit do you"; Patient agreeable to PT treatment.  Pain/Comfort:  Pain Rating 1: 0/10  Pain Rating Post-Intervention 1: 0/10      Objective:     Communicated with MARIBEL Alarcon prior to session.  Patient found HOB elevated with Condom Catheter, PICC line upon PT entry to room.     General Precautions: " Standard, diabetic, fall (liquid diet for GI procedure in AM)  Orthopedic Precautions:N/A   Braces: N/A  Respiratory Status: Room air     Patient donned red socks and gait belt for OOB mobility. Patient donned mask for hallway ambulation.    Functional Mobility:  · Bed Mobility:   Requires 10 min of encouragement prior to pt initiating bed mobility  · Supine to Sit: CGA  · Sit to Supine: CGA  · Transfers:     · Sit to Stand:  Supervision-CGA with rollator  · 2x from EOB  · 1x from BSC  · Toilet Transfer: contact guard assistance with  no AD  using  Step Transfer  · Increased fatigue after BM requiring hands on assistance for return to bed  · PT assisted with anne-marie hygiene  · Gait: 3x40 ft with rollator with CGA-SBA  · Slow gait with shuffling steps       AM-PAC 6 CLICK MOBILITY  Turning over in bed (including adjusting bedclothes, sheets and blankets)?: 3  Sitting down on and standing up from a chair with arms (e.g., wheelchair, bedside commode, etc.): 3  Moving from lying on back to sitting on the side of the bed?: 3  Moving to and from a bed to a chair (including a wheelchair)?: 3  Need to walk in hospital room?: 3  Climbing 3-5 steps with a railing?: 3  Basic Mobility Total Score: 18       Therapeutic Activities and Exercises:  · Slow mobility requiring increased time for all tasks    Patient left HOB elevated with all lines intact, call button in reach, RN Yun notified and Crista telesitter present.    GOALS:   Multidisciplinary Problems     Physical Therapy Goals        Problem: Physical Therapy Goal    Goal Priority Disciplines Outcome Goal Variances Interventions   Physical Therapy Goal     PT, PT/OT Ongoing, Not Progressing     Description: Goals to be met by: 4/3/2022    Patient will increase functional independence with mobility by performin. Sit<>stand with SPV with LRAD.  2. Gait x 300 feet with SPV with LRAD.   3. Static standing in SLS with no UE support with SBA x10 sec.                       Time Tracking:     PT Received On: 03/22/22  PT Start Time: 1317     PT Stop Time: 1351  PT Total Time (min): 34 min     Billable Minutes: Gait Training 15 and Therapeutic Activity 19    Treatment Type: Treatment  PT/PTA: PT     PTA Visit Number: 0     03/22/2022

## 2022-03-23 PROBLEM — R11.10 VOMITING: Status: RESOLVED | Noted: 2022-03-15 | Resolved: 2022-03-23

## 2022-03-23 LAB
POCT GLUCOSE: 108 MG/DL (ref 70–110)
POCT GLUCOSE: 132 MG/DL (ref 70–110)
POCT GLUCOSE: 153 MG/DL (ref 70–110)
POCT GLUCOSE: 93 MG/DL (ref 70–110)

## 2022-03-23 PROCEDURE — 25000003 PHARM REV CODE 250: Performed by: NURSE PRACTITIONER

## 2022-03-23 PROCEDURE — 97116 GAIT TRAINING THERAPY: CPT | Mod: CQ

## 2022-03-23 PROCEDURE — 99232 SBSQ HOSP IP/OBS MODERATE 35: CPT | Mod: ,,, | Performed by: HOSPITALIST

## 2022-03-23 PROCEDURE — 11000001 HC ACUTE MED/SURG PRIVATE ROOM

## 2022-03-23 PROCEDURE — A4216 STERILE WATER/SALINE, 10 ML: HCPCS | Performed by: INTERNAL MEDICINE

## 2022-03-23 PROCEDURE — 92507 TX SP LANG VOICE COMM INDIV: CPT

## 2022-03-23 PROCEDURE — 25000003 PHARM REV CODE 250: Performed by: HOSPITALIST

## 2022-03-23 PROCEDURE — 97535 SELF CARE MNGMENT TRAINING: CPT

## 2022-03-23 PROCEDURE — 99232 PR SUBSEQUENT HOSPITAL CARE,LEVL II: ICD-10-PCS | Mod: ,,, | Performed by: HOSPITALIST

## 2022-03-23 PROCEDURE — 36406 VNPNXR<3YRS PHY/QHP OTHER VN: CPT

## 2022-03-23 PROCEDURE — 94761 N-INVAS EAR/PLS OXIMETRY MLT: CPT

## 2022-03-23 PROCEDURE — 25000003 PHARM REV CODE 250: Performed by: INTERNAL MEDICINE

## 2022-03-23 RX ADMIN — Medication 10 ML: at 05:03

## 2022-03-23 RX ADMIN — FLUOXETINE 20 MG: 20 CAPSULE ORAL at 08:03

## 2022-03-23 RX ADMIN — Medication 10 ML: at 12:03

## 2022-03-23 RX ADMIN — INSULIN ASPART 7 UNITS: 100 INJECTION, SOLUTION INTRAVENOUS; SUBCUTANEOUS at 12:03

## 2022-03-23 RX ADMIN — THERA TABS 1 TABLET: TAB at 08:03

## 2022-03-23 RX ADMIN — ATORVASTATIN CALCIUM 80 MG: 20 TABLET, FILM COATED ORAL at 08:03

## 2022-03-23 RX ADMIN — FOLIC ACID 1 MG: 1 TABLET ORAL at 08:03

## 2022-03-23 RX ADMIN — THIAMINE HCL TAB 100 MG 100 MG: 100 TAB at 08:03

## 2022-03-23 RX ADMIN — TAMSULOSIN HYDROCHLORIDE 0.4 MG: 0.4 CAPSULE ORAL at 09:03

## 2022-03-23 NOTE — PT/OT/SLP PROGRESS
"Occupational Therapy   Treatment    Name: Forest Lopez  MRN: [id number]  Admitting Diagnosis:  Acute stroke due to ischemia  Day of Surgery    Recommendations:     Discharge Recommendations: nursing facility, skilled, rehabilitation facility (SNF versus IRF)  Discharge Equipment Recommendations:  bedside commode, shower chair  Barriers to discharge:  Decreased caregiver support (current functional level)    Assessment:   Found with condom catheter off and pads soaked in urine.  When asked if feeling urine or wetness at padding Pt. Stating, "I felt it, but I didn't feel like dealing with it" and also stating, "I don't feel like it" in regards to cleaning front anne-marie region.  Pt. Requiring MAX initial encouragement for participation in self-care task.  CGA/SBA for standing ADL and household level ambulation due to impaired balance/stability.  Able to clean front anne-marie region and inner thigh while in stance though requiring assist for thoroughness and cleaning back anne-marie/posterior thighs; while receiving assist with standing ADL, Pt. Noted to demos slight forward/backward sway though no LOB noted.  Progressing towards goals. Continued recommendation of post acute OT in IRF versus SNF.  To benefit from continued acute care OT services to increase independence in self-care/functional transfers.  Continue POC.     Forest Lopez is a 72 y.o. male with a medical diagnosis of Acute stroke due to ischemia.  He presents with below deficits decreasing independence in self-care/functional transfers. Performance deficits affecting function are weakness, impaired endurance, impaired self care skills, impaired functional mobilty, gait instability, impaired balance, decreased safety awareness, decreased upper extremity function, decreased lower extremity function, decreased ROM.     Rehab Prognosis:  Good; patient would benefit from acute skilled OT services to address these deficits and reach maximum level of function.       Plan: " "    Patient to be seen 3 x/week to address the above listed problems via self-care/home management, therapeutic activities, therapeutic exercises  · Plan of Care Expires: 04/02/22  · Plan of Care Reviewed with: patient    Subjective     Pain/Comfort:  · Pain Rating 1: 0/10  · Pain Rating Post-Intervention 1: 0/10    Objective:     Communicated with: Aida PARK RN prior to session.  Patient found HOB elevated with Condom Catheter, PICC line (condom catheter present though not in place) upon OT entry to room.    General Precautions: Standard, diabetic, fall (clear liquid diet)   Orthopedic Precautions:N/A   Braces: N/A  Respiratory Status: Room air     Occupational Performance:     Bed Mobility:    · Supine<>sit with SBA for safety.     Functional Mobility/Transfers:  · Sit>stand from EOB and step transfer bed>bedside chair with CGA/SBA for safety and without AD.  Ambulating short household distance bedside chair>sink and return to bed with CGA/SBA for steadying/safety.        Activities of Daily Living:  · Found with condom cath off and urine soiled bed pads.  Requiring retrieval of all needed clothing items.  Assist needed to doff soiled gown and handed clean gown at this time.  Able to thread BUE into gown though requiring assist for tying at neck.  Handed wipes for cleaning front/back anne-marie region requiring MAX encouragement for task as Pt. Initially stating, "I don't feel like it."  Assist needed to clean back anne-marie region and posterior thighs for thoroughness as well as inner thighs.  Donned underwear threading while seated in bedside chair with hip/knee flexion and downward reaching for task; CGA/SBA for pulling clothing to waist in stance.  Fresh padding and new flat sheet and blanket provided.        [facility] 6 Click ADL: 17    Treatment & Education:  · Supine<>sit with SBA for safety.   · Sit>stand from EOB and step transfer bed>bedside chair with CGA/SBA for safety and without AD.  Ambulating short household " "distance bedside chair>sink and return to bed with CGA/SBA for steadying/safety.    · Found with condom cath off and urine soiled bed pads.  Requiring retrieval of all needed clothing items.  Assist needed to doff soiled gown and handed clean gown at this time.  Able to thread BUE into gown though requiring assist for tying at neck.  Handed wipes for cleaning front/back anne-marie region requiring MAX encouragement for task as Pt. Initially stating, "I don't feel like it."  Assist needed to clean back anne-marie region and posterior thighs for thoroughness as well as inner thighs.  Donned underwear threading while seated in bedside chair with hip/knee flexion and downward reaching for task; CGA/SBA for pulling clothing to waist in stance.  Fresh padding and new flat sheet and blanket provided.      Patient left HOB elevated with all lines intact, call button in reach, nursing notified and AVASYS/telesitteEducation:  r in place.    GOALS:   Multidisciplinary Problems     Occupational Therapy Goals        Problem: Occupational Therapy Goal    Goal Priority Disciplines Outcome Interventions   Occupational Therapy Goal     OT, PT/OT Ongoing, Progressing    Description: Goals to be met by: 3/28/2022    Patient will increase functional independence with ADLs by performing:    UE Dressing with [facility].  LE Dressing with [facility].  Grooming while standing at sink with Supervision.  Toileting from toilet with Supervision for hygiene and clothing management.   Toilet transfer to toilet with Supervision.                     Time Tracking:     OT Date of Treatment: 03/23/22  OT Start Time: 1143  OT Stop Time: 1207  OT Total Time (min): 24 min    Billable Minutes:Self Care/Home Management 24    OT/DUTCH: OT          3/23/2022    "

## 2022-03-23 NOTE — PROGRESS NOTES
This gent has not complied with consuming magnesium citrate as prep for his colon exam. He has not had any further bowel activity and he stated he is too tired to pursue this. He has car cute visceral signs or that of ischemia or cholangitis and no signs of active blood loss.     T<100 HR 80  Anicteric with temporal wasting  Neck supple no mass  Heart RR no gallop  Chest clear no wheeze  Abdomen soft active sounds no masses ascites or tenderness  Extremities no cyanosis or edema  Neuro alert though somewhat lethargic    Labs as reported, CT scan reviewed    Will revisit later when hopefully he is more alert and agreeable.

## 2022-03-23 NOTE — ASSESSMENT & PLAN NOTE
PT/OT recommending rehab vs SNF after stroke with debility/poor motivation  Having difficulty finding placement for unclear reasons.  Will continue to pursue this as above.  Again, patient has been pleasant and cooperative.

## 2022-03-23 NOTE — PLAN OF CARE
03/23/22 0950   Discharge Reassessment   Assessment Type Discharge Planning Reassessment   Did the patient's condition or plan change since previous assessment? No   Discharge Plan discussed with:   (Dr. Bosch- provider)   Communicated EFRAÍN with patient/caregiver Date not available/Unable to determine   Discharge Plan A Rehab  ([facility] accepted pending 3 rehab denials in careport)   Discharge Plan B Long-term acute care facility (LTAC)  (will be able to get ltac w/ [facility] under waiver)   Discharge Barriers Identified Other (see comments)  (no accepting facilities;)   Why the patient remains in the hospital Placement issues   Post-Acute Status   Post-Acute Placement Status Referrals Sent   Coverage [employer]   Patient choice form signed by patient/caregiver   (patient open to whomever accepts; choice letter to be updated upon dc)   Discharge Delays (!) Post-Acute Set-up  (pending acceptance by post acute facility)     Collette with [facility] and [facility] gave an update on acceptance status (noted above); [facility] referrals update to be completed today.

## 2022-03-23 NOTE — ASSESSMENT & PLAN NOTE
Unclear cause.  Spoke to [facility] and patient has had previous syncopal episodes attributed to alcohol abuse.  Has been prescribed meclizine as well.  Tox positive for cocaine but not alcohol, and he denies current use of both.  CTA brain/neck was done in ED, and per ED note the results were discussed with stroke neurologist Dr. Tovar who felt that CTA finding of a short segment of left vertebral artery stenosis was incidental.  He did not think stroke workup with MRI was indicated.    Since change of status per son compared to when he saw him early during the week a CT of the brain was ordered and no abnormalities seen.  MRI of the brain showed acute strokes of the left cerebral hemisphere and right frontal lobe.

## 2022-03-23 NOTE — PLAN OF CARE
Recommendations  1.When medically able advance back to [facility] soft diet with thin liquids as tolerated. - Boost Plus TID.   2.Encourage good PO intake.  3. RD to continue to monitor.    Goals: Pt to meet % EEN/EPN via PO intake by RD f/u.  Nutrition Goal Status: goal met  Communication of RD Recs:  (POC)

## 2022-03-23 NOTE — PT/OT/SLP PROGRESS
Speech Language Pathology Treatment    Patient Name:  Forest Lopez   MRN:  [id number]  Admitting Diagnosis: Acute stroke due to ischemia    Recommendations:                  General Recommendations:   1. Speech pathology to continue to follow 2-3x/week for ongoing assessment of cognitive-communicative deficits s/p acute infarcts of L cerebral hemisphere     Diet recommendations: Solids: Mechanical soft solids, Liquids: Thin      Aspiration Precautions: Eliminate distractions, Feed only when awake/alert, Frequent oral care and HOB to 90 degrees      General Precautions: Standard,       Communication strategies:  Reduce informational content/context; frequent repetition and cues to redirect    Subjective     · Pt awake/alert, sitting upright in bed. Agreeable to participate in SLP session.     Respiratory Status: Room air    Objective:     Has the patient been evaluated by SLP for swallowing?   Yes  Keep patient NPO? No   Current Respiratory Status:    Room air    Cognitive-Communicative and Language Status:  Improving participation and motivation noted this date. Targeting divided and sustained attention, as well as, visual scanning, pt completed word search. Divided attention targeted by SLP playing music during task. Dcr initiation of task. Pt with 5-minute delay prior to beginning task despite MAX cues. SLP removed distractor of music. Pt able to locate 8 words in word search within 20-minute time frame given mod-MAX verbal cues.       Education: Pt would benefit from ongoing SLP services at next level of care.     Assessment:     Forest Lopez is a 72 y.o. 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    Goals:   Multidisciplinary Problems     SLP Goals        Problem: SLP Goal    Goal Priority Disciplines Outcome   SLP Goal     SLP Ongoing, Progressing   Description: 1. Pt will consume a regular/thin diet without  overt s/s of aspiration or airway threat without assistance.  2. Pt will increase orientation to person, place, situation and date with 90% acc given min assist.  3. Pt will follow 3-4 step commands to increase functional IND with 75% acc given min assist.   4. Pt will complete immediate and delayed recall tasks with 75% acc given mod assist.  5. Targeting word finding, pt will complete divergent naming tasks given 1-minute time frame with 50% acc given mod assist.  6. Ongoing cognitive-communicative assessment.     Updated Goals 2/8:  7. Pt will sustain attention to topic/task without distraction in 5-10 minute increments given mod verbal cues.                   Plan:     · Patient to be seen:  2 x/week, 3 x/week   · Plan of Care expires:  03/21/22  · Plan of Care reviewed with:  patient   · SLP Follow-Up:  Yes       Discharge recommendations: Inpatient rehab or  nursing facility, skilled     Time Tracking:     SLP Treatment Date:   03/23/22  Speech Start Time:  0910  Speech Stop Time:  0930     Speech Total Time (min):  20 min    Billable Minutes: Speech Therapy Individual 20 min    03/23/2022

## 2022-03-23 NOTE — PT/OT/SLP PROGRESS
Physical Therapy Treatment    Patient Name:  Forest Lopez   MRN:  [id number]    Recommendations:     Discharge Recommendations:   (IRF vs SNF)   Discharge Equipment Recommendations: bedside commode, shower chair   Barriers to discharge: None    Assessment:     Forest Lopez is a 72 y.o. male admitted with a medical diagnosis of Acute stroke due to ischemia.  He presents with the following impairments/functional limitations:  weakness, gait instability, impaired balance, impaired endurance, impaired functional mobilty, impaired self care skills, decreased lower extremity function, decreased ROM, decreased safety awareness, decreased upper extremity function.    Amb 4 x 75' with rollator and CGA-SBA, no LoB or instability noted, short standing rest breaks btwn bouts  Sit>supine with SBA  Scoot towards HOB with SBA  Pt with good mobility and safe use of rollator this visit  Rec IRF vs SNF    Rehab Prognosis: Good; patient would benefit from acute skilled PT services to address these deficits and reach maximum level of function.    Recent Surgery: Procedure(s) (LRB):  COLONOSCOPY (N/A) Day of Surgery    Plan:     During this hospitalization, patient to be seen 3 x/week to address the identified rehab impairments via gait training, therapeutic activities, therapeutic exercises, neuromuscular re-education and progress toward the following goals:    · Plan of Care Expires:  04/03/22    Subjective     Chief Complaint: none stated  Patient/Family Comments/goals: pt singing along to oldies tunes  Pain/Comfort:  · Pain Rating 1: 0/10  · Pain Rating Post-Intervention 1: 0/10      Objective:     Communicated with PT Debbie prior to session.  Patient found ambulatory in room/nice with Condom Catheter, PICC line upon PT entry to room.     General Precautions: Standard, diabetic, fall (liquid diet for GI procedure in AM)   Orthopedic Precautions:N/A   Braces:    Respiratory Status: Room air     Functional Mobility:  · Bed Mobility:      · Scooting: stand by assistance  · Sit to Supine: stand by assistance  · Gait: 4 x 75' with rollator and CGA-SBA, short standing rest breaks btwn bouts, no LoB or instability noted      AM-PAC 6 CLICK MOBILITY  Turning over in bed (including adjusting bedclothes, sheets and blankets)?: 3  Sitting down on and standing up from a chair with arms (e.g., wheelchair, bedside commode, etc.): 3  Moving from lying on back to sitting on the side of the bed?: 3  Moving to and from a bed to a chair (including a wheelchair)?: 3  Need to walk in hospital room?: 3  Climbing 3-5 steps with a railing?: 3  Basic Mobility Total Score: 18       Therapeutic Activities and Exercises:   Gait training as noted    Patient left HOB elevated with all lines intact, call button in reach and nurse Aida notified..    GOALS:   Multidisciplinary Problems     Physical Therapy Goals        Problem: Physical Therapy Goal    Goal Priority Disciplines Outcome Goal Variances Interventions   Physical Therapy Goal     PT, PT/OT Ongoing, Not Progressing     Description: Goals to be met by: 4/3/2022    Patient will increase functional independence with mobility by performin. Sit<>stand with SPV with LRAD.  2. Gait x 300 feet with SPV with LRAD.   3. Static standing in SLS with no UE support with SBA x10 sec.                      Time Tracking:     PT Received On: 22  PT Start Time: 1247     PT Stop Time: 1310  PT Total Time (min): 23 min     Billable Minutes: Gait Training 23    Treatment Type: Treatment  PT/PTA: PTA     PTA Visit Number: 1     2022

## 2022-03-23 NOTE — PLAN OF CARE
CM Sup contacted Josep RAGSDALE with [facility] that noted accepted on 2/9/22, but advised on today that rehab was denied by insurance. Since that time, [facility] has not continued pursuit for admission; no P2P is noted that was pursued.     Odalis with [facility] was advised of the rehab denial, but she advised that she would attempt to re-submit for rehab based on the SNF denials at this time.  Rehab denials: [facility]  No response to referrals since 2/9/2022 (considered denials): [facility] and [facility]    Secondary plan would be to review for LTAC using waiver for admission. However, the barrier to LTAC is that the patient therapy needs should show improvement or rehab. CM to consult with therapy to review patient's progress and recommendations for rehab.    EFRAÍN: pending placement acceptance.

## 2022-03-23 NOTE — ASSESSMENT & PLAN NOTE
Reportedly he is on metformin and glipizide, both held.  Continue SSI for now.  He has 4+ sugar in urine and HbA1c is 10.3, and he is hyperglycemic here.  Will start levemir and continue ISS, increase levemir to 25 units daily  Add scheduled novolog 5 units tid, increase to 7 units  Improving  OK to resume metformin.  Increased levemir to 28 units daily with good improvement.  Hold metformin with contrast with CT.  BG lower now off metformin, has had a few low BG as well.  Will decrease Levemir to 20 units daily and monitor.

## 2022-03-23 NOTE — SUBJECTIVE & OBJECTIVE
Interval History:  He did not want to drink the mag citrate, and colonoscopy was canceled again.  He states he is not nauseated any longer and has no bowel issues.  He had a visit from a rehab on the [facility] today and is interested in going there.    Review of Systems   Constitutional:  Negative for chills and fever.   Respiratory:  Negative for cough and shortness of breath.    Cardiovascular:  Negative for chest pain and palpitations.   Gastrointestinal:  Negative for abdominal pain, nausea and vomiting.   Objective:     Vital Signs (Most Recent):  Temp: 97.9 °F (36.6 °C) (03/23/22 1121)  Pulse: 71 (03/23/22 1632)  Resp: 16 (03/23/22 1632)  BP: 123/66 (03/23/22 1632)  SpO2: 96 % (03/23/22 1632) Vital Signs (24h Range):  Temp:  [97.9 °F (36.6 °C)-99.3 °F (37.4 °C)] 97.9 °F (36.6 °C)  Pulse:  [71-93] 71  Resp:  [16-18] 16  SpO2:  [96 %-99 %] 96 %  BP: (112-137)/(59-83) 123/66     Weight: 66.2 kg (145 lb 15.1 oz)  Body mass index is 19.79 kg/m².    Intake/Output Summary (Last 24 hours) at 3/23/2022 9004  Last data filed at 3/23/2022 0500  Gross per 24 hour   Intake --   Output 1400 ml   Net -1400 ml      Physical Exam  Constitutional:       General: He is not in acute distress.     Comments: Thin   HENT:      Mouth/Throat:      Comments: Edentulous  Cardiovascular:      Rate and Rhythm: Normal rate and regular rhythm.      Pulses: Normal pulses.      Heart sounds: Normal heart sounds. No murmur heard.    No gallop.   Pulmonary:      Effort: Pulmonary effort is normal.      Breath sounds: Normal breath sounds.   Abdominal:      General: Bowel sounds are normal. There is no distension.      Palpations: Abdomen is soft.      Tenderness: There is no abdominal tenderness.   Skin:     General: Skin is warm and dry.   Neurological:      Mental Status: He is alert.      Comments: Not oriented to time.       Significant Labs: All pertinent labs within the past 24 hours have been reviewed.    Significant Imaging: I have  reviewed all pertinent imaging results/findings within the past 24 hours.

## 2022-03-23 NOTE — NURSING
Pt worked with PT and OT today.  Incontinent of bladder, condom cath in place.  No BM noted today, colonoscopy delayed at this time, no further bowel prep ordered.  VSS,  Calm and cooperative.  Midline dressing changed per unit policy.  Call light within reach.  No new needs expressed.  No c/o pain.

## 2022-03-23 NOTE — ASSESSMENT & PLAN NOTE
kub showed gastric distention, moderate stool  No further episodes of vomiting  Suppository given once and he is having BMs.  Vomited again, CT abdomen showed moderate colonic stool retention in the left colon.  Liquid stool in the right colon and transverse colon.  Focal region of thickening of the walls of the rectum versus under distension/peristalsis.    Given suppository and started on miralax  Discussed with GI, due to CT findings will try for colonoscopy if able to clear.  Colonoscopy canceled as he did not finish the prep.  Further attempt planned for 3/23 but he did not drink the mag citrate.  Reports his symptoms have resolved.  Last cscope per chart in 2013 at [facility].

## 2022-03-23 NOTE — PROGRESS NOTES
[facility] - Med Surg ([facility])  Adult Nutrition  Progress Note    SUMMARY       Recommendations  1.When medically able advance back to mech soft diet with thin liquids as tolerated. - Boost Plus TID.   2.Encourage good PO intake.   3. RD to continue to monitor.    Goals: Pt to meet % EEN/EPN via PO intake by RD f/u.  Nutrition Goal Status: goal met  Communication of RD Recs:  (POC)    Assessment and Plan  Nutrition Problem  swallowing difficulty     Related to (etiology):   stroke     Signs and Symptoms (as evidenced by):   Pt requiring texture modified diet and speech therapy services for difficulty swallowing     Interventions(treatment strategy):  Collaboration with other providers  Commercial beverage  Carbohydrate modified diet (diabetic)  Texture modified diet (IDDSI L5 MM)     Nutrition Diagnosis Status:   Continues     Reason for Assessment    Reason For Assessment: RD follow-up  Diagnosis:  (acute DVT)  Relevant Medical History: HTN, HLD, T2DM, BPH  Interdisciplinary Rounds: attended  General Information Comments: 3/23- RD working remotely for coverage. RD  f/u. Pt on clear liquid diet- for colon exam. Unable to consume all of prep. SLP recommend mech soft with thin liquids. Good PO intake continues. Will continue to monitor. Updated wt- wt gain noted. LBM 3/22.    Nutrition Discharge Planning: Pt to follow a cardiac/ DM diet(vit K restriction as needed) as tolerated.    Nutrition Risk Screen    Nutrition Risk Screen: no indicators present    Nutrition/Diet History    Spiritual, Cultural Beliefs, Yazidism Practices, Values that Affect Care:  (None stated.)  Factors Affecting Nutritional Intake: clear liquid diet (colon prep)    Anthropometrics    Temp: 97.9 °F (36.6 °C)  Height: 6' (182.9 cm)  Height (inches): 72 in  Weight Method: Bed Scale  Weight: 66.2 kg (145 lb 15.1 oz)  Weight (lb): 145.95 lb  Ideal Body Weight (IBW), Male: 178 lb  % Ideal Body Weight, Male (lb): 81.99 %  BMI (Calculated):  19.8  BMI Grade: 18.5-24.9 - normal       Lab/Procedures/Meds    Pertinent Labs Reviewed: reviewed  Pertinent Labs Comments: none  Pertinent Medications Reviewed: reviewed  Pertinent Medications Comments: Atorvastatin, fluoxetine, folic acid, insulin aspart, insulin detemir, MV, NaCl flush, tamsulosin, thiamine    Physical Findings/Assessment         Estimated/Assessed Needs    Weight Used For Calorie Calculations: 63 kg (138 lb 14.2 oz)  Energy Calorie Requirements (kcal): 1980 kcal day (MSJ X AF 1.4)  Energy Need Method: [place] Jeor  Protein Requirements: 50-63 g day (0.8-1.0 g/kg)  Weight Used For Protein Calculations: 63 kg (138 lb 14.2 oz)     Estimated Fluid Requirement Method: RDA Method  RDA Method (mL): 1980  CHO Requirement: 248 g day      Nutrition Prescription Ordered    Current Diet Order: clear liquid  Oral Nutrition Supplement:  (ordered- not recieving on clear liquid)    Evaluation of Received Nutrient/Fluid Intake    Energy Calories Required: meeting needs  Protein Required: meeting needs  Fluid Required: meeting needs  Comments: LBM 3/22  Tolerance: tolerating  % Intake of Estimated Energy Needs: 75 - 100 %  % Meal Intake: 75 - 100 %    Nutrition Risk    Level of Risk/Frequency of Follow-up: low (1x weekly)     Monitor and Evaluation    Food and Nutrient Intake: energy intake, food and beverage intake  Food and Nutrient Adminstration: diet order  Knowledge/Beliefs/Attitudes: food and nutrition knowledge/skill  Physical Activity and Function: nutrition-related ADLs and IADLs  Anthropometric Measurements: weight, weight change, body mass index  Biochemical Data, Medical Tests and Procedures: glucose/endocrine profile, gastrointestinal profile, electrolyte and renal panel, inflammatory profile, lipid profile  Nutrition-Focused Physical Findings: overall appearance     Nutrition Follow-Up    RD Follow-up?: Yes

## 2022-03-23 NOTE — ASSESSMENT & PLAN NOTE
- MRI showed areas of diffusion signal hyperintensity consistent with areas of acute ischemia/infarct involving the left cerebral hemisphere, the most prominent measuring approximately 1.4 cm, also a small focus of the right frontal lobe.  - Patient on full dose anticoagulation currently due to acute bilateral DVT.  - Sinus rhythm noted throughout hospital stay  - Risk factor modification - control diabetes, continue statin.  - RPR, HIV non reactive.  B12 low normal.   - CTA showed a focal segment of 60-70% stenosis involving the proximal to mid left vertebral artery that was new when compared to the prior study of 09/25/2019.  No evidence of large vessel intracranial occlusion.   - Neurology noted symptoms do not correspond to stroke location, although the frontal lobe stroke might have something to do with his lack of motivation, and Speech has noted he has had delayed swallowing.  - Echo with bubble study done, no shunt seen.  - Follow up with vascular neurology in 4 weeks.  - Therapy recommended rehab initially but changed recommendation to SNF due to his low interest in participation.  - Started Lexapro due to suspicion for depression.  - Psych consulted, changed to Prozac and started thiamine, folic acid, MVI due to previous history of alcohol abuse, although Wernicke encephalopathy is not suspected.  - Re-consulted SNF for rehab from the stroke, as discharging him to a shelter would be inappropriate and unsafe for him.  Discussed with his son, Forest, at length on 3/10.  Forest is out of town on a job but will be returning to [place] eventually.  He agrees his father will eventually need nursing home care as he is unable to care for himself.  - SNF still denying patient.  Will pursue rehab placement.

## 2022-03-23 NOTE — ASSESSMENT & PLAN NOTE
He smokes 5-6 cigarettes/day.  Not trying to quit and not interested in a nicotine patch, but has been here more than a month without cigarettes and has not had any problems with stopping.

## 2022-03-24 ENCOUNTER — PATIENT OUTREACH (OUTPATIENT)
Dept: ADMINISTRATIVE | Facility: OTHER | Age: 72
End: 2022-03-24
Payer: MEDICARE

## 2022-03-24 ENCOUNTER — ANESTHESIA EVENT (OUTPATIENT)
Dept: ENDOSCOPY | Facility: OTHER | Age: 72
DRG: 299 | End: 2022-03-24
Payer: MEDICARE

## 2022-03-24 PROBLEM — R93.3 ABNORMAL CT SCAN, GASTROINTESTINAL TRACT: Status: ACTIVE | Noted: 2022-03-24

## 2022-03-24 LAB
POCT GLUCOSE: 104 MG/DL (ref 70–110)
POCT GLUCOSE: 125 MG/DL (ref 70–110)
POCT GLUCOSE: 72 MG/DL (ref 70–110)

## 2022-03-24 PROCEDURE — 25000003 PHARM REV CODE 250: Performed by: INTERNAL MEDICINE

## 2022-03-24 PROCEDURE — 25000003 PHARM REV CODE 250: Performed by: HOSPITALIST

## 2022-03-24 PROCEDURE — 97530 THERAPEUTIC ACTIVITIES: CPT

## 2022-03-24 PROCEDURE — 11000001 HC ACUTE MED/SURG PRIVATE ROOM

## 2022-03-24 PROCEDURE — 92507 TX SP LANG VOICE COMM INDIV: CPT

## 2022-03-24 PROCEDURE — 99232 PR SUBSEQUENT HOSPITAL CARE,LEVL II: ICD-10-PCS | Mod: ,,, | Performed by: HOSPITALIST

## 2022-03-24 PROCEDURE — 94761 N-INVAS EAR/PLS OXIMETRY MLT: CPT

## 2022-03-24 PROCEDURE — A4216 STERILE WATER/SALINE, 10 ML: HCPCS | Performed by: INTERNAL MEDICINE

## 2022-03-24 PROCEDURE — 36406 VNPNXR<3YRS PHY/QHP OTHER VN: CPT

## 2022-03-24 PROCEDURE — 25000003 PHARM REV CODE 250: Performed by: NURSE PRACTITIONER

## 2022-03-24 PROCEDURE — 99232 SBSQ HOSP IP/OBS MODERATE 35: CPT | Mod: ,,, | Performed by: HOSPITALIST

## 2022-03-24 RX ADMIN — Medication 10 ML: at 12:03

## 2022-03-24 RX ADMIN — INSULIN DETEMIR 20 UNITS: 100 INJECTION, SOLUTION SUBCUTANEOUS at 09:03

## 2022-03-24 RX ADMIN — TAMSULOSIN HYDROCHLORIDE 0.4 MG: 0.4 CAPSULE ORAL at 09:03

## 2022-03-24 RX ADMIN — Medication 10 ML: at 05:03

## 2022-03-24 RX ADMIN — FOLIC ACID 1 MG: 1 TABLET ORAL at 09:03

## 2022-03-24 RX ADMIN — THERA TABS 1 TABLET: TAB at 09:03

## 2022-03-24 RX ADMIN — FLUOXETINE 20 MG: 20 CAPSULE ORAL at 09:03

## 2022-03-24 RX ADMIN — ATORVASTATIN CALCIUM 80 MG: 20 TABLET, FILM COATED ORAL at 09:03

## 2022-03-24 RX ADMIN — THIAMINE HCL TAB 100 MG 100 MG: 100 TAB at 09:03

## 2022-03-24 RX ADMIN — INSULIN ASPART 7 UNITS: 100 INJECTION, SOLUTION INTRAVENOUS; SUBCUTANEOUS at 09:03

## 2022-03-24 RX ADMIN — INSULIN ASPART 7 UNITS: 100 INJECTION, SOLUTION INTRAVENOUS; SUBCUTANEOUS at 01:03

## 2022-03-24 RX ADMIN — Medication 10 ML: at 01:03

## 2022-03-24 NOTE — PROGRESS NOTES
[facility] Medicine  Progress Note    Patient Name: Forest Lopez  MRN: [id number]  Patient Class: IP- Inpatient   Admission Date: 1/31/2022  Length of Stay: 52 days  Attending Physician: Mojgan Bosch MD  Primary Care Provider: Julian Escobar        Subjective:     Principal Problem:Acute stroke due to ischemia        HPI:  Mr. Lopez is a 72 year old man who presented after a syncopal episode.  He reports he had been feeling well prior to the episode but then had a prodrome prior to passing out.  EMS was called, report patient's BP was low normal on their arrival, improved after an IV fluids bolus.  Patient denies chest pain or shortness of breath and says he does not feel weak currently although is rather tired.  When seen in the ED this morning he could barely express himself audibly.     Vital signs were normal on presentation here.  He is on warfarin for recurrent DVT, but his INR was 1, and d-dimer markedly elevated at 21.7.  Tox screen was positive for cocaine.  He had a CTA of the brain and neck done on presentation so CTA of the chest for PE study could not be done for 48 hours.  Ultrasound of the lower extremities showed extensive bilateral DVT.  On the right there was thrombosis of the common femoral vein, femoral vein, popliteal vein, one of the paired posterior tibial veins and both visualized peroneal veins.  On the left there was thrombosis of the common femoral vein, femoral vein and popliteal vein.  Patient was started on full dose Lovenox and admitted.    Medical history is from the chart as he is unable to give me much information.  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  He smokes 5-6 cigarettes/day and is not interested in quitting.  He is currently homeless and staying with a friend.  Despite the normal INR he is sure  he is taking his warfarin and is not having difficulty taking his medications.  I discussed his care with [facility] staff on the [facility] and patient had been lost to follow up since November 2021.      Overview/Hospital Course:  Patient presented to the ED after a syncopal episode and was found to have bilateral lower extremity DVTs and a low INR.  Tox screen was positive for cocaine.  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  He was started back on his warfarin with bridging Lovenox.  His 2D echo showed grade I diastolic dysfunction.  Neurology evaluated him and recommended echo with bubble study, which was negative for a shunt.    PT/OT evaluated him and recommended SNF placement versus inpatient rehab.  His INR was difficult to get therapeutic, and as he had no contraindication to a NOAC his warfarin was changed to apixaban, and the full dose Lovenox was discontinued.  As he appeared to be depressed and had no objection to starting an antidepressant Lexapro was started.  He had a fall in the hospital on 2/13, had gone to the bathroom for a BM with the nurse, and when she turned away while he was washing his hands he apparently lost his balance and fell.  He did not hit his head and did not lose consciousness, but his BP was low transiently and he was given a bolus of IV fluids.    Patient's mental status improved slowly, but he gradually became more talkative and was able to hold a conversation.  He was diagnosed with a polymicrobial UTI on 2/21 and completed treatment with antibiotics.  Psychiatry evaluated him on 3/3 and changed his antidepressant to Prozac as it can be more activating and patient was having difficulty with his level of motivation.      Patient's discharge was delayed as he was not accepted by any SNF facilities in the area.  Reasons for the denials are unclear as he has a clear rehab diagnosis.  He has  a history of drug use but has not shown any interest in getting any illicit or legal drugs since he has been here, and he has had no visitors that could have provided drugs to him.  He has been pleasant and cooperative while here although lacks motivation, likely due to the nature of the stroke affecting his frontal lobe.  He requires considerable encouragement to participate in therapy.  Discharging him to live on the street or shelter would not be appropriate as he would not be able to take care of himself and likely will need [facility] nursing home care eventually after he gets the chance to improve with rehab.  As he has gradually improved here with therapy and has better participation he could benefit from consideration for inpatient rehab.      Interval History:  He tells me he is getting his procedure (flex sig) tomorrow after a morning enema.  BG has been stable, insulin decreased due to him being on clear liquids for days.    Review of Systems   Constitutional:  Negative for chills and fever.   Respiratory:  Negative for cough and shortness of breath.    Cardiovascular:  Negative for chest pain and palpitations.   Gastrointestinal:  Negative for abdominal pain, nausea and vomiting.   Objective:     Vital Signs (Most Recent):  Temp: 98.3 °F (36.8 °C) (03/24/22 1207)  Pulse: 69 (03/24/22 1207)  Resp: 16 (03/24/22 1207)  BP: 110/67 (03/24/22 1207)  SpO2: 98 % (03/24/22 1207) Vital Signs (24h Range):  Temp:  [98 °F (36.7 °C)-99.1 °F (37.3 °C)] 98.3 °F (36.8 °C)  Pulse:  [69-87] 69  Resp:  [16-18] 16  SpO2:  [96 %-99 %] 98 %  BP: (110-149)/(66-83) 110/67     Weight: 66.2 kg (145 lb 15.1 oz)  Body mass index is 19.79 kg/m².  No intake or output data in the 24 hours ending 03/24/22 1517   Physical Exam  Constitutional:       General: He is not in acute distress.     Comments: Thin   HENT:      Mouth/Throat:      Comments: Edentulous  Cardiovascular:      Rate and Rhythm: Normal rate and regular rhythm.       Pulses: Normal pulses.      Heart sounds: Normal heart sounds. No murmur heard.    No gallop.   Pulmonary:      Effort: Pulmonary effort is normal.      Breath sounds: Normal breath sounds.   Abdominal:      General: Bowel sounds are normal. There is no distension.      Palpations: Abdomen is soft.      Tenderness: There is no abdominal tenderness.   Skin:     General: Skin is warm and dry.   Neurological:      Mental Status: He is alert.      Comments: Not oriented to time.       Significant Labs: All pertinent labs within the past 24 hours have been reviewed.    Significant Imaging: I have reviewed all pertinent imaging results/findings within the past 24 hours.      Assessment/Plan:      * Acute stroke due to ischemia  - MRI showed areas of diffusion signal hyperintensity consistent with areas of acute ischemia/infarct involving the left cerebral hemisphere, the most prominent measuring approximately 1.4 cm, also a small focus of the right frontal lobe.  - Patient on full dose anticoagulation currently due to acute bilateral DVT.  - Sinus rhythm noted throughout hospital stay  - Risk factor modification - control diabetes, continue statin.  - RPR, HIV non reactive.  B12 low normal.   - CTA showed a focal segment of 60-70% stenosis involving the proximal to mid left vertebral artery that was new when compared to the prior study of 09/25/2019.  No evidence of large vessel intracranial occlusion.   - Neurology noted symptoms do not correspond to stroke location, although the frontal lobe stroke might have something to do with his lack of motivation, and Speech has noted he has had delayed swallowing.  - Echo with bubble study done, no shunt seen.  - Follow up with vascular neurology in 4 weeks.  - Therapy recommended rehab initially but changed recommendation to SNF due to his low interest in participation.  - Started Lexapro due to suspicion for depression.  - Psych consulted, changed to Prozac and started  thiamine, folic acid, MVI due to previous history of alcohol abuse, although Wernicke encephalopathy is not suspected.  - Re-consulted SNF for rehab from the stroke, as discharging him to a shelter would be inappropriate and unsafe for him.  Discussed with his son, Forest, at length on 3/10.  Forest is out of town on a job but will be returning to [place] eventually.  He agrees his father will eventually need nursing home care as he is unable to care for himself.  - SNF still denying patient.  Will pursue rehab placement.      Acute deep vein thrombosis (DVT) of both femoral veins  Last ultrasound showed partially occlusive DVTs, now completely occlusive DVT of multiple lower extremity veins on ultrasound.  Patient reports compliance with warfarin but his INR is only one.  He is homeless but says he does not have difficulty obtaining his medications.  Does not seem to care about anything, which again might be due to the frontal lobe stroke.  D-dimer was markedly elevated on presentation and there was a question of possible PE, but he had a CTA of the brain/neck on presentation so could not get another CTA for another 48 hours.    V/Q scan was done, showed low probability for PE.  2D echo showed grade I diastolic dysfunction.  Warfarin was restarted with bridging Lovenox, but INR was still low.  Given the difficulty of getting him to follow up changed to apixaban and discontinued Lovenox.    Abnormal CT scan, gastrointestinal tract  CT showed thickening of the rectal wall with colonoscopy recommended for further evaluation.  Patient has been unable to tolerate the prep and is still not cleaned out.  GI spoke to him and he agreed to a flex sig after an enema tomorrow.      Advance care planning        Severe protein-calorie malnutrition  Diet as tolerated      Debility  PT/OT recommending rehab vs SNF after stroke with debility/poor motivation  Having difficulty finding placement for unclear reasons.  Will  continue to pursue this as above.  Again, patient has been pleasant and cooperative.      Type 2 diabetes mellitus with hyperglycemia, without long-term current use of insulin  Reportedly he is on metformin and glipizide, both held.  Continue SSI for now.  He has 4+ sugar in urine and HbA1c is 10.3, and he is hyperglycemic here.  Will start levemir and continue ISS, increase levemir to 25 units daily  Add scheduled novolog 5 units tid, increase to 7 units  Improving  OK to resume metformin.  Increased levemir to 28 units daily with good improvement.  Hold metformin with contrast with CT.  BG lower now off metformin, has had a few low BG as well.  Will decrease Levemir to 20 units daily and monitor.    Syncope and collapse  Unclear cause.  Spoke to [facility] and patient has had previous syncopal episodes attributed to alcohol abuse.  Has been prescribed meclizine as well.  Tox positive for cocaine but not alcohol, and he denies current use of both.  CTA brain/neck was done in ED, and per ED note the results were discussed with stroke neurologist Dr. Tovar who felt that CTA finding of a short segment of left vertebral artery stenosis was incidental.  He did not think stroke workup with MRI was indicated.    Since change of status per son compared to when he saw him early during the week a CT of the brain was ordered and no abnormalities seen.  MRI of the brain showed acute strokes of the left cerebral hemisphere and right frontal lobe.      Hyperlipidemia  Resumed lipitor      Essential hypertension  Doubtful he was on anything at home.  Started losartan 25 mg daily  BP well controlled on low dose losartan.  BP transiently low after he fell but recovered quickly.  IVF bolus given.  Losartan discontinued as BP persistently low-normal.    Tobacco dependence  He smokes 5-6 cigarettes/day.  Not trying to quit and not interested in a nicotine patch, but has been here more than a month without cigarettes and has not had any  problems with stopping.      VTE Risk Mitigation (From admission, onward)         Ordered     Place sequential compression device  Until discontinued         01/31/22 0533                      Mojgan Meza MD  Department of Hospital Medicine   [facility], [place], operated by [facility] ([place])

## 2022-03-24 NOTE — NURSING
Pt resting in bed throughout day.  NAD noted.  Pleasant and cooperative with care.  Back on regular diet for today, enema scheduled for 3/35 am prior to flex sig.  Pt aware of upcoming procedure.  VSS.  No complaints of any pain.  No new needs expressed.  Continues to wait for discharge placement.  Call light within reach.

## 2022-03-24 NOTE — PT/OT/SLP PROGRESS
Occupational Therapy   Treatment    Name: Forest Lopez  MRN: [id number]  Admitting Diagnosis:  Acute stroke due to ischemia  1 Day Post-Op    Recommendations:     Discharge Recommendations: nursing facility, skilled vs rehabilitation facility  Discharge Equipment Recommendations:  bedside commode, shower chair  Barriers to discharge:  Decreased caregiver support (current functional level)    Assessment:     Forest Lopez is a 72 y.o. male with a medical diagnosis of Acute stroke due to ischemia.  He presents with no pain. Performance deficits affecting function are weakness, impaired self care skills, gait instability, impaired functional mobilty, impaired balance, decreased safety awareness, decreased coordination, decreased lower extremity function, decreased upper extremity function, decreased ROM, impaired endurance.  Pt agreeable to participating in therapy upon arrival to room.  Pt able to complete grooming task standing at sink with SBA-CGA.  Mild postural sway noted during activity, but no instances of LOB observed.  While ambulating with CGA/HHA impaired balance observed.  Pt reported some pain in R shoulder during UB exercises.    Overall, pt tolerated therapy well.  He is making progress towards goals and would continue to benefit from skilled OT services to address problems listed above and increase independence with ADLs.  SNF vs. Rehab is recommended upon d/c from acute care to further address deficits and help pt improve overall functional independence.          Rehab Prognosis:  Fair; patient would benefit from acute skilled OT services to address these deficits and reach maximum level of function.       Plan:     Patient to be seen 3 x/week to address the above listed problems via self-care/home management, therapeutic activities, therapeutic exercises  · Plan of Care Expires: 04/02/22  · Plan of Care Reviewed with: patient    Subjective     Pain/Comfort:  · Pain Rating 1: 0/10  · Pain Rating  Post-Intervention 1: 0/10    Objective:     Communicated with: RN (Aida) prior to session.  Patient found HOB elevated with Condom Catheter, PICC line, AVASYS upon OT entry to room.    General Precautions: Standard, diabetic, fall (clear liquid diet)   Orthopedic Precautions:N/A   Braces: N/A  Respiratory Status: Room air     Occupational Performance:     Bed Mobility:    · Supine <> sit: SBA  · Scooting: SBA  · Sit <> Supine: SBA    Functional Mobility/Transfers:  · Sit <> Stand: CGA/HHA x 1 trial from EOB  · Functional Mobility: Pt ambulated ~16 ft in room with CGA/HHA.  · Mild postural sway and impaired balance noted; no instances of LOB observed.    Activities of Daily Living:  · Grooming: SBA-CGA for washing hands while standing at sink.  · Mild postural sway noted      AMPAC 6 Click ADL: 18    Treatment & Education:  *Pt educated on role of OT in acute care setting.  *Pt ambulated within room to address coordination, endurance, and balance needed for functional mobility.  *Pt performed grooming task standing at sink; cues for postural stability  *Pt performed UB exercises to provide stretch and promote increased endurance and ROM needed for ADLs: 2 sets x 12 reps seated at EOB  -Horizontal shoulder abduction/adduction  -Arm bike  *POC reviewed with pt        Patient left right sidelying with all lines intact, call button in reach and AVASYS presentEducation:      GOALS:   Multidisciplinary Problems     Occupational Therapy Goals        Problem: Occupational Therapy Goal    Goal Priority Disciplines Outcome Interventions   Occupational Therapy Goal     OT, PT/OT Ongoing, Progressing    Description: Goals to be met by: 3/28/2022    Patient will increase functional independence with ADLs by performing:    UE Dressing with [place].  LE Dressing with [place].  Grooming while standing at sink with Supervision.  Toileting from toilet with Supervision for hygiene and clothing management.   Toilet transfer  to toilet with Supervision.                     Time Tracking:     OT Date of Treatment: 03/24/22  OT Start Time: 1052  OT Stop Time: 1106  OT Total Time (min): 14 min    Billable Minutes:Therapeutic Activity 14    OT/DUTCH: OT          3/24/2022

## 2022-03-24 NOTE — ASSESSMENT & PLAN NOTE
Unclear cause.  Spoke to [facility] and patient has had previous syncopal episodes attributed to alcohol abuse.  Has been prescribed meclizine as well.  Tox positive for cocaine but not alcohol, and he denies current use of both.  CTA brain/neck was done in ED, and per ED note the results were discussed with stroke neurologist Dr. Tovar who felt that CTA finding of a short segment of left vertebral artery stenosis was incidental.  He did not think stroke workup with MRI was indicated.    Since change of status per son compared to when he saw him early during the week a CT of the brain was ordered and no abnormalities seen.  MRI of the brain showed acute strokes of the left cerebral hemisphere and right frontal lobe.

## 2022-03-24 NOTE — ASSESSMENT & PLAN NOTE
CT showed thickening of the rectal wall with colonoscopy recommended for further evaluation.  Patient has been unable to tolerate the prep and is still not cleaned out.  GI spoke to him and he agreed to a flex sig after an enema tomorrow.

## 2022-03-24 NOTE — PT/OT/SLP PROGRESS
Speech Language Pathology Treatment    Patient Name:  Forest Lopez   MRN:  [id number]  Admitting Diagnosis: Acute stroke due to ischemia    Recommendations:                  General Recommendations:   1. Speech pathology to continue to follow 2-3x/week for ongoing assessment of cognitive-communicative deficits s/p acute infarcts of L cerebral hemisphere     Diet recommendations: Solids: Mechanical soft solids, Liquids: Thin      Aspiration Precautions: Eliminate distractions, Feed only when awake/alert, Frequent oral care and HOB to 90 degrees      General Precautions: Standard,       Communication strategies:  Reduce informational content/context; frequent repetition and cues to redirect    Subjective     · Pt awake/alert, sitting upright in bed. Agreeable to participate in SLP session.     Respiratory Status: Room air    Objective:     Has the patient been evaluated by SLP for swallowing?   Yes  Keep patient NPO? No   Current Respiratory Status:    Room air    Cognitive-Communicative and Language Status:  Improving participation and motivation. Noted pt completed portions of word searches provided during previous session. Pt motivated by these therapeutic activities. Targeting divided and sustained attention, as well as, reading, writing, receptive and expressive language, pt completed crossword puzzle. Divided attention targeted by SLP playing music during task. Improving initiation of task this date. Pt able to correctly complete crossword puzzle with 80% acc given mod verbal and semantic cues.     At end of session, requested pt to date page. Pt IND utilized white board and room to write date on paper.     Education: Pt would benefit from ongoing SLP services at next level of care.     Assessment:     Forest Lopez is a 72 y.o. 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    Goals:   Multidisciplinary Problems      SLP Goals        Problem: SLP Goal    Goal Priority Disciplines Outcome   SLP Goal     SLP Ongoing, Progressing   Description: 1. Pt will consume a regular/thin diet without overt s/s of aspiration or airway threat without assistance.  2. Pt will increase orientation to person, place, situation and date with 90% acc given min assist.  3. Pt will follow 3-4 step commands to increase functional IND with 75% acc given min assist.   4. Pt will complete immediate and delayed recall tasks with 75% acc given mod assist.  5. Targeting word finding, pt will complete divergent naming tasks given 1-minute time frame with 50% acc given mod assist.  6. Ongoing cognitive-communicative assessment.     Updated Goals 2/8:  7. Pt will sustain attention to topic/task without distraction in 5-10 minute increments given mod verbal cues.                   Plan:     · Patient to be seen:  2 x/week, 3 x/week   · Plan of Care expires:  03/21/22  · Plan of Care reviewed with:  patient   · SLP Follow-Up:  Yes       Discharge recommendations: IRF vs. Skilled Nursing Facility     Time Tracking:     SLP Treatment Date:   03/24/22  Speech Start Time:  1006  Speech Stop Time:  1030     Speech Total Time (min):  24 min    Billable Minutes: Speech Therapy Individual 24 min    03/24/2022

## 2022-03-24 NOTE — ASSESSMENT & PLAN NOTE
- MRI showed areas of diffusion signal hyperintensity consistent with areas of acute ischemia/infarct involving the left cerebral hemisphere, the most prominent measuring approximately 1.4 cm, also a small focus of the right frontal lobe.  - Patient on full dose anticoagulation currently due to acute bilateral DVT.  - Sinus rhythm noted throughout hospital stay  - Risk factor modification - control diabetes, continue statin.  - RPR, HIV non reactive.  B12 low normal.   - CTA showed a focal segment of 60-70% stenosis involving the proximal to mid left vertebral artery that was new when compared to the prior study of 09/25/2019.  No evidence of large vessel intracranial occlusion.   - Neurology noted symptoms do not correspond to stroke location, although the frontal lobe stroke might have something to do with his lack of motivation, and Speech has noted he has had delayed swallowing.  - Echo with bubble study done, no shunt seen.  - Follow up with vascular neurology in 4 weeks.  - Therapy recommended rehab initially but changed recommendation to SNF due to his low interest in participation.  - Started Lexapro due to suspicion for depression.  - Psych consulted, changed to Prozac and started thiamine, folic acid, MVI due to previous history of alcohol abuse, although Wernicke encephalopathy is not suspected.  - Re-consulted SNF for rehab from the stroke, as discharging him to a shelter would be inappropriate and unsafe for him.  Discussed with his son, Forest, at length on 3/10.  Forest is out of town on a job but will be returning to [place] eventually.  He agrees his father will eventually need nursing home care as he is unable to care for himself.  - SNF still denying patient.  Will pursue rehab placement.

## 2022-03-24 NOTE — PROGRESS NOTES
This gent is in no distress and he denies nausea or emesis. No blood loss or signs of cholangitis or visceral ischemia. We discussed the need for a colon exam and he feels the prep here is awful. We discussed the CT scan that showed a rectal abnormality and he feels he can tolerate an enema so we can at least do a flexible sigmoidoscopy. He has agreed to this.    T<100 HR 80  Anicteric temporal wasting  Neck supple no mass  Heart RR no gallop  Chest clear no wheeze  Abdomen soft active sounds no masses ascites or tenderness  Extremities no cyanosis or clubbing    Labs and imaging reviewed.    WiIl plan on colon exam tomorrow and prep from below. Hopefully he will comply. Can advance diet.

## 2022-03-24 NOTE — PLAN OF CARE
POC reviewed with patient, questions and concerns addressed. No acute events through the night.  All Vital signs stable. No complaints.  AAOx4. Safety maintained.  Bed locked, in lowest position, call light within reach, side rails x2.  Mobilized to their highest function. See doc flow sheets for further information. Will continue to monitor.    Problem: Adult Inpatient Plan of Care  Goal: Plan of Care Review  Outcome: Ongoing, Progressing  Goal: Patient-Specific Goal (Individualized)  Outcome: Ongoing, Progressing  Goal: Absence of Hospital-Acquired Illness or Injury  Outcome: Ongoing, Progressing  Goal: Optimal Comfort and Wellbeing  Outcome: Ongoing, Progressing  Goal: Readiness for Transition of Care  Outcome: Ongoing, Progressing     Problem: Diabetes Comorbidity  Goal: Blood Glucose Level Within Targeted Range  Outcome: Ongoing, Progressing     Problem: Skin Injury Risk Increased  Goal: Skin Health and Integrity  Outcome: Ongoing, Progressing     Problem: Coping Ineffective  Goal: Effective Coping  Outcome: Ongoing, Progressing     Problem: Fall Injury Risk  Goal: Absence of Fall and Fall-Related Injury  Outcome: Ongoing, Progressing     Problem: Adjustment to Illness (Sepsis/Septic Shock)  Goal: Optimal Coping  Outcome: Ongoing, Progressing     Problem: Bleeding (Sepsis/Septic Shock)  Goal: Absence of Bleeding  Outcome: Ongoing, Progressing     Problem: Glycemic Control Impaired (Sepsis/Septic Shock)  Goal: Blood Glucose Level Within Desired Range  Outcome: Ongoing, Progressing     Problem: Infection Progression (Sepsis/Septic Shock)  Goal: Absence of Infection Signs and Symptoms  Outcome: Ongoing, Progressing     Problem: Nutrition Impaired (Sepsis/Septic Shock)  Goal: Optimal Nutrition Intake  Outcome: Ongoing, Progressing     Problem: Infection  Goal: Absence of Infection Signs and Symptoms  Outcome: Ongoing, Progressing     Problem: Spiritual Distress Risk or Actual  Goal: Spiritual Wellbeing  Outcome:  Ongoing, Progressing

## 2022-03-24 NOTE — SUBJECTIVE & OBJECTIVE
Interval History:  He tells me he is getting his procedure (flex sig) tomorrow after a morning enema.  BG has been stable, insulin decreased due to him being on clear liquids for days.    Review of Systems   Constitutional:  Negative for chills and fever.   Respiratory:  Negative for cough and shortness of breath.    Cardiovascular:  Negative for chest pain and palpitations.   Gastrointestinal:  Negative for abdominal pain, nausea and vomiting.   Objective:     Vital Signs (Most Recent):  Temp: 98.3 °F (36.8 °C) (03/24/22 1207)  Pulse: 69 (03/24/22 1207)  Resp: 16 (03/24/22 1207)  BP: 110/67 (03/24/22 1207)  SpO2: 98 % (03/24/22 1207) Vital Signs (24h Range):  Temp:  [98 °F (36.7 °C)-99.1 °F (37.3 °C)] 98.3 °F (36.8 °C)  Pulse:  [69-87] 69  Resp:  [16-18] 16  SpO2:  [96 %-99 %] 98 %  BP: (110-149)/(66-83) 110/67     Weight: 66.2 kg (145 lb 15.1 oz)  Body mass index is 19.79 kg/m².  No intake or output data in the 24 hours ending 03/24/22 1517   Physical Exam  Constitutional:       General: He is not in acute distress.     Comments: Thin   HENT:      Mouth/Throat:      Comments: Edentulous  Cardiovascular:      Rate and Rhythm: Normal rate and regular rhythm.      Pulses: Normal pulses.      Heart sounds: Normal heart sounds. No murmur heard.    No gallop.   Pulmonary:      Effort: Pulmonary effort is normal.      Breath sounds: Normal breath sounds.   Abdominal:      General: Bowel sounds are normal. There is no distension.      Palpations: Abdomen is soft.      Tenderness: There is no abdominal tenderness.   Skin:     General: Skin is warm and dry.   Neurological:      Mental Status: He is alert.      Comments: Not oriented to time.       Significant Labs: All pertinent labs within the past 24 hours have been reviewed.    Significant Imaging: I have reviewed all pertinent imaging results/findings within the past 24 hours.

## 2022-03-25 ENCOUNTER — ANESTHESIA (OUTPATIENT)
Dept: ENDOSCOPY | Facility: OTHER | Age: 72
DRG: 299 | End: 2022-03-25
Payer: MEDICARE

## 2022-03-25 PROBLEM — R55 SYNCOPE AND COLLAPSE: Status: RESOLVED | Noted: 2022-01-31 | Resolved: 2022-03-25

## 2022-03-25 LAB
POCT GLUCOSE: 114 MG/DL (ref 70–110)
POCT GLUCOSE: 140 MG/DL (ref 70–110)
POCT GLUCOSE: 218 MG/DL (ref 70–110)
POCT GLUCOSE: 90 MG/DL (ref 70–110)

## 2022-03-25 PROCEDURE — A4216 STERILE WATER/SALINE, 10 ML: HCPCS | Performed by: INTERNAL MEDICINE

## 2022-03-25 PROCEDURE — 25000003 PHARM REV CODE 250: Performed by: INTERNAL MEDICINE

## 2022-03-25 PROCEDURE — 99232 SBSQ HOSP IP/OBS MODERATE 35: CPT | Mod: ,,, | Performed by: HOSPITALIST

## 2022-03-25 PROCEDURE — 37000008 HC ANESTHESIA 1ST 15 MINUTES: Performed by: INTERNAL MEDICINE

## 2022-03-25 PROCEDURE — 11000001 HC ACUTE MED/SURG PRIVATE ROOM

## 2022-03-25 PROCEDURE — 25000003 PHARM REV CODE 250: Performed by: NURSE ANESTHETIST, CERTIFIED REGISTERED

## 2022-03-25 PROCEDURE — C1751 CATH, INF, PER/CENT/MIDLINE: HCPCS

## 2022-03-25 PROCEDURE — 63600175 PHARM REV CODE 636 W HCPCS: Performed by: NURSE ANESTHETIST, CERTIFIED REGISTERED

## 2022-03-25 PROCEDURE — 37000009 HC ANESTHESIA EA ADD 15 MINS: Performed by: INTERNAL MEDICINE

## 2022-03-25 PROCEDURE — C9399 UNCLASSIFIED DRUGS OR BIOLOG: HCPCS | Performed by: INTERNAL MEDICINE

## 2022-03-25 PROCEDURE — 45378 DIAGNOSTIC COLONOSCOPY: CPT | Performed by: INTERNAL MEDICINE

## 2022-03-25 PROCEDURE — 99232 PR SUBSEQUENT HOSPITAL CARE,LEVL II: ICD-10-PCS | Mod: ,,, | Performed by: HOSPITALIST

## 2022-03-25 RX ORDER — PROCHLORPERAZINE EDISYLATE 5 MG/ML
5 INJECTION INTRAMUSCULAR; INTRAVENOUS EVERY 30 MIN PRN
Status: DISCONTINUED | OUTPATIENT
Start: 2022-03-25 | End: 2022-04-27 | Stop reason: HOSPADM

## 2022-03-25 RX ORDER — OXYCODONE HYDROCHLORIDE 5 MG/1
5 TABLET ORAL
Status: DISCONTINUED | OUTPATIENT
Start: 2022-03-25 | End: 2022-04-27 | Stop reason: HOSPADM

## 2022-03-25 RX ORDER — PHENYLEPHRINE HYDROCHLORIDE 10 MG/ML
INJECTION INTRAVENOUS
Status: DISCONTINUED | OUTPATIENT
Start: 2022-03-25 | End: 2022-03-25

## 2022-03-25 RX ORDER — MEPERIDINE HYDROCHLORIDE 25 MG/ML
12.5 INJECTION INTRAMUSCULAR; INTRAVENOUS; SUBCUTANEOUS ONCE AS NEEDED
Status: ACTIVE | OUTPATIENT
Start: 2022-03-25 | End: 2022-03-26

## 2022-03-25 RX ORDER — PROPOFOL 10 MG/ML
VIAL (ML) INTRAVENOUS
Status: DISCONTINUED | OUTPATIENT
Start: 2022-03-25 | End: 2022-03-25

## 2022-03-25 RX ORDER — SODIUM CHLORIDE 0.9 % (FLUSH) 0.9 %
3 SYRINGE (ML) INJECTION
Status: DISCONTINUED | OUTPATIENT
Start: 2022-03-25 | End: 2022-04-27 | Stop reason: HOSPADM

## 2022-03-25 RX ORDER — PROPOFOL 10 MG/ML
VIAL (ML) INTRAVENOUS CONTINUOUS PRN
Status: DISCONTINUED | OUTPATIENT
Start: 2022-03-25 | End: 2022-03-25

## 2022-03-25 RX ORDER — HYDROMORPHONE HYDROCHLORIDE 2 MG/ML
0.4 INJECTION, SOLUTION INTRAMUSCULAR; INTRAVENOUS; SUBCUTANEOUS EVERY 5 MIN PRN
Status: DISCONTINUED | OUTPATIENT
Start: 2022-03-25 | End: 2022-04-27 | Stop reason: HOSPADM

## 2022-03-25 RX ORDER — LIDOCAINE HYDROCHLORIDE 20 MG/ML
INJECTION INTRAVENOUS
Status: DISCONTINUED | OUTPATIENT
Start: 2022-03-25 | End: 2022-03-25

## 2022-03-25 RX ORDER — DIPHENHYDRAMINE HYDROCHLORIDE 50 MG/ML
25 INJECTION INTRAMUSCULAR; INTRAVENOUS EVERY 6 HOURS PRN
Status: DISCONTINUED | OUTPATIENT
Start: 2022-03-25 | End: 2022-04-27 | Stop reason: HOSPADM

## 2022-03-25 RX ADMIN — Medication 10 ML: at 11:03

## 2022-03-25 RX ADMIN — PROPOFOL 40 MG: 10 INJECTION, EMULSION INTRAVENOUS at 07:03

## 2022-03-25 RX ADMIN — SODIUM CHLORIDE, SODIUM LACTATE, POTASSIUM CHLORIDE, AND CALCIUM CHLORIDE: .6; .31; .03; .02 INJECTION, SOLUTION INTRAVENOUS at 06:03

## 2022-03-25 RX ADMIN — PROPOFOL 150 MCG/KG/MIN: 10 INJECTION, EMULSION INTRAVENOUS at 07:03

## 2022-03-25 RX ADMIN — LIDOCAINE HYDROCHLORIDE 50 MG: 20 INJECTION, SOLUTION INTRAVENOUS at 07:03

## 2022-03-25 RX ADMIN — TAMSULOSIN HYDROCHLORIDE 0.4 MG: 0.4 CAPSULE ORAL at 08:03

## 2022-03-25 RX ADMIN — ATORVASTATIN CALCIUM 80 MG: 20 TABLET, FILM COATED ORAL at 08:03

## 2022-03-25 RX ADMIN — Medication 10 ML: at 05:03

## 2022-03-25 RX ADMIN — Medication 1 ENEMA: at 05:03

## 2022-03-25 RX ADMIN — INSULIN DETEMIR 20 UNITS: 100 INJECTION, SOLUTION SUBCUTANEOUS at 09:03

## 2022-03-25 RX ADMIN — INSULIN ASPART 1 UNITS: 100 INJECTION, SOLUTION INTRAVENOUS; SUBCUTANEOUS at 09:03

## 2022-03-25 RX ADMIN — GLYCOPYRROLATE 0.2 MG: 0.2 INJECTION, SOLUTION INTRAMUSCULAR; INTRAVITREAL at 07:03

## 2022-03-25 RX ADMIN — Medication 10 ML: at 12:03

## 2022-03-25 RX ADMIN — INSULIN ASPART 7 UNITS: 100 INJECTION, SOLUTION INTRAVENOUS; SUBCUTANEOUS at 05:03

## 2022-03-25 RX ADMIN — THERA TABS 1 TABLET: TAB at 08:03

## 2022-03-25 RX ADMIN — INSULIN ASPART 7 UNITS: 100 INJECTION, SOLUTION INTRAVENOUS; SUBCUTANEOUS at 08:03

## 2022-03-25 RX ADMIN — FOLIC ACID 1 MG: 1 TABLET ORAL at 08:03

## 2022-03-25 RX ADMIN — THIAMINE HCL TAB 100 MG 100 MG: 100 TAB at 08:03

## 2022-03-25 RX ADMIN — INSULIN ASPART 7 UNITS: 100 INJECTION, SOLUTION INTRAVENOUS; SUBCUTANEOUS at 12:03

## 2022-03-25 RX ADMIN — PHENYLEPHRINE HYDROCHLORIDE 100 MCG: 10 INJECTION INTRAVENOUS at 07:03

## 2022-03-25 RX ADMIN — FLUOXETINE 20 MG: 20 CAPSULE ORAL at 08:03

## 2022-03-25 NOTE — ASSESSMENT & PLAN NOTE
- MRI showed areas of diffusion signal hyperintensity consistent with areas of acute ischemia/infarct involving the left cerebral hemisphere, the most prominent measuring approximately 1.4 cm, also a small focus of the right frontal lobe.  - Patient on full dose anticoagulation currently due to acute bilateral DVT.  - Sinus rhythm noted throughout hospital stay  - Risk factor modification - control diabetes, continue statin.  - RPR, HIV non reactive.  B12 low normal.   - CTA showed a focal segment of 60-70% stenosis involving the proximal to mid left vertebral artery that was new when compared to the prior study of 09/25/2019.  No evidence of large vessel intracranial occlusion.   - Neurology noted symptoms do not correspond to stroke location, although the frontal lobe stroke might have something to do with his lack of motivation, and Speech has noted he has had delayed swallowing.  - Echo with bubble study done, no shunt seen.  - Follow up with vascular neurology in 4 weeks.  - Therapy recommended rehab initially but changed recommendation to SNF due to his low interest in participation.  - Started Lexapro due to suspicion for depression.  - Psych consulted, changed to Prozac and started thiamine, folic acid, MVI due to previous history of alcohol abuse, although Wernicke encephalopathy is not suspected.  - Re-consulted SNF for rehab from the stroke, as discharging him to a shelter would be inappropriate and unsafe for him.  Discussed with his son, Forest, at length on 3/10.  Forest is out of town on a job but will be returning to [place] eventually.  He agrees his father will eventually need nursing home care as he is unable to care for himself.  - SNF still denying patient.  Will pursue rehab placement vs LTAC

## 2022-03-25 NOTE — ASSESSMENT & PLAN NOTE
Last ultrasound showed partially occlusive DVTs, now completely occlusive DVT of multiple lower extremity veins on ultrasound.  Patient reports compliance with warfarin but his INR is only one.  He is homeless but says he does not have difficulty obtaining his medications.  Does not seem to care about anything, which again might be due to the frontal lobe stroke.  D-dimer was markedly elevated on presentation and there was a question of possible PE, but he had a CTA of the brain/neck on presentation so could not get another CTA for another 48 hours.    V/Q scan was done, showed low probability for PE.  2D echo showed grade I diastolic dysfunction.  Warfarin was restarted with bridging Lovenox, but INR was still low.  Changed to apixaban and discontinued Lovenox.

## 2022-03-25 NOTE — ANESTHESIA POSTPROCEDURE EVALUATION
Anesthesia Post Evaluation    Patient: Forest Lopez    Procedure(s) Performed: Procedure(s) (LRB):  COLONOSCOPY (N/A)    Final Anesthesia Type: general      Patient location during evaluation: PACU  Patient participation: Yes- Able to Participate  Level of consciousness: awake and alert  Post-procedure vital signs: reviewed and stable  Pain management: adequate  Airway patency: patent    PONV status at discharge: No PONV  Anesthetic complications: no      Cardiovascular status: blood pressure returned to baseline  Respiratory status: unassisted and spontaneous ventilation  Hydration status: euvolemic  Follow-up not needed.          Vitals Value Taken Time   /61 03/25/22 0854   Temp 36.8 °C (98.2 °F) 03/25/22 0854   Pulse 78 03/25/22 0854   Resp 16 03/25/22 0854   SpO2 97 % 03/25/22 0854         Event Time   Out of Recovery 03/25/2022 07:40:44         Pain/Jose Score: Jose Score: 8 (3/25/2022  7:17 AM)

## 2022-03-25 NOTE — PLAN OF CARE
Problem: Adult Inpatient Plan of Care  Goal: Plan of Care Review  Outcome: Ongoing, Progressing  Goal: Patient-Specific Goal (Individualized)  Outcome: Ongoing, Progressing  Goal: Absence of Hospital-Acquired Illness or Injury  Outcome: Ongoing, Progressing  Goal: Optimal Comfort and Wellbeing  Outcome: Ongoing, Progressing  Goal: Readiness for Transition of Care  Outcome: Ongoing, Progressing     Problem: Diabetes Comorbidity  Goal: Blood Glucose Level Within Targeted Range  Outcome: Ongoing, Progressing     Problem: Skin Injury Risk Increased  Goal: Skin Health and Integrity  Outcome: Ongoing, Progressing     Problem: Coping Ineffective  Goal: Effective Coping  Outcome: Ongoing, Progressing     Problem: Fall Injury Risk  Goal: Absence of Fall and Fall-Related Injury  Outcome: Ongoing, Progressing     Problem: Adjustment to Illness (Sepsis/Septic Shock)  Goal: Optimal Coping  Outcome: Ongoing, Progressing     Problem: Bleeding (Sepsis/Septic Shock)  Goal: Absence of Bleeding  Outcome: Ongoing, Progressing     Problem: Glycemic Control Impaired (Sepsis/Septic Shock)  Goal: Blood Glucose Level Within Desired Range  Outcome: Ongoing, Progressing     Problem: Infection Progression (Sepsis/Septic Shock)  Goal: Absence of Infection Signs and Symptoms  Outcome: Ongoing, Progressing     Problem: Nutrition Impaired (Sepsis/Septic Shock)  Goal: Optimal Nutrition Intake  Outcome: Ongoing, Progressing     Problem: Infection  Goal: Absence of Infection Signs and Symptoms  Outcome: Ongoing, Progressing     Problem: Spiritual Distress Risk or Actual  Goal: Spiritual Wellbeing  Outcome: Ongoing, Progressing

## 2022-03-25 NOTE — ASSESSMENT & PLAN NOTE
Reportedly he is on metformin and glipizide, both held.  He had 4+ sugar in urine and HbA1c was 10.3, and he was hyperglycemic on presentation.  Started on Levemir and ISS, then increased Levemir to 25 units daily  Added scheduled novolog 5 units tid, increased to 7 units with improvement  OK to resume metformin.    Increased levemir to 28 units daily with good improvement.  Held metformin with contrast with CT.  BG lower now off metformin, has had a few low BG as well due to being on clear liquids for colonoscopy prep.  Levemir decreased to 20 units daily.  Expect BG will come back up when he is back on his diet.

## 2022-03-25 NOTE — TRANSFER OF CARE
Anesthesia Transfer of Care Note    Patient: Forest Lopez    Procedure(s) Performed: Procedure(s) (LRB):  COLONOSCOPY (N/A)    Patient location: PACU    Anesthesia Type: general    Transport from OR: Transported from OR on 6-10 L/min O2 by face mask with adequate spontaneous ventilation    Post pain: adequate analgesia    Post assessment: no apparent anesthetic complications and tolerated procedure well    Post vital signs: stable    Level of consciousness: awake, alert and oriented    Nausea/Vomiting: no nausea/vomiting    Complications: none    Transfer of care protocol was followed      Last vitals:   Visit Vitals  BP (!) 118/55 (BP Location: Right arm, Patient Position: Lying)   Pulse 67   Temp 36.4 °C (97.5 °F) (Oral)   Resp 16   Ht 6' (1.829 m)   Wt 66.2 kg (145 lb 15.1 oz)   SpO2 100%   BMI 19.79 kg/m²

## 2022-03-25 NOTE — SUBJECTIVE & OBJECTIVE
Interval History:  Patient seen after flex sig, has no complaints, looking forward to getting real food.    Review of Systems   Constitutional:  Negative for chills and fever.   Respiratory:  Negative for cough and shortness of breath.    Cardiovascular:  Negative for chest pain and palpitations.   Gastrointestinal:  Negative for abdominal pain, nausea and vomiting.   Objective:     Vital Signs (Most Recent):  Temp: 98.1 °F (36.7 °C) (03/25/22 1234)  Pulse: 91 (03/25/22 1234)  Resp: 18 (03/25/22 1234)  BP: 119/81 (03/25/22 1234)  SpO2: 99 % (03/25/22 1234) Vital Signs (24h Range):  Temp:  [97.5 °F (36.4 °C)-98.6 °F (37 °C)] 98.1 °F (36.7 °C)  Pulse:  [63-91] 91  Resp:  [16-18] 18  SpO2:  [95 %-100 %] 99 %  BP: (118-166)/(55-88) 119/81     Weight: 66.2 kg (145 lb 15.1 oz)  Body mass index is 19.79 kg/m².    Intake/Output Summary (Last 24 hours) at 3/25/2022 1516  Last data filed at 3/25/2022 0745  Gross per 24 hour   Intake 940 ml   Output 1750 ml   Net -810 ml      Physical Exam  Constitutional:       General: He is not in acute distress.     Comments: Thin   HENT:      Mouth/Throat:      Comments: Edentulous  Cardiovascular:      Rate and Rhythm: Normal rate and regular rhythm.      Pulses: Normal pulses.      Heart sounds: Normal heart sounds. No murmur heard.    No gallop.   Pulmonary:      Effort: Pulmonary effort is normal.      Breath sounds: Normal breath sounds.   Abdominal:      General: Bowel sounds are normal. There is no distension.      Palpations: Abdomen is soft.      Tenderness: There is no abdominal tenderness.   Skin:     General: Skin is warm and dry.   Neurological:      Mental Status: He is alert.      Comments: Not oriented to time.       Significant Labs: All pertinent labs within the past 24 hours have been reviewed.    Significant Imaging: I have reviewed all pertinent imaging results/findings within the past 24 hours.

## 2022-03-25 NOTE — ANESTHESIA PREPROCEDURE EVALUATION
03/25/2022  Forest Lopez is a 72 y.o., male.      Pre-op Assessment    I have reviewed the Patient Summary Reports.     I have reviewed the Nursing Notes. I have reviewed the NPO Status.   I have reviewed the Medications.     Review of Systems  Anesthesia Hx:  No problems with previous Anesthesia  Denies Family Hx of Anesthesia complications.   Denies Personal Hx of Anesthesia complications.   Social:  cocaine   Hematology/Oncology:  Hematology Normal   Oncology Normal     EENT/Dental:EENT/Dental Normal   Cardiovascular:   Hypertension    Pulmonary:  Pulmonary Normal    Renal/:  Renal/ Normal     Hepatic/GI:   GERD    Musculoskeletal:  Musculoskeletal Normal    Neurological:   CVA    Endocrine:   Diabetes    Dermatological:  Skin Normal    Psych:  Psychiatric Normal           Physical Exam  General: Well nourished and Alert    Airway:  Mallampati: II   Mouth Opening: Normal  Tongue: Normal    Dental:  Edentulous        Anesthesia Plan  Type of Anesthesia, risks & benefits discussed:    Anesthesia Type: Gen Natural Airway  Intra-op Monitoring Plan: Standard ASA Monitors  Induction:  IV  Informed Consent: Informed consent signed with the Patient and all parties understand the risks and agree with anesthesia plan.  All questions answered.   ASA Score: 3    Ready For Surgery From Anesthesia Perspective.     .

## 2022-03-25 NOTE — PROGRESS NOTES
This gent had a flexible sigmoidoscopy this morning. Findings:  1) Internal hemorrhoids  2)Otherwise normal mucosa to descending colon  No neoplasia.

## 2022-03-25 NOTE — PROGRESS NOTES
[facility], [place] Medicine  Progress Note    Patient Name: Forest Lopez  MRN: [id number]  Patient Class: IP- Inpatient   Admission Date: 1/31/2022  Length of Stay: 53 days  Attending Physician: Mojgan Bosch MD  Primary Care Provider: Julian Escobar        Subjective:     Principal Problem:Acute stroke due to ischemia        HPI:  Mr. Lopez is a 72 year old man who presented after a syncopal episode.  He reports he had been feeling well prior to the episode but then had a prodrome prior to passing out.  EMS was called, report patient's BP was low normal on their arrival, improved after an IV fluids bolus.  Patient denies chest pain or shortness of breath and says he does not feel weak currently although is rather tired.  When seen in the ED this morning he could barely express himself audibly.     Vital signs were normal on presentation here.  He is on warfarin for recurrent DVT, but his INR was 1, and d-dimer markedly elevated at 21.7.  Tox screen was positive for cocaine.  He had a CTA of the brain and neck done on presentation so CTA of the chest for PE study could not be done for 48 hours.  Ultrasound of the lower extremities showed extensive bilateral DVT.  On the right there was thrombosis of the common femoral vein, femoral vein, popliteal vein, one of the paired posterior tibial veins and both visualized peroneal veins.  On the left there was thrombosis of the common femoral vein, femoral vein and popliteal vein.  Patient was started on full dose Lovenox and admitted.    Medical history is from the chart as he is unable to give me much information.  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  He smokes 5-6 cigarettes/day and is not interested in quitting.  He is currently homeless and staying with a friend.  Despite the normal INR he is sure  he is taking his warfarin and is not having difficulty taking his medications.  I discussed his care with [facility] staff on the [facility] and patient had been lost to follow up since November 2021.      Overview/Hospital Course:  Patient presented to the ED after a syncopal episode and was found to have bilateral lower extremity DVTs and a low INR.  Tox screen was positive for cocaine.  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  He was started back on his warfarin with bridging Lovenox.  His 2D echo showed grade I diastolic dysfunction.  Neurology evaluated him and recommended echo with bubble study, which was negative for a shunt.    PT/OT evaluated him and recommended SNF placement versus inpatient rehab.  His INR was difficult to get therapeutic, and as he had no contraindication to a NOAC his warfarin was changed to apixaban, and the full dose Lovenox was discontinued.  As he appeared to be depressed and had no objection to starting an antidepressant Lexapro was started.  He had a fall in the hospital on 2/13, had gone to the bathroom for a BM with the nurse, and when she turned away while he was washing his hands he apparently lost his balance and fell.  He did not hit his head and did not lose consciousness, but his BP was low transiently and he was given a bolus of IV fluids.    Patient's mental status improved slowly, but he gradually became more talkative and was able to hold a conversation.  He was diagnosed with a polymicrobial UTI on 2/21 and completed treatment with antibiotics.  Psychiatry evaluated him on 3/3 and changed his antidepressant to Prozac as it can be more activating and patient was having difficulty with his level of motivation.      Patient had some episodes of nausea and vomiting and CT of the abdomen was done, with incidental finding of rectal wall thickening noted.  Colonoscopy was recommended,  but he was unable to finish the prep.  GI evaluated him and decided the patient would benefit from a flex sig to evaluate the rectum.  He had the procedure on 3/25 and no abnormalities were seen other than internal hemorrhoids.    Patient's discharge was delayed as he was not accepted by any SNF facilities in the area.  Reasons for the denials are unclear as he has a clear rehab diagnosis.  He has a history of drug use but has not shown any interest in getting any illicit or legal drugs since he has been here, and he has had no visitors that could have provided drugs to him.  He has been pleasant and cooperative while here although lacks motivation, likely due to the nature of the stroke affecting his frontal lobe.  He requires considerable encouragement to participate in therapy.  Discharging him to live on the street or shelter would not be appropriate as he would not be able to take care of himself and likely will need nursing home nursing home care eventually after he gets the chance to improve with rehab.  As he has gradually improved here with therapy and has better participation he could benefit from consideration for inpatient rehab.      Interval History:  Patient seen after flex sig, has no complaints, looking forward to getting real food.    Review of Systems   Constitutional:  Negative for chills and fever.   Respiratory:  Negative for cough and shortness of breath.    Cardiovascular:  Negative for chest pain and palpitations.   Gastrointestinal:  Negative for abdominal pain, nausea and vomiting.   Objective:     Vital Signs (Most Recent):  Temp: 98.1 °F (36.7 °C) (03/25/22 1234)  Pulse: 91 (03/25/22 1234)  Resp: 18 (03/25/22 1234)  BP: 119/81 (03/25/22 1234)  SpO2: 99 % (03/25/22 1234) Vital Signs (24h Range):  Temp:  [97.5 °F (36.4 °C)-98.6 °F (37 °C)] 98.1 °F (36.7 °C)  Pulse:  [63-91] 91  Resp:  [16-18] 18  SpO2:  [95 %-100 %] 99 %  BP: (118-166)/(55-88) 119/81     Weight: 66.2 kg (145 lb 15.1 oz)  Body  mass index is 19.79 kg/m².    Intake/Output Summary (Last 24 hours) at 3/25/2022 1516  Last data filed at 3/25/2022 0745  Gross per 24 hour   Intake 940 ml   Output 1750 ml   Net -810 ml      Physical Exam  Constitutional:       General: He is not in acute distress.     Comments: Thin   HENT:      Mouth/Throat:      Comments: Edentulous  Cardiovascular:      Rate and Rhythm: Normal rate and regular rhythm.      Pulses: Normal pulses.      Heart sounds: Normal heart sounds. No murmur heard.    No gallop.   Pulmonary:      Effort: Pulmonary effort is normal.      Breath sounds: Normal breath sounds.   Abdominal:      General: Bowel sounds are normal. There is no distension.      Palpations: Abdomen is soft.      Tenderness: There is no abdominal tenderness.   Skin:     General: Skin is warm and dry.   Neurological:      Mental Status: He is alert.      Comments: Not oriented to time.       Significant Labs: All pertinent labs within the past 24 hours have been reviewed.    Significant Imaging: I have reviewed all pertinent imaging results/findings within the past 24 hours.      Assessment/Plan:      * Acute stroke due to ischemia  - MRI showed areas of diffusion signal hyperintensity consistent with areas of acute ischemia/infarct involving the left cerebral hemisphere, the most prominent measuring approximately 1.4 cm, also a small focus of the right frontal lobe.  - Patient on full dose anticoagulation currently due to acute bilateral DVT.  - Sinus rhythm noted throughout hospital stay  - Risk factor modification - control diabetes, continue statin.  - RPR, HIV non reactive.  B12 low normal.   - CTA showed a focal segment of 60-70% stenosis involving the proximal to mid left vertebral artery that was new when compared to the prior study of 09/25/2019.  No evidence of large vessel intracranial occlusion.   - Neurology noted symptoms do not correspond to stroke location, although the frontal lobe stroke might have  something to do with his lack of motivation, and Speech has noted he has had delayed swallowing.  - Echo with bubble study done, no shunt seen.  - Follow up with vascular neurology in 4 weeks.  - Therapy recommended rehab initially but changed recommendation to SNF due to his low interest in participation.  - Started Lexapro due to suspicion for depression.  - Psych consulted, changed to Prozac and started thiamine, folic acid, MVI due to previous history of alcohol abuse, although Wernicke encephalopathy is not suspected.  - Re-consulted SNF for rehab from the stroke, as discharging him to a shelter would be inappropriate and unsafe for him.  Discussed with his son, Forest, at length on 3/10.  Forest is out of town on a job but will be returning to [place] eventually.  He agrees his father will eventually need nursing home care as he is unable to care for himself.  - SNF still denying patient.  Will pursue rehab placement vs LTAC      Acute deep vein thrombosis (DVT) of both femoral veins  Last ultrasound showed partially occlusive DVTs, now completely occlusive DVT of multiple lower extremity veins on ultrasound.  Patient reports compliance with warfarin but his INR is only one.  He is homeless but says he does not have difficulty obtaining his medications.  Does not seem to care about anything, which again might be due to the frontal lobe stroke.  D-dimer was markedly elevated on presentation and there was a question of possible PE, but he had a CTA of the brain/neck on presentation so could not get another CTA for another 48 hours.    V/Q scan was done, showed low probability for PE.  2D echo showed grade I diastolic dysfunction.  Warfarin was restarted with bridging Lovenox, but INR was still low.  Changed to apixaban and discontinued Lovenox.    Abnormal CT scan, gastrointestinal tract  CT showed thickening of the rectal wall with colonoscopy recommended for further evaluation.  Patient was unable to  tolerate the prep.  GI spoke to him and he agreed to a flex sig after an enema this morning.  Flex sig negative for abnormalities other than internal hemorrhoids.      Advance care planning        Severe protein-calorie malnutrition  Diet as tolerated      Debility  PT/OT recommending rehab vs SNF after stroke with debility/poor motivation  Having difficulty finding placement for unclear reasons.  Will continue to pursue this as above.  Again, patient has been pleasant and cooperative.      Type 2 diabetes mellitus with hyperglycemia, without long-term current use of insulin  Reportedly he is on metformin and glipizide, both held.  He had 4+ sugar in urine and HbA1c was 10.3, and he was hyperglycemic on presentation.  Started on Levemir and ISS, then increased Levemir to 25 units daily  Added scheduled novolog 5 units tid, increased to 7 units with improvement  OK to resume metformin.    Increased levemir to 28 units daily with good improvement.  Held metformin with contrast with CT.  BG lower now off metformin, has had a few low BG as well due to being on clear liquids for colonoscopy prep.  Levemir decreased to 20 units daily.  Expect BG will come back up when he is back on his diet.    Hyperlipidemia  Continue atorvastatin status post stroke.        VTE Risk Mitigation (From admission, onward)         Ordered     Place sequential compression device  Until discontinued         01/31/22 0533                Discharge Planning   EFRAÍN:      Code Status: Full Code   Is the patient medically ready for discharge?:     Reason for patient still in hospital (select all that apply): Pending disposition  Discharge Plan A: Rehab ([facility] accepted pending 3 rehab denials in [facility])   Discharge Delays: (!) Post-Acute Set-up (pending acceptance by post acute facility)              Mojgan Meza MD  Department of Hospital Medicine   [facility])

## 2022-03-25 NOTE — OR NURSING
Pt without change from previous assessment. Report called to Amina LÓPEZ. Report placed in chart. IVF D/C'ed. Pt on room air  Pt placed on transport.

## 2022-03-25 NOTE — ASSESSMENT & PLAN NOTE
Unclear cause.  Spoke to [facility] and patient has had previous syncopal episodes attributed to alcohol abuse.  Has been prescribed meclizine as well.  Tox positive for cocaine but not alcohol, and he denies current use of both.  CTA brain/neck was done in ED, and per ED note the results were discussed with stroke neurologist Dr. Tovar who felt that CTA finding of a short segment of left vertebral artery stenosis was incidental.  He did not think stroke workup with MRI was indicated.    Since change of status per son compared to when he saw him early during the week a CT of the brain was ordered and no abnormalities seen.  MRI of the brain showed acute strokes of the left cerebral hemisphere and right frontal lobe.

## 2022-03-25 NOTE — ASSESSMENT & PLAN NOTE
CT showed thickening of the rectal wall with colonoscopy recommended for further evaluation.  Patient was unable to tolerate the prep.  GI spoke to him and he agreed to a flex sig after an enema this morning.  Flex sig negative for abnormalities other than internal hemorrhoids.

## 2022-03-26 LAB
POCT GLUCOSE: 187 MG/DL (ref 70–110)
POCT GLUCOSE: 269 MG/DL (ref 70–110)
POCT GLUCOSE: 95 MG/DL (ref 70–110)
POCT GLUCOSE: 99 MG/DL (ref 70–110)

## 2022-03-26 PROCEDURE — 11000001 HC ACUTE MED/SURG PRIVATE ROOM

## 2022-03-26 PROCEDURE — 99231 SBSQ HOSP IP/OBS SF/LOW 25: CPT | Mod: ,,, | Performed by: HOSPITALIST

## 2022-03-26 PROCEDURE — 25000003 PHARM REV CODE 250: Performed by: INTERNAL MEDICINE

## 2022-03-26 PROCEDURE — 99231 PR SUBSEQUENT HOSPITAL CARE,LEVL I: ICD-10-PCS | Mod: ,,, | Performed by: HOSPITALIST

## 2022-03-26 PROCEDURE — A4216 STERILE WATER/SALINE, 10 ML: HCPCS | Performed by: INTERNAL MEDICINE

## 2022-03-26 PROCEDURE — 94761 N-INVAS EAR/PLS OXIMETRY MLT: CPT

## 2022-03-26 PROCEDURE — 63600175 PHARM REV CODE 636 W HCPCS: Performed by: INTERNAL MEDICINE

## 2022-03-26 RX ADMIN — INSULIN ASPART 3 UNITS: 100 INJECTION, SOLUTION INTRAVENOUS; SUBCUTANEOUS at 12:03

## 2022-03-26 RX ADMIN — Medication 10 ML: at 11:03

## 2022-03-26 RX ADMIN — FLUOXETINE 20 MG: 20 CAPSULE ORAL at 08:03

## 2022-03-26 RX ADMIN — INSULIN ASPART 7 UNITS: 100 INJECTION, SOLUTION INTRAVENOUS; SUBCUTANEOUS at 06:03

## 2022-03-26 RX ADMIN — INSULIN DETEMIR 20 UNITS: 100 INJECTION, SOLUTION SUBCUTANEOUS at 08:03

## 2022-03-26 RX ADMIN — Medication 10 ML: at 06:03

## 2022-03-26 RX ADMIN — THIAMINE HCL TAB 100 MG 100 MG: 100 TAB at 08:03

## 2022-03-26 RX ADMIN — THERA TABS 1 TABLET: TAB at 08:03

## 2022-03-26 RX ADMIN — FOLIC ACID 1 MG: 1 TABLET ORAL at 08:03

## 2022-03-26 RX ADMIN — TAMSULOSIN HYDROCHLORIDE 0.4 MG: 0.4 CAPSULE ORAL at 09:03

## 2022-03-26 RX ADMIN — Medication 10 ML: at 12:03

## 2022-03-26 RX ADMIN — INSULIN ASPART 7 UNITS: 100 INJECTION, SOLUTION INTRAVENOUS; SUBCUTANEOUS at 08:03

## 2022-03-26 RX ADMIN — ATORVASTATIN CALCIUM 80 MG: 20 TABLET, FILM COATED ORAL at 08:03

## 2022-03-26 RX ADMIN — INSULIN ASPART 7 UNITS: 100 INJECTION, SOLUTION INTRAVENOUS; SUBCUTANEOUS at 12:03

## 2022-03-26 NOTE — ASSESSMENT & PLAN NOTE
- MRI showed areas of diffusion signal hyperintensity consistent with areas of acute ischemia/infarct involving the left cerebral hemisphere, the most prominent measuring approximately 1.4 cm, also a small focus of the right frontal lobe.  - Patient on full dose anticoagulation currently due to acute bilateral DVT.  - Sinus rhythm noted throughout hospital stay  - Risk factor modification - control diabetes, continue statin.  - RPR, HIV non reactive.  B12 low normal.   - CTA showed a focal segment of 60-70% stenosis involving the proximal to mid left vertebral artery that was new when compared to the prior study of 09/25/2019.  No evidence of large vessel intracranial occlusion.   - Neurology noted symptoms do not correspond to stroke location, although the frontal lobe stroke might have something to do with his lack of motivation, and Speech has noted he has had delayed swallowing.  - Echo with bubble study done, no shunt seen.  - Follow up with vascular neurology in 4 weeks.  - Therapy recommended rehab initially but changed recommendation to SNF due to his low interest in participation.  - Started Lexapro due to suspicion for depression.  - Psych consulted, changed to Prozac and started thiamine, folic acid, MVI due to previous history of alcohol abuse, although Wernicke encephalopathy is not suspected.  - Re-consulted SNF for rehab from the stroke, as discharging him to a shelter would be inappropriate and unsafe for him.  Discussed with his son, Forest, at length on 3/10.  Forest is out of town on a job but will be returning to [place] eventually.  He agrees his father will eventually need nursing home care as he is unable to care for himself.  - SNF still denying patient.  Will pursue rehab placement vs LTAC

## 2022-03-26 NOTE — PT/OT/SLP PROGRESS
Physical Therapy      Patient Name:  Forest Lopez   MRN:  [id number]    Patient not seen today secondary to Patient unwilling to participate, Patient fatigue, Other (Comment) (Pt reported I just don't feel like getting out of the bed today. Come back tomorrow.). Will follow-up 3/28/22.

## 2022-03-26 NOTE — ASSESSMENT & PLAN NOTE
CT showed thickening of the rectal wall with colonoscopy recommended for further evaluation.  Patient was unable to tolerate the prep.  GI spoke to him and he agreed to a flex sig after an enema.  Flex sig negative for abnormalities other than internal hemorrhoids.

## 2022-03-26 NOTE — SUBJECTIVE & OBJECTIVE
Interval History:  No complaints, as expected his BG has increased quite a bit now that he is back on a diet.    Review of Systems   Constitutional:  Negative for chills and fever.   Respiratory:  Negative for cough and shortness of breath.    Cardiovascular:  Negative for chest pain and palpitations.   Gastrointestinal:  Negative for abdominal pain, nausea and vomiting.   Objective:     Vital Signs (Most Recent):  Temp: 97.9 °F (36.6 °C) (03/26/22 1112)  Pulse: 73 (03/26/22 1112)  Resp: 12 (03/26/22 1112)  BP: (!) 111/58 (03/26/22 1112)  SpO2: 96 % (03/26/22 1112)   Vital Signs (24h Range):  Temp:  [97.9 °F (36.6 °C)-99.2 °F (37.3 °C)] 97.9 °F (36.6 °C)  Pulse:  [73-89] 73  Resp:  [12-18] 12  SpO2:  [94 %-98 %] 96 %  BP: (110-135)/(58-77) 111/58     Weight: 66.2 kg (145 lb 15.1 oz)  Body mass index is 19.79 kg/m².    Intake/Output Summary (Last 24 hours) at 3/26/2022 1252  Last data filed at 3/26/2022 0500  Gross per 24 hour   Intake 120 ml   Output 1450 ml   Net -1330 ml      Physical Exam  Constitutional:       General: He is not in acute distress.     Comments: Thin   HENT:      Mouth/Throat:      Comments: Edentulous  Cardiovascular:      Rate and Rhythm: Normal rate and regular rhythm.      Pulses: Normal pulses.      Heart sounds: Normal heart sounds. No murmur heard.    No gallop.   Pulmonary:      Effort: Pulmonary effort is normal.      Breath sounds: Normal breath sounds.   Abdominal:      General: Bowel sounds are normal. There is no distension.      Palpations: Abdomen is soft.      Tenderness: There is no abdominal tenderness.   Skin:     General: Skin is warm and dry.   Neurological:      Mental Status: He is alert.      Comments: Not oriented to time.       Significant Labs: All pertinent labs within the past 24 hours have been reviewed.    Significant Imaging: I have reviewed all pertinent imaging results/findings within the past 24 hours.

## 2022-03-26 NOTE — ASSESSMENT & PLAN NOTE
Reportedly he is on metformin and glipizide, both held.  He had 4+ sugar in urine and HbA1c was 10.3, and he was hyperglycemic on presentation.  Started on Levemir and ISS, then increased Levemir to 25 units daily  Added scheduled novolog 5 units tid, increased to 7 units with improvement  OK to resume metformin.    Increased levemir to 28 units daily with good improvement.  Held metformin with contrast with CT.  BG lower now off metformin, has had a few low BG as well due to being on clear liquids for colonoscopy prep.  Levemir decreased to 20 units daily.    As expected BG increased when diet was resumed so increased Levemir back to 28 with good results.

## 2022-03-26 NOTE — ASSESSMENT & PLAN NOTE
- MRI showed areas of diffusion signal hyperintensity consistent with areas of acute ischemia/infarct involving the left cerebral hemisphere, the most prominent measuring approximately 1.4 cm, also a small focus of the right frontal lobe.  - Patient on full dose anticoagulation currently due to acute bilateral DVT.  - Sinus rhythm noted throughout hospital stay  - Risk factor modification - control diabetes, continue statin.  - RPR, HIV non reactive.  B12 low normal.   - CTA showed a focal segment of 60-70% stenosis involving the proximal to mid left vertebral artery that was new when compared to the prior study of 09/25/2019.  No evidence of large vessel intracranial occlusion.   - Neurology noted symptoms do not correspond to stroke location, although the frontal lobe stroke might have something to do with his lack of motivation, and Speech has noted he has had delayed swallowing.  - Echo with bubble study done, no shunt seen.  - Follow up with vascular neurology in 4 weeks.  - Therapy recommended rehab initially but changed recommendation to SNF due to his low interest in participation.  - Started Lexapro due to suspicion for depression.  - Psych consulted, changed to Prozac and started thiamine, folic acid, MVI due to previous history of alcohol abuse, although Wernicke encephalopathy is not suspected.  - Re-consulted SNF for rehab from the stroke, as discharging him to a shelter would be inappropriate and unsafe for him.  Discussed with his son, Forest, at length on 3/10.  Forest is out of town on a job but will be returning to [place] eventually.  He agrees his father will eventually need nursing home care as he is unable to care for himself.  - SNF still denying patient.  Will pursue rehab placement vs LTAC

## 2022-03-26 NOTE — PROGRESS NOTES
[facility] Medicine  Progress Note    Patient Name: Forest Lopez  MRN: [id number]  Patient Class: IP- Inpatient   Admission Date: 1/31/2022  Length of Stay: 54 days  Attending Physician: Mojgan Bosch MD  Primary Care Provider: Julian Escobar        Subjective:     Principal Problem:Acute stroke due to ischemia        HPI:  Mr. Lopez is a 72 year old man who presented after a syncopal episode.  He reports he had been feeling well prior to the episode but then had a prodrome prior to passing out.  EMS was called, report patient's BP was low normal on their arrival, improved after an IV fluids bolus.  Patient denies chest pain or shortness of breath and says he does not feel weak currently although is rather tired.  When seen in the ED this morning he could barely express himself audibly.     Vital signs were normal on presentation here.  He is on warfarin for recurrent DVT, but his INR was 1, and d-dimer markedly elevated at 21.7.  Tox screen was positive for cocaine.  He had a CTA of the brain and neck done on presentation so CTA of the chest for PE study could not be done for 48 hours.  Ultrasound of the lower extremities showed extensive bilateral DVT.  On the right there was thrombosis of the common femoral vein, femoral vein, popliteal vein, one of the paired posterior tibial veins and both visualized peroneal veins.  On the left there was thrombosis of the common femoral vein, femoral vein and popliteal vein.  Patient was started on full dose Lovenox and admitted.    Medical history is from the chart as he is unable to give me much information.  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  He smokes 5-6 cigarettes/day and is not interested in quitting.  He is currently homeless and staying with a friend.  Despite the normal INR he is sure  he is taking his warfarin and is not having difficulty taking his medications.  I discussed his care with [facility] staff on the [facility] and patient had been lost to follow up since November 2021.      Overview/Hospital Course:  Patient presented to the ED after a syncopal episode and was found to have bilateral lower extremity DVTs and a low INR.  Tox screen was positive for cocaine.  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  He was started back on his warfarin with bridging Lovenox.  His 2D echo showed grade I diastolic dysfunction.  Neurology evaluated him and recommended echo with bubble study, which was negative for a shunt.    PT/OT evaluated him and recommended SNF placement versus inpatient rehab.  His INR was difficult to get therapeutic, and as he had no contraindication to a NOAC his warfarin was changed to apixaban, and the full dose Lovenox was discontinued.  As he appeared to be depressed and had no objection to starting an antidepressant Lexapro was started.  He had a fall in the hospital on 2/13, had gone to the bathroom for a BM with the nurse, and when she turned away while he was washing his hands he apparently lost his balance and fell.  He did not hit his head and did not lose consciousness, but his BP was low transiently and he was given a bolus of IV fluids.    Patient's mental status improved slowly, but he gradually became more talkative and was able to hold a conversation.  He was diagnosed with a polymicrobial UTI on 2/21 and completed treatment with antibiotics.  Psychiatry evaluated him on 3/3 and changed his antidepressant to Prozac as it can be more activating and patient was having difficulty with his level of motivation.      Patient had some episodes of nausea and vomiting and CT of the abdomen was done, with incidental finding of rectal wall thickening noted.  Colonoscopy was recommended,  but he was unable to finish the prep.  GI evaluated him and decided the patient would benefit from a flex sig to evaluate the rectum.  He had the procedure on 3/25 and no abnormalities were seen other than internal hemorrhoids.    Patient's discharge was delayed as he was not accepted by any SNF facilities in the area.  Reasons for the denials are unclear as he has a clear rehab diagnosis.  He has a history of drug use but has not shown any interest in getting any illicit or legal drugs since he has been here, and he has had no visitors that could have provided drugs to him.  He has been pleasant and cooperative while here although lacks motivation, likely due to the nature of the stroke affecting his frontal lobe.  He requires considerable encouragement to participate in therapy.  Discharging him to live on the street or shelter would not be appropriate as he would not be able to take care of himself and likely will need group home nursing home care eventually after he gets the chance to improve with rehab.  As he has gradually improved here with therapy and has better participation he could benefit from consideration for inpatient rehab.      Interval History:  No complaints, as expected his BG has increased quite a bit now that he is back on a diet.    Review of Systems   Constitutional:  Negative for chills and fever.   Respiratory:  Negative for cough and shortness of breath.    Cardiovascular:  Negative for chest pain and palpitations.   Gastrointestinal:  Negative for abdominal pain, nausea and vomiting.   Objective:     Vital Signs (Most Recent):  Temp: 97.9 °F (36.6 °C) (03/26/22 1112)  Pulse: 73 (03/26/22 1112)  Resp: 12 (03/26/22 1112)  BP: (!) 111/58 (03/26/22 1112)  SpO2: 96 % (03/26/22 1112)   Vital Signs (24h Range):  Temp:  [97.9 °F (36.6 °C)-99.2 °F (37.3 °C)] 97.9 °F (36.6 °C)  Pulse:  [73-89] 73  Resp:  [12-18] 12  SpO2:  [94 %-98 %] 96 %  BP: (110-135)/(58-77) 111/58     Weight: 66.2 kg (145 lb  15.1 oz)  Body mass index is 19.79 kg/m².    Intake/Output Summary (Last 24 hours) at 3/26/2022 1252  Last data filed at 3/26/2022 0500  Gross per 24 hour   Intake 120 ml   Output 1450 ml   Net -1330 ml      Physical Exam  Constitutional:       General: He is not in acute distress.     Comments: Thin   HENT:      Mouth/Throat:      Comments: Edentulous  Cardiovascular:      Rate and Rhythm: Normal rate and regular rhythm.      Pulses: Normal pulses.      Heart sounds: Normal heart sounds. No murmur heard.    No gallop.   Pulmonary:      Effort: Pulmonary effort is normal.      Breath sounds: Normal breath sounds.   Abdominal:      General: Bowel sounds are normal. There is no distension.      Palpations: Abdomen is soft.      Tenderness: There is no abdominal tenderness.   Skin:     General: Skin is warm and dry.   Neurological:      Mental Status: He is alert.      Comments: Not oriented to time.       Significant Labs: All pertinent labs within the past 24 hours have been reviewed.    Significant Imaging: I have reviewed all pertinent imaging results/findings within the past 24 hours.      Assessment/Plan:      * Acute stroke due to ischemia  - MRI showed areas of diffusion signal hyperintensity consistent with areas of acute ischemia/infarct involving the left cerebral hemisphere, the most prominent measuring approximately 1.4 cm, also a small focus of the right frontal lobe.  - Patient on full dose anticoagulation currently due to acute bilateral DVT.  - Sinus rhythm noted throughout hospital stay  - Risk factor modification - control diabetes, continue statin.  - RPR, HIV non reactive.  B12 low normal.   - CTA showed a focal segment of 60-70% stenosis involving the proximal to mid left vertebral artery that was new when compared to the prior study of 09/25/2019.  No evidence of large vessel intracranial occlusion.   - Neurology noted symptoms do not correspond to stroke location, although the frontal lobe  stroke might have something to do with his lack of motivation, and Speech has noted he has had delayed swallowing.  - Echo with bubble study done, no shunt seen.  - Follow up with vascular neurology in 4 weeks.  - Therapy recommended rehab initially but changed recommendation to SNF due to his low interest in participation.  - Started Lexapro due to suspicion for depression.  - Psych consulted, changed to Prozac and started thiamine, folic acid, MVI due to previous history of alcohol abuse, although Wernicke encephalopathy is not suspected.  - Re-consulted SNF for rehab from the stroke, as discharging him to a shelter would be inappropriate and unsafe for him.  Discussed with his son, Forest, at length on 3/10.  Forest is out of town on a job but will be returning to [place] eventually.  He agrees his father will eventually need nursing home care as he is unable to care for himself.  - SNF still denying patient.  Will pursue rehab placement vs LTAC      Acute deep vein thrombosis (DVT) of both femoral veins  Last ultrasound showed partially occlusive DVTs, now completely occlusive DVT of multiple lower extremity veins on ultrasound.  Patient reports compliance with warfarin but his INR is only one.  He is homeless but says he does not have difficulty obtaining his medications.  Does not seem to care about anything, which again might be due to the frontal lobe stroke.  D-dimer was markedly elevated on presentation and there was a question of possible PE, but he had a CTA of the brain/neck on presentation so could not get another CTA for another 48 hours.    V/Q scan was done, showed low probability for PE.  2D echo showed grade I diastolic dysfunction.  Warfarin was restarted with bridging Lovenox, but INR was still low.  Changed to apixaban and discontinued Lovenox.    Abnormal CT scan, gastrointestinal tract  CT showed thickening of the rectal wall with colonoscopy recommended for further evaluation.  Patient  was unable to tolerate the prep.  GI spoke to him and he agreed to a flex sig after an enema.  Flex sig negative for abnormalities other than internal hemorrhoids.      Advance care planning        Severe protein-calorie malnutrition  Diet as tolerated      Debility  PT/OT recommending rehab vs SNF after stroke with debility/poor motivation  Having difficulty finding placement for unclear reasons.  Will continue to pursue this as above.  Again, patient has been pleasant and cooperative.      Type 2 diabetes mellitus with hyperglycemia, without long-term current use of insulin  Reportedly he is on metformin and glipizide, both held.  He had 4+ sugar in urine and HbA1c was 10.3, and he was hyperglycemic on presentation.  Started on Levemir and ISS, then increased Levemir to 25 units daily  Added scheduled novolog 5 units tid, increased to 7 units with improvement  OK to resume metformin.    Increased levemir to 28 units daily with good improvement.  Held metformin with contrast with CT.  BG lower now off metformin, has had a few low BG as well due to being on clear liquids for colonoscopy prep.  Levemir decreased to 20 units daily.    As expected BG increased when diet was resumed so will increase Levemir back to 28.    Hyperlipidemia  Continue atorvastatin status post stroke.        VTE Risk Mitigation (From admission, onward)         Ordered     Place sequential compression device  Until discontinued         01/31/22 0533                Discharge Planning   EFRAÍN:      Code Status: Full Code   Is the patient medically ready for discharge?:     Reason for patient still in hospital (select all that apply): Pending disposition  Discharge Plan A: Rehab   Discharge Delays:  (Still no accepting facilites.)              Mojgan Meza MD  Department of Hospital Medicine   [facility] ([place])

## 2022-03-26 NOTE — PLAN OF CARE
03/25/22 1905   Post-Acute Status   Post-Acute Authorization Placement   Post-Acute Placement Status Referrals Sent   Coverage [employer] MANAGED MEDICARE - [employer] SNP (SPECIAL NEEDS PLAN   Discharge Delays   (Still no accepting facilites.)   Discharge Plan   Discharge Plan A Rehab   Discharge Plan B Skilled Nursing Facility     SHANICE sent referrals to most area SNF and Rehab. facilities for reconsideration. The patient and  his son stated that the [place] area would be acceptable as well as the [facility] area. They would like to find a facility near enough that family can visit the patient. Ann is considering the patient again. SHANICE explained that if the Director of Nursing had any questions about his care, his nurse or MD here at [facility] would be glad to explain his care needs. The patient has been cooperative and pleasant through out his stay.

## 2022-03-27 LAB
POCT GLUCOSE: 120 MG/DL (ref 70–110)
POCT GLUCOSE: 142 MG/DL (ref 70–110)
POCT GLUCOSE: 228 MG/DL (ref 70–110)
POCT GLUCOSE: 242 MG/DL (ref 70–110)

## 2022-03-27 PROCEDURE — 25000003 PHARM REV CODE 250: Performed by: INTERNAL MEDICINE

## 2022-03-27 PROCEDURE — 99231 PR SUBSEQUENT HOSPITAL CARE,LEVL I: ICD-10-PCS | Mod: ,,, | Performed by: HOSPITALIST

## 2022-03-27 PROCEDURE — A4216 STERILE WATER/SALINE, 10 ML: HCPCS | Performed by: INTERNAL MEDICINE

## 2022-03-27 PROCEDURE — 11000001 HC ACUTE MED/SURG PRIVATE ROOM

## 2022-03-27 PROCEDURE — 99231 SBSQ HOSP IP/OBS SF/LOW 25: CPT | Mod: ,,, | Performed by: HOSPITALIST

## 2022-03-27 PROCEDURE — 36406 VNPNXR<3YRS PHY/QHP OTHER VN: CPT

## 2022-03-27 RX ADMIN — ATORVASTATIN CALCIUM 80 MG: 20 TABLET, FILM COATED ORAL at 08:03

## 2022-03-27 RX ADMIN — INSULIN ASPART 7 UNITS: 100 INJECTION, SOLUTION INTRAVENOUS; SUBCUTANEOUS at 05:03

## 2022-03-27 RX ADMIN — FOLIC ACID 1 MG: 1 TABLET ORAL at 08:03

## 2022-03-27 RX ADMIN — FLUOXETINE 20 MG: 20 CAPSULE ORAL at 08:03

## 2022-03-27 RX ADMIN — THERA TABS 1 TABLET: TAB at 08:03

## 2022-03-27 RX ADMIN — Medication 10 ML: at 05:03

## 2022-03-27 RX ADMIN — TAMSULOSIN HYDROCHLORIDE 0.4 MG: 0.4 CAPSULE ORAL at 08:03

## 2022-03-27 RX ADMIN — THIAMINE HCL TAB 100 MG 100 MG: 100 TAB at 08:03

## 2022-03-27 RX ADMIN — INSULIN ASPART 7 UNITS: 100 INJECTION, SOLUTION INTRAVENOUS; SUBCUTANEOUS at 08:03

## 2022-03-27 RX ADMIN — INSULIN ASPART 2 UNITS: 100 INJECTION, SOLUTION INTRAVENOUS; SUBCUTANEOUS at 05:03

## 2022-03-27 RX ADMIN — Medication 10 ML: at 12:03

## 2022-03-27 NOTE — NURSING
Pt resting in bed throughout day.  Up to bathroom toilet with assistance to have BM.  Condom cath replaced 3 times due to pt pulling it off.  NAD noted.  VSS.  No c/o pain.  Multiple soft BM's noted.  Pleasant and cooperative.  No new needs expressed.  Still awaiting discharge.

## 2022-03-27 NOTE — PROGRESS NOTES
[facility] Medicine  Progress Note    Patient Name: Forest Lopez  MRN: [id number]  Patient Class: IP- Inpatient   Admission Date: 1/31/2022  Length of Stay: 55 days  Attending Physician: Mojgan Bosch MD  Primary Care Provider: Julian Escobar        Subjective:     Principal Problem:Acute stroke due to ischemia        HPI:  Mr. Lopez is a 72 year old man who presented after a syncopal episode.  He reports he had been feeling well prior to the episode but then had a prodrome prior to passing out.  EMS was called, report patient's BP was low normal on their arrival, improved after an IV fluids bolus.  Patient denies chest pain or shortness of breath and says he does not feel weak currently although is rather tired.  When seen in the ED this morning he could barely express himself audibly.     Vital signs were normal on presentation here.  He is on warfarin for recurrent DVT, but his INR was 1, and d-dimer markedly elevated at 21.7.  Tox screen was positive for cocaine.  He had a CTA of the brain and neck done on presentation so CTA of the chest for PE study could not be done for 48 hours.  Ultrasound of the lower extremities showed extensive bilateral DVT.  On the right there was thrombosis of the common femoral vein, femoral vein, popliteal vein, one of the paired posterior tibial veins and both visualized peroneal veins.  On the left there was thrombosis of the common femoral vein, femoral vein and popliteal vein.  Patient was started on full dose Lovenox and admitted.    Medical history is from the chart as he is unable to give me much information.  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  He smokes 5-6 cigarettes/day and is not interested in quitting.  He is currently homeless and staying with a friend.  Despite the normal INR he is sure  he is taking his warfarin and is not having difficulty taking his medications.  I discussed his care with [facility] staff on the [facility] and patient had been lost to follow up since November 2021.      Overview/Hospital Course:  Patient presented to the ED after a syncopal episode and was found to have bilateral lower extremity DVTs and a low INR.  Tox screen was positive for cocaine.  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  He was started back on his warfarin with bridging Lovenox.  His 2D echo showed grade I diastolic dysfunction.  Neurology evaluated him and recommended echo with bubble study, which was negative for a shunt.    PT/OT evaluated him and recommended SNF placement versus inpatient rehab.  His INR was difficult to get therapeutic, and as he had no contraindication to a NOAC his warfarin was changed to apixaban, and the full dose Lovenox was discontinued.  As he appeared to be depressed and had no objection to starting an antidepressant Lexapro was started.  He had a fall in the hospital on 2/13, had gone to the bathroom for a BM with the nurse, and when she turned away while he was washing his hands he apparently lost his balance and fell.  He did not hit his head and did not lose consciousness, but his BP was low transiently and he was given a bolus of IV fluids.    Patient's mental status improved slowly, but he gradually became more talkative and was able to hold a conversation.  He was diagnosed with a polymicrobial UTI on 2/21 and completed treatment with antibiotics.  Psychiatry evaluated him on 3/3 and changed his antidepressant to Prozac as it can be more activating and patient was having difficulty with his level of motivation.      Patient had some episodes of nausea and vomiting and CT of the abdomen was done, with incidental finding of rectal wall thickening noted.  Colonoscopy was recommended,  but he was unable to finish the prep.  GI evaluated him and decided the patient would benefit from a flex sig to evaluate the rectum.  He had the procedure on 3/25 and no abnormalities were seen other than internal hemorrhoids.    Patient's discharge was delayed as he was not accepted by any SNF facilities in the area.  Reasons for the denials are unclear as he has a clear rehab diagnosis.  He has a history of drug use but has not shown any interest in getting any illicit or legal drugs since he has been here, and he has had no visitors that could have provided drugs to him.  He has been pleasant and cooperative while here although lacks motivation, likely due to the nature of the stroke affecting his frontal lobe.  He requires considerable encouragement to participate in therapy.  Discharging him to live on the street or shelter would not be appropriate as he would not be able to take care of himself and likely will need senior care nursing home care eventually after he gets the chance to improve with rehab.  As he has gradually improved here with therapy and has better participation he could benefit from consideration for inpatient rehab.      Interval History:  Says he is fine, tolerating food, feels comfortable.    Review of Systems   Constitutional:  Negative for chills and fever.   Respiratory:  Negative for cough and shortness of breath.    Cardiovascular:  Negative for chest pain and palpitations.   Gastrointestinal:  Negative for abdominal pain, nausea and vomiting.   Objective:     Vital Signs (Most Recent):  Temp: 98.4 °F (36.9 °C) (03/27/22 1134)  Pulse: 98 (03/27/22 1134)  Resp: 12 (03/27/22 1134)  BP: 114/80 (03/27/22 1134)  SpO2: 97 % (03/27/22 1134) Vital Signs (24h Range):  Temp:  [97.6 °F (36.4 °C)-98.8 °F (37.1 °C)] 98.4 °F (36.9 °C)  Pulse:  [74-98] 98  Resp:  [12-20] 12  SpO2:  [95 %-99 %] 97 %  BP: (108-147)/(71-80) 114/80     Weight: 66.2 kg (145 lb 15.1 oz)  Body mass index is 19.79  kg/m².    Intake/Output Summary (Last 24 hours) at 3/27/2022 1227  Last data filed at 3/27/2022 0500  Gross per 24 hour   Intake 1350 ml   Output 2430 ml   Net -1080 ml      Physical Exam  Constitutional:       General: He is not in acute distress.     Comments: Thin   HENT:      Mouth/Throat:      Comments: Edentulous  Cardiovascular:      Rate and Rhythm: Normal rate and regular rhythm.      Pulses: Normal pulses.      Heart sounds: Normal heart sounds. No murmur heard.    No gallop.   Pulmonary:      Effort: Pulmonary effort is normal.      Breath sounds: Normal breath sounds.   Abdominal:      General: Bowel sounds are normal. There is no distension.      Palpations: Abdomen is soft.      Tenderness: There is no abdominal tenderness.   Skin:     General: Skin is warm and dry.   Neurological:      Mental Status: He is alert.      Comments: Not oriented to time.       Significant Labs: All pertinent labs within the past 24 hours have been reviewed.    Significant Imaging: I have reviewed all pertinent imaging results/findings within the past 24 hours.      Assessment/Plan:      * Acute stroke due to ischemia  - MRI showed areas of diffusion signal hyperintensity consistent with areas of acute ischemia/infarct involving the left cerebral hemisphere, the most prominent measuring approximately 1.4 cm, also a small focus of the right frontal lobe.  - Patient on full dose anticoagulation currently due to acute bilateral DVT.  - Sinus rhythm noted throughout hospital stay  - Risk factor modification - control diabetes, continue statin.  - RPR, HIV non reactive.  B12 low normal.   - CTA showed a focal segment of 60-70% stenosis involving the proximal to mid left vertebral artery that was new when compared to the prior study of 09/25/2019.  No evidence of large vessel intracranial occlusion.   - Neurology noted symptoms do not correspond to stroke location, although the frontal lobe stroke might have something to do with  his lack of motivation, and Speech has noted he has had delayed swallowing.  - Echo with bubble study done, no shunt seen.  - Follow up with vascular neurology in 4 weeks.  - Therapy recommended rehab initially but changed recommendation to SNF due to his low interest in participation.  - Started Lexapro due to suspicion for depression.  - Psych consulted, changed to Prozac and started thiamine, folic acid, MVI due to previous history of alcohol abuse, although Wernicke encephalopathy is not suspected.  - Re-consulted SNF for rehab from the stroke, as discharging him to a shelter would be inappropriate and unsafe for him.  Discussed with his son, Forest, at length on 3/10.  Forest is out of town on a job but will be returning to [place] eventually.  He agrees his father will eventually need nursing home care as he is unable to care for himself.  - SNF still denying patient.  Will pursue rehab placement vs LTAC      Acute deep vein thrombosis (DVT) of both femoral veins  Last ultrasound showed partially occlusive DVTs, now completely occlusive DVT of multiple lower extremity veins on ultrasound.  Patient reports compliance with warfarin but his INR is only one.  He is homeless but says he does not have difficulty obtaining his medications.  Does not seem to care about anything, which again might be due to the frontal lobe stroke.  D-dimer was markedly elevated on presentation and there was a question of possible PE, but he had a CTA of the brain/neck on presentation so could not get another CTA for another 48 hours.    V/Q scan was done, showed low probability for PE.  2D echo showed grade I diastolic dysfunction.  Warfarin was restarted with bridging Lovenox, but INR was still low.  Changed to apixaban and discontinued Lovenox.    Abnormal CT scan, gastrointestinal tract  CT showed thickening of the rectal wall with colonoscopy recommended for further evaluation.  Patient was unable to tolerate the prep.  GI  spoke to him and he agreed to a flex sig after an enema.  Flex sig negative for abnormalities other than internal hemorrhoids.      Advance care planning        Severe protein-calorie malnutrition  Diet as tolerated  Patient seen by dietician, recommendations made.      Debility  PT/OT recommending rehab vs SNF after stroke with debility/poor motivation  Having difficulty finding placement for unclear reasons.  Will continue to pursue this as above.  Again, patient has been pleasant and cooperative.      Type 2 diabetes mellitus with hyperglycemia, without long-term current use of insulin  Reportedly he is on metformin and glipizide, both held.  He had 4+ sugar in urine and HbA1c was 10.3, and he was hyperglycemic on presentation.  Started on Levemir and ISS, then increased Levemir to 25 units daily  Added scheduled novolog 5 units tid, increased to 7 units with improvement  OK to resume metformin.    Increased levemir to 28 units daily with good improvement.  Held metformin with contrast with CT.  BG lower now off metformin, has had a few low BG as well due to being on clear liquids for colonoscopy prep.  Levemir decreased to 20 units daily.    As expected BG increased when diet was resumed so increased Levemir back to 28 with good results.    Hyperlipidemia  Continue atorvastatin status post stroke.        VTE Risk Mitigation (From admission, onward)         Ordered     Place sequential compression device  Until discontinued         01/31/22 0533                Discharge Planning   EFRAÍN:      Code Status: Full Code   Is the patient medically ready for discharge?:     Reason for patient still in hospital (select all that apply): Pending disposition  Discharge Plan A: Rehab   Discharge Delays:  (Still no accepting facilites.)              Mojgan Meza MD  Department of Hospital Medicine   [facility] ([place])

## 2022-03-27 NOTE — PLAN OF CARE
Patient currently oriented x 4...though he can be forgetful of situation at times.  Skin warm/dry without breakdown.  Sacral foam dressing placed at beginning of shift torn off again by patient.  Left midline flushes easily but does not draw back.  Dressing c/d/I. Up to toilet for small BM yesterday.  Great appetite. He ate 100% of supper plus a dinner tray after midnight without any nausea.  Condom catheter intact pale jeremy urine noted to drainage bag.  [employer] camera #3 in use.  Call light within reach and room close to nurses' station.  Rounding maintained per protocol.      Problem: Adult Inpatient Plan of Care  Goal: Plan of Care Review  Outcome: Ongoing, Progressing     Problem: Diabetes Comorbidity  Goal: Blood Glucose Level Within Targeted Range  Outcome: Ongoing, Progressing  Intervention: Monitor and Manage Glycemia  Flowsheets (Taken 3/27/2022 0526)  Glycemic Management: blood glucose monitored     Problem: Skin Injury Risk Increased  Goal: Skin Health and Integrity  Outcome: Ongoing, Progressing  Intervention: Optimize Skin Protection  Flowsheets (Taken 3/27/2022 0526)  Pressure Reduction Techniques: frequent weight shift encouraged  Head of Bed (HOB) Positioning: HOB elevated     Problem: Coping Ineffective  Goal: Effective Coping  Outcome: Ongoing, Progressing  Intervention: Support and Enhance Coping Strategies  Flowsheets (Taken 3/27/2022 0526)  Supportive Measures:   active listening utilized   self-care encouraged   self-reflection promoted   self-responsibility promoted   verbalization of feelings encouraged  Family/Support System Care: self-care encouraged  Environmental Support: calm environment promoted     Problem: Glycemic Control Impaired (Sepsis/Septic Shock)  Goal: Blood Glucose Level Within Desired Range  Outcome: Ongoing, Progressing  Intervention: Optimize Glycemic Control  Flowsheets (Taken 3/27/2022 0526)  Glycemic Management: blood glucose monitored     Problem: Infection Progression  (Sepsis/Septic Shock)  Goal: Absence of Infection Signs and Symptoms  Outcome: Ongoing, Progressing     Problem: Infection  Goal: Absence of Infection Signs and Symptoms  Outcome: Ongoing, Progressing

## 2022-03-27 NOTE — SUBJECTIVE & OBJECTIVE
Interval History:  Says he is fine, tolerating food, feels comfortable.    Review of Systems   Constitutional:  Negative for chills and fever.   Respiratory:  Negative for cough and shortness of breath.    Cardiovascular:  Negative for chest pain and palpitations.   Gastrointestinal:  Negative for abdominal pain, nausea and vomiting.   Objective:     Vital Signs (Most Recent):  Temp: 98.4 °F (36.9 °C) (03/27/22 1134)  Pulse: 98 (03/27/22 1134)  Resp: 12 (03/27/22 1134)  BP: 114/80 (03/27/22 1134)  SpO2: 97 % (03/27/22 1134) Vital Signs (24h Range):  Temp:  [97.6 °F (36.4 °C)-98.8 °F (37.1 °C)] 98.4 °F (36.9 °C)  Pulse:  [74-98] 98  Resp:  [12-20] 12  SpO2:  [95 %-99 %] 97 %  BP: (108-147)/(71-80) 114/80     Weight: 66.2 kg (145 lb 15.1 oz)  Body mass index is 19.79 kg/m².    Intake/Output Summary (Last 24 hours) at 3/27/2022 1227  Last data filed at 3/27/2022 0500  Gross per 24 hour   Intake 1350 ml   Output 2430 ml   Net -1080 ml      Physical Exam  Constitutional:       General: He is not in acute distress.     Comments: Thin   HENT:      Mouth/Throat:      Comments: Edentulous  Cardiovascular:      Rate and Rhythm: Normal rate and regular rhythm.      Pulses: Normal pulses.      Heart sounds: Normal heart sounds. No murmur heard.    No gallop.   Pulmonary:      Effort: Pulmonary effort is normal.      Breath sounds: Normal breath sounds.   Abdominal:      General: Bowel sounds are normal. There is no distension.      Palpations: Abdomen is soft.      Tenderness: There is no abdominal tenderness.   Skin:     General: Skin is warm and dry.   Neurological:      Mental Status: He is alert.      Comments: Not oriented to time.       Significant Labs: All pertinent labs within the past 24 hours have been reviewed.    Significant Imaging: I have reviewed all pertinent imaging results/findings within the past 24 hours.

## 2022-03-28 LAB
POCT GLUCOSE: 110 MG/DL (ref 70–110)
POCT GLUCOSE: 184 MG/DL (ref 70–110)
POCT GLUCOSE: 184 MG/DL (ref 70–110)
POCT GLUCOSE: 227 MG/DL (ref 70–110)
POCT GLUCOSE: 267 MG/DL (ref 70–110)

## 2022-03-28 PROCEDURE — 94761 N-INVAS EAR/PLS OXIMETRY MLT: CPT

## 2022-03-28 PROCEDURE — 99232 SBSQ HOSP IP/OBS MODERATE 35: CPT | Mod: ,,, | Performed by: HOSPITALIST

## 2022-03-28 PROCEDURE — 25000003 PHARM REV CODE 250: Performed by: INTERNAL MEDICINE

## 2022-03-28 PROCEDURE — 36406 VNPNXR<3YRS PHY/QHP OTHER VN: CPT

## 2022-03-28 PROCEDURE — 11000001 HC ACUTE MED/SURG PRIVATE ROOM

## 2022-03-28 PROCEDURE — 36410 VNPNXR 3YR/> PHY/QHP DX/THER: CPT

## 2022-03-28 PROCEDURE — 99232 PR SUBSEQUENT HOSPITAL CARE,LEVL II: ICD-10-PCS | Mod: ,,, | Performed by: HOSPITALIST

## 2022-03-28 PROCEDURE — A4216 STERILE WATER/SALINE, 10 ML: HCPCS | Performed by: INTERNAL MEDICINE

## 2022-03-28 PROCEDURE — 97530 THERAPEUTIC ACTIVITIES: CPT

## 2022-03-28 RX ADMIN — FLUOXETINE 20 MG: 20 CAPSULE ORAL at 09:03

## 2022-03-28 RX ADMIN — INSULIN ASPART 7 UNITS: 100 INJECTION, SOLUTION INTRAVENOUS; SUBCUTANEOUS at 08:03

## 2022-03-28 RX ADMIN — TAMSULOSIN HYDROCHLORIDE 0.4 MG: 0.4 CAPSULE ORAL at 09:03

## 2022-03-28 RX ADMIN — INSULIN ASPART 7 UNITS: 100 INJECTION, SOLUTION INTRAVENOUS; SUBCUTANEOUS at 12:03

## 2022-03-28 RX ADMIN — INSULIN ASPART 3 UNITS: 100 INJECTION, SOLUTION INTRAVENOUS; SUBCUTANEOUS at 04:03

## 2022-03-28 RX ADMIN — Medication 10 ML: at 12:03

## 2022-03-28 RX ADMIN — INSULIN ASPART 1 UNITS: 100 INJECTION, SOLUTION INTRAVENOUS; SUBCUTANEOUS at 09:03

## 2022-03-28 RX ADMIN — Medication 10 ML: at 05:03

## 2022-03-28 RX ADMIN — ATORVASTATIN CALCIUM 80 MG: 20 TABLET, FILM COATED ORAL at 09:03

## 2022-03-28 RX ADMIN — THERA TABS 1 TABLET: TAB at 09:03

## 2022-03-28 RX ADMIN — FOLIC ACID 1 MG: 1 TABLET ORAL at 09:03

## 2022-03-28 RX ADMIN — INSULIN ASPART 7 UNITS: 100 INJECTION, SOLUTION INTRAVENOUS; SUBCUTANEOUS at 04:03

## 2022-03-28 RX ADMIN — THIAMINE HCL TAB 100 MG 100 MG: 100 TAB at 09:03

## 2022-03-28 NOTE — PROGRESS NOTES
[facility] Medicine  Progress Note    Patient Name: Forest Lopez  MRN: [id number]  Patient Class: IP- Inpatient   Admission Date: 1/31/2022  Length of Stay: 56 days  Attending Physician: Mojgan Bosch MD  Primary Care Provider: Julian Escobar        Subjective:     Principal Problem:Acute stroke due to ischemia        HPI:  Mr. Lopez is a 72 year old man who presented after a syncopal episode.  He reports he had been feeling well prior to the episode but then had a prodrome prior to passing out.  EMS was called, report patient's BP was low normal on their arrival, improved after an IV fluids bolus.  Patient denies chest pain or shortness of breath and says he does not feel weak currently although is rather tired.  When seen in the ED this morning he could barely express himself audibly.     Vital signs were normal on presentation here.  He is on warfarin for recurrent DVT, but his INR was 1, and d-dimer markedly elevated at 21.7.  Tox screen was positive for cocaine.  He had a CTA of the brain and neck done on presentation so CTA of the chest for PE study could not be done for 48 hours.  Ultrasound of the lower extremities showed extensive bilateral DVT.  On the right there was thrombosis of the common femoral vein, femoral vein, popliteal vein, one of the paired posterior tibial veins and both visualized peroneal veins.  On the left there was thrombosis of the common femoral vein, femoral vein and popliteal vein.  Patient was started on full dose Lovenox and admitted.    Medical history is from the chart as he is unable to give me much information.  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  He smokes 5-6 cigarettes/day and is not interested in quitting.  He is currently homeless and staying with a friend.  Despite the normal INR he is sure  he is taking his warfarin and is not having difficulty taking his medications.  I discussed his care with [facility] staff [facility] the [facility] and patient had been lost to follow up since November 2021.      Overview/Hospital Course:  Patient presented to the ED after a syncopal episode and was found to have bilateral lower extremity DVTs and a low INR.  Tox screen was positive for cocaine.  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  He was started back on his warfarin with bridging Lovenox.  His 2D echo showed grade I diastolic dysfunction.  Neurology evaluated him and recommended echo with bubble study, which was negative for a shunt.    PT/OT evaluated him and recommended SNF placement versus inpatient rehab.  His INR was difficult to get therapeutic, and as he had no contraindication to a NOAC his warfarin was changed to apixaban, and the full dose Lovenox was discontinued.  As he appeared to be depressed and had no objection to starting an antidepressant Lexapro was started.  He had a fall in the hospital on 2/13, had gone to the bathroom for a BM with the nurse, and when she turned away while he was washing his hands he apparently lost his balance and fell.  He did not hit his head and did not lose consciousness, but his BP was low transiently and he was given a bolus of IV fluids.    Patient's mental status improved slowly, but he gradually became more talkative and was able to hold a conversation.  He was diagnosed with a polymicrobial UTI on 2/21 and completed treatment with antibiotics.  Psychiatry evaluated him on 3/3 and changed his antidepressant to Prozac as it can be more activating and patient was having difficulty with his level of motivation.      Patient had some episodes of nausea and vomiting and CT of the abdomen was done, with incidental finding of rectal wall thickening noted.  Colonoscopy was recommended,  but he was unable to finish the prep.  GI evaluated him and decided the patient would benefit from a flex sig to evaluate the rectum.  He had the procedure on 3/25 and no abnormalities were seen other than internal hemorrhoids.    Patient's discharge was delayed as he was not accepted by any SNF facilities in the area.  Reasons for the denials are unclear as he has a clear rehab diagnosis.  He has a history of drug use but has not shown any interest in getting any illicit or legal drugs since he has been here, and he has had no visitors that could have provided drugs to him.  He has been pleasant and cooperative while here although lacks motivation, likely due to the nature of the stroke affecting his frontal lobe.  He requires considerable encouragement to participate in therapy.  Discharging him to live on the street or shelter would not be appropriate as he would not be able to take care of himself and likely will need jail nursing home care eventually after he gets the chance to improve with rehab.  As he has gradually improved here with therapy and has better participation he could benefit from consideration for inpatient rehab.      Interval History:  No complaints, feels fine, pleasant and cooperative.  Discussed results of flex sig and progress of placing patient with his son Forest.    Review of Systems   Constitutional:  Negative for chills and fever.   Respiratory:  Negative for cough and shortness of breath.    Cardiovascular:  Negative for chest pain and palpitations.   Gastrointestinal:  Negative for abdominal pain, nausea and vomiting.   Objective:     Vital Signs (Most Recent):  Temp: 97.9 °F (36.6 °C) (03/28/22 1151)  Pulse: 85 (03/28/22 1151)  Resp: 18 (03/28/22 1151)  BP: 115/72 (03/28/22 1151)  SpO2: 100 % (03/28/22 1151) Vital Signs (24h Range):  Temp:  [97.7 °F (36.5 °C)-98.6 °F (37 °C)] 97.9 °F (36.6 °C)  Pulse:  [78-98] 85  Resp:  [16-20] 18  SpO2:  [94 %-100 %] 100 %  BP:  (106-134)/(59-79) 115/72     Weight: 66.2 kg (145 lb 15.1 oz)  Body mass index is 19.79 kg/m².    Intake/Output Summary (Last 24 hours) at 3/28/2022 1619  Last data filed at 3/28/2022 1600  Gross per 24 hour   Intake 610 ml   Output 2425 ml   Net -1815 ml      Physical Exam  Constitutional:       General: He is not in acute distress.     Comments: Thin   HENT:      Mouth/Throat:      Comments: Edentulous  Cardiovascular:      Rate and Rhythm: Normal rate and regular rhythm.      Pulses: Normal pulses.      Heart sounds: Normal heart sounds. No murmur heard.    No gallop.   Pulmonary:      Effort: Pulmonary effort is normal.      Breath sounds: Normal breath sounds.   Abdominal:      General: Bowel sounds are normal. There is no distension.      Palpations: Abdomen is soft.      Tenderness: There is no abdominal tenderness.   Skin:     General: Skin is warm and dry.   Neurological:      Mental Status: He is alert.      Comments: Not oriented to time.       Significant Labs: All pertinent labs within the past 24 hours have been reviewed.    Significant Imaging: I have reviewed all pertinent imaging results/findings within the past 24 hours.      Assessment/Plan:      * Acute stroke due to ischemia  - MRI showed areas of diffusion signal hyperintensity consistent with areas of acute ischemia/infarct involving the left cerebral hemisphere, the most prominent measuring approximately 1.4 cm, also a small focus of the right frontal lobe.  - Patient on full dose anticoagulation currently due to acute bilateral DVT.  - Sinus rhythm noted throughout hospital stay  - Risk factor modification - control diabetes, continue statin.  - RPR, HIV non reactive.  B12 low normal.   - CTA showed a focal segment of 60-70% stenosis involving the proximal to mid left vertebral artery that was new when compared to the prior study of 09/25/2019.  No evidence of large vessel intracranial occlusion.   - Neurology noted symptoms do not  correspond to stroke location, although the frontal lobe stroke might have something to do with his lack of motivation, and Speech has noted he has had delayed swallowing.  - Echo with bubble study done, no shunt seen.  - Follow up with vascular neurology in 4 weeks.  - Therapy recommended rehab initially but changed recommendation to SNF due to his low interest in participation.  - Started Lexapro due to suspicion for depression.  - Psych consulted, changed to Prozac and started thiamine, folic acid, MVI due to previous history of alcohol abuse, although Wernicke encephalopathy is not suspected.  - Re-consulted SNF for rehab from the stroke, as discharging him to a shelter would be inappropriate and unsafe for him.  Discussed with his son, Forest, at length on 3/10.  oFrest is out of town on a job but will be returning to [place] eventually.  He agrees his father will eventually need nursing home care as he is unable to care for himself.  - SNF still denying patient.  Will pursue rehab placement vs LTAC      Acute deep vein thrombosis (DVT) of both femoral veins  Last ultrasound showed partially occlusive DVTs, now completely occlusive DVT of multiple lower extremity veins on ultrasound.  Patient reports compliance with warfarin but his INR is only one.  He is homeless but says he does not have difficulty obtaining his medications.  Does not seem to care about anything, which again might be due to the frontal lobe stroke.  D-dimer was markedly elevated on presentation and there was a question of possible PE, but he had a CTA of the brain/neck on presentation so could not get another CTA for another 48 hours.    V/Q scan was done, showed low probability for PE.  2D echo showed grade I diastolic dysfunction.  Warfarin was restarted with bridging Lovenox, but INR was still low.  Changed to apixaban and discontinued Lovenox.    Abnormal CT scan, gastrointestinal tract  CT showed thickening of the rectal wall with  colonoscopy recommended for further evaluation.  Patient was unable to tolerate the prep.  GI spoke to him and he agreed to a flex sig after an enema.  Flex sig negative for abnormalities other than internal hemorrhoids.      Advance care planning        Severe protein-calorie malnutrition  Diet as tolerated  Patient seen by dietician, recommendations made.      Debility  PT/OT recommending rehab vs SNF after stroke with debility/poor motivation  Having difficulty finding placement for unclear reasons.  Will continue to pursue this as above.  Again, patient has been pleasant and cooperative.      Type 2 diabetes mellitus with hyperglycemia, without long-term current use of insulin  Reportedly he is on metformin and glipizide, both held.  He had 4+ sugar in urine and HbA1c was 10.3, and he was hyperglycemic on presentation.  Started on Levemir and ISS, then increased Levemir to 25 units daily  Added scheduled novolog 5 units tid, increased to 7 units with improvement  OK to resume metformin.    Increased levemir to 28 units daily with good improvement.  Held metformin with contrast with CT.  BG lower now off metformin, has had a few low BG as well due to being on clear liquids for colonoscopy prep.  Levemir decreased to 20 units daily.    As expected BG increased when diet was resumed so increased Levemir back to 28 with good results.    Hyperlipidemia  Continue atorvastatin status post stroke.        VTE Risk Mitigation (From admission, onward)         Ordered     Place sequential compression device  Until discontinued         01/31/22 0533                Discharge Planning   EFRAÍN:      Code Status: Full Code   Is the patient medically ready for discharge?:     Reason for patient still in hospital (select all that apply): Pending disposition  Discharge Plan A: Rehab   Discharge Delays:  (Still no accepting facilites.)              Mojgan Meza MD  Department of Hospital Medicine   [facility])

## 2022-03-28 NOTE — SUBJECTIVE & OBJECTIVE
Interval History:  No complaints, feels fine, pleasant and cooperative.  Discussed results of flex sig and progress of placing patient with his son Forest.    Review of Systems   Constitutional:  Negative for chills and fever.   Respiratory:  Negative for cough and shortness of breath.    Cardiovascular:  Negative for chest pain and palpitations.   Gastrointestinal:  Negative for abdominal pain, nausea and vomiting.   Objective:     Vital Signs (Most Recent):  Temp: 97.9 °F (36.6 °C) (03/28/22 1151)  Pulse: 85 (03/28/22 1151)  Resp: 18 (03/28/22 1151)  BP: 115/72 (03/28/22 1151)  SpO2: 100 % (03/28/22 1151) Vital Signs (24h Range):  Temp:  [97.7 °F (36.5 °C)-98.6 °F (37 °C)] 97.9 °F (36.6 °C)  Pulse:  [78-98] 85  Resp:  [16-20] 18  SpO2:  [94 %-100 %] 100 %  BP: (106-134)/(59-79) 115/72     Weight: 66.2 kg (145 lb 15.1 oz)  Body mass index is 19.79 kg/m².    Intake/Output Summary (Last 24 hours) at 3/28/2022 1619  Last data filed at 3/28/2022 1600  Gross per 24 hour   Intake 610 ml   Output 2425 ml   Net -1815 ml      Physical Exam  Constitutional:       General: He is not in acute distress.     Comments: Thin   HENT:      Mouth/Throat:      Comments: Edentulous  Cardiovascular:      Rate and Rhythm: Normal rate and regular rhythm.      Pulses: Normal pulses.      Heart sounds: Normal heart sounds. No murmur heard.    No gallop.   Pulmonary:      Effort: Pulmonary effort is normal.      Breath sounds: Normal breath sounds.   Abdominal:      General: Bowel sounds are normal. There is no distension.      Palpations: Abdomen is soft.      Tenderness: There is no abdominal tenderness.   Skin:     General: Skin is warm and dry.   Neurological:      Mental Status: He is alert.      Comments: Not oriented to time.       Significant Labs: All pertinent labs within the past 24 hours have been reviewed.    Significant Imaging: I have reviewed all pertinent imaging results/findings within the past 24 hours.

## 2022-03-28 NOTE — ASSESSMENT & PLAN NOTE
- MRI showed areas of diffusion signal hyperintensity consistent with areas of acute ischemia/infarct involving the left cerebral hemisphere, the most prominent measuring approximately 1.4 cm, also a small focus of the right frontal lobe.  - Patient on full dose anticoagulation currently due to acute bilateral DVT.  - Sinus rhythm noted throughout hospital stay  - Risk factor modification - control diabetes, continue statin.  - RPR, HIV non reactive.  B12 low normal.   - CTA showed a focal segment of 60-70% stenosis involving the proximal to mid left vertebral artery that was new when compared to the prior study of 09/25/2019.  No evidence of large vessel intracranial occlusion.   - Neurology noted symptoms do not correspond to stroke location, although the frontal lobe stroke might have something to do with his lack of motivation, and Speech has noted he has had delayed swallowing.  - Echo with bubble study done, no shunt seen.  - Follow up with vascular neurology in 4 weeks.  - Therapy recommended rehab initially but changed recommendation to SNF due to his low interest in participation.  - Started Lexapro due to suspicion for depression.  - Psych consulted, changed to Prozac and started thiamine, folic acid, MVI due to previous history of alcohol abuse, although Wernicke encephalopathy is not suspected.  - Re-consulted SNF for rehab from the stroke, as discharging him to a shelter would be inappropriate and unsafe for him.  Discussed with his son, Forest, at length on 3/10.  Forest is out of town on a job but will be returning to [place] eventually.  He agrees his father will eventually need nursing home care as he is unable to care for himself.  - SNF still denying patient.  Will pursue rehab placement vs LTAC

## 2022-03-28 NOTE — PT/OT/SLP PROGRESS
Occupational Therapy   Treatment    Name: Forest Lopez  MRN: [id number]  Admitting Diagnosis:  Acute stroke due to ischemia  3 Days Post-Op    Recommendations:     Discharge Recommendations: rehabilitation facility, nursing facility, skilled, other (see comments) (SNF vs IPR)  Discharge Equipment Recommendations:  bedside commode, shower chair  Barriers to discharge:  Decreased caregiver support, Other (Comment) (below baseline)    Assessment:     Forest Lopez is a 72 y.o. male with a medical diagnosis of Acute stroke due to ischemia.  He presents with weakness, impaired endurance, impaired self care skills, impaired functional mobilty, gait instability, impaired balance, impaired cognition, decreased coordination, decreased safety awareness.     Rehab Prognosis:  Good; patient would benefit from acute skilled OT services to address these deficits and reach maximum level of function.       Plan:     Patient to be seen 3 x/week to address the above listed problems via self-care/home management, therapeutic activities, therapeutic exercises  · Plan of Care Expires: 04/02/22  · Plan of Care Reviewed with: patient    Subjective     Pain/Comfort:  · Pain Rating 1: 0/10    Objective:     Communicated with: RN prior to session.  Patient found HOB elevated with Condom Catheter, PICC line upon OT entry to room.    General Precautions: Standard, diabetic, fall   Orthopedic Precautions:N/A   Braces: N/A  Respiratory Status: Room air     Occupational Performance:     Bed Mobility:    · Supine > sitting: supervision with HOB elevated and use of bed rails     Functional Mobility/Transfers:  · Bed > chair: CGA to stand pivot    Therapeutic Activities:  · Pt participated in fine motor coordination and gross motor coordination activity while seated to improve coordination, accuracy, dynamic sitting balance and fine motor control for increased independence with ADLs and increased safety.   · Pt crumbled paper to make a ball and  throw paper ball into waste basket set at different distances, graded up with progress. Pt demonstrated decreased  strength for crumbling paper. Pt also with difficulty determining UE power needed for set distance. Pt benefited from cues to use compensatory strategies to improve aim and accuracy.   · Pt given newspaper articles to engage in leisure reading throughout the day.      [facility] [address] 6 Click ADL: 18    Treatment & Education:  OT role, plan of care, progression of goals, importance of continued OOB activity, fall prevention, ADL retraining, transfer retraining, therapeutic activities    Patient left up in chair with all lines intact, call button in reach, chair alarm on and RN notifiedEducation:  . [employer] on.     GOALS:   Multidisciplinary Problems     Occupational Therapy Goals        Problem: Occupational Therapy Goal    Goal Priority Disciplines Outcome Interventions   Occupational Therapy Goal     OT, PT/OT Ongoing, Progressing    Description: Goals to be met by: 3/28/2022    Patient will increase functional independence with ADLs by performing:    UE Dressing with [place].  LE Dressing with [place].  Grooming while standing at sink with Supervision.  Toileting from toilet with Supervision for hygiene and clothing management.   Toilet transfer to toilet with Supervision.                     Time Tracking:     OT Date of Treatment: 03/28/22  OT Start Time: 0937  OT Stop Time: 1007  OT Total Time (min): 30 min    Billable Minutes:Therapeutic Activity 30 minutes    OT/DUTCH: OT          3/28/2022

## 2022-03-28 NOTE — PLAN OF CARE
No change from yesterday.  Skin warm/dry.  No breakdown.  He pulled the sacral dressing off yet again.  He does however turn frequently while in the bed.  Left Midline flushed twice during the night shift to maintain patency.  Blood pressure WNL for patient history.  Glucometer checks were 228 and 110 respectively without sliding scale given.  Call bell within reach.  Room near nurse station. Attensitys camera #3 in use.  Rounding maintained per protocol.      Problem: Adult Inpatient Plan of Care  Goal: Plan of Care Review  Outcome: Ongoing, Progressing  Flowsheets (Taken 3/28/2022 0551)  Plan of Care Reviewed With: patient     Problem: Diabetes Comorbidity  Goal: Blood Glucose Level Within Targeted Range  Outcome: Ongoing, Progressing  Intervention: Monitor and Manage Glycemia  Flowsheets (Taken 3/28/2022 0551)  Glycemic Management: blood glucose monitored     Problem: Skin Injury Risk Increased  Goal: Skin Health and Integrity  Outcome: Ongoing, Progressing  Intervention: Optimize Skin Protection  Flowsheets (Taken 3/28/2022 0551)  Pressure Reduction Techniques: frequent weight shift encouraged  Pressure Reduction Devices: pressure-redistributing mattress utilized  Head of Bed (HOB) Positioning: HOB elevated     Problem: Coping Ineffective  Goal: Effective Coping  Outcome: Ongoing, Progressing  Intervention: Support and Enhance Coping Strategies  Flowsheets (Taken 3/28/2022 0551)  Supportive Measures:   decision-making supported   positive reinforcement provided   self-care encouraged   self-reflection promoted   self-responsibility promoted   verbalization of feelings encouraged  Family/Support System Care:   self-care encouraged   support provided     Problem: Fall Injury Risk  Goal: Absence of Fall and Fall-Related Injury  Outcome: Ongoing, Progressing  Intervention: Identify and Manage Contributors  Flowsheets (Taken 3/28/2022 0551)  Self-Care Promotion:   independence encouraged   BADL personal routines  maintained   safe use of adaptive equipment encouraged  Medication Review/Management:   medications reviewed   high-risk medications identified  Intervention: Promote Injury-Free Environment  Flowsheets (Taken 3/28/2022 0551)  Safety Promotion/Fall Prevention:   bed alarm set   Fall Risk signage in place   high risk medications identified   lighting adjusted   medications reviewed   nonskid shoes/socks when out of bed   /camera at bedside   room near unit station   side rails raised x 2   instructed to call staff for mobility     Problem: Adjustment to Illness (Sepsis/Septic Shock)  Goal: Optimal Coping  Outcome: Ongoing, Progressing  Intervention: Optimize Psychosocial Adjustment to Illness  Flowsheets (Taken 3/28/2022 0551)  Supportive Measures:   decision-making supported   positive reinforcement provided   self-care encouraged   self-reflection promoted   self-responsibility promoted   verbalization of feelings encouraged  Family/Support System Care:   self-care encouraged   support provided     Problem: Bleeding (Sepsis/Septic Shock)  Goal: Absence of Bleeding  Outcome: Ongoing, Progressing  Intervention: Monitor and Manage Bleeding  Flowsheets (Taken 3/28/2022 0551)  Bleeding Precautions: blood pressure closely monitored     Problem: Glycemic Control Impaired (Sepsis/Septic Shock)  Goal: Blood Glucose Level Within Desired Range  Outcome: Ongoing, Progressing  Intervention: Optimize Glycemic Control  Flowsheets (Taken 3/28/2022 0551)  Glycemic Management: blood glucose monitored     Problem: Infection Progression (Sepsis/Septic Shock)  Goal: Absence of Infection Signs and Symptoms  Outcome: Ongoing, Progressing

## 2022-03-29 ENCOUNTER — PATIENT OUTREACH (OUTPATIENT)
Dept: ADMINISTRATIVE | Facility: OTHER | Age: 72
End: 2022-03-29
Payer: MEDICARE

## 2022-03-29 LAB
POCT GLUCOSE: 149 MG/DL (ref 70–110)
POCT GLUCOSE: 185 MG/DL (ref 70–110)
POCT GLUCOSE: 236 MG/DL (ref 70–110)
POCT GLUCOSE: 267 MG/DL (ref 70–110)

## 2022-03-29 PROCEDURE — 25000003 PHARM REV CODE 250: Performed by: INTERNAL MEDICINE

## 2022-03-29 PROCEDURE — A4216 STERILE WATER/SALINE, 10 ML: HCPCS | Performed by: INTERNAL MEDICINE

## 2022-03-29 PROCEDURE — 11000001 HC ACUTE MED/SURG PRIVATE ROOM

## 2022-03-29 PROCEDURE — 97116 GAIT TRAINING THERAPY: CPT | Mod: CQ

## 2022-03-29 PROCEDURE — 99233 PR SUBSEQUENT HOSPITAL CARE,LEVL III: ICD-10-PCS | Mod: ,,, | Performed by: INTERNAL MEDICINE

## 2022-03-29 PROCEDURE — 94761 N-INVAS EAR/PLS OXIMETRY MLT: CPT

## 2022-03-29 PROCEDURE — 97110 THERAPEUTIC EXERCISES: CPT | Mod: CQ

## 2022-03-29 PROCEDURE — 99233 SBSQ HOSP IP/OBS HIGH 50: CPT | Mod: ,,, | Performed by: INTERNAL MEDICINE

## 2022-03-29 RX ORDER — POLYETHYLENE GLYCOL 3350 17 G/17G
17 POWDER, FOR SOLUTION ORAL DAILY
Status: DISCONTINUED | OUTPATIENT
Start: 2022-03-30 | End: 2022-04-27 | Stop reason: HOSPADM

## 2022-03-29 RX ADMIN — Medication 10 ML: at 09:03

## 2022-03-29 RX ADMIN — INSULIN ASPART 7 UNITS: 100 INJECTION, SOLUTION INTRAVENOUS; SUBCUTANEOUS at 09:03

## 2022-03-29 RX ADMIN — Medication 10 ML: at 05:03

## 2022-03-29 RX ADMIN — THIAMINE HCL TAB 100 MG 100 MG: 100 TAB at 09:03

## 2022-03-29 RX ADMIN — Medication 10 ML: at 12:03

## 2022-03-29 RX ADMIN — FLUOXETINE 20 MG: 20 CAPSULE ORAL at 09:03

## 2022-03-29 RX ADMIN — INSULIN ASPART 2 UNITS: 100 INJECTION, SOLUTION INTRAVENOUS; SUBCUTANEOUS at 12:03

## 2022-03-29 RX ADMIN — INSULIN ASPART 7 UNITS: 100 INJECTION, SOLUTION INTRAVENOUS; SUBCUTANEOUS at 05:03

## 2022-03-29 RX ADMIN — FOLIC ACID 1 MG: 1 TABLET ORAL at 09:03

## 2022-03-29 RX ADMIN — INSULIN ASPART 7 UNITS: 100 INJECTION, SOLUTION INTRAVENOUS; SUBCUTANEOUS at 12:03

## 2022-03-29 RX ADMIN — TAMSULOSIN HYDROCHLORIDE 0.4 MG: 0.4 CAPSULE ORAL at 09:03

## 2022-03-29 RX ADMIN — APIXABAN 5 MG: 2.5 TABLET, FILM COATED ORAL at 09:03

## 2022-03-29 RX ADMIN — ATORVASTATIN CALCIUM 80 MG: 20 TABLET, FILM COATED ORAL at 09:03

## 2022-03-29 RX ADMIN — THERA TABS 1 TABLET: TAB at 09:03

## 2022-03-29 NOTE — PLAN OF CARE
AAOx1(Person). POC reviewed. No significant events this shift. No complaints of pain. Pt is incontinent x2. Pt ambulates with assist. [employer] camera in room. Bed low and locked, side rails up x3, call light within reach.    Problem: Adult Inpatient Plan of Care  Goal: Plan of Care Review  Outcome: Ongoing, Progressing  Goal: Patient-Specific Goal (Individualized)  Outcome: Ongoing, Progressing  Goal: Absence of Hospital-Acquired Illness or Injury  Outcome: Ongoing, Progressing  Goal: Optimal Comfort and Wellbeing  Outcome: Ongoing, Progressing  Goal: Readiness for Transition of Care  Outcome: Ongoing, Progressing     Problem: Diabetes Comorbidity  Goal: Blood Glucose Level Within Targeted Range  Outcome: Ongoing, Progressing     Problem: Skin Injury Risk Increased  Goal: Skin Health and Integrity  Outcome: Ongoing, Progressing     Problem: Coping Ineffective  Goal: Effective Coping  Outcome: Ongoing, Progressing     Problem: Fall Injury Risk  Goal: Absence of Fall and Fall-Related Injury  Outcome: Ongoing, Progressing     Problem: Adjustment to Illness (Sepsis/Septic Shock)  Goal: Optimal Coping  Outcome: Ongoing, Progressing     Problem: Bleeding (Sepsis/Septic Shock)  Goal: Absence of Bleeding  Outcome: Ongoing, Progressing     Problem: Glycemic Control Impaired (Sepsis/Septic Shock)  Goal: Blood Glucose Level Within Desired Range  Outcome: Ongoing, Progressing     Problem: Infection Progression (Sepsis/Septic Shock)  Goal: Absence of Infection Signs and Symptoms  Outcome: Ongoing, Progressing     Problem: Nutrition Impaired (Sepsis/Septic Shock)  Goal: Optimal Nutrition Intake  Outcome: Ongoing, Progressing     Problem: Infection  Goal: Absence of Infection Signs and Symptoms  Outcome: Ongoing, Progressing     Problem: Spiritual Distress Risk or Actual  Goal: Spiritual Wellbeing  Outcome: Ongoing, Progressing

## 2022-03-29 NOTE — PT/OT/SLP PROGRESS
Physical Therapy Treatment    Patient Name:  Forest Lopez   MRN:  [id number]    Recommendations:     Discharge Recommendations:   (IRF vs SNF)   Discharge Equipment Recommendations: bedside commode, shower chair   Barriers to discharge: None    Assessment:     Forest Lopez is a 72 y.o. male admitted with a medical diagnosis of Acute stroke due to ischemia.  He presents with the following impairments/functional limitations:  weakness, gait instability, impaired balance, impaired cognition, impaired endurance, impaired functional mobilty, impaired self care skills, decreased coordination, decreased safety awareness.    Supine>sit with SBA  Sit>stand with SBA from bed (CGA from rollator seat)  Amb 200' with rollator and CGA-SBA, pt with decreased stability but no LoB  Chair>Bed with CGA and no AD, step transfer  Pt requires assist for OOB mobility 2/2 instability  Rec IRF vs SNF    Rehab Prognosis: Good; patient would benefit from acute skilled PT services to address these deficits and reach maximum level of function.    Recent Surgery: Procedure(s) (LRB):  COLONOSCOPY (N/A) 4 Days Post-Op    Plan:     During this hospitalization, patient to be seen 3 x/week to address the identified rehab impairments via gait training, therapeutic activities, therapeutic exercises, neuromuscular re-education and progress toward the following goals:    · Plan of Care Expires:  04/03/22    Subjective     Chief Complaint: none stated  Patient/Family Comments/goals: pt singing along to oldies  Pain/Comfort:  · Pain Rating 1: 0/10  · Pain Rating Post-Intervention 1: 0/10      Objective:     Communicated with nurse Elizalde prior to session.  Patient found HOB elevated with PICC line (Condom catheter doffed) upon PT entry to room.     General Precautions: Standard, diabetic, fall (liquid diet for GI procedure in AM)   Orthopedic Precautions:N/A   Braces:    Respiratory Status: Room air     Functional Mobility:  · Bed Mobility:     · Supine  to Sit: stand by assistance  · Transfers:     · Sit to Stand:  stand by assistance and contact guard assistance with rollator  · Chair to Bed: contact guard assistance with  no AD  using  Step Transfer  · Gait: 200' with rollator and CGA-SBA, pt with decreased stability but no LoB      AM-PAC 6 CLICK MOBILITY  Turning over in bed (including adjusting bedclothes, sheets and blankets)?: 3  Sitting down on and standing up from a chair with arms (e.g., wheelchair, bedside commode, etc.): 3  Moving from lying on back to sitting on the side of the bed?: 3  Moving to and from a bed to a chair (including a wheelchair)?: 3  Need to walk in hospital room?: 3  Climbing 3-5 steps with a railing?: 3  Basic Mobility Total Score: 18       Therapeutic Activities and Exercises:   Seated therex: LAQ, heel raises x 10-15  Pt agreed to perform SLS with CGA, but did not comply / was not able to lift foot off ground in static stance.  Gait training as noted    Patient left sitting edge of bed with all lines intact and call button in reach..    GOALS:   Multidisciplinary Problems     Physical Therapy Goals        Problem: Physical Therapy Goal    Goal Priority Disciplines Outcome Goal Variances Interventions   Physical Therapy Goal     PT, PT/OT Ongoing, Not Progressing     Description: Goals to be met by: 4/3/2022    Patient will increase functional independence with mobility by performin. Sit<>stand with SPV with LRAD.  2. Gait x 300 feet with SPV with LRAD.   3. Static standing in SLS with no UE support with SBA x10 sec.                      Time Tracking:     PT Received On: 22  PT Start Time: 1357     PT Stop Time: 1420  PT Total Time (min): 23 min     Billable Minutes: Gait Training 15 and Therapeutic Exercise 8    Treatment Type: Treatment  PT/PTA: PTA     PTA Visit Number: 2     2022

## 2022-03-29 NOTE — SUBJECTIVE & OBJECTIVE
Interval History: no acute issues, tolerating diet.    Review of Systems   Constitutional:  Negative for chills and fever.   HENT:  Negative for trouble swallowing.    Respiratory:  Negative for cough and shortness of breath.    Cardiovascular:  Negative for chest pain and leg swelling.   Gastrointestinal:  Negative for abdominal pain, blood in stool, nausea and vomiting.   Genitourinary:  Negative for dysuria and hematuria.   Neurological:  Negative for headaches.   Objective:     Vital Signs (Most Recent):  Temp: 97.6 °F (36.4 °C) (03/29/22 1205)  Pulse: 89 (03/29/22 1205)  Resp: 18 (03/29/22 1205)  BP: (!) 101/59 (03/29/22 1205)  SpO2: 99 % (03/29/22 1205)   Vital Signs (24h Range):  Temp:  [97.6 °F (36.4 °C)-98.5 °F (36.9 °C)] 97.6 °F (36.4 °C)  Pulse:  [] 89  Resp:  [15-20] 18  SpO2:  [95 %-100 %] 99 %  BP: (101-140)/(59-98) 101/59     Weight: 66.2 kg (145 lb 15.1 oz)  Body mass index is 19.79 kg/m².    Intake/Output Summary (Last 24 hours) at 3/29/2022 1552  Last data filed at 3/29/2022 0500  Gross per 24 hour   Intake --   Output 975 ml   Net -975 ml      Physical Exam  Vitals reviewed.   Constitutional:       General: He is not in acute distress.     Appearance: He is well-developed.   HENT:      Head: Normocephalic and atraumatic.   Eyes:      Extraocular Movements: Extraocular movements intact.      Pupils: Pupils are equal, round, and reactive to light.   Cardiovascular:      Rate and Rhythm: Normal rate and regular rhythm.   Pulmonary:      Effort: Pulmonary effort is normal. No respiratory distress.      Breath sounds: Normal breath sounds.   Abdominal:      General: Bowel sounds are normal. There is no distension.      Palpations: Abdomen is soft.      Tenderness: There is no abdominal tenderness.   Musculoskeletal:         General: No swelling. Normal range of motion.      Cervical back: Normal range of motion and neck supple.   Skin:     General: Skin is warm.      Findings: No rash.    Neurological:      General: No focal deficit present.      Mental Status: He is alert.      Comments: Oriented to person, place   Psychiatric:         Mood and Affect: Mood normal.         Behavior: Behavior normal.       Significant Labs: All pertinent labs within the past 24 hours have been reviewed.    Significant Imaging: I have reviewed all pertinent imaging results/findings within the past 24 hours.

## 2022-03-29 NOTE — PT/OT/SLP PROGRESS
Speech Language Pathology      Forest Lopez  MRN: [id number]    Patient not seen today secondary to Other (Comment). Pt politely declined to participate in tx at this time. However, SLP provided pt with therapeutic handout activity. Pt agreeable to complete handout later today. Will follow-up next available date 3/30/22.

## 2022-03-29 NOTE — PROGRESS NOTES
[facility] Medicine  Progress Note    Patient Name: Forest Lopez  MRN: [id number]  Patient Class: IP- Inpatient   Admission Date: 1/31/2022  Length of Stay: 57 days  Attending Physician: Alma Elizalde MD  Primary Care Provider: Julian Escobar        Subjective:     Principal Problem:Acute stroke due to ischemia        HPI:  Mr. Lopez is a 72 year old man who presented after a syncopal episode.  He reports he had been feeling well prior to the episode but then had a prodrome prior to passing out.  EMS was called, report patient's BP was low normal on their arrival, improved after an IV fluids bolus.  Patient denies chest pain or shortness of breath and says he does not feel weak currently although is rather tired.  When seen in the ED this morning he could barely express himself audibly.     Vital signs were normal on presentation here.  He is on warfarin for recurrent DVT, but his INR was 1, and d-dimer markedly elevated at 21.7.  Tox screen was positive for cocaine.  He had a CTA of the brain and neck done on presentation so CTA of the chest for PE study could not be done for 48 hours.  Ultrasound of the lower extremities showed extensive bilateral DVT.  On the right there was thrombosis of the common femoral vein, femoral vein, popliteal vein, one of the paired posterior tibial veins and both visualized peroneal veins.  On the left there was thrombosis of the common femoral vein, femoral vein and popliteal vein.  Patient was started on full dose Lovenox and admitted.    Medical history is from the chart as he is unable to give me much information.  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  He smokes 5-6 cigarettes/day and is not interested in quitting.  He is currently homeless and staying with a friend.  Despite the normal INR he is sure he  is taking his warfarin and is not having difficulty taking his medications.  I discussed his care with [facility] staff on the [facility] and patient had been lost to follow up since November 2021.      Overview/Hospital Course:  Patient presented to the ED after a syncopal episode and was found to have bilateral lower extremity DVTs and a low INR.  Tox screen was positive for cocaine.  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  He was started back on his warfarin with bridging Lovenox.  His 2D echo showed grade I diastolic dysfunction.  Neurology evaluated him and recommended echo with bubble study, which was negative for a shunt.    PT/OT evaluated him and recommended SNF placement versus inpatient rehab.  His INR was difficult to get therapeutic, and as he had no contraindication to a NOAC his warfarin was changed to apixaban, and the full dose Lovenox was discontinued.  As he appeared to be depressed and had no objection to starting an antidepressant Lexapro was started.  He had a fall in the hospital on 2/13, had gone to the bathroom for a BM with the nurse, and when she turned away while he was washing his hands he apparently lost his balance and fell.  He did not hit his head and did not lose consciousness, but his BP was low transiently and he was given a bolus of IV fluids.    Patient's mental status improved slowly, but he gradually became more talkative and was able to hold a conversation.  He was diagnosed with a polymicrobial UTI on 2/21 and completed treatment with antibiotics.  Psychiatry evaluated him on 3/3 and changed his antidepressant to Prozac as it can be more activating and patient was having difficulty with his level of motivation.      Patient had some episodes of nausea and vomiting and CT of the abdomen was done, with incidental finding of rectal wall thickening noted.  Colonoscopy was recommended, but  he was unable to finish the prep.  GI evaluated him and decided the patient would benefit from a flex sig to evaluate the rectum.  He had the procedure on 3/25 and no abnormalities were seen other than internal hemorrhoids.    Patient's discharge was delayed as he was not accepted by any SNF facilities in the area.  Reasons for the denials are unclear as he has a clear rehab diagnosis.  He has a history of drug use but has not shown any interest in getting any illicit or legal drugs since he has been here, and he has had no visitors that could have provided drugs to him.  He has been pleasant and cooperative while here although lacks motivation, likely due to the nature of the stroke affecting his frontal lobe.  He requires considerable encouragement to participate in therapy.  Discharging him to live on the street or shelter would not be appropriate as he would not be able to take care of himself and likely will need [facility] nursing home care eventually after he gets the chance to improve with rehab.  As he has gradually improved here with therapy and has better participation he could benefit from consideration for inpatient rehab.      Interval History: no acute issues, tolerating diet.    Review of Systems   Constitutional:  Negative for chills and fever.   HENT:  Negative for trouble swallowing.    Respiratory:  Negative for cough and shortness of breath.    Cardiovascular:  Negative for chest pain and leg swelling.   Gastrointestinal:  Negative for abdominal pain, blood in stool, nausea and vomiting.   Genitourinary:  Negative for dysuria and hematuria.   Neurological:  Negative for headaches.   Objective:     Vital Signs (Most Recent):  Temp: 97.6 °F (36.4 °C) (03/29/22 1205)  Pulse: 89 (03/29/22 1205)  Resp: 18 (03/29/22 1205)  BP: (!) 101/59 (03/29/22 1205)  SpO2: 99 % (03/29/22 1205)   Vital Signs (24h Range):  Temp:  [97.6 °F (36.4 °C)-98.5 °F (36.9 °C)] 97.6 °F (36.4 °C)  Pulse:  [] 89  Resp:   [15-20] 18  SpO2:  [95 %-100 %] 99 %  BP: (101-140)/(59-98) 101/59     Weight: 66.2 kg (145 lb 15.1 oz)  Body mass index is 19.79 kg/m².    Intake/Output Summary (Last 24 hours) at 3/29/2022 1552  Last data filed at 3/29/2022 0500  Gross per 24 hour   Intake --   Output 975 ml   Net -975 ml      Physical Exam  Vitals reviewed.   Constitutional:       General: He is not in acute distress.     Appearance: He is well-developed.   HENT:      Head: Normocephalic and atraumatic.   Eyes:      Extraocular Movements: Extraocular movements intact.      Pupils: Pupils are equal, round, and reactive to light.   Cardiovascular:      Rate and Rhythm: Normal rate and regular rhythm.   Pulmonary:      Effort: Pulmonary effort is normal. No respiratory distress.      Breath sounds: Normal breath sounds.   Abdominal:      General: Bowel sounds are normal. There is no distension.      Palpations: Abdomen is soft.      Tenderness: There is no abdominal tenderness.   Musculoskeletal:         General: No swelling. Normal range of motion.      Cervical back: Normal range of motion and neck supple.   Skin:     General: Skin is warm.      Findings: No rash.   Neurological:      General: No focal deficit present.      Mental Status: He is alert.      Comments: Oriented to person, place   Psychiatric:         Mood and Affect: Mood normal.         Behavior: Behavior normal.       Significant Labs: All pertinent labs within the past 24 hours have been reviewed.    Significant Imaging: I have reviewed all pertinent imaging results/findings within the past 24 hours.      Assessment/Plan:      * Acute stroke due to ischemia  - MRI showed areas of diffusion signal hyperintensity consistent with areas of acute ischemia/infarct involving the left cerebral hemisphere, the most prominent measuring approximately 1.4 cm, also a small focus of the right frontal lobe.  - Patient on full dose anticoagulation currently due to acute bilateral DVT.  - Sinus  rhythm noted throughout hospital stay  - Risk factor modification - control diabetes, continue statin.  - RPR, HIV non reactive.  B12 low normal.   - CTA showed a focal segment of 60-70% stenosis involving the proximal to mid left vertebral artery that was new when compared to the prior study of 09/25/2019.  No evidence of large vessel intracranial occlusion.   - Neurology noted symptoms do not correspond to stroke location, although the frontal lobe stroke might have something to do with his lack of motivation, and Speech has noted he has had delayed swallowing.  - Echo with bubble study done, no shunt seen.  - Follow up with vascular neurology in 4 weeks.  - Therapy recommended rehab initially but changed recommendation to SNF due to his low interest in participation.  - Started Lexapro due to suspicion for depression.  - Psych consulted, changed to Prozac and started thiamine, folic acid, MVI due to previous history of alcohol abuse, although Wernicke encephalopathy is not suspected.  - Re-consulted SNF for rehab from the stroke, as discharging him to a shelter would be inappropriate and unsafe for him.  Discussed with his son, Forest, at length on 3/10.  Forest is out of town on a job but will be returning to [place] eventually.  He agrees his father will eventually need nursing home care as he is unable to care for himself.  - SNF still denying patient.  Will pursue rehab placement vs LTAC      Abnormal CT scan, gastrointestinal tract  CT showed thickening of the rectal wall with colonoscopy recommended for further evaluation.  Patient was unable to tolerate the prep.  GI spoke to him and he agreed to a flex sig after an enema.  Flex sig negative for abnormalities other than internal hemorrhoids.      Advance care planning        Severe protein-calorie malnutrition  Diet as tolerated  Patient seen by dietician, recommendations made.      Debility  PT/OT recommending rehab vs SNF after stroke with  debility/poor motivation  Having difficulty finding placement for unclear reasons.  Will continue to pursue this as above.  Again, patient has been pleasant and cooperative.      Type 2 diabetes mellitus with hyperglycemia, without long-term current use of insulin  Reportedly he is on metformin and glipizide, both held.  He had 4+ sugar in urine and HbA1c was 10.3, and he was hyperglycemic on presentation.  Started on Levemir and ISS, then increased Levemir to 25 units daily  Added scheduled novolog 5 units tid, increased to 7 units with improvement  OK to resume metformin.    Increased levemir to 28 units daily with good improvement.  Held metformin with contrast with CT.  BG lower now off metformin, has had a few low BG as well due to being on clear liquids for colonoscopy prep.  Levemir decreased to 20 units daily.    As expected BG increased when diet was resumed so increased Levemir back to 28 with good results.    Acute deep vein thrombosis (DVT) of both femoral veins  Last ultrasound showed partially occlusive DVTs, now completely occlusive DVT of multiple lower extremity veins on ultrasound.  Patient reports compliance with warfarin but his INR is only one.  He is homeless but says he does not have difficulty obtaining his medications.  Does not seem to care about anything, which again might be due to the frontal lobe stroke.  D-dimer was markedly elevated on presentation and there was a question of possible PE, but he had a CTA of the brain/neck on presentation so could not get another CTA for another 48 hours.    V/Q scan was done, showed low probability for PE.  2D echo showed grade I diastolic dysfunction.  Warfarin was restarted with bridging Lovenox, but INR was still low.  Changed to apixaban and discontinued Lovenox.    Hyperlipidemia  Continue atorvastatin status post stroke.        VTE Risk Mitigation (From admission, onward)         Ordered     Place sequential compression device  Until  discontinued         01/31/22 0533                Discharge Planning   EFRAÍN:      Code Status: Full Code   Is the patient medically ready for discharge?:     Reason for patient still in hospital (select all that apply): Patient trending condition and Treatment  Discharge Plan A: Rehab   Discharge Delays:  (Still no accepting facilites.)              Alma Elizalde MD  Department of Hospital Medicine   [facility] ([place])

## 2022-03-30 ENCOUNTER — PATIENT OUTREACH (OUTPATIENT)
Dept: ADMINISTRATIVE | Facility: OTHER | Age: 72
End: 2022-03-30
Payer: MEDICARE

## 2022-03-30 LAB
POCT GLUCOSE: 146 MG/DL (ref 70–110)
POCT GLUCOSE: 200 MG/DL (ref 70–110)
POCT GLUCOSE: 206 MG/DL (ref 70–110)
POCT GLUCOSE: 237 MG/DL (ref 70–110)

## 2022-03-30 PROCEDURE — 99233 PR SUBSEQUENT HOSPITAL CARE,LEVL III: ICD-10-PCS | Mod: ,,, | Performed by: INTERNAL MEDICINE

## 2022-03-30 PROCEDURE — 25000003 PHARM REV CODE 250: Performed by: INTERNAL MEDICINE

## 2022-03-30 PROCEDURE — 97530 THERAPEUTIC ACTIVITIES: CPT

## 2022-03-30 PROCEDURE — 92507 TX SP LANG VOICE COMM INDIV: CPT

## 2022-03-30 PROCEDURE — 94761 N-INVAS EAR/PLS OXIMETRY MLT: CPT

## 2022-03-30 PROCEDURE — 99233 SBSQ HOSP IP/OBS HIGH 50: CPT | Mod: ,,, | Performed by: INTERNAL MEDICINE

## 2022-03-30 PROCEDURE — 11000001 HC ACUTE MED/SURG PRIVATE ROOM

## 2022-03-30 RX ADMIN — THIAMINE HCL TAB 100 MG 100 MG: 100 TAB at 08:03

## 2022-03-30 RX ADMIN — INSULIN ASPART 7 UNITS: 100 INJECTION, SOLUTION INTRAVENOUS; SUBCUTANEOUS at 08:03

## 2022-03-30 RX ADMIN — TAMSULOSIN HYDROCHLORIDE 0.4 MG: 0.4 CAPSULE ORAL at 08:03

## 2022-03-30 RX ADMIN — ATORVASTATIN CALCIUM 80 MG: 20 TABLET, FILM COATED ORAL at 08:03

## 2022-03-30 RX ADMIN — FLUOXETINE 20 MG: 20 CAPSULE ORAL at 08:03

## 2022-03-30 RX ADMIN — INSULIN ASPART 1 UNITS: 100 INJECTION, SOLUTION INTRAVENOUS; SUBCUTANEOUS at 11:03

## 2022-03-30 RX ADMIN — APIXABAN 5 MG: 2.5 TABLET, FILM COATED ORAL at 08:03

## 2022-03-30 RX ADMIN — FOLIC ACID 1 MG: 1 TABLET ORAL at 08:03

## 2022-03-30 RX ADMIN — INSULIN ASPART 7 UNITS: 100 INJECTION, SOLUTION INTRAVENOUS; SUBCUTANEOUS at 05:03

## 2022-03-30 RX ADMIN — INSULIN ASPART 7 UNITS: 100 INJECTION, SOLUTION INTRAVENOUS; SUBCUTANEOUS at 12:03

## 2022-03-30 RX ADMIN — THERA TABS 1 TABLET: TAB at 08:03

## 2022-03-30 NOTE — PROGRESS NOTES
[facility], [place], operated by [facility]  Progress Note    Patient Name: Forest Lopez  MRN: [id number]  Patient Class: IP- Inpatient   Admission Date: 1/31/2022  Length of Stay: 58 days  Attending Physician: Alma Elizalde MD  Primary Care Provider: Julian Escobar        Subjective:     Principal Problem:Acute stroke due to ischemia        HPI:  Mr. Lopez is a 72 year old man who presented after a syncopal episode.  He reports he had been feeling well prior to the episode but then had a prodrome prior to passing out.  EMS was called, report patient's BP was low normal on their arrival, improved after an IV fluids bolus.  Patient denies chest pain or shortness of breath and says he does not feel weak currently although is rather tired.  When seen in the ED this morning he could barely express himself audibly.     Vital signs were normal on presentation here.  He is on warfarin for recurrent DVT, but his INR was 1, and d-dimer markedly elevated at 21.7.  Tox screen was positive for cocaine.  He had a CTA of the brain and neck done on presentation so CTA of the chest for PE study could not be done for 48 hours.  Ultrasound of the lower extremities showed extensive bilateral DVT.  On the right there was thrombosis of the common femoral vein, femoral vein, popliteal vein, one of the paired posterior tibial veins and both visualized peroneal veins.  On the left there was thrombosis of the common femoral vein, femoral vein and popliteal vein.  Patient was started on full dose Lovenox and admitted.    Medical history is from the chart as he is unable to give me much information.  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  He smokes 5-6 cigarettes/day and is not interested in quitting.  He is currently homeless and staying with a friend.  Despite the normal INR he is sure he  is taking his warfarin and is not having difficulty taking his medications.  I discussed his care with [facility] staff on the [facility] and patient had been lost to follow up since November 2021.      Overview/Hospital Course:  Patient presented to the ED after a syncopal episode and was found to have bilateral lower extremity DVTs and a low INR.  Tox screen was positive for cocaine.  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  He was started back on his warfarin with bridging Lovenox.  His 2D echo showed grade I diastolic dysfunction.  Neurology evaluated him and recommended echo with bubble study, which was negative for a shunt.    PT/OT evaluated him and recommended SNF placement versus inpatient rehab.  His INR was difficult to get therapeutic, and as he had no contraindication to a NOAC his warfarin was changed to apixaban, and the full dose Lovenox was discontinued.  As he appeared to be depressed and had no objection to starting an antidepressant Lexapro was started.  He had a fall in the hospital on 2/13, had gone to the bathroom for a BM with the nurse, and when she turned away while he was washing his hands he apparently lost his balance and fell.  He did not hit his head and did not lose consciousness, but his BP was low transiently and he was given a bolus of IV fluids.    Patient's mental status improved slowly, but he gradually became more talkative and was able to hold a conversation.  He was diagnosed with a polymicrobial UTI on 2/21 and completed treatment with antibiotics.  Psychiatry evaluated him on 3/3 and changed his antidepressant to Prozac as it can be more activating and patient was having difficulty with his level of motivation.      Patient had some episodes of nausea and vomiting and CT of the abdomen was done, with incidental finding of rectal wall thickening noted.  Colonoscopy was recommended, but  he was unable to finish the prep.  GI evaluated him and decided the patient would benefit from a flex sig to evaluate the rectum.  He had the procedure on 3/25 and no abnormalities were seen other than internal hemorrhoids.    Patient's discharge was delayed as he was not accepted by any SNF facilities in the area.  Reasons for the denials are unclear as he has a clear rehab diagnosis.  He has a history of drug use but has not shown any interest in getting any illicit or legal drugs since he has been here, and he has had no visitors that could have provided drugs to him.  He has been pleasant and cooperative while here although lacks motivation, likely due to the nature of the stroke affecting his frontal lobe.  He requires considerable encouragement to participate in therapy.  Discharging him to live on the street or shelter would not be appropriate as he would not be able to take care of himself and likely will need [facility] nursing home care eventually after he gets the chance to improve with rehab.  As he has gradually improved here with therapy and has better participation he could benefit from consideration for inpatient rehab.      Interval History: no acute issues, tolerating diet.    Review of Systems   Constitutional:  Negative for chills and fever.   HENT:  Negative for trouble swallowing.    Respiratory:  Negative for cough and shortness of breath.    Cardiovascular:  Negative for chest pain and leg swelling.   Gastrointestinal:  Negative for abdominal pain, blood in stool, nausea and vomiting.   Genitourinary:  Negative for dysuria and hematuria.   Neurological:  Negative for headaches.   Objective:     Vital Signs (Most Recent):  Temp: 97.4 °F (36.3 °C) (03/30/22 1131)  Pulse: 85 (03/30/22 1131)  Resp: 16 (03/30/22 1131)  BP: 108/64 (03/30/22 1131)  SpO2: 97 % (03/30/22 1300)   Vital Signs (24h Range):  Temp:  [97.4 °F (36.3 °C)-98.3 °F (36.8 °C)] 97.4 °F (36.3 °C)  Pulse:  [79-88] 85  Resp:  [15-19]  16  SpO2:  [96 %-99 %] 97 %  BP: (108-139)/(64-83) 108/64     Weight: 66.2 kg (145 lb 15.1 oz)  Body mass index is 19.79 kg/m².    Intake/Output Summary (Last 24 hours) at 3/30/2022 1523  Last data filed at 3/30/2022 0400  Gross per 24 hour   Intake --   Output 700 ml   Net -700 ml        Physical Exam  Vitals reviewed.   Constitutional:       General: He is not in acute distress.     Appearance: He is well-developed.   HENT:      Head: Normocephalic and atraumatic.   Eyes:      Extraocular Movements: Extraocular movements intact.      Pupils: Pupils are equal, round, and reactive to light.   Cardiovascular:      Rate and Rhythm: Normal rate and regular rhythm.   Pulmonary:      Effort: Pulmonary effort is normal. No respiratory distress.      Breath sounds: Normal breath sounds.   Abdominal:      General: Bowel sounds are normal. There is no distension.      Palpations: Abdomen is soft.      Tenderness: There is no abdominal tenderness.   Musculoskeletal:         General: No swelling. Normal range of motion.      Cervical back: Normal range of motion and neck supple.   Skin:     General: Skin is warm.      Findings: No rash.   Neurological:      General: No focal deficit present.      Mental Status: He is alert.      Comments: Oriented to person, place   Psychiatric:         Mood and Affect: Mood normal.         Behavior: Behavior normal.       Significant Labs: All pertinent labs within the past 24 hours have been reviewed.    Significant Imaging: I have reviewed all pertinent imaging results/findings within the past 24 hours.      Assessment/Plan:      * Acute stroke due to ischemia  - MRI showed areas of diffusion signal hyperintensity consistent with areas of acute ischemia/infarct involving the left cerebral hemisphere, the most prominent measuring approximately 1.4 cm, also a small focus of the right frontal lobe.  - Patient on full dose anticoagulation currently due to acute bilateral DVT.  - Sinus rhythm  noted throughout hospital stay  - Risk factor modification - control diabetes, continue statin.  - RPR, HIV non reactive.  B12 low normal.   - CTA showed a focal segment of 60-70% stenosis involving the proximal to mid left vertebral artery that was new when compared to the prior study of 09/25/2019.  No evidence of large vessel intracranial occlusion.   - Neurology noted symptoms do not correspond to stroke location, although the frontal lobe stroke might have something to do with his lack of motivation, and Speech has noted he has had delayed swallowing.  - Echo with bubble study done, no shunt seen.  - Follow up with vascular neurology in 4 weeks.  - Therapy recommended rehab initially but changed recommendation to SNF due to his low interest in participation.  - Started Lexapro due to suspicion for depression.  - Psych consulted, changed to Prozac and started thiamine, folic acid, MVI due to previous history of alcohol abuse, although Wernicke encephalopathy is not suspected.  - Re-consulted SNF for rehab from the stroke, as discharging him to a shelter would be inappropriate and unsafe for him.  Discussed with his son, Forest, at length on 3/10.  Forest is out of town on a job but will be returning to [place] eventually.  He agrees his father will eventually need nursing home care as he is unable to care for himself.  - awaiting placement.      Abnormal CT scan, gastrointestinal tract  CT showed thickening of the rectal wall with colonoscopy recommended for further evaluation.  Patient was unable to tolerate the prep.  GI spoke to him and he agreed to a flex sig after an enema.  Flex sig negative for abnormalities other than internal hemorrhoids.      Advance care planning        Severe protein-calorie malnutrition  Diet as tolerated  Patient seen by dietician, recommendations made.      Debility  PT/OT recommending rehab vs SNF after stroke with debility/poor motivation  Having difficulty finding placement  for unclear reasons.  Will continue to pursue this as above.  Again, patient has been pleasant and cooperative.      Type 2 diabetes mellitus with hyperglycemia, without long-term current use of insulin  Reportedly he is on metformin and glipizide, both held.  He had 4+ sugar in urine and HbA1c was 10.3, and he was hyperglycemic on presentation.  Started on Levemir and ISS, then increased Levemir to 25 units daily  Added scheduled novolog 5 units tid, increased to 7 units with improvement  OK to resume metformin.    Increased levemir to 28 units daily with good improvement.  Held metformin with contrast with CT.  BG lower now off metformin, has had a few low BG as well due to being on clear liquids for colonoscopy prep.  Levemir decreased to 20 units daily.    As expected BG increased when diet was resumed so increased Levemir back to 28 with good results.    Acute deep vein thrombosis (DVT) of both femoral veins  Last ultrasound showed partially occlusive DVTs, now completely occlusive DVT of multiple lower extremity veins on ultrasound.  Patient reports compliance with warfarin but his INR is only one.  He is homeless but says he does not have difficulty obtaining his medications.  Does not seem to care about anything, which again might be due to the frontal lobe stroke.  D-dimer was markedly elevated on presentation and there was a question of possible PE, but he had a CTA of the brain/neck on presentation so could not get another CTA for another 48 hours.    V/Q scan was done, showed low probability for PE.  2D echo showed grade I diastolic dysfunction.  Warfarin was restarted with bridging Lovenox, but INR was still low.  Changed to apixaban and discontinued Lovenox.    Hyperlipidemia  Continue atorvastatin status post stroke.        VTE Risk Mitigation (From admission, onward)         Ordered     apixaban tablet 5 mg  2 times daily         03/29/22 1555     Place sequential compression device  Until  discontinued         01/31/22 0533                Discharge Planning   EFRAÍN:      Code Status: Full Code   Is the patient medically ready for discharge?:     Reason for patient still in hospital (select all that apply): Patient trending condition and Treatment  Discharge Plan A: New Nursing Home placement - long-term care facility   Discharge Delays: (!) Other (Pending acceptance in a facility.)              Alma Elizalde MD  Department of Hospital Medicine   [facility] ([place])

## 2022-03-30 NOTE — PLAN OF CARE
Recommendations  1.Recommend Huber BID to promote wound healing.   2.Continue consistent CHO mech soft diet with ONS Boost Glucose Control TID.   3.Encourage good PO intake as needed.    Goals: Pt to meet % EEN/EPN via PO intake by RD f/u.  Nutrition Goal Status: goal met  Communication of RD Recs:  (POC)

## 2022-03-30 NOTE — ASSESSMENT & PLAN NOTE
- MRI showed areas of diffusion signal hyperintensity consistent with areas of acute ischemia/infarct involving the left cerebral hemisphere, the most prominent measuring approximately 1.4 cm, also a small focus of the right frontal lobe.  - Patient on full dose anticoagulation currently due to acute bilateral DVT.  - Sinus rhythm noted throughout hospital stay  - Risk factor modification - control diabetes, continue statin.  - RPR, HIV non reactive.  B12 low normal.   - CTA showed a focal segment of 60-70% stenosis involving the proximal to mid left vertebral artery that was new when compared to the prior study of 09/25/2019.  No evidence of large vessel intracranial occlusion.   - Neurology noted symptoms do not correspond to stroke location, although the frontal lobe stroke might have something to do with his lack of motivation, and Speech has noted he has had delayed swallowing.  - Echo with bubble study done, no shunt seen.  - Follow up with vascular neurology in 4 weeks.  - Therapy recommended rehab initially but changed recommendation to SNF due to his low interest in participation.  - Started Lexapro due to suspicion for depression.  - Psych consulted, changed to Prozac and started thiamine, folic acid, MVI due to previous history of alcohol abuse, although Wernicke encephalopathy is not suspected.  - Re-consulted SNF for rehab from the stroke, as discharging him to a shelter would be inappropriate and unsafe for him.  Discussed with his son, Forest, at length on 3/10.  Forest is out of town on a job but will be returning to [place] eventually.  He agrees his father will eventually need nursing home care as he is unable to care for himself.  - awaiting placement.

## 2022-03-30 NOTE — PROGRESS NOTES
[facility] ([place])  Adult Nutrition  Progress Note    SUMMARY       Recommendations  1.Recommend Huber BID to promote wound healing.   2.Continue consistent CHO mech soft diet with ONS Boost Glucose Control TID.   3.Encourage good PO intake as needed.    Goals: Pt to meet % EEN/EPN via PO intake by RD f/u.  Nutrition Goal Status: goal met  Communication of RD Recs:  (POC)    Assessment and Plan  Nutrition Problem  swallowing difficulty     Related to (etiology):   stroke     Signs and Symptoms (as evidenced by):   Pt requiring texture modified diet and speech therapy services for difficulty swallowing     Interventions(treatment strategy):  Collaboration with other providers  Commercial beverage  Carbohydrate modified diet (diabetic)  Texture modified diet (IDDSI L5 MM)     Nutrition Diagnosis Status:   Continues       Reason for Assessment    Reason For Assessment: RD follow-up  Diagnosis:  (acute DVT)  Relevant Medical History: HTN, HLD, T2DM, BPH  Interdisciplinary Rounds: did not attend  General Information Comments: 3/30- RD f/u. Pt on consistent CHO diet mech soft. ONS- BGC TID. Tolerating without issues. Good appeite. PO intake 100%. Will cntinue to monitor. Wounds noted.  Nutrition Discharge Planning: Pt to follow a cardiac/ DM diet(vit K restriction as needed) as tolerated.    Nutrition Risk Screen    Nutrition Risk Screen: no indicators present    Nutrition/Diet History    Spiritual, Cultural Beliefs, [employer] Practices, Values that Affect Care: no  Factors Affecting Nutritional Intake: clear liquid diet (colon prep)    Anthropometrics    Temp: 97.4 °F (36.3 °C)  Height: 6' (182.9 cm)  Height (inches): 72 in  Weight Method: Bed Scale  Weight: 66.2 kg (145 lb 15.1 oz)  Weight (lb): 145.95 lb  Ideal Body Weight (IBW), Male: 178 lb  % Ideal Body Weight, Male (lb): 81.99 %  BMI (Calculated): 19.8  BMI Grade: 18.5-24.9 - normal     Lab/Procedures/Meds    Pertinent Labs Reviewed:  reviewed  Pertinent Labs Comments: none  Pertinent Medications Reviewed: reviewed  Pertinent Medications Comments: apixaban, atorvastatin, fluoxetine, folic acid, insulin aspart, insulin determir, MV, polyethylene glycol, tamsulosin, thiamine    Estimated/Assessed Needs    Weight Used For Calorie Calculations: 63 kg (138 lb 14.2 oz)  Energy Calorie Requirements (kcal): 1980 kcal day (MSJ X AF 1.4)  Energy Need Method: [place]-St Jeor  Protein Requirements: 50-63 g day (0.8-1.0 g/kg)  Weight Used For Protein Calculations: 63 kg (138 lb 14.2 oz)     Estimated Fluid Requirement Method: RDA Method  RDA Method (mL): 1980  CHO Requirement: 248 g day    Nutrition Prescription Ordered    Current Diet Order: consistent carb/ mech soft  Oral Nutrition Supplement: Boost Glucose TID    Evaluation of Received Nutrient/Fluid Intake    Energy Calories Required: meeting needs  Protein Required: meeting needs  Fluid Required: meeting needs  Comments: LBM 3/29  Tolerance: tolerating  % Intake of Estimated Energy Needs: 75 - 100 %  % Meal Intake: 75 - 100 %    Nutrition Risk    Level of Risk/Frequency of Follow-up: low (1x weekly)     Monitor and Evaluation    Food and Nutrient Intake: energy intake, food and beverage intake  Food and Nutrient Adminstration: diet order  Knowledge/Beliefs/Attitudes: food and nutrition knowledge/skill  Physical Activity and Function: nutrition-related ADLs and IADLs  Anthropometric Measurements: weight, weight change, body mass index  Biochemical Data, Medical Tests and Procedures: glucose/endocrine profile, gastrointestinal profile, electrolyte and renal panel, inflammatory profile, lipid profile  Nutrition-Focused Physical Findings: overall appearance     Nutrition Follow-Up    RD Follow-up?: Yes

## 2022-03-30 NOTE — PT/OT/SLP PROGRESS
Occupational Therapy   Treatment    Name: Forest Lopez  MRN: [id number]  Admitting Diagnosis:  Acute stroke due to ischemia  5 Days Post-Op    Recommendations:     Discharge Recommendations: rehabilitation facility, nursing facility, skilled  Discharge Equipment Recommendations:  bedside commode, shower chair  Barriers to discharge:  Decreased caregiver support    Assessment:     Forest Lopez is a 72 y.o. male with a medical diagnosis of Acute stroke due to ischemia.  He presents with weakness, impaired endurance, impaired self care skills, impaired functional mobilty, impaired cognition, impaired balance, gait instability, decreased coordination, decreased safety awareness.     Rehab Prognosis:  Good; patient would benefit from acute skilled OT services to address these deficits and reach maximum level of function.       Plan:     Patient to be seen 3 x/week to address the above listed problems via self-care/home management, therapeutic activities, therapeutic exercises  · Plan of Care Expires: 04/02/22  · Plan of Care Reviewed with: patient    Subjective     Pain/Comfort:  · Pain Rating 1: 0/10    Objective:     Communicated with: RN prior to session.  Patient found right sidelying with Condom Catheter, PICC line upon OT entry to room.    General Precautions: Standard, diabetic, fall   Orthopedic Precautions:N/A   Braces: N/A  Respiratory Status: Room air     Occupational Performance:     Bed Mobility:    · Pt modified independent for rolling using bed rails      Therapeutic Activity:  · Pt participated in meaningful therapeutic activity and relaxation activity. Pt stated he is tired today. Participated in therapeutic activity involving music and education on progression of goals and importance of continued OOB activity.       [facility] 6 Click ADL: 18    Treatment & Education:  OT role, plan of care, progression of goals, importance of continued OOB activity, active listening, therapeutic use of self. meaningful  occupations    Patient left right sidelying with all lines intact and call button in reachEducation:      GOALS:   Multidisciplinary Problems     Occupational Therapy Goals        Problem: Occupational Therapy Goal    Goal Priority Disciplines Outcome Interventions   Occupational Therapy Goal     OT, PT/OT Ongoing, Progressing    Description: Goals to be met by: 3/28/2022    Patient will increase functional independence with ADLs by performing:    UE Dressing with [place].  LE Dressing with [place].  Grooming while standing at sink with Supervision.  Toileting from toilet with Supervision for hygiene and clothing management.   Toilet transfer to toilet with Supervision.                     Time Tracking:     OT Date of Treatment: 03/30/22  OT Start Time: 1359  OT Stop Time: 1414  OT Total Time (min): 15 min    Billable Minutes:Therapeutic Activity 15 minutes    OT/DUTCH: OT          3/30/2022

## 2022-03-30 NOTE — SUBJECTIVE & OBJECTIVE
Interval History: no acute issues, tolerating diet.    Review of Systems   Constitutional:  Negative for chills and fever.   HENT:  Negative for trouble swallowing.    Respiratory:  Negative for cough and shortness of breath.    Cardiovascular:  Negative for chest pain and leg swelling.   Gastrointestinal:  Negative for abdominal pain, blood in stool, nausea and vomiting.   Genitourinary:  Negative for dysuria and hematuria.   Neurological:  Negative for headaches.   Objective:     Vital Signs (Most Recent):  Temp: 97.4 °F (36.3 °C) (03/30/22 1131)  Pulse: 85 (03/30/22 1131)  Resp: 16 (03/30/22 1131)  BP: 108/64 (03/30/22 1131)  SpO2: 97 % (03/30/22 1300)   Vital Signs (24h Range):  Temp:  [97.4 °F (36.3 °C)-98.3 °F (36.8 °C)] 97.4 °F (36.3 °C)  Pulse:  [79-88] 85  Resp:  [15-19] 16  SpO2:  [96 %-99 %] 97 %  BP: (108-139)/(64-83) 108/64     Weight: 66.2 kg (145 lb 15.1 oz)  Body mass index is 19.79 kg/m².    Intake/Output Summary (Last 24 hours) at 3/30/2022 1523  Last data filed at 3/30/2022 0400  Gross per 24 hour   Intake --   Output 700 ml   Net -700 ml        Physical Exam  Vitals reviewed.   Constitutional:       General: He is not in acute distress.     Appearance: He is well-developed.   HENT:      Head: Normocephalic and atraumatic.   Eyes:      Extraocular Movements: Extraocular movements intact.      Pupils: Pupils are equal, round, and reactive to light.   Cardiovascular:      Rate and Rhythm: Normal rate and regular rhythm.   Pulmonary:      Effort: Pulmonary effort is normal. No respiratory distress.      Breath sounds: Normal breath sounds.   Abdominal:      General: Bowel sounds are normal. There is no distension.      Palpations: Abdomen is soft.      Tenderness: There is no abdominal tenderness.   Musculoskeletal:         General: No swelling. Normal range of motion.      Cervical back: Normal range of motion and neck supple.   Skin:     General: Skin is warm.      Findings: No rash.    Neurological:      General: No focal deficit present.      Mental Status: He is alert.      Comments: Oriented to person, place   Psychiatric:         Mood and Affect: Mood normal.         Behavior: Behavior normal.       Significant Labs: All pertinent labs within the past 24 hours have been reviewed.    Significant Imaging: I have reviewed all pertinent imaging results/findings within the past 24 hours.

## 2022-03-30 NOTE — PT/OT/SLP PROGRESS
Speech Language Pathology Treatment    Patient Name:  Forest Lopez   MRN:  [id number]  Admitting Diagnosis: Acute stroke due to ischemia    Recommendations:                  General Recommendations:   1. Speech pathology to continue to follow 2-3x/week for ongoing assessment of cognitive-communicative deficits s/p acute infarcts of L cerebral hemisphere     Diet recommendations: Solids: Mechanical soft solids, Liquids: Thin      Aspiration Precautions: Eliminate distractions, Feed only when awake/alert, Frequent oral care and HOB to 90 degrees      General Precautions: Standard,       Communication strategies:  Reduce informational content/context; frequent repetition and cues to redirect    Subjective     · Pt awake/alert, sitting upright in bed. Initially declining SLP session. However, with min verbal encouragement, pt agreeable to participate.    Respiratory Status: Room air    Objective:     Has the patient been evaluated by SLP for swallowing?   Yes  Keep patient NPO? No   Current Respiratory Status:    Room air    Cognitive-Communicative and Language Status:  Improving participation and motivation. Pt did not complete therapeutic task provided yesterday. However, pt reports ongoing interest and motivaton by these type of therapeutic activities. Targeting divided and sustained attention, as well as, visual scanning, pt completed word search. Divided attention targeted by SLP playing music during task. Improving initiation of task this date. Pt able to correctly complete 75% of word search within 25 minutes. Required mod verbal cues for redirection and assistance with complexity of task.    Education: Pt would benefit from ongoing SLP services at next level of care.     Assessment:     Forest Lopez is a 72 y.o. 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    Goals:   Multidisciplinary Problems     SLP Goals         Problem: SLP Goal    Goal Priority Disciplines Outcome   SLP Goal     SLP Ongoing, Progressing   Description: 1. Pt will consume a regular/thin diet without overt s/s of aspiration or airway threat without assistance.  2. Pt will increase orientation to person, place, situation and date with 90% acc given min assist.  3. Pt will follow 3-4 step commands to increase functional IND with 75% acc given min assist.   4. Pt will complete immediate and delayed recall tasks with 75% acc given mod assist.  5. Targeting word finding, pt will complete divergent naming tasks given 1-minute time frame with 50% acc given mod assist.  6. Ongoing cognitive-communicative assessment.     Updated Goals 2/8:  7. Pt will sustain attention to topic/task without distraction in 5-10 minute increments given mod verbal cues.                   Plan:     · Patient to be seen:  2 x/week, 3 x/week   · Plan of Care expires:  03/21/22  · Plan of Care reviewed with:  patient   · SLP Follow-Up:  Yes       Discharge recommendations: IRF vs. Skilled Nursing Facility     Time Tracking:     SLP Treatment Date:   03/30/22  Speech Start Time:  1040  Speech Stop Time:  1108     Speech Total Time (min):  28 min    Billable Minutes: Speech Therapy Individual 28 min    03/30/2022

## 2022-03-30 NOTE — PLAN OF CARE
03/29/22 1920   Post-Acute Status   Post-Acute Authorization Placement   Post-Acute Placement Status Referrals Sent   Coverage [employer] MANAGED MEDICARE - [employer] SNP (SPECIAL NEEDS PLAN)   Discharge Delays (!) Other  (Pending acceptance in a facility.)   Discharge Plan   Discharge Plan A New Nursing Home placement - [facility] care facility     The patient is being considered by [facility]. They are planning to  contact the patient's son, regarding the placement. Additional information sent to the   admission director.

## 2022-03-30 NOTE — NURSING
Pt resting in bed throughout day.  NAD noted.  No c/o pain.  Calm and cooperative.  Elevated BG, scheduled insulin given with meals.  One BM noted.  Incontinent of bladder and bowel.  Condom cath in place, clear yellow urine.  Call light in place.  Tray set up for meals, fair appetite.

## 2022-03-31 ENCOUNTER — PATIENT OUTREACH (OUTPATIENT)
Dept: ADMINISTRATIVE | Facility: OTHER | Age: 72
End: 2022-03-31
Payer: MEDICARE

## 2022-03-31 LAB
POCT GLUCOSE: 152 MG/DL (ref 70–110)
POCT GLUCOSE: 178 MG/DL (ref 70–110)
POCT GLUCOSE: 220 MG/DL (ref 70–110)
POCT GLUCOSE: 236 MG/DL (ref 70–110)

## 2022-03-31 PROCEDURE — 97116 GAIT TRAINING THERAPY: CPT | Mod: CQ

## 2022-03-31 PROCEDURE — 94761 N-INVAS EAR/PLS OXIMETRY MLT: CPT

## 2022-03-31 PROCEDURE — 99232 SBSQ HOSP IP/OBS MODERATE 35: CPT | Mod: ,,, | Performed by: INTERNAL MEDICINE

## 2022-03-31 PROCEDURE — 25000003 PHARM REV CODE 250: Performed by: INTERNAL MEDICINE

## 2022-03-31 PROCEDURE — 99232 PR SUBSEQUENT HOSPITAL CARE,LEVL II: ICD-10-PCS | Mod: ,,, | Performed by: INTERNAL MEDICINE

## 2022-03-31 PROCEDURE — 11000001 HC ACUTE MED/SURG PRIVATE ROOM

## 2022-03-31 RX ADMIN — THERA TABS 1 TABLET: TAB at 08:03

## 2022-03-31 RX ADMIN — FLUOXETINE 20 MG: 20 CAPSULE ORAL at 08:03

## 2022-03-31 RX ADMIN — INSULIN ASPART 7 UNITS: 100 INJECTION, SOLUTION INTRAVENOUS; SUBCUTANEOUS at 12:03

## 2022-03-31 RX ADMIN — INSULIN ASPART 2 UNITS: 100 INJECTION, SOLUTION INTRAVENOUS; SUBCUTANEOUS at 05:03

## 2022-03-31 RX ADMIN — APIXABAN 5 MG: 2.5 TABLET, FILM COATED ORAL at 08:03

## 2022-03-31 RX ADMIN — THIAMINE HCL TAB 100 MG 100 MG: 100 TAB at 08:03

## 2022-03-31 RX ADMIN — FOLIC ACID 1 MG: 1 TABLET ORAL at 08:03

## 2022-03-31 RX ADMIN — TAMSULOSIN HYDROCHLORIDE 0.4 MG: 0.4 CAPSULE ORAL at 09:03

## 2022-03-31 RX ADMIN — APIXABAN 5 MG: 2.5 TABLET, FILM COATED ORAL at 09:03

## 2022-03-31 RX ADMIN — ATORVASTATIN CALCIUM 80 MG: 20 TABLET, FILM COATED ORAL at 08:03

## 2022-03-31 RX ADMIN — INSULIN ASPART 7 UNITS: 100 INJECTION, SOLUTION INTRAVENOUS; SUBCUTANEOUS at 08:03

## 2022-03-31 RX ADMIN — INSULIN ASPART 7 UNITS: 100 INJECTION, SOLUTION INTRAVENOUS; SUBCUTANEOUS at 05:03

## 2022-03-31 NOTE — ASSESSMENT & PLAN NOTE
Reportedly he is on metformin and glipizide, both held.  He had 4+ sugar in urine and HbA1c was 10.3, and he was hyperglycemic on presentation.  Started on Levemir and ISS, then increased Levemir to 25 units daily  Added scheduled novolog 5 units tid, increased to 7 units with improvement  OK to resume metformin.    Increased levemir to 28 units daily with good improvement.  Held metformin with contrast with CT.  BG lower now off metformin, has had a few low BG as well due to being on clear liquids for colonoscopy prep.  Levemir decreased to 20 units daily.    As expected BG increased when diet was resumed so increased Levemir back to 28, will increase to 30 units as sugars on higher side.

## 2022-03-31 NOTE — PT/OT/SLP PROGRESS
Occupational Therapy      Patient Name:  Forest Lopez   MRN:  [id number]    Attempted to see pt twice this date.  On first attempt at 13:44 pt using the bathroom and requested OT to return later.  On second attempt at 14:34 pt resting in bed and declined participation in therapy.  OT educated pt on purpose and benefits of therapy in acute care setting.  Will follow-up at next scheduled therapy session.    MICHELLE Willett  3/31/2022

## 2022-03-31 NOTE — PROGRESS NOTES
RSW called patient and patient has no additional needs. Patient expressed his readiness to leave hospital and inquired where he was going. RSW explained to patient that patient's  is working diligently to get placement for patient.    SUMMER Wilson

## 2022-03-31 NOTE — PT/OT/SLP PROGRESS
Physical Therapy Treatment    Patient Name:  Forest Lopez   MRN:  [id number]    Recommendations:     Discharge Recommendations:   (IRF vs SNF)   Discharge Equipment Recommendations: bedside commode, shower chair   Barriers to discharge: None    Assessment:     Forest Lopez is a 72 y.o. male admitted with a medical diagnosis of Acute stroke due to ischemia.  He presents with the following impairments/functional limitations:  weakness, gait instability, impaired balance, impaired cognition, impaired endurance, impaired functional mobilty, impaired self care skills, decreased coordination, decreased safety awareness.    Supine<>sit with SBA  Sit>stand SBA from bed  Amb 3 x 45 ' with rollator and SBA, pt with decreased stability but no LoB, decreased B step length (shuffling steps)  Pt ambulating with short standing rest breaks, approaching new baseline for mobility  Rec SNF vs IRF  Rehab Prognosis: Good; patient would benefit from acute skilled PT services to address these deficits and reach maximum level of function.    Recent Surgery: Procedure(s) (LRB):  COLONOSCOPY (N/A) 6 Days Post-Op    Plan:     During this hospitalization, patient to be seen 3 x/week to address the identified rehab impairments via gait training, therapeutic activities, therapeutic exercises, neuromuscular re-education and progress toward the following goals:    · Plan of Care Expires:  04/03/22    Subjective     Chief Complaint: none stated  Patient/Family Comments/goals: I can't wait to get back to this bed  Pain/Comfort:  · Pain Rating 1: 0/10  · Pain Rating Post-Intervention 1: 0/10      Objective:     Communicated with nurse Parra prior to session.  Patient found HOB elevated with PICC line, Condom Catheter upon PT entry to room.     General Precautions: Standard, diabetic, fall (liquid diet for GI procedure in AM)   Orthopedic Precautions:N/A   Braces:    Respiratory Status: Room air     Functional Mobility:  · Bed Mobility:     · Supine  to Sit: stand by assistance  · Sit to Supine: stand by assistance  · Transfers:     · Sit to Stand:  stand by assistance with 4 wheeled walker  · Gait:  3 x 45 ' with rollator and SBA, pt with decreased stability but no LoB, decreased B step length (shuffling steps)      AM-PAC 6 CLICK MOBILITY  Turning over in bed (including adjusting bedclothes, sheets and blankets)?: 3  Sitting down on and standing up from a chair with arms (e.g., wheelchair, bedside commode, etc.): 3  Moving from lying on back to sitting on the side of the bed?: 3  Moving to and from a bed to a chair (including a wheelchair)?: 3  Need to walk in hospital room?: 3  Climbing 3-5 steps with a railing?: 3  Basic Mobility Total Score: 18       Therapeutic Activities and Exercises:   Gait training as noted.  Pt declined any further therapy    Patient left HOB elevated with all lines intact, call button in reach, nurse Aida notified and Crista telesitter present..    GOALS:   Multidisciplinary Problems     Physical Therapy Goals        Problem: Physical Therapy Goal    Goal Priority Disciplines Outcome Goal Variances Interventions   Physical Therapy Goal     PT, PT/OT Ongoing, Not Progressing     Description: Goals to be met by: 4/3/2022    Patient will increase functional independence with mobility by performin. Sit<>stand with SPV with LRAD.  2. Gait x 300 feet with SPV with LRAD.   3. Static standing in SLS with no UE support with SBA x10 sec.                      Time Tracking:     PT Received On: 22  PT Start Time: 1045     PT Stop Time: 1100  PT Total Time (min): 15 min     Billable Minutes: Gait Training 15    Treatment Type: Treatment  PT/PTA: PTA     PTA Visit Number: 3     2022

## 2022-03-31 NOTE — PROGRESS NOTES
[facility], [place], operated by [facility]  Progress Note    Patient Name: Forest Lopez  MRN: [id number]  Patient Class: IP- Inpatient   Admission Date: 1/31/2022  Length of Stay: 59 days  Attending Physician: Alma Elizalde MD  Primary Care Provider: Julian Escobar        Subjective:     Principal Problem:Acute stroke due to ischemia        HPI:  Mr. Lopez is a 72 year old man who presented after a syncopal episode.  He reports he had been feeling well prior to the episode but then had a prodrome prior to passing out.  EMS was called, report patient's BP was low normal on their arrival, improved after an IV fluids bolus.  Patient denies chest pain or shortness of breath and says he does not feel weak currently although is rather tired.  When seen in the ED this morning he could barely express himself audibly.     Vital signs were normal on presentation here.  He is on warfarin for recurrent DVT, but his INR was 1, and d-dimer markedly elevated at 21.7.  Tox screen was positive for cocaine.  He had a CTA of the brain and neck done on presentation so CTA of the chest for PE study could not be done for 48 hours.  Ultrasound of the lower extremities showed extensive bilateral DVT.  On the right there was thrombosis of the common femoral vein, femoral vein, popliteal vein, one of the paired posterior tibial veins and both visualized peroneal veins.  On the left there was thrombosis of the common femoral vein, femoral vein and popliteal vein.  Patient was started on full dose Lovenox and admitted.    Medical history is from the chart as he is unable to give me much information.  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  He smokes 5-6 cigarettes/day and is not interested in quitting.  He is currently homeless and staying with a friend.  Despite the normal INR he is sure he  is taking his warfarin and is not having difficulty taking his medications.  I discussed his care with [facility] staff on the [facility] and patient had been lost to follow up since November 2021.      Overview/Hospital Course:  Patient presented to the ED after a syncopal episode and was found to have bilateral lower extremity DVTs and a low INR.  Tox screen was positive for cocaine.  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  He was started back on his warfarin with bridging Lovenox.  His 2D echo showed grade I diastolic dysfunction.  Neurology evaluated him and recommended echo with bubble study, which was negative for a shunt.    PT/OT evaluated him and recommended SNF placement versus inpatient rehab.  His INR was difficult to get therapeutic, and as he had no contraindication to a NOAC his warfarin was changed to apixaban, and the full dose Lovenox was discontinued.  As he appeared to be depressed and had no objection to starting an antidepressant Lexapro was started.  He had a fall in the hospital on 2/13, had gone to the bathroom for a BM with the nurse, and when she turned away while he was washing his hands he apparently lost his balance and fell.  He did not hit his head and did not lose consciousness, but his BP was low transiently and he was given a bolus of IV fluids.    Patient's mental status improved slowly, but he gradually became more talkative and was able to hold a conversation.  He was diagnosed with a polymicrobial UTI on 2/21 and completed treatment with antibiotics.  Psychiatry evaluated him on 3/3 and changed his antidepressant to Prozac as it can be more activating and patient was having difficulty with his level of motivation.      Patient had some episodes of nausea and vomiting and CT of the abdomen was done, with incidental finding of rectal wall thickening noted.  Colonoscopy was recommended, but  he was unable to finish the prep.  GI evaluated him and decided the patient would benefit from a flex sig to evaluate the rectum.  He had the procedure on 3/25 and no abnormalities were seen other than internal hemorrhoids.    Patient's discharge was delayed as he was not accepted by any SNF facilities in the area.  Reasons for the denials are unclear as he has a clear rehab diagnosis.  He has a history of drug use but has not shown any interest in getting any illicit or legal drugs since he has been here, and he has had no visitors that could have provided drugs to him.  He has been pleasant and cooperative while here although lacks motivation, likely due to the nature of the stroke affecting his frontal lobe.  He requires considerable encouragement to participate in therapy.  Discharging him to live on the street or shelter would not be appropriate as he would not be able to take care of himself and likely will need [facility] nursing home care eventually after he gets the chance to improve with rehab.  As he has gradually improved here with therapy and has better participation he could benefit from consideration for inpatient rehab.      Interval History: no acute issues, tolerating diet, having daily bm.    Review of Systems   Constitutional:  Negative for chills and fever.   HENT:  Negative for trouble swallowing.    Respiratory:  Negative for cough and shortness of breath.    Cardiovascular:  Negative for chest pain and leg swelling.   Gastrointestinal:  Negative for abdominal pain, blood in stool, nausea and vomiting.   Genitourinary:  Negative for dysuria and hematuria.   Neurological:  Negative for headaches.   Objective:     Vital Signs (Most Recent):  Temp: 97.7 °F (36.5 °C) (03/31/22 1112)  Pulse: 93 (03/31/22 1112)  Resp: 18 (03/31/22 1112)  BP: 119/83 (03/31/22 1112)  SpO2: 95 % (03/31/22 1112)   Vital Signs (24h Range):  Temp:  [97.2 °F (36.2 °C)-98.1 °F (36.7 °C)] 97.7 °F (36.5 °C)  Pulse:  []  93  Resp:  [14-20] 18  SpO2:  [95 %-99 %] 95 %  BP: (102-130)/(55-83) 119/83     Weight: 66.2 kg (145 lb 15.1 oz)  Body mass index is 19.79 kg/m².    Intake/Output Summary (Last 24 hours) at 3/31/2022 1417  Last data filed at 3/30/2022 1600  Gross per 24 hour   Intake --   Output 1200 ml   Net -1200 ml        Physical Exam  Vitals reviewed.   Constitutional:       General: He is not in acute distress.     Appearance: He is well-developed.   HENT:      Head: Normocephalic and atraumatic.   Eyes:      Extraocular Movements: Extraocular movements intact.      Pupils: Pupils are equal, round, and reactive to light.   Cardiovascular:      Rate and Rhythm: Normal rate and regular rhythm.   Pulmonary:      Effort: Pulmonary effort is normal. No respiratory distress.      Breath sounds: Normal breath sounds.   Abdominal:      General: Bowel sounds are normal. There is no distension.      Palpations: Abdomen is soft.      Tenderness: There is no abdominal tenderness.   Musculoskeletal:         General: No swelling. Normal range of motion.      Cervical back: Normal range of motion and neck supple.   Skin:     General: Skin is warm.      Findings: No rash.   Neurological:      General: No focal deficit present.      Mental Status: He is alert.      Comments: Oriented to person, place   Psychiatric:         Mood and Affect: Mood normal.         Behavior: Behavior normal.       Significant Labs: All pertinent labs within the past 24 hours have been reviewed.    Significant Imaging: I have reviewed all pertinent imaging results/findings within the past 24 hours.      Assessment/Plan:      * Acute stroke due to ischemia  - MRI showed areas of diffusion signal hyperintensity consistent with areas of acute ischemia/infarct involving the left cerebral hemisphere, the most prominent measuring approximately 1.4 cm, also a small focus of the right frontal lobe.  - Patient on full dose anticoagulation currently due to acute bilateral  DVT.  - Sinus rhythm noted throughout hospital stay  - Risk factor modification - control diabetes, continue statin.  - RPR, HIV non reactive.  B12 low normal.   - CTA showed a focal segment of 60-70% stenosis involving the proximal to mid left vertebral artery that was new when compared to the prior study of 09/25/2019.  No evidence of large vessel intracranial occlusion.   - Neurology noted symptoms do not correspond to stroke location, although the frontal lobe stroke might have something to do with his lack of motivation, and Speech has noted he has had delayed swallowing.  - Echo with bubble study done, no shunt seen.  - Follow up with vascular neurology in 4 weeks.  - Therapy recommended rehab initially but changed recommendation to SNF due to his low interest in participation.  - Started Lexapro due to suspicion for depression.  - Psych consulted, changed to Prozac and started thiamine, folic acid, MVI due to previous history of alcohol abuse, although Wernicke encephalopathy is not suspected.  - Re-consulted SNF for rehab from the stroke, as discharging him to a shelter would be inappropriate and unsafe for him.  Discussed with his son, Forest, at length on 3/10.  Forest is out of town on a job but will be returning to [place] eventually.  He agrees his father will eventually need nursing home care as he is unable to care for himself.  - awaiting placement.      Abnormal CT scan, gastrointestinal tract  CT showed thickening of the rectal wall with colonoscopy recommended for further evaluation.  Patient was unable to tolerate the prep.  GI spoke to him and he agreed to a flex sig after an enema.  Flex sig negative for abnormalities other than internal hemorrhoids.      Advance care planning        Severe protein-calorie malnutrition  Diet as tolerated  Patient seen by dietician, recommendations made.      Debility  PT/OT recommending rehab vs SNF after stroke with debility/poor motivation  Having  difficulty finding placement for unclear reasons.  Will continue to pursue this as above.  Again, patient has been pleasant and cooperative.      Type 2 diabetes mellitus with hyperglycemia, without long-term current use of insulin  Reportedly he is on metformin and glipizide, both held.  He had 4+ sugar in urine and HbA1c was 10.3, and he was hyperglycemic on presentation.  Started on Levemir and ISS, then increased Levemir to 25 units daily  Added scheduled novolog 5 units tid, increased to 7 units with improvement  OK to resume metformin.    Increased levemir to 28 units daily with good improvement.  Held metformin with contrast with CT.  BG lower now off metformin, has had a few low BG as well due to being on clear liquids for colonoscopy prep.  Levemir decreased to 20 units daily.    As expected BG increased when diet was resumed so increased Levemir back to 28, will increase to 30 units as sugars on higher side.    Acute deep vein thrombosis (DVT) of both femoral veins  Last ultrasound showed partially occlusive DVTs, now completely occlusive DVT of multiple lower extremity veins on ultrasound.  Patient reports compliance with warfarin but his INR is only one.  He is homeless but says he does not have difficulty obtaining his medications.  Does not seem to care about anything, which again might be due to the frontal lobe stroke.  D-dimer was markedly elevated on presentation and there was a question of possible PE, but he had a CTA of the brain/neck on presentation so could not get another CTA for another 48 hours.    V/Q scan was done, showed low probability for PE.  2D echo showed grade I diastolic dysfunction.  Warfarin was restarted with bridging Lovenox, but INR was still low.  Changed to apixaban and discontinued Lovenox.    Hyperlipidemia  Continue atorvastatin status post stroke.        VTE Risk Mitigation (From admission, onward)         Ordered     apixaban tablet 5 mg  2 times daily         03/29/22  1555     Place sequential compression device  Until discontinued         01/31/22 0533                Discharge Planning   EFRAÍN:      Code Status: Full Code   Is the patient medically ready for discharge?:     Reason for patient still in hospital (select all that apply)pending placement  : Discharge Plan A: New Nursing Home placement - shelter care facility   Discharge Delays: (!) Other (Pending acceptance in a facility.)              Alma Elizalde MD  Department of Hospital Medicine   [facility] ([place])

## 2022-03-31 NOTE — PLAN OF CARE
Pt in bed, no noted acute distress, no complaints of pain, AAO x 3, pt had x 2 BM during shift, VSS on RA, AVASYS in place for fall prevention, bed in low locked position, call light in reach, hourly rounds complete, will continue to assess

## 2022-03-31 NOTE — SUBJECTIVE & OBJECTIVE
Interval History: no acute issues, tolerating diet.    Review of Systems   Constitutional:  Negative for chills and fever.   HENT:  Negative for trouble swallowing.    Respiratory:  Negative for cough and shortness of breath.    Cardiovascular:  Negative for chest pain and leg swelling.   Gastrointestinal:  Negative for abdominal pain, blood in stool, nausea and vomiting.   Genitourinary:  Negative for dysuria and hematuria.   Neurological:  Negative for headaches.   Objective:     Vital Signs (Most Recent):  Temp: 97.6 °F (36.4 °C) (04/01/22 1121)  Pulse: 94 (04/01/22 1121)  Resp: 16 (04/01/22 1121)  BP: 113/62 (04/01/22 1121)  SpO2: 98 % (04/01/22 1121)   Vital Signs (24h Range):  Temp:  [97.5 °F (36.4 °C)-98.3 °F (36.8 °C)] 97.6 °F (36.4 °C)  Pulse:  [] 94  Resp:  [16-20] 16  SpO2:  [98 %-100 %] 98 %  BP: (102-148)/(62-79) 113/62     Weight: 66.2 kg (145 lb 15.1 oz)  Body mass index is 19.79 kg/m².    Intake/Output Summary (Last 24 hours) at 4/1/2022 1327  Last data filed at 4/1/2022 0900  Gross per 24 hour   Intake 850 ml   Output 1850 ml   Net -1000 ml        Physical Exam  Vitals reviewed.   Constitutional:       General: He is not in acute distress.     Appearance: He is well-developed.   HENT:      Head: Normocephalic and atraumatic.   Eyes:      Extraocular Movements: Extraocular movements intact.      Pupils: Pupils are equal, round, and reactive to light.   Cardiovascular:      Rate and Rhythm: Normal rate and regular rhythm.   Pulmonary:      Effort: Pulmonary effort is normal. No respiratory distress.      Breath sounds: Normal breath sounds.   Abdominal:      General: Bowel sounds are normal. There is no distension.      Palpations: Abdomen is soft.      Tenderness: There is no abdominal tenderness.   Musculoskeletal:         General: No swelling. Normal range of motion.      Cervical back: Normal range of motion and neck supple.   Skin:     General: Skin is warm.      Findings: No rash.    Neurological:      General: No focal deficit present.      Mental Status: He is alert.      Comments: Oriented to person, place   Psychiatric:         Mood and Affect: Mood normal.         Behavior: Behavior normal.       Significant Labs: All pertinent labs within the past 24 hours have been reviewed.    Significant Imaging: I have reviewed all pertinent imaging results/findings within the past 24 hours.

## 2022-03-31 NOTE — PROGRESS NOTES
[facility] Medicine  Progress Note    Patient Name: Forest Lopez  MRN: [id number]  Patient Class: IP- Inpatient   Admission Date: 1/31/2022  Length of Stay: 59 days  Attending Physician: Alma Elizalde MD  Primary Care Provider: Julian Escobar        Subjective:     Principal Problem:Acute stroke due to ischemia        HPI:  Mr. Lopez is a 72 year old man who presented after a syncopal episode.  He reports he had been feeling well prior to the episode but then had a prodrome prior to passing out.  EMS was called, report patient's BP was low normal on their arrival, improved after an IV fluids bolus.  Patient denies chest pain or shortness of breath and says he does not feel weak currently although is rather tired.  When seen in the ED this morning he could barely express himself audibly.     Vital signs were normal on presentation here.  He is on warfarin for recurrent DVT, but his INR was 1, and d-dimer markedly elevated at 21.7.  Tox screen was positive for cocaine.  He had a CTA of the brain and neck done on presentation so CTA of the chest for PE study could not be done for 48 hours.  Ultrasound of the lower extremities showed extensive bilateral DVT.  On the right there was thrombosis of the common femoral vein, femoral vein, popliteal vein, one of the paired posterior tibial veins and both visualized peroneal veins.  On the left there was thrombosis of the common femoral vein, femoral vein and popliteal vein.  Patient was started on full dose Lovenox and admitted.    Medical history is from the chart as he is unable to give me much information.  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  He smokes 5-6 cigarettes/day and is not interested in quitting.  He is currently homeless and staying with a friend.  Despite the normal INR he is sure he  is taking his warfarin and is not having difficulty taking his medications.  I discussed his care with [facility] staff on the [facility] and patient had been lost to follow up since November 2021.      Overview/Hospital Course:  Patient presented to the ED after a syncopal episode and was found to have bilateral lower extremity DVTs and a low INR.  Tox screen was positive for cocaine.  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  He was started back on his warfarin with bridging Lovenox.  His 2D echo showed grade I diastolic dysfunction.  Neurology evaluated him and recommended echo with bubble study, which was negative for a shunt.    PT/OT evaluated him and recommended SNF placement versus inpatient rehab.  His INR was difficult to get therapeutic, and as he had no contraindication to a NOAC his warfarin was changed to apixaban, and the full dose Lovenox was discontinued.  As he appeared to be depressed and had no objection to starting an antidepressant Lexapro was started.  He had a fall in the hospital on 2/13, had gone to the bathroom for a BM with the nurse, and when she turned away while he was washing his hands he apparently lost his balance and fell.  He did not hit his head and did not lose consciousness, but his BP was low transiently and he was given a bolus of IV fluids.    Patient's mental status improved slowly, but he gradually became more talkative and was able to hold a conversation.  He was diagnosed with a polymicrobial UTI on 2/21 and completed treatment with antibiotics.  Psychiatry evaluated him on 3/3 and changed his antidepressant to Prozac as it can be more activating and patient was having difficulty with his level of motivation.      Patient had some episodes of nausea and vomiting and CT of the abdomen was done, with incidental finding of rectal wall thickening noted.  Colonoscopy was recommended, but  he was unable to finish the prep.  GI evaluated him and decided the patient would benefit from a flex sig to evaluate the rectum.  He had the procedure on 3/25 and no abnormalities were seen other than internal hemorrhoids.    Patient's discharge was delayed as he was not accepted by any SNF facilities in the area.  Reasons for the denials are unclear as he has a clear rehab diagnosis.  He has a history of drug use but has not shown any interest in getting any illicit or legal drugs since he has been here, and he has had no visitors that could have provided drugs to him.  He has been pleasant and cooperative while here although lacks motivation, likely due to the nature of the stroke affecting his frontal lobe.  He requires considerable encouragement to participate in therapy.  Discharging him to live on the street or shelter would not be appropriate as he would not be able to take care of himself and likely will need care home nursing home care eventually after he gets the chance to improve with rehab.  As he has gradually improved here with therapy and has better participation he could benefit from consideration for inpatient rehab.      Interval History: no acute issues, tolerating diet, having daily bm.    Review of Systems   Constitutional:  Negative for chills and fever.   HENT:  Negative for trouble swallowing.    Respiratory:  Negative for cough and shortness of breath.    Cardiovascular:  Negative for chest pain and leg swelling.   Gastrointestinal:  Negative for abdominal pain, blood in stool, nausea and vomiting.   Genitourinary:  Negative for dysuria and hematuria.   Neurological:  Negative for headaches.   Objective:     Vital Signs (Most Recent):  Temp: 97.7 °F (36.5 °C) (03/31/22 1112)  Pulse: 93 (03/31/22 1112)  Resp: 18 (03/31/22 1112)  BP: 119/83 (03/31/22 1112)  SpO2: 95 % (03/31/22 1112)   Vital Signs (24h Range):  Temp:  [97.2 °F (36.2 °C)-98.1 °F (36.7 °C)] 97.7 °F (36.5 °C)  Pulse:  []  93  Resp:  [14-20] 18  SpO2:  [95 %-99 %] 95 %  BP: (102-130)/(55-83) 119/83     Weight: 66.2 kg (145 lb 15.1 oz)  Body mass index is 19.79 kg/m².    Intake/Output Summary (Last 24 hours) at 3/31/2022 1420  Last data filed at 3/30/2022 1600  Gross per 24 hour   Intake --   Output 1200 ml   Net -1200 ml        Physical Exam  Vitals reviewed.   Constitutional:       General: He is not in acute distress.     Appearance: He is well-developed.   HENT:      Head: Normocephalic and atraumatic.   Eyes:      Extraocular Movements: Extraocular movements intact.      Pupils: Pupils are equal, round, and reactive to light.   Cardiovascular:      Rate and Rhythm: Normal rate and regular rhythm.   Pulmonary:      Effort: Pulmonary effort is normal. No respiratory distress.      Breath sounds: Normal breath sounds.   Abdominal:      General: Bowel sounds are normal. There is no distension.      Palpations: Abdomen is soft.      Tenderness: There is no abdominal tenderness.   Musculoskeletal:         General: No swelling. Normal range of motion.      Cervical back: Normal range of motion and neck supple.   Skin:     General: Skin is warm.      Findings: No rash.   Neurological:      General: No focal deficit present.      Mental Status: He is alert.      Comments: Oriented to person, place   Psychiatric:         Mood and Affect: Mood normal.         Behavior: Behavior normal.       Significant Labs: All pertinent labs within the past 24 hours have been reviewed.    Significant Imaging: I have reviewed all pertinent imaging results/findings within the past 24 hours.      Assessment/Plan:      * Acute stroke due to ischemia  - MRI showed areas of diffusion signal hyperintensity consistent with areas of acute ischemia/infarct involving the left cerebral hemisphere, the most prominent measuring approximately 1.4 cm, also a small focus of the right frontal lobe.  - Patient on full dose anticoagulation currently due to acute bilateral  DVT.  - Sinus rhythm noted throughout hospital stay  - Risk factor modification - control diabetes, continue statin.  - RPR, HIV non reactive.  B12 low normal.   - CTA showed a focal segment of 60-70% stenosis involving the proximal to mid left vertebral artery that was new when compared to the prior study of 09/25/2019.  No evidence of large vessel intracranial occlusion.   - Neurology noted symptoms do not correspond to stroke location, although the frontal lobe stroke might have something to do with his lack of motivation, and Speech has noted he has had delayed swallowing.  - Echo with bubble study done, no shunt seen.  - Follow up with vascular neurology in 4 weeks.  - Therapy recommended rehab initially but changed recommendation to SNF due to his low interest in participation.  - Started Lexapro due to suspicion for depression.  - Psych consulted, changed to Prozac and started thiamine, folic acid, MVI due to previous history of alcohol abuse, although Wernicke encephalopathy is not suspected.  - Re-consulted SNF for rehab from the stroke, as discharging him to a shelter would be inappropriate and unsafe for him.  Discussed with his son, Forest, at length on 3/10.  Forest is out of town on a job but will be returning to [place] eventually.  He agrees his father will eventually need nursing home care as he is unable to care for himself.  - awaiting placement.      Abnormal CT scan, gastrointestinal tract  CT showed thickening of the rectal wall with colonoscopy recommended for further evaluation.  Patient was unable to tolerate the prep.  GI spoke to him and he agreed to a flex sig after an enema.  Flex sig negative for abnormalities other than internal hemorrhoids.      Advance care planning        Severe protein-calorie malnutrition  Diet as tolerated  Patient seen by dietician, recommendations made.      Debility  PT/OT recommending rehab vs SNF after stroke with debility/poor motivation  Having  difficulty finding placement for unclear reasons.  Will continue to pursue this as above.  Again, patient has been pleasant and cooperative.      Type 2 diabetes mellitus with hyperglycemia, without long-term current use of insulin  Reportedly he is on metformin and glipizide, both held.  He had 4+ sugar in urine and HbA1c was 10.3, and he was hyperglycemic on presentation.  Started on Levemir and ISS, then increased Levemir to 25 units daily  Added scheduled novolog 5 units tid, increased to 7 units with improvement  OK to resume metformin.    Increased levemir to 28 units daily with good improvement.  Held metformin with contrast with CT.  BG lower now off metformin, has had a few low BG as well due to being on clear liquids for colonoscopy prep.  Levemir decreased to 20 units daily.    As expected BG increased when diet was resumed so increased Levemir back to 28, will increase to 30 units as sugars on higher side.    Acute deep vein thrombosis (DVT) of both femoral veins  Last ultrasound showed partially occlusive DVTs, now completely occlusive DVT of multiple lower extremity veins on ultrasound.  Patient reports compliance with warfarin but his INR is only one.  He is homeless but says he does not have difficulty obtaining his medications.  Does not seem to care about anything, which again might be due to the frontal lobe stroke.  D-dimer was markedly elevated on presentation and there was a question of possible PE, but he had a CTA of the brain/neck on presentation so could not get another CTA for another 48 hours.    V/Q scan was done, showed low probability for PE.  2D echo showed grade I diastolic dysfunction.  Warfarin was restarted with bridging Lovenox, but INR was still low.  Changed to apixaban and discontinued Lovenox.    Hyperlipidemia  Continue atorvastatin status post stroke.        VTE Risk Mitigation (From admission, onward)         Ordered     apixaban tablet 5 mg  2 times daily         03/29/22  1555     Place sequential compression device  Until discontinued         01/31/22 0533                Discharge Planning   EFRAÍN:      Code Status: Full Code   Is the patient medically ready for discharge?:     Reason for patient still in hospital (select all that apply): Patient trending condition and Treatment  Discharge Plan A: New Nursing Home placement - correction care facility   Discharge Delays: (!) Other (Pending acceptance in a facility.)              Alma Elizalde MD  Department of Hospital Medicine   [facility] ([place])

## 2022-03-31 NOTE — NURSING
Pt resting in bed.  Up with therapy.  Call light within reach.  No c/o pain.  NAD noted.  Orientated to self and place.  No new needs expressed.  Good appetite.  Scheduled insulin given with meals.  Awaiting facility placement.

## 2022-03-31 NOTE — PROGRESS NOTES
SHANICE spoke with Heron at [facility] on the [facility], who at first said the patient would not be accepted at the facility. SHANICE asked that the DON/admit committee  Please reconsider. She agreed and asked SHANICE to send more information which SHANICE did. Awaiting an answer again..       Also called Gali at [facility]  SHANICE left a message for her as SHANICE has not received a final answer from this facility.

## 2022-03-31 NOTE — SUBJECTIVE & OBJECTIVE
Interval History: no acute issues, tolerating diet, having daily bm.    Review of Systems   Constitutional:  Negative for chills and fever.   HENT:  Negative for trouble swallowing.    Respiratory:  Negative for cough and shortness of breath.    Cardiovascular:  Negative for chest pain and leg swelling.   Gastrointestinal:  Negative for abdominal pain, blood in stool, nausea and vomiting.   Genitourinary:  Negative for dysuria and hematuria.   Neurological:  Negative for headaches.   Objective:     Vital Signs (Most Recent):  Temp: 97.7 °F (36.5 °C) (03/31/22 1112)  Pulse: 93 (03/31/22 1112)  Resp: 18 (03/31/22 1112)  BP: 119/83 (03/31/22 1112)  SpO2: 95 % (03/31/22 1112)   Vital Signs (24h Range):  Temp:  [97.2 °F (36.2 °C)-98.1 °F (36.7 °C)] 97.7 °F (36.5 °C)  Pulse:  [] 93  Resp:  [14-20] 18  SpO2:  [95 %-99 %] 95 %  BP: (102-130)/(55-83) 119/83     Weight: 66.2 kg (145 lb 15.1 oz)  Body mass index is 19.79 kg/m².    Intake/Output Summary (Last 24 hours) at 3/31/2022 1417  Last data filed at 3/30/2022 1600  Gross per 24 hour   Intake --   Output 1200 ml   Net -1200 ml        Physical Exam  Vitals reviewed.   Constitutional:       General: He is not in acute distress.     Appearance: He is well-developed.   HENT:      Head: Normocephalic and atraumatic.   Eyes:      Extraocular Movements: Extraocular movements intact.      Pupils: Pupils are equal, round, and reactive to light.   Cardiovascular:      Rate and Rhythm: Normal rate and regular rhythm.   Pulmonary:      Effort: Pulmonary effort is normal. No respiratory distress.      Breath sounds: Normal breath sounds.   Abdominal:      General: Bowel sounds are normal. There is no distension.      Palpations: Abdomen is soft.      Tenderness: There is no abdominal tenderness.   Musculoskeletal:         General: No swelling. Normal range of motion.      Cervical back: Normal range of motion and neck supple.   Skin:     General: Skin is warm.      Findings: No  rash.   Neurological:      General: No focal deficit present.      Mental Status: He is alert.      Comments: Oriented to person, place   Psychiatric:         Mood and Affect: Mood normal.         Behavior: Behavior normal.       Significant Labs: All pertinent labs within the past 24 hours have been reviewed.    Significant Imaging: I have reviewed all pertinent imaging results/findings within the past 24 hours.

## 2022-04-01 LAB
ERYTHROCYTE [DISTWIDTH] IN BLOOD BY AUTOMATED COUNT: 13.9 % (ref 11.5–14.5)
HCT VFR BLD AUTO: 41.5 % (ref 40–54)
HGB BLD-MCNC: 13.2 G/DL (ref 14–18)
MCH RBC QN AUTO: 25.6 PG (ref 27–31)
MCHC RBC AUTO-ENTMCNC: 31.8 G/DL (ref 32–36)
MCV RBC AUTO: 81 FL (ref 82–98)
PLATELET # BLD AUTO: 241 K/UL (ref 150–450)
PMV BLD AUTO: 9.8 FL (ref 9.2–12.9)
POCT GLUCOSE: 152 MG/DL (ref 70–110)
POCT GLUCOSE: 172 MG/DL (ref 70–110)
POCT GLUCOSE: 177 MG/DL (ref 70–110)
POCT GLUCOSE: 200 MG/DL (ref 70–110)
RBC # BLD AUTO: 5.15 M/UL (ref 4.6–6.2)
WBC # BLD AUTO: 11.32 K/UL (ref 3.9–12.7)

## 2022-04-01 PROCEDURE — 85027 COMPLETE CBC AUTOMATED: CPT | Performed by: INTERNAL MEDICINE

## 2022-04-01 PROCEDURE — 25000003 PHARM REV CODE 250: Performed by: INTERNAL MEDICINE

## 2022-04-01 PROCEDURE — 92526 ORAL FUNCTION THERAPY: CPT

## 2022-04-01 PROCEDURE — 99232 PR SUBSEQUENT HOSPITAL CARE,LEVL II: ICD-10-PCS | Mod: ,,, | Performed by: INTERNAL MEDICINE

## 2022-04-01 PROCEDURE — 92507 TX SP LANG VOICE COMM INDIV: CPT

## 2022-04-01 PROCEDURE — 99232 SBSQ HOSP IP/OBS MODERATE 35: CPT | Mod: ,,, | Performed by: INTERNAL MEDICINE

## 2022-04-01 PROCEDURE — 11000001 HC ACUTE MED/SURG PRIVATE ROOM

## 2022-04-01 PROCEDURE — 97116 GAIT TRAINING THERAPY: CPT | Mod: CQ

## 2022-04-01 PROCEDURE — 94761 N-INVAS EAR/PLS OXIMETRY MLT: CPT

## 2022-04-01 PROCEDURE — C9399 UNCLASSIFIED DRUGS OR BIOLOG: HCPCS | Performed by: INTERNAL MEDICINE

## 2022-04-01 PROCEDURE — 36415 COLL VENOUS BLD VENIPUNCTURE: CPT | Performed by: INTERNAL MEDICINE

## 2022-04-01 RX ADMIN — APIXABAN 5 MG: 2.5 TABLET, FILM COATED ORAL at 08:04

## 2022-04-01 RX ADMIN — ATORVASTATIN CALCIUM 80 MG: 20 TABLET, FILM COATED ORAL at 08:04

## 2022-04-01 RX ADMIN — FOLIC ACID 1 MG: 1 TABLET ORAL at 08:04

## 2022-04-01 RX ADMIN — INSULIN ASPART 7 UNITS: 100 INJECTION, SOLUTION INTRAVENOUS; SUBCUTANEOUS at 03:04

## 2022-04-01 RX ADMIN — HYPROMELLOSE 2910 1 DROP: 5 SOLUTION OPHTHALMIC at 08:04

## 2022-04-01 RX ADMIN — TAMSULOSIN HYDROCHLORIDE 0.4 MG: 0.4 CAPSULE ORAL at 08:04

## 2022-04-01 RX ADMIN — THIAMINE HCL TAB 100 MG 100 MG: 100 TAB at 08:04

## 2022-04-01 RX ADMIN — INSULIN DETEMIR 20 UNITS: 100 INJECTION, SOLUTION SUBCUTANEOUS at 03:04

## 2022-04-01 RX ADMIN — THERA TABS 1 TABLET: TAB at 08:04

## 2022-04-01 RX ADMIN — FLUOXETINE 20 MG: 20 CAPSULE ORAL at 08:04

## 2022-04-01 NOTE — PLAN OF CARE
AAO x 3.  He can be forgetful of situation at times.  Skin warm/dry without breakdown.  He declines to wear the sacral foam dressing and pulls it off each time it is placed.  He will wear his gown and safety socks.  IV site to the right posterior forearm flushed for patency.  Condom catheter replaced twice during the shift.  A break from the condom from 0400 hrs to 0545 hrs noted and the second one applied.  Clear yellow urine noted to drainage bag (550 ml).  Number[employer] telesitter camera #3 in use.  Up to toilet twice for BM...only smears noted.  Room near nurse station.  Door left open.  Rounding maintained per protocol.      Problem: Adult Inpatient Plan of Care  Goal: Plan of Care Review  Outcome: Ongoing, Progressing  Flowsheets (Taken 4/1/2022 0558)  Plan of Care Reviewed With: patient     Problem: Diabetes Comorbidity  Goal: Blood Glucose Level Within Targeted Range  Outcome: Ongoing, Progressing  Intervention: Monitor and Manage Glycemia  Flowsheets (Taken 4/1/2022 0558)  Glycemic Management: blood glucose monitored     Problem: Skin Injury Risk Increased  Goal: Skin Health and Integrity  Outcome: Ongoing, Progressing  Intervention: Optimize Skin Protection  Flowsheets (Taken 4/1/2022 0558)  Pressure Reduction Techniques: frequent weight shift encouraged  Pressure Reduction Devices: pressure-redistributing mattress utilized  Skin Protection: incontinence pads utilized  Head of Bed (HOB) Positioning: HOB elevated     Problem: Coping Ineffective  Goal: Effective Coping  Outcome: Ongoing, Progressing  Intervention: Support and Enhance Coping Strategies  Flowsheets (Taken 4/1/2022 0558)  Supportive Measures:   active listening utilized   decision-making supported   positive reinforcement provided   verbalization of feelings encouraged   self-care encouraged   self-reflection promoted   self-responsibility promoted  Environmental Support: calm environment promoted     Problem: Fall Injury Risk  Goal: Absence of Fall  and Fall-Related Injury  Outcome: Ongoing, Progressing  Intervention: Identify and Manage Contributors  Flowsheets (Taken 4/1/2022 0558)  Self-Care Promotion: independence encouraged  Medication Review/Management:   medications reviewed   high-risk medications identified  Intervention: Promote Injury-Free Environment  Flowsheets (Taken 4/1/2022 0558)  Safety Promotion/Fall Prevention:   assistive device/personal item within reach   Fall Risk reviewed with patient/family   Fall Risk signage in place   medications reviewed   lighting adjusted   nonskid shoes/socks when out of bed   high risk medications identified   room near unit station   /camera at bedside   side rails raised x 2   instructed to call staff for mobility     Problem: Glycemic Control Impaired (Sepsis/Septic Shock)  Goal: Blood Glucose Level Within Desired Range  Outcome: Ongoing, Progressing  Intervention: Optimize Glycemic Control  Flowsheets (Taken 4/1/2022 0558)  Glycemic Management: blood glucose monitored     Problem: Infection Progression (Sepsis/Septic Shock)  Goal: Absence of Infection Signs and Symptoms  Outcome: Ongoing, Progressing  Intervention: Initiate Sepsis Management  Flowsheets (Taken 4/1/2022 0558)  Infection Prevention:   environmental surveillance performed   single patient room provided  Intervention: Promote Stabilization  Flowsheets (Taken 4/1/2022 0558)  Fever Reduction/Comfort Measures: lightweight clothing  Intervention: Promote Recovery  Flowsheets (Taken 4/1/2022 0558)  Activity Management:   Ambulated to bathroom - L4   Sitting at edge of bed - L2   Rolling - L1

## 2022-04-01 NOTE — PROGRESS NOTES
[facility] Medicine  Progress Note    Patient Name: Forest Lopez  MRN: [id number]  Patient Class: IP- Inpatient   Admission Date: 1/31/2022  Length of Stay: 60 days  Attending Physician: Alma Elizalde MD  Primary Care Provider: Julian Escobar        Subjective:     Principal Problem:Acute stroke due to ischemia        HPI:  Mr. Lopez is a 72 year old man who presented after a syncopal episode.  He reports he had been feeling well prior to the episode but then had a prodrome prior to passing out.  EMS was called, report patient's BP was low normal on their arrival, improved after an IV fluids bolus.  Patient denies chest pain or shortness of breath and says he does not feel weak currently although is rather tired.  When seen in the ED this morning he could barely express himself audibly.     Vital signs were normal on presentation here.  He is on warfarin for recurrent DVT, but his INR was 1, and d-dimer markedly elevated at 21.7.  Tox screen was positive for cocaine.  He had a CTA of the brain and neck done on presentation so CTA of the chest for PE study could not be done for 48 hours.  Ultrasound of the lower extremities showed extensive bilateral DVT.  On the right there was thrombosis of the common femoral vein, femoral vein, popliteal vein, one of the paired posterior tibial veins and both visualized peroneal veins.  On the left there was thrombosis of the common femoral vein, femoral vein and popliteal vein.  Patient was started on full dose Lovenox and admitted.    Medical history is from the chart as he is unable to give me much information.  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  He smokes 5-6 cigarettes/day and is not interested in quitting.  He is currently homeless and staying with a friend.  Despite the normal INR he is sure he  is taking his warfarin and is not having difficulty taking his medications.  I discussed his care with [facility] staff on the [facility] and patient had been lost to follow up since November 2021.      Overview/Hospital Course:  Patient presented to the ED after a syncopal episode and was found to have bilateral lower extremity DVTs and a low INR.  Tox screen was positive for cocaine.  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  He was started back on his warfarin with bridging Lovenox.  His 2D echo showed grade I diastolic dysfunction.  Neurology evaluated him and recommended echo with bubble study, which was negative for a shunt.    PT/OT evaluated him and recommended SNF placement versus inpatient rehab.  His INR was difficult to get therapeutic, and as he had no contraindication to a NOAC his warfarin was changed to apixaban, and the full dose Lovenox was discontinued.  As he appeared to be depressed and had no objection to starting an antidepressant Lexapro was started.  He had a fall in the hospital on 2/13, had gone to the bathroom for a BM with the nurse, and when she turned away while he was washing his hands he apparently lost his balance and fell.  He did not hit his head and did not lose consciousness, but his BP was low transiently and he was given a bolus of IV fluids.    Patient's mental status improved slowly, but he gradually became more talkative and was able to hold a conversation.  He was diagnosed with a polymicrobial UTI on 2/21 and completed treatment with antibiotics.  Psychiatry evaluated him on 3/3 and changed his antidepressant to Prozac as it can be more activating and patient was having difficulty with his level of motivation.      Patient had some episodes of nausea and vomiting and CT of the abdomen was done, with incidental finding of rectal wall thickening noted.  Colonoscopy was recommended, but  he was unable to finish the prep.  GI evaluated him and decided the patient would benefit from a flex sig to evaluate the rectum.  He had the procedure on 3/25 and no abnormalities were seen other than internal hemorrhoids.    Patient's discharge was delayed as he was not accepted by any SNF facilities in the area.  Reasons for the denials are unclear as he has a clear rehab diagnosis.  He has a history of drug use but has not shown any interest in getting any illicit or legal drugs since he has been here, and he has had no visitors that could have provided drugs to him.  He has been pleasant and cooperative while here although lacks motivation, likely due to the nature of the stroke affecting his frontal lobe.  He requires considerable encouragement to participate in therapy.  Discharging him to live on the street or shelter would not be appropriate as he would not be able to take care of himself and likely will need skilled nursing nursing home care eventually after he gets the chance to improve with rehab.  As he has gradually improved here with therapy and has better participation he could benefit from consideration for inpatient rehab.      Interval History: no acute issues, tolerating diet.    Review of Systems   Constitutional:  Negative for chills and fever.   HENT:  Negative for trouble swallowing.    Respiratory:  Negative for cough and shortness of breath.    Cardiovascular:  Negative for chest pain and leg swelling.   Gastrointestinal:  Negative for abdominal pain, blood in stool, nausea and vomiting.   Genitourinary:  Negative for dysuria and hematuria.   Neurological:  Negative for headaches.   Objective:     Vital Signs (Most Recent):  Temp: 97.6 °F (36.4 °C) (04/01/22 1121)  Pulse: 94 (04/01/22 1121)  Resp: 16 (04/01/22 1121)  BP: 113/62 (04/01/22 1121)  SpO2: 98 % (04/01/22 1121)   Vital Signs (24h Range):  Temp:  [97.5 °F (36.4 °C)-98.3 °F (36.8 °C)] 97.6 °F (36.4 °C)  Pulse:  [] 94  Resp:  [16-20]  16  SpO2:  [98 %-100 %] 98 %  BP: (102-148)/(62-79) 113/62     Weight: 66.2 kg (145 lb 15.1 oz)  Body mass index is 19.79 kg/m².    Intake/Output Summary (Last 24 hours) at 4/1/2022 1327  Last data filed at 4/1/2022 0900  Gross per 24 hour   Intake 850 ml   Output 1850 ml   Net -1000 ml        Physical Exam  Vitals reviewed.   Constitutional:       General: He is not in acute distress.     Appearance: He is well-developed.   HENT:      Head: Normocephalic and atraumatic.   Eyes:      Extraocular Movements: Extraocular movements intact.      Pupils: Pupils are equal, round, and reactive to light.   Cardiovascular:      Rate and Rhythm: Normal rate and regular rhythm.   Pulmonary:      Effort: Pulmonary effort is normal. No respiratory distress.      Breath sounds: Normal breath sounds.   Abdominal:      General: Bowel sounds are normal. There is no distension.      Palpations: Abdomen is soft.      Tenderness: There is no abdominal tenderness.   Musculoskeletal:         General: No swelling. Normal range of motion.      Cervical back: Normal range of motion and neck supple.   Skin:     General: Skin is warm.      Findings: No rash.   Neurological:      General: No focal deficit present.      Mental Status: He is alert.      Comments: Oriented to person, place   Psychiatric:         Mood and Affect: Mood normal.         Behavior: Behavior normal.       Significant Labs: All pertinent labs within the past 24 hours have been reviewed.    Significant Imaging: I have reviewed all pertinent imaging results/findings within the past 24 hours.      Assessment/Plan:      * Acute stroke due to ischemia  - MRI showed areas of diffusion signal hyperintensity consistent with areas of acute ischemia/infarct involving the left cerebral hemisphere, the most prominent measuring approximately 1.4 cm, also a small focus of the right frontal lobe.  - Patient on full dose anticoagulation currently due to acute bilateral DVT.  - Sinus  rhythm noted throughout hospital stay  - Risk factor modification - control diabetes, continue statin.  - RPR, HIV non reactive.  B12 low normal.   - CTA showed a focal segment of 60-70% stenosis involving the proximal to mid left vertebral artery that was new when compared to the prior study of 09/25/2019.  No evidence of large vessel intracranial occlusion.   - Neurology noted symptoms do not correspond to stroke location, although the frontal lobe stroke might have something to do with his lack of motivation, and Speech has noted he has had delayed swallowing.  - Echo with bubble study done, no shunt seen.  - Follow up with vascular neurology in 4 weeks.  - Therapy recommended rehab initially but changed recommendation to SNF due to his low interest in participation.  - Started Lexapro due to suspicion for depression.  - Psych consulted, changed to Prozac and started thiamine, folic acid, MVI due to previous history of alcohol abuse, although Wernicke encephalopathy is not suspected.  - Re-consulted SNF for rehab from the stroke, as discharging him to a shelter would be inappropriate and unsafe for him.  Discussed with his son, Forest, at length on 3/10.  Forest is out of town on a job but will be returning to [place] eventually.  He agrees his father will eventually need nursing home care as he is unable to care for himself.  - awaiting placement.      Abnormal CT scan, gastrointestinal tract  CT showed thickening of the rectal wall with colonoscopy recommended for further evaluation.  Patient was unable to tolerate the prep.  GI spoke to him and he agreed to a flex sig after an enema.  Flex sig negative for abnormalities other than internal hemorrhoids.      Advance care planning        Severe protein-calorie malnutrition  Diet as tolerated  Patient seen by dietician, recommendations made.      Debility  PT/OT recommending rehab vs SNF after stroke with debility/poor motivation  Having difficulty finding  placement for unclear reasons.  Will continue to pursue this as above.  Again, patient has been pleasant and cooperative.      Type 2 diabetes mellitus with hyperglycemia, without long-term current use of insulin  Reportedly he is on metformin and glipizide, both held.  He had 4+ sugar in urine and HbA1c was 10.3, and he was hyperglycemic on presentation.  Started on Levemir and ISS, then increased Levemir to 25 units daily  Added scheduled novolog 5 units tid, increased to 7 units with improvement  OK to resume metformin.    Increased levemir to 28 units daily with good improvement.  Held metformin with contrast with CT.  BG lower now off metformin, has had a few low BG as well due to being on clear liquids for colonoscopy prep.  Levemir decreased to 20 units daily.    As expected BG increased when diet was resumed so increased Levemir back to 28, will increase to 30 units as sugars on higher side.    Acute deep vein thrombosis (DVT) of both femoral veins  Last ultrasound showed partially occlusive DVTs, now completely occlusive DVT of multiple lower extremity veins on ultrasound.  Patient reports compliance with warfarin but his INR is only one.  He is homeless but says he does not have difficulty obtaining his medications.  Does not seem to care about anything, which again might be due to the frontal lobe stroke.  D-dimer was markedly elevated on presentation and there was a question of possible PE, but he had a CTA of the brain/neck on presentation so could not get another CTA for another 48 hours.    V/Q scan was done, showed low probability for PE.  2D echo showed grade I diastolic dysfunction.  Warfarin was restarted with bridging Lovenox, but INR was still low.  Changed to apixaban and discontinued Lovenox.    Hyperlipidemia  Continue atorvastatin status post stroke.        VTE Risk Mitigation (From admission, onward)         Ordered     apixaban tablet 5 mg  2 times daily         03/29/22 1555     Place  sequential compression device  Until discontinued         01/31/22 0533                Discharge Planning   EFRAÍN:      Code Status: Full Code   Is the patient medically ready for discharge?:     Reason for patient still in hospital (select all that apply): Pending disposition  Discharge Plan A: New Nursing Home placement - senior care care facility   Discharge Delays: (!) Other (Pending acceptance in a facility.)              Alma Elizalde MD  Department of Hospital Medicine   [facility] ([place])

## 2022-04-01 NOTE — PT/OT/SLP PROGRESS
Physical Therapy Treatment    Patient Name:  Forest Lopez   MRN:  [id number]    Recommendations:     Discharge Recommendations:   (IRF vs SNF)   Discharge Equipment Recommendations: bedside commode, shower chair   Barriers to discharge: None    Assessment:     Forest Lopez is a 72 y.o. male admitted with a medical diagnosis of Acute stroke due to ischemia.  He presents with the following impairments/functional limitations:  weakness, gait instability, impaired balance, impaired cognition, impaired endurance, impaired functional mobilty, impaired self care skills, decreased coordination, decreased safety awareness.    Sit>stand with no AD and CGA  Toilet transfer with CGA and no AD, step transfer  Amb 15' with CGA, no AD but use of grab bars, walls  Pt req'ing assist for safety 2/2 decreased stability  Rec IRF vs SNF    Rehab Prognosis: Good; patient would benefit from acute skilled PT services to address these deficits and reach maximum level of function.    Recent Surgery: Procedure(s) (LRB):  COLONOSCOPY (N/A) 7 Days Post-Op    Plan:     During this hospitalization, patient to be seen 3 x/week to address the identified rehab impairments via gait training, therapeutic activities, therapeutic exercises, neuromuscular re-education and progress toward the following goals:    · Plan of Care Expires:  04/03/22    Subjective     Chief Complaint: none stated  Patient/Family Comments/goals: pt needing to use bathroom  Pain/Comfort:  · Pain Rating 1: 0/10  · Pain Rating Post-Intervention 1: 0/10      Objective:     Communicated with nurse Brewer prior to session.  Patient found sitting edge of bed with PICC line, Condom Catheter upon PT entry to room.     General Precautions: Standard, diabetic, fall (liquid diet for GI procedure in AM)   Orthopedic Precautions:N/A   Braces:    Respiratory Status: Room air     PTA entered room when [employer] camera alarm sounded - pt was attempting to get out of bed.    Functional  Mobility:  · Transfers:     · Sit to Stand:  contact guard assistance with no AD  · Toilet Transfer: contact guard assistance with  no AD and grab bars  using  Step Transfer  · Gait: 15' with CGA, grab bars, hand on wall, pt with decreased stability      AM-PAC 6 CLICK MOBILITY  Turning over in bed (including adjusting bedclothes, sheets and blankets)?: 3  Sitting down on and standing up from a chair with arms (e.g., wheelchair, bedside commode, etc.): 3  Moving from lying on back to sitting on the side of the bed?: 3  Moving to and from a bed to a chair (including a wheelchair)?: 3  Need to walk in hospital room?: 3  Climbing 3-5 steps with a railing?: 3  Basic Mobility Total Score: 18       Therapeutic Activities and Exercises:   Gait training and transfer as noted    Patient left seated on toilet with all lines intact, call button in reach and PCT Ashlie, nurse Daniella, and Crista telesitter present..    GOALS:   Multidisciplinary Problems     Physical Therapy Goals        Problem: Physical Therapy Goal    Goal Priority Disciplines Outcome Goal Variances Interventions   Physical Therapy Goal     PT, PT/OT Ongoing, Not Progressing     Description: Goals to be met by: 4/3/2022    Patient will increase functional independence with mobility by performin. Sit<>stand with SPV with LRAD.  2. Gait x 300 feet with SPV with LRAD.   3. Static standing in SLS with no UE support with SBA x10 sec.                      Time Tracking:     PT Received On: 22  PT Start Time: 1500     PT Stop Time: 1508  PT Total Time (min): 8 min     Billable Minutes: Gait Training 8    Treatment Type: Treatment  PT/PTA: PTA     PTA Visit Number: 4     2022

## 2022-04-01 NOTE — PT/OT/SLP PROGRESS
"Occupational Therapy      Patient Name:  Forest Lopez   MRN:  [id number]    Attempted to see pt at 12:02.  Pt declined participation in therapy stating "this is not a good time."  OT educated pt on purpose of OT in acute care setting and provided max encouragement.  OT suggested performing exercises in bed or at EOB.  Pt agreeable; however, once OT asked if pt was ready pt responded, "for what?"  Additional encouragement and education provided, but pt stated he did not feel up to doing therapy at that time.  Will follow-up at next scheduled therapy session.  OT present in room from 12:02-12:07.    MICHELLE Willett  4/1/2022  "

## 2022-04-01 NOTE — PT/OT/SLP PROGRESS
Speech Language Pathology Treatment    Patient Name:  Forest Lopez   MRN:  [id number]  Admitting Diagnosis: Acute stroke due to ischemia    Recommendations:                  General Recommendations:   1. Speech pathology to continue to follow 2-3x/week for ongoing assessment of cognitive-communicative deficits s/p acute infarcts of L cerebral hemisphere     Diet recommendations: Solids: Mechanical soft solids, Liquids: Thin      Aspiration Precautions: Eliminate distractions, Feed only when awake/alert, Frequent oral care and HOB to 90 degrees      General Precautions: Standard,       Communication strategies:  Reduce informational content/context; frequent repetition and cues to redirect    Subjective     · Pt awake/alert, reclined in bed. Agreeable to participate in SLP session.     Respiratory Status: Room air    Objective:     Has the patient been evaluated by SLP for swallowing?   Yes  Keep patient NPO? No   Current Respiratory Status:    Room air    Cognitive-Communicative and Language Status:  Improving participation and motivation. Targeting divided and sustained attention, as well as, visual scanning, pt completed word search. Divided attention targeted by SLP playing music during task. Improving initiation of task this date. Pt able to correctly complete 75% of word search within 20 minutes. Pt required more frequent cues to redirect attention this date. Required x8 verbal cues to attend to task.     Swallowing:  Pt's breakfast tray delivered during session. SLP completed diet check. Assisted in set-up of mechanical soft/thin liquids tray. Mildly disorganized bolus prep/mastication 2/2 pt being edentulous. However, functional. Lingual pumping prior to a-p transport. No overt s/s of aspiration or airway threat during 100% of po trial- no coughing, choking, changes in breath stream or vocal quality. Continue current diet recs.     Education: Pt would benefit from ongoing SLP services at next level of care.      Assessment:     Forest Lopez is a 72 y.o. 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    Goals:   Multidisciplinary Problems     SLP Goals        Problem: SLP Goal    Goal Priority Disciplines Outcome   SLP Goal     SLP Ongoing, Progressing   Description: 1. Pt will consume a regular/thin diet without overt s/s of aspiration or airway threat without assistance.  2. Pt will increase orientation to person, place, situation and date with 90% acc given min assist.  3. Pt will follow 3-4 step commands to increase functional IND with 75% acc given min assist.   4. Pt will complete immediate and delayed recall tasks with 75% acc given mod assist.  5. Targeting word finding, pt will complete divergent naming tasks given 1-minute time frame with 50% acc given mod assist.  6. Ongoing cognitive-communicative assessment.     Updated Goals 2/8:  7. Pt will sustain attention to topic/task without distraction in 5-10 minute increments given mod verbal cues.                   Plan:     · Patient to be seen:  2 x/week, 3 x/week   · Plan of Care expires:  03/21/22  · Plan of Care reviewed with:  patient   · SLP Follow-Up:  Yes       Discharge recommendations: IRF vs. Skilled Nursing Facility     Time Tracking:     SLP Treatment Date:   04/01/22  Speech Start Time:  0820  Speech Stop Time:  0845     Speech Total Time (min):  25 min    Billable Minutes: Speech Therapy Individual 20 min; speech therapy individual 5 min     04/01/2022

## 2022-04-02 LAB
POCT GLUCOSE: 123 MG/DL (ref 70–110)
POCT GLUCOSE: 126 MG/DL (ref 70–110)
POCT GLUCOSE: 206 MG/DL (ref 70–110)
POCT GLUCOSE: 215 MG/DL (ref 70–110)
POCT GLUCOSE: 241 MG/DL (ref 70–110)

## 2022-04-02 PROCEDURE — 99232 SBSQ HOSP IP/OBS MODERATE 35: CPT | Mod: ,,, | Performed by: INTERNAL MEDICINE

## 2022-04-02 PROCEDURE — 92507 TX SP LANG VOICE COMM INDIV: CPT

## 2022-04-02 PROCEDURE — 25000003 PHARM REV CODE 250: Performed by: INTERNAL MEDICINE

## 2022-04-02 PROCEDURE — 99232 PR SUBSEQUENT HOSPITAL CARE,LEVL II: ICD-10-PCS | Mod: ,,, | Performed by: INTERNAL MEDICINE

## 2022-04-02 PROCEDURE — 11000001 HC ACUTE MED/SURG PRIVATE ROOM

## 2022-04-02 PROCEDURE — 92526 ORAL FUNCTION THERAPY: CPT

## 2022-04-02 PROCEDURE — 94761 N-INVAS EAR/PLS OXIMETRY MLT: CPT

## 2022-04-02 RX ADMIN — FOLIC ACID 1 MG: 1 TABLET ORAL at 09:04

## 2022-04-02 RX ADMIN — APIXABAN 5 MG: 2.5 TABLET, FILM COATED ORAL at 11:04

## 2022-04-02 RX ADMIN — TAMSULOSIN HYDROCHLORIDE 0.4 MG: 0.4 CAPSULE ORAL at 11:04

## 2022-04-02 RX ADMIN — THIAMINE HCL TAB 100 MG 100 MG: 100 TAB at 09:04

## 2022-04-02 RX ADMIN — FLUOXETINE 20 MG: 20 CAPSULE ORAL at 09:04

## 2022-04-02 RX ADMIN — POLYETHYLENE GLYCOL 3350 17 G: 17 POWDER, FOR SOLUTION ORAL at 09:04

## 2022-04-02 RX ADMIN — INSULIN ASPART 1 UNITS: 100 INJECTION, SOLUTION INTRAVENOUS; SUBCUTANEOUS at 09:04

## 2022-04-02 RX ADMIN — THERA TABS 1 TABLET: TAB at 09:04

## 2022-04-02 RX ADMIN — INSULIN ASPART 2 UNITS: 100 INJECTION, SOLUTION INTRAVENOUS; SUBCUTANEOUS at 05:04

## 2022-04-02 RX ADMIN — INSULIN ASPART 7 UNITS: 100 INJECTION, SOLUTION INTRAVENOUS; SUBCUTANEOUS at 09:04

## 2022-04-02 RX ADMIN — APIXABAN 5 MG: 2.5 TABLET, FILM COATED ORAL at 09:04

## 2022-04-02 RX ADMIN — ATORVASTATIN CALCIUM 80 MG: 20 TABLET, FILM COATED ORAL at 09:04

## 2022-04-02 RX ADMIN — INSULIN ASPART 7 UNITS: 100 INJECTION, SOLUTION INTRAVENOUS; SUBCUTANEOUS at 05:04

## 2022-04-02 NOTE — PT/OT/SLP PROGRESS
Speech Language Pathology Treatment    Patient Name:  Forest Lopez   MRN:  [id number]  Admitting Diagnosis: Acute stroke due to ischemia    Recommendations:                 General Recommendations:  Speech Language Pathology therapy  Diet recommendations:  Mechanical soft, Liquid Diet Level: Thin   Aspiration Precautions: Eliminate distractions, Feed only when awake/alert, Frequent oral care and HOB to 90 degrees   General Precautions: Standard, aspiration  Communication strategies:  Reduce information context/content, frequent repetition, frequent cues for redirection     Subjective   PT awake/arert in bed. Agreeable to participate in most outlined speech  therapy tasks.     Patient goals: none stated  Respiratory Status: Room air    Objective:     Has the patient been evaluated by SLP for swallowing?   Yes  Keep patient NPO? No   Current Respiratory Status: Room Air      Cognitive-Communicative and Language Status:  Decreased participation and motivation. Targeting word finding, Pt completed divergent naming tasks given 1- minute time frame with 20% accuracy.  Pt required max cues to redirect attention this date. Required x4 verbal cues to attend to task.      Swallowing:  Pt's breakfast tray delivered during  before session. Moderately disorganized bolus prep/mastication 2/2 pt being edentulous. However, functional. Lingual pumping prior to a-p transport. No overt s/s of aspiration or airway threat during 100% of po trial- no coughing, choking, changes in breath stream or vocal quality. Continue current diet recs.          Assessment:     Forest Lopez is a 72 y.o. 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         Goals:   Multidisciplinary Problems     SLP Goals        Problem: SLP Goal    Goal Priority Disciplines Outcome   SLP Goal     SLP Ongoing, Progressing   Description: 1. Pt will consume a regular/thin  diet without overt s/s of aspiration or airway threat without assistance.  2. Pt will increase orientation to person, place, situation and date with 90% acc given min assist.  3. Pt will follow 3-4 step commands to increase functional IND with 75% acc given min assist.   4. Pt will complete immediate and delayed recall tasks with 75% acc given mod assist.  5. Targeting word finding, pt will complete divergent naming tasks given 1-minute time frame with 50% acc given mod assist.  6. Ongoing cognitive-communicative assessment.     Updated Goals 2/8:  7. Pt will sustain attention to topic/task without distraction in 5-10 minute increments given mod verbal cues.                   Plan:     · Patient to be seen:  3 x/week, 2 x/week   · Plan of Care expires:  03/21/22  · Plan of Care reviewed with:  patient   · SLP Follow-Up:  Yes       Discharge recommendations:  rehabilitation facility, nursing facility, skilled   Barriers to Discharge:  Level of Skilled Assistance Needed      Time Tracking:     SLP Treatment Date:   04/02/22  Speech Start Time:  0900  Speech Stop Time:  0915     Speech Total Time (min):  15 min    Billable Minutes: Speech Therapy Individual 10 minutes; Speech Therapy Individual 5 minutes     Delmis Meléndez M.S., CCC-SLP   04/02/2022

## 2022-04-02 NOTE — PLAN OF CARE
Plan of care reviewed. Hourly rounding performed. No complaints of pain during the shift. Pt has condom cath on and is able to ambulate to the bathroom with assist. Pt slept most of day and refused to eat lunch even after several attempts. Blood sugar monitored closely and insulin held at lunch. Pt woke up around 4 but did not want to get in the chair but participated in pushing himself up in bed. Bed lowered and locked. Personal items and call bell within reach.

## 2022-04-02 NOTE — SUBJECTIVE & OBJECTIVE
Interval History: per report vomiting yesterday, tolerating diet today, 2 bm yesterday.    Review of Systems   Constitutional:  Negative for chills and fever.   HENT:  Negative for trouble swallowing.    Respiratory:  Negative for cough and shortness of breath.    Cardiovascular:  Negative for chest pain and leg swelling.   Gastrointestinal:  Negative for abdominal pain, blood in stool, nausea and vomiting.   Genitourinary:  Negative for dysuria and hematuria.   Neurological:  Negative for headaches.   Objective:     Vital Signs (Most Recent):  Temp: 98.3 °F (36.8 °C) (04/02/22 1209)  Pulse: 94 (04/02/22 1209)  Resp: 18 (04/02/22 1209)  BP: (!) 133/90 (04/02/22 1209)  SpO2: 96 % (04/02/22 1209)   Vital Signs (24h Range):  Temp:  [97.4 °F (36.3 °C)-98.3 °F (36.8 °C)] 98.3 °F (36.8 °C)  Pulse:  [70-94] 94  Resp:  [16-18] 18  SpO2:  [96 %-100 %] 96 %  BP: (101-134)/(55-90) 133/90     Weight: 66.2 kg (145 lb 15.1 oz)  Body mass index is 19.79 kg/m².    Intake/Output Summary (Last 24 hours) at 4/2/2022 1355  Last data filed at 4/1/2022 2300  Gross per 24 hour   Intake 420 ml   Output 1325 ml   Net -905 ml        Physical Exam  Vitals reviewed.   Constitutional:       General: He is not in acute distress.     Appearance: He is well-developed.   HENT:      Head: Normocephalic and atraumatic.   Eyes:      Extraocular Movements: Extraocular movements intact.      Pupils: Pupils are equal, round, and reactive to light.   Cardiovascular:      Rate and Rhythm: Normal rate and regular rhythm.   Pulmonary:      Effort: Pulmonary effort is normal. No respiratory distress.      Breath sounds: Normal breath sounds.   Abdominal:      General: Bowel sounds are normal. There is no distension.      Palpations: Abdomen is soft.      Tenderness: There is no abdominal tenderness.   Musculoskeletal:         General: No swelling. Normal range of motion.      Cervical back: Normal range of motion and neck supple.   Skin:     General: Skin is  warm.      Findings: No rash.   Neurological:      General: No focal deficit present.      Mental Status: He is alert.      Comments: Oriented to person, place   Psychiatric:         Mood and Affect: Mood normal.         Behavior: Behavior normal.       Significant Labs: All pertinent labs within the past 24 hours have been reviewed.    Significant Imaging: I have reviewed all pertinent imaging results/findings within the past 24 hours.

## 2022-04-02 NOTE — PLAN OF CARE
POC reviewed with patient. Disoriented to time and situation at times.VSS. . No complaints verbalized during shift. Tray set up provided for feeding.  Abulates to restroom x1 person assist. Positions self. Turn Q2/frequent weight shift encouraged by RN. Low air loss mattress in use. Instructed to call for help ambulating. No injuries, falls, or trauma occurred during shift. Purposeful rounding completed. Bed low and locked with side rails up x 3 and call light within reach.

## 2022-04-02 NOTE — PLAN OF CARE
POC reviewed with patient, questions and concerns addressed. No acute events through the night.  All Vital signs stable. No complaints.  AAOx3. Safety maintained.  Bed locked, in lowest position, call light within reach, side rails x2. Mobilized to their highest function. See doc flow sheets for further information. Will continue to monitor.      Problem: Adult Inpatient Plan of Care  Goal: Plan of Care Review  Outcome: Ongoing, Progressing  Goal: Patient-Specific Goal (Individualized)  Outcome: Ongoing, Progressing  Goal: Absence of Hospital-Acquired Illness or Injury  Outcome: Ongoing, Progressing  Goal: Optimal Comfort and Wellbeing  Outcome: Ongoing, Progressing     Problem: Diabetes Comorbidity  Goal: Blood Glucose Level Within Targeted Range  Outcome: Ongoing, Progressing     Problem: Skin Injury Risk Increased  Goal: Skin Health and Integrity  Outcome: Ongoing, Progressing     Problem: Coping Ineffective  Goal: Effective Coping  Outcome: Ongoing, Progressing     Problem: Fall Injury Risk  Goal: Absence of Fall and Fall-Related Injury  Outcome: Ongoing, Progressing     Problem: Adjustment to Illness (Sepsis/Septic Shock)  Goal: Optimal Coping  Outcome: Ongoing, Progressing     Problem: Bleeding (Sepsis/Septic Shock)  Goal: Absence of Bleeding  Outcome: Ongoing, Progressing     Problem: Glycemic Control Impaired (Sepsis/Septic Shock)  Goal: Blood Glucose Level Within Desired Range  Outcome: Ongoing, Progressing     Problem: Infection Progression (Sepsis/Septic Shock)  Goal: Absence of Infection Signs and Symptoms  Outcome: Ongoing, Progressing     Problem: Nutrition Impaired (Sepsis/Septic Shock)  Goal: Optimal Nutrition Intake  Outcome: Ongoing, Progressing     Problem: Infection  Goal: Absence of Infection Signs and Symptoms  Outcome: Ongoing, Progressing     Problem: Spiritual Distress Risk or Actual  Goal: Spiritual Wellbeing  Outcome: Ongoing, Progressing

## 2022-04-02 NOTE — ASSESSMENT & PLAN NOTE
CT showed thickening of the rectal wall with colonoscopy recommended for further evaluation.  Patient was unable to tolerate the prep.  GI spoke to him and he agreed to a flex sig after an enema.  Flex sig negative for abnormalities other than internal hemorrhoids.  Check kub for vomiting

## 2022-04-02 NOTE — PROGRESS NOTES
[facility] Medicine  Progress Note    Patient Name: Forest Lopez  MRN: [id number]  Patient Class: IP- Inpatient   Admission Date: 1/31/2022  Length of Stay: 61 days  Attending Physician: Alma Elizalde MD  Primary Care Provider: Julian Escobar        Subjective:     Principal Problem:Acute stroke due to ischemia        HPI:  Mr. Lopez is a 72 year old man who presented after a syncopal episode.  He reports he had been feeling well prior to the episode but then had a prodrome prior to passing out.  EMS was called, report patient's BP was low normal on their arrival, improved after an IV fluids bolus.  Patient denies chest pain or shortness of breath and says he does not feel weak currently although is rather tired.  When seen in the ED this morning he could barely express himself audibly.     Vital signs were normal on presentation here.  He is on warfarin for recurrent DVT, but his INR was 1, and d-dimer markedly elevated at 21.7.  Tox screen was positive for cocaine.  He had a CTA of the brain and neck done on presentation so CTA of the chest for PE study could not be done for 48 hours.  Ultrasound of the lower extremities showed extensive bilateral DVT.  On the right there was thrombosis of the common femoral vein, femoral vein, popliteal vein, one of the paired posterior tibial veins and both visualized peroneal veins.  On the left there was thrombosis of the common femoral vein, femoral vein and popliteal vein.  Patient was started on full dose Lovenox and admitted.    Medical history is from the chart as he is unable to give me much information.  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  He smokes 5-6 cigarettes/day and is not interested in quitting.  He is currently homeless and staying with a friend.  Despite the normal INR he is sure he  is taking his warfarin and is not having difficulty taking his medications.  I discussed his care with [facility] staff on the [facility] and patient had been lost to follow up since November 2021.      Overview/Hospital Course:  Patient presented to the ED after a syncopal episode and was found to have bilateral lower extremity DVTs and a low INR.  Tox screen was positive for cocaine.  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  He was started back on his warfarin with bridging Lovenox.  His 2D echo showed grade I diastolic dysfunction.  Neurology evaluated him and recommended echo with bubble study, which was negative for a shunt.    PT/OT evaluated him and recommended SNF placement versus inpatient rehab.  His INR was difficult to get therapeutic, and as he had no contraindication to a NOAC his warfarin was changed to apixaban, and the full dose Lovenox was discontinued.  As he appeared to be depressed and had no objection to starting an antidepressant Lexapro was started.  He had a fall in the hospital on 2/13, had gone to the bathroom for a BM with the nurse, and when she turned away while he was washing his hands he apparently lost his balance and fell.  He did not hit his head and did not lose consciousness, but his BP was low transiently and he was given a bolus of IV fluids.    Patient's mental status improved slowly, but he gradually became more talkative and was able to hold a conversation.  He was diagnosed with a polymicrobial UTI on 2/21 and completed treatment with antibiotics.  Psychiatry evaluated him on 3/3 and changed his antidepressant to Prozac as it can be more activating and patient was having difficulty with his level of motivation.      Patient had some episodes of nausea and vomiting and CT of the abdomen was done, with incidental finding of rectal wall thickening noted.  Colonoscopy was recommended, but  he was unable to finish the prep.  GI evaluated him and decided the patient would benefit from a flex sig to evaluate the rectum.  He had the procedure on 3/25 and no abnormalities were seen other than internal hemorrhoids.    Patient's discharge was delayed as he was not accepted by any SNF facilities in the area.  Reasons for the denials are unclear as he has a clear rehab diagnosis.  He has a history of drug use but has not shown any interest in getting any illicit or legal drugs since he has been here, and he has had no visitors that could have provided drugs to him.  He has been pleasant and cooperative while here although lacks motivation, likely due to the nature of the stroke affecting his frontal lobe.  He requires considerable encouragement to participate in therapy.  Discharging him to live on the street or shelter would not be appropriate as he would not be able to take care of himself and likely will need retirement nursing home care eventually after he gets the chance to improve with rehab.       Interval History: per report vomiting yesterday, tolerating diet today, 2 bm yesterday.    Review of Systems   Constitutional:  Negative for chills and fever.   HENT:  Negative for trouble swallowing.    Respiratory:  Negative for cough and shortness of breath.    Cardiovascular:  Negative for chest pain and leg swelling.   Gastrointestinal:  Negative for abdominal pain, blood in stool, nausea and vomiting.   Genitourinary:  Negative for dysuria and hematuria.   Neurological:  Negative for headaches.   Objective:     Vital Signs (Most Recent):  Temp: 98.3 °F (36.8 °C) (04/02/22 1209)  Pulse: 94 (04/02/22 1209)  Resp: 18 (04/02/22 1209)  BP: (!) 133/90 (04/02/22 1209)  SpO2: 96 % (04/02/22 1209)   Vital Signs (24h Range):  Temp:  [97.4 °F (36.3 °C)-98.3 °F (36.8 °C)] 98.3 °F (36.8 °C)  Pulse:  [70-94] 94  Resp:  [16-18] 18  SpO2:  [96 %-100 %] 96 %  BP: (101-134)/(55-90) 133/90     Weight: 66.2 kg (145 lb 15.1  oz)  Body mass index is 19.79 kg/m².    Intake/Output Summary (Last 24 hours) at 4/2/2022 1355  Last data filed at 4/1/2022 2300  Gross per 24 hour   Intake 420 ml   Output 1325 ml   Net -905 ml        Physical Exam  Vitals reviewed.   Constitutional:       General: He is not in acute distress.     Appearance: He is well-developed.   HENT:      Head: Normocephalic and atraumatic.   Eyes:      Extraocular Movements: Extraocular movements intact.      Pupils: Pupils are equal, round, and reactive to light.   Cardiovascular:      Rate and Rhythm: Normal rate and regular rhythm.   Pulmonary:      Effort: Pulmonary effort is normal. No respiratory distress.      Breath sounds: Normal breath sounds.   Abdominal:      General: Bowel sounds are normal. There is no distension.      Palpations: Abdomen is soft.      Tenderness: There is no abdominal tenderness.   Musculoskeletal:         General: No swelling. Normal range of motion.      Cervical back: Normal range of motion and neck supple.   Skin:     General: Skin is warm.      Findings: No rash.   Neurological:      General: No focal deficit present.      Mental Status: He is alert.      Comments: Oriented to person, place   Psychiatric:         Mood and Affect: Mood normal.         Behavior: Behavior normal.       Significant Labs: All pertinent labs within the past 24 hours have been reviewed.    Significant Imaging: I have reviewed all pertinent imaging results/findings within the past 24 hours.      Assessment/Plan:      * Acute stroke due to ischemia  - MRI showed areas of diffusion signal hyperintensity consistent with areas of acute ischemia/infarct involving the left cerebral hemisphere, the most prominent measuring approximately 1.4 cm, also a small focus of the right frontal lobe.  - Patient on full dose anticoagulation currently due to acute bilateral DVT.  - Sinus rhythm noted throughout hospital stay  - Risk factor modification - control diabetes, continue  statin.  - RPR, HIV non reactive.  B12 low normal.   - CTA showed a focal segment of 60-70% stenosis involving the proximal to mid left vertebral artery that was new when compared to the prior study of 09/25/2019.  No evidence of large vessel intracranial occlusion.   - Neurology noted symptoms do not correspond to stroke location, although the frontal lobe stroke might have something to do with his lack of motivation, and Speech has noted he has had delayed swallowing.  - Echo with bubble study done, no shunt seen.  - Follow up with vascular neurology in 4 weeks.  - Therapy recommended rehab initially but changed recommendation to SNF due to his low interest in participation.  - Started Lexapro due to suspicion for depression.  - Psych consulted, changed to Prozac and started thiamine, folic acid, MVI due to previous history of alcohol abuse, although Wernicke encephalopathy is not suspected.  - Re-consulted SNF for rehab from the stroke, as discharging him to a shelter would be inappropriate and unsafe for him.  Discussed with his son, Forest, at length on 3/10.  Forest is out of town on a job but will be returning to [place] eventually.  He agrees his father will eventually need nursing home care as he is unable to care for himself.  - awaiting placement.      Abnormal CT scan, gastrointestinal tract  CT showed thickening of the rectal wall with colonoscopy recommended for further evaluation.  Patient was unable to tolerate the prep.  GI spoke to him and he agreed to a flex sig after an enema.  Flex sig negative for abnormalities other than internal hemorrhoids.  Check kub for vomiting      Advance care planning        Severe protein-calorie malnutrition  Diet as tolerated  Patient seen by dietician, recommendations made.      Debility  PT/OT recommending rehab vs SNF after stroke with debility/poor motivation  Having difficulty finding placement for unclear reasons.  Will continue to pursue this as  above.  Again, patient has been pleasant and cooperative.      Type 2 diabetes mellitus with hyperglycemia, without long-term current use of insulin  Reportedly he is on metformin and glipizide, both held.  He had 4+ sugar in urine and HbA1c was 10.3, and he was hyperglycemic on presentation.  Started on Levemir and ISS, then increased Levemir to 25 units daily  Added scheduled novolog 5 units tid, increased to 7 units with improvement  OK to resume metformin.    Increased levemir to 28 units daily with good improvement.  Held metformin with contrast with CT.  BG lower now off metformin, has had a few low BG as well due to being on clear liquids for colonoscopy prep.  Levemir decreased to 20 units daily.    As expected BG increased when diet was resumed so increased Levemir back to 28, will increase to 30 units as sugars on higher side, was given less levemir as sugars on low side.    Acute deep vein thrombosis (DVT) of both femoral veins  Last ultrasound showed partially occlusive DVTs, now completely occlusive DVT of multiple lower extremity veins on ultrasound.  Patient reports compliance with warfarin but his INR is only one.  He is homeless but says he does not have difficulty obtaining his medications.  Does not seem to care about anything, which again might be due to the frontal lobe stroke.  D-dimer was markedly elevated on presentation and there was a question of possible PE, but he had a CTA of the brain/neck on presentation so could not get another CTA for another 48 hours.    V/Q scan was done, showed low probability for PE.  2D echo showed grade I diastolic dysfunction.  Warfarin was restarted with bridging Lovenox, but INR was still low.  Changed to apixaban and discontinued Lovenox.    Hyperlipidemia  Continue atorvastatin status post stroke.        VTE Risk Mitigation (From admission, onward)         Ordered     apixaban tablet 5 mg  2 times daily         03/29/22 1555     Place sequential  compression device  Until discontinued         01/31/22 0533                Discharge Planning   EFRAÍN:      Code Status: Full Code   Is the patient medically ready for discharge?:     Reason for patient still in hospital (select all that apply): Patient trending condition and Treatment  Discharge Plan A: New Nursing Home placement - group home care facility   Discharge Delays: (!) Other (Pending acceptance in a facility.)              Alma Elizalde MD  Department of Hospital Medicine   [facility] - Med Surg ([place])

## 2022-04-02 NOTE — ASSESSMENT & PLAN NOTE
Reportedly he is on metformin and glipizide, both held.  He had 4+ sugar in urine and HbA1c was 10.3, and he was hyperglycemic on presentation.  Started on Levemir and ISS, then increased Levemir to 25 units daily  Added scheduled novolog 5 units tid, increased to 7 units with improvement  OK to resume metformin.    Increased levemir to 28 units daily with good improvement.  Held metformin with contrast with CT.  BG lower now off metformin, has had a few low BG as well due to being on clear liquids for colonoscopy prep.  Levemir decreased to 20 units daily.    As expected BG increased when diet was resumed so increased Levemir back to 28, will increase to 30 units as sugars on higher side, was given less levemir as sugars on low side.

## 2022-04-03 LAB
POCT GLUCOSE: 123 MG/DL (ref 70–110)
POCT GLUCOSE: 123 MG/DL (ref 70–110)
POCT GLUCOSE: 132 MG/DL (ref 70–110)
POCT GLUCOSE: 145 MG/DL (ref 70–110)

## 2022-04-03 PROCEDURE — 25000003 PHARM REV CODE 250: Performed by: INTERNAL MEDICINE

## 2022-04-03 PROCEDURE — 11000001 HC ACUTE MED/SURG PRIVATE ROOM

## 2022-04-03 PROCEDURE — 92507 TX SP LANG VOICE COMM INDIV: CPT

## 2022-04-03 PROCEDURE — 99232 SBSQ HOSP IP/OBS MODERATE 35: CPT | Mod: ,,, | Performed by: INTERNAL MEDICINE

## 2022-04-03 PROCEDURE — 92526 ORAL FUNCTION THERAPY: CPT

## 2022-04-03 PROCEDURE — 94761 N-INVAS EAR/PLS OXIMETRY MLT: CPT

## 2022-04-03 PROCEDURE — 99232 PR SUBSEQUENT HOSPITAL CARE,LEVL II: ICD-10-PCS | Mod: ,,, | Performed by: INTERNAL MEDICINE

## 2022-04-03 RX ADMIN — TAMSULOSIN HYDROCHLORIDE 0.4 MG: 0.4 CAPSULE ORAL at 09:04

## 2022-04-03 RX ADMIN — FLUOXETINE 20 MG: 20 CAPSULE ORAL at 08:04

## 2022-04-03 RX ADMIN — THIAMINE HCL TAB 100 MG 100 MG: 100 TAB at 08:04

## 2022-04-03 RX ADMIN — INSULIN ASPART 7 UNITS: 100 INJECTION, SOLUTION INTRAVENOUS; SUBCUTANEOUS at 08:04

## 2022-04-03 RX ADMIN — APIXABAN 5 MG: 2.5 TABLET, FILM COATED ORAL at 08:04

## 2022-04-03 RX ADMIN — ATORVASTATIN CALCIUM 80 MG: 20 TABLET, FILM COATED ORAL at 08:04

## 2022-04-03 RX ADMIN — POLYETHYLENE GLYCOL 3350 17 G: 17 POWDER, FOR SOLUTION ORAL at 08:04

## 2022-04-03 RX ADMIN — INSULIN DETEMIR 30 UNITS: 100 INJECTION, SOLUTION SUBCUTANEOUS at 08:04

## 2022-04-03 RX ADMIN — INSULIN ASPART 7 UNITS: 100 INJECTION, SOLUTION INTRAVENOUS; SUBCUTANEOUS at 01:04

## 2022-04-03 RX ADMIN — APIXABAN 5 MG: 2.5 TABLET, FILM COATED ORAL at 09:04

## 2022-04-03 RX ADMIN — FOLIC ACID 1 MG: 1 TABLET ORAL at 08:04

## 2022-04-03 RX ADMIN — THERA TABS 1 TABLET: TAB at 08:04

## 2022-04-03 RX ADMIN — INSULIN ASPART 7 UNITS: 100 INJECTION, SOLUTION INTRAVENOUS; SUBCUTANEOUS at 05:04

## 2022-04-03 NOTE — NURSING
Pt resting much of the day.  Pleasant and cooperative.  Call light within reach.  No new needs expressed.  Call light within reach.  Condom cath in place.  No BM noted, scheduled miralax given with am meds.  BG <150.  VSS.  Awaiting placement.

## 2022-04-03 NOTE — PLAN OF CARE
No issues during the night. Orientation still with short term memory forgetfulness. Patient up to toilet twice. Large BM noted on the second visit.  No s/s of nausea or vomiting this shift. Ambulated with standby assist.  No skin breakdown.  Turning/repositioning independently in bed.  IV site to left forearm flushed for patency.  Call light within reach.  [employer] camera #3 in use.  Room near nurses station with door kept open.  Rounding maintained per protocol.      Problem: Adult Inpatient Plan of Care  Goal: Plan of Care Review  Outcome: Ongoing, Progressing  Flowsheets (Taken 4/3/2022 0402)  Plan of Care Reviewed With: patient     Problem: Diabetes Comorbidity  Goal: Blood Glucose Level Within Targeted Range  Outcome: Ongoing, Progressing     Problem: Skin Injury Risk Increased  Goal: Skin Health and Integrity  Outcome: Ongoing, Progressing  Intervention: Optimize Skin Protection  Flowsheets (Taken 4/3/2022 0402)  Pressure Reduction Techniques: frequent weight shift encouraged  Skin Protection: incontinence pads utilized  Head of Bed (HOB) Positioning: HOB elevated     Problem: Coping Ineffective  Goal: Effective Coping  Outcome: Ongoing, Progressing     Problem: Fall Injury Risk  Goal: Absence of Fall and Fall-Related Injury  Outcome: Ongoing, Progressing  Intervention: Identify and Manage Contributors  Flowsheets (Taken 4/3/2022 0402)  Self-Care Promotion: independence encouraged  Medication Review/Management:   medications reviewed   high-risk medications identified  Intervention: Promote Injury-Free Environment  Flowsheets (Taken 4/3/2022 0402)  Safety Promotion/Fall Prevention:   /camera at bedside   room near unit station   side rails raised x 2   instructed to call staff for mobility   Fall Risk signage in place   Fall Risk reviewed with patient/family   high risk medications identified   lighting adjusted   medications reviewed   nonskid shoes/socks when out of bed     Problem: Adjustment  to Illness (Sepsis/Septic Shock)  Goal: Optimal Coping  Outcome: Ongoing, Progressing  Intervention: Optimize Psychosocial Adjustment to Illness  Flowsheets (Taken 4/3/2022 0402)  Supportive Measures:   active listening utilized   decision-making supported   positive reinforcement provided   self-care encouraged   self-reflection promoted   self-responsibility promoted   verbalization of feelings encouraged     Problem: Glycemic Control Impaired (Sepsis/Septic Shock)  Goal: Blood Glucose Level Within Desired Range  Outcome: Ongoing, Progressing  Intervention: Optimize Glycemic Control  Flowsheets (Taken 4/3/2022 0402)  Glycemic Management: blood glucose monitored     Problem: Infection Progression (Sepsis/Septic Shock)  Goal: Absence of Infection Signs and Symptoms  Outcome: Ongoing, Progressing  Intervention: Initiate Sepsis Management  Flowsheets (Taken 4/3/2022 0402)  Infection Prevention:   single patient room provided   environmental surveillance performed  Infection Management: aseptic technique maintained  Intervention: Promote Stabilization  Flowsheets (Taken 4/3/2022 0402)  Fever Reduction/Comfort Measures: lightweight bedding  Intervention: Promote Recovery  Flowsheets (Taken 4/3/2022 0402)  Activity Management:   Ambulated to bathroom - L4   Sitting at edge of bed - L2   Rolling - L1     Problem: Infection  Goal: Absence of Infection Signs and Symptoms  Outcome: Ongoing, Progressing  Intervention: Prevent or Manage Infection  Flowsheets (Taken 4/3/2022 0402)  Fever Reduction/Comfort Measures: lightweight bedding  Infection Management: aseptic technique maintained

## 2022-04-03 NOTE — PROGRESS NOTES
[facility] Medicine  Progress Note    Patient Name: Forest Lopze  MRN: [id number]  Patient Class: IP- Inpatient   Admission Date: 1/31/2022  Length of Stay: 62 days  Attending Physician: Alma Elizalde MD  Primary Care Provider: Julian Escobar        Subjective:     Principal Problem:Acute stroke due to ischemia        HPI:  Mr. Lopez is a 72 year old man who presented after a syncopal episode.  He reports he had been feeling well prior to the episode but then had a prodrome prior to passing out.  EMS was called, report patient's BP was low normal on their arrival, improved after an IV fluids bolus.  Patient denies chest pain or shortness of breath and says he does not feel weak currently although is rather tired.  When seen in the ED this morning he could barely express himself audibly.     Vital signs were normal on presentation here.  He is on warfarin for recurrent DVT, but his INR was 1, and d-dimer markedly elevated at 21.7.  Tox screen was positive for cocaine.  He had a CTA of the brain and neck done on presentation so CTA of the chest for PE study could not be done for 48 hours.  Ultrasound of the lower extremities showed extensive bilateral DVT.  On the right there was thrombosis of the common femoral vein, femoral vein, popliteal vein, one of the paired posterior tibial veins and both visualized peroneal veins.  On the left there was thrombosis of the common femoral vein, femoral vein and popliteal vein.  Patient was started on full dose Lovenox and admitted.    Medical history is from the chart as he is unable to give me much information.  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  He smokes 5-6 cigarettes/day and is not interested in quitting.  He is currently homeless and staying with a friend.  Despite the normal INR he is sure he  is taking his warfarin and is not having difficulty taking his medications.  I discussed his care with [facility] staff on the [facility] and patient had been lost to follow up since November 2021.      Overview/Hospital Course:  Patient presented to the ED after a syncopal episode and was found to have bilateral lower extremity DVTs and a low INR.  Tox screen was positive for cocaine.  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  He was started back on his warfarin with bridging Lovenox.  His 2D echo showed grade I diastolic dysfunction.  Neurology evaluated him and recommended echo with bubble study, which was negative for a shunt.    PT/OT evaluated him and recommended SNF placement versus inpatient rehab.  His INR was difficult to get therapeutic, and as he had no contraindication to a NOAC his warfarin was changed to apixaban, and the full dose Lovenox was discontinued.  As he appeared to be depressed and had no objection to starting an antidepressant Lexapro was started.  He had a fall in the hospital on 2/13, had gone to the bathroom for a BM with the nurse, and when she turned away while he was washing his hands he apparently lost his balance and fell.  He did not hit his head and did not lose consciousness, but his BP was low transiently and he was given a bolus of IV fluids.    Patient's mental status improved slowly, but he gradually became more talkative and was able to hold a conversation.  He was diagnosed with a polymicrobial UTI on 2/21 and completed treatment with antibiotics.  Psychiatry evaluated him on 3/3 and changed his antidepressant to Prozac as it can be more activating and patient was having difficulty with his level of motivation.      Patient had some episodes of nausea and vomiting and CT of the abdomen was done, with incidental finding of rectal wall thickening noted.  Colonoscopy was recommended, but  he was unable to finish the prep.  GI evaluated him and decided the patient would benefit from a flex sig to evaluate the rectum.  He had the procedure on 3/25 and no abnormalities were seen other than internal hemorrhoids.    Patient's discharge was delayed as he was not accepted by any SNF facilities in the area.  Reasons for the denials are unclear as he has a clear rehab diagnosis.  He has a history of drug use but has not shown any interest in getting any illicit or legal drugs since he has been here, and he has had no visitors that could have provided drugs to him.  He has been pleasant and cooperative while here although lacks motivation, likely due to the nature of the stroke affecting his frontal lobe.  He requires considerable encouragement to participate in therapy.  Discharging him to live on the street or shelter would not be appropriate as he would not be able to take care of himself and likely will need alf nursing home care eventually after he gets the chance to improve with rehab.       Interval History: no further vomiting, tolerating diet.    Review of Systems   Constitutional:  Negative for chills and fever.   HENT:  Negative for trouble swallowing.    Respiratory:  Negative for cough and shortness of breath.    Cardiovascular:  Negative for chest pain and leg swelling.   Gastrointestinal:  Negative for abdominal pain, blood in stool, nausea and vomiting.   Genitourinary:  Negative for dysuria and hematuria.   Neurological:  Negative for headaches.   Objective:     Vital Signs (Most Recent):  Temp: 97.8 °F (36.6 °C) (04/03/22 1151)  Pulse: 85 (04/03/22 1151)  Resp: 18 (04/03/22 1151)  BP: 117/69 (04/03/22 1151)  SpO2: 99 % (04/03/22 1151)   Vital Signs (24h Range):  Temp:  [97.6 °F (36.4 °C)-98.1 °F (36.7 °C)] 97.8 °F (36.6 °C)  Pulse:  [73-85] 85  Resp:  [18-20] 18  SpO2:  [97 %-99 %] 99 %  BP: (104-123)/(66-71) 117/69     Weight: 66.2 kg (145 lb 15.1 oz)  Body mass index is 19.79  kg/m².    Intake/Output Summary (Last 24 hours) at 4/3/2022 1333  Last data filed at 4/3/2022 0447  Gross per 24 hour   Intake --   Output 1000 ml   Net -1000 ml        Physical Exam  Vitals reviewed.   Constitutional:       General: He is not in acute distress.     Appearance: He is well-developed.   HENT:      Head: Normocephalic and atraumatic.   Eyes:      Extraocular Movements: Extraocular movements intact.      Pupils: Pupils are equal, round, and reactive to light.   Cardiovascular:      Rate and Rhythm: Normal rate and regular rhythm.   Pulmonary:      Effort: Pulmonary effort is normal. No respiratory distress.      Breath sounds: Normal breath sounds.   Abdominal:      General: Bowel sounds are normal. There is no distension.      Palpations: Abdomen is soft.      Tenderness: There is no abdominal tenderness.   Musculoskeletal:         General: No swelling. Normal range of motion.      Cervical back: Normal range of motion and neck supple.   Skin:     General: Skin is warm.      Findings: No rash.   Neurological:      General: No focal deficit present.      Mental Status: He is alert and oriented to person, place, and time.   Psychiatric:         Mood and Affect: Mood normal.         Behavior: Behavior normal.       Significant Labs: All pertinent labs within the past 24 hours have been reviewed.    Significant Imaging: I have reviewed all pertinent imaging results/findings within the past 24 hours.      Assessment/Plan:      * Acute stroke due to ischemia  - MRI showed areas of diffusion signal hyperintensity consistent with areas of acute ischemia/infarct involving the left cerebral hemisphere, the most prominent measuring approximately 1.4 cm, also a small focus of the right frontal lobe.  - Patient on full dose anticoagulation currently due to acute bilateral DVT.  - Sinus rhythm noted throughout hospital stay  - Risk factor modification - control diabetes, continue statin.  - RPR, HIV non reactive.   B12 low normal.   - CTA showed a focal segment of 60-70% stenosis involving the proximal to mid left vertebral artery that was new when compared to the prior study of 09/25/2019.  No evidence of large vessel intracranial occlusion.   - Neurology noted symptoms do not correspond to stroke location, although the frontal lobe stroke might have something to do with his lack of motivation, and Speech has noted he has had delayed swallowing.  - Echo with bubble study done, no shunt seen.  - Follow up with vascular neurology in 4 weeks.  - Therapy recommended rehab initially but changed recommendation to SNF due to his low interest in participation.  - Started Lexapro due to suspicion for depression.  - Psych consulted, changed to Prozac and started thiamine, folic acid, MVI due to previous history of alcohol abuse, although Wernicke encephalopathy is not suspected.  - Re-consulted SNF for rehab from the stroke, as discharging him to a shelter would be inappropriate and unsafe for him.  Discussed with his son, Forest, at length on 3/10.  Forest is out of town on a job but will be returning to [place] eventually.  He agrees his father will eventually need nursing home care as he is unable to care for himself.  - awaiting placement.      Abnormal CT scan, gastrointestinal tract  CT showed thickening of the rectal wall with colonoscopy recommended for further evaluation.  Patient was unable to tolerate the prep.  GI spoke to him and he agreed to a flex sig after an enema.  Flex sig negative for abnormalities other than internal hemorrhoids.  Kub ok, continue miralax    Advance care planning        Severe protein-calorie malnutrition  Diet as tolerated  Patient seen by dietician, recommendations made.      Debility  PT/OT recommending rehab vs SNF after stroke with debility/poor motivation  Having difficulty finding placement for unclear reasons.  Will continue to pursue this as above.  Again, patient has been pleasant and  cooperative.      Type 2 diabetes mellitus with hyperglycemia, without long-term current use of insulin  Reportedly he is on metformin and glipizide, both held.  He had 4+ sugar in urine and HbA1c was 10.3, and he was hyperglycemic on presentation.  Started on Levemir and ISS, then increased Levemir to 25 units daily  Added scheduled novolog 5 units tid, increased to 7 units with improvement  OK to resume metformin.    Increased levemir to 28 units daily with good improvement.  Held metformin with contrast with CT.  BG lower now off metformin, has had a few low BG as well due to being on clear liquids for colonoscopy prep.  Levemir decreased to 20 units daily.    As expected BG increased when diet was resumed so increased Levemir back to 28, will increase to 30 units as sugars on higher side    Acute deep vein thrombosis (DVT) of both femoral veins  Last ultrasound showed partially occlusive DVTs, now completely occlusive DVT of multiple lower extremity veins on ultrasound.  Patient reports compliance with warfarin but his INR is only one.  He is homeless but says he does not have difficulty obtaining his medications.  Does not seem to care about anything, which again might be due to the frontal lobe stroke.  D-dimer was markedly elevated on presentation and there was a question of possible PE, but he had a CTA of the brain/neck on presentation so could not get another CTA for another 48 hours.    V/Q scan was done, showed low probability for PE.  2D echo showed grade I diastolic dysfunction.  Warfarin was restarted with bridging Lovenox, but INR was still low.  Changed to apixaban and discontinued Lovenox.    Hyperlipidemia  Continue atorvastatin status post stroke.        VTE Risk Mitigation (From admission, onward)         Ordered     apixaban tablet 5 mg  2 times daily         03/29/22 8274     Place sequential compression device  Until discontinued         01/31/22 6048                Discharge Planning   EFRAÍN:       Code Status: Full Code   Is the patient medically ready for discharge?:     Reason for patient still in hospital (select all that apply): Pending disposition  Discharge Plan A: New Nursing Home placement - longterm care facility   Discharge Delays: (!) Other (Pending acceptance in a facility.)              Alma Elizalde MD  Department of Hospital Medicine   [facility] ([place])

## 2022-04-03 NOTE — SUBJECTIVE & OBJECTIVE
Interval History: no further vomiting, tolerating diet.    Review of Systems   Constitutional:  Negative for chills and fever.   HENT:  Negative for trouble swallowing.    Respiratory:  Negative for cough and shortness of breath.    Cardiovascular:  Negative for chest pain and leg swelling.   Gastrointestinal:  Negative for abdominal pain, blood in stool, nausea and vomiting.   Genitourinary:  Negative for dysuria and hematuria.   Neurological:  Negative for headaches.   Objective:     Vital Signs (Most Recent):  Temp: 97.8 °F (36.6 °C) (04/03/22 1151)  Pulse: 85 (04/03/22 1151)  Resp: 18 (04/03/22 1151)  BP: 117/69 (04/03/22 1151)  SpO2: 99 % (04/03/22 1151)   Vital Signs (24h Range):  Temp:  [97.6 °F (36.4 °C)-98.1 °F (36.7 °C)] 97.8 °F (36.6 °C)  Pulse:  [73-85] 85  Resp:  [18-20] 18  SpO2:  [97 %-99 %] 99 %  BP: (104-123)/(66-71) 117/69     Weight: 66.2 kg (145 lb 15.1 oz)  Body mass index is 19.79 kg/m².    Intake/Output Summary (Last 24 hours) at 4/3/2022 1333  Last data filed at 4/3/2022 0447  Gross per 24 hour   Intake --   Output 1000 ml   Net -1000 ml        Physical Exam  Vitals reviewed.   Constitutional:       General: He is not in acute distress.     Appearance: He is well-developed.   HENT:      Head: Normocephalic and atraumatic.   Eyes:      Extraocular Movements: Extraocular movements intact.      Pupils: Pupils are equal, round, and reactive to light.   Cardiovascular:      Rate and Rhythm: Normal rate and regular rhythm.   Pulmonary:      Effort: Pulmonary effort is normal. No respiratory distress.      Breath sounds: Normal breath sounds.   Abdominal:      General: Bowel sounds are normal. There is no distension.      Palpations: Abdomen is soft.      Tenderness: There is no abdominal tenderness.   Musculoskeletal:         General: No swelling. Normal range of motion.      Cervical back: Normal range of motion and neck supple.   Skin:     General: Skin is warm.      Findings: No rash.    Neurological:      General: No focal deficit present.      Mental Status: He is alert and oriented to person, place, and time.   Psychiatric:         Mood and Affect: Mood normal.         Behavior: Behavior normal.       Significant Labs: All pertinent labs within the past 24 hours have been reviewed.    Significant Imaging: I have reviewed all pertinent imaging results/findings within the past 24 hours.

## 2022-04-03 NOTE — ASSESSMENT & PLAN NOTE
CT showed thickening of the rectal wall with colonoscopy recommended for further evaluation.  Patient was unable to tolerate the prep.  GI spoke to him and he agreed to a flex sig after an enema.  Flex sig negative for abnormalities other than internal hemorrhoids.  Kub ok, continue miralax

## 2022-04-03 NOTE — PT/OT/SLP PROGRESS
"Speech Language Pathology Treatment    Patient Name:  Forest Lopez   MRN:  [id number]  Admitting Diagnosis: Acute stroke due to ischemia    Recommendations:                 General Recommendations:  Dysphagia therapy and Cognitive-linguistic therapy  Diet recommendations:  Mechanical soft, Liquid Diet Level: Thin   Aspiration Precautions: 1 bite/sip at a time, Avoid talking while eating, Eliminate distractions, HOB to 90 degrees, Small bites/sips and Standard aspiration precautions   General Precautions: Standard, aspiration  Communication strategies:  none    Subjective     Spoke with RN prior to session. Received pt awake/alert sitting upright in bed w/ breakfast tray in front of him. Pt very pleasant and agreeable to speech therapy.     Pain/Comfort:  · Pain Rating 1: 0/10    Respiratory Status: Room air    Objective:     Has the patient been evaluated by SLP for swallowing?   Yes  Keep patient NPO? No      Pt seen for ongoing dysphagia and cognitive-linguistic therapy. Pt was able to tolerate mechanical soft solids from meal tray without any overt s/sx of airway compromise, though demonstrated intermittent wet vocal quality with intake of thin liquids. Worked on implementing safe swallow strategies/aspiration precautions such as  liquids from solids during intake, taking small, single sips/bites and using volitional throat clear and cough w/doubble swallow to clear wet vocal quality. He was able to implement following initial clinician cuing.     Pt able to orient to person, place and month today. Worked on following 3 step commands during mealtime. (I.e. "take a bite of yogurt AFTER you take a bite of sausage with the fork."). He continues to require 2-3 verbal repetitions in order to follow commands with 80% acc. Worked on implementing memory of repetition to recall 3 items. He was able to repeat back 3/3 items immediately in 4/5 trials or with 80% acc. Using repetition, he was able to recall 3/3 item " after 2 minutes, 5 minutes and 10 minutes with minimal distraction. He was able to participate and attend to task for entire 5-10 minutes. Reviewed strategies and ST POC. He verbalized understanding and was in agreement.     Assessment:     Forest Lopez is a 72 y.o. male with an SLP diagnosis of Dysphagia and Cognitive-Linguistic Impairment. ST services will continue to follow.     Goals:   Multidisciplinary Problems     SLP Goals        Problem: SLP Goal    Goal Priority Disciplines Outcome   SLP Goal     SLP Ongoing, Progressing   Description: 1. Pt will consume a regular/thin diet without overt s/s of aspiration or airway threat without assistance.  2. Pt will increase orientation to person, place, situation and date with 90% acc given min assist.  3. Pt will follow 3-4 step commands to increase functional IND with 75% acc given min assist.   4. Pt will complete immediate and delayed recall tasks with 75% acc given mod assist.  5. Targeting word finding, pt will complete divergent naming tasks given 1-minute time frame with 50% acc given mod assist.  6. Ongoing cognitive-communicative assessment.     Updated Goals 2/8:  7. Pt will sustain attention to topic/task without distraction in 5-10 minute increments given mod verbal cues.                   Plan:     · Patient to be seen:  2 x/week, 3 x/week   · Plan of Care expires:  03/21/22  · Plan of Care reviewed with:  patient   · SLP Follow-Up:  Yes       Discharge recommendations:  rehabilitation facility, nursing facility, skilled   Barriers to Discharge:  Level of Skilled Assistance Needed      Time Tracking:     SLP Treatment Date:   04/03/22  Speech Start Time:  0902  Speech Stop Time:  0920     Speech Total Time (min):  18 min    Billable Minutes: Speech Therapy Individual 10 and Treatment Swallowing Dysfunction 8    04/03/2022

## 2022-04-03 NOTE — ASSESSMENT & PLAN NOTE
Reportedly he is on metformin and glipizide, both held.  He had 4+ sugar in urine and HbA1c was 10.3, and he was hyperglycemic on presentation.  Started on Levemir and ISS, then increased Levemir to 25 units daily  Added scheduled novolog 5 units tid, increased to 7 units with improvement  OK to resume metformin.    Increased levemir to 28 units daily with good improvement.  Held metformin with contrast with CT.  BG lower now off metformin, has had a few low BG as well due to being on clear liquids for colonoscopy prep.  Levemir decreased to 20 units daily.    As expected BG increased when diet was resumed so increased Levemir back to 28, will increase to 30 units as sugars on higher side

## 2022-04-04 ENCOUNTER — PATIENT OUTREACH (OUTPATIENT)
Dept: ADMINISTRATIVE | Facility: OTHER | Age: 72
End: 2022-04-04
Payer: MEDICARE

## 2022-04-04 LAB
POCT GLUCOSE: 146 MG/DL (ref 70–110)
POCT GLUCOSE: 156 MG/DL (ref 70–110)
POCT GLUCOSE: 210 MG/DL (ref 70–110)
POCT GLUCOSE: 232 MG/DL (ref 70–110)

## 2022-04-04 PROCEDURE — 25000003 PHARM REV CODE 250: Performed by: INTERNAL MEDICINE

## 2022-04-04 PROCEDURE — 99232 PR SUBSEQUENT HOSPITAL CARE,LEVL II: ICD-10-PCS | Mod: ,,, | Performed by: INTERNAL MEDICINE

## 2022-04-04 PROCEDURE — 11000001 HC ACUTE MED/SURG PRIVATE ROOM

## 2022-04-04 PROCEDURE — C9399 UNCLASSIFIED DRUGS OR BIOLOG: HCPCS | Performed by: INTERNAL MEDICINE

## 2022-04-04 PROCEDURE — 94761 N-INVAS EAR/PLS OXIMETRY MLT: CPT

## 2022-04-04 PROCEDURE — 99232 SBSQ HOSP IP/OBS MODERATE 35: CPT | Mod: ,,, | Performed by: INTERNAL MEDICINE

## 2022-04-04 RX ADMIN — HYPROMELLOSE 2910 1 DROP: 5 SOLUTION OPHTHALMIC at 10:04

## 2022-04-04 RX ADMIN — THERA TABS 1 TABLET: TAB at 09:04

## 2022-04-04 RX ADMIN — INSULIN ASPART 7 UNITS: 100 INJECTION, SOLUTION INTRAVENOUS; SUBCUTANEOUS at 01:04

## 2022-04-04 RX ADMIN — INSULIN ASPART 2 UNITS: 100 INJECTION, SOLUTION INTRAVENOUS; SUBCUTANEOUS at 06:04

## 2022-04-04 RX ADMIN — APIXABAN 5 MG: 2.5 TABLET, FILM COATED ORAL at 08:04

## 2022-04-04 RX ADMIN — APIXABAN 5 MG: 2.5 TABLET, FILM COATED ORAL at 09:04

## 2022-04-04 RX ADMIN — THIAMINE HCL TAB 100 MG 100 MG: 100 TAB at 09:04

## 2022-04-04 RX ADMIN — INSULIN ASPART 7 UNITS: 100 INJECTION, SOLUTION INTRAVENOUS; SUBCUTANEOUS at 06:04

## 2022-04-04 RX ADMIN — FOLIC ACID 1 MG: 1 TABLET ORAL at 09:04

## 2022-04-04 RX ADMIN — INSULIN ASPART 2 UNITS: 100 INJECTION, SOLUTION INTRAVENOUS; SUBCUTANEOUS at 01:04

## 2022-04-04 RX ADMIN — INSULIN ASPART 2 UNITS: 100 INJECTION, SOLUTION INTRAVENOUS; SUBCUTANEOUS at 09:04

## 2022-04-04 RX ADMIN — TAMSULOSIN HYDROCHLORIDE 0.4 MG: 0.4 CAPSULE ORAL at 08:04

## 2022-04-04 RX ADMIN — FLUOXETINE 20 MG: 20 CAPSULE ORAL at 09:04

## 2022-04-04 RX ADMIN — INSULIN ASPART 7 UNITS: 100 INJECTION, SOLUTION INTRAVENOUS; SUBCUTANEOUS at 09:04

## 2022-04-04 RX ADMIN — INSULIN DETEMIR 30 UNITS: 100 INJECTION, SOLUTION SUBCUTANEOUS at 10:04

## 2022-04-04 RX ADMIN — POLYETHYLENE GLYCOL 3350 17 G: 17 POWDER, FOR SOLUTION ORAL at 09:04

## 2022-04-04 RX ADMIN — ATORVASTATIN CALCIUM 80 MG: 20 TABLET, FILM COATED ORAL at 09:04

## 2022-04-04 NOTE — PLAN OF CARE
Mr. Lopez began his night disoriented to his situation. During the course of the night he forgot where he was.  He pulled his condom catheter off three times.  After cleaning and providing fresh linen and gown he got an hour break from the catheter.  It is now back on.  His hs accucheck was 123 ( no coverage required).  He simply will not wear a sacral or heel mepilex dressing to protect those spots... but he does move around frequently enough in and out of bed to not experience any skin breakdown.  No temps or high blood pressures noted.  His appetite is very good as he frequently eats 100% of his meals and snacks.  He no longer has an IV site. MD aware and ok with this.  [employer] camera #3 noted in use.  Room near the nurse's station.  Door left open.  Call bell within reach.  Rounding maintained per protocol.      Problem: Adult Inpatient Plan of Care  Goal: Plan of Care Review  Outcome: Ongoing, Progressing  Flowsheets (Taken 4/4/2022 0711)  Plan of Care Reviewed With: patient     Problem: Diabetes Comorbidity  Goal: Blood Glucose Level Within Targeted Range  Outcome: Ongoing, Progressing  Intervention: Monitor and Manage Glycemia  Flowsheets (Taken 4/4/2022 0711)  Glycemic Management: blood glucose monitored     Problem: Skin Injury Risk Increased  Goal: Skin Health and Integrity  Outcome: Ongoing, Progressing  Intervention: Optimize Skin Protection  Flowsheets (Taken 4/4/2022 0711)  Pressure Reduction Techniques: frequent weight shift encouraged  Pressure Reduction Devices: pressure-redistributing mattress utilized  Skin Protection:   adhesive use limited   incontinence pads utilized  Head of Bed (HOB) Positioning: HOB elevated     Problem: Coping Ineffective  Goal: Effective Coping  Outcome: Ongoing, Progressing  Intervention: Support and Enhance Coping Strategies  Flowsheets (Taken 4/4/2022 0711)  Supportive Measures:   positive reinforcement provided   self-care encouraged   self-reflection promoted    self-responsibility promoted   verbalization of feelings encouraged  Family/Support System Care:   self-care encouraged   support provided  Environmental Support: calm environment promoted     Problem: Fall Injury Risk  Goal: Absence of Fall and Fall-Related Injury  Outcome: Ongoing, Progressing  Intervention: Identify and Manage Contributors  Flowsheets (Taken 4/4/2022 0711)  Self-Care Promotion: independence encouraged  Medication Review/Management: medications reviewed  Intervention: Promote Injury-Free Environment  Flowsheets (Taken 4/4/2022 0711)  Safety Promotion/Fall Prevention:   commode/urinal/bedpan at bedside   Fall Risk signage in place   Fall Risk reviewed with patient/family   high risk medications identified   lighting adjusted   medications reviewed   nonskid shoes/socks when out of bed   /camera at bedside   room near unit station   side rails raised x 2   instructed to call staff for mobility     Problem: Bleeding (Sepsis/Septic Shock)  Goal: Absence of Bleeding  Outcome: Ongoing, Progressing  Intervention: Monitor and Manage Bleeding  Flowsheets (Taken 4/4/2022 0711)  Bleeding Precautions: blood pressure closely monitored     Problem: Glycemic Control Impaired (Sepsis/Septic Shock)  Goal: Blood Glucose Level Within Desired Range  Outcome: Ongoing, Progressing     Problem: Glycemic Control Impaired (Sepsis/Septic Shock)  Goal: Blood Glucose Level Within Desired Range  Outcome: Ongoing, Progressing  Intervention: Optimize Glycemic Control  Flowsheets (Taken 4/4/2022 0711)  Glycemic Management: blood glucose monitored     Problem: Nutrition Impaired (Sepsis/Septic Shock)  Goal: Optimal Nutrition Intake  Outcome: Ongoing, Progressing

## 2022-04-04 NOTE — SUBJECTIVE & OBJECTIVE
Interval History: no further vomiting, tolerating diet.    Review of Systems   Constitutional:  Negative for chills and fever.   HENT:  Negative for trouble swallowing.    Respiratory:  Negative for cough and shortness of breath.    Cardiovascular:  Negative for chest pain and leg swelling.   Gastrointestinal:  Negative for abdominal pain, blood in stool, nausea and vomiting.   Genitourinary:  Negative for dysuria and hematuria.   Neurological:  Negative for headaches.   Objective:     Vital Signs (Most Recent):  Temp: 98.3 °F (36.8 °C) (04/04/22 1120)  Pulse: 84 (04/04/22 1120)  Resp: 12 (04/04/22 1120)  BP: 108/61 (04/04/22 1120)  SpO2: 95 % (04/04/22 1120)   Vital Signs (24h Range):  Temp:  [97.6 °F (36.4 °C)-98.5 °F (36.9 °C)] 98.3 °F (36.8 °C)  Pulse:  [] 84  Resp:  [12-20] 12  SpO2:  [95 %-99 %] 95 %  BP: ()/(56-74) 108/61     Weight: 66.2 kg (145 lb 15.1 oz)  Body mass index is 19.79 kg/m².    Intake/Output Summary (Last 24 hours) at 4/4/2022 1420  Last data filed at 4/4/2022 0600  Gross per 24 hour   Intake 400 ml   Output 1000 ml   Net -600 ml        Physical Exam  Vitals reviewed.   Constitutional:       General: He is not in acute distress.     Appearance: He is well-developed.   HENT:      Head: Normocephalic and atraumatic.   Eyes:      Extraocular Movements: Extraocular movements intact.      Pupils: Pupils are equal, round, and reactive to light.   Cardiovascular:      Rate and Rhythm: Normal rate and regular rhythm.   Pulmonary:      Effort: Pulmonary effort is normal. No respiratory distress.      Breath sounds: Normal breath sounds.   Abdominal:      General: Bowel sounds are normal. There is no distension.      Palpations: Abdomen is soft.      Tenderness: There is no abdominal tenderness.   Musculoskeletal:         General: No swelling. Normal range of motion.      Cervical back: Normal range of motion and neck supple.   Skin:     General: Skin is warm.      Findings: No rash.    Neurological:      General: No focal deficit present.      Mental Status: He is alert and oriented to person, place, and time.   Psychiatric:         Mood and Affect: Mood normal.         Behavior: Behavior normal.       Significant Labs: All pertinent labs within the past 24 hours have been reviewed.    Significant Imaging: I have reviewed all pertinent imaging results/findings within the past 24 hours.

## 2022-04-04 NOTE — PROGRESS NOTES
[facility] Medicine  Progress Note    Patient Name: Forest Lopez  MRN: [id number]  Patient Class: IP- Inpatient   Admission Date: 1/31/2022  Length of Stay: 63 days  Attending Physician: Alma Elizalde MD  Primary Care Provider: Julian Escobar        Subjective:     Principal Problem:Acute stroke due to ischemia        HPI:  Mr. Lopez is a 72 year old man who presented after a syncopal episode.  He reports he had been feeling well prior to the episode but then had a prodrome prior to passing out.  EMS was called, report patient's BP was low normal on their arrival, improved after an IV fluids bolus.  Patient denies chest pain or shortness of breath and says he does not feel weak currently although is rather tired.  When seen in the ED this morning he could barely express himself audibly.     Vital signs were normal on presentation here.  He is on warfarin for recurrent DVT, but his INR was 1, and d-dimer markedly elevated at 21.7.  Tox screen was positive for cocaine.  He had a CTA of the brain and neck done on presentation so CTA of the chest for PE study could not be done for 48 hours.  Ultrasound of the lower extremities showed extensive bilateral DVT.  On the right there was thrombosis of the common femoral vein, femoral vein, popliteal vein, one of the paired posterior tibial veins and both visualized peroneal veins.  On the left there was thrombosis of the common femoral vein, femoral vein and popliteal vein.  Patient was started on full dose Lovenox and admitted.    Medical history is from the chart as he is unable to give me much information.  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  He smokes 5-6 cigarettes/day and is not interested in quitting.  He is currently homeless and staying with a friend.  Despite the normal INR he is sure he  is taking his warfarin and is not having difficulty taking his medications.  I discussed his care with [facility] staff on the [facility] and patient had been lost to follow up since November 2021.      Overview/Hospital Course:  Patient presented to the ED after a syncopal episode and was found to have bilateral lower extremity DVTs and a low INR.  Tox screen was positive for cocaine.  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  He was started back on his warfarin with bridging Lovenox.  His 2D echo showed grade I diastolic dysfunction.  Neurology evaluated him and recommended echo with bubble study, which was negative for a shunt.    PT/OT evaluated him and recommended SNF placement versus inpatient rehab.  His INR was difficult to get therapeutic, and as he had no contraindication to a NOAC his warfarin was changed to apixaban, and the full dose Lovenox was discontinued.  As he appeared to be depressed and had no objection to starting an antidepressant Lexapro was started.  He had a fall in the hospital on 2/13, had gone to the bathroom for a BM with the nurse, and when she turned away while he was washing his hands he apparently lost his balance and fell.  He did not hit his head and did not lose consciousness, but his BP was low transiently and he was given a bolus of IV fluids.    Patient's mental status improved slowly, but he gradually became more talkative and was able to hold a conversation.  He was diagnosed with a polymicrobial UTI on 2/21 and completed treatment with antibiotics.  Psychiatry evaluated him on 3/3 and changed his antidepressant to Prozac as it can be more activating and patient was having difficulty with his level of motivation.      Patient had some episodes of nausea and vomiting and CT of the abdomen was done, with incidental finding of rectal wall thickening noted.  Colonoscopy was recommended, but  he was unable to finish the prep.  GI evaluated him and decided the patient would benefit from a flex sig to evaluate the rectum.  He had the procedure on 3/25 and no abnormalities were seen other than internal hemorrhoids.    Patient's discharge was delayed as he was not accepted by any SNF facilities in the area.  Reasons for the denials are unclear as he has a clear rehab diagnosis.  He has a history of drug use but has not shown any interest in getting any illicit or legal drugs since he has been here, and he has had no visitors that could have provided drugs to him.  He has been pleasant and cooperative while here although lacks motivation, likely due to the nature of the stroke affecting his frontal lobe.  He requires considerable encouragement to participate in therapy.  Discharging him to live on the street or shelter would not be appropriate as he would not be able to take care of himself and likely will need snf nursing home care eventually after he gets the chance to improve with rehab.       Interval History: no further vomiting, tolerating diet.    Review of Systems   Constitutional:  Negative for chills and fever.   HENT:  Negative for trouble swallowing.    Respiratory:  Negative for cough and shortness of breath.    Cardiovascular:  Negative for chest pain and leg swelling.   Gastrointestinal:  Negative for abdominal pain, blood in stool, nausea and vomiting.   Genitourinary:  Negative for dysuria and hematuria.   Neurological:  Negative for headaches.   Objective:     Vital Signs (Most Recent):  Temp: 98.3 °F (36.8 °C) (04/04/22 1120)  Pulse: 84 (04/04/22 1120)  Resp: 12 (04/04/22 1120)  BP: 108/61 (04/04/22 1120)  SpO2: 95 % (04/04/22 1120)   Vital Signs (24h Range):  Temp:  [97.6 °F (36.4 °C)-98.5 °F (36.9 °C)] 98.3 °F (36.8 °C)  Pulse:  [] 84  Resp:  [12-20] 12  SpO2:  [95 %-99 %] 95 %  BP: ()/(56-74) 108/61     Weight: 66.2 kg (145 lb 15.1 oz)  Body mass index is 19.79  kg/m².    Intake/Output Summary (Last 24 hours) at 4/4/2022 1420  Last data filed at 4/4/2022 0600  Gross per 24 hour   Intake 400 ml   Output 1000 ml   Net -600 ml        Physical Exam  Vitals reviewed.   Constitutional:       General: He is not in acute distress.     Appearance: He is well-developed.   HENT:      Head: Normocephalic and atraumatic.   Eyes:      Extraocular Movements: Extraocular movements intact.      Pupils: Pupils are equal, round, and reactive to light.   Cardiovascular:      Rate and Rhythm: Normal rate and regular rhythm.   Pulmonary:      Effort: Pulmonary effort is normal. No respiratory distress.      Breath sounds: Normal breath sounds.   Abdominal:      General: Bowel sounds are normal. There is no distension.      Palpations: Abdomen is soft.      Tenderness: There is no abdominal tenderness.   Musculoskeletal:         General: No swelling. Normal range of motion.      Cervical back: Normal range of motion and neck supple.   Skin:     General: Skin is warm.      Findings: No rash.   Neurological:      General: No focal deficit present.      Mental Status: He is alert and oriented to person, place, and time.   Psychiatric:         Mood and Affect: Mood normal.         Behavior: Behavior normal.       Significant Labs: All pertinent labs within the past 24 hours have been reviewed.    Significant Imaging: I have reviewed all pertinent imaging results/findings within the past 24 hours.      Assessment/Plan:      * Acute stroke due to ischemia  - MRI showed areas of diffusion signal hyperintensity consistent with areas of acute ischemia/infarct involving the left cerebral hemisphere, the most prominent measuring approximately 1.4 cm, also a small focus of the right frontal lobe.  - Patient on full dose anticoagulation currently due to acute bilateral DVT.  - Sinus rhythm noted throughout hospital stay  - Risk factor modification - control diabetes, continue statin.  - RPR, HIV non reactive.   B12 low normal.   - CTA showed a focal segment of 60-70% stenosis involving the proximal to mid left vertebral artery that was new when compared to the prior study of 09/25/2019.  No evidence of large vessel intracranial occlusion.   - Neurology noted symptoms do not correspond to stroke location, although the frontal lobe stroke might have something to do with his lack of motivation, and Speech has noted he has had delayed swallowing.  - Echo with bubble study done, no shunt seen.  - Follow up with vascular neurology in 4 weeks.  - Therapy recommended rehab initially but changed recommendation to SNF due to his low interest in participation.  - Started Lexapro due to suspicion for depression.  - Psych consulted, changed to Prozac and started thiamine, folic acid, MVI due to previous history of alcohol abuse, although Wernicke encephalopathy is not suspected.  - Re-consulted SNF for rehab from the stroke, as discharging him to a shelter would be inappropriate and unsafe for him.  Discussed with his son, Forest, at length on 3/10.  Forest is out of town on a job but will be returning to [place] eventually.  He agrees his father will eventually need nursing home care as he is unable to care for himself.  - awaiting placement.      Abnormal CT scan, gastrointestinal tract  CT showed thickening of the rectal wall with colonoscopy recommended for further evaluation.  Patient was unable to tolerate the prep.  GI spoke to him and he agreed to a flex sig after an enema.  Flex sig negative for abnormalities other than internal hemorrhoids.  Kub ok, continue miralax    Advance care planning        Severe protein-calorie malnutrition  Diet as tolerated  Patient seen by dietician, recommendations made.      Debility  PT/OT recommending rehab vs SNF after stroke with debility/poor motivation  Having difficulty finding placement for unclear reasons.  Will continue to pursue this as above.  Again, patient has been pleasant  and cooperative.      Type 2 diabetes mellitus with hyperglycemia, without long-term current use of insulin  Reportedly he is on metformin and glipizide, both held.  He had 4+ sugar in urine and HbA1c was 10.3, and he was hyperglycemic on presentation.  Started on Levemir and ISS, then increased Levemir to 25 units daily  Added scheduled novolog 5 units tid, increased to 7 units with improvement  OK to resume metformin.    Increased levemir to 28 units daily with good improvement.  Held metformin with contrast with CT.  BG lower now off metformin, has had a few low BG as well due to being on clear liquids for colonoscopy prep.  Levemir decreased to 20 units daily.    As expected BG increased when diet was resumed so increased Levemir back to 28, will increase to 30 units as sugars on higher side    Acute deep vein thrombosis (DVT) of both femoral veins  Last ultrasound showed partially occlusive DVTs, now completely occlusive DVT of multiple lower extremity veins on ultrasound.  Patient reports compliance with warfarin but his INR is only one.  He is homeless but says he does not have difficulty obtaining his medications.  Does not seem to care about anything, which again might be due to the frontal lobe stroke.  D-dimer was markedly elevated on presentation and there was a question of possible PE, but he had a CTA of the brain/neck on presentation so could not get another CTA for another 48 hours.    V/Q scan was done, showed low probability for PE.  2D echo showed grade I diastolic dysfunction.  Warfarin was restarted with bridging Lovenox, but INR was still low.  Changed to apixaban and discontinued Lovenox.    Hyperlipidemia  Continue atorvastatin status post stroke.        VTE Risk Mitigation (From admission, onward)         Ordered     apixaban tablet 5 mg  2 times daily         03/29/22 0846     Place sequential compression device  Until discontinued         01/31/22 8512                Discharge Planning    EFRAÍN:      Code Status: Full Code   Is the patient medically ready for discharge?:     Reason for patient still in hospital (select all that apply): Pending disposition  Discharge Plan A: New Nursing Home placement - assisted care facility   Discharge Delays: (!) Other (Pending acceptance in a facility.)              Alma Elizalde MD  Department of Hospital Medicine   [facility] ([place])

## 2022-04-05 ENCOUNTER — PATIENT OUTREACH (OUTPATIENT)
Dept: ADMINISTRATIVE | Facility: OTHER | Age: 72
End: 2022-04-05
Payer: MEDICARE

## 2022-04-05 LAB
POCT GLUCOSE: 139 MG/DL (ref 70–110)
POCT GLUCOSE: 165 MG/DL (ref 70–110)
POCT GLUCOSE: 175 MG/DL (ref 70–110)
POCT GLUCOSE: 184 MG/DL (ref 70–110)

## 2022-04-05 PROCEDURE — 97116 GAIT TRAINING THERAPY: CPT

## 2022-04-05 PROCEDURE — 11000001 HC ACUTE MED/SURG PRIVATE ROOM

## 2022-04-05 PROCEDURE — 25000003 PHARM REV CODE 250: Performed by: INTERNAL MEDICINE

## 2022-04-05 PROCEDURE — 97530 THERAPEUTIC ACTIVITIES: CPT

## 2022-04-05 PROCEDURE — 99232 SBSQ HOSP IP/OBS MODERATE 35: CPT | Mod: ,,, | Performed by: HOSPITALIST

## 2022-04-05 PROCEDURE — 92507 TX SP LANG VOICE COMM INDIV: CPT

## 2022-04-05 PROCEDURE — 97535 SELF CARE MNGMENT TRAINING: CPT

## 2022-04-05 PROCEDURE — 99232 PR SUBSEQUENT HOSPITAL CARE,LEVL II: ICD-10-PCS | Mod: ,,, | Performed by: HOSPITALIST

## 2022-04-05 RX ADMIN — APIXABAN 5 MG: 2.5 TABLET, FILM COATED ORAL at 09:04

## 2022-04-05 RX ADMIN — FOLIC ACID 1 MG: 1 TABLET ORAL at 09:04

## 2022-04-05 RX ADMIN — INSULIN DETEMIR 30 UNITS: 100 INJECTION, SOLUTION SUBCUTANEOUS at 09:04

## 2022-04-05 RX ADMIN — TAMSULOSIN HYDROCHLORIDE 0.4 MG: 0.4 CAPSULE ORAL at 09:04

## 2022-04-05 RX ADMIN — INSULIN ASPART 7 UNITS: 100 INJECTION, SOLUTION INTRAVENOUS; SUBCUTANEOUS at 01:04

## 2022-04-05 RX ADMIN — INSULIN ASPART 7 UNITS: 100 INJECTION, SOLUTION INTRAVENOUS; SUBCUTANEOUS at 09:04

## 2022-04-05 RX ADMIN — INSULIN ASPART 7 UNITS: 100 INJECTION, SOLUTION INTRAVENOUS; SUBCUTANEOUS at 06:04

## 2022-04-05 RX ADMIN — FLUOXETINE 20 MG: 20 CAPSULE ORAL at 09:04

## 2022-04-05 RX ADMIN — THERA TABS 1 TABLET: TAB at 09:04

## 2022-04-05 RX ADMIN — ATORVASTATIN CALCIUM 80 MG: 20 TABLET, FILM COATED ORAL at 09:04

## 2022-04-05 NOTE — PT/OT/SLP PROGRESS
Speech Language Pathology Treatment    Patient Name:  Forest Lopez   MRN:  [id number]  Admitting Diagnosis: Acute stroke due to ischemia    Recommendations:                  General Recommendations:   1. Speech pathology to continue to follow 2-3x/week for ongoing assessment of cognitive-communicative deficits s/p acute infarcts of L cerebral hemisphere     Diet recommendations: Solids: Mechanical soft solids, Liquids: Thin      Aspiration Precautions: Eliminate distractions, Feed only when awake/alert, Frequent oral care and HOB to 90 degrees      General Precautions: Standard,       Communication strategies:  Reduce informational content/context; frequent repetition and cues to redirect    Subjective     · Pt finishing session with OT. Sitting EOB.  · Pt agreeable to participate in SLP session.     Respiratory Status: Room air    Objective:     Has the patient been evaluated by SLP for swallowing?   Yes  Keep patient NPO? No   Current Respiratory Status:    Room air    Cognitive-Communicative and Language Status:  Improving participation and motivation. Targeting executive functioning skills of initiation, planning, and sequencing, pt participated in card game of his choice. Pt able to accurately describe game and sequence directions of game provided min-mod assist. Notable improvement in initiation during task. Initiated appropriate conversation during session. Pt able to sustain attention throughout session without distraction. Followed 2-step commands during session with 80% acc given min verbal cues. Pt transitioned to supine in bed at end of session.       Education: Pt would benefit from ongoing SLP services at next level of care.     Assessment:     Forest Lopez is a 72 y.o. 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    Goals:   Multidisciplinary Problems     SLP Goals        Problem: SLP Goal    Goal  Priority Disciplines Outcome   SLP Goal     SLP Ongoing, Progressing   Description: 1. Pt will consume a regular/thin diet without overt s/s of aspiration or airway threat without assistance.  2. Pt will increase orientation to person, place, situation and date with 90% acc given min assist.  3. Pt will follow 3-4 step commands to increase functional IND with 75% acc given min assist.   4. Pt will complete immediate and delayed recall tasks with 75% acc given mod assist.  5. Targeting word finding, pt will complete divergent naming tasks given 1-minute time frame with 50% acc given mod assist.  6. Ongoing cognitive-communicative assessment.     Updated Goals 2/8:  7. Pt will sustain attention to topic/task without distraction in 5-10 minute increments given mod verbal cues.                   Plan:     · Patient to be seen:  2 x/week, 3 x/week   · Plan of Care expires:  03/21/22  · Plan of Care reviewed with:  patient   · SLP Follow-Up:  Yes       Discharge recommendations: IRF vs. Skilled Nursing Facility     Time Tracking:     SLP Treatment Date:   04/05/22  Speech Start Time:  1115  Speech Stop Time:  1140     Speech Total Time (min):  25 min    Billable Minutes: Speech Therapy Individual 25 min    04/05/2022

## 2022-04-05 NOTE — PLAN OF CARE
04/04/22 1944   Discharge Assessment   Assessment Type Discharge Planning Reassessment   Source of Information patient   Communicated EFRAÍN with patient/caregiver Yes   Reason For Admission Acute stroke due to ischemia   Lives With other (see comments)  (The patient is homeless.)   Do you expect to return to your current living situation? No   Do you have help at home or someone to help you manage your care at home? No   Prior to hospitilization cognitive status: Alert/Oriented   Current cognitive status: Alert/Oriented   Walking or Climbing Stairs Difficulty ambulation difficulty, assistance 1 person;ambulation difficulty, requires equipment   Mobility Management Patient can walk with someone by his side.   Dressing/Bathing Difficulty bathing difficulty, assistance 1 person   Dressing/Bathing Management Patient needs supervision for ADLs.   Equipment Currently Used at Home none   Readmission within 30 days? No   Patient currently being followed by outpatient case management? No   Do you currently have service(s) that help you manage your care at home? No   Do you take prescription medications? Yes   Do you have prescription coverage? Yes   Coverage [employer] MANAGED MEDICARE - [employer] SNP (SPECIAL NEEDS PLAN)   Do you have any problems affording any of your prescribed medications? No   Is the patient taking medications as prescribed? yes   Who is going to help you get home at discharge? [facility]   Are you on dialysis? No   Do you take coumadin? No   Discharge Plan A Skilled Nursing Facility   Discharge Plan B Shelter   DME Needed Upon Discharge  none   Discharge Plan discussed with: Patient   Discharge Barriers Identified Homeless;No family/friends to help;Other (see comments)  (Patient has not be accepted to a SNF in this area.)     Patient has been turned down for admission at [facility].  SHANICE called [facility] in [facility], but there were no male beds.  SHANICE spoke with the patient tonight.  He is agreeable to go to the facility in [place], [place].  He realizes that he may have to go to a shelter. He did not like it when he was in a shelter before.  Patient can make his own decision about his d/c plan.

## 2022-04-05 NOTE — PHYSICIAN QUERY
PT Name: Forest Lopez  MR #: [id number]    DOCUMENTATION CLARIFICATION     CDS/: Lin Saleh               Contact information:[email or web address]  This form is a permanent document in the medical record.     Query Date: April 5, 2022    By submitting this query, we are merely seeking further clarification of documentation. Please utilize your independent clinical judgment when addressing the question(s) below.    The Medical Record contains the following:   Indicators   Supporting Clinical Findings Location in Medical Record    AMS, Confusion,  LOC, etc.      x Acute/Chronic Illness Sepsis  UTI  Encephalopathy HM note 2-23    Radiology Findings      Electrolyte Imbalance     x Medication Give 500 ml fluid bolus and started ivf  Started on rocephin  Added vancomycin till full cx report HM note 2-22    Treatment             x Other  Urine culture growing Proteus   and [place] HM note 2-24     The noted clinical guidelines are only system guidelines and do not replace the providers clinical judgment.    The National [place] of Neurologic Disorders and Stroke (NINDS) of the NIH describes encephalopathy as any diffuse disease of the brain that alters brain function or structure.    Provider, please specify the diagnosis or diagnoses associated with above clinical findings.  [  x ] Metabolic Encephalopathy - Due to electrolyte imbalance, metabolic derangements, or infectious processes, includes Septic Encephalopathy, Uremic Encephalopathy   [   ] Encephalopathy, unspecified      [   ] Other Encephalopathy (please specify): ____________________   [   ] Other neurological condition- Includes Post-ictal altered mental status (please specify condition): __________   [   ]  Clinically Undetermined     Please document in your progress notes daily for the duration of treatment until resolved, and include in your discharge summary.    References:  TAMICA Peña RN, CCDS. (2018, June 9). Notes  from the Instructor: Encephalopathy tips. Retrieved October 22, 2020, from [email or web address]    ICD-9-CM Coding Clinic First Quarter 2013, Effective with discharges: October 21, 2013 [facility] § Seizure with encephalopathy due to postictal state (2013).    ICD-10-CM/[employer] Integrated Codebook (Version V.20.8.10.0) [Computer software]. (2020). Retrieved October 21, 2020.    National [place] of Neurological Disorders and Stroke. (2019, March 27). Retrieved October 22, 2020, from https://www.ninds.nih.gov/Disorders/All-Disorders/Jikjaxpvhoahxp-Utnydwrouoe-Nfke    Form No. 33013

## 2022-04-05 NOTE — PHYSICIAN QUERY
PT Name: Forest Lopez  MR #: [id number]    DOCUMENTATION CLARIFICATION     CDS/: Lin Saleh               Contact information:[email or web address]    This form is a permanent document in the medical record.     Query Date: April 5, 2022    By submitting this query, we are merely seeking further clarification of documentation. Please utilize your independent clinical judgment when addressing the question(s) below.    Supporting Clinical Findings Location in Medical Record   Sepsis  Labs show elevated wbc, low grade fever, normal  lactic acid  Give 500 ml fluid bolus and started ivf  Follow blood and urine cx, cxr ok, on room air  ua positive  Started on rocephin  Added vancomycin till full cx report    Patient more lethargic on 2/21 with low grade fever and leukocytosis, work up looked like uti and started on abx, fluids.     note 2-22           Provider, please clarify if there is any clinical correlation between _________________________ and __________________.           Are the conditions:      [  ] Due to or associated with each other   [  ] Unrelated to each other   [  ] Other explanation (Please Specify): ______________   [ x ] Clinically Undetermined                                                                               Please document in your progress notes daily for the duration of treatment until resolved and include in your discharge summary.

## 2022-04-05 NOTE — PLAN OF CARE
This  Director called [facility] to check the status of the referral for the patient. Per Karthik [phone]), he does not have the patient on his list.  Referral to be faxed [phone].  SHANICE Lara to be updated.

## 2022-04-05 NOTE — ASSESSMENT & PLAN NOTE
- MRI showed areas of diffusion signal hyperintensity consistent with areas of acute ischemia/infarct involving the left cerebral hemisphere, the most prominent measuring approximately 1.4 cm, also a small focus of the right frontal lobe.  - Patient on full dose anticoagulation currently due to acute bilateral DVT.  - Sinus rhythm noted throughout hospital stay  - Risk factor modification - control diabetes, continue statin.  - RPR, HIV non reactive.  B12 low normal.   - CTA showed a focal segment of 60-70% stenosis involving the proximal to mid left vertebral artery that was new when compared to the prior study of 09/25/2019.  No evidence of large vessel intracranial occlusion.   - Neurology noted symptoms do not correspond to stroke location, although the frontal lobe stroke might have something to do with his lack of motivation, and Speech has noted he has had delayed swallowing.  - Echo with bubble study done, no shunt seen.  - Follow up with vascular neurology in 4 weeks.  - Therapy recommended rehab initially but changed recommendation to SNF due to his low interest in participation.  - Started Lexapro due to suspicion for depression.  - Psych consulted, changed to Prozac and started thiamine, folic acid, MVI due to previous history of alcohol abuse, although Wernicke encephalopathy is not suspected.  - Re-consulted SNF for rehab from the stroke, as discharging him to a shelter would be inappropriate and unsafe for him.  Discussed with his son, Forest, at length on 3/10.  Forest is out of town on a job but will be returning to [place] eventually.  He agrees his father will eventually need nursing home care as he is unable to care for himself.  - awaiting placement.

## 2022-04-05 NOTE — PLAN OF CARE
Problem: Occupational Therapy Goal  Goal: Occupational Therapy Goal  Description: Goals to be met by: 4/12/2022    Patient will increase functional independence with ADLs by performing:    UE Dressing with [place].  LE Dressing with [place].  Grooming while standing at sink with Supervision.  Toileting from toilet with Supervision for hygiene and clothing management.   Toilet transfer to toilet with Supervision.    Outcome: Ongoing, Progressing     POC remains appropriate.  SNF vs rehab is recommended upon d/c from acute care to further address deficits and help pt improve overall functional independence.     MICHELLE Willett  4/5/2022

## 2022-04-05 NOTE — PT/OT/SLP PROGRESS
Occupational Therapy   Treatment    Name: Forest Lopez  MRN: [id number]  Admitting Diagnosis:  Acute stroke due to ischemia  11 Days Post-Op    Recommendations:     Discharge Recommendations: nursing facility, skilled vs rehab  Discharge Equipment Recommendations:  bedside commode, shower chair  Barriers to discharge:  Decreased caregiver support    Assessment:     Forest Lopez is a 72 y.o. male with a medical diagnosis of Acute stroke due to ischemia.  He presents with no pain. Performance deficits affecting function are weakness, gait instability, impaired balance, impaired cognition, impaired self care skills, impaired functional mobilty, impaired endurance, decreased coordination, decreased safety awareness.  Pt agreeable to participating in therapy upon arrival to room.  While ambulating in room decreased safety awareness and impaired balance noted.  Pt required CGA to perform toileting in standing and grooming task while standing at sink.  Pt appeared somewhat SOB during UB exercises, but able to recover quickly.     Overall, pt tolerated therapy well.  He would continue to benefit from skilled OT services to address problems listed above and increase independence with ADLs.  SNF vs rehab is recommended upon d/c from acute care to further address deficits and help pt improve overall functional independence.         Rehab Prognosis:  Good; patient would benefit from acute skilled OT services to address these deficits and reach maximum level of function.       Plan:     Patient to be seen 3 x/week to address the above listed problems via self-care/home management, therapeutic activities, therapeutic exercises  · Plan of Care Expires: 04/02/22  · Plan of Care Reviewed with: patient    Subjective     Pain/Comfort:  · Pain Rating 1: 0/10  · Pain Rating Post-Intervention 1: 0/10    Objective:     Communicated with: RN (Yun) prior to session.  Patient found HOB elevated with PICC line, Condom Catheter upon OT entry  to room.    General Precautions: Standard, aspiration, fall   Orthopedic Precautions:N/A   Braces: N/A  Respiratory Status: Room air     Occupational Performance:     Bed Mobility:    · Supine <> sit: supervision  · Scooting: Supervision    Functional Mobility/Transfers:  · Sit <> Stand: CGA x 1 trial from EOB  · Functional Mobility: Pt ambulated ~20 ft in room with CGA/HHA (bed <> bathroom <> sink <> bed).  · Decreased safety awareness and impaired balance noted.    Activities of Daily Living:  · Grooming: CGA for washing hands while standing at sink.  · Mild postural sway observed.  · Toileting: CGA for standing at toilet.  · Pt stated he had to use bathroom.  Once he arrived at toilet he said he had to urinate; OT reminded pt he was wearing condom catheter.  Pt continued standing at toilet to urinate.      [facility] 6 Click ADL: 18    Treatment & Education:  *Pt educated on role of OT in acute care setting.  *Pt ambulated within room to address coordination, endurance, and balance needed for functional mobility.  *Pt performed toileting and grooming; cues required for safety.  *Pt performed UB exercises to provide stretch and promote increased endurance needed for ADLs: 2 sets x 10 reps per exercise; seated at EOB  -Overhead claps  -Horizontal shoulder abduction/adduction  *POC reviewed with pt        Patient left sitting at EOB with SLPEducation:   (Jocelyn) and RN (Yun) present.    GOALS:   Multidisciplinary Problems     Occupational Therapy Goals        Problem: Occupational Therapy Goal    Goal Priority Disciplines Outcome Interventions   Occupational Therapy Goal     OT, PT/OT Ongoing, Progressing    Description: Goals to be met by: 4/12/2022    Patient will increase functional independence with ADLs by performing:    UE Dressing with [place].  LE Dressing with [place].  Grooming while standing at sink with Supervision.  Toileting from toilet with Supervision for hygiene and clothing management.    Toilet transfer to toilet with Supervision.                     Time Tracking:     OT Date of Treatment: 04/05/22  OT Start Time: 1056  OT Stop Time: 1116  OT Total Time (min): 20 min    Billable Minutes:Self Care/Home Management 20    OT/DUTCH: OT          4/5/2022

## 2022-04-05 NOTE — PLAN OF CARE
This CM Director checked faxing for the patient. The packet was faxed on 3/17 by SHANICE Lara. Documents re-faxed to [phone]. Called Summer CARMONA back to give update. Spoke with Kristin.

## 2022-04-05 NOTE — PROGRESS NOTES
RSW spoke with patient and patient has no additional needs. Patient is upset that he may have to go to a shelter. BERKLEYW will follow up.    SUMMER Wilson

## 2022-04-05 NOTE — PROGRESS NOTES
[facility] Medicine  Progress Note    Patient Name: Forest Lopez  MRN: [id number]  Patient Class: IP- Inpatient   Admission Date: 1/31/2022  Length of Stay: 64 days  Attending Physician: Mojgan Bosch MD  Primary Care Provider: Julian Escobar        Subjective:     Principal Problem:Acute stroke due to ischemia        HPI:  Mr. Lopez is a 72 year old man who presented after a syncopal episode.  He reports he had been feeling well prior to the episode but then had a prodrome prior to passing out.  EMS was called, report patient's BP was low normal on their arrival, improved after an IV fluids bolus.  Patient denies chest pain or shortness of breath and says he does not feel weak currently although is rather tired.  When seen in the ED this morning he could barely express himself audibly.     Vital signs were normal on presentation here.  He is on warfarin for recurrent DVT, but his INR was 1, and d-dimer markedly elevated at 21.7.  Tox screen was positive for cocaine.  He had a CTA of the brain and neck done on presentation so CTA of the chest for PE study could not be done for 48 hours.  Ultrasound of the lower extremities showed extensive bilateral DVT.  On the right there was thrombosis of the common femoral vein, femoral vein, popliteal vein, one of the paired posterior tibial veins and both visualized peroneal veins.  On the left there was thrombosis of the common femoral vein, femoral vein and popliteal vein.  Patient was started on full dose Lovenox and admitted.    Medical history is from the chart as he is unable to give me much information.  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  He smokes 5-6 cigarettes/day and is not interested in quitting.  He is currently homeless and staying with a friend.  Despite the normal INR he is sure  he is taking his warfarin and is not having difficulty taking his medications.  I discussed his care with [facility] staff on the [facility] and patient had been lost to follow up since November 2021.      Overview/Hospital Course:  Patient presented to the ED after a syncopal episode and was found to have bilateral lower extremity DVTs and a low INR.  Tox screen was positive for cocaine.  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  He was started back on his warfarin with bridging Lovenox.  His 2D echo showed grade I diastolic dysfunction.  Neurology evaluated him and recommended echo with bubble study, which was negative for a shunt.    PT/OT evaluated him and recommended SNF placement versus inpatient rehab.  His INR was difficult to get therapeutic, and as he had no contraindication to a NOAC his warfarin was changed to apixaban, and the full dose Lovenox was discontinued.  As he appeared to be depressed and had no objection to starting an antidepressant Lexapro was started.  He had a fall in the hospital on 2/13, had gone to the bathroom for a BM with the nurse, and when she turned away while he was washing his hands he apparently lost his balance and fell.  He did not hit his head and did not lose consciousness, but his BP was low transiently and he was given a bolus of IV fluids.    Patient's mental status improved slowly, but he gradually became more talkative and was able to hold a conversation.  He was diagnosed with a polymicrobial UTI on 2/21 and completed treatment with antibiotics.  Psychiatry evaluated him on 3/3 and changed his antidepressant to Prozac as it can be more activating and patient was having difficulty with his level of motivation.      Patient had some episodes of nausea and vomiting and CT of the abdomen was done, with incidental finding of rectal wall thickening noted.  Colonoscopy was recommended,  but he was unable to finish the prep.  GI evaluated him and decided the patient would benefit from a flex sig to evaluate the rectum.  He had the procedure on 3/25 and no abnormalities were seen other than internal hemorrhoids.    Patient's discharge was delayed as he was not accepted by any SNF facilities in the area.  Reasons for the denials are unclear as he has a clear rehab diagnosis.  He has a history of drug use but has not shown any interest in getting any illicit or legal drugs since he has been here, and he has had no visitors that could have provided drugs to him.  He has been pleasant and cooperative while here although lacks motivation, likely due to the nature of the stroke affecting his frontal lobe.  He requires considerable encouragement to participate in therapy.  Discharging him to live on the street or shelter would not be appropriate as he would not be able to take care of himself and likely will need [facility] nursing home care eventually after he gets the chance to improve with rehab.       Interval History:  Doing fine, feels comfortable, makes easy conversation.  No complaints.    Review of Systems   Constitutional:  Negative for chills and fever.   Respiratory:  Negative for cough and shortness of breath.    Cardiovascular:  Negative for chest pain and palpitations.   Gastrointestinal:  Negative for abdominal pain, nausea and vomiting.   Objective:     Vital Signs (Most Recent):  Temp: 98 °F (36.7 °C) (04/05/22 1511)  Pulse: 99 (04/05/22 1511)  Resp: 18 (04/05/22 1511)  BP: (!) 102/56 (04/05/22 1511)  SpO2: 97 % (04/05/22 1511)   Vital Signs (24h Range):  Temp:  [97.8 °F (36.6 °C)-98.9 °F (37.2 °C)] 98 °F (36.7 °C)  Pulse:  [76-99] 99  Resp:  [12-18] 18  SpO2:  [97 %-99 %] 97 %  BP: (102-125)/(56-78) 102/56     Weight: 66.2 kg (145 lb 15.1 oz)  Body mass index is 19.79 kg/m².    Intake/Output Summary (Last 24 hours) at 4/5/2022 0492  Last data filed at 4/5/2022 1500  Gross per 24 hour    Intake --   Output 700 ml   Net -700 ml      Physical Exam  Constitutional:       General: He is not in acute distress.     Comments: Thin   Cardiovascular:      Rate and Rhythm: Normal rate and regular rhythm.   Pulmonary:      Effort: Pulmonary effort is normal.      Breath sounds: Normal breath sounds.   Abdominal:      General: Bowel sounds are normal.      Palpations: Abdomen is soft.   Skin:     General: Skin is warm and dry.   Neurological:      Mental Status: He is alert.      Comments: Not oriented to time.       Significant Labs: All pertinent labs within the past 24 hours have been reviewed.    Significant Imaging: I have reviewed all pertinent imaging results/findings within the past 24 hours.      Assessment/Plan:      * Acute stroke due to ischemia  - MRI showed areas of diffusion signal hyperintensity consistent with areas of acute ischemia/infarct involving the left cerebral hemisphere, the most prominent measuring approximately 1.4 cm, also a small focus of the right frontal lobe.  - Patient on full dose anticoagulation currently due to acute bilateral DVT.  - Sinus rhythm noted throughout hospital stay  - Risk factor modification - control diabetes, continue statin.  - RPR, HIV non reactive.  B12 low normal.   - CTA showed a focal segment of 60-70% stenosis involving the proximal to mid left vertebral artery that was new when compared to the prior study of 09/25/2019.  No evidence of large vessel intracranial occlusion.   - Neurology noted symptoms do not correspond to stroke location, although the frontal lobe stroke might have something to do with his lack of motivation, and Speech has noted he has had delayed swallowing.  - Echo with bubble study done, no shunt seen.  - Follow up with vascular neurology in 4 weeks.  - Therapy recommended rehab initially but changed recommendation to SNF due to his low interest in participation.  - Started Lexapro due to suspicion for depression.  - Psych  consulted, changed to Prozac and started thiamine, folic acid, MVI due to previous history of alcohol abuse, although Wernicke encephalopathy is not suspected.  - Re-consulted SNF for rehab from the stroke, as discharging him to a shelter would be inappropriate and unsafe for him.  Discussed with his son, Forest, at length on 3/10.  Forest is out of town on a job but will be returning to [place] eventually.  He agrees his father will eventually need nursing home care as he is unable to care for himself.  - awaiting placement.      Acute deep vein thrombosis (DVT) of both femoral veins  Last ultrasound showed partially occlusive DVTs, now completely occlusive DVT of multiple lower extremity veins on ultrasound.  Patient reports compliance with warfarin but his INR is only one.  He is homeless but says he does not have difficulty obtaining his medications.  Does not seem to care about anything, which again might be due to the frontal lobe stroke.  D-dimer was markedly elevated on presentation and there was a question of possible PE, but he had a CTA of the brain/neck on presentation so could not get another CTA for another 48 hours.    V/Q scan was done, showed low probability for PE.  2D echo showed grade I diastolic dysfunction.  Warfarin was restarted with bridging Lovenox, but INR was still low.  Changed to apixaban and discontinued Lovenox.    Abnormal CT scan, gastrointestinal tract  CT showed thickening of the rectal wall with colonoscopy recommended for further evaluation.  Patient was unable to tolerate the prep.  GI spoke to him and he agreed to a flex sig after an enema.  Flex sig negative for abnormalities other than internal hemorrhoids.  Kub ok, continue miralax    Advance care planning        Severe protein-calorie malnutrition  Diet as tolerated  Patient seen by dietician, recommendations made.      Debility  PT/OT recommending rehab vs SNF after stroke with debility/poor motivation  Having  difficulty finding placement for unclear reasons.  Will continue to pursue this as above.  Again, patient has been pleasant and cooperative.      Type 2 diabetes mellitus with hyperglycemia, without long-term current use of insulin  Reportedly he is on metformin and glipizide, both held.  He had 4+ sugar in urine and HbA1c was 10.3, and he was hyperglycemic on presentation.  Started on Levemir and ISS, then increased Levemir to 25 units daily  Added scheduled novolog 5 units tid, increased to 7 units with improvement  OK to resume metformin.    Increased levemir to 28 units daily with good improvement.  Held metformin with contrast with CT.  BG lower now off metformin, has had a few low BG as well due to being on clear liquids for colonoscopy prep.  Levemir decreased to 20 units daily.    As expected BG increased when diet was resumed so increased Levemir back to 28, will increase to 30 units as sugars on higher side    Hyperlipidemia  Continue atorvastatin status post stroke.        VTE Risk Mitigation (From admission, onward)         Ordered     apixaban tablet 5 mg  2 times daily         03/29/22 1555     Place sequential compression device  Until discontinued         01/31/22 0533                Discharge Planning   EFRAÍN:      Code Status: Full Code   Is the patient medically ready for discharge?:     Reason for patient still in hospital (select all that apply): Pending disposition  Discharge Plan A: Skilled Nursing Facility   Discharge Delays: (!) Other (Pending acceptance in a facility.)              Mojgan Meza MD  Department of Hospital Medicine   [facility] - [facility] ([place])

## 2022-04-05 NOTE — SUBJECTIVE & OBJECTIVE
Interval History:  Doing fine, feels comfortable, makes easy conversation.  No complaints.    Review of Systems   Constitutional:  Negative for chills and fever.   Respiratory:  Negative for cough and shortness of breath.    Cardiovascular:  Negative for chest pain and palpitations.   Gastrointestinal:  Negative for abdominal pain, nausea and vomiting.   Objective:     Vital Signs (Most Recent):  Temp: 98 °F (36.7 °C) (04/05/22 1511)  Pulse: 99 (04/05/22 1511)  Resp: 18 (04/05/22 1511)  BP: (!) 102/56 (04/05/22 1511)  SpO2: 97 % (04/05/22 1511)   Vital Signs (24h Range):  Temp:  [97.8 °F (36.6 °C)-98.9 °F (37.2 °C)] 98 °F (36.7 °C)  Pulse:  [76-99] 99  Resp:  [12-18] 18  SpO2:  [97 %-99 %] 97 %  BP: (102-125)/(56-78) 102/56     Weight: 66.2 kg (145 lb 15.1 oz)  Body mass index is 19.79 kg/m².    Intake/Output Summary (Last 24 hours) at 4/5/2022 1627  Last data filed at 4/5/2022 1500  Gross per 24 hour   Intake --   Output 700 ml   Net -700 ml      Physical Exam  Constitutional:       General: He is not in acute distress.     Comments: Thin   Cardiovascular:      Rate and Rhythm: Normal rate and regular rhythm.   Pulmonary:      Effort: Pulmonary effort is normal.      Breath sounds: Normal breath sounds.   Abdominal:      General: Bowel sounds are normal.      Palpations: Abdomen is soft.   Skin:     General: Skin is warm and dry.   Neurological:      Mental Status: He is alert.      Comments: Not oriented to time.       Significant Labs: All pertinent labs within the past 24 hours have been reviewed.    Significant Imaging: I have reviewed all pertinent imaging results/findings within the past 24 hours.

## 2022-04-05 NOTE — PLAN OF CARE
Problem: Physical Therapy Goal  Goal: Physical Therapy Goal  Description: Goals to be met by: 2022    Patient will increase functional independence with mobility by performin. Sit<>stand with SPV with LRAD.   2. Gait x 300 feet with SPV with rollator.   3. Static standing in SLS with no UE support with SBA x10 sec.   4. Gait x50 ft with cane with SPV.    Outcome: Ongoing, Progressing     Additional goal of ambulation with cane added. Patient with increased fatigue and instability with decreased DME support.

## 2022-04-05 NOTE — PHYSICIAN QUERY
PT Name: Forest Lopez  MR #: [id number]    DOCUMENTATION CLARIFICATION     CDS/: Lin Saleh               Contact information:[email or web address]    This form is a permanent document in the medical record.     Query Date: April 5, 2022    By submitting this query, we are merely seeking further clarification of documentation. Please utilize your independent clinical judgment when addressing the question(s) below.    Supporting Clinical Findings Location in Medical Record   Sepsis  Labs show elevated wbc, low grade fever, normal  lactic acid  Give 500 ml fluid bolus and started ivf  Follow blood and urine cx, cxr ok, on room air  ua positive  Started on rocephin  Added vancomycin till full cx report    Patient more lethargic on 2/21 with low grade fever and leukocytosis, work up looked like uti and started on abx, fluids.    HM note 2-22   Urine culture growing Proteus gram-negative jessu.  final identification and sensitivity pending.  White count trending down    Urine culture growing Proteus   and Klebseilla HM note 2-23          HM note 2-24       Provider, please clarify if there is any clinical correlation between Sepsis and Proteus and Klebseilla.           Are the conditions:      [ x ] Due to or associated with each other   [  ] Unrelated to each other   [  ] Other explanation (Please Specify): ______________   [  ] Clinically Undetermined                                                                               Please document in your progress notes daily for the duration of treatment until resolved and include in your discharge summary.

## 2022-04-05 NOTE — PT/OT/SLP PROGRESS
Physical Therapy Treatment    Patient Name:  Forest Lopez   MRN:  [id number]    Recommendations:     Discharge Recommendations:  nursing facility, skilled, rehabilitation facility, nursing facility, basic   Discharge Equipment Recommendations: bedside commode, shower chair, rollator  Barriers to discharge: None to NH    Assessment:     Forest Lopez is a 72 y.o. male admitted with a medical diagnosis of Acute stroke due to ischemia.  He presents with the following impairments/functional limitations:  weakness, impaired endurance, impaired self care skills, impaired functional mobilty, gait instability, impaired balance, impaired cognition, decreased coordination, decreased safety awareness.    Patient with some improvement in OOB tolerance from last PT session, gait training advanced to include SPC to challenge strength and balance. Rec for dc to SNF vs IRF progressing to NH - pt approaching new baseline.    Rehab Prognosis: Good; patient would benefit from acute skilled PT services to address these deficits and reach maximum level of function.    Recent Surgery: Procedure(s) (LRB):  COLONOSCOPY (N/A) 11 Days Post-Op    Plan:     During this hospitalization, patient to be seen 3 x/week to address the identified rehab impairments via gait training, therapeutic activities, therapeutic exercises, neuromuscular re-education and progress toward the following goals:    · Plan of Care Expires:  05/05/22    Subjective     Chief Complaint: Needing extended time to mentally prepare for PT  Patient/Family Comments/goals: Talkative to nursing staff in hallway; Patient agreeable to PT treatment.  Pain/Comfort:  Pain Rating 1: 0/10  Pain Rating Post-Intervention 1: 0/10      Objective:     Patient found HOB elevated slumped in bed with Condom Catheter and tash sys upon PT entry to room.     General Precautions: Standard, aspiration, fall  Orthopedic Precautions:N/A   Braces: N/A  Respiratory Status: Room air     Patient donned red  socks and gait belt for OOB mobility. Patient donned mask for hallway ambulation.    Functional Mobility:  · Bed Mobility:   Requires 15 min of encouragement prior to pt initiating bed mobility  · Supine to Sit: mod(I)  · Sit to Supine: supervision  · Transfers:     · Gait: x300 ft with rollator with SBA-supervision, x30 ft with SPC with CGA-Varun  · Increased gait speed and minimal shuffling gait  · With SPC, decreased gait speed with occasional cross over gait requiring Varun to prevent falls  · At end of SPC gait bout, notable R knee buckling requiring BUE support to prevent falls      AM-PAC 6 CLICK MOBILITY  Turning over in bed (including adjusting bedclothes, sheets and blankets)?: 4  Sitting down on and standing up from a chair with arms (e.g., wheelchair, bedside commode, etc.): 3  Moving from lying on back to sitting on the side of the bed?: 4  Moving to and from a bed to a chair (including a wheelchair)?: 3  Need to walk in hospital room?: 3  Climbing 3-5 steps with a railing?: 3  Basic Mobility Total Score: 20       Therapeutic Activities and Exercises:  · Slow mobility requiring increased time for all tasks    Patient left HOB elevated with all lines intact, call button in reach, RN Yun notified and Crista telesitter present.    GOALS:   Multidisciplinary Problems     Physical Therapy Goals        Problem: Physical Therapy Goal    Goal Priority Disciplines Outcome Goal Variances Interventions   Physical Therapy Goal     PT, PT/OT Ongoing, Progressing     Description: Goals to be met by: 2022    Patient will increase functional independence with mobility by performin. Sit<>stand with SPV with LRAD.   2. Gait x 300 feet with SPV with rollator.   3. Static standing in SLS with no UE support with SBA x10 sec.   4. Gait x50 ft with cane with SPV.                     Time Tracking:     PT Received On: 22  PT Start Time: 1508     PT Stop Time: 1538  PT Total Time (min): 30 min     Billable  Minutes: Gait Training 15 and Therapeutic Activity 15    Treatment Type: Treatment  PT/PTA: PT     PTA Visit Number: 0     04/05/2022

## 2022-04-06 ENCOUNTER — PATIENT OUTREACH (OUTPATIENT)
Dept: ADMINISTRATIVE | Facility: OTHER | Age: 72
End: 2022-04-06
Payer: MEDICARE

## 2022-04-06 PROBLEM — R93.3 ABNORMAL CT SCAN, GASTROINTESTINAL TRACT: Status: RESOLVED | Noted: 2022-03-24 | Resolved: 2022-04-06

## 2022-04-06 LAB
POCT GLUCOSE: 218 MG/DL (ref 70–110)
POCT GLUCOSE: 259 MG/DL (ref 70–110)
POCT GLUCOSE: 278 MG/DL (ref 70–110)
POCT GLUCOSE: 362 MG/DL (ref 70–110)

## 2022-04-06 PROCEDURE — 25000003 PHARM REV CODE 250: Performed by: INTERNAL MEDICINE

## 2022-04-06 PROCEDURE — 99231 PR SUBSEQUENT HOSPITAL CARE,LEVL I: ICD-10-PCS | Mod: ,,, | Performed by: HOSPITALIST

## 2022-04-06 PROCEDURE — 97535 SELF CARE MNGMENT TRAINING: CPT

## 2022-04-06 PROCEDURE — 94761 N-INVAS EAR/PLS OXIMETRY MLT: CPT

## 2022-04-06 PROCEDURE — 99233 SBSQ HOSP IP/OBS HIGH 50: CPT | Mod: ,,, | Performed by: INTERNAL MEDICINE

## 2022-04-06 PROCEDURE — 11000001 HC ACUTE MED/SURG PRIVATE ROOM

## 2022-04-06 PROCEDURE — 99231 SBSQ HOSP IP/OBS SF/LOW 25: CPT | Mod: ,,, | Performed by: HOSPITALIST

## 2022-04-06 PROCEDURE — 99233 PR SUBSEQUENT HOSPITAL CARE,LEVL III: ICD-10-PCS | Mod: ,,, | Performed by: INTERNAL MEDICINE

## 2022-04-06 RX ADMIN — ATORVASTATIN CALCIUM 80 MG: 20 TABLET, FILM COATED ORAL at 09:04

## 2022-04-06 RX ADMIN — INSULIN ASPART 2 UNITS: 100 INJECTION, SOLUTION INTRAVENOUS; SUBCUTANEOUS at 05:04

## 2022-04-06 RX ADMIN — THERA TABS 1 TABLET: TAB at 09:04

## 2022-04-06 RX ADMIN — INSULIN DETEMIR 30 UNITS: 100 INJECTION, SOLUTION SUBCUTANEOUS at 09:04

## 2022-04-06 RX ADMIN — FLUOXETINE 20 MG: 20 CAPSULE ORAL at 09:04

## 2022-04-06 RX ADMIN — INSULIN ASPART 7 UNITS: 100 INJECTION, SOLUTION INTRAVENOUS; SUBCUTANEOUS at 05:04

## 2022-04-06 RX ADMIN — POLYETHYLENE GLYCOL 3350 17 G: 17 POWDER, FOR SOLUTION ORAL at 09:04

## 2022-04-06 RX ADMIN — INSULIN ASPART 3 UNITS: 100 INJECTION, SOLUTION INTRAVENOUS; SUBCUTANEOUS at 10:04

## 2022-04-06 RX ADMIN — INSULIN ASPART 4 UNITS: 100 INJECTION, SOLUTION INTRAVENOUS; SUBCUTANEOUS at 09:04

## 2022-04-06 RX ADMIN — THIAMINE HCL TAB 100 MG 100 MG: 100 TAB at 09:04

## 2022-04-06 RX ADMIN — FOLIC ACID 1 MG: 1 TABLET ORAL at 09:04

## 2022-04-06 RX ADMIN — APIXABAN 5 MG: 2.5 TABLET, FILM COATED ORAL at 08:04

## 2022-04-06 RX ADMIN — TAMSULOSIN HYDROCHLORIDE 0.4 MG: 0.4 CAPSULE ORAL at 08:04

## 2022-04-06 RX ADMIN — INSULIN ASPART 7 UNITS: 100 INJECTION, SOLUTION INTRAVENOUS; SUBCUTANEOUS at 09:04

## 2022-04-06 RX ADMIN — INSULIN ASPART 7 UNITS: 100 INJECTION, SOLUTION INTRAVENOUS; SUBCUTANEOUS at 02:04

## 2022-04-06 RX ADMIN — APIXABAN 5 MG: 2.5 TABLET, FILM COATED ORAL at 09:04

## 2022-04-06 NOTE — PROGRESS NOTES
[facility] - [facility] ([place])  Palliative Medicine  Progress Note    Patient Name: Forest Lopez  MRN: [id number]  Admission Date: 1/31/2022  Hospital Length of Stay: 65 days  Code Status: Full Code   Attending Provider: Mojgan Bosch MD  Consulting Provider: Harmony Skinner MD  Primary Care Physician: Julian Escobar  Principal Problem:Acute stroke due to ischemia    Assessment/Plan:     Advance Care Planning       4/6/22  - Chart and interval history reviewed; met with pt at bedside. Considerably more interactive than prior visits; pt was sitting up in bed, eating breakfast, smiling, even made a joke.   - We discussed that / is still trying to locate a facility for pt. He is agreeable with this plan, and understands that he requires 24/7 care.   - No changes in current plan at this time; discussed pt during MDT rounds  - Will follow up with pt; once accepting facility is located, he would benefit from palliative medicine follow up     2/11/22  - Chart and interval history reviewed; discussed pt with primary team and during MDT rounds  - Met with pt at bedside; awake, able to have a conversation, though very flat affect (vs fatigued)  - He remains agreeable to possible SNF placement; discussed with him possibility of prison NH after this, given his recent homelessness.   - As pt seems depressed, discussed starting anti-depressant. He said he would like to think about this before starting one.   - Brought up code status, and explained difference between DNR and full code. Did share transparent concern that pt would likely have very poor outcome with CPR/life support, given his low BMI and limited mobility. He would like to think about this prior to making decision; would maintain full code status at this time.   - Our team will follow up with pt; updated Dr Bosch.       2/4/22  - Consult for advance care planning in older pt with multiple chronic illnesses, active cocaine abuse, homelessness currently  in hospital after syncopal episode. Chart reviewed in depth; discussed pt in person with primary team.   - Met with pt at bedside; calm, polite, though speaks very quietly and is hard to hear at times. Introduced role of palliative medicine, and learned more about pt outside of hospital. Per pt, he is , and has two sons. His son Forest Jones ([phone], patient's preferred MPOA) lives locally and was just in to visit pt yesterday. He does not recall the phone number for his other son, Camilo. He has approximately 8 brothers and sisters. He is a retired  for the [employer] (possibly may have a pension?), and worked for them for many years. Lately, he spends much of his time watching TV.   - He did confirm that he is homeless, though he is receptive to SNF, and then likely NH after this. Asked him if anyone in his family would be open to having him move in with them; he suggested they would not.   - We did discuss his cocaine usage; he did seem to be reflecting when we discussed if he might be using this as a coping mechanism/self-medicating for possible depression. May benefit from initation of anti-depressant.   - Did not discuss code status during initial visit, though will address this at future visit given his frailty   - Our team will follow up with pt and likely will try to update his son Forest; discussed with primary team     Acute stroke due to ischemia  - Noted during hospital stay; working with PT/OT and has gradually become more interactive since stroke. Would continue fluoxetine as ordered, which was started during hospital stay. Pt denies feeling depressed, but notes he wasn't feeling like his normal self.      Severe protein-calorie malnutrition  - BMI gradually improving, 19.79 on today's visit   - RD consult for nutritional optimization     Debility  - Working with PT/OT    I will follow-up with patient. Please contact us if you have any additional questions.      VASYL Lincoln  Palliative Medicine Staff   [phone]    > 50% of 35 min visit spent in chart review, face to face discussion of symptom assessment, coordination of care with other specialists, and discharge planning.    Subjective:     Chief Complaint:   Chief Complaint   Patient presents with    Loss of Consciousness     Sudden onset dizziness with syncope & hypotension. EMS reports 120 systolic to 90 systolic upon rising.      Interval History: Awaiting placement.     Medications:  Continuous Infusions:  Scheduled Meds:   apixaban  5 mg Oral BID    atorvastatin  80 mg Oral Daily    FLUoxetine  20 mg Oral Daily    folic acid  1 mg Oral Daily    insulin aspart U-100  7 Units Subcutaneous TIDWM    insulin detemir U-100  30 Units Subcutaneous Daily    multivitamin  1 tablet Oral Daily    polyethylene glycol  17 g Oral Daily    tamsulosin  0.4 mg Oral QHS    thiamine  100 mg Oral Daily     PRN Meds:acetaminophen, artificial tears, dextrose 10%, dextrose 10%, diphenhydrAMINE, glucagon (human recombinant), glucose, glucose, hydrALAZINE, HYDROmorphone, insulin aspart U-100, loperamide, naloxone, ondansetron, oxyCODONE, prochlorperazine, sodium chloride 0.9%, [CANCELED] Flushing PICC Protocol **AND** [DISCONTINUED] sodium chloride 0.9% **AND** sodium chloride 0.9%, sodium chloride 0.9%    Objective:     Vital Signs (Most Recent):  Temp: 98 °F (36.7 °C) (04/06/22 1201)  Pulse: 110 (04/06/22 1201)  Resp: 16 (04/06/22 1201)  BP: (!) 107/58 (04/06/22 1201)  SpO2: 96 % (04/06/22 1201)   Vital Signs (24h Range):  Temp:  [98 °F (36.7 °C)-98.8 °F (37.1 °C)] 98 °F (36.7 °C)  Pulse:  [] 110  Resp:  [16-19] 16  SpO2:  [96 %-99 %] 96 %  BP: (102-134)/(56-75) 107/58     Weight: 66.2 kg (145 lb 15.1 oz)  Body mass index is 19.79 kg/m².    Physical Exam  Constitutional:       Comments: Sitting up in bed, frail appearing, eating breakfast, smiling and interactive    HENT:      Head: Normocephalic.      Nose:  Nose normal.      Mouth/Throat:      Mouth: Mucous membranes are moist.   Eyes:      Extraocular Movements: Extraocular movements intact.   Cardiovascular:      Rate and Rhythm: Tachycardia present.   Pulmonary:      Effort: Pulmonary effort is normal.   Skin:     General: Skin is warm.   Neurological:      Mental Status: He is alert.      Comments: Conversational    Psychiatric:         Mood and Affect: Mood normal.         Behavior: Behavior normal.       Significant Labs: All pertinent labs within the past 24 hours have been reviewed.  CBC:   Recent Labs   Lab 04/01/22  0625   WBC 11.32   HGB 13.2*   HCT 41.5   MCV 81*        BMP:  No results for input(s): GLU, NA, K, CL, CO2, BUN, CREATININE, CALCIUM, MG in the last 24 hours.  LFT:  Lab Results   Component Value Date    AST 22 02/25/2022    ALKPHOS 81 02/25/2022    BILITOT 0.3 02/25/2022     Albumin:   Albumin   Date Value Ref Range Status   02/25/2022 2.8 (L) 3.5 - 5.2 g/dL Final     Protein:   Total Protein   Date Value Ref Range Status   02/25/2022 6.4 6.0 - 8.4 g/dL Final     Lactic acid:   Lab Results   Component Value Date    LACTATE 0.9 02/22/2022    LACTATE 1.3 06/15/2020       Significant Imaging: I have reviewed all pertinent imaging results/findings within the past 24 hours.

## 2022-04-06 NOTE — PROGRESS NOTES
[facility] - Med [facility] ([place])  Adult Nutrition  Progress Note    SUMMARY       Recommendations  1.Continue consistent CHO mech soft diet with ONS Boost Glucose Control TID + Huber BID.   2.Encourage good PO intake as needed.    Goals: Pt to meet % EEN/EPN via PO intake by RD f/u.  Nutrition Goal Status: goal met  Communication of RD Recs:  (POC)    Assessment and Plan  Nutrition Problem  swallowing difficulty     Related to (etiology):   stroke     Signs and Symptoms (as evidenced by):   Pt requiring texture modified diet and speech therapy services for difficulty swallowing     Interventions(treatment strategy):  Collaboration with other providers  Commercial beverage  Carbohydrate modified diet (diabetic)  Texture modified diet (IDDSI L5 MM)     Nutrition Diagnosis Status:   Continues      Reason for Assessment    Reason For Assessment: RD follow-up  Diagnosis:  (acute DVT)  Relevant Medical History: HTN, HLD, T2DM, BPH  Interdisciplinary Rounds: did not attend  General Information Comments: 4/6- RD f/u. COntinue on consistent CHO/ mech soft diet. BGC TID and Huber BID. Tolearting. Good appettie. PO intake 100%. Continues to seek placement. Will continue to monitor.    Nutrition Discharge Planning: Pt to follow a cardiac/ DM diet(vit K restriction as needed) as tolerated.    Nutrition Risk Screen    Nutrition Risk Screen: no indicators present    Nutrition/Diet History    Spiritual, Cultural Beliefs, Yarsanism Practices, Values that Affect Care: no  Factors Affecting Nutritional Intake: clear liquid diet (colon prep)    Anthropometrics    Temp: 98 °F (36.7 °C)  Height: 6' (182.9 cm)  Height (inches): 72 in  Weight Method: Bed Scale  Weight: 66.2 kg (145 lb 15.1 oz)  Weight (lb): 145.95 lb  Ideal Body Weight (IBW), Male: 178 lb  % Ideal Body Weight, Male (lb): 81.99 %  BMI (Calculated): 19.8  BMI Grade: 18.5-24.9 - normal       Lab/Procedures/Meds    Pertinent Labs Reviewed: reviewed  Pertinent Labs Comments:  none  Pertinent Medications Reviewed: reviewed  Pertinent Medications Comments: apixaban, atorvastatin, folic acid, insulin aspart, insulin detemir, MV, polyethyelen glycol, tamsulosin, thiamine    Estimated/Assessed Needs    Weight Used For Calorie Calculations: 63 kg (138 lb 14.2 oz)  Energy Calorie Requirements (kcal): 1980 kcal day (MSJ X AF 1.4)  Energy Need Method: Cornell-St Jeor  Protein Requirements: 50-63 g day (0.8-1.0 g/kg)  Weight Used For Protein Calculations: 63 kg (138 lb 14.2 oz)     Estimated Fluid Requirement Method: RDA Method  RDA Method (mL): 1980  CHO Requirement: 248 g day    Nutrition Prescription Ordered    Current Diet Order: consistent carb/ mech soft  Oral Nutrition Supplement: Boost Glucose TID    Evaluation of Received Nutrient/Fluid Intake    Energy Calories Required: meeting needs  Protein Required: meeting needs  Fluid Required: meeting needs  Comments: LBM 3/29  Tolerance: tolerating  % Intake of Estimated Energy Needs: 75 - 100 %  % Meal Intake: 75 - 100 %    Nutrition Risk    Level of Risk/Frequency of Follow-up: low (1x weekly)     Monitor and Evaluation    Food and Nutrient Intake: energy intake, food and beverage intake  Food and Nutrient Adminstration: diet order  Knowledge/Beliefs/Attitudes: food and nutrition knowledge/skill  Physical Activity and Function: nutrition-related ADLs and IADLs  Anthropometric Measurements: weight, weight change, body mass index  Biochemical Data, Medical Tests and Procedures: glucose/endocrine profile, gastrointestinal profile, electrolyte and renal panel, inflammatory profile, lipid profile  Nutrition-Focused Physical Findings: overall appearance     Nutrition Follow-Up    RD Follow-up?: Yes

## 2022-04-06 NOTE — PROGRESS NOTES
SHANICE expected to hear from Ms. Talbert at [facility], who was to arrange for their admission persons to come to visit Mr. Lopez. SHANICE left messages for her, but was unable to reach her until about 4:30 pm. Ms. Talbert said that the admit persons will visit him tomorrow.     SHANICE attempted to reach Sumi at [facility] today, but had to leave message requesting a call back.

## 2022-04-06 NOTE — PT/OT/SLP PROGRESS
"Occupational Therapy   Treatment    Name: Forest Lopez  MRN: [id number]  Admitting Diagnosis:  Acute stroke due to ischemia  12 Days Post-Op    Recommendations:     Discharge Recommendations: nursing facility, skilled, nursing facility, basic, rehabilitation facility  Discharge Equipment Recommendations:  bedside commode, shower chair, rollator  Barriers to discharge:  Decreased caregiver support (current functional level)    Assessment:   Found in urine soiled padding with condom catheter off requiring increased encouragement for participation with Pt. Stating, "I don't want to."  Encouraged to get OOB to allow for linen change and clean up though with increased time Pt. Finally agreeable.  CGA/SBA for functional  ambulation without AD due to impaired balance/stability with shuffled steps noted with slight forward flexed trunk and forward head.  Also requiring MIN A for lift during sit>stand from bedside chair and BSC.  Requiring encouragement to initiate threading clean hospital gown with Pt. Initially stating, "help me with it" though able to thread arms and and requiring assist with tying at posterior neck.  Donned socks seated EOB with SBA and retrieval of clean socks.  Pt. Requires consistent cuing to initiate self-care tasks.  Progressing towards goals. Recommend post acute OT in SNF versus IRF.  To benefit from continued acute care OT services to increase independence in self-care/functional transfers.  Continue POC.     Forest Lopez is a 72 y.o. male with a medical diagnosis of Acute stroke due to ischemia.  He presents with below deficits decreasing independence in self-care/functional transfers. Performance deficits affecting function are weakness, impaired endurance, impaired self care skills, impaired functional mobilty, gait instability, impaired balance, impaired cognition, decreased coordination, decreased safety awareness.     Rehab Prognosis:  Good; patient would benefit from acute skilled OT " services to address these deficits and reach maximum level of function.       Plan:     Patient to be seen 3 x/week to address the above listed problems via self-care/home management, therapeutic activities, therapeutic exercises  · Plan of Care Expires: 04/12/22  · Plan of Care Reviewed with: patient    Subjective     Pain/Comfort:  · Pain Rating 1: 0/10  · Pain Rating Post-Intervention 1: 0/10    Objective:     Communicated with: Aida Martins RN prior to session.  Patient found HOB elevated with peripheral IV, Condom Catheter (condom catheter displaced; AVASYS/telesitter) upon OT entry to room.    General Precautions: Standard, aspiration, fall   Orthopedic Precautions:N/A   Braces: N/A  Respiratory Status: Room air     Occupational Performance:     Bed Mobility:    · Supine>sit with MIN A and HHA and sit>supine with SBA; HOB elevated when coming into sitting.     Functional Mobility/Transfers:  · Step transfer bed>bedside chair with CGA/SBA and sit>stand from bedside chair with MIN A for lift.  Ambulating short household distance bedside chair>bathroom>sink and return to bedside chair with CGA/SBA for steadying/safety.  Step transfer to BSC frame over toilet with CGA/SBA for controlled descent and requiring MIN A for boost from BSC.      Activities of Daily Living:  · Donned socks seated at bedside chair with SBA and retrieval of needed clothing.  Able to thread BUE into hospital gown with assist to tie at posterior neck though requiring increased cuing for task initiation.  Attempting to have BM while seated at [facility] frame over toilet though without success.  Performing back anne-marie hygiene in stance using RUE for task with forward flexed trunk during backward reaching.  Given increased cuing for task initiation for cleaning interior thighs and front anne-marie region while seated at BS due to being in urine soiled padding with eventual follow through though requiring increased time and encouragement to initiate.        [facility] 6 Click ADL: 17    Treatment & Education:  · Supine>sit with MIN A and HHA and sit>supine with SBA; HOB elevated when coming into sitting.   · Step transfer bed>bedside chair with CGA/SBA and sit>stand from bedside chair with MIN A for lift.  Ambulating short household distance bedside chair>bathroom>sink and return to bedside chair with CGA/SBA for steadying/safety.  Step transfer to BS frame over toilet with CGA/SBA for controlled descent and requiring MIN A for boost from BSC.    · Donned socks seated at bedside chair with SBA and retrieval of needed clothing.  Able to thread BUE into hospital gown with assist to tie at posterior neck though requiring increased cuing for task initiation.  Attempting to have BM while seated at [facility] [place] frame over toilet though without success.  Performing back anne-marie hygiene in stance using RUE for task with forward flexed trunk during backward reaching.  Given increased cuing for task initiation for cleaning interior thighs and front anne-marie region while seated at [facility] [place] due to being in urine soiled padding with eventual follow through though requiring increased time and encouragement to initiate.     Patient left HOB elevated with all lines intact, call button in reach, nursing notified and AVASYS/telesitter presentEducation:      GOALS:   Multidisciplinary Problems     Occupational Therapy Goals        Problem: Occupational Therapy Goal    Goal Priority Disciplines Outcome Interventions   Occupational Therapy Goal     OT, PT/OT Ongoing, Progressing    Description: Goals to be met by: 4/12/2022    Patient will increase functional independence with ADLs by performing:    UE Dressing with [place].  LE Dressing with [place].  Grooming while standing at sink with Supervision.  Toileting from toilet with Supervision for hygiene and clothing management.   Toilet transfer to toilet with Supervision.                     Time Tracking:     OT Date of Treatment: 04/06/22  OT  Start Time: 1544  OT Stop Time: 1618  OT Total Time (min): 34 min    Billable Minutes:Self Care/Home Management 34    OT/DUTCH: OT          4/6/2022

## 2022-04-06 NOTE — ASSESSMENT & PLAN NOTE
- MRI showed areas of diffusion signal hyperintensity consistent with areas of acute ischemia/infarct involving the left cerebral hemisphere, the most prominent measuring approximately 1.4 cm, also a small focus of the right frontal lobe.  - Patient on full dose anticoagulation currently due to acute bilateral DVT.  - Sinus rhythm noted throughout hospital stay  - Risk factor modification - control diabetes, continue statin.  - RPR, HIV non reactive.  B12 low normal.   - CTA showed a focal segment of 60-70% stenosis involving the proximal to mid left vertebral artery that was new when compared to the prior study of 09/25/2019.  No evidence of large vessel intracranial occlusion.   - Neurology noted symptoms do not correspond to stroke location, although the frontal lobe stroke might have something to do with his lack of motivation, and Speech has noted he has had delayed swallowing.  - Echo with bubble study done, no shunt seen.  - Follow up with vascular neurology in 4 weeks.  - Therapy recommended rehab initially but changed recommendation to SNF due to his low interest in participation.  - Started Lexapro due to suspicion for depression.  - Psych consulted, changed to Prozac and started thiamine, folic acid, MVI due to previous history of alcohol abuse, although Wernicke encephalopathy is not suspected.  - Re-consulted SNF for rehab from the stroke, as discharging him to a shelter would be inappropriate and unsafe for him.  Discussed with his son, Forest, at length on 3/10.  Forest is out of town on a job but will be returning to [place] eventually.  He agrees his father will eventually need nursing home care as he is unable to care for himself.  - awaiting placement.

## 2022-04-06 NOTE — PROGRESS NOTES
[facility] Medicine  Progress Note    Patient Name: Forest Lopez  MRN: [id number]  Patient Class: IP- Inpatient   Admission Date: 1/31/2022  Length of Stay: 65 days  Attending Physician: Mojgan Bosch MD  Primary Care Provider: Julian Escobar        Subjective:     Principal Problem:Acute stroke due to ischemia        HPI:  Mr. Lopez is a 72 year old man who presented after a syncopal episode.  He reports he had been feeling well prior to the episode but then had a prodrome prior to passing out.  EMS was called, report patient's BP was low normal on their arrival, improved after an IV fluids bolus.  Patient denies chest pain or shortness of breath and says he does not feel weak currently although is rather tired.  When seen in the ED this morning he could barely express himself audibly.     Vital signs were normal on presentation here.  He is on warfarin for recurrent DVT, but his INR was 1, and d-dimer markedly elevated at 21.7.  Tox screen was positive for cocaine.  He had a CTA of the brain and neck done on presentation so CTA of the chest for PE study could not be done for 48 hours.  Ultrasound of the lower extremities showed extensive bilateral DVT.  On the right there was thrombosis of the common femoral vein, femoral vein, popliteal vein, one of the paired posterior tibial veins and both visualized peroneal veins.  On the left there was thrombosis of the common femoral vein, femoral vein and popliteal vein.  Patient was started on full dose Lovenox and admitted.    Medical history is from the chart as he is unable to give me much information.  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  He smokes 5-6 cigarettes/day and is not interested in quitting.  He is currently homeless and staying with a friend.  Despite the normal INR he is sure  he is taking his warfarin and is not having difficulty taking his medications.  I discussed his care with [facility] staff on the [facility] and patient had been lost to follow up since November 2021.      Overview/Hospital Course:  Patient presented to the ED after a syncopal episode and was found to have bilateral lower extremity DVTs and a low INR.  Tox screen was positive for cocaine.  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  He was started back on his warfarin with bridging Lovenox.  His 2D echo showed grade I diastolic dysfunction.  Neurology evaluated him and recommended echo with bubble study, which was negative for a shunt.    PT/OT evaluated him and recommended SNF placement versus inpatient rehab.  His INR was difficult to get therapeutic, and as he had no contraindication to a NOAC his warfarin was changed to apixaban, and the full dose Lovenox was discontinued.  As he appeared to be depressed and had no objection to starting an antidepressant Lexapro was started.  He had a fall in the hospital on 2/13, had gone to the bathroom for a BM with the nurse, and when she turned away while he was washing his hands he apparently lost his balance and fell.  He did not hit his head and did not lose consciousness, but his BP was low transiently and he was given a bolus of IV fluids.    Patient's mental status improved slowly, but he gradually became more talkative and was able to hold a conversation.  He was diagnosed with a polymicrobial UTI on 2/21 and completed treatment with antibiotics.  Psychiatry evaluated him on 3/3 and changed his antidepressant to Prozac as it can be more activating and patient was having difficulty with his level of motivation.      Patient had some episodes of nausea and vomiting and CT of the abdomen was done, with incidental finding of rectal wall thickening noted.  Colonoscopy was recommended,  but he was unable to finish the prep.  GI evaluated him and decided the patient would benefit from a flex sig to evaluate the rectum.  He had the procedure on 3/25 and no abnormalities were seen other than internal hemorrhoids.    Patient's discharge was delayed as he was not accepted by any SNF facilities in the area.  Reasons for the denials are unclear as he has a clear rehab diagnosis.  He has a history of drug use but has not shown any interest in getting any illicit or legal drugs since he has been here, and he has had no visitors that could have provided drugs to him.  He has been pleasant and cooperative while here although lacks motivation, likely due to the nature of the stroke affecting his frontal lobe.  He requires considerable encouragement to participate in therapy.  Discharging him to live on the street or shelter would not be appropriate as he would not be able to take care of himself and likely will need long term nursing home care eventually after he gets the chance to improve with rehab.       Interval History:  No new problems, not much energy today.  He had a visit with palliative care earlier.    Review of Systems   Constitutional:  Negative for chills and fever.   Respiratory:  Negative for cough and shortness of breath.    Cardiovascular:  Negative for chest pain and palpitations.   Gastrointestinal:  Negative for abdominal pain, nausea and vomiting.   Objective:     Vital Signs (Most Recent):  Temp: 98 °F (36.7 °C) (04/06/22 1201)  Pulse: 110 (04/06/22 1201)  Resp: 16 (04/06/22 1201)  BP: (!) 107/58 (04/06/22 1201)  SpO2: 96 % (04/06/22 1201)   Vital Signs (24h Range):  Temp:  [98 °F (36.7 °C)-98.8 °F (37.1 °C)] 98 °F (36.7 °C)  Pulse:  [] 110  Resp:  [16-19] 16  SpO2:  [96 %-99 %] 96 %  BP: (102-134)/(56-75) 107/58     Weight: 66.2 kg (145 lb 15.1 oz)  Body mass index is 19.79 kg/m².    Intake/Output Summary (Last 24 hours) at 4/6/2022 1345  Last data filed at 4/6/2022 1200  Gross  per 24 hour   Intake --   Output 1200 ml   Net -1200 ml      Physical Exam  Constitutional:       General: He is not in acute distress.     Comments: Thin   Cardiovascular:      Rate and Rhythm: Normal rate and regular rhythm.   Pulmonary:      Effort: Pulmonary effort is normal.      Breath sounds: Normal breath sounds.   Abdominal:      General: Bowel sounds are normal.      Palpations: Abdomen is soft.   Skin:     General: Skin is warm and dry.   Neurological:      Mental Status: He is alert.      Comments: Not oriented to time.       Significant Labs: All pertinent labs within the past 24 hours have been reviewed.    Significant Imaging: I have reviewed all pertinent imaging results/findings within the past 24 hours.      Assessment/Plan:      * Acute stroke due to ischemia  - MRI showed areas of diffusion signal hyperintensity consistent with areas of acute ischemia/infarct involving the left cerebral hemisphere, the most prominent measuring approximately 1.4 cm, also a small focus of the right frontal lobe.  - Patient on full dose anticoagulation currently due to acute bilateral DVT.  - Sinus rhythm noted throughout hospital stay  - Risk factor modification - control diabetes, continue statin.  - RPR, HIV non reactive.  B12 low normal.   - CTA showed a focal segment of 60-70% stenosis involving the proximal to mid left vertebral artery that was new when compared to the prior study of 09/25/2019.  No evidence of large vessel intracranial occlusion.   - Neurology noted symptoms do not correspond to stroke location, although the frontal lobe stroke might have something to do with his lack of motivation, and Speech has noted he has had delayed swallowing.  - Echo with bubble study done, no shunt seen.  - Follow up with vascular neurology in 4 weeks.  - Therapy recommended rehab initially but changed recommendation to SNF due to his low interest in participation.  - Started Lexapro due to suspicion for  depression.  - Psych consulted, changed to Prozac and started thiamine, folic acid, MVI due to previous history of alcohol abuse, although Wernicke encephalopathy is not suspected.  - Re-consulted SNF for rehab from the stroke, as discharging him to a shelter would be inappropriate and unsafe for him.  Discussed with his son, Forest, at length on 3/10.  Forest is out of town on a job but will be returning to [place] eventually.  He agrees his father will eventually need nursing home care as he is unable to care for himself.  - awaiting placement.      Acute deep vein thrombosis (DVT) of both femoral veins  Last ultrasound showed partially occlusive DVTs, now completely occlusive DVT of multiple lower extremity veins on ultrasound.  Patient reports compliance with warfarin but his INR is only one.  He is homeless but says he does not have difficulty obtaining his medications.  Does not seem to care about anything, which again might be due to the frontal lobe stroke.  D-dimer was markedly elevated on presentation and there was a question of possible PE, but he had a CTA of the brain/neck on presentation so could not get another CTA for another 48 hours.    V/Q scan was done, showed low probability for PE.  2D echo showed grade I diastolic dysfunction.  Warfarin was restarted with bridging Lovenox, but INR was still low.  Changed to apixaban and discontinued Lovenox.    Advance care planning        Severe protein-calorie malnutrition  Diet as tolerated  Patient seen by dietician, recommendations made.      Debility  PT/OT recommending rehab vs SNF after stroke with debility/poor motivation  Having difficulty finding placement for unclear reasons.  Will continue to pursue this as above.  Again, patient has been pleasant and cooperative.      Type 2 diabetes mellitus with hyperglycemia, without long-term current use of insulin  Reportedly he is on metformin and glipizide, both held.  He had 4+ sugar in urine and  HbA1c was 10.3, and he was hyperglycemic on presentation.  Started on Levemir and ISS, then increased Levemir to 25 units daily  Added scheduled novolog 5 units tid, increased to 7 units with improvement  OK to resume metformin.    Increased levemir to 28 units daily with good improvement.  Held metformin with contrast with CT.  BG lower now off metformin, has had a few low BG as well due to being on clear liquids for colonoscopy prep.  Levemir decreased to 20 units daily.    As expected BG increased when diet was resumed so increased Levemir back to 28, will increase to 30 units as sugars on higher side    Hyperlipidemia  Continue atorvastatin status post stroke.        VTE Risk Mitigation (From admission, onward)         Ordered     apixaban tablet 5 mg  2 times daily         03/29/22 1555     Place sequential compression device  Until discontinued         01/31/22 0533                Discharge Planning   EFRAÍN:      Code Status: Full Code   Is the patient medically ready for discharge?:     Reason for patient still in hospital (select all that apply): Pending disposition  Discharge Plan A: New Nursing Home placement - senior care care facility   Discharge Delays: (!) Other (Pending acceptance in a facility.)              Mojgan Meza MD  Department of Hospital Medicine   [facility] ([place])

## 2022-04-06 NOTE — SUBJECTIVE & OBJECTIVE
Interval History:  No new problems, not much energy today.  He had a visit with palliative care earlier.    Review of Systems   Constitutional:  Negative for chills and fever.   Respiratory:  Negative for cough and shortness of breath.    Cardiovascular:  Negative for chest pain and palpitations.   Gastrointestinal:  Negative for abdominal pain, nausea and vomiting.   Objective:     Vital Signs (Most Recent):  Temp: 98 °F (36.7 °C) (04/06/22 1201)  Pulse: 110 (04/06/22 1201)  Resp: 16 (04/06/22 1201)  BP: (!) 107/58 (04/06/22 1201)  SpO2: 96 % (04/06/22 1201)   Vital Signs (24h Range):  Temp:  [98 °F (36.7 °C)-98.8 °F (37.1 °C)] 98 °F (36.7 °C)  Pulse:  [] 110  Resp:  [16-19] 16  SpO2:  [96 %-99 %] 96 %  BP: (102-134)/(56-75) 107/58     Weight: 66.2 kg (145 lb 15.1 oz)  Body mass index is 19.79 kg/m².    Intake/Output Summary (Last 24 hours) at 4/6/2022 1344  Last data filed at 4/6/2022 1200  Gross per 24 hour   Intake --   Output 1200 ml   Net -1200 ml      Physical Exam  Constitutional:       General: He is not in acute distress.     Comments: Thin   Cardiovascular:      Rate and Rhythm: Normal rate and regular rhythm.   Pulmonary:      Effort: Pulmonary effort is normal.      Breath sounds: Normal breath sounds.   Abdominal:      General: Bowel sounds are normal.      Palpations: Abdomen is soft.   Skin:     General: Skin is warm and dry.   Neurological:      Mental Status: He is alert.      Comments: Not oriented to time.       Significant Labs: All pertinent labs within the past 24 hours have been reviewed.    Significant Imaging: I have reviewed all pertinent imaging results/findings within the past 24 hours.

## 2022-04-06 NOTE — SUBJECTIVE & OBJECTIVE
Interval History: Awaiting placement.     Medications:  Continuous Infusions:  Scheduled Meds:   apixaban  5 mg Oral BID    atorvastatin  80 mg Oral Daily    FLUoxetine  20 mg Oral Daily    folic acid  1 mg Oral Daily    insulin aspart U-100  7 Units Subcutaneous TIDWM    insulin detemir U-100  30 Units Subcutaneous Daily    multivitamin  1 tablet Oral Daily    polyethylene glycol  17 g Oral Daily    tamsulosin  0.4 mg Oral QHS    thiamine  100 mg Oral Daily     PRN Meds:acetaminophen, artificial tears, dextrose 10%, dextrose 10%, diphenhydrAMINE, glucagon (human recombinant), glucose, glucose, hydrALAZINE, HYDROmorphone, insulin aspart U-100, loperamide, naloxone, ondansetron, oxyCODONE, prochlorperazine, sodium chloride 0.9%, [CANCELED] Flushing PICC Protocol **AND** [DISCONTINUED] sodium chloride 0.9% **AND** sodium chloride 0.9%, sodium chloride 0.9%    Objective:     Vital Signs (Most Recent):  Temp: 98 °F (36.7 °C) (04/06/22 1201)  Pulse: 110 (04/06/22 1201)  Resp: 16 (04/06/22 1201)  BP: (!) 107/58 (04/06/22 1201)  SpO2: 96 % (04/06/22 1201)   Vital Signs (24h Range):  Temp:  [98 °F (36.7 °C)-98.8 °F (37.1 °C)] 98 °F (36.7 °C)  Pulse:  [] 110  Resp:  [16-19] 16  SpO2:  [96 %-99 %] 96 %  BP: (102-134)/(56-75) 107/58     Weight: 66.2 kg (145 lb 15.1 oz)  Body mass index is 19.79 kg/m².    Physical Exam  Constitutional:       Comments: Sitting up in bed, frail appearing, eating breakfast, smiling and interactive    HENT:      Head: Normocephalic.      Nose: Nose normal.      Mouth/Throat:      Mouth: Mucous membranes are moist.   Eyes:      Extraocular Movements: Extraocular movements intact.   Cardiovascular:      Rate and Rhythm: Tachycardia present.   Pulmonary:      Effort: Pulmonary effort is normal.   Skin:     General: Skin is warm.   Neurological:      Mental Status: He is alert.      Comments: Conversational    Psychiatric:         Mood and Affect: Mood normal.         Behavior: Behavior normal.        Significant Labs: All pertinent labs within the past 24 hours have been reviewed.  CBC:   Recent Labs   Lab 04/01/22  0625   WBC 11.32   HGB 13.2*   HCT 41.5   MCV 81*        BMP:  No results for input(s): GLU, NA, K, CL, CO2, BUN, CREATININE, CALCIUM, MG in the last 24 hours.  LFT:  Lab Results   Component Value Date    AST 22 02/25/2022    ALKPHOS 81 02/25/2022    BILITOT 0.3 02/25/2022     Albumin:   Albumin   Date Value Ref Range Status   02/25/2022 2.8 (L) 3.5 - 5.2 g/dL Final     Protein:   Total Protein   Date Value Ref Range Status   02/25/2022 6.4 6.0 - 8.4 g/dL Final     Lactic acid:   Lab Results   Component Value Date    LACTATE 0.9 02/22/2022    LACTATE 1.3 06/15/2020       Significant Imaging: I have reviewed all pertinent imaging results/findings within the past 24 hours.

## 2022-04-06 NOTE — NURSING
Pt resting in bed.  Good appetite.  Elevated BG throughout day despite no change in PO intake.  Incontinent of bladder, condom cath in place.  Attempted to have BM in BR x2 with no success. Scheduled miralax given in am as ordered.  Calm and cooperative.  Call light within reach.

## 2022-04-06 NOTE — PLAN OF CARE
Recommendations  1.Continue consistent CHO mech soft diet with ONS Boost Glucose Control TID + Huber BID.   2.Encourage good PO intake as needed.    Goals: Pt to meet % EEN/EPN via PO intake by RD f/u.  Nutrition Goal Status: goal met  Communication of RD Recs:  (POC)

## 2022-04-06 NOTE — ASSESSMENT & PLAN NOTE
4/6/22  - Chart and interval history reviewed; met with pt at bedside. Considerably more interactive than prior visits; pt was sitting up in bed, eating breakfast, smiling, even made a joke.   - We discussed that SW/CM is still trying to locate a facility for pt. He is agreeable with this plan, and understands that he requires 24/7 care.   - No changes in current plan at this time  - Will follow up with pt; once accepting facility is located, he would benefit from palliative medicine follow up     2/11/22  - Chart and interval history reviewed; discussed pt with primary team and during MDT rounds  - Met with pt at bedside; awake, able to have a conversation, though very flat affect (vs fatigued)  - He remains agreeable to possible SNF placement; discussed with him possibility of skilled nursing NH after this, given his recent homelessness.   - As pt seems depressed, discussed starting anti-depressant. He said he would like to think about this before starting one.   - Brought up code status, and explained difference between DNR and full code. Did share transparent concern that pt would likely have very poor outcome with CPR/life support, given his low BMI and limited mobility. He would like to think about this prior to making decision; would maintain full code status at this time.   - Our team will follow up with pt; updated Dr Bosch.       2/4/22  - Consult for advance care planning in older pt with multiple chronic illnesses, active cocaine abuse, homelessness currently in hospital after syncopal episode. Chart reviewed in depth; discussed pt in person with primary team.   - Met with pt at bedside; calm, polite, though speaks very quietly and is hard to hear at times. Introduced role of palliative medicine, and learned more about pt outside of hospital. Per pt, he is , and has two sons. His son Forest Jones ([phone], patient's preferred MPOA) lives locally and was just in to visit pt yesterday. He does not  recall the phone number for his other son, Camilo. He has approximately 8 brothers and sisters. He is a retired  for the [employer] (possibly may have a pension?), and worked for them for many years. Lately, he spends much of his time watching TV.   - He did confirm that he is homeless, though he is receptive to SNF, and then likely NH after this. Asked him if anyone in his family would be open to having him move in with them; he suggested they would not.   - We did discuss his cocaine usage; he did seem to be reflecting when we discussed if he might be using this as a coping mechanism/self-medicating for possible depression. May benefit from initation of anti-depressant.   - Did not discuss code status during initial visit, though will address this at future visit given his frailty   - Our team will follow up with pt and likely will try to update his son Forest; discussed with primary team

## 2022-04-06 NOTE — ASSESSMENT & PLAN NOTE
- Noted during hospital stay; working with PT/OT and has gradually become more interactive since stroke. Would continue fluoxetine as ordered, which was started during hospital stay. Pt denies feeling depressed, but notes he wasn't feeling like his normal self.

## 2022-04-06 NOTE — PLAN OF CARE
Called [facility] admission dept at [phone], spoke with admission's rep Gali to request a re-review of intermediate care placement referral  and a bedside hospital visitation;   Per Gali, once updated clinical documentation is received, she will schedule a bedside visitation with their liaison nurse, Sofia; pending call back from Gali with estimated time of visit on today. Updated MD Bosch and LCSJane RANDHAWA

## 2022-04-07 ENCOUNTER — PATIENT OUTREACH (OUTPATIENT)
Dept: ADMINISTRATIVE | Facility: OTHER | Age: 72
End: 2022-04-07
Payer: MEDICARE

## 2022-04-07 LAB
ERYTHROCYTE [DISTWIDTH] IN BLOOD BY AUTOMATED COUNT: 13.8 % (ref 11.5–14.5)
HCT VFR BLD AUTO: 39.8 % (ref 40–54)
HGB BLD-MCNC: 12.9 G/DL (ref 14–18)
MCH RBC QN AUTO: 26 PG (ref 27–31)
MCHC RBC AUTO-ENTMCNC: 32.4 G/DL (ref 32–36)
MCV RBC AUTO: 80 FL (ref 82–98)
PLATELET # BLD AUTO: 251 K/UL (ref 150–450)
PMV BLD AUTO: 10.2 FL (ref 9.2–12.9)
POCT GLUCOSE: 158 MG/DL (ref 70–110)
POCT GLUCOSE: 169 MG/DL (ref 70–110)
POCT GLUCOSE: 171 MG/DL (ref 70–110)
POCT GLUCOSE: 294 MG/DL (ref 70–110)
RBC # BLD AUTO: 4.97 M/UL (ref 4.6–6.2)
WBC # BLD AUTO: 8.58 K/UL (ref 3.9–12.7)

## 2022-04-07 PROCEDURE — 25000003 PHARM REV CODE 250: Performed by: INTERNAL MEDICINE

## 2022-04-07 PROCEDURE — 99232 PR SUBSEQUENT HOSPITAL CARE,LEVL II: ICD-10-PCS | Mod: ,,, | Performed by: HOSPITALIST

## 2022-04-07 PROCEDURE — 85027 COMPLETE CBC AUTOMATED: CPT | Performed by: HOSPITALIST

## 2022-04-07 PROCEDURE — 99232 SBSQ HOSP IP/OBS MODERATE 35: CPT | Mod: ,,, | Performed by: HOSPITALIST

## 2022-04-07 PROCEDURE — 11000001 HC ACUTE MED/SURG PRIVATE ROOM

## 2022-04-07 PROCEDURE — 63600175 PHARM REV CODE 636 W HCPCS: Performed by: INTERNAL MEDICINE

## 2022-04-07 PROCEDURE — 36415 COLL VENOUS BLD VENIPUNCTURE: CPT | Performed by: HOSPITALIST

## 2022-04-07 PROCEDURE — 97116 GAIT TRAINING THERAPY: CPT | Mod: CQ

## 2022-04-07 RX ORDER — INSULIN ASPART 100 [IU]/ML
7 INJECTION, SOLUTION INTRAVENOUS; SUBCUTANEOUS
Refills: 0
Start: 2022-04-07 | End: 2022-04-26 | Stop reason: HOSPADM

## 2022-04-07 RX ORDER — TAMSULOSIN HYDROCHLORIDE 0.4 MG/1
0.4 CAPSULE ORAL NIGHTLY
Start: 2022-04-07

## 2022-04-07 RX ORDER — FLUOXETINE HYDROCHLORIDE 20 MG/1
20 CAPSULE ORAL DAILY
Start: 2022-04-08 | End: 2023-04-08

## 2022-04-07 RX ADMIN — INSULIN ASPART 7 UNITS: 100 INJECTION, SOLUTION INTRAVENOUS; SUBCUTANEOUS at 12:04

## 2022-04-07 RX ADMIN — THIAMINE HCL TAB 100 MG 100 MG: 100 TAB at 08:04

## 2022-04-07 RX ADMIN — TAMSULOSIN HYDROCHLORIDE 0.4 MG: 0.4 CAPSULE ORAL at 09:04

## 2022-04-07 RX ADMIN — INSULIN ASPART 7 UNITS: 100 INJECTION, SOLUTION INTRAVENOUS; SUBCUTANEOUS at 08:04

## 2022-04-07 RX ADMIN — ATORVASTATIN CALCIUM 80 MG: 20 TABLET, FILM COATED ORAL at 08:04

## 2022-04-07 RX ADMIN — APIXABAN 5 MG: 2.5 TABLET, FILM COATED ORAL at 09:04

## 2022-04-07 RX ADMIN — INSULIN DETEMIR 30 UNITS: 100 INJECTION, SOLUTION SUBCUTANEOUS at 08:04

## 2022-04-07 RX ADMIN — INSULIN ASPART 7 UNITS: 100 INJECTION, SOLUTION INTRAVENOUS; SUBCUTANEOUS at 05:04

## 2022-04-07 RX ADMIN — FLUOXETINE 20 MG: 20 CAPSULE ORAL at 08:04

## 2022-04-07 RX ADMIN — FOLIC ACID 1 MG: 1 TABLET ORAL at 08:04

## 2022-04-07 RX ADMIN — INSULIN ASPART 1 UNITS: 100 INJECTION, SOLUTION INTRAVENOUS; SUBCUTANEOUS at 09:04

## 2022-04-07 RX ADMIN — POLYETHYLENE GLYCOL 3350 17 G: 17 POWDER, FOR SOLUTION ORAL at 08:04

## 2022-04-07 RX ADMIN — THERA TABS 1 TABLET: TAB at 08:04

## 2022-04-07 RX ADMIN — APIXABAN 5 MG: 2.5 TABLET, FILM COATED ORAL at 08:04

## 2022-04-07 NOTE — ASSESSMENT & PLAN NOTE
- MRI showed areas of diffusion signal hyperintensity consistent with areas of acute ischemia/infarct involving the left cerebral hemisphere, the most prominent measuring approximately 1.4 cm, also a small focus of the right frontal lobe.  - Patient on full dose anticoagulation currently due to acute bilateral DVT.  - Sinus rhythm noted throughout hospital stay  - Risk factor modification - control diabetes, continue statin.  - RPR, HIV non reactive.  B12 low normal.   - CTA showed a focal segment of 60-70% stenosis involving the proximal to mid left vertebral artery that was new when compared to the prior study of 09/25/2019.  No evidence of large vessel intracranial occlusion.   - Neurology noted symptoms do not correspond to stroke location, although the frontal lobe stroke might have something to do with his lack of motivation, and Speech has noted he has had delayed swallowing.  - Echo with bubble study done, no shunt seen.  - Follow up with vascular neurology in 4 weeks.  - Therapy recommended rehab initially but changed recommendation to SNF due to his low interest in participation.  - Started Lexapro due to suspicion for depression.  - Psych consulted, changed to Prozac and started thiamine, folic acid, MVI due to previous history of alcohol abuse, although Wernicke encephalopathy is not suspected.  - Re-consulted SNF for rehab from the stroke, as discharging him to a shelter would be inappropriate and unsafe for him.  Discussed with his son, Forest, at length on 3/10.  Forest is out of town on a job but will be returning to [place] eventually.  He agrees his father will eventually need nursing home care as he is unable to care for himself.  - awaiting placement.

## 2022-04-07 NOTE — PLAN OF CARE
04/07/22 1854   Discharge Reassessment   Assessment Type Discharge Planning Reassessment   Did the patient's condition or plan change since previous assessment? No   Discharge Plan discussed with: Patient   Communicated EFRAÍN with patient/caregiver Yes   Discharge Plan A Skilled Nursing Facility   Discharge Plan B New Nursing Home placement - long term care facility   DME Needed Upon Discharge  none   Discharge Barriers Identified Other (see comments)  (Awaiting acceptance at a SNF.)   Why the patient remains in the hospital Placement issues   Post-Acute Status   Post-Acute Placement Status Pending post-acute provider review/more information requested   Coverage [employer] MANAGED MEDICARE - [employer] SNP (SPECIAL NEEDS PLAN)   Discharge Delays (!) Post-Acute Set-up     SHANICE spoke with Gali, at [place], who said that the patient was clinically accepted. The final acceptance, should occur tomorrow.  SHANICE assisted the patient to get his financial information to Gali. He is in agreement with going to the facility.

## 2022-04-07 NOTE — PT/OT/SLP PROGRESS
Physical Therapy Treatment    Patient Name:  Forest Lopez   MRN:  [id number]    Recommendations:     Discharge Recommendations:  nursing facility, skilled, rehabilitation facility, nursing facility, basic   Discharge Equipment Recommendations: bedside commode, shower chair, rollator   Barriers to discharge: None to NH    Assessment:     Forest Lopez is a 72 y.o. male admitted with a medical diagnosis of Acute stroke due to ischemia.  He presents with the following impairments/functional limitations:  weakness, gait instability, impaired balance, impaired cognition, impaired endurance, impaired functional mobilty, impaired self care skills, decreased coordination, decreased safety awareness.    Supine>sit with mod(I)  Sit>stand CGA with rollator  Amb 300' with rollator and SBA-SPV + 2 x 70' with straight cane and CGA, occ Varun, with pt amb mostly alongside wall for extra support when needed, pt with increased stability with rollator, but increasing amb distance with SPC and no LoB noted  Rec SNF vs IRF with transition to NH      Rehab Prognosis: Good; patient would benefit from acute skilled PT services to address these deficits and reach maximum level of function.    Recent Surgery: Procedure(s) (LRB):  COLONOSCOPY (N/A) 13 Days Post-Op    Plan:     During this hospitalization, patient to be seen 3 x/week to address the identified rehab impairments via gait training, therapeutic activities, therapeutic exercises, neuromuscular re-education and progress toward the following goals:    · Plan of Care Expires:  05/05/22    Subjective     Chief Complaint: I'm cold  Patient/Family Comments/goals: pt keen to participate with therapy while listening to music  Pain/Comfort:  · Pain Rating 1: 0/10  · Pain Rating Post-Intervention 1: 0/10      Objective:     Communicated with nurse Parra prior to session.  Patient found HOB elevated with peripheral IV ([employer] telesitter) upon PT entry to room.     General Precautions:  Standard, aspiration, fall   Orthopedic Precautions:N/A   Braces:    Respiratory Status: Room air     Functional Mobility:  · Bed Mobility:     · Supine to Sit: modified independence  · Transfers:     · Sit to Stand:  contact guard assistance with 4 wheeled walker  · Gait: 300' with rollator and SBA-SPV + 2 x 70' with straight cane and CGA, occ Varun, with pt amb mostly alongside wall for extra support when needed,      AM-PAC 6 CLICK MOBILITY  Turning over in bed (including adjusting bedclothes, sheets and blankets)?: 4  Sitting down on and standing up from a chair with arms (e.g., wheelchair, bedside commode, etc.): 3  Moving from lying on back to sitting on the side of the bed?: 4  Moving to and from a bed to a chair (including a wheelchair)?: 3  Need to walk in hospital room?: 3  Climbing 3-5 steps with a railing?: 3  Basic Mobility Total Score: 20       Therapeutic Activities and Exercises:   Gait training as noted    Patient left up in chair with all lines intact, call button in reach and PCT Salma and Crista telesitter present..    GOALS:   Multidisciplinary Problems     Physical Therapy Goals        Problem: Physical Therapy Goal    Goal Priority Disciplines Outcome Goal Variances Interventions   Physical Therapy Goal     PT, PT/OT Ongoing, Progressing     Description: Goals to be met by: 2022    Patient will increase functional independence with mobility by performin. Sit<>stand with SPV with LRAD.   2. Gait x 300 feet with SPV with rollator.   3. Static standing in SLS with no UE support with SBA x10 sec.   4. Gait x50 ft with cane with SPV.                     Time Tracking:     PT Received On: 22  PT Start Time: 1127     PT Stop Time: 1153  PT Total Time (min): 26 min     Billable Minutes: Gait Training 26    Treatment Type: Treatment  PT/PTA: PTA     PTA Visit Number: 1     2022

## 2022-04-07 NOTE — PROGRESS NOTES
[facility] Medicine  Progress Note    Patient Name: Forest Lopez  MRN: [id number]  Patient Class: IP- Inpatient   Admission Date: 1/31/2022  Length of Stay: 66 days  Attending Physician: Mojgan Bosch MD  Primary Care Provider: Julian Escobar        Subjective:     Principal Problem:Acute stroke due to ischemia        HPI:  Mr. Lopez is a 72 year old man who presented after a syncopal episode.  He reports he had been feeling well prior to the episode but then had a prodrome prior to passing out.  EMS was called, report patient's BP was low normal on their arrival, improved after an IV fluids bolus.  Patient denies chest pain or shortness of breath and says he does not feel weak currently although is rather tired.  When seen in the ED this morning he could barely express himself audibly.     Vital signs were normal on presentation here.  He is on warfarin for recurrent DVT, but his INR was 1, and d-dimer markedly elevated at 21.7.  Tox screen was positive for cocaine.  He had a CTA of the brain and neck done on presentation so CTA of the chest for PE study could not be done for 48 hours.  Ultrasound of the lower extremities showed extensive bilateral DVT.  On the right there was thrombosis of the common femoral vein, femoral vein, popliteal vein, one of the paired posterior tibial veins and both visualized peroneal veins.  On the left there was thrombosis of the common femoral vein, femoral vein and popliteal vein.  Patient was started on full dose Lovenox and admitted.    Medical history is from the chart as he is unable to give me much information.  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  He smokes 5-6 cigarettes/day and is not interested in quitting.  He is currently homeless and staying with a friend.  Despite the normal INR he is sure  he is taking his warfarin and is not having difficulty taking his medications.  I discussed his care with [facility] staff on the [facility] and patient had been lost to follow up since November 2021.      Overview/Hospital Course:  Patient presented to the ED after a syncopal episode and was found to have bilateral lower extremity DVTs and a low INR.  Tox screen was positive for cocaine.  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  He was started back on his warfarin with bridging Lovenox.  His 2D echo showed grade I diastolic dysfunction.  Neurology evaluated him and recommended echo with bubble study, which was negative for a shunt.    PT/OT evaluated him and recommended SNF placement versus inpatient rehab.  His INR was difficult to get therapeutic, and as he had no contraindication to a NOAC his warfarin was changed to apixaban, and the full dose Lovenox was discontinued.  As he appeared to be depressed and had no objection to starting an antidepressant Lexapro was started.  He had a fall in the hospital on 2/13, had gone to the bathroom for a BM with the nurse, and when she turned away while he was washing his hands he apparently lost his balance and fell.  He did not hit his head and did not lose consciousness, but his BP was low transiently and he was given a bolus of IV fluids.    Patient's mental status improved slowly, but he gradually became more talkative and was able to hold a conversation.  He was diagnosed with a polymicrobial UTI on 2/21 and completed treatment with antibiotics.  Psychiatry evaluated him on 3/3 and changed his antidepressant to Prozac as it can be more activating and patient was having difficulty with his level of motivation.      Patient had some episodes of nausea and vomiting and CT of the abdomen was done, with incidental finding of rectal wall thickening noted.  Colonoscopy was recommended,  but he was unable to finish the prep.  GI evaluated him and decided the patient would benefit from a flex sig to evaluate the rectum.  He had the procedure on 3/25 and no abnormalities were seen other than internal hemorrhoids.    Patient's discharge was delayed as he was not accepted by any SNF facilities in the area.  Reasons for the denials are unclear as he has a clear rehab diagnosis.  He has a history of drug use but has not shown any interest in getting any illicit or legal drugs since he has been here, and he has had no visitors that could have provided drugs to him.  He has been pleasant and cooperative while here although lacks motivation, likely due to the nature of the stroke affecting his frontal lobe.  He requires considerable encouragement to participate in therapy.  Discharging him to live on the street or shelter would not be appropriate as he would not be able to take care of himself and likely will need skilled nursing nursing home care eventually after he gets the chance to improve with rehab.       Interval History:  Doing well, participated in therapy this morning, no complaints.    Review of Systems   Constitutional:  Negative for chills and fever.   Respiratory:  Negative for cough and shortness of breath.    Cardiovascular:  Negative for chest pain and palpitations.   Gastrointestinal:  Negative for abdominal pain, nausea and vomiting.   Objective:     Vital Signs (Most Recent):  Temp: 97.8 °F (36.6 °C) (04/07/22 1107)  Pulse: 84 (04/07/22 1107)  Resp: 12 (04/07/22 1107)  BP: 115/62 (04/07/22 1107)  SpO2: 99 % (04/07/22 1107) Vital Signs (24h Range):  Temp:  [97.8 °F (36.6 °C)-98.1 °F (36.7 °C)] 97.8 °F (36.6 °C)  Pulse:  [72-84] 84  Resp:  [12-20] 12  SpO2:  [97 %-100 %] 99 %  BP: (115-134)/(62-79) 115/62     Weight: 66.2 kg (145 lb 15.1 oz)  Body mass index is 19.79 kg/m².    Intake/Output Summary (Last 24 hours) at 4/7/2022 0621  Last data filed at 4/6/2022 1700  Gross per 24 hour   Intake --    Output 150 ml   Net -150 ml      Physical Exam  Constitutional:       General: He is not in acute distress.     Comments: Thin   Cardiovascular:      Rate and Rhythm: Normal rate and regular rhythm.   Pulmonary:      Effort: Pulmonary effort is normal.      Breath sounds: Normal breath sounds.   Abdominal:      General: Bowel sounds are normal.      Palpations: Abdomen is soft.   Skin:     General: Skin is warm and dry.   Neurological:      Mental Status: He is alert.      Comments: Not oriented to time.       Significant Labs: All pertinent labs within the past 24 hours have been reviewed.    Significant Imaging: I have reviewed all pertinent imaging results/findings within the past 24 hours.      Assessment/Plan:      * Acute stroke due to ischemia  - MRI showed areas of diffusion signal hyperintensity consistent with areas of acute ischemia/infarct involving the left cerebral hemisphere, the most prominent measuring approximately 1.4 cm, also a small focus of the right frontal lobe.  - Patient on full dose anticoagulation currently due to acute bilateral DVT.  - Sinus rhythm noted throughout hospital stay  - Risk factor modification - control diabetes, continue statin.  - RPR, HIV non reactive.  B12 low normal.   - CTA showed a focal segment of 60-70% stenosis involving the proximal to mid left vertebral artery that was new when compared to the prior study of 09/25/2019.  No evidence of large vessel intracranial occlusion.   - Neurology noted symptoms do not correspond to stroke location, although the frontal lobe stroke might have something to do with his lack of motivation, and Speech has noted he has had delayed swallowing.  - Echo with bubble study done, no shunt seen.  - Follow up with vascular neurology in 4 weeks.  - Therapy recommended rehab initially but changed recommendation to SNF due to his low interest in participation.  - Started Lexapro due to suspicion for depression.  - Psych consulted,  changed to Prozac and started thiamine, folic acid, MVI due to previous history of alcohol abuse, although Wernicke encephalopathy is not suspected.  - Re-consulted SNF for rehab from the stroke, as discharging him to a shelter would be inappropriate and unsafe for him.  Discussed with his son, Forest, at length on 3/10.  Forest is out of town on a job but will be returning to [place] eventually.  He agrees his father will eventually need nursing home care as he is unable to care for himself.  - awaiting placement.      Acute deep vein thrombosis (DVT) of both femoral veins  Last ultrasound showed partially occlusive DVTs, now completely occlusive DVT of multiple lower extremity veins on ultrasound.  Patient reports compliance with warfarin but his INR is only one.  He is homeless but says he does not have difficulty obtaining his medications.  Does not seem to care about anything, which again might be due to the frontal lobe stroke.  D-dimer was markedly elevated on presentation and there was a question of possible PE, but he had a CTA of the brain/neck on presentation so could not get another CTA for another 48 hours.    V/Q scan was done, showed low probability for PE.  2D echo showed grade I diastolic dysfunction.  Warfarin was restarted with bridging Lovenox, but INR was still low.  Changed to apixaban and discontinued Lovenox.    Advance care planning        Severe protein-calorie malnutrition  Diet as tolerated  Patient seen by dietician, recommendations made.      Debility  PT/OT recommending rehab vs SNF after stroke with debility/poor motivation  Having difficulty finding placement for unclear reasons.  Will continue to pursue this as above.  Again, patient has been pleasant and cooperative.      Type 2 diabetes mellitus with hyperglycemia, without long-term current use of insulin  Reportedly he is on metformin and glipizide, both held.  He had 4+ sugar in urine and HbA1c was 10.3, and he was  hyperglycemic on presentation.  Started on Levemir and ISS, then increased Levemir to 25 units daily  Added scheduled novolog 5 units tid, increased to 7 units with improvement  OK to resume metformin.    Increased levemir to 28 units daily with good improvement.  Held metformin with contrast with CT.  BG lower now off metformin, has had a few low BG as well due to being on clear liquids for colonoscopy prep.  Levemir decreased to 20 units daily.    As expected BG increased when diet was resumed so increased Levemir back to 28, will increase to 30 units as sugars on higher side  Metformin discontinued.    Hyperlipidemia  Continue atorvastatin status post stroke.        VTE Risk Mitigation (From admission, onward)         Ordered     apixaban tablet 5 mg  2 times daily         03/29/22 1555     Place sequential compression device  Until discontinued         01/31/22 0533                Discharge Planning   EFRAÍN: 4/8/2022     Code Status: Full Code   Is the patient medically ready for discharge?:     Reason for patient still in hospital (select all that apply): Pending disposition  Discharge Plan A: New Nursing Home placement - halfway care facility   Discharge Delays: (!) Other (Pending acceptance in a facility.)              Mojgan Meza MD  Department of Hospital Medicine   [facility] ([place])

## 2022-04-07 NOTE — PT/OT/SLP PROGRESS
"Occupational Therapy      Patient Name:  Forest Lopez   MRN:  [id number]    Attempted to see pt twice this date.  On first attempt at 11:17 pt declined participation in therapy stating he was cold and wanted to stay in bed.  Education regarding purpose and benefits of therapy provided as well as encouragement; however, pt said no.  OT stated she would return later if able and pt responded "ok".  On second attempt at 14:17 CM present in room speaking with pt. Will follow-up at next scheduled therapy session.    MICHELLE Willett  4/7/2022  "

## 2022-04-07 NOTE — ASSESSMENT & PLAN NOTE
Reportedly he is on metformin and glipizide, both held.  He had 4+ sugar in urine and HbA1c was 10.3, and he was hyperglycemic on presentation.  Started on Levemir and ISS, then increased Levemir to 25 units daily  Added scheduled novolog 5 units tid, increased to 7 units with improvement  OK to resume metformin.    Increased levemir to 28 units daily with good improvement.  Held metformin with contrast with CT.  BG lower now off metformin, has had a few low BG as well due to being on clear liquids for colonoscopy prep.  Levemir decreased to 20 units daily.    As expected BG increased when diet was resumed so increased Levemir back to 28, will increase to 30 units as sugars on higher side  Metformin discontinued.

## 2022-04-07 NOTE — PLAN OF CARE
POC reviewed with patient, questions and concerns addressed. No acute events through the night.  All Vital signs stable. No complaints.  AAOx3 Safety maintained.  Bed locked, in lowest position, call light within reach, side rails x2. Mobilized to their highest function. See doc flow sheets for further information. Will continue to monitor.    Problem: Adult Inpatient Plan of Care  Goal: Plan of Care Review  Outcome: Ongoing, Progressing  Goal: Patient-Specific Goal (Individualized)  Outcome: Ongoing, Progressing  Goal: Absence of Hospital-Acquired Illness or Injury  Outcome: Ongoing, Progressing  Goal: Optimal Comfort and Wellbeing  Outcome: Ongoing, Progressing     Problem: Diabetes Comorbidity  Goal: Blood Glucose Level Within Targeted Range  Outcome: Ongoing, Progressing     Problem: Skin Injury Risk Increased  Goal: Skin Health and Integrity  Outcome: Ongoing, Progressing     Problem: Coping Ineffective  Goal: Effective Coping  Outcome: Ongoing, Progressing     Problem: Fall Injury Risk  Goal: Absence of Fall and Fall-Related Injury  Outcome: Ongoing, Progressing     Problem: Adjustment to Illness (Sepsis/Septic Shock)  Goal: Optimal Coping  Outcome: Ongoing, Progressing     Problem: Bleeding (Sepsis/Septic Shock)  Goal: Absence of Bleeding  Outcome: Ongoing, Progressing     Problem: Glycemic Control Impaired (Sepsis/Septic Shock)  Goal: Blood Glucose Level Within Desired Range  Outcome: Ongoing, Progressing     Problem: Infection Progression (Sepsis/Septic Shock)  Goal: Absence of Infection Signs and Symptoms  Outcome: Ongoing, Progressing     Problem: Nutrition Impaired (Sepsis/Septic Shock)  Goal: Optimal Nutrition Intake  Outcome: Ongoing, Progressing     Problem: Infection  Goal: Absence of Infection Signs and Symptoms  Outcome: Ongoing, Progressing     Problem: Spiritual Distress Risk or Actual  Goal: Spiritual Wellbeing  Outcome: Ongoing, Progressing

## 2022-04-07 NOTE — SUBJECTIVE & OBJECTIVE
Interval History:  Doing well, participated in therapy this morning, no complaints.    Review of Systems   Constitutional:  Negative for chills and fever.   Respiratory:  Negative for cough and shortness of breath.    Cardiovascular:  Negative for chest pain and palpitations.   Gastrointestinal:  Negative for abdominal pain, nausea and vomiting.   Objective:     Vital Signs (Most Recent):  Temp: 97.8 °F (36.6 °C) (04/07/22 1107)  Pulse: 84 (04/07/22 1107)  Resp: 12 (04/07/22 1107)  BP: 115/62 (04/07/22 1107)  SpO2: 99 % (04/07/22 1107) Vital Signs (24h Range):  Temp:  [97.8 °F (36.6 °C)-98.1 °F (36.7 °C)] 97.8 °F (36.6 °C)  Pulse:  [72-84] 84  Resp:  [12-20] 12  SpO2:  [97 %-100 %] 99 %  BP: (115-134)/(62-79) 115/62     Weight: 66.2 kg (145 lb 15.1 oz)  Body mass index is 19.79 kg/m².    Intake/Output Summary (Last 24 hours) at 4/7/2022 1445  Last data filed at 4/6/2022 1700  Gross per 24 hour   Intake --   Output 150 ml   Net -150 ml      Physical Exam  Constitutional:       General: He is not in acute distress.     Comments: Thin   Cardiovascular:      Rate and Rhythm: Normal rate and regular rhythm.   Pulmonary:      Effort: Pulmonary effort is normal.      Breath sounds: Normal breath sounds.   Abdominal:      General: Bowel sounds are normal.      Palpations: Abdomen is soft.   Skin:     General: Skin is warm and dry.   Neurological:      Mental Status: He is alert.      Comments: Not oriented to time.       Significant Labs: All pertinent labs within the past 24 hours have been reviewed.    Significant Imaging: I have reviewed all pertinent imaging results/findings within the past 24 hours.

## 2022-04-08 ENCOUNTER — PATIENT OUTREACH (OUTPATIENT)
Dept: ADMINISTRATIVE | Facility: OTHER | Age: 72
End: 2022-04-08
Payer: MEDICARE

## 2022-04-08 LAB
POCT GLUCOSE: 178 MG/DL (ref 70–110)
POCT GLUCOSE: 188 MG/DL (ref 70–110)
POCT GLUCOSE: 193 MG/DL (ref 70–110)
POCT GLUCOSE: 212 MG/DL (ref 70–110)
SARS-COV-2 RDRP RESP QL NAA+PROBE: NEGATIVE

## 2022-04-08 PROCEDURE — U0002 COVID-19 LAB TEST NON-CDC: HCPCS | Performed by: HOSPITALIST

## 2022-04-08 PROCEDURE — 99231 PR SUBSEQUENT HOSPITAL CARE,LEVL I: ICD-10-PCS | Mod: ,,, | Performed by: HOSPITALIST

## 2022-04-08 PROCEDURE — 25000003 PHARM REV CODE 250: Performed by: INTERNAL MEDICINE

## 2022-04-08 PROCEDURE — 99231 SBSQ HOSP IP/OBS SF/LOW 25: CPT | Mod: ,,, | Performed by: HOSPITALIST

## 2022-04-08 PROCEDURE — 30200315 PPD INTRADERMAL TEST REV CODE 302: Performed by: HOSPITALIST

## 2022-04-08 PROCEDURE — 94761 N-INVAS EAR/PLS OXIMETRY MLT: CPT

## 2022-04-08 PROCEDURE — 11000001 HC ACUTE MED/SURG PRIVATE ROOM

## 2022-04-08 PROCEDURE — 86580 TB INTRADERMAL TEST: CPT | Performed by: HOSPITALIST

## 2022-04-08 RX ORDER — METFORMIN HYDROCHLORIDE 500 MG/1
500 TABLET ORAL 2 TIMES DAILY WITH MEALS
Start: 2022-04-08 | End: 2023-04-08

## 2022-04-08 RX ADMIN — FLUOXETINE 20 MG: 20 CAPSULE ORAL at 08:04

## 2022-04-08 RX ADMIN — INSULIN ASPART 7 UNITS: 100 INJECTION, SOLUTION INTRAVENOUS; SUBCUTANEOUS at 09:04

## 2022-04-08 RX ADMIN — ATORVASTATIN CALCIUM 80 MG: 20 TABLET, FILM COATED ORAL at 08:04

## 2022-04-08 RX ADMIN — TUBERCULIN PURIFIED PROTEIN DERIVATIVE 5 UNITS: 5 INJECTION, SOLUTION INTRADERMAL at 09:04

## 2022-04-08 RX ADMIN — THERA TABS 1 TABLET: TAB at 08:04

## 2022-04-08 RX ADMIN — FOLIC ACID 1 MG: 1 TABLET ORAL at 08:04

## 2022-04-08 RX ADMIN — APIXABAN 5 MG: 2.5 TABLET, FILM COATED ORAL at 08:04

## 2022-04-08 RX ADMIN — TAMSULOSIN HYDROCHLORIDE 0.4 MG: 0.4 CAPSULE ORAL at 10:04

## 2022-04-08 RX ADMIN — POLYETHYLENE GLYCOL 3350 17 G: 17 POWDER, FOR SOLUTION ORAL at 08:04

## 2022-04-08 RX ADMIN — INSULIN DETEMIR 30 UNITS: 100 INJECTION, SOLUTION SUBCUTANEOUS at 09:04

## 2022-04-08 RX ADMIN — INSULIN ASPART 7 UNITS: 100 INJECTION, SOLUTION INTRAVENOUS; SUBCUTANEOUS at 12:04

## 2022-04-08 RX ADMIN — THIAMINE HCL TAB 100 MG 100 MG: 100 TAB at 08:04

## 2022-04-08 RX ADMIN — APIXABAN 5 MG: 2.5 TABLET, FILM COATED ORAL at 10:04

## 2022-04-08 NOTE — PROGRESS NOTES
SHANICE called Gali at [place]. She is going to sp;alaina with the patient about his finances this morning and get back to SHANICE.

## 2022-04-08 NOTE — PROGRESS NOTES
[facility] Medicine  Progress Note    Patient Name: Forest Lopez  MRN: [id number]  Patient Class: IP- Inpatient   Admission Date: 1/31/2022  Length of Stay: 67 days  Attending Physician: Mojgan Bosch MD  Primary Care Provider: Julian Escobar        Subjective:     Principal Problem:Acute stroke due to ischemia        HPI:  Mr. Lopez is a 72 year old man who presented after a syncopal episode.  He reports he had been feeling well prior to the episode but then had a prodrome prior to passing out.  EMS was called, report patient's BP was low normal on their arrival, improved after an IV fluids bolus.  Patient denies chest pain or shortness of breath and says he does not feel weak currently although is rather tired.  When seen in the ED this morning he could barely express himself audibly.     Vital signs were normal on presentation here.  He is on warfarin for recurrent DVT, but his INR was 1, and d-dimer markedly elevated at 21.7.  Tox screen was positive for cocaine.  He had a CTA of the brain and neck done on presentation so CTA of the chest for PE study could not be done for 48 hours.  Ultrasound of the lower extremities showed extensive bilateral DVT.  On the right there was thrombosis of the common femoral vein, femoral vein, popliteal vein, one of the paired posterior tibial veins and both visualized peroneal veins.  On the left there was thrombosis of the common femoral vein, femoral vein and popliteal vein.  Patient was started on full dose Lovenox and admitted.    Medical history is from the chart as he is unable to give me much information.  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  He smokes 5-6 cigarettes/day and is not interested in quitting.  He is currently homeless and staying with a friend.  Despite the normal INR he is sure  he is taking his warfarin and is not having difficulty taking his medications.  I discussed his care with [facility] staff on the [facility] and patient had been lost to follow up since November 2021.      Overview/Hospital Course:  Patient presented to the ED after a syncopal episode and was found to have bilateral lower extremity DVTs and a low INR.  Tox screen was positive for cocaine.  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  He was started back on his warfarin with bridging Lovenox.  His 2D echo showed grade I diastolic dysfunction.  Neurology evaluated him and recommended echo with bubble study, which was negative for a shunt.    PT/OT evaluated him and recommended SNF placement versus inpatient rehab.  His INR was difficult to get therapeutic, and as he had no contraindication to a NOAC his warfarin was changed to apixaban, and the full dose Lovenox was discontinued.  As he appeared to be depressed and had no objection to starting an antidepressant Lexapro was started.  He had a fall in the hospital on 2/13, had gone to the bathroom for a BM with the nurse, and when she turned away while he was washing his hands he apparently lost his balance and fell.  He did not hit his head and did not lose consciousness, but his BP was low transiently and he was given a bolus of IV fluids.    Patient's mental status improved slowly, but he gradually became more talkative and was able to hold a conversation.  He was diagnosed with a polymicrobial UTI on 2/21 and completed treatment with antibiotics.  Psychiatry evaluated him on 3/3 and changed his antidepressant to Prozac as it can be more activating and patient was having difficulty with his level of motivation.      Patient had some episodes of nausea and vomiting and CT of the abdomen was done, with incidental finding of rectal wall thickening noted.  Colonoscopy was recommended,  but he was unable to finish the prep.  GI evaluated him and decided the patient would benefit from a flex sig to evaluate the rectum.  He had the procedure on 3/25 and no abnormalities were seen other than internal hemorrhoids.    Patient's discharge was delayed as he was not accepted by any SNF facilities in the area.  Reasons for the denials are unclear as he has a clear rehab diagnosis.  He has a history of drug use but has not shown any interest in getting any illicit or legal drugs since he has been here, and he has had no visitors that could have provided drugs to him.  He has been pleasant and cooperative while here although lacks motivation, likely due to the nature of the stroke affecting his frontal lobe.  He requires considerable encouragement to participate in therapy.  Discharging him to live on the street or shelter would not be appropriate as he would not be able to take care of himself and likely will need assisted nursing home care eventually after he gets the chance to improve with rehab.       Interval History:  He was accepted to [facility] and was very excited this morning.  Discharge fell through later in the afternoon when he was unable to provide the full details of his financial institution.    Review of Systems   Constitutional:  Negative for chills and fever.   Respiratory:  Negative for cough and shortness of breath.    Cardiovascular:  Negative for chest pain and palpitations.   Gastrointestinal:  Negative for abdominal pain, nausea and vomiting.   Objective:     Vital Signs (Most Recent):  Temp: 97.9 °F (36.6 °C) (04/08/22 1155)  Pulse: 85 (04/08/22 1155)  Resp: 20 (04/08/22 1155)  BP: (!) 107/59 (04/08/22 1155)  SpO2: 96 % (04/08/22 1500)   Vital Signs (24h Range):  Temp:  [97.5 °F (36.4 °C)-98.4 °F (36.9 °C)] 97.9 °F (36.6 °C)  Pulse:  [76-85] 85  Resp:  [12-20] 20  SpO2:  [96 %-100 %] 96 %  BP: (107-139)/(59-78) 107/59     Weight: 66.2 kg (145 lb 15.1 oz)  Body mass index is 19.79  kg/m².    Intake/Output Summary (Last 24 hours) at 4/8/2022 1604  Last data filed at 4/8/2022 1500  Gross per 24 hour   Intake --   Output 1800 ml   Net -1800 ml      Physical Exam  Constitutional:       General: He is not in acute distress.     Comments: Thin   Cardiovascular:      Rate and Rhythm: Normal rate and regular rhythm.   Pulmonary:      Effort: Pulmonary effort is normal.      Breath sounds: Normal breath sounds.   Abdominal:      General: Bowel sounds are normal.      Palpations: Abdomen is soft.   Skin:     General: Skin is warm and dry.   Neurological:      Mental Status: He is alert.      Comments: Not oriented to time.       Significant Labs: All pertinent labs within the past 24 hours have been reviewed.    Significant Imaging: I have reviewed all pertinent imaging results/findings within the past 24 hours.      Assessment/Plan:      * Acute stroke due to ischemia  - MRI showed areas of diffusion signal hyperintensity consistent with areas of acute ischemia/infarct involving the left cerebral hemisphere, the most prominent measuring approximately 1.4 cm, also a small focus of the right frontal lobe.  - Patient on full dose anticoagulation currently due to acute bilateral DVT.  - Sinus rhythm noted throughout hospital stay  - Risk factor modification - control diabetes, continue statin.  - RPR, HIV non reactive.  B12 low normal.   - CTA showed a focal segment of 60-70% stenosis involving the proximal to mid left vertebral artery that was new when compared to the prior study of 09/25/2019.  No evidence of large vessel intracranial occlusion.   - Neurology noted symptoms do not correspond to stroke location, although the frontal lobe stroke might have something to do with his lack of motivation, and Speech has noted he has had delayed swallowing.  - Echo with bubble study done, no shunt seen.  - Follow up with vascular neurology in 4 weeks.  - Therapy recommended rehab initially but changed  recommendation to SNF due to his low interest in participation.  - Started Lexapro due to suspicion for depression.  - Psych consulted, changed to Prozac and started thiamine, folic acid, MVI due to previous history of alcohol abuse, although Wernicke encephalopathy is not suspected.  - Re-consulted SNF for rehab from the stroke, as discharging him to a shelter would be inappropriate and unsafe for him.  Discussed with his son, Forest, at length on 3/10.  Forest is out of town on a job but will be returning to [place] eventually.  He agrees his father will eventually need nursing home care as he is unable to care for himself.  - awaiting placement.      Acute deep vein thrombosis (DVT) of both femoral veins  Last ultrasound showed partially occlusive DVTs, now completely occlusive DVT of multiple lower extremity veins on ultrasound.  Patient reports compliance with warfarin but his INR is only one.  He is homeless but says he does not have difficulty obtaining his medications.  Does not seem to care about anything, which again might be due to the frontal lobe stroke.  D-dimer was markedly elevated on presentation and there was a question of possible PE, but he had a CTA of the brain/neck on presentation so could not get another CTA for another 48 hours.    V/Q scan was done, showed low probability for PE.  2D echo showed grade I diastolic dysfunction.  Warfarin was restarted with bridging Lovenox, but INR was still low.  Changed to apixaban and discontinued Lovenox.    Advance care planning        Severe protein-calorie malnutrition  Diet as tolerated  Patient seen by dietician, recommendations made.      Debility  PT/OT recommending rehab vs SNF after stroke with debility/poor motivation  Having difficulty finding placement for unclear reasons.  Will continue to pursue this as above.  Again, patient has been pleasant and cooperative.      Type 2 diabetes mellitus with hyperglycemia, without long-term current  use of insulin  Reportedly he is on metformin and glipizide, both held.  He had 4+ sugar in urine and HbA1c was 10.3, and he was hyperglycemic on presentation.  Started on Levemir and ISS, then increased Levemir to 25 units daily  Added scheduled novolog 5 units tid, increased to 7 units with improvement  OK to resume metformin.    Increased levemir to 28 units daily with good improvement.  Held metformin with contrast with CT.  BG lower now off metformin, has had a few low BG as well due to being on clear liquids for colonoscopy prep.  Levemir decreased to 20 units daily.    As expected BG increased when diet was resumed so increased Levemir back to 28, will increase to 30 units as sugars on higher side  Metformin discontinued.    Hyperlipidemia  Continue atorvastatin status post stroke.        VTE Risk Mitigation (From admission, onward)         Ordered     apixaban tablet 5 mg  2 times daily         03/29/22 1555     Place sequential compression device  Until discontinued         01/31/22 0533                Discharge Planning   EFRAÍN: 4/8/2022     Code Status: Full Code   Is the patient medically ready for discharge?:     Reason for patient still in hospital (select all that apply): Pending disposition  Discharge Plan A: Skilled Nursing Facility   Discharge Delays: (!) Post-Acute Set-up              Mojgan Meza MD  Department of Hospital Medicine   [facility] - Med Surg ([place])

## 2022-04-08 NOTE — PLAN OF CARE
Pt in bed, no noted acute distress, no complaints of pain, AAO x 3, up with x 1 assist to restroom, condom cath in place, no injuries or falls during shift, AVASYS in place, bed in low locked position, call light in reach, hourly rounds complete, will continue to assess

## 2022-04-08 NOTE — PT/OT/SLP PROGRESS
Speech Language Pathology      Forest Lopez  MRN: [id number]    Patient not seen today secondary to Patient unwilling to participate. Pt declined participating in tx this date. Voiced anticipation and excitement for pending d/c. Will follow-up next available date.

## 2022-04-08 NOTE — PT/OT/SLP PROGRESS
Physical Therapy      Patient Name:  Forest Lopez   MRN:  [id number]    Patient not seen today secondary to Patient unwilling to participate at this time. Will follow-up later as scheduling permits.

## 2022-04-08 NOTE — SUBJECTIVE & OBJECTIVE
Interval History:  He was accepted to [facility] and was very excited this morning.  Discharge fell through later in the afternoon when he was unable to provide the full details of his financial institution.    Review of Systems   Constitutional:  Negative for chills and fever.   Respiratory:  Negative for cough and shortness of breath.    Cardiovascular:  Negative for chest pain and palpitations.   Gastrointestinal:  Negative for abdominal pain, nausea and vomiting.   Objective:     Vital Signs (Most Recent):  Temp: 97.9 °F (36.6 °C) (04/08/22 1155)  Pulse: 85 (04/08/22 1155)  Resp: 20 (04/08/22 1155)  BP: (!) 107/59 (04/08/22 1155)  SpO2: 96 % (04/08/22 1500)   Vital Signs (24h Range):  Temp:  [97.5 °F (36.4 °C)-98.4 °F (36.9 °C)] 97.9 °F (36.6 °C)  Pulse:  [76-85] 85  Resp:  [12-20] 20  SpO2:  [96 %-100 %] 96 %  BP: (107-139)/(59-78) 107/59     Weight: 66.2 kg (145 lb 15.1 oz)  Body mass index is 19.79 kg/m².    Intake/Output Summary (Last 24 hours) at 4/8/2022 1604  Last data filed at 4/8/2022 1500  Gross per 24 hour   Intake --   Output 1800 ml   Net -1800 ml      Physical Exam  Constitutional:       General: He is not in acute distress.     Comments: Thin   Cardiovascular:      Rate and Rhythm: Normal rate and regular rhythm.   Pulmonary:      Effort: Pulmonary effort is normal.      Breath sounds: Normal breath sounds.   Abdominal:      General: Bowel sounds are normal.      Palpations: Abdomen is soft.   Skin:     General: Skin is warm and dry.   Neurological:      Mental Status: He is alert.      Comments: Not oriented to time.       Significant Labs: All pertinent labs within the past 24 hours have been reviewed.    Significant Imaging: I have reviewed all pertinent imaging results/findings within the past 24 hours.

## 2022-04-09 LAB
POCT GLUCOSE: 143 MG/DL (ref 70–110)
POCT GLUCOSE: 215 MG/DL (ref 70–110)
POCT GLUCOSE: 217 MG/DL (ref 70–110)
POCT GLUCOSE: 259 MG/DL (ref 70–110)
POCT GLUCOSE: 89 MG/DL (ref 70–110)

## 2022-04-09 PROCEDURE — 11000001 HC ACUTE MED/SURG PRIVATE ROOM

## 2022-04-09 PROCEDURE — 25000003 PHARM REV CODE 250: Performed by: INTERNAL MEDICINE

## 2022-04-09 PROCEDURE — 99232 SBSQ HOSP IP/OBS MODERATE 35: CPT | Mod: ,,, | Performed by: HOSPITALIST

## 2022-04-09 PROCEDURE — 25000003 PHARM REV CODE 250: Performed by: HOSPITALIST

## 2022-04-09 PROCEDURE — 99232 PR SUBSEQUENT HOSPITAL CARE,LEVL II: ICD-10-PCS | Mod: ,,, | Performed by: HOSPITALIST

## 2022-04-09 PROCEDURE — 94761 N-INVAS EAR/PLS OXIMETRY MLT: CPT

## 2022-04-09 RX ORDER — METFORMIN HYDROCHLORIDE 500 MG/1
500 TABLET ORAL 2 TIMES DAILY WITH MEALS
Status: DISCONTINUED | OUTPATIENT
Start: 2022-04-09 | End: 2022-04-27 | Stop reason: HOSPADM

## 2022-04-09 RX ADMIN — INSULIN ASPART 1 UNITS: 100 INJECTION, SOLUTION INTRAVENOUS; SUBCUTANEOUS at 09:04

## 2022-04-09 RX ADMIN — APIXABAN 5 MG: 2.5 TABLET, FILM COATED ORAL at 08:04

## 2022-04-09 RX ADMIN — INSULIN ASPART 7 UNITS: 100 INJECTION, SOLUTION INTRAVENOUS; SUBCUTANEOUS at 12:04

## 2022-04-09 RX ADMIN — ATORVASTATIN CALCIUM 80 MG: 20 TABLET, FILM COATED ORAL at 08:04

## 2022-04-09 RX ADMIN — TAMSULOSIN HYDROCHLORIDE 0.4 MG: 0.4 CAPSULE ORAL at 08:04

## 2022-04-09 RX ADMIN — INSULIN DETEMIR 30 UNITS: 100 INJECTION, SOLUTION SUBCUTANEOUS at 08:04

## 2022-04-09 RX ADMIN — METFORMIN HYDROCHLORIDE 500 MG: 500 TABLET ORAL at 04:04

## 2022-04-09 RX ADMIN — INSULIN ASPART 2 UNITS: 100 INJECTION, SOLUTION INTRAVENOUS; SUBCUTANEOUS at 12:04

## 2022-04-09 RX ADMIN — FLUOXETINE 20 MG: 20 CAPSULE ORAL at 08:04

## 2022-04-09 RX ADMIN — THIAMINE HCL TAB 100 MG 100 MG: 100 TAB at 08:04

## 2022-04-09 RX ADMIN — THERA TABS 1 TABLET: TAB at 08:04

## 2022-04-09 RX ADMIN — INSULIN ASPART 7 UNITS: 100 INJECTION, SOLUTION INTRAVENOUS; SUBCUTANEOUS at 08:04

## 2022-04-09 RX ADMIN — METFORMIN HYDROCHLORIDE 500 MG: 500 TABLET ORAL at 08:04

## 2022-04-09 RX ADMIN — POLYETHYLENE GLYCOL 3350 17 G: 17 POWDER, FOR SOLUTION ORAL at 08:04

## 2022-04-09 RX ADMIN — INSULIN ASPART 1 UNITS: 100 INJECTION, SOLUTION INTRAVENOUS; SUBCUTANEOUS at 12:04

## 2022-04-09 RX ADMIN — FOLIC ACID 1 MG: 1 TABLET ORAL at 08:04

## 2022-04-09 NOTE — PROGRESS NOTES
SHANICE met with the patient in the morning waiting for a call into the patient's room from Gali at [facility]. The phone didn't ring. SHANICE went back for another visit w/in the next 1/2 to call Gali from the patient's phone. SHANICE was told Gali was in a meeting now. SHANICE spoke with Stan Perla CM. She then attemp;dakota to assist the member to find his financial information on line. However the patient couldn't remember his account numbers. Stan coelhoinued to work with this issue. She also spoke with the member's son.  Currently the issue has not been resolved and it held up the patient's d/c today.

## 2022-04-09 NOTE — SUBJECTIVE & OBJECTIVE
Interval History:  No complaints, eating breakfast.    Review of Systems   Constitutional:  Negative for chills and fever.   Respiratory:  Negative for cough and shortness of breath.    Cardiovascular:  Negative for chest pain and palpitations.   Gastrointestinal:  Negative for abdominal pain, nausea and vomiting.   Objective:     Vital Signs (Most Recent):  Temp: 98 °F (36.7 °C) (04/09/22 0734)  Pulse: 90 (04/09/22 0734)  Resp: 16 (04/09/22 0734)  BP: 119/77 (04/09/22 0734)  SpO2: 98 % (04/09/22 0734) Vital Signs (24h Range):  Temp:  [97.9 °F (36.6 °C)-98.2 °F (36.8 °C)] 98 °F (36.7 °C)  Pulse:  [78-93] 90  Resp:  [14-20] 16  SpO2:  [96 %-100 %] 98 %  BP: (107-148)/(55-78) 119/77     Weight: 66.2 kg (145 lb 15.1 oz)  Body mass index is 19.79 kg/m².    Intake/Output Summary (Last 24 hours) at 4/9/2022 1110  Last data filed at 4/9/2022 0603  Gross per 24 hour   Intake --   Output 2400 ml   Net -2400 ml      Physical Exam  Constitutional:       General: He is not in acute distress.     Comments: Thin   Cardiovascular:      Rate and Rhythm: Normal rate and regular rhythm.   Pulmonary:      Effort: Pulmonary effort is normal.      Breath sounds: Normal breath sounds.   Abdominal:      General: Bowel sounds are normal.      Palpations: Abdomen is soft.   Skin:     General: Skin is warm and dry.   Neurological:      Mental Status: He is alert.      Comments: Not oriented to time.       Significant Labs: All pertinent labs within the past 24 hours have been reviewed.    Significant Imaging: I have reviewed all pertinent imaging results/findings within the past 24 hours.

## 2022-04-09 NOTE — ASSESSMENT & PLAN NOTE
Reportedly he is on metformin and glipizide, both held.  He had 4+ sugar in urine and HbA1c was 10.3, and he was hyperglycemic on presentation.  Started on Levemir and ISS, then increased Levemir to 25 units daily  Added scheduled novolog 5 units tid, increased to 7 units with improvement  OK to resume metformin.    Increased levemir to 28 units daily with good improvement.  Held metformin with contrast with CT.  BG lower now off metformin, has had a few low BG as well due to being on clear liquids for colonoscopy prep.  Levemir decreased to 20 units daily.    As expected BG increased when diet was resumed so increased Levemir back to 28, will increase to 30 units as sugars on higher side  Metformin discontinued while he was NPO but now may be resumed.

## 2022-04-09 NOTE — PROGRESS NOTES
[facility] Medicine  Progress Note    Patient Name: Forest Lopez  MRN: [id number]  Patient Class: IP- Inpatient   Admission Date: 1/31/2022  Length of Stay: 68 days  Attending Physician: Mojgan Bosch MD  Primary Care Provider: Julian Escobar        Subjective:     Principal Problem:Acute stroke due to ischemia        HPI:  Mr. Lopez is a 72 year old man who presented after a syncopal episode.  He reports he had been feeling well prior to the episode but then had a prodrome prior to passing out.  EMS was called, report patient's BP was low normal on their arrival, improved after an IV fluids bolus.  Patient denies chest pain or shortness of breath and says he does not feel weak currently although is rather tired.  When seen in the ED this morning he could barely express himself audibly.     Vital signs were normal on presentation here.  He is on warfarin for recurrent DVT, but his INR was 1, and d-dimer markedly elevated at 21.7.  Tox screen was positive for cocaine.  He had a CTA of the brain and neck done on presentation so CTA of the chest for PE study could not be done for 48 hours.  Ultrasound of the lower extremities showed extensive bilateral DVT.  On the right there was thrombosis of the common femoral vein, femoral vein, popliteal vein, one of the paired posterior tibial veins and both visualized peroneal veins.  On the left there was thrombosis of the common femoral vein, femoral vein and popliteal vein.  Patient was started on full dose Lovenox and admitted.    Medical history is from the chart as he is unable to give me much information.  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  He smokes 5-6 cigarettes/day and is not interested in quitting.  He is currently homeless and staying with a friend.  Despite the normal INR he is sure  he is taking his warfarin and is not having difficulty taking his medications.  I discussed his care with [facility] staff on the [facility] and patient had been lost to follow up since November 2021.      Overview/Hospital Course:  Patient presented to the ED after a syncopal episode and was found to have bilateral lower extremity DVTs and a low INR.  Tox screen was positive for cocaine.  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  He was started back on his warfarin with bridging Lovenox.  His 2D echo showed grade I diastolic dysfunction.  Neurology evaluated him and recommended echo with bubble study, which was negative for a shunt.    PT/OT evaluated him and recommended SNF placement versus inpatient rehab.  His INR was difficult to get therapeutic, and as he had no contraindication to a NOAC his warfarin was changed to apixaban, and the full dose Lovenox was discontinued.  As he appeared to be depressed and had no objection to starting an antidepressant Lexapro was started.  He had a fall in the hospital on 2/13, had gone to the bathroom for a BM with the nurse, and when she turned away while he was washing his hands he apparently lost his balance and fell.  He did not hit his head and did not lose consciousness, but his BP was low transiently and he was given a bolus of IV fluids.    Patient's mental status improved slowly, but he gradually became more talkative and was able to hold a conversation.  He was diagnosed with a polymicrobial UTI on 2/21 and completed treatment with antibiotics.  Psychiatry evaluated him on 3/3 and changed his antidepressant to Prozac as it can be more activating and patient was having difficulty with his level of motivation.      Patient had some episodes of nausea and vomiting and CT of the abdomen was done, with incidental finding of rectal wall thickening noted.  Colonoscopy was recommended,  but he was unable to finish the prep.  GI evaluated him and decided the patient would benefit from a flex sig to evaluate the rectum.  He had the procedure on 3/25 and no abnormalities were seen other than internal hemorrhoids.    Patient's discharge was delayed as he was not accepted by any SNF facilities in the area.  Reasons for the denials are unclear as he has a clear rehab diagnosis.  He has a history of drug use but has not shown any interest in getting any illicit or legal drugs since he has been here, and he has had no visitors that could have provided drugs to him.  He has been pleasant and cooperative while here although lacks motivation, likely due to the nature of the stroke affecting his frontal lobe.  He requires considerable encouragement to participate in therapy.  Discharging him to live on the street or shelter would not be appropriate as he would not be able to take care of himself.  At this point he has agreed to senior living nursing care, as his stroke was more than 2 months ago and he has already had sufficient rehab while here in the hospital.  Discharge was planned for 4/8/22, but was delayed due to him not having access to his own bank account.  Likely will be able to be transferred to nursing home on Monday.      Interval History:  No complaints, eating breakfast.    Review of Systems   Constitutional:  Negative for chills and fever.   Respiratory:  Negative for cough and shortness of breath.    Cardiovascular:  Negative for chest pain and palpitations.   Gastrointestinal:  Negative for abdominal pain, nausea and vomiting.   Objective:     Vital Signs (Most Recent):  Temp: 98 °F (36.7 °C) (04/09/22 0734)  Pulse: 90 (04/09/22 0734)  Resp: 16 (04/09/22 0734)  BP: 119/77 (04/09/22 0734)  SpO2: 98 % (04/09/22 0734) Vital Signs (24h Range):  Temp:  [97.9 °F (36.6 °C)-98.2 °F (36.8 °C)] 98 °F (36.7 °C)  Pulse:  [78-93] 90  Resp:  [14-20] 16  SpO2:  [96 %-100 %] 98 %  BP: (107-148)/(55-78) 119/77      Weight: 66.2 kg (145 lb 15.1 oz)  Body mass index is 19.79 kg/m².    Intake/Output Summary (Last 24 hours) at 4/9/2022 1110  Last data filed at 4/9/2022 0603  Gross per 24 hour   Intake --   Output 2400 ml   Net -2400 ml      Physical Exam  Constitutional:       General: He is not in acute distress.     Comments: Thin   Cardiovascular:      Rate and Rhythm: Normal rate and regular rhythm.   Pulmonary:      Effort: Pulmonary effort is normal.      Breath sounds: Normal breath sounds.   Abdominal:      General: Bowel sounds are normal.      Palpations: Abdomen is soft.   Skin:     General: Skin is warm and dry.   Neurological:      Mental Status: He is alert.      Comments: Not oriented to time.       Significant Labs: All pertinent labs within the past 24 hours have been reviewed.    Significant Imaging: I have reviewed all pertinent imaging results/findings within the past 24 hours.      Assessment/Plan:      * Acute stroke due to ischemia  - MRI showed areas of diffusion signal hyperintensity consistent with areas of acute ischemia/infarct involving the left cerebral hemisphere, the most prominent measuring approximately 1.4 cm, also a small focus of the right frontal lobe.  - Patient on full dose anticoagulation currently due to acute bilateral DVT.  - Sinus rhythm noted throughout hospital stay  - Risk factor modification - control diabetes, continue statin.  - RPR, HIV non reactive.  B12 low normal.   - CTA showed a focal segment of 60-70% stenosis involving the proximal to mid left vertebral artery that was new when compared to the prior study of 09/25/2019.  No evidence of large vessel intracranial occlusion.   - Neurology noted symptoms do not correspond to stroke location, although the frontal lobe stroke might have something to do with his lack of motivation, and Speech has noted he has had delayed swallowing.  - Echo with bubble study done, no shunt seen.  - Follow up with vascular neurology in 4  weeks.  - Therapy recommended rehab initially but changed recommendation to SNF due to his low interest in participation.  - Started Lexapro due to suspicion for depression.  - Psych consulted, changed to Prozac and started thiamine, folic acid, MVI due to previous history of alcohol abuse, although Wernicke encephalopathy is not suspected.  - Re-consulted SNF for rehab from the stroke, as discharging him to a shelter would be inappropriate and unsafe for him.  Discussed with his son, Forest, at length on 3/10.  Forest is out of town on a job but will be returning to [place] eventually.  He agrees his father will eventually need nursing home care as he is unable to care for himself.  - awaiting placement.      Acute deep vein thrombosis (DVT) of both femoral veins  Last ultrasound showed partially occlusive DVTs, now completely occlusive DVT of multiple lower extremity veins on ultrasound.  Patient reports compliance with warfarin but his INR is only one.  He is homeless but says he does not have difficulty obtaining his medications.  Does not seem to care about anything, which again might be due to the frontal lobe stroke.  D-dimer was markedly elevated on presentation and there was a question of possible PE, but he had a CTA of the brain/neck on presentation so could not get another CTA for another 48 hours.    V/Q scan was done, showed low probability for PE.  2D echo showed grade I diastolic dysfunction.  Warfarin was restarted with bridging Lovenox, but INR was still low.  Changed to apixaban and discontinued Lovenox.    Advance care planning        Severe protein-calorie malnutrition  Diet as tolerated  Patient seen by dietician, recommendations made.      Debility  PT/OT recommending rehab vs SNF after stroke with debility/poor motivation  Having difficulty finding placement for unclear reasons.  Will continue to pursue this as above.  Again, patient has been pleasant and cooperative.      Type 2 diabetes  mellitus with hyperglycemia, without long-term current use of insulin  Reportedly he is on metformin and glipizide, both held.  He had 4+ sugar in urine and HbA1c was 10.3, and he was hyperglycemic on presentation.  Started on Levemir and ISS, then increased Levemir to 25 units daily  Added scheduled novolog 5 units tid, increased to 7 units with improvement  OK to resume metformin.    Increased levemir to 28 units daily with good improvement.  Held metformin with contrast with CT.  BG lower now off metformin, has had a few low BG as well due to being on clear liquids for colonoscopy prep.  Levemir decreased to 20 units daily.    As expected BG increased when diet was resumed so increased Levemir back to 28, will increase to 30 units as sugars on higher side  Metformin discontinued while he was NPO but now may be resumed.    Hyperlipidemia  Continue atorvastatin status post stroke.        VTE Risk Mitigation (From admission, onward)         Ordered     apixaban tablet 5 mg  2 times daily         03/29/22 1555     Place sequential compression device  Until discontinued         01/31/22 0533                Discharge Planning   EFRAÍN: 4/11/2022     Code Status: Full Code   Is the patient medically ready for discharge?:     Reason for patient still in hospital (select all that apply): Pending disposition  Discharge Plan A: Skilled Nursing Facility   Discharge Delays: (!) Post-Acute Set-up              Mojgan Meza MD  Department of Hospital Medicine   [facility] Med Surg ([place])

## 2022-04-09 NOTE — ASSESSMENT & PLAN NOTE
- MRI showed areas of diffusion signal hyperintensity consistent with areas of acute ischemia/infarct involving the left cerebral hemisphere, the most prominent measuring approximately 1.4 cm, also a small focus of the right frontal lobe.  - Patient on full dose anticoagulation currently due to acute bilateral DVT.  - Sinus rhythm noted throughout hospital stay  - Risk factor modification - control diabetes, continue statin.  - RPR, HIV non reactive.  B12 low normal.   - CTA showed a focal segment of 60-70% stenosis involving the proximal to mid left vertebral artery that was new when compared to the prior study of 09/25/2019.  No evidence of large vessel intracranial occlusion.   - Neurology noted symptoms do not correspond to stroke location, although the frontal lobe stroke might have something to do with his lack of motivation, and Speech has noted he has had delayed swallowing.  - Echo with bubble study done, no shunt seen.  - Follow up with vascular neurology in 4 weeks.  - Therapy recommended rehab initially but changed recommendation to SNF due to his low interest in participation.  - Started Lexapro due to suspicion for depression.  - Psych consulted, changed to Prozac and started thiamine, folic acid, MVI due to previous history of alcohol abuse, although Wernicke encephalopathy is not suspected.  - Re-consulted SNF for rehab from the stroke, as discharging him to a shelter would be inappropriate and unsafe for him.  Discussed with his son, Forest, at length on 3/10.  Forest is out of town on a job but will be returning to [place] eventually.  He agrees his father will eventually need nursing home care as he is unable to care for himself.  - awaiting placement.

## 2022-04-10 PROBLEM — R76.11 POSITIVE PPD: Status: ACTIVE | Noted: 2022-04-10

## 2022-04-10 LAB
POCT GLUCOSE: 108 MG/DL (ref 70–110)
POCT GLUCOSE: 125 MG/DL (ref 70–110)
POCT GLUCOSE: 129 MG/DL (ref 70–110)
POCT GLUCOSE: 82 MG/DL (ref 70–110)
TB INDURATION 48 - 72 HR READ: 15 MM

## 2022-04-10 PROCEDURE — 25000003 PHARM REV CODE 250: Performed by: INTERNAL MEDICINE

## 2022-04-10 PROCEDURE — 99232 SBSQ HOSP IP/OBS MODERATE 35: CPT | Mod: ,,, | Performed by: HOSPITALIST

## 2022-04-10 PROCEDURE — 99232 PR SUBSEQUENT HOSPITAL CARE,LEVL II: ICD-10-PCS | Mod: ,,, | Performed by: HOSPITALIST

## 2022-04-10 PROCEDURE — 11000001 HC ACUTE MED/SURG PRIVATE ROOM

## 2022-04-10 PROCEDURE — 25000003 PHARM REV CODE 250: Performed by: HOSPITALIST

## 2022-04-10 RX ADMIN — POLYETHYLENE GLYCOL 3350 17 G: 17 POWDER, FOR SOLUTION ORAL at 09:04

## 2022-04-10 RX ADMIN — METFORMIN HYDROCHLORIDE 500 MG: 500 TABLET ORAL at 05:04

## 2022-04-10 RX ADMIN — TAMSULOSIN HYDROCHLORIDE 0.4 MG: 0.4 CAPSULE ORAL at 09:04

## 2022-04-10 RX ADMIN — ATORVASTATIN CALCIUM 80 MG: 20 TABLET, FILM COATED ORAL at 09:04

## 2022-04-10 RX ADMIN — INSULIN DETEMIR 30 UNITS: 100 INJECTION, SOLUTION SUBCUTANEOUS at 09:04

## 2022-04-10 RX ADMIN — INSULIN ASPART 7 UNITS: 100 INJECTION, SOLUTION INTRAVENOUS; SUBCUTANEOUS at 05:04

## 2022-04-10 RX ADMIN — APIXABAN 5 MG: 2.5 TABLET, FILM COATED ORAL at 09:04

## 2022-04-10 RX ADMIN — METFORMIN HYDROCHLORIDE 500 MG: 500 TABLET ORAL at 08:04

## 2022-04-10 RX ADMIN — INSULIN ASPART 7 UNITS: 100 INJECTION, SOLUTION INTRAVENOUS; SUBCUTANEOUS at 08:04

## 2022-04-10 RX ADMIN — FLUOXETINE 20 MG: 20 CAPSULE ORAL at 09:04

## 2022-04-10 RX ADMIN — INSULIN ASPART 7 UNITS: 100 INJECTION, SOLUTION INTRAVENOUS; SUBCUTANEOUS at 12:04

## 2022-04-10 RX ADMIN — THERA TABS 1 TABLET: TAB at 09:04

## 2022-04-10 RX ADMIN — THIAMINE HCL TAB 100 MG 100 MG: 100 TAB at 09:04

## 2022-04-10 RX ADMIN — FOLIC ACID 1 MG: 1 TABLET ORAL at 09:04

## 2022-04-10 NOTE — PROGRESS NOTES
[facility] Medicine  Progress Note    Patient Name: Forest Lopez  MRN: [id number]  Patient Class: IP- Inpatient   Admission Date: 1/31/2022  Length of Stay: 69 days  Attending Physician: Mojgan Bosch MD  Primary Care Provider: Julian Escobar        Subjective:     Principal Problem:Acute stroke due to ischemia        HPI:  Mr. Lopez is a 72 year old man who presented after a syncopal episode.  He reports he had been feeling well prior to the episode but then had a prodrome prior to passing out.  EMS was called, report patient's BP was low normal on their arrival, improved after an IV fluids bolus.  Patient denies chest pain or shortness of breath and says he does not feel weak currently although is rather tired.  When seen in the ED this morning he could barely express himself audibly.     Vital signs were normal on presentation here.  He is on warfarin for recurrent DVT, but his INR was 1, and d-dimer markedly elevated at 21.7.  Tox screen was positive for cocaine.  He had a CTA of the brain and neck done on presentation so CTA of the chest for PE study could not be done for 48 hours.  Ultrasound of the lower extremities showed extensive bilateral DVT.  On the right there was thrombosis of the common femoral vein, femoral vein, popliteal vein, one of the paired posterior tibial veins and both visualized peroneal veins.  On the left there was thrombosis of the common femoral vein, femoral vein and popliteal vein.  Patient was started on full dose Lovenox and admitted.    Medical history is from the chart as he is unable to give me much information.  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  He smokes 5-6 cigarettes/day and is not interested in quitting.  He is currently homeless and staying with a friend.  Despite the normal INR he is sure  he is taking his warfarin and is not having difficulty taking his medications.  I discussed his care with [facility] staff on the [facility] and patient had been lost to follow up since November 2021.      Overview/Hospital Course:  Patient presented to the ED after a syncopal episode and was found to have bilateral lower extremity DVTs and a low INR.  Tox screen was positive for cocaine.  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  He was started back on his warfarin with bridging Lovenox.  His 2D echo showed grade I diastolic dysfunction.  Neurology evaluated him and recommended echo with bubble study, which was negative for a shunt.    PT/OT evaluated him and recommended SNF placement versus inpatient rehab.  His INR was difficult to get therapeutic, and as he had no contraindication to a NOAC his warfarin was changed to apixaban, and the full dose Lovenox was discontinued.  As he appeared to be depressed and had no objection to starting an antidepressant Lexapro was started.  He had a fall in the hospital on 2/13, had gone to the bathroom for a BM with the nurse, and when she turned away while he was washing his hands he apparently lost his balance and fell.  He did not hit his head and did not lose consciousness, but his BP was low transiently and he was given a bolus of IV fluids.    Patient's mental status improved slowly, but he gradually became more talkative and was able to hold a conversation.  He was diagnosed with a polymicrobial UTI on 2/21 and completed treatment with antibiotics.  Psychiatry evaluated him on 3/3 and changed his antidepressant to Prozac as it can be more activating and patient was having difficulty with his level of motivation.      Patient had some episodes of nausea and vomiting and CT of the abdomen was done, with incidental finding of rectal wall thickening noted.  Colonoscopy was recommended,  but he was unable to finish the prep.  GI evaluated him and decided the patient would benefit from a flex sig to evaluate the rectum.  He had the procedure on 3/25 and no abnormalities were seen other than internal hemorrhoids.    Patient's discharge was delayed as he was not accepted by any SNF facilities in the area.  Reasons for the denials are unclear as he has a clear rehab diagnosis.  He has a history of drug use but has not shown any interest in getting any illicit or legal drugs since he has been here, and he has had no visitors that could have provided drugs to him.  He has been pleasant and cooperative while here although lacks motivation, likely due to the nature of the stroke affecting his frontal lobe.  He requires considerable encouragement to participate in therapy.  Discharging him to live on the street or shelter would not be appropriate as he would not be able to take care of himself.  At this point he has agreed to [facility] nursing care, as his stroke was more than 2 months ago and he has already had sufficient rehab while here in the hospital.  Discharge was planned for 4/8/22, but was delayed due to him not having access to his own bank account.  Likely will be able to be transferred to nursing home on Monday.      Interval History:  He says he is tired today.  No other complaints.  Ate well for breakfast.    Review of Systems   Constitutional:  Negative for chills and fever.   Respiratory:  Negative for cough and shortness of breath.    Cardiovascular:  Negative for chest pain and palpitations.   Gastrointestinal:  Negative for abdominal pain, nausea and vomiting.   Objective:     Vital Signs (Most Recent):  Temp: 97.9 °F (36.6 °C) (04/10/22 1616)  Pulse: 90 (04/10/22 1616)  Resp: 16 (04/10/22 1616)  BP: 109/64 (04/10/22 1616)  SpO2: 95 % (04/10/22 1616) Vital Signs (24h Range):  Temp:  [96.7 °F (35.9 °C)-98.6 °F (37 °C)] 97.9 °F (36.6 °C)  Pulse:  [] 90  Resp:  [16-20] 16  SpO2:  [94  %-100 %] 95 %  BP: (109-127)/(64-77) 109/64     Weight: 66.2 kg (145 lb 15.1 oz)  Body mass index is 19.79 kg/m².    Intake/Output Summary (Last 24 hours) at 4/10/2022 1657  Last data filed at 4/10/2022 0600  Gross per 24 hour   Intake 360 ml   Output 1075 ml   Net -715 ml      Physical Exam  Constitutional:       General: He is not in acute distress.     Comments: Thin   Cardiovascular:      Rate and Rhythm: Normal rate and regular rhythm.   Pulmonary:      Effort: Pulmonary effort is normal.      Breath sounds: Normal breath sounds.   Abdominal:      General: Bowel sounds are normal.      Palpations: Abdomen is soft.   Skin:     General: Skin is warm and dry.   Neurological:      Mental Status: He is alert.      Comments: Not oriented to time.       Significant Labs: All pertinent labs within the past 24 hours have been reviewed.    Significant Imaging: I have reviewed all pertinent imaging results/findings within the past 24 hours.      Assessment/Plan:      * Acute stroke due to ischemia  - MRI showed areas of diffusion signal hyperintensity consistent with areas of acute ischemia/infarct involving the left cerebral hemisphere, the most prominent measuring approximately 1.4 cm, also a small focus of the right frontal lobe.  - Patient on full dose anticoagulation currently due to acute bilateral DVT.  - Sinus rhythm noted throughout hospital stay  - Risk factor modification - control diabetes, continue statin.  - RPR, HIV non reactive.  B12 low normal.   - CTA showed a focal segment of 60-70% stenosis involving the proximal to mid left vertebral artery that was new when compared to the prior study of 09/25/2019.  No evidence of large vessel intracranial occlusion.   - Neurology noted symptoms do not correspond to stroke location, although the frontal lobe stroke might have something to do with his lack of motivation, and Speech has noted he has had delayed swallowing.  - Echo with bubble study done, no shunt  seen.  - Follow up with vascular neurology in 4 weeks.  - Therapy recommended rehab initially but changed recommendation to SNF due to his low interest in participation.  - Started Lexapro due to suspicion for depression.  - Psych consulted, changed to Prozac and started thiamine, folic acid, MVI due to previous history of alcohol abuse, although Wernicke encephalopathy is not suspected.  - Re-consulted SNF for rehab from the stroke, as discharging him to a shelter would be inappropriate and unsafe for him.  Discussed with his son, Forest, at length on 3/10.  Forest is out of town on a job but will be returning to [place] eventually.  He agrees his father will eventually need nursing home care as he is unable to care for himself.  - awaiting placement.      Acute deep vein thrombosis (DVT) of both femoral veins  Last ultrasound showed partially occlusive DVTs, now completely occlusive DVT of multiple lower extremity veins on ultrasound.  Patient reports compliance with warfarin but his INR is only one.  He is homeless but says he does not have difficulty obtaining his medications.  Does not seem to care about anything, which again might be due to the frontal lobe stroke.  D-dimer was markedly elevated on presentation and there was a question of possible PE, but he had a CTA of the brain/neck on presentation so could not get another CTA for another 48 hours.    V/Q scan was done, showed low probability for PE.  2D echo showed grade I diastolic dysfunction.  Warfarin was restarted with bridging Lovenox, but INR was still low.  Changed to apixaban and discontinued Lovenox.    Positive PPD  PPD read today as 15 mm indurated.  He has no cough or fever and none since he presented here.  CXR has been read as normal.  Clinically he does not have TB.  Quantiferon gold has been ordered to see if he has latent TB.  Will discuss with ID tomorrow.      Advance care planning        Severe protein-calorie malnutrition  Diet as  tolerated  Patient seen by dietician, recommendations made.      Debility  PT/OT recommending rehab vs SNF after stroke with debility/poor motivation  Having difficulty finding placement for unclear reasons.  Will continue to pursue this as above.  Again, patient has been pleasant and cooperative.      Type 2 diabetes mellitus with hyperglycemia, without long-term current use of insulin  Reportedly he is on metformin and glipizide, both held.  He had 4+ sugar in urine and HbA1c was 10.3, and he was hyperglycemic on presentation.  Started on Levemir and ISS, then increased Levemir to 25 units daily  Added scheduled novolog 5 units tid, increased to 7 units with improvement  OK to resume metformin.    Increased levemir to 28 units daily with good improvement.  Held metformin with contrast with CT.  BG lower now off metformin, has had a few low BG as well due to being on clear liquids for colonoscopy prep.  Levemir decreased to 20 units daily.    As expected BG increased when diet was resumed so increased Levemir back to 28, will increase to 30 units as sugars on higher side  Metformin discontinued while he was NPO but now may be resumed.    Hyperlipidemia  Continue atorvastatin status post stroke.      VTE Risk Mitigation (From admission, onward)         Ordered     apixaban tablet 5 mg  2 times daily         03/29/22 1555     Place sequential compression device  Until discontinued         01/31/22 0533                Discharge Planning   EFRAÍN: 4/11/2022     Code Status: Full Code   Is the patient medically ready for discharge?:     Reason for patient still in hospital (select all that apply): Pending disposition  Discharge Plan A: Skilled Nursing Facility   Discharge Delays: (!) Post-Acute Set-up              Mojgan Meza MD  Department of Hospital Medicine   [facility]

## 2022-04-10 NOTE — ASSESSMENT & PLAN NOTE
PPD read as 15 mm indurated.  He has no cough or fever and none since he presented here.  CXR has been read as normal.  Clinically he does not have TB.  Quantiferon gold has been ordered to see if he has latent TB.  Discussed with ID, if quantiferon gold is positive would treat for latent TB with INH and pyridoxine for 6 months.  If negative then no treatment is needed.

## 2022-04-10 NOTE — PLAN OF CARE
POC reviewed with patient. Stable on room air. No complaints verbalized during shift. Incontinent x2. Condom cath utilized. Positions self x 1 assist. Turn Q2/frequent weight shift encouraged by LPN. Low air loss mattress in use. Instructed to call for help ambulating. Positive TB skin test resulted this sift. MD notified and stated that pt has had positive PPD for many years with normal CXR, no cough or fever. No injuries, falls, or trauma occurred during shift. Purposeful rounding completed. Bed low and locked with side rails up x 3 and call light within reach.      Problem: Adult Inpatient Plan of Care  Goal: Plan of Care Review  Outcome: Ongoing, Progressing  Goal: Patient-Specific Goal (Individualized)  Outcome: Ongoing, Progressing  Goal: Absence of Hospital-Acquired Illness or Injury  Outcome: Ongoing, Progressing  Goal: Optimal Comfort and Wellbeing  Outcome: Ongoing, Progressing     Problem: Diabetes Comorbidity  Goal: Blood Glucose Level Within Targeted Range  Outcome: Ongoing, Progressing     Problem: Skin Injury Risk Increased  Goal: Skin Health and Integrity  Outcome: Ongoing, Progressing     Problem: Coping Ineffective  Goal: Effective Coping  Outcome: Ongoing, Progressing     Problem: Fall Injury Risk  Goal: Absence of Fall and Fall-Related Injury  Outcome: Ongoing, Progressing     Problem: Adjustment to Illness (Sepsis/Septic Shock)  Goal: Optimal Coping  Outcome: Ongoing, Progressing     Problem: Bleeding (Sepsis/Septic Shock)  Goal: Absence of Bleeding  Outcome: Ongoing, Progressing     Problem: Glycemic Control Impaired (Sepsis/Septic Shock)  Goal: Blood Glucose Level Within Desired Range  Outcome: Ongoing, Progressing     Problem: Infection Progression (Sepsis/Septic Shock)  Goal: Absence of Infection Signs and Symptoms  Outcome: Ongoing, Progressing     Problem: Nutrition Impaired (Sepsis/Septic Shock)  Goal: Optimal Nutrition Intake  Outcome: Ongoing, Progressing     Problem: Infection  Goal:  Absence of Infection Signs and Symptoms  Outcome: Ongoing, Progressing     Problem: Spiritual Distress Risk or Actual  Goal: Spiritual Wellbeing  Outcome: Ongoing, Progressing

## 2022-04-10 NOTE — PLAN OF CARE
Pt remained free of falls and injuries throughout shift. AAOx4. Pt calm and cooperative. Purposeful hourly rounding performed. Pt swallows meds whole. Pt has no IV, MD aware. Patient ambulates with standby assist. Condom catheter in place to gravity. Avasys monitoring maintained. VSS on room air. Pt resting comfortably in bed, denies pain, denies needs at this time. Bed low and locked, bed alarm on, call light in reach. Side rails up x2. Will continue to monitor.

## 2022-04-10 NOTE — SUBJECTIVE & OBJECTIVE
Interval History:  He says he is tired today.  No other complaints.  Ate well for breakfast.    Review of Systems   Constitutional:  Negative for chills and fever.   Respiratory:  Negative for cough and shortness of breath.    Cardiovascular:  Negative for chest pain and palpitations.   Gastrointestinal:  Negative for abdominal pain, nausea and vomiting.   Objective:     Vital Signs (Most Recent):  Temp: 97.9 °F (36.6 °C) (04/10/22 1616)  Pulse: 90 (04/10/22 1616)  Resp: 16 (04/10/22 1616)  BP: 109/64 (04/10/22 1616)  SpO2: 95 % (04/10/22 1616) Vital Signs (24h Range):  Temp:  [96.7 °F (35.9 °C)-98.6 °F (37 °C)] 97.9 °F (36.6 °C)  Pulse:  [] 90  Resp:  [16-20] 16  SpO2:  [94 %-100 %] 95 %  BP: (109-127)/(64-77) 109/64     Weight: 66.2 kg (145 lb 15.1 oz)  Body mass index is 19.79 kg/m².    Intake/Output Summary (Last 24 hours) at 4/10/2022 1657  Last data filed at 4/10/2022 0600  Gross per 24 hour   Intake 360 ml   Output 1075 ml   Net -715 ml      Physical Exam  Constitutional:       General: He is not in acute distress.     Comments: Thin   Cardiovascular:      Rate and Rhythm: Normal rate and regular rhythm.   Pulmonary:      Effort: Pulmonary effort is normal.      Breath sounds: Normal breath sounds.   Abdominal:      General: Bowel sounds are normal.      Palpations: Abdomen is soft.   Skin:     General: Skin is warm and dry.   Neurological:      Mental Status: He is alert.      Comments: Not oriented to time.       Significant Labs: All pertinent labs within the past 24 hours have been reviewed.    Significant Imaging: I have reviewed all pertinent imaging results/findings within the past 24 hours.

## 2022-04-11 ENCOUNTER — PATIENT OUTREACH (OUTPATIENT)
Dept: ADMINISTRATIVE | Facility: OTHER | Age: 72
End: 2022-04-11
Payer: MEDICARE

## 2022-04-11 PROBLEM — Z22.7 LTBI (LATENT TUBERCULOSIS INFECTION): Status: ACTIVE | Noted: 2022-04-11

## 2022-04-11 LAB
CREAT SERPL-MCNC: 0.9 MG/DL (ref 0.5–1.4)
ERYTHROCYTE [DISTWIDTH] IN BLOOD BY AUTOMATED COUNT: 13.8 % (ref 11.5–14.5)
EST. GFR  (AFRICAN AMERICAN): >60 ML/MIN/1.73 M^2
EST. GFR  (NON AFRICAN AMERICAN): >60 ML/MIN/1.73 M^2
HCT VFR BLD AUTO: 41.7 % (ref 40–54)
HGB BLD-MCNC: 13.3 G/DL (ref 14–18)
MCH RBC QN AUTO: 25.9 PG (ref 27–31)
MCHC RBC AUTO-ENTMCNC: 31.9 G/DL (ref 32–36)
MCV RBC AUTO: 81 FL (ref 82–98)
PLATELET # BLD AUTO: 264 K/UL (ref 150–450)
PMV BLD AUTO: 9.9 FL (ref 9.2–12.9)
POCT GLUCOSE: 121 MG/DL (ref 70–110)
POCT GLUCOSE: 125 MG/DL (ref 70–110)
POCT GLUCOSE: 139 MG/DL (ref 70–110)
POCT GLUCOSE: 89 MG/DL (ref 70–110)
RBC # BLD AUTO: 5.13 M/UL (ref 4.6–6.2)
WBC # BLD AUTO: 11.75 K/UL (ref 3.9–12.7)

## 2022-04-11 PROCEDURE — 25000003 PHARM REV CODE 250: Performed by: INTERNAL MEDICINE

## 2022-04-11 PROCEDURE — 94761 N-INVAS EAR/PLS OXIMETRY MLT: CPT

## 2022-04-11 PROCEDURE — 99232 SBSQ HOSP IP/OBS MODERATE 35: CPT | Mod: ,,, | Performed by: HOSPITALIST

## 2022-04-11 PROCEDURE — 85027 COMPLETE CBC AUTOMATED: CPT | Performed by: HOSPITALIST

## 2022-04-11 PROCEDURE — 82565 ASSAY OF CREATININE: CPT | Performed by: HOSPITALIST

## 2022-04-11 PROCEDURE — 99222 PR INITIAL HOSPITAL CARE,LEVL II: ICD-10-PCS | Mod: ,,, | Performed by: INTERNAL MEDICINE

## 2022-04-11 PROCEDURE — 99232 PR SUBSEQUENT HOSPITAL CARE,LEVL II: ICD-10-PCS | Mod: ,,, | Performed by: HOSPITALIST

## 2022-04-11 PROCEDURE — 25000003 PHARM REV CODE 250: Performed by: HOSPITALIST

## 2022-04-11 PROCEDURE — 11000001 HC ACUTE MED/SURG PRIVATE ROOM

## 2022-04-11 PROCEDURE — 36415 COLL VENOUS BLD VENIPUNCTURE: CPT | Performed by: HOSPITALIST

## 2022-04-11 PROCEDURE — 86480 TB TEST CELL IMMUN MEASURE: CPT | Performed by: HOSPITALIST

## 2022-04-11 PROCEDURE — 99222 1ST HOSP IP/OBS MODERATE 55: CPT | Mod: ,,, | Performed by: INTERNAL MEDICINE

## 2022-04-11 RX ADMIN — METFORMIN HYDROCHLORIDE 500 MG: 500 TABLET ORAL at 08:04

## 2022-04-11 RX ADMIN — THERA TABS 1 TABLET: TAB at 08:04

## 2022-04-11 RX ADMIN — APIXABAN 5 MG: 2.5 TABLET, FILM COATED ORAL at 08:04

## 2022-04-11 RX ADMIN — TAMSULOSIN HYDROCHLORIDE 0.4 MG: 0.4 CAPSULE ORAL at 09:04

## 2022-04-11 RX ADMIN — THIAMINE HCL TAB 100 MG 100 MG: 100 TAB at 08:04

## 2022-04-11 RX ADMIN — APIXABAN 5 MG: 2.5 TABLET, FILM COATED ORAL at 09:04

## 2022-04-11 RX ADMIN — FLUOXETINE 20 MG: 20 CAPSULE ORAL at 08:04

## 2022-04-11 RX ADMIN — ATORVASTATIN CALCIUM 80 MG: 20 TABLET, FILM COATED ORAL at 08:04

## 2022-04-11 RX ADMIN — INSULIN ASPART 7 UNITS: 100 INJECTION, SOLUTION INTRAVENOUS; SUBCUTANEOUS at 12:04

## 2022-04-11 RX ADMIN — POLYETHYLENE GLYCOL 3350 17 G: 17 POWDER, FOR SOLUTION ORAL at 08:04

## 2022-04-11 RX ADMIN — FOLIC ACID 1 MG: 1 TABLET ORAL at 08:04

## 2022-04-11 RX ADMIN — METFORMIN HYDROCHLORIDE 500 MG: 500 TABLET ORAL at 06:04

## 2022-04-11 RX ADMIN — INSULIN ASPART 7 UNITS: 100 INJECTION, SOLUTION INTRAVENOUS; SUBCUTANEOUS at 08:04

## 2022-04-11 RX ADMIN — INSULIN DETEMIR 30 UNITS: 100 INJECTION, SOLUTION SUBCUTANEOUS at 08:04

## 2022-04-11 NOTE — CONSULTS
[facility] ([place])  Infectious Disease  Consult Note    Patient Name: Forest Lopez  MRN: [id number]  Admission Date: 1/31/2022  Hospital Length of Stay: 70 days  Attending Physician: Mojgan Bosch MD  Primary Care Provider: [facility]     Isolation Status: No active isolations    Patient information was obtained from patient, past medical records and ER records.      Inpatient consult to Infectious Diseases  Consult performed by: Yuri Mtz MD  Consult ordered by: Mojgan Bosch MD        Assessment/Plan:     LTBI (latent tuberculosis infection)  - unclear if PPD is a true positive or not  - last CXR normal and no symptoms of TB  - would await IGRA results and treat for LTBI, if possible, with  mg po daily and pyridoxine 25 mg daily x 6 months    Thank you for your consult. I will sign off. Please contact us if you have any additional questions.    Yuri Mtz MD  Infectious Disease  [facility] ([place])    Subjective:     Principal Problem: Acute stroke due to ischemia    HPI: Mr. Lopez was admitted with a CVA about 70 days ago. He is being transitioned to SNF and ID is consulted for a positive PPD of 15mm. IGRA is pending and CXR is normal (2/21). ID is consulted for positive PPD. The patient denies any known TB exposures. Additionally, he tells me that he had a positive TB skin test years ago in [place] and was told that his skin test would always be positive. He has not taken prophylaxis to his knowledge.       Past Medical History:   Diagnosis Date    Blind left eye     able to see, but poorly    BPH (benign prostatic hyperplasia)     Diabetes mellitus, type 2     Hyperlipidemia     Hypertension     Left rib fracture 03/30/2017    Stroke-like symptoms 09/25/2019    L facial droop, slurred speech & L arm weakness       Past Surgical History:   Procedure Laterality Date    COLONOSCOPY N/A 3/25/2022    Procedure: COLONOSCOPY;  Surgeon: Carlos SAUNDERS  MD [place];  Location: [facility];  Service: Endoscopy;  Laterality: N/A;    NO PAST SURGERIES  09/25/2019       Review of patient's allergies indicates:  No Known Allergies    Medications:  Medications Prior to Admission   Medication Sig    [DISCONTINUED] metformin (GLUCOPHAGE) 500 MG tablet Take 1,000 mg by mouth 2 (two) times daily with meals.     [DISCONTINUED] warfarin (COUMADIN) 7.5 MG tablet Take 7.5 mg by mouth once daily.    albuterol (PROVENTIL/VENTOLIN HFA) 90 mcg/actuation inhaler Inhale 2 puffs into the lungs every 6 (six) hours as needed for Wheezing or Shortness of Breath.    fluticasone-salmeterol diskus inhaler 250-50 mcg Inhale 1 puff into the lungs 2 (two) times daily. Controller    [DISCONTINUED] blood sugar diagnostic (BLOOD GLUCOSE TEST) Strp by Misc.(Non-Drug; Combo Route) route.    [DISCONTINUED] citalopram (CELEXA) 20 MG tablet Take 20 mg by mouth once daily.    [DISCONTINUED] finasteride (PROSCAR) 5 mg tablet Take 1 tablet (5 mg total) by mouth once daily.    [DISCONTINUED] folic acid (FOLVITE) 1 MG tablet Take 1 tablet (1 mg total) by mouth once daily.    [DISCONTINUED] gemfibrozil (LOPID) 600 MG tablet Take 600 mg by mouth 2 (two) times daily before meals.    [DISCONTINUED] glipiZIDE (GLUCOTROL) 10 MG tablet Take 1 tablet (10 mg total) by mouth 2 (two) times daily before meals.    [DISCONTINUED] losartan (COZAAR) 25 MG tablet Take 25 mg by mouth once daily.    [DISCONTINUED] meclizine (ANTIVERT) 25 mg tablet Take 12.5 mg by mouth 2 (two) times daily as needed for Dizziness.    [DISCONTINUED] omeprazole (PRILOSEC) 20 MG capsule TAKE 2 CAPSULES (40 MG) BY MOUTH ONCE DAILY.    [DISCONTINUED] rosuvastatin (CRESTOR) 40 MG Tab Take 10 mg by mouth every evening.    [DISCONTINUED] sildenafiL (VIAGRA) 25 MG tablet Take 25 mg by mouth daily as needed for Erectile Dysfunction.     Antibiotics (From admission, onward)                None          Antifungals (From admission, onward)  "               None          Antivirals (From admission, onward)      None             Immunization History   Administered Date(s) Administered    Influenza - Quadrivalent 10/01/2014, 09/29/2015    PPD Test 02/04/2022, 04/08/2022    Pneumococcal Conjugate - 13 Valent 05/08/2015       Family History       Problem Relation (Age of Onset)    Cancer Sister    Heart disease Mother, Father    No Known Problems Son, Son    Stroke Sister          Social History     Socioeconomic History    Marital status: Single    Number of children: 2   Tobacco Use    Smoking status: Light Tobacco Smoker     Types: Cigarettes, Cigars    Smokeless tobacco: Never Used    Tobacco comment: Smokes 5-6 cigarettes/day   Substance and Sexual Activity    Alcohol use: Yes     Alcohol/week: 0.0 standard drinks     Comment: drinks beer socially     Drug use: Not Currently     Types: Marijuana, "Crack" cocaine    Sexual activity: Not Currently     Social Determinants of Health     Financial Resource Strain: High Risk    Difficulty of Paying Living Expenses: Hard   Food Insecurity: No Food Insecurity    Worried About Running Out of Food in the Last Year: Never true    Ran Out of Food in the Last Year: Never true   Transportation Needs: No Transportation Needs    Lack of Transportation (Medical): No    Lack of Transportation (Non-Medical): No   Physical Activity: Inactive    Days of Exercise per Week: 0 days    Minutes of Exercise per Session: 0 min   Stress: No Stress Concern Present    Feeling of Stress : Not at all   Social Connections: Socially Isolated    Frequency of Communication with Friends and Family: Twice a week    Frequency of Social Gatherings with Friends and Family: Never    Attends Alevism Services: Never    Active Member of Clubs or Organizations: No    Attends Club or Organization Meetings: Never    Marital Status:    Housing Stability: High Risk    Unable to Pay for Housing in the Last Year: Yes "    Number of Places Lived in the Last Year: 2    Unstable Housing in the Last Year: Yes     Review of Systems   Constitutional:  Negative for diaphoresis, fever and unexpected weight change.   Respiratory:  Negative for cough and shortness of breath.    All other systems reviewed and are negative.  Objective:     Vital Signs (Most Recent):  Temp: 97.8 °F (36.6 °C) (04/11/22 0724)  Pulse: 76 (04/11/22 0724)  Resp: 16 (04/11/22 0724)  BP: 123/61 (04/11/22 0724)  SpO2: 97 % (04/11/22 0724) Vital Signs (24h Range):  Temp:  [97.8 °F (36.6 °C)-98.4 °F (36.9 °C)] 97.8 °F (36.6 °C)  Pulse:  [] 76  Resp:  [14-20] 16  SpO2:  [94 %-97 %] 97 %  BP: (102-137)/(61-76) 123/61     Weight: 66.2 kg (145 lb 15.1 oz)  Body mass index is 19.79 kg/m².    CrCl cannot be calculated (Patient's most recent lab result is older than the maximum 7 days allowed.).    Physical Exam  Vitals and nursing note reviewed.   Constitutional:       General: He is not in acute distress.     Appearance: Normal appearance. He is not ill-appearing, toxic-appearing or diaphoretic.   HENT:      Head: Normocephalic and atraumatic.      Right Ear: External ear normal.      Left Ear: External ear normal.   Eyes:      Extraocular Movements: Extraocular movements intact.      Pupils: Pupils are equal, round, and reactive to light.   Cardiovascular:      Rate and Rhythm: Normal rate.   Pulmonary:      Effort: Pulmonary effort is normal.      Breath sounds: Normal breath sounds.   Abdominal:      Tenderness: There is no abdominal tenderness.   Neurological:      General: No focal deficit present.      Mental Status: He is alert and oriented to person, place, and time.   Psychiatric:         Mood and Affect: Mood normal.         Behavior: Behavior normal.         Thought Content: Thought content normal.         Judgment: Judgment normal.       Significant Labs: CBC:   Recent Labs   Lab 04/11/22  0515   WBC 11.75   HGB 13.3*   HCT 41.7        CMP: No  results for input(s): NA, K, CL, CO2, GLU, BUN, CREATININE, CALCIUM, PROT, ALBUMIN, BILITOT, ALKPHOS, AST, ALT, ANIONGAP, EGFRNONAA in the last 48 hours.    Invalid input(s): ESTGFAFRICA  Quantiferon: No results for input(s): NIL, TBAG, TBAGNIL, MITOGENNIL, TBGOLD in the last 48 hours.    Significant Imaging: I have reviewed all pertinent imaging results/findings within the past 24 hours.

## 2022-04-11 NOTE — ASSESSMENT & PLAN NOTE
- MRI showed areas of diffusion signal hyperintensity consistent with areas of acute ischemia/infarct involving the left cerebral hemisphere, the most prominent measuring approximately 1.4 cm, also a small focus of the right frontal lobe.  - Patient on full dose anticoagulation currently due to acute bilateral DVT.  - Sinus rhythm noted throughout hospital stay  - Risk factor modification - control diabetes, continue statin.  - RPR, HIV non reactive.  B12 low normal.   - CTA showed a focal segment of 60-70% stenosis involving the proximal to mid left vertebral artery that was new when compared to the prior study of 09/25/2019.  No evidence of large vessel intracranial occlusion.   - Neurology noted symptoms do not correspond to stroke location, although the frontal lobe stroke might have something to do with his lack of motivation, and Speech has noted he has had delayed swallowing.  - Echo with bubble study done, no shunt seen.  - Follow up with vascular neurology in 4 weeks.  - Therapy recommended rehab initially but changed recommendation to SNF due to his low interest in participation.  - Started Lexapro due to suspicion for depression.  - Psych consulted, changed to Prozac and started thiamine, folic acid, MVI due to previous history of alcohol abuse, although Wernicke encephalopathy is not suspected.  - Re-consulted SNF for rehab from the stroke, as discharging him to a shelter would be inappropriate and unsafe for him.  Discussed with his son, Forest, at length on 3/10.  Forest is out of town on a job but will be returning to [place] eventually.  He agrees his father will eventually need nursing home care as he is unable to care for himself.  - awaiting placement.

## 2022-04-11 NOTE — PLAN OF CARE
AAOx4. POC reviewed. No significant events this shift.  Pt voids and shifts in bed with assist x1. Bed low and locked, side rails up x3, [employer] camera in room, call light within reach.    Problem: Adult Inpatient Plan of Care  Goal: Plan of Care Review  Outcome: Ongoing, Progressing  Goal: Patient-Specific Goal (Individualized)  Outcome: Ongoing, Progressing  Goal: Absence of Hospital-Acquired Illness or Injury  Outcome: Ongoing, Progressing  Goal: Optimal Comfort and Wellbeing  Outcome: Ongoing, Progressing     Problem: Diabetes Comorbidity  Goal: Blood Glucose Level Within Targeted Range  Outcome: Ongoing, Progressing     Problem: Skin Injury Risk Increased  Goal: Skin Health and Integrity  Outcome: Ongoing, Progressing     Problem: Coping Ineffective  Goal: Effective Coping  Outcome: Ongoing, Progressing     Problem: Fall Injury Risk  Goal: Absence of Fall and Fall-Related Injury  Outcome: Ongoing, Progressing     Problem: Adjustment to Illness (Sepsis/Septic Shock)  Goal: Optimal Coping  Outcome: Ongoing, Progressing     Problem: Bleeding (Sepsis/Septic Shock)  Goal: Absence of Bleeding  Outcome: Ongoing, Progressing     Problem: Glycemic Control Impaired (Sepsis/Septic Shock)  Goal: Blood Glucose Level Within Desired Range  Outcome: Ongoing, Progressing     Problem: Infection Progression (Sepsis/Septic Shock)  Goal: Absence of Infection Signs and Symptoms  Outcome: Ongoing, Progressing     Problem: Nutrition Impaired (Sepsis/Septic Shock)  Goal: Optimal Nutrition Intake  Outcome: Ongoing, Progressing     Problem: Infection  Goal: Absence of Infection Signs and Symptoms  Outcome: Ongoing, Progressing     Problem: Spiritual Distress Risk or Actual  Goal: Spiritual Wellbeing  Outcome: Ongoing, Progressing

## 2022-04-11 NOTE — HPI
Mr. Lopez was admitted with a CVA about 70 days ago. He is being transitioned to SNF and ID is consulted for a positive PPD of 15mm. IGRA is pending and CXR is normal (2/21). ID is consulted for positive PPD. The patient denies any known TB exposures. Additionally, he tells me that he had a positive TB skin test years ago in [place] and was told that his skin test would always be positive. He has not taken prophylaxis to his knowledge.

## 2022-04-11 NOTE — ASSESSMENT & PLAN NOTE
See ID note.  Patient had positive PPD 2 years ago and was not treated.  PPD this admission is also positive.  He has been afebrile and has no cough or other TB symptoms.  CXR repeated and was normal.  Clinically he does not have TB.  Quantiferon gold has been ordered to see if he has latent TB.  Discussed with ID, if quantiferon gold is positive would treat for latent TB with INH and pyridoxine for 6 months.  If negative then no treatment is needed.

## 2022-04-11 NOTE — PLAN OF CARE
Pt in bed, no noted acute distress, no complaints of pain, AAO x 3, up with x 1 assist to restroom, x 2 BM during shift, blood glucose monitored, no supplemental insulin given, condom cath in place, no injuries or falls during shift, AVASYS in place, bed in low locked position, call light in reach, hourly rounds complete, will continue to assess

## 2022-04-11 NOTE — PROGRESS NOTES
[facility] Medicine  Progress Note    Patient Name: Forest Lopez  MRN: [id number]  Patient Class: IP- Inpatient   Admission Date: 1/31/2022  Length of Stay: 70 days  Attending Physician: Mojgan Bosch MD  Primary Care Provider: Julian Escobar        Subjective:     Principal Problem:Acute stroke due to ischemia        HPI:  Mr. Lopez is a 72 year old man who presented after a syncopal episode.  He reports he had been feeling well prior to the episode but then had a prodrome prior to passing out.  EMS was called, report patient's BP was low normal on their arrival, improved after an IV fluids bolus.  Patient denies chest pain or shortness of breath and says he does not feel weak currently although is rather tired.  When seen in the ED this morning he could barely express himself audibly.     Vital signs were normal on presentation here.  He is on warfarin for recurrent DVT, but his INR was 1, and d-dimer markedly elevated at 21.7.  Tox screen was positive for cocaine.  He had a CTA of the brain and neck done on presentation so CTA of the chest for PE study could not be done for 48 hours.  Ultrasound of the lower extremities showed extensive bilateral DVT.  On the right there was thrombosis of the common femoral vein, femoral vein, popliteal vein, one of the paired posterior tibial veins and both visualized peroneal veins.  On the left there was thrombosis of the common femoral vein, femoral vein and popliteal vein.  Patient was started on full dose Lovenox and admitted.    Medical history is from the chart as he is unable to give me much information.  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  He smokes 5-6 cigarettes/day and is not interested in quitting.  He is currently homeless and staying with a friend.  Despite the normal INR he is sure  he is taking his warfarin and is not having difficulty taking his medications.  I discussed his care with [facility] staff on the [facility] and patient had been lost to follow up since November 2021.      Overview/Hospital Course:  Patient presented to the ED after a syncopal episode and was found to have bilateral lower extremity DVTs and a low INR.  Tox screen was positive for cocaine.  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  He was started back on his warfarin with bridging Lovenox.  His 2D echo showed grade I diastolic dysfunction.  Neurology evaluated him and recommended echo with bubble study, which was negative for a shunt.    PT/OT evaluated him and recommended SNF placement versus inpatient rehab.  His INR was difficult to get therapeutic, and as he had no contraindication to a NOAC his warfarin was changed to apixaban, and the full dose Lovenox was discontinued.  As he appeared to be depressed and had no objection to starting an antidepressant Lexapro was started.  He had a fall in the hospital on 2/13, had gone to the bathroom for a BM with the nurse, and when she turned away while he was washing his hands he apparently lost his balance and fell.  He did not hit his head and did not lose consciousness, but his BP was low transiently and he was given a bolus of IV fluids.    Patient's mental status improved slowly, but he gradually became more talkative and was able to hold a conversation.  He was diagnosed with a polymicrobial UTI on 2/21 and completed treatment with antibiotics.  Psychiatry evaluated him on 3/3 and changed his antidepressant to Prozac as it can be more activating and patient was having difficulty with his level of motivation.      Patient had some episodes of nausea and vomiting and CT of the abdomen was done, with incidental finding of rectal wall thickening noted.  Colonoscopy was recommended,  but he was unable to finish the prep.  GI evaluated him and decided the patient would benefit from a flex sig to evaluate the rectum.  He had the procedure on 3/25 and no abnormalities were seen other than internal hemorrhoids.    Patient's discharge was delayed as he was not accepted by any SNF facilities in the area.  Reasons for the denials are unclear as he has a clear rehab diagnosis.  He has a history of drug use but has not shown any interest in getting any illicit or legal drugs since he has been here, and he has had no visitors that could have provided drugs to him.  He has been pleasant and cooperative while here although lacks motivation, likely due to the nature of the stroke affecting his frontal lobe.  He requires considerable encouragement to participate in therapy.  Discharging him to live on the street or shelter would not be appropriate as he would not be able to take care of himself.  At this point he has agreed to halfway nursing care, as his stroke was more than 2 months ago and he has already had sufficient rehab while here in the hospital.  Discharge was planned for 4/8/22, but was delayed due to him not having access to his own bank account.  Hopefully he can be transferred to nursing home as soon as financial situation is straightened out.      Interval History:  No complaints.  We discussed his PPD; he tells me he had a positive test 2 years ago and was told not to get another.  He is asymptomatic - no fever or cough.  CXR showed normal pulmonary vasculature.    Review of Systems   Constitutional:  Negative for chills and fever.   Respiratory:  Negative for cough and shortness of breath.    Cardiovascular:  Negative for chest pain and palpitations.   Gastrointestinal:  Negative for abdominal pain, nausea and vomiting.   Objective:     Vital Signs (Most Recent):  Temp: 98.3 °F (36.8 °C) (04/11/22 1613)  Pulse: 90 (04/11/22 1613)  Resp: 20 (04/11/22 1613)  BP: 109/62 (04/11/22 1613)  SpO2:  95 % (04/11/22 1613) Vital Signs (24h Range):  Temp:  [97.8 °F (36.6 °C)-98.5 °F (36.9 °C)] 98.3 °F (36.8 °C)  Pulse:  [] 90  Resp:  [14-20] 20  SpO2:  [94 %-99 %] 95 %  BP: (102-137)/(61-76) 109/62     Weight: 66.2 kg (145 lb 15.1 oz)  Body mass index is 19.79 kg/m².    Intake/Output Summary (Last 24 hours) at 4/11/2022 1637  Last data filed at 4/11/2022 1500  Gross per 24 hour   Intake 480 ml   Output 1227 ml   Net -747 ml      Physical Exam  Constitutional:       General: He is not in acute distress.     Comments: Thin   Cardiovascular:      Rate and Rhythm: Normal rate and regular rhythm.   Pulmonary:      Effort: Pulmonary effort is normal.      Breath sounds: Normal breath sounds.   Abdominal:      General: Bowel sounds are normal.      Palpations: Abdomen is soft.   Skin:     General: Skin is warm and dry.   Neurological:      Mental Status: He is alert.      Comments: Not oriented to time.       Significant Labs: All pertinent labs within the past 24 hours have been reviewed.    Significant Imaging: I have reviewed all pertinent imaging results/findings within the past 24 hours.      Assessment/Plan:      * Acute stroke due to ischemia  - MRI showed areas of diffusion signal hyperintensity consistent with areas of acute ischemia/infarct involving the left cerebral hemisphere, the most prominent measuring approximately 1.4 cm, also a small focus of the right frontal lobe.  - Patient on full dose anticoagulation currently due to acute bilateral DVT.  - Sinus rhythm noted throughout hospital stay  - Risk factor modification - control diabetes, continue statin.  - RPR, HIV non reactive.  B12 low normal.   - CTA showed a focal segment of 60-70% stenosis involving the proximal to mid left vertebral artery that was new when compared to the prior study of 09/25/2019.  No evidence of large vessel intracranial occlusion.   - Neurology noted symptoms do not correspond to stroke location, although the frontal lobe  stroke might have something to do with his lack of motivation, and Speech has noted he has had delayed swallowing.  - Echo with bubble study done, no shunt seen.  - Follow up with vascular neurology in 4 weeks.  - Therapy recommended rehab initially but changed recommendation to SNF due to his low interest in participation.  - Started Lexapro due to suspicion for depression.  - Psych consulted, changed to Prozac and started thiamine, folic acid, MVI due to previous history of alcohol abuse, although Wernicke encephalopathy is not suspected.  - Re-consulted SNF for rehab from the stroke, as discharging him to a shelter would be inappropriate and unsafe for him.  Discussed with his son, Forest, at length on 3/10.  Forest is out of town on a job but will be returning to [place] eventually.  He agrees his father will eventually need nursing home care as he is unable to care for himself.  - awaiting placement.      Acute deep vein thrombosis (DVT) of both femoral veins  Last ultrasound showed partially occlusive DVTs, now completely occlusive DVT of multiple lower extremity veins on ultrasound.  Patient reports compliance with warfarin but his INR is only one.  He is homeless but says he does not have difficulty obtaining his medications.  Does not seem to care about anything, which again might be due to the frontal lobe stroke.  D-dimer was markedly elevated on presentation and there was a question of possible PE, but he had a CTA of the brain/neck on presentation so could not get another CTA for another 48 hours.    V/Q scan was done, showed low probability for PE.  2D echo showed grade I diastolic dysfunction.  Warfarin was restarted with bridging Lovenox, but INR was still low.  Changed to apixaban and discontinued Lovenox.    LTBI (latent tuberculosis infection)  See ID note.  Patient had positive PPD 2 years ago and was not treated.  PPD this admission is also positive.  He has been afebrile and has no cough or  other TB symptoms.  CXR repeated and was normal.  Clinically he does not have TB.  Quantiferon gold has been ordered to see if he has latent TB.  Discussed with ID, if quantiferon gold is positive would treat for latent TB with INH and pyridoxine for 6 months.  If negative then no treatment is needed.        Severe protein-calorie malnutrition  Diet as tolerated  Patient seen by dietician, recommendations made.      Debility  PT/OT recommending rehab vs SNF after stroke with debility/poor motivation  Having difficulty finding placement for unclear reasons.  Will continue to pursue this as above.  Again, patient has been pleasant and cooperative.      Type 2 diabetes mellitus with hyperglycemia, without long-term current use of insulin  Reportedly he is on metformin and glipizide, both held.  He had 4+ sugar in urine and HbA1c was 10.3, and he was hyperglycemic on presentation.  Started on Levemir and ISS, then increased Levemir to 25 units daily  Added scheduled novolog 5 units tid, increased to 7 units with improvement  OK to resume metformin.    Increased levemir to 28 units daily with good improvement.  Held metformin with contrast with CT.  BG lower now off metformin, has had a few low BG as well due to being on clear liquids for colonoscopy prep.  Levemir decreased to 20 units daily.    As expected BG increased when diet was resumed so increased Levemir back to 28, will increase to 30 units as sugars on higher side  Metformin discontinued while he was NPO but now may be resumed.    Hyperlipidemia  Continue atorvastatin status post stroke.      VTE Risk Mitigation (From admission, onward)         Ordered     apixaban tablet 5 mg  2 times daily         03/29/22 1555     Place sequential compression device  Until discontinued         01/31/22 0533                Discharge Planning   EFRAÍN: 4/11/2022     Code Status: Full Code   Is the patient medically ready for discharge?:     Reason for patient still in hospital  (select all that apply): Pending disposition  Discharge Plan A: Skilled Nursing Facility   Discharge Delays: (!) Post-Acute Set-up              Mojgan Meza MD  Department of Hospital Medicine   [facility], [place], operated by [facility] - [facility] ([place])

## 2022-04-11 NOTE — ASSESSMENT & PLAN NOTE
See ID note.  Patient had positive PPD 2 years ago and was not treated.  PPD this admission is also positive.  He has been afebrile and has no cough or other TB symptoms.  CXR repeated and was normal.

## 2022-04-11 NOTE — ASSESSMENT & PLAN NOTE
- unclear if PPD is a true positive or not  - last CXR normal and no symptoms of TB  - would await IGRA results and treat for LTBI, if possible, with  mg po daily and pyridoxine 25 mg daily x 6 months

## 2022-04-11 NOTE — SUBJECTIVE & OBJECTIVE
Past Medical History:   Diagnosis Date    Blind left eye     able to see, but poorly    BPH (benign prostatic hyperplasia)     Diabetes mellitus, type 2     Hyperlipidemia     Hypertension     Left rib fracture 03/30/2017    Stroke-like symptoms 09/25/2019    L facial droop, slurred speech & L arm weakness       Past Surgical History:   Procedure Laterality Date    COLONOSCOPY N/A 3/25/2022    Procedure: COLONOSCOPY;  Surgeon: Carlos Uriarte MD;  Location: [facility];  Service: Endoscopy;  Laterality: N/A;    NO PAST SURGERIES  09/25/2019       Review of patient's allergies indicates:  No Known Allergies    Medications:  Medications Prior to Admission   Medication Sig    [DISCONTINUED] metformin (GLUCOPHAGE) 500 MG tablet Take 1,000 mg by mouth 2 (two) times daily with meals.     [DISCONTINUED] warfarin (COUMADIN) 7.5 MG tablet Take 7.5 mg by mouth once daily.    albuterol (PROVENTIL/VENTOLIN HFA) 90 mcg/actuation inhaler Inhale 2 puffs into the lungs every 6 (six) hours as needed for Wheezing or Shortness of Breath.    fluticasone-salmeterol diskus inhaler 250-50 mcg Inhale 1 puff into the lungs 2 (two) times daily. Controller    [DISCONTINUED] blood sugar diagnostic (BLOOD GLUCOSE TEST) Strp by Misc.(Non-Drug; Combo Route) route.    [DISCONTINUED] citalopram (CELEXA) 20 MG tablet Take 20 mg by mouth once daily.    [DISCONTINUED] finasteride (PROSCAR) 5 mg tablet Take 1 tablet (5 mg total) by mouth once daily.    [DISCONTINUED] folic acid (FOLVITE) 1 MG tablet Take 1 tablet (1 mg total) by mouth once daily.    [DISCONTINUED] gemfibrozil (LOPID) 600 MG tablet Take 600 mg by mouth 2 (two) times daily before meals.    [DISCONTINUED] glipiZIDE (GLUCOTROL) 10 MG tablet Take 1 tablet (10 mg total) by mouth 2 (two) times daily before meals.    [DISCONTINUED] losartan (COZAAR) 25 MG tablet Take 25 mg by mouth once daily.    [DISCONTINUED] meclizine (ANTIVERT) 25 mg tablet Take 12.5 mg by mouth 2 (two) times daily as  "needed for Dizziness.    [DISCONTINUED] omeprazole (PRILOSEC) 20 MG capsule TAKE 2 CAPSULES (40 MG) BY MOUTH ONCE DAILY.    [DISCONTINUED] rosuvastatin (CRESTOR) 40 MG Tab Take 10 mg by mouth every evening.    [DISCONTINUED] sildenafiL (VIAGRA) 25 MG tablet Take 25 mg by mouth daily as needed for Erectile Dysfunction.     Antibiotics (From admission, onward)                None          Antifungals (From admission, onward)                None          Antivirals (From admission, onward)      None             Immunization History   Administered Date(s) Administered    Influenza - Quadrivalent 10/01/2014, 09/29/2015    PPD Test 02/04/2022, 04/08/2022    Pneumococcal Conjugate - 13 Valent 05/08/2015       Family History       Problem Relation (Age of Onset)    Cancer Sister    Heart disease Mother, Father    No Known Problems Son, Son    Stroke Sister          Social History     Socioeconomic History    Marital status: Single    Number of children: 2   Tobacco Use    Smoking status: Light Tobacco Smoker     Types: Cigarettes, Cigars    Smokeless tobacco: Never Used    Tobacco comment: Smokes 5-6 cigarettes/day   Substance and Sexual Activity    Alcohol use: Yes     Alcohol/week: 0.0 standard drinks     Comment: drinks beer socially     Drug use: Not Currently     Types: Marijuana, "Crack" cocaine    Sexual activity: Not Currently     Social Determinants of Health     Financial Resource Strain: High Risk    Difficulty of Paying Living Expenses: Hard   Food Insecurity: No Food Insecurity    Worried About Running Out of Food in the Last Year: Never true    Ran Out of Food in the Last Year: Never true   Transportation Needs: No Transportation Needs    Lack of Transportation (Medical): No    Lack of Transportation (Non-Medical): No   Physical Activity: Inactive    Days of Exercise per Week: 0 days    Minutes of Exercise per Session: 0 min   Stress: No Stress Concern Present    Feeling of Stress : Not at all   Social " Connections: Socially Isolated    Frequency of Communication with Friends and Family: Twice a week    Frequency of Social Gatherings with Friends and Family: Never    Attends Restoration Services: Never    Active Member of Clubs or Organizations: No    Attends Club or Organization Meetings: Never    Marital Status:    Housing Stability: High Risk    Unable to Pay for Housing in the Last Year: Yes    Number of Places Lived in the Last Year: 2    Unstable Housing in the Last Year: Yes     Review of Systems   Constitutional:  Negative for diaphoresis, fever and unexpected weight change.   Respiratory:  Negative for cough and shortness of breath.    All other systems reviewed and are negative.  Objective:     Vital Signs (Most Recent):  Temp: 97.8 °F (36.6 °C) (04/11/22 0724)  Pulse: 76 (04/11/22 0724)  Resp: 16 (04/11/22 0724)  BP: 123/61 (04/11/22 0724)  SpO2: 97 % (04/11/22 0724) Vital Signs (24h Range):  Temp:  [97.8 °F (36.6 °C)-98.4 °F (36.9 °C)] 97.8 °F (36.6 °C)  Pulse:  [] 76  Resp:  [14-20] 16  SpO2:  [94 %-97 %] 97 %  BP: (102-137)/(61-76) 123/61     Weight: 66.2 kg (145 lb 15.1 oz)  Body mass index is 19.79 kg/m².    CrCl cannot be calculated (Patient's most recent lab result is older than the maximum 7 days allowed.).    Physical Exam  Vitals and nursing note reviewed.   Constitutional:       General: He is not in acute distress.     Appearance: Normal appearance. He is not ill-appearing, toxic-appearing or diaphoretic.   HENT:      Head: Normocephalic and atraumatic.      Right Ear: External ear normal.      Left Ear: External ear normal.   Eyes:      Extraocular Movements: Extraocular movements intact.      Pupils: Pupils are equal, round, and reactive to light.   Cardiovascular:      Rate and Rhythm: Normal rate.   Pulmonary:      Effort: Pulmonary effort is normal.      Breath sounds: Normal breath sounds.   Abdominal:      Tenderness: There is no abdominal tenderness.   Neurological:       General: No focal deficit present.      Mental Status: He is alert and oriented to person, place, and time.   Psychiatric:         Mood and Affect: Mood normal.         Behavior: Behavior normal.         Thought Content: Thought content normal.         Judgment: Judgment normal.       Significant Labs: CBC:   Recent Labs   Lab 04/11/22  0515   WBC 11.75   HGB 13.3*   HCT 41.7        CMP: No results for input(s): NA, K, CL, CO2, GLU, BUN, CREATININE, CALCIUM, PROT, ALBUMIN, BILITOT, ALKPHOS, AST, ALT, ANIONGAP, EGFRNONAA in the last 48 hours.    Invalid input(s): ESTGFAFRICA  Quantiferon: No results for input(s): NIL, TBAG, TBAGNIL, MITOGENNIL, TBGOLD in the last 48 hours.    Significant Imaging: I have reviewed all pertinent imaging results/findings within the past 24 hours.

## 2022-04-11 NOTE — SUBJECTIVE & OBJECTIVE
Interval History:  No complaints.  We discussed his PPD; he tells me he had a positive test 2 years ago and was told not to get another.  He is asymptomatic - no fever or cough.  CXR showed normal pulmonary vasculature.    Review of Systems   Constitutional:  Negative for chills and fever.   Respiratory:  Negative for cough and shortness of breath.    Cardiovascular:  Negative for chest pain and palpitations.   Gastrointestinal:  Negative for abdominal pain, nausea and vomiting.   Objective:     Vital Signs (Most Recent):  Temp: 98.3 °F (36.8 °C) (04/11/22 1613)  Pulse: 90 (04/11/22 1613)  Resp: 20 (04/11/22 1613)  BP: 109/62 (04/11/22 1613)  SpO2: 95 % (04/11/22 1613) Vital Signs (24h Range):  Temp:  [97.8 °F (36.6 °C)-98.5 °F (36.9 °C)] 98.3 °F (36.8 °C)  Pulse:  [] 90  Resp:  [14-20] 20  SpO2:  [94 %-99 %] 95 %  BP: (102-137)/(61-76) 109/62     Weight: 66.2 kg (145 lb 15.1 oz)  Body mass index is 19.79 kg/m².    Intake/Output Summary (Last 24 hours) at 4/11/2022 1637  Last data filed at 4/11/2022 1500  Gross per 24 hour   Intake 480 ml   Output 1227 ml   Net -747 ml      Physical Exam  Constitutional:       General: He is not in acute distress.     Comments: Thin   Cardiovascular:      Rate and Rhythm: Normal rate and regular rhythm.   Pulmonary:      Effort: Pulmonary effort is normal.      Breath sounds: Normal breath sounds.   Abdominal:      General: Bowel sounds are normal.      Palpations: Abdomen is soft.   Skin:     General: Skin is warm and dry.   Neurological:      Mental Status: He is alert.      Comments: Not oriented to time.       Significant Labs: All pertinent labs within the past 24 hours have been reviewed.    Significant Imaging: I have reviewed all pertinent imaging results/findings within the past 24 hours.

## 2022-04-12 ENCOUNTER — PATIENT OUTREACH (OUTPATIENT)
Dept: ADMINISTRATIVE | Facility: OTHER | Age: 72
End: 2022-04-12
Payer: MEDICARE

## 2022-04-12 LAB
POCT GLUCOSE: 120 MG/DL (ref 70–110)
POCT GLUCOSE: 129 MG/DL (ref 70–110)
POCT GLUCOSE: 205 MG/DL (ref 70–110)

## 2022-04-12 PROCEDURE — 25000003 PHARM REV CODE 250: Performed by: INTERNAL MEDICINE

## 2022-04-12 PROCEDURE — 97116 GAIT TRAINING THERAPY: CPT | Mod: CQ

## 2022-04-12 PROCEDURE — 94761 N-INVAS EAR/PLS OXIMETRY MLT: CPT

## 2022-04-12 PROCEDURE — 11000001 HC ACUTE MED/SURG PRIVATE ROOM

## 2022-04-12 PROCEDURE — 99232 SBSQ HOSP IP/OBS MODERATE 35: CPT | Mod: ,,, | Performed by: INTERNAL MEDICINE

## 2022-04-12 PROCEDURE — 92507 TX SP LANG VOICE COMM INDIV: CPT

## 2022-04-12 PROCEDURE — 99232 PR SUBSEQUENT HOSPITAL CARE,LEVL II: ICD-10-PCS | Mod: ,,, | Performed by: INTERNAL MEDICINE

## 2022-04-12 PROCEDURE — 25000003 PHARM REV CODE 250: Performed by: HOSPITALIST

## 2022-04-12 RX ADMIN — APIXABAN 5 MG: 2.5 TABLET, FILM COATED ORAL at 10:04

## 2022-04-12 RX ADMIN — METFORMIN HYDROCHLORIDE 500 MG: 500 TABLET ORAL at 05:04

## 2022-04-12 RX ADMIN — METFORMIN HYDROCHLORIDE 500 MG: 500 TABLET ORAL at 09:04

## 2022-04-12 RX ADMIN — INSULIN ASPART 7 UNITS: 100 INJECTION, SOLUTION INTRAVENOUS; SUBCUTANEOUS at 09:04

## 2022-04-12 RX ADMIN — TAMSULOSIN HYDROCHLORIDE 0.4 MG: 0.4 CAPSULE ORAL at 10:04

## 2022-04-12 RX ADMIN — INSULIN DETEMIR 30 UNITS: 100 INJECTION, SOLUTION SUBCUTANEOUS at 09:04

## 2022-04-12 RX ADMIN — FOLIC ACID 1 MG: 1 TABLET ORAL at 09:04

## 2022-04-12 RX ADMIN — APIXABAN 5 MG: 2.5 TABLET, FILM COATED ORAL at 09:04

## 2022-04-12 RX ADMIN — ATORVASTATIN CALCIUM 80 MG: 20 TABLET, FILM COATED ORAL at 09:04

## 2022-04-12 RX ADMIN — INSULIN ASPART 7 UNITS: 100 INJECTION, SOLUTION INTRAVENOUS; SUBCUTANEOUS at 12:04

## 2022-04-12 RX ADMIN — INSULIN ASPART 7 UNITS: 100 INJECTION, SOLUTION INTRAVENOUS; SUBCUTANEOUS at 05:04

## 2022-04-12 RX ADMIN — THIAMINE HCL TAB 100 MG 100 MG: 100 TAB at 09:04

## 2022-04-12 RX ADMIN — FLUOXETINE 20 MG: 20 CAPSULE ORAL at 09:04

## 2022-04-12 RX ADMIN — POLYETHYLENE GLYCOL 3350 17 G: 17 POWDER, FOR SOLUTION ORAL at 09:04

## 2022-04-12 RX ADMIN — THERA TABS 1 TABLET: TAB at 09:04

## 2022-04-12 NOTE — PT/OT/SLP PROGRESS
"Occupational Therapy      Patient Name:  Forest Lopez   MRN:  [id number]    Attempted to see pt at 14:16.  Upon arrival to room pt sleeping peacefully.  Once awoken by OT pt shook his head "No" stating "Not today."  OT provided encouragement and educated pt on purpose of OT in acute care setting and benefits of participating; however, pt said "no".       MICHELLE Willett  4/12/2022  "

## 2022-04-12 NOTE — PLAN OF CARE
04/11/22 1937   Discharge Reassessment   Assessment Type Discharge Planning Reassessment   Did the patient's condition or plan change since previous assessment? No   Discharge Plan discussed with: Patient   Communicated EFRAÍN with patient/caregiver Yes   Discharge Plan A Skilled Nursing Facility   DME Needed Upon Discharge  none   Discharge Barriers Identified Other (see comments)  (Patient has been delayed by not knowing his financial information. He has been unable to provide financial information that a nursing facility requires.)   Post-Acute Status   Post-Acute Authorization Placement   Post-Acute Placement Status   (Pending availabiliy of financial information.)   Coverage [employer] MANAGED MEDICARE - [employer] SNP (SPECIAL NEEDS PLAN)   Discharge Delays (!) Other  (Waiting for financial information to be available.)

## 2022-04-12 NOTE — PROGRESS NOTES
[facility] Medicine  Progress Note    Patient Name: Forest Lopez  MRN: [id number]  Patient Class: IP- Inpatient   Admission Date: 1/31/2022  Length of Stay: 71 days  Attending Physician: Alma Elizalde MD  Primary Care Provider: Julian Escobar        Subjective:     Principal Problem:Acute stroke due to ischemia        HPI:  Mr. Lopez is a 72 year old man who presented after a syncopal episode.  He reports he had been feeling well prior to the episode but then had a prodrome prior to passing out.  EMS was called, report patient's BP was low normal on their arrival, improved after an IV fluids bolus.  Patient denies chest pain or shortness of breath and says he does not feel weak currently although is rather tired.  When seen in the ED this morning he could barely express himself audibly.     Vital signs were normal on presentation here.  He is on warfarin for recurrent DVT, but his INR was 1, and d-dimer markedly elevated at 21.7.  Tox screen was positive for cocaine.  He had a CTA of the brain and neck done on presentation so CTA of the chest for PE study could not be done for 48 hours.  Ultrasound of the lower extremities showed extensive bilateral DVT.  On the right there was thrombosis of the common femoral vein, femoral vein, popliteal vein, one of the paired posterior tibial veins and both visualized peroneal veins.  On the left there was thrombosis of the common femoral vein, femoral vein and popliteal vein.  Patient was started on full dose Lovenox and admitted.    Medical history is from the chart as he is unable to give me much information.  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  He smokes 5-6 cigarettes/day and is not interested in quitting.  He is currently homeless and staying with a friend.  Despite the normal INR he is sure he  is taking his warfarin and is not having difficulty taking his medications.  I discussed his care with [facility] [place] [facility] [place] had been lost to follow up since November 2021.      Overview/Hospital Course:  Patient presented to the ED after a syncopal episode and was found to have bilateral lower extremity DVTs and a low INR.  Tox screen was positive for cocaine.  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  He was started back on his warfarin with bridging Lovenox.  His 2D echo showed grade I diastolic dysfunction.  Neurology evaluated him and recommended echo with bubble study, which was negative for a shunt.    PT/OT evaluated him and recommended SNF placement versus inpatient rehab.  His INR was difficult to get therapeutic, and as he had no contraindication to a NOAC his warfarin was changed to apixaban, and the full dose Lovenox was discontinued.  As he appeared to be depressed and had no objection to starting an antidepressant Lexapro was started.  He had a fall in the hospital on 2/13, had gone to the bathroom for a BM with the nurse, and when she turned away while he was washing his hands he apparently lost his balance and fell.  He did not hit his head and did not lose consciousness, but his BP was low transiently and he was given a bolus of IV fluids.    Patient's mental status improved slowly, but he gradually became more talkative and was able to hold a conversation.  He was diagnosed with a polymicrobial UTI on 2/21 and completed treatment with antibiotics.  Psychiatry evaluated him on 3/3 and changed his antidepressant to Prozac as it can be more activating and patient was having difficulty with his level of motivation.      Patient had some episodes of nausea and vomiting and CT of the abdomen was done, with incidental finding of rectal wall thickening noted.  Colonoscopy was recommended, but  he was unable to finish the prep.  GI evaluated him and decided the patient would benefit from a flex sig to evaluate the rectum.  He had the procedure on 3/25 and no abnormalities were seen other than internal hemorrhoids.    Patient's discharge was delayed as he was not accepted by any SNF facilities in the area.  Reasons for the denials are unclear as he has a clear rehab diagnosis.  He has a history of drug use but has not shown any interest in getting any illicit or legal drugs since he has been here, and he has had no visitors that could have provided drugs to him.  He has been pleasant and cooperative while here although lacks motivation, likely due to the nature of the stroke affecting his frontal lobe.  He requires considerable encouragement to participate in therapy.  Discharging him to live on the street or shelter would not be appropriate as he would not be able to take care of himself.  At this point he has agreed to halfway nursing care, as his stroke was more than 2 months ago and he has already had sufficient rehab while here in the hospital.  Discharge was planned for 4/8/22, but was delayed due to him not having access to his own bank account.  Hopefully he can be transferred to nursing home as soon as financial situation is straightened out.      Interval History: Tolerating diet, had 1 loose bm today.    Review of Systems   Constitutional:  Negative for chills and fever.   HENT:  Negative for trouble swallowing.    Respiratory:  Negative for cough and shortness of breath.    Cardiovascular:  Negative for chest pain and leg swelling.   Gastrointestinal:  Negative for abdominal pain, blood in stool, nausea and vomiting.   Genitourinary:  Negative for dysuria and hematuria.   Neurological:  Negative for headaches.   Objective:     Vital Signs (Most Recent):  Temp: 97.6 °F (36.4 °C) (04/12/22 1150)  Pulse: 95 (04/12/22 1150)  Resp: 12 (04/12/22 1150)  BP: 117/68 (04/12/22 1150)  SpO2: (!) 94 %  (04/12/22 1150)   Vital Signs (24h Range):  Temp:  [97.6 °F (36.4 °C)-98.1 °F (36.7 °C)] 97.6 °F (36.4 °C)  Pulse:  [85-95] 95  Resp:  [12-18] 12  SpO2:  [94 %-97 %] 94 %  BP: (117-136)/(68-78) 117/68     Weight: 66.2 kg (145 lb 15.1 oz)  Body mass index is 19.79 kg/m².    Intake/Output Summary (Last 24 hours) at 4/12/2022 1647  Last data filed at 4/12/2022 1300  Gross per 24 hour   Intake 240 ml   Output 1225 ml   Net -985 ml      Physical Exam  Vitals reviewed.   Constitutional:       General: He is not in acute distress.     Appearance: He is well-developed.   HENT:      Head: Normocephalic and atraumatic.   Eyes:      Extraocular Movements: Extraocular movements intact.      Pupils: Pupils are equal, round, and reactive to light.   Cardiovascular:      Rate and Rhythm: Normal rate and regular rhythm.   Pulmonary:      Effort: Pulmonary effort is normal. No respiratory distress.      Breath sounds: Normal breath sounds.   Abdominal:      General: Bowel sounds are normal. There is no distension.      Palpations: Abdomen is soft.      Tenderness: There is no abdominal tenderness.   Musculoskeletal:         General: No swelling. Normal range of motion.      Cervical back: Normal range of motion and neck supple.   Skin:     General: Skin is warm.      Findings: No rash.   Neurological:      General: No focal deficit present.      Mental Status: He is alert and oriented to person, place, and time.   Psychiatric:         Behavior: Behavior normal.      Comments: Flat affect, improved from prior       Significant Labs: All pertinent labs within the past 24 hours have been reviewed.    Significant Imaging: I have reviewed all pertinent imaging results/findings within the past 24 hours.      Assessment/Plan:      * Acute stroke due to ischemia  - MRI showed areas of diffusion signal hyperintensity consistent with areas of acute ischemia/infarct involving the left cerebral hemisphere, the most prominent measuring  approximately 1.4 cm, also a small focus of the right frontal lobe.  - Patient on full dose anticoagulation currently due to acute bilateral DVT.  - Sinus rhythm noted throughout hospital stay  - Risk factor modification - control diabetes, continue statin.  - RPR, HIV non reactive.  B12 low normal.   - CTA showed a focal segment of 60-70% stenosis involving the proximal to mid left vertebral artery that was new when compared to the prior study of 09/25/2019.  No evidence of large vessel intracranial occlusion.   - Neurology noted symptoms do not correspond to stroke location, although the frontal lobe stroke might have something to do with his lack of motivation, and Speech has noted he has had delayed swallowing.  - Echo with bubble study done, no shunt seen.  - Follow up with vascular neurology in 4 weeks.  - Therapy recommended rehab initially but changed recommendation to SNF due to his low interest in participation.  - Started Lexapro due to suspicion for depression.  - Psych consulted, changed to Prozac and started thiamine, folic acid, MVI due to previous history of alcohol abuse, although Wernicke encephalopathy is not suspected.  - Re-consulted SNF for rehab from the stroke, as discharging him to a shelter would be inappropriate and unsafe for him.  Discussed with his son, Forest, at length on 3/10.  Forest is out of town on a job but will be returning to [place] eventually.  He agrees his father will eventually need nursing home care as he is unable to care for himself.  - awaiting placement.      LTBI (latent tuberculosis infection)  See ID note.  Patient had positive PPD 2 years ago and was not treated.  PPD this admission is also positive.  He has been afebrile and has no cough or other TB symptoms.  CXR repeated and was normal.  Clinically he does not have TB.  Quantiferon gold has been ordered to see if he has latent TB.  Discussed with ID, if quantiferon gold is positive would treat for latent  TB with INH and pyridoxine for 6 months.  If negative then no treatment is needed.        Severe protein-calorie malnutrition  Diet as tolerated  Patient seen by dietician, recommendations made.      Debility  PT/OT recommending rehab vs SNF after stroke with debility/poor motivation  Having difficulty finding placement for unclear reasons.  Will continue to pursue this as above.  Again, patient has been pleasant and cooperative.      Type 2 diabetes mellitus with hyperglycemia, without long-term current use of insulin  Reportedly he is on metformin and glipizide, both held.  He had 4+ sugar in urine and HbA1c was 10.3, and he was hyperglycemic on presentation.  Started on Levemir and ISS, then increased Levemir to 25 units daily  Added scheduled novolog 5 units tid, increased to 7 units with improvement  OK to resume metformin.    Increased levemir to 28 units daily with good improvement.  Held metformin with contrast with CT.  BG lower now off metformin, has had a few low BG as well due to being on clear liquids for colonoscopy prep.  Levemir decreased to 20 units daily.    As expected BG increased when diet was resumed so increased Levemir back to 28, will increase to 30 units as sugars on higher side  Metformin discontinued while he was NPO but now may be resumed.    Acute deep vein thrombosis (DVT) of both femoral veins  Last ultrasound showed partially occlusive DVTs, now completely occlusive DVT of multiple lower extremity veins on ultrasound.  Patient reports compliance with warfarin but his INR is only one.  He is homeless but says he does not have difficulty obtaining his medications.  Does not seem to care about anything, which again might be due to the frontal lobe stroke.  D-dimer was markedly elevated on presentation and there was a question of possible PE, but he had a CTA of the brain/neck on presentation so could not get another CTA for another 48 hours.    V/Q scan was done, showed low probability  for PE.  2D echo showed grade I diastolic dysfunction.  Warfarin was restarted with bridging Lovenox, but INR was still low.  Changed to apixaban and discontinued Lovenox.    Hyperlipidemia  Continue atorvastatin status post stroke.        VTE Risk Mitigation (From admission, onward)         Ordered     apixaban tablet 5 mg  2 times daily         03/29/22 1555     Place sequential compression device  Until discontinued         01/31/22 0533                Discharge Planning   EFRAÍN: 4/11/2022     Code Status: Full Code   Is the patient medically ready for discharge?:     Reason for patient still in hospital (select all that apply): Pending disposition  Discharge Plan A: Skilled Nursing Facility   Discharge Delays: (!) Other (Waiting for financial information to be available.)              Alma Elizalde MD  Department of Hospital Medicine   [facility] Med Surg ([place])

## 2022-04-12 NOTE — PROGRESS NOTES
SHANICE contacted Gali at [facility] to check if there was any new information on her end regarding the patient's financials or admission.  She stated that there was not at this time.  SHANICE called and left a message for Bettina at [facility]. She had assisted the CM dept. with obtaining information for a patient who was unable to access his own bank accounts in the past.  Stan stated that she maybe be able to help Mr. Lopez with this issue so that is admission to [facility] may move forward.

## 2022-04-12 NOTE — PROGRESS NOTES
SHANICE attempted to reach Ms. Owen again this afternoon, but had to leave a message. No change to the D/c plan of Mr. Lopez, but financial hold up not resolved yet. SHANICE did call [facility] [place], and spoke with Alie. SHANICE will fax information to the facility as there are beds available there.

## 2022-04-12 NOTE — PT/OT/SLP PROGRESS
Speech Language Pathology Treatment    Patient Name:  Forest Lopez   MRN:  [id number]  Admitting Diagnosis: Acute stroke due to ischemia    Recommendations:                  General Recommendations:   1. Speech pathology to continue to follow 2-3x/week for ongoing assessment of cognitive-communicative deficits s/p acute infarcts of L cerebral hemisphere     Diet recommendations: Solids: Mechanical soft solids, Liquids: Thin      Aspiration Precautions: Eliminate distractions, Feed only when awake/alert, Frequent oral care and HOB to 90 degrees      General Precautions: Standard,       Communication strategies:  Reduce informational content/context; frequent repetition and cues to redirect    Subjective     · Pt awake/alert, agreeable to SLP session.     Respiratory Status: Room air    Objective:     Has the patient been evaluated by SLP for swallowing?   Yes  Keep patient NPO? No   Current Respiratory Status:    Room air    Cognitive-Communicative and Language Status:  Pt agreeable to participate in session this date. Improving participation and motivation. SLP assisted in repositioned pt on EOB. Pt requesting to play cards. Targeting executive functioning skills of initiation, planning, and sequencing, pt participated in card game of his choice. Pt able to accurately describe game and sequence directions of game provided min assist. Notable improvement in initiation during task. Initiated appropriate conversation during session. Able to respond to open-ended questions with appropriate response time. Required min incr time for processing. Pt able to sustain attention throughout session without distraction.     Pt's breakfast delivered during session. SLP assisted in setting-up of tray. Pt remained EOB for feeding.       Education: Pt would benefit from ongoing SLP services at next level of care.     Assessment:     Forest Lopez is a 72 y.o. male with an SLP diagnosis of Cognitive-Linguistic Impairment secondary to  acute L cerebral hemisphere infarct and prior hx of stroke in 2019.    Goals:   Multidisciplinary Problems     SLP Goals        Problem: SLP Goal    Goal Priority Disciplines Outcome   SLP Goal     SLP Ongoing, Progressing   Description: 1. Pt will consume a regular/thin diet without overt s/s of aspiration or airway threat without assistance.  2. Pt will increase orientation to person, place, situation and date with 90% acc given min assist.  3. Pt will follow 3-4 step commands to increase functional IND with 75% acc given min assist.   4. Pt will complete immediate and delayed recall tasks with 75% acc given mod assist.  5. Targeting word finding, pt will complete divergent naming tasks given 1-minute time frame with 50% acc given mod assist.  6. Ongoing cognitive-communicative assessment.     Updated Goals 2/8:  7. Pt will sustain attention to topic/task without distraction in 5-10 minute increments given mod verbal cues.                   Plan:     · Patient to be seen:  2 x/week, 3 x/week   · Plan of Care expires:  03/21/22  · Plan of Care reviewed with:  patient   · SLP Follow-Up:  Yes       Discharge recommendations: IRF vs. Skilled Nursing Facility     Time Tracking:     SLP Treatment Date:   04/12/22  Speech Start Time:  0805  Speech Stop Time:  0835     Speech Total Time (min):  30 min    Billable Minutes: Speech Therapy Individual 30 min    04/12/2022

## 2022-04-12 NOTE — PT/OT/SLP PROGRESS
Physical Therapy Treatment    Patient Name:  Forest Lopez   MRN:  [id number]    Recommendations:     Discharge Recommendations:  nursing facility, skilled, rehabilitation facility, nursing facility, basic   Discharge Equipment Recommendations: bedside commode, shower chair, rollator   Barriers to discharge: None tp NH    Assessment:     Forest Lopez is a 72 y.o. male admitted with a medical diagnosis of Acute stroke due to ischemia.  He presents with the following impairments/functional limitations:  weakness, gait instability, impaired balance, impaired cognition, impaired endurance, impaired functional mobilty, impaired self care skills, decreased coordination, decreased safety awareness.    Supine<>sit with mod(I)  Sit>stand CGA with SPC  Amb ~200' with SPC and CGA, pt with decreased stability and gait speed, no actual LoB  Pt increasing amb distance with cane, but requires assist for safety  Rec SNF vs IRF    Rehab Prognosis: Good; patient would benefit from acute skilled PT services to address these deficits and reach maximum level of function.    Recent Surgery: Procedure(s) (LRB):  COLONOSCOPY (N/A) 18 Days Post-Op    Plan:     During this hospitalization, patient to be seen 3 x/week to address the identified rehab impairments via gait training, therapeutic activities, therapeutic exercises, neuromuscular re-education and progress toward the following goals:    · Plan of Care Expires:  05/05/22    Subjective     Chief Complaint: none stated  Patient/Family Comments/goals: I'm not walking too good today, huh?  Pain/Comfort:  · Pain Rating 1: 0/10  · Pain Rating Post-Intervention 1: 0/10      Objective:     Communicated with nurse Alarcon prior to session.  Patient found HOB elevated with peripheral IV, Condom Catheter (AvSMCpross telesitter) upon PT entry to room.     General Precautions: Standard, aspiration, fall   Orthopedic Precautions:N/A   Braces:    Respiratory Status: Room air     Functional  Mobility:  · Bed Mobility:     · Supine to Sit: modified independence  · Sit to Supine: modified independence  · Transfers:     · Sit to Stand:  contact guard assistance with straight cane  · Gait: ~200' with SPC and CGA, pt with decreased stability and gait speed, no actual LoB      AM-PAC 6 CLICK MOBILITY  Turning over in bed (including adjusting bedclothes, sheets and blankets)?: 4  Sitting down on and standing up from a chair with arms (e.g., wheelchair, bedside commode, etc.): 3  Moving from lying on back to sitting on the side of the bed?: 4  Moving to and from a bed to a chair (including a wheelchair)?: 3  Need to walk in hospital room?: 3  Climbing 3-5 steps with a railing?: 3  Basic Mobility Total Score: 20       Therapeutic Activities and Exercises:   Gait training as noted    Patient left HOB elevated with all lines intact, call button in reach, nurse Agnes notified and Crista telesitter present..    GOALS:   Multidisciplinary Problems     Physical Therapy Goals        Problem: Physical Therapy Goal    Goal Priority Disciplines Outcome Goal Variances Interventions   Physical Therapy Goal     PT, PT/OT Ongoing, Progressing     Description: Goals to be met by: 2022    Patient will increase functional independence with mobility by performin. Sit<>stand with SPV with LRAD.   2. Gait x 300 feet with SPV with rollator.   3. Static standing in SLS with no UE support with SBA x10 sec.   4. Gait x50 ft with cane with SPV.                     Time Tracking:     PT Received On: 22  PT Start Time: 1039     PT Stop Time: 1055  PT Total Time (min): 16 min     Billable Minutes: Gait Training 16    Treatment Type: Treatment  PT/PTA: PTA     PTA Visit Number: 2     2022

## 2022-04-12 NOTE — SUBJECTIVE & OBJECTIVE
Interval History: Tolerating diet, had 1 loose bm today.    Review of Systems   Constitutional:  Negative for chills and fever.   HENT:  Negative for trouble swallowing.    Respiratory:  Negative for cough and shortness of breath.    Cardiovascular:  Negative for chest pain and leg swelling.   Gastrointestinal:  Negative for abdominal pain, blood in stool, nausea and vomiting.   Genitourinary:  Negative for dysuria and hematuria.   Neurological:  Negative for headaches.   Objective:     Vital Signs (Most Recent):  Temp: 97.6 °F (36.4 °C) (04/12/22 1150)  Pulse: 95 (04/12/22 1150)  Resp: 12 (04/12/22 1150)  BP: 117/68 (04/12/22 1150)  SpO2: (!) 94 % (04/12/22 1150)   Vital Signs (24h Range):  Temp:  [97.6 °F (36.4 °C)-98.1 °F (36.7 °C)] 97.6 °F (36.4 °C)  Pulse:  [85-95] 95  Resp:  [12-18] 12  SpO2:  [94 %-97 %] 94 %  BP: (117-136)/(68-78) 117/68     Weight: 66.2 kg (145 lb 15.1 oz)  Body mass index is 19.79 kg/m².    Intake/Output Summary (Last 24 hours) at 4/12/2022 1647  Last data filed at 4/12/2022 1300  Gross per 24 hour   Intake 240 ml   Output 1225 ml   Net -985 ml      Physical Exam  Vitals reviewed.   Constitutional:       General: He is not in acute distress.     Appearance: He is well-developed.   HENT:      Head: Normocephalic and atraumatic.   Eyes:      Extraocular Movements: Extraocular movements intact.      Pupils: Pupils are equal, round, and reactive to light.   Cardiovascular:      Rate and Rhythm: Normal rate and regular rhythm.   Pulmonary:      Effort: Pulmonary effort is normal. No respiratory distress.      Breath sounds: Normal breath sounds.   Abdominal:      General: Bowel sounds are normal. There is no distension.      Palpations: Abdomen is soft.      Tenderness: There is no abdominal tenderness.   Musculoskeletal:         General: No swelling. Normal range of motion.      Cervical back: Normal range of motion and neck supple.   Skin:     General: Skin is warm.      Findings: No rash.    Neurological:      General: No focal deficit present.      Mental Status: He is alert and oriented to person, place, and time.   Psychiatric:         Behavior: Behavior normal.      Comments: Flat affect, improved from prior       Significant Labs: All pertinent labs within the past 24 hours have been reviewed.    Significant Imaging: I have reviewed all pertinent imaging results/findings within the past 24 hours.

## 2022-04-12 NOTE — ASSESSMENT & PLAN NOTE
- MRI showed areas of diffusion signal hyperintensity consistent with areas of acute ischemia/infarct involving the left cerebral hemisphere, the most prominent measuring approximately 1.4 cm, also a small focus of the right frontal lobe.  - Patient on full dose anticoagulation currently due to acute bilateral DVT.  - Sinus rhythm noted throughout hospital stay  - Risk factor modification - control diabetes, continue statin.  - RPR, HIV non reactive.  B12 low normal.   - CTA showed a focal segment of 60-70% stenosis involving the proximal to mid left vertebral artery that was new when compared to the prior study of 09/25/2019.  No evidence of large vessel intracranial occlusion.   - Neurology noted symptoms do not correspond to stroke location, although the frontal lobe stroke might have something to do with his lack of motivation, and Speech has noted he has had delayed swallowing.  - Echo with bubble study done, no shunt seen.  - Follow up with vascular neurology in 4 weeks.  - Therapy recommended rehab initially but changed recommendation to SNF due to his low interest in participation.  - Started Lexapro due to suspicion for depression.  - Psych consulted, changed to Prozac and started thiamine, folic acid, MVI due to previous history of alcohol abuse, although Wernicke encephalopathy is not suspected.  - Re-consulted SNF for rehab from the stroke, as discharging him to a shelter would be inappropriate and unsafe for him.  Discussed with his son, Forest, at length on 3/10.  Forest is out of town on a job but will be returning to [place] eventually.  He agrees his father will eventually need nursing home care as he is unable to care for himself.  - awaiting placement.

## 2022-04-12 NOTE — PROGRESS NOTES
SHANICE contacted Lexie at the State: [phone]. She took Gali's number at [place], and said she would help to work on the admission of the patient from the financial prospective.

## 2022-04-13 ENCOUNTER — PATIENT OUTREACH (OUTPATIENT)
Dept: ADMINISTRATIVE | Facility: OTHER | Age: 72
End: 2022-04-13
Payer: MEDICARE

## 2022-04-13 LAB
GAMMA INTERFERON BACKGROUND BLD IA-ACNC: 0.71 IU/ML
M TB IFN-G CD4+ BCKGRND COR BLD-ACNC: 3.19 IU/ML
MITOGEN IGNF BCKGRD COR BLD-ACNC: 9.29 IU/ML
POCT GLUCOSE: 101 MG/DL (ref 70–110)
POCT GLUCOSE: 121 MG/DL (ref 70–110)
POCT GLUCOSE: 133 MG/DL (ref 70–110)
POCT GLUCOSE: 157 MG/DL (ref 70–110)
POCT GLUCOSE: 157 MG/DL (ref 70–110)
TB GOLD PLUS: POSITIVE
TB2 - NIL: 4.71 IU/ML

## 2022-04-13 PROCEDURE — 94761 N-INVAS EAR/PLS OXIMETRY MLT: CPT

## 2022-04-13 PROCEDURE — 11000001 HC ACUTE MED/SURG PRIVATE ROOM

## 2022-04-13 PROCEDURE — 25000003 PHARM REV CODE 250: Performed by: HOSPITALIST

## 2022-04-13 PROCEDURE — 97535 SELF CARE MNGMENT TRAINING: CPT

## 2022-04-13 PROCEDURE — 25000003 PHARM REV CODE 250: Performed by: INTERNAL MEDICINE

## 2022-04-13 PROCEDURE — 99233 SBSQ HOSP IP/OBS HIGH 50: CPT | Mod: ,,, | Performed by: INTERNAL MEDICINE

## 2022-04-13 PROCEDURE — 99233 PR SUBSEQUENT HOSPITAL CARE,LEVL III: ICD-10-PCS | Mod: ,,, | Performed by: INTERNAL MEDICINE

## 2022-04-13 RX ORDER — ISONIAZID 300 MG/1
300 TABLET ORAL DAILY
Status: DISCONTINUED | OUTPATIENT
Start: 2022-04-13 | End: 2022-04-27 | Stop reason: HOSPADM

## 2022-04-13 RX ORDER — PYRIDOXINE HCL (VITAMIN B6) 25 MG
25 TABLET ORAL DAILY
Status: DISCONTINUED | OUTPATIENT
Start: 2022-04-13 | End: 2022-04-27 | Stop reason: HOSPADM

## 2022-04-13 RX ADMIN — FLUOXETINE 20 MG: 20 CAPSULE ORAL at 09:04

## 2022-04-13 RX ADMIN — POLYETHYLENE GLYCOL 3350 17 G: 17 POWDER, FOR SOLUTION ORAL at 09:04

## 2022-04-13 RX ADMIN — ATORVASTATIN CALCIUM 80 MG: 20 TABLET, FILM COATED ORAL at 09:04

## 2022-04-13 RX ADMIN — INSULIN DETEMIR 30 UNITS: 100 INJECTION, SOLUTION SUBCUTANEOUS at 09:04

## 2022-04-13 RX ADMIN — TAMSULOSIN HYDROCHLORIDE 0.4 MG: 0.4 CAPSULE ORAL at 08:04

## 2022-04-13 RX ADMIN — METFORMIN HYDROCHLORIDE 500 MG: 500 TABLET ORAL at 05:04

## 2022-04-13 RX ADMIN — APIXABAN 5 MG: 2.5 TABLET, FILM COATED ORAL at 09:04

## 2022-04-13 RX ADMIN — INSULIN ASPART 7 UNITS: 100 INJECTION, SOLUTION INTRAVENOUS; SUBCUTANEOUS at 09:04

## 2022-04-13 RX ADMIN — THIAMINE HCL TAB 100 MG 100 MG: 100 TAB at 09:04

## 2022-04-13 RX ADMIN — ISONIAZID 300 MG: 300 TABLET ORAL at 03:04

## 2022-04-13 RX ADMIN — METFORMIN HYDROCHLORIDE 500 MG: 500 TABLET ORAL at 09:04

## 2022-04-13 RX ADMIN — INSULIN ASPART 7 UNITS: 100 INJECTION, SOLUTION INTRAVENOUS; SUBCUTANEOUS at 06:04

## 2022-04-13 RX ADMIN — APIXABAN 5 MG: 2.5 TABLET, FILM COATED ORAL at 08:04

## 2022-04-13 RX ADMIN — Medication 25 MG: at 03:04

## 2022-04-13 RX ADMIN — THERA TABS 1 TABLET: TAB at 09:04

## 2022-04-13 RX ADMIN — FOLIC ACID 1 MG: 1 TABLET ORAL at 09:04

## 2022-04-13 NOTE — PT/OT/SLP PROGRESS
Occupational Therapy   Treatment    Name: Forest Lopez  MRN: [id number]  Admitting Diagnosis:  Acute stroke due to ischemia  19 Days Post-Op    Recommendations:     Discharge Recommendations: nursing facility, skilled, rehabilitation facility  Discharge Equipment Recommendations:  bedside commode, shower chair  Barriers to discharge:  Decreased caregiver support    Assessment:     Forest Lopez is a 72 y.o. male with a medical diagnosis of Acute stroke due to ischemia.  He presents with the following performance deficits affecting function: gait instability, impaired endurance, impaired balance, decreased lower extremity function, decreased safety awareness, impaired self care skills, impaired cognition, impaired functional mobilty.     Patient is progressing towards goals.  Tolerated sponge bath, up to toilet and sink on this date.  No complaints.  Was found soaked in urine.  Continue OT services to maximize patient functioning.     Rehab Prognosis:  Fair; patient would benefit from acute skilled OT services to address these deficits and reach maximum level of function.       Plan:     Patient to be seen 2 x/week to address the above listed problems via self-care/home management, therapeutic activities, therapeutic exercises  · Plan of Care Expires: 04/29/22  · Plan of Care Reviewed with: patient    Subjective     Pain/Comfort:  · Pain Rating 1: 0/10  · Pain Rating Post-Intervention 1: 0/10    Objective:     Communicated with: RN unable, in mtg.  Patient found HOB elevated with peripheral IV (ROYA sys) upon OT entry to room.    General Precautions: Standard, anti-coagulation medicine, aspiration, fall   Orthopedic Precautions:N/A   Braces: N/A  Respiratory Status: Room air     Occupational Performance:     Bed Mobility:    · Patient completed Scooting/Bridging with supervision  · Patient completed Supine to Sit with supervision  · Patient completed Sit to Supine with supervision     Functional  Mobility/Transfers:  · Patient completed Sit <> Stand Transfer with stand by assistance  with  no assistive device   · Functional Mobility: CGA within room without AD    Activities of Daily Living:  · Grooming: stand by assistance in standing at sink washing hands  · Bathing: minimum assistance (B) feet during sponge bath at EOB  · Upper Body Dressing: set up assistance for gown at EOB  · Lower Body Dressing: stand by assistance for doffing/donning non skid socks at EOB  · Toileting: contact guard assistance in standing at toilet, but unable to void      AMPAC 6 Click ADL: 18    Treatment & Education:  Educated on purpose of OT/session.  Verbalized understanding, but will reinforce.    Patient left HOB elevated with all lines intact, call button in reach, and AVAsys presentEducation:   (RN unable, in mtg)    GOALS:   Multidisciplinary Problems     Occupational Therapy Goals        Problem: Occupational Therapy Goal    Goal Priority Disciplines Outcome Interventions   Occupational Therapy Goal     OT, PT/OT Ongoing, Progressing    Description: Goals to be met by: 4/12/2022    Patient will increase functional independence with ADLs by performing:    UE Dressing with [place].  LE Dressing with [place].  Grooming while standing at sink with Supervision.  Toileting from toilet with Supervision for hygiene and clothing management.   Toilet transfer to toilet with Supervision.                     Time Tracking:     OT Date of Treatment: 04/13/22  OT Start Time: 1059  OT Stop Time: 1127  OT Total Time (min): 28 min    Billable Minutes:Self Care/Home Management 28    OT/DUTCH: OT          4/13/2022

## 2022-04-13 NOTE — ASSESSMENT & PLAN NOTE
See ID note.  Patient had positive PPD 2 years ago and was not treated.  PPD this admission is also positive.  He has been afebrile and has no cough or other TB symptoms.  CXR repeated and was normal.  Clinically he does not have TB.  Quantiferon gold has been ordered to see if he has latent TB.  Discussed with ID, if quantiferon gold is positive would treat for latent TB with INH and pyridoxine for 6 months. Tb gold positive, will start  INH and pyridoxine.

## 2022-04-13 NOTE — PLAN OF CARE
Pt AAOx4. No acute events this shift. Pt free from complaints of pain and free from falls. Bed low and locked in place. Call bell and personal items in reach.      Problem: Adult Inpatient Plan of Care  Goal: Plan of Care Review  Outcome: Ongoing, Progressing  Goal: Patient-Specific Goal (Individualized)  Outcome: Ongoing, Progressing  Goal: Absence of Hospital-Acquired Illness or Injury  Outcome: Ongoing, Progressing  Goal: Optimal Comfort and Wellbeing  Outcome: Ongoing, Progressing     Problem: Diabetes Comorbidity  Goal: Blood Glucose Level Within Targeted Range  Outcome: Ongoing, Progressing     Problem: Skin Injury Risk Increased  Goal: Skin Health and Integrity  Outcome: Ongoing, Progressing     Problem: Coping Ineffective  Goal: Effective Coping  Outcome: Ongoing, Progressing     Problem: Fall Injury Risk  Goal: Absence of Fall and Fall-Related Injury  Outcome: Ongoing, Progressing     Problem: Adjustment to Illness (Sepsis/Septic Shock)  Goal: Optimal Coping  Outcome: Ongoing, Progressing     Problem: Bleeding (Sepsis/Septic Shock)  Goal: Absence of Bleeding  Outcome: Ongoing, Progressing     Problem: Glycemic Control Impaired (Sepsis/Septic Shock)  Goal: Blood Glucose Level Within Desired Range  Outcome: Ongoing, Progressing     Problem: Infection Progression (Sepsis/Septic Shock)  Goal: Absence of Infection Signs and Symptoms  Outcome: Ongoing, Progressing     Problem: Nutrition Impaired (Sepsis/Septic Shock)  Goal: Optimal Nutrition Intake  Outcome: Ongoing, Progressing     Problem: Infection  Goal: Absence of Infection Signs and Symptoms  Outcome: Ongoing, Progressing     Problem: Spiritual Distress Risk or Actual  Goal: Spiritual Wellbeing  Outcome: Ongoing, Progressing

## 2022-04-13 NOTE — PROGRESS NOTES
SHANICE received a call from Lexie at the [facility] in [place], [place]. She stated that she was going to speak with Elderly Protective Services, (SHANICE had left information regarding Mr. Lopez's financial problem on their VM, but has not heard back yet.) Ms. Owen is attempting to assist with the financial problem that is holding up the member's d/c.   The patient does not know his banking information, that is necessary to set up nursing home placement. Ms. Owen stated she will call this SHANICE back tomorrow.     Information faxed to [facility] in [place], [place] to Alie. They have accepted a patient before that had a financial situation similar to Mr. Lopez's. There is a male bed available there.  They may be willing to work with the patient to get him admitted if accepted medically.

## 2022-04-13 NOTE — PLAN OF CARE
Problem: Occupational Therapy Goal  Goal: Occupational Therapy Goal  Description: Goals to be met by: 4/12/2022    Patient will increase functional independence with ADLs by performing:    UE Dressing with [place].  LE Dressing with [place].  Grooming while standing at sink with Supervision.  Toileting from toilet with Supervision for hygiene and clothing management.   Toilet transfer to toilet with Supervision.    Outcome: Ongoing, Progressing  Patient is progressing towards goals.  Tolerated sponge bath, up to toilet and sink on this date.  No complaints.  Was found soaked in urine.  Continue OT services to maximize patient functioning.

## 2022-04-13 NOTE — PROGRESS NOTES
[facility] Medicine  Progress Note    Patient Name: Forest Lopez  MRN: [id number]  Patient Class: IP- Inpatient   Admission Date: 1/31/2022  Length of Stay: 72 days  Attending Physician: Alma Elizalde MD  Primary Care Provider: Julian Escobar        Subjective:     Principal Problem:Acute stroke due to ischemia        HPI:  Mr. Lopez is a 72 year old man who presented after a syncopal episode.  He reports he had been feeling well prior to the episode but then had a prodrome prior to passing out.  EMS was called, report patient's BP was low normal on their arrival, improved after an IV fluids bolus.  Patient denies chest pain or shortness of breath and says he does not feel weak currently although is rather tired.  When seen in the ED this morning he could barely express himself audibly.     Vital signs were normal on presentation here.  He is on warfarin for recurrent DVT, but his INR was 1, and d-dimer markedly elevated at 21.7.  Tox screen was positive for cocaine.  He had a CTA of the brain and neck done on presentation so CTA of the chest for PE study could not be done for 48 hours.  Ultrasound of the lower extremities showed extensive bilateral DVT.  On the right there was thrombosis of the common femoral vein, femoral vein, popliteal vein, one of the paired posterior tibial veins and both visualized peroneal veins.  On the left there was thrombosis of the common femoral vein, femoral vein and popliteal vein.  Patient was started on full dose Lovenox and admitted.    Medical history is from the chart as he is unable to give me much information.  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  He smokes 5-6 cigarettes/day and is not interested in quitting.  He is currently homeless and staying with a friend.  Despite the normal INR he is sure he  is taking his warfarin and is not having difficulty taking his medications.  I discussed his care with [facility] staff on the [facility] and patient had been lost to follow up since November 2021.      Overview/Hospital Course:  Patient presented to the ED after a syncopal episode and was found to have bilateral lower extremity DVTs and a low INR.  Tox screen was positive for cocaine.  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  He was started back on his warfarin with bridging Lovenox.  His 2D echo showed grade I diastolic dysfunction.  Neurology evaluated him and recommended echo with bubble study, which was negative for a shunt.    PT/OT evaluated him and recommended SNF placement versus inpatient rehab.  His INR was difficult to get therapeutic, and as he had no contraindication to a NOAC his warfarin was changed to apixaban, and the full dose Lovenox was discontinued.  As he appeared to be depressed and had no objection to starting an antidepressant Lexapro was started.  He had a fall in the hospital on 2/13, had gone to the bathroom for a BM with the nurse, and when she turned away while he was washing his hands he apparently lost his balance and fell.  He did not hit his head and did not lose consciousness, but his BP was low transiently and he was given a bolus of IV fluids.    Patient's mental status improved slowly, but he gradually became more talkative and was able to hold a conversation.  He was diagnosed with a polymicrobial UTI on 2/21 and completed treatment with antibiotics.  Psychiatry evaluated him on 3/3 and changed his antidepressant to Prozac as it can be more activating and patient was having difficulty with his level of motivation.      Patient had some episodes of nausea and vomiting and CT of the abdomen was done, with incidental finding of rectal wall thickening noted.  Colonoscopy was recommended, but  he was unable to finish the prep.  GI evaluated him and decided the patient would benefit from a flex sig to evaluate the rectum.  He had the procedure on 3/25 and no abnormalities were seen other than internal hemorrhoids.    Patient's discharge was delayed as he was not accepted by any SNF facilities in the area.  Reasons for the denials are unclear as he has a clear rehab diagnosis.  He has a history of drug use but has not shown any interest in getting any illicit or legal drugs since he has been here, and he has had no visitors that could have provided drugs to him.  He has been pleasant and cooperative while here although lacks motivation, likely due to the nature of the stroke affecting his frontal lobe.  He requires considerable encouragement to participate in therapy.  Discharging him to live on the street or shelter would not be appropriate as he would not be able to take care of himself.  At this point he has agreed to [facility] nursing care, as his stroke was more than 2 months ago and he has already had sufficient rehab while here in the hospital.  Discharge was planned for 4/8/22, but was delayed due to him not having access to his own bank account.  Hopefully he can be transferred to nursing home as soon as financial situation is straightened out.      Interval History: Tolerating diet, awaiting placement.  Review of Systems   Constitutional:  Negative for chills and fever.   HENT:  Negative for trouble swallowing.    Respiratory:  Negative for cough and shortness of breath.    Cardiovascular:  Negative for chest pain and leg swelling.   Gastrointestinal:  Negative for abdominal pain, blood in stool, nausea and vomiting.   Genitourinary:  Negative for dysuria and hematuria.   Neurological:  Negative for headaches.   Objective:     Vital Signs (Most Recent):  Temp: 98.4 °F (36.9 °C) (04/13/22 1133)  Pulse: 87 (04/13/22 1133)  Resp: 18 (04/13/22 1133)  BP: 121/80 (04/13/22 1133)  SpO2: 97 % (04/13/22  1133)   Vital Signs (24h Range):  Temp:  [97.7 °F (36.5 °C)-98.4 °F (36.9 °C)] 98.4 °F (36.9 °C)  Pulse:  [] 87  Resp:  [12-18] 18  SpO2:  [95 %-100 %] 97 %  BP: (113-144)/(65-80) 121/80     Weight: 66.2 kg (145 lb 15.1 oz)  Body mass index is 19.79 kg/m².  No intake or output data in the 24 hours ending 04/13/22 1339     Physical Exam  Vitals reviewed.   Constitutional:       General: He is not in acute distress.     Appearance: He is well-developed.   HENT:      Head: Normocephalic and atraumatic.   Eyes:      Extraocular Movements: Extraocular movements intact.      Pupils: Pupils are equal, round, and reactive to light.   Cardiovascular:      Rate and Rhythm: Normal rate and regular rhythm.   Pulmonary:      Effort: Pulmonary effort is normal. No respiratory distress.      Breath sounds: Normal breath sounds.   Abdominal:      General: Bowel sounds are normal. There is no distension.      Palpations: Abdomen is soft.      Tenderness: There is no abdominal tenderness.   Musculoskeletal:         General: No swelling. Normal range of motion.      Cervical back: Normal range of motion and neck supple.   Skin:     General: Skin is warm.      Findings: No rash.   Neurological:      General: No focal deficit present.      Mental Status: He is alert and oriented to person, place, and time.   Psychiatric:         Behavior: Behavior normal.      Comments: Flat affect, improved from prior       Significant Labs: All pertinent labs within the past 24 hours have been reviewed.    Significant Imaging: I have reviewed all pertinent imaging results/findings within the past 24 hours.      Assessment/Plan:      * Acute stroke due to ischemia  - MRI showed areas of diffusion signal hyperintensity consistent with areas of acute ischemia/infarct involving the left cerebral hemisphere, the most prominent measuring approximately 1.4 cm, also a small focus of the right frontal lobe.  - Patient on full dose anticoagulation  currently due to acute bilateral DVT.  - Sinus rhythm noted throughout hospital stay  - Risk factor modification - control diabetes, continue statin.  - RPR, HIV non reactive.  B12 low normal.   - CTA showed a focal segment of 60-70% stenosis involving the proximal to mid left vertebral artery that was new when compared to the prior study of 09/25/2019.  No evidence of large vessel intracranial occlusion.   - Neurology noted symptoms do not correspond to stroke location, although the frontal lobe stroke might have something to do with his lack of motivation, and Speech has noted he has had delayed swallowing.  - Echo with bubble study done, no shunt seen.  - Follow up with vascular neurology in 4 weeks.  - Therapy recommended rehab initially but changed recommendation to SNF due to his low interest in participation.  - Started Lexapro due to suspicion for depression.  - Psych consulted, changed to Prozac and started thiamine, folic acid, MVI due to previous history of alcohol abuse, although Wernicke encephalopathy is not suspected.  - Re-consulted SNF for rehab from the stroke, as discharging him to a shelter would be inappropriate and unsafe for him.  Discussed with his son, Forest, at length on 3/10.  Forest is out of town on a job but will be returning to [place] eventually.  He agrees his father will eventually need nursing home care as he is unable to care for himself.  - awaiting placement.      LTBI (latent tuberculosis infection)  See ID note.  Patient had positive PPD 2 years ago and was not treated.  PPD this admission is also positive.  He has been afebrile and has no cough or other TB symptoms.  CXR repeated and was normal.  Clinically he does not have TB.  Quantiferon gold has been ordered to see if he has latent TB.  Discussed with ID, if quantiferon gold is positive would treat for latent TB with INH and pyridoxine for 6 months. Tb gold positive, will start  INH and pyridoxine.        Severe  protein-calorie malnutrition  Diet as tolerated  Patient seen by dietician, recommendations made.      Debility  PT/OT recommending rehab vs SNF after stroke with debility/poor motivation  Having difficulty finding placement for unclear reasons.  Will continue to pursue this as above.  Again, patient has been pleasant and cooperative.      Type 2 diabetes mellitus with hyperglycemia, without long-term current use of insulin  Reportedly he is on metformin and glipizide, both held.  He had 4+ sugar in urine and HbA1c was 10.3, and he was hyperglycemic on presentation.  Started on Levemir and ISS, then increased Levemir to 25 units daily  Added scheduled novolog 5 units tid, increased to 7 units with improvement  OK to resume metformin.    Increased levemir to 28 units daily with good improvement.  Held metformin with contrast with CT.  BG lower now off metformin, has had a few low BG as well due to being on clear liquids for colonoscopy prep.  Levemir decreased to 20 units daily.    As expected BG increased when diet was resumed so increased Levemir back to 28, will increase to 30 units as sugars on higher side  Metformin discontinued while he was NPO but now may be resumed.    Acute deep vein thrombosis (DVT) of both femoral veins  Last ultrasound showed partially occlusive DVTs, now completely occlusive DVT of multiple lower extremity veins on ultrasound.  Patient reports compliance with warfarin but his INR is only one.  He is homeless but says he does not have difficulty obtaining his medications.  Does not seem to care about anything, which again might be due to the frontal lobe stroke.  D-dimer was markedly elevated on presentation and there was a question of possible PE, but he had a CTA of the brain/neck on presentation so could not get another CTA for another 48 hours.    V/Q scan was done, showed low probability for PE.  2D echo showed grade I diastolic dysfunction.  Warfarin was restarted with bridging  Lovenox, but INR was still low.  Changed to apixaban and discontinued Lovenox.    Hyperlipidemia  Continue atorvastatin status post stroke.        VTE Risk Mitigation (From admission, onward)         Ordered     apixaban tablet 5 mg  2 times daily         03/29/22 1555     Place sequential compression device  Until discontinued         01/31/22 0533                Discharge Planning   EFRAÍN: 4/11/2022     Code Status: Full Code   Is the patient medically ready for discharge?:     Reason for patient still in hospital (select all that apply): Pending disposition  Discharge Plan A: Skilled Nursing Facility   Discharge Delays: (!) Other (Waiting for financial information to be available.)              Alma Elizalde MD  Department of Hospital Medicine   [facility] - Med Surg ([place])

## 2022-04-13 NOTE — SUBJECTIVE & OBJECTIVE
Interval History: Tolerating diet, awaiting placement.  Review of Systems   Constitutional:  Negative for chills and fever.   HENT:  Negative for trouble swallowing.    Respiratory:  Negative for cough and shortness of breath.    Cardiovascular:  Negative for chest pain and leg swelling.   Gastrointestinal:  Negative for abdominal pain, blood in stool, nausea and vomiting.   Genitourinary:  Negative for dysuria and hematuria.   Neurological:  Negative for headaches.   Objective:     Vital Signs (Most Recent):  Temp: 98.4 °F (36.9 °C) (04/13/22 1133)  Pulse: 87 (04/13/22 1133)  Resp: 18 (04/13/22 1133)  BP: 121/80 (04/13/22 1133)  SpO2: 97 % (04/13/22 1133)   Vital Signs (24h Range):  Temp:  [97.7 °F (36.5 °C)-98.4 °F (36.9 °C)] 98.4 °F (36.9 °C)  Pulse:  [] 87  Resp:  [12-18] 18  SpO2:  [95 %-100 %] 97 %  BP: (113-144)/(65-80) 121/80     Weight: 66.2 kg (145 lb 15.1 oz)  Body mass index is 19.79 kg/m².  No intake or output data in the 24 hours ending 04/13/22 1339     Physical Exam  Vitals reviewed.   Constitutional:       General: He is not in acute distress.     Appearance: He is well-developed.   HENT:      Head: Normocephalic and atraumatic.   Eyes:      Extraocular Movements: Extraocular movements intact.      Pupils: Pupils are equal, round, and reactive to light.   Cardiovascular:      Rate and Rhythm: Normal rate and regular rhythm.   Pulmonary:      Effort: Pulmonary effort is normal. No respiratory distress.      Breath sounds: Normal breath sounds.   Abdominal:      General: Bowel sounds are normal. There is no distension.      Palpations: Abdomen is soft.      Tenderness: There is no abdominal tenderness.   Musculoskeletal:         General: No swelling. Normal range of motion.      Cervical back: Normal range of motion and neck supple.   Skin:     General: Skin is warm.      Findings: No rash.   Neurological:      General: No focal deficit present.      Mental Status: He is alert and oriented to  person, place, and time.   Psychiatric:         Behavior: Behavior normal.      Comments: Flat affect, improved from prior       Significant Labs: All pertinent labs within the past 24 hours have been reviewed.    Significant Imaging: I have reviewed all pertinent imaging results/findings within the past 24 hours.

## 2022-04-13 NOTE — PROGRESS NOTES
[facility] ([place])  Adult Nutrition  Progress Note    SUMMARY       Recommendations    1.Continue consistent CHO mech soft diet with ONS Boost Glucose Control TID + Huber BID.   2.Encourage good PO intake as needed.    Goals: Pt to meet % EEN/EPN via PO intake by RD f/u.  Nutrition Goal Status: goal met  Communication of RD Recs:  (POC)    Assessment and Plan  Nutrition Problem  swallowing difficulty     Related to (etiology):   stroke     Signs and Symptoms (as evidenced by):   Pt requiring texture modified diet and speech therapy services for difficulty swallowing     Interventions(treatment strategy):  Collaboration with other providers  Commercial beverage  Carbohydrate modified diet (diabetic)  Texture modified diet (IDDSI L5 MM)     Nutrition Diagnosis Status:   Continues    Reason for Assessment    Reason For Assessment: RD follow-up  Diagnosis:  (acute DVT)  Relevant Medical History: HTN, HLD, T2DM, BPH  Interdisciplinary Rounds: did not attend  General Information Comments: 4/13-  RD f/u. On consistent CHO, mech soft diet. Huber BID. BGC TID. Has been tolerating diet without issues. PO intake 75%. Will continue to monitor. Wt gain noted.    Nutrition Discharge Planning: Pt to follow a cardiac/ DM diet(vit K restriction as needed) as tolerated.    Nutrition Risk Screen    Nutrition Risk Screen: no indicators present    Nutrition/Diet History    Spiritual, Cultural Beliefs, Presybeterian Practices, Values that Affect Care: no  Factors Affecting Nutritional Intake: clear liquid diet (colon prep)    Anthropometrics    Temp: 98.4 °F (36.9 °C)  Height: 6' (182.9 cm)  Height (inches): 72 in  Weight Method: Bed Scale  Weight: 66.2 kg (145 lb 15.1 oz)  Weight (lb): 145.95 lb  Ideal Body Weight (IBW), Male: 178 lb  % Ideal Body Weight, Male (lb): 81.99 %  BMI (Calculated): 19.8  BMI Grade: 18.5-24.9 - normal     Lab/Procedures/Meds    Pertinent Labs Reviewed: reviewed  Pertinent Labs Comments: heme  13.3  Pertinent Medications Reviewed: reviewed  Pertinent Medications Comments: apixaban, floxetine, folic acid, insulin aspart/ detemir, meformin, MV, polyethylene glycol, tamasulosin, thiamine    Estimated/Assessed Needs    Weight Used For Calorie Calculations: 63 kg (138 lb 14.2 oz)  Energy Calorie Requirements (kcal): 1980 kcal day (MSJ X AF 1.4)  Energy Need Method: [place]-St Jeor  Protein Requirements: 50-63 g day (0.8-1.0 g/kg)  Weight Used For Protein Calculations: 63 kg (138 lb 14.2 oz)  Estimated Fluid Requirement Method: RDA Method  RDA Method (mL): 1980  CHO Requirement: 248 g day    Nutrition Prescription Ordered    Current Diet Order: consistent carb/ mech soft  Oral Nutrition Supplement: BGC TID and Huber BID    Evaluation of Received Nutrient/Fluid Intake    Energy Calories Required: meeting needs  Protein Required: meeting needs  Fluid Required: meeting needs  Comments: LBM 3/29  Tolerance: tolerating  % Intake of Estimated Energy Needs: 75 - 100 %  % Meal Intake: 75 - 100 %    Nutrition Risk    Level of Risk/Frequency of Follow-up: low (1x weekly)     Monitor and Evaluation    Food and Nutrient Intake: energy intake, food and beverage intake  Food and Nutrient Adminstration: diet order  Knowledge/Beliefs/Attitudes: food and nutrition knowledge/skill  Physical Activity and Function: nutrition-related ADLs and IADLs  Anthropometric Measurements: weight, weight change, body mass index  Biochemical Data, Medical Tests and Procedures: glucose/endocrine profile, gastrointestinal profile, electrolyte and renal panel, inflammatory profile, lipid profile  Nutrition-Focused Physical Findings: overall appearance     Nutrition Follow-Up    RD Follow-up?: Yes

## 2022-04-14 LAB
POCT GLUCOSE: 105 MG/DL (ref 70–110)
POCT GLUCOSE: 175 MG/DL (ref 70–110)
POCT GLUCOSE: 79 MG/DL (ref 70–110)

## 2022-04-14 PROCEDURE — C9399 UNCLASSIFIED DRUGS OR BIOLOG: HCPCS | Performed by: INTERNAL MEDICINE

## 2022-04-14 PROCEDURE — 99232 PR SUBSEQUENT HOSPITAL CARE,LEVL II: ICD-10-PCS | Mod: ,,, | Performed by: INTERNAL MEDICINE

## 2022-04-14 PROCEDURE — 94761 N-INVAS EAR/PLS OXIMETRY MLT: CPT

## 2022-04-14 PROCEDURE — 25000003 PHARM REV CODE 250: Performed by: INTERNAL MEDICINE

## 2022-04-14 PROCEDURE — 99232 SBSQ HOSP IP/OBS MODERATE 35: CPT | Mod: ,,, | Performed by: INTERNAL MEDICINE

## 2022-04-14 PROCEDURE — 11000001 HC ACUTE MED/SURG PRIVATE ROOM

## 2022-04-14 PROCEDURE — 97530 THERAPEUTIC ACTIVITIES: CPT

## 2022-04-14 PROCEDURE — 25000003 PHARM REV CODE 250: Performed by: HOSPITALIST

## 2022-04-14 RX ADMIN — FOLIC ACID 1 MG: 1 TABLET ORAL at 08:04

## 2022-04-14 RX ADMIN — METFORMIN HYDROCHLORIDE 500 MG: 500 TABLET ORAL at 08:04

## 2022-04-14 RX ADMIN — INSULIN DETEMIR 30 UNITS: 100 INJECTION, SOLUTION SUBCUTANEOUS at 11:04

## 2022-04-14 RX ADMIN — FLUOXETINE 20 MG: 20 CAPSULE ORAL at 08:04

## 2022-04-14 RX ADMIN — APIXABAN 5 MG: 2.5 TABLET, FILM COATED ORAL at 08:04

## 2022-04-14 RX ADMIN — ISONIAZID 300 MG: 300 TABLET ORAL at 08:04

## 2022-04-14 RX ADMIN — ATORVASTATIN CALCIUM 80 MG: 20 TABLET, FILM COATED ORAL at 08:04

## 2022-04-14 RX ADMIN — THERA TABS 1 TABLET: TAB at 08:04

## 2022-04-14 RX ADMIN — Medication 25 MG: at 08:04

## 2022-04-14 RX ADMIN — TAMSULOSIN HYDROCHLORIDE 0.4 MG: 0.4 CAPSULE ORAL at 08:04

## 2022-04-14 RX ADMIN — INSULIN ASPART 7 UNITS: 100 INJECTION, SOLUTION INTRAVENOUS; SUBCUTANEOUS at 01:04

## 2022-04-14 RX ADMIN — THIAMINE HCL TAB 100 MG 100 MG: 100 TAB at 08:04

## 2022-04-14 RX ADMIN — POLYETHYLENE GLYCOL 3350 17 G: 17 POWDER, FOR SOLUTION ORAL at 11:04

## 2022-04-14 RX ADMIN — INSULIN ASPART 7 UNITS: 100 INJECTION, SOLUTION INTRAVENOUS; SUBCUTANEOUS at 08:04

## 2022-04-14 NOTE — PLAN OF CARE
"   04/14/22 1709   Post-Acute Status   Post-Acute Authorization Placement   Coverage [employer] MANAGED MEDICARE - [employer] (SPECIAL NEEDS PLAN)   Other Status   (Awaiting financial clearance for the patient.)   Discharge Delays   (Patient's finances can't be varified with the bank.)   Discharge Plan   Discharge Plan A Skilled Nursing Facility     SHANICE spoke with the patient's son, Jr. Forest to give him an update. He stated that there   is nothing else for him to do to help. He had tried speaking to someone at [facility], about his father's financial situation, (concerning the nursing home). They weren't willing to cooperate because legally they could not give any information. Mr. John Christiansen Is feeling frustrated as  He can't make any impact in this situation. SHANICE did not get a call back from Lexie at [facility]. Lexie is to work with Elder[facility] SHANICE will follow up on 4/16/22.      "

## 2022-04-14 NOTE — SUBJECTIVE & OBJECTIVE
Interval History: Tolerating diet, awaiting placement.  Review of Systems   Constitutional:  Negative for chills and fever.   HENT:  Negative for trouble swallowing.    Respiratory:  Negative for cough and shortness of breath.    Cardiovascular:  Negative for chest pain and leg swelling.   Gastrointestinal:  Negative for abdominal pain, blood in stool, nausea and vomiting.   Genitourinary:  Negative for dysuria and hematuria.   Neurological:  Negative for headaches.   Objective:     Vital Signs (Most Recent):  Temp: 98.4 °F (36.9 °C) (04/14/22 1131)  Pulse: 74 (04/14/22 1131)  Resp: 16 (04/14/22 1131)  BP: (!) 118/59 (04/14/22 1131)  SpO2: 97 % (04/14/22 1131)   Vital Signs (24h Range):  Temp:  [97.8 °F (36.6 °C)-98.6 °F (37 °C)] 98.4 °F (36.9 °C)  Pulse:  [74-93] 74  Resp:  [16-18] 16  SpO2:  [97 %-100 %] 97 %  BP: (118-133)/(59-79) 118/59     Weight: 66.2 kg (145 lb 15.1 oz)  Body mass index is 19.79 kg/m².    Intake/Output Summary (Last 24 hours) at 4/14/2022 1403  Last data filed at 4/14/2022 0500  Gross per 24 hour   Intake --   Output 1300 ml   Net -1300 ml        Physical Exam  Vitals reviewed.   Constitutional:       General: He is not in acute distress.     Appearance: He is well-developed.   HENT:      Head: Normocephalic and atraumatic.   Eyes:      Extraocular Movements: Extraocular movements intact.      Pupils: Pupils are equal, round, and reactive to light.   Cardiovascular:      Rate and Rhythm: Normal rate and regular rhythm.   Pulmonary:      Effort: Pulmonary effort is normal. No respiratory distress.      Breath sounds: Normal breath sounds.   Abdominal:      General: Bowel sounds are normal. There is no distension.      Palpations: Abdomen is soft.      Tenderness: There is no abdominal tenderness.   Musculoskeletal:         General: No swelling. Normal range of motion.      Cervical back: Normal range of motion and neck supple.   Skin:     General: Skin is warm.      Findings: No rash.    Neurological:      General: No focal deficit present.      Mental Status: He is alert and oriented to person, place, and time.   Psychiatric:         Mood and Affect: Mood normal.         Behavior: Behavior normal.       Significant Labs: All pertinent labs within the past 24 hours have been reviewed.    Significant Imaging: I have reviewed all pertinent imaging results/findings within the past 24 hours.

## 2022-04-14 NOTE — ASSESSMENT & PLAN NOTE
See ID note.  Patient had positive PPD 2 years ago and was not treated.  PPD this admission is also positive.  He has been afebrile and has no cough or other TB symptoms.  CXR repeated and was normal.  Clinically he does not have TB.  Quantiferon gold has been ordered to see if he has latent TB.  Discussed with ID, if quantiferon gold is positive would treat for latent TB with INH and pyridoxine for 6 months. Tb gold positive,started  INH and pyridoxine.

## 2022-04-14 NOTE — PROGRESS NOTES
[facility] Medicine  Progress Note    Patient Name: Forest Lopez  MRN: [id number]  Patient Class: IP- Inpatient   Admission Date: 1/31/2022  Length of Stay: 73 days  Attending Physician: Alma Elizalde MD  Primary Care Provider: Julian Escobar        Subjective:     Principal Problem:Acute stroke due to ischemia        HPI:  Mr. Lopez is a 72 year old man who presented after a syncopal episode.  He reports he had been feeling well prior to the episode but then had a prodrome prior to passing out.  EMS was called, report patient's BP was low normal on their arrival, improved after an IV fluids bolus.  Patient denies chest pain or shortness of breath and says he does not feel weak currently although is rather tired.  When seen in the ED this morning he could barely express himself audibly.     Vital signs were normal on presentation here.  He is on warfarin for recurrent DVT, but his INR was 1, and d-dimer markedly elevated at 21.7.  Tox screen was positive for cocaine.  He had a CTA of the brain and neck done on presentation so CTA of the chest for PE study could not be done for 48 hours.  Ultrasound of the lower extremities showed extensive bilateral DVT.  On the right there was thrombosis of the common femoral vein, femoral vein, popliteal vein, one of the paired posterior tibial veins and both visualized peroneal veins.  On the left there was thrombosis of the common femoral vein, femoral vein and popliteal vein.  Patient was started on full dose Lovenox and admitted.    Medical history is from the chart as he is unable to give me much information.  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  He smokes 5-6 cigarettes/day and is not interested in quitting.  He is currently homeless and staying with a friend.  Despite the normal INR he is sure he  is taking his warfarin and is not having difficulty taking his medications.  I discussed his care with [facility] staff on the [facility] and patient had been lost to follow up since November 2021.      Overview/Hospital Course:  Patient presented to the ED after a syncopal episode and was found to have bilateral lower extremity DVTs and a low INR.  Tox screen was positive for cocaine.  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  He was started back on his warfarin with bridging Lovenox.  His 2D echo showed grade I diastolic dysfunction.  Neurology evaluated him and recommended echo with bubble study, which was negative for a shunt.    PT/OT evaluated him and recommended SNF placement versus inpatient rehab.  His INR was difficult to get therapeutic, and as he had no contraindication to a NOAC his warfarin was changed to apixaban, and the full dose Lovenox was discontinued.  As he appeared to be depressed and had no objection to starting an antidepressant Lexapro was started.  He had a fall in the hospital on 2/13, had gone to the bathroom for a BM with the nurse, and when she turned away while he was washing his hands he apparently lost his balance and fell.  He did not hit his head and did not lose consciousness, but his BP was low transiently and he was given a bolus of IV fluids.    Patient's mental status improved slowly, but he gradually became more talkative and was able to hold a conversation.  He was diagnosed with a polymicrobial UTI on 2/21 and completed treatment with antibiotics.  Psychiatry evaluated him on 3/3 and changed his antidepressant to Prozac as it can be more activating and patient was having difficulty with his level of motivation.      Patient had some episodes of nausea and vomiting and CT of the abdomen was done, with incidental finding of rectal wall thickening noted.  Colonoscopy was recommended, but  he was unable to finish the prep.  GI evaluated him and decided the patient would benefit from a flex sig to evaluate the rectum.  He had the procedure on 3/25 and no abnormalities were seen other than internal hemorrhoids.    Patient's discharge was delayed as he was not accepted by any SNF facilities in the area.  Reasons for the denials are unclear as he has a clear rehab diagnosis.  He has a history of drug use but has not shown any interest in getting any illicit or legal drugs since he has been here, and he has had no visitors that could have provided drugs to him.  He has been pleasant and cooperative while here although lacks motivation, likely due to the nature of the stroke affecting his frontal lobe.  He requires considerable encouragement to participate in therapy.  Discharging him to live on the street or shelter would not be appropriate as he would not be able to take care of himself.  At this point he has agreed to snf nursing care, as his stroke was more than 2 months ago and he has already had sufficient rehab while here in the hospital.  Discharge was planned for 4/8/22, but was delayed due to him not having access to his own bank account.  Hopefully he can be transferred to nursing home as soon as financial situation is straightened out.      Interval History: Tolerating diet, awaiting placement.  Review of Systems   Constitutional:  Negative for chills and fever.   HENT:  Negative for trouble swallowing.    Respiratory:  Negative for cough and shortness of breath.    Cardiovascular:  Negative for chest pain and leg swelling.   Gastrointestinal:  Negative for abdominal pain, blood in stool, nausea and vomiting.   Genitourinary:  Negative for dysuria and hematuria.   Neurological:  Negative for headaches.   Objective:     Vital Signs (Most Recent):  Temp: 98.4 °F (36.9 °C) (04/14/22 1131)  Pulse: 74 (04/14/22 1131)  Resp: 16 (04/14/22 1131)  BP: (!) 118/59 (04/14/22 1131)  SpO2: 97 %  (04/14/22 1131)   Vital Signs (24h Range):  Temp:  [97.8 °F (36.6 °C)-98.6 °F (37 °C)] 98.4 °F (36.9 °C)  Pulse:  [74-93] 74  Resp:  [16-18] 16  SpO2:  [97 %-100 %] 97 %  BP: (118-133)/(59-79) 118/59     Weight: 66.2 kg (145 lb 15.1 oz)  Body mass index is 19.79 kg/m².    Intake/Output Summary (Last 24 hours) at 4/14/2022 1403  Last data filed at 4/14/2022 0500  Gross per 24 hour   Intake --   Output 1300 ml   Net -1300 ml        Physical Exam  Vitals reviewed.   Constitutional:       General: He is not in acute distress.     Appearance: He is well-developed.   HENT:      Head: Normocephalic and atraumatic.   Eyes:      Extraocular Movements: Extraocular movements intact.      Pupils: Pupils are equal, round, and reactive to light.   Cardiovascular:      Rate and Rhythm: Normal rate and regular rhythm.   Pulmonary:      Effort: Pulmonary effort is normal. No respiratory distress.      Breath sounds: Normal breath sounds.   Abdominal:      General: Bowel sounds are normal. There is no distension.      Palpations: Abdomen is soft.      Tenderness: There is no abdominal tenderness.   Musculoskeletal:         General: No swelling. Normal range of motion.      Cervical back: Normal range of motion and neck supple.   Skin:     General: Skin is warm.      Findings: No rash.   Neurological:      General: No focal deficit present.      Mental Status: He is alert and oriented to person, place, and time.   Psychiatric:         Mood and Affect: Mood normal.         Behavior: Behavior normal.       Significant Labs: All pertinent labs within the past 24 hours have been reviewed.    Significant Imaging: I have reviewed all pertinent imaging results/findings within the past 24 hours.      Assessment/Plan:      * Acute stroke due to ischemia  - MRI showed areas of diffusion signal hyperintensity consistent with areas of acute ischemia/infarct involving the left cerebral hemisphere, the most prominent measuring approximately 1.4 cm,  also a small focus of the right frontal lobe.  - Patient on full dose anticoagulation currently due to acute bilateral DVT.  - Sinus rhythm noted throughout hospital stay  - Risk factor modification - control diabetes, continue statin.  - RPR, HIV non reactive.  B12 low normal.   - CTA showed a focal segment of 60-70% stenosis involving the proximal to mid left vertebral artery that was new when compared to the prior study of 09/25/2019.  No evidence of large vessel intracranial occlusion.   - Neurology noted symptoms do not correspond to stroke location, although the frontal lobe stroke might have something to do with his lack of motivation, and Speech has noted he has had delayed swallowing.  - Echo with bubble study done, no shunt seen.  - Follow up with vascular neurology in 4 weeks.  - Therapy recommended rehab initially but changed recommendation to SNF due to his low interest in participation.  - Started Lexapro due to suspicion for depression.  - Psych consulted, changed to Prozac and started thiamine, folic acid, MVI due to previous history of alcohol abuse, although Wernicke encephalopathy is not suspected.  - Re-consulted SNF for rehab from the stroke, as discharging him to a shelter would be inappropriate and unsafe for him.  Discussed with his son, Forest, at length on 3/10.  Forest is out of town on a job but will be returning to [place] eventually.  He agrees his father will eventually need nursing home care as he is unable to care for himself.  - awaiting placement.      LTBI (latent tuberculosis infection)  See ID note.  Patient had positive PPD 2 years ago and was not treated.  PPD this admission is also positive.  He has been afebrile and has no cough or other TB symptoms.  CXR repeated and was normal.  Clinically he does not have TB.  Quantiferon gold has been ordered to see if he has latent TB.  Discussed with ID, if quantiferon gold is positive would treat for latent TB with INH and  pyridoxine for 6 months. Tb gold positive,started  INH and pyridoxine.        Severe protein-calorie malnutrition  Diet as tolerated  Patient seen by dietician, recommendations made.      Debility  PT/OT recommending rehab vs SNF after stroke with debility/poor motivation  Having difficulty finding placement for unclear reasons.  Will continue to pursue this as above.  Again, patient has been pleasant and cooperative.      Type 2 diabetes mellitus with hyperglycemia, without long-term current use of insulin  Reportedly he is on metformin and glipizide, both held.  He had 4+ sugar in urine and HbA1c was 10.3, and he was hyperglycemic on presentation.  Started on Levemir and ISS, then increased Levemir to 25 units daily  Added scheduled novolog 5 units tid, increased to 7 units with improvement  OK to resume metformin.    Increased levemir to 28 units daily with good improvement.  Held metformin with contrast with CT.  BG lower now off metformin, has had a few low BG as well due to being on clear liquids for colonoscopy prep.  Levemir decreased to 20 units daily.    As expected BG increased when diet was resumed so increased Levemir back to 28, will increase to 30 units as sugars on higher side  Metformin discontinued while he was NPO but now may be resumed.    Acute deep vein thrombosis (DVT) of both femoral veins  Last ultrasound showed partially occlusive DVTs, now completely occlusive DVT of multiple lower extremity veins on ultrasound.  Patient reports compliance with warfarin but his INR is only one.  He is homeless but says he does not have difficulty obtaining his medications.  Does not seem to care about anything, which again might be due to the frontal lobe stroke.  D-dimer was markedly elevated on presentation and there was a question of possible PE, but he had a CTA of the brain/neck on presentation so could not get another CTA for another 48 hours.    V/Q scan was done, showed low probability for PE.  2D  echo showed grade I diastolic dysfunction.  Warfarin was restarted with bridging Lovenox, but INR was still low.  Changed to apixaban and discontinued Lovenox.    Hyperlipidemia  Continue atorvastatin status post stroke.        VTE Risk Mitigation (From admission, onward)         Ordered     apixaban tablet 5 mg  2 times daily         03/29/22 1555     Place sequential compression device  Until discontinued         01/31/22 0533                Discharge Planning   EFRAÍN: 4/11/2022     Code Status: Full Code   Is the patient medically ready for discharge?:     Reason for patient still in hospital (select all that apply): Pending disposition  Discharge Plan A: Skilled Nursing Facility   Discharge Delays: (!) Other (Waiting for financial information to be available.)              Alma Elizalde MD  Department of Hospital Medicine   [facility] ([place])

## 2022-04-14 NOTE — PLAN OF CARE
POC reviewed with patient, questions and concerns addressed. No acute events through the night.  All Vital signs stable. No complaints.  AAOx3. Safety maintained.  Bed locked, in lowest position, call light within reach, side rails x2. Mobilized to their highest function. See doc flow sheets for further information. Will continue to monitor      Problem: Adult Inpatient Plan of Care  Goal: Plan of Care Review  Outcome: Ongoing, Progressing  Goal: Patient-Specific Goal (Individualized)  Outcome: Ongoing, Progressing  Goal: Absence of Hospital-Acquired Illness or Injury  Outcome: Ongoing, Progressing  Goal: Optimal Comfort and Wellbeing  Outcome: Ongoing, Progressing     Problem: Diabetes Comorbidity  Goal: Blood Glucose Level Within Targeted Range  Outcome: Ongoing, Progressing     Problem: Skin Injury Risk Increased  Goal: Skin Health and Integrity  Outcome: Ongoing, Progressing     Problem: Coping Ineffective  Goal: Effective Coping  Outcome: Ongoing, Progressing     Problem: Fall Injury Risk  Goal: Absence of Fall and Fall-Related Injury  Outcome: Ongoing, Progressing     Problem: Adjustment to Illness (Sepsis/Septic Shock)  Goal: Optimal Coping  Outcome: Ongoing, Progressing     Problem: Bleeding (Sepsis/Septic Shock)  Goal: Absence of Bleeding  Outcome: Ongoing, Progressing     Problem: Glycemic Control Impaired (Sepsis/Septic Shock)  Goal: Blood Glucose Level Within Desired Range  Outcome: Ongoing, Progressing     Problem: Infection Progression (Sepsis/Septic Shock)  Goal: Absence of Infection Signs and Symptoms  Outcome: Ongoing, Progressing     Problem: Nutrition Impaired (Sepsis/Septic Shock)  Goal: Optimal Nutrition Intake  Outcome: Ongoing, Progressing     Problem: Infection  Goal: Absence of Infection Signs and Symptoms  Outcome: Ongoing, Progressing     Problem: Spiritual Distress Risk or Actual  Goal: Spiritual Wellbeing  Outcome: Ongoing, Progressing

## 2022-04-14 NOTE — PT/OT/SLP PROGRESS
Occupational Therapy   Treatment    Name: Forest Lopez  MRN: [id number]  Admitting Diagnosis:  Acute stroke due to ischemia  20 Days Post-Op    Recommendations:     Discharge Recommendations: nursing facility, skilled, rehabilitation facility  Discharge Equipment Recommendations:  bedside commode, shower chair  Barriers to discharge:  Decreased caregiver support    Assessment:     Froest Lopez is a 72 y.o. male with a medical diagnosis of Acute stroke due to ischemia.  He presents with weakness, impaired endurance, impaired self care skills, impaired functional mobilty, gait instability, impaired balance, impaired cognition, decreased safety awareness.     Rehab Prognosis:  Good; patient would benefit from acute skilled OT services to address these deficits and reach maximum level of function.       Plan:     Patient to be seen 2 x/week to address the above listed problems via self-care/home management, therapeutic activities, therapeutic exercises, cognitive retraining  · Plan of Care Expires: 04/29/22  · Plan of Care Reviewed with: patient    Subjective     Pain/Comfort:  · Pain Rating 1: 0/10    Objective:     Communicated with: rn prior to session.  Patient found right sidelying with peripheral IV (AVASYS) upon OT entry to room.    General Precautions: Standard, anti-coagulation medicine, aspiration, fall   Orthopedic Precautions:N/A   Braces: N/A  Respiratory Status: Room air     Occupational Performance:     Bed Mobility:    · Supine > sitting: SBA with cues for initiation. Pt limited by somnolence but with improved arousal once sitting EOB. Increased conversation this date.      Functional Mobility/Transfers:  · Bed > chair: SBA     Therapeutic Activities:  · Pt participated in visual motor, memory, coordination and recall/sequencing tasks while seated in chair. Pt with memory deficits to recall a 4 letter sequence, requiring cues and extra time. Pt with functional fine motor for scribing tasks. Pt would  benefit from cognitive retraining for memory and problem solving.       [facility] 6 Click ADL: 18    Treatment & Education:  OT role, plan of care, progression of goals, importance of continued OOB activity, visual motor therex, cognitive retraining, memory therex      Patient left up in chair with all lines intact, call button in reach, chair alarm on and RN notifiedEducation:      GOALS:   Multidisciplinary Problems     Occupational Therapy Goals        Problem: Occupational Therapy Goal    Goal Priority Disciplines Outcome Interventions   Occupational Therapy Goal     OT, PT/OT Ongoing, Progressing    Description: Goals to be met by: 4/12/2022    Patient will increase functional independence with ADLs by performing:    UE Dressing with [place].  LE Dressing with [place].  Grooming while standing at sink with Supervision.  Toileting from toilet with Supervision for hygiene and clothing management.   Toilet transfer to toilet with Supervision.                     Time Tracking:     OT Date of Treatment: 04/14/22  OT Start Time: 1038  OT Stop Time: 1056  OT Total Time (min): 18 min    Billable Minutes:Therapeutic Activity 18 minutes    OT/DUTCH: OT          4/14/2022

## 2022-04-15 LAB
ERYTHROCYTE [DISTWIDTH] IN BLOOD BY AUTOMATED COUNT: 13.6 % (ref 11.5–14.5)
HCT VFR BLD AUTO: 39.5 % (ref 40–54)
HGB BLD-MCNC: 12.7 G/DL (ref 14–18)
MCH RBC QN AUTO: 25.8 PG (ref 27–31)
MCHC RBC AUTO-ENTMCNC: 32.2 G/DL (ref 32–36)
MCV RBC AUTO: 80 FL (ref 82–98)
PLATELET # BLD AUTO: 233 K/UL (ref 150–450)
PMV BLD AUTO: 10.1 FL (ref 9.2–12.9)
POCT GLUCOSE: 135 MG/DL (ref 70–110)
POCT GLUCOSE: 171 MG/DL (ref 70–110)
POCT GLUCOSE: 192 MG/DL (ref 70–110)
POCT GLUCOSE: 70 MG/DL (ref 70–110)
POCT GLUCOSE: 74 MG/DL (ref 70–110)
RBC # BLD AUTO: 4.92 M/UL (ref 4.6–6.2)
WBC # BLD AUTO: 10.14 K/UL (ref 3.9–12.7)

## 2022-04-15 PROCEDURE — 36415 COLL VENOUS BLD VENIPUNCTURE: CPT | Performed by: INTERNAL MEDICINE

## 2022-04-15 PROCEDURE — 25000003 PHARM REV CODE 250: Performed by: HOSPITALIST

## 2022-04-15 PROCEDURE — 94761 N-INVAS EAR/PLS OXIMETRY MLT: CPT

## 2022-04-15 PROCEDURE — 99232 SBSQ HOSP IP/OBS MODERATE 35: CPT | Mod: ,,, | Performed by: INTERNAL MEDICINE

## 2022-04-15 PROCEDURE — 11000001 HC ACUTE MED/SURG PRIVATE ROOM

## 2022-04-15 PROCEDURE — 25000003 PHARM REV CODE 250: Performed by: INTERNAL MEDICINE

## 2022-04-15 PROCEDURE — 63600175 PHARM REV CODE 636 W HCPCS: Performed by: INTERNAL MEDICINE

## 2022-04-15 PROCEDURE — 99232 PR SUBSEQUENT HOSPITAL CARE,LEVL II: ICD-10-PCS | Mod: ,,, | Performed by: INTERNAL MEDICINE

## 2022-04-15 PROCEDURE — 85027 COMPLETE CBC AUTOMATED: CPT | Performed by: INTERNAL MEDICINE

## 2022-04-15 RX ADMIN — INSULIN ASPART 7 UNITS: 100 INJECTION, SOLUTION INTRAVENOUS; SUBCUTANEOUS at 08:04

## 2022-04-15 RX ADMIN — APIXABAN 5 MG: 2.5 TABLET, FILM COATED ORAL at 09:04

## 2022-04-15 RX ADMIN — ATORVASTATIN CALCIUM 80 MG: 20 TABLET, FILM COATED ORAL at 09:04

## 2022-04-15 RX ADMIN — METFORMIN HYDROCHLORIDE 500 MG: 500 TABLET ORAL at 06:04

## 2022-04-15 RX ADMIN — TAMSULOSIN HYDROCHLORIDE 0.4 MG: 0.4 CAPSULE ORAL at 10:04

## 2022-04-15 RX ADMIN — APIXABAN 5 MG: 2.5 TABLET, FILM COATED ORAL at 10:04

## 2022-04-15 RX ADMIN — INSULIN ASPART 7 UNITS: 100 INJECTION, SOLUTION INTRAVENOUS; SUBCUTANEOUS at 06:04

## 2022-04-15 RX ADMIN — THIAMINE HCL TAB 100 MG 100 MG: 100 TAB at 09:04

## 2022-04-15 RX ADMIN — THERA TABS 1 TABLET: TAB at 09:04

## 2022-04-15 RX ADMIN — INSULIN ASPART 7 UNITS: 100 INJECTION, SOLUTION INTRAVENOUS; SUBCUTANEOUS at 01:04

## 2022-04-15 RX ADMIN — Medication 25 MG: at 09:04

## 2022-04-15 RX ADMIN — METFORMIN HYDROCHLORIDE 500 MG: 500 TABLET ORAL at 09:04

## 2022-04-15 RX ADMIN — FLUOXETINE 20 MG: 20 CAPSULE ORAL at 09:04

## 2022-04-15 RX ADMIN — FOLIC ACID 1 MG: 1 TABLET ORAL at 09:04

## 2022-04-15 RX ADMIN — ISONIAZID 300 MG: 300 TABLET ORAL at 09:04

## 2022-04-15 NOTE — SUBJECTIVE & OBJECTIVE
Interval History: Tolerating diet, awaiting placement, no bm yesterday  Review of Systems   Constitutional:  Negative for chills and fever.   HENT:  Negative for trouble swallowing.    Respiratory:  Negative for cough and shortness of breath.    Cardiovascular:  Negative for chest pain and leg swelling.   Gastrointestinal:  Negative for abdominal pain, blood in stool, nausea and vomiting.   Genitourinary:  Negative for dysuria and hematuria.   Neurological:  Negative for headaches.   Objective:     Vital Signs (Most Recent):  Temp: 98.4 °F (36.9 °C) (04/16/22 1110)  Pulse: 100 (04/16/22 1110)  Resp: 16 (04/16/22 1110)  BP: (!) 98/53 (04/16/22 1110)  SpO2: 98 % (04/16/22 1110)   Vital Signs (24h Range):  Temp:  [97.4 °F (36.3 °C)-98.8 °F (37.1 °C)] 98.4 °F (36.9 °C)  Pulse:  [] 100  Resp:  [14-18] 16  SpO2:  [95 %-99 %] 98 %  BP: ()/(53-78) 98/53     Weight: 66.2 kg (145 lb 15.1 oz)  Body mass index is 19.79 kg/m².    Intake/Output Summary (Last 24 hours) at 4/16/2022 1446  Last data filed at 4/15/2022 1800  Gross per 24 hour   Intake --   Output 700 ml   Net -700 ml        Physical Exam  Vitals reviewed.   Constitutional:       General: He is not in acute distress.     Appearance: He is well-developed.   HENT:      Head: Normocephalic and atraumatic.   Eyes:      Extraocular Movements: Extraocular movements intact.      Pupils: Pupils are equal, round, and reactive to light.   Cardiovascular:      Rate and Rhythm: Normal rate and regular rhythm.   Pulmonary:      Effort: Pulmonary effort is normal. No respiratory distress.      Breath sounds: Normal breath sounds.   Abdominal:      General: Bowel sounds are normal. There is no distension.      Palpations: Abdomen is soft.      Tenderness: There is no abdominal tenderness.   Musculoskeletal:         General: No swelling. Normal range of motion.      Cervical back: Normal range of motion and neck supple.   Skin:     General: Skin is warm.      Findings: No  rash.   Neurological:      General: No focal deficit present.      Mental Status: He is alert and oriented to person, place, and time.   Psychiatric:         Mood and Affect: Mood normal.         Behavior: Behavior normal.       Significant Labs: All pertinent labs within the past 24 hours have been reviewed.    Significant Imaging: I have reviewed all pertinent imaging results/findings within the past 24 hours.

## 2022-04-15 NOTE — PLAN OF CARE
No major changes over the last few days.  Patient oriented x 4 for the most part.   He does have a short memory can easily be reminded of any particular point.  Skin warm/dry.  No break down.  Foam padding actually has lasted 24 hours thus far to his sacrum without him pulling it off.  Very mobile in bed with turning self.  Stood up at bedside to urinate once during shift.  No bowel movement observed.  Condom catheter placed after MN.  Patient with poor appetite yesterday.  He declined his evening meals so his meal time insulin was held. Metformin was still given.  Later near midnight he had a sandwich and a pudding cup po.   [employer] camera #3 noted in use.  Door left open to room near the nurses' station.  Rounding maintained per protocol.  Problem: Adult Inpatient Plan of Care  Goal: Plan of Care Review  Outcome: Ongoing, Progressing  Flowsheets (Taken 4/15/2022 0600)  Plan of Care Reviewed With: patient     Problem: Diabetes Comorbidity  Goal: Blood Glucose Level Within Targeted Range  Outcome: Ongoing, Progressing  Intervention: Monitor and Manage Glycemia  Flowsheets (Taken 4/15/2022 0600)  Glycemic Management: blood glucose monitored     Problem: Skin Injury Risk Increased  Goal: Skin Health and Integrity  Outcome: Ongoing, Progressing     Problem: Coping Ineffective  Goal: Effective Coping  Outcome: Ongoing, Progressing  Intervention: Support and Enhance Coping Strategies  Flowsheets (Taken 4/15/2022 0600)  Supportive Measures:   active listening utilized   decision-making supported   positive reinforcement provided   relaxation techniques promoted   self-care encouraged   self-reflection promoted   self-responsibility promoted   verbalization of feelings encouraged  Family/Support System Care:   self-care encouraged   support provided  Environmental Support: calm environment promoted     Problem: Fall Injury Risk  Goal: Absence of Fall and Fall-Related Injury  Outcome: Ongoing, Progressing  Intervention:  Identify and Manage Contributors  Flowsheets (Taken 4/15/2022 0600)  Self-Care Promotion:   independence encouraged   BADL personal objects within reach  Medication Review/Management:   medications reviewed   high-risk medications identified     Problem: Adjustment to Illness (Sepsis/Septic Shock)  Goal: Optimal Coping  Outcome: Ongoing, Progressing  Intervention: Optimize Psychosocial Adjustment to Illness  Flowsheets (Taken 4/15/2022 0600)  Supportive Measures:   active listening utilized   decision-making supported   positive reinforcement provided   relaxation techniques promoted   self-care encouraged   self-reflection promoted   self-responsibility promoted   verbalization of feelings encouraged  Family/Support System Care:   self-care encouraged   support provided     Problem: Bleeding (Sepsis/Septic Shock)  Goal: Absence of Bleeding  Outcome: Ongoing, Progressing     Problem: Glycemic Control Impaired (Sepsis/Septic Shock)  Goal: Blood Glucose Level Within Desired Range  Outcome: Ongoing, Progressing     Problem: Infection  Goal: Absence of Infection Signs and Symptoms  Outcome: Ongoing, Progressing

## 2022-04-15 NOTE — SUBJECTIVE & OBJECTIVE
Interval History: Tolerating diet, awaiting placement, no bm yesterday  Review of Systems   Constitutional:  Negative for chills and fever.   HENT:  Negative for trouble swallowing.    Respiratory:  Negative for cough and shortness of breath.    Cardiovascular:  Negative for chest pain and leg swelling.   Gastrointestinal:  Negative for abdominal pain, blood in stool, nausea and vomiting.   Genitourinary:  Negative for dysuria and hematuria.   Neurological:  Negative for headaches.   Objective:     Vital Signs (Most Recent):  Temp: 98 °F (36.7 °C) (04/15/22 1148)  Pulse: 92 (04/15/22 1148)  Resp: 18 (04/15/22 1148)  BP: 104/63 (04/15/22 1148)  SpO2: 97 % (04/15/22 1148)   Vital Signs (24h Range):  Temp:  [97.5 °F (36.4 °C)-98.5 °F (36.9 °C)] 98 °F (36.7 °C)  Pulse:  [72-92] 92  Resp:  [15-20] 18  SpO2:  [97 %-100 %] 97 %  BP: (104-154)/(57-75) 104/63     Weight: 66.2 kg (145 lb 15.1 oz)  Body mass index is 19.79 kg/m².    Intake/Output Summary (Last 24 hours) at 4/15/2022 1356  Last data filed at 4/15/2022 0400  Gross per 24 hour   Intake 250 ml   Output 900 ml   Net -650 ml        Physical Exam  Vitals reviewed.   Constitutional:       General: He is not in acute distress.     Appearance: He is well-developed.   HENT:      Head: Normocephalic and atraumatic.   Eyes:      Extraocular Movements: Extraocular movements intact.      Pupils: Pupils are equal, round, and reactive to light.   Cardiovascular:      Rate and Rhythm: Normal rate and regular rhythm.   Pulmonary:      Effort: Pulmonary effort is normal. No respiratory distress.      Breath sounds: Normal breath sounds.   Abdominal:      General: Bowel sounds are normal. There is no distension.      Palpations: Abdomen is soft.      Tenderness: There is no abdominal tenderness.   Musculoskeletal:         General: No swelling. Normal range of motion.      Cervical back: Normal range of motion and neck supple.   Skin:     General: Skin is warm.      Findings: No  rash.   Neurological:      General: No focal deficit present.      Mental Status: He is alert and oriented to person, place, and time.   Psychiatric:         Mood and Affect: Mood normal.         Behavior: Behavior normal.       Significant Labs: All pertinent labs within the past 24 hours have been reviewed.    Significant Imaging: I have reviewed all pertinent imaging results/findings within the past 24 hours.

## 2022-04-15 NOTE — PLAN OF CARE
POC reviewed with patient. AAOx4. VSS. No complaints of pain during shift.  Insulin administered according to MAR. No other complaints verbalized during shift. Urinal for voiding, but experiences episodes of incontinence. Positions self.  Turn Q2/frequent weight shift encouraged by RN. Low air loss mattress in use. Instructed to call for help ambulating. No injuries, falls, or trauma occurred during shift. Purposeful rounding completed. Bed low and locked with side rails up x 3 and call light within reach.  Pt refuses attempts to apply protective dressings to sacrum and heels.    Problem: Adult Inpatient Plan of Care  Goal: Plan of Care Review  Outcome: Ongoing, Progressing  Goal: Patient-Specific Goal (Individualized)  Outcome: Ongoing, Progressing  Goal: Absence of Hospital-Acquired Illness or Injury  Outcome: Ongoing, Progressing  Intervention: Identify and Manage Fall Risk  Flowsheets (Taken 4/14/2022 1927)  Safety Promotion/Fall Prevention:   assistive device/personal item within reach   bed alarm set   lighting adjusted   room near unit station   side rails raised x 3  Intervention: Prevent Skin Injury  Flowsheets (Taken 4/14/2022 1927)  Body Position: position changed independently  Skin Protection: (pt refuses protective dressings to sacrum and heels throughout day) --  Goal: Optimal Comfort and Wellbeing  Outcome: Ongoing, Progressing

## 2022-04-15 NOTE — ASSESSMENT & PLAN NOTE
Reportedly he is on metformin and glipizide, both held.  He had 4+ sugar in urine and HbA1c was 10.3, and he was hyperglycemic on presentation.  Started on Levemir and ISS, then increased Levemir to 25 units daily  Added scheduled novolog 5 units tid, increased to 7 units with improvement  OK to resume metformin.    Increased levemir to 28 units daily with good improvement.  Held metformin with contrast with CT.  BG lower now off metformin, has had a few low BG as well due to being on clear liquids for colonoscopy prep.  Levemir decreased to 20 units daily.    As expected BG increased when diet was resumed so increased Levemir back to 28, will increase to 30 units as sugars on higher side  Metformin discontinued while he was NPO but now resumed  Sugars in 70s for a day,levemir decreased to 25 units, monitor sugars, higher , will increase to 28 units daily.

## 2022-04-16 LAB
POCT GLUCOSE: 100 MG/DL (ref 70–110)
POCT GLUCOSE: 149 MG/DL (ref 70–110)
POCT GLUCOSE: 196 MG/DL (ref 70–110)
POCT GLUCOSE: 200 MG/DL (ref 70–110)

## 2022-04-16 PROCEDURE — 25000003 PHARM REV CODE 250: Performed by: HOSPITALIST

## 2022-04-16 PROCEDURE — 99232 SBSQ HOSP IP/OBS MODERATE 35: CPT | Mod: ,,, | Performed by: INTERNAL MEDICINE

## 2022-04-16 PROCEDURE — 94761 N-INVAS EAR/PLS OXIMETRY MLT: CPT

## 2022-04-16 PROCEDURE — 11000001 HC ACUTE MED/SURG PRIVATE ROOM

## 2022-04-16 PROCEDURE — 25000003 PHARM REV CODE 250: Performed by: INTERNAL MEDICINE

## 2022-04-16 PROCEDURE — 99232 PR SUBSEQUENT HOSPITAL CARE,LEVL II: ICD-10-PCS | Mod: ,,, | Performed by: INTERNAL MEDICINE

## 2022-04-16 PROCEDURE — 97530 THERAPEUTIC ACTIVITIES: CPT | Mod: CQ

## 2022-04-16 PROCEDURE — 97116 GAIT TRAINING THERAPY: CPT | Mod: CQ

## 2022-04-16 RX ADMIN — INSULIN ASPART 7 UNITS: 100 INJECTION, SOLUTION INTRAVENOUS; SUBCUTANEOUS at 09:04

## 2022-04-16 RX ADMIN — ATORVASTATIN CALCIUM 80 MG: 20 TABLET, FILM COATED ORAL at 09:04

## 2022-04-16 RX ADMIN — METFORMIN HYDROCHLORIDE 500 MG: 500 TABLET ORAL at 09:04

## 2022-04-16 RX ADMIN — METFORMIN HYDROCHLORIDE 500 MG: 500 TABLET ORAL at 05:04

## 2022-04-16 RX ADMIN — THIAMINE HCL TAB 100 MG 100 MG: 100 TAB at 09:04

## 2022-04-16 RX ADMIN — APIXABAN 5 MG: 2.5 TABLET, FILM COATED ORAL at 09:04

## 2022-04-16 RX ADMIN — INSULIN ASPART 7 UNITS: 100 INJECTION, SOLUTION INTRAVENOUS; SUBCUTANEOUS at 05:04

## 2022-04-16 RX ADMIN — FOLIC ACID 1 MG: 1 TABLET ORAL at 09:04

## 2022-04-16 RX ADMIN — FLUOXETINE 20 MG: 20 CAPSULE ORAL at 09:04

## 2022-04-16 RX ADMIN — INSULIN ASPART 7 UNITS: 100 INJECTION, SOLUTION INTRAVENOUS; SUBCUTANEOUS at 01:04

## 2022-04-16 RX ADMIN — ISONIAZID 300 MG: 300 TABLET ORAL at 09:04

## 2022-04-16 RX ADMIN — TAMSULOSIN HYDROCHLORIDE 0.4 MG: 0.4 CAPSULE ORAL at 09:04

## 2022-04-16 RX ADMIN — Medication 25 MG: at 09:04

## 2022-04-16 RX ADMIN — POLYETHYLENE GLYCOL 3350 17 G: 17 POWDER, FOR SOLUTION ORAL at 09:04

## 2022-04-16 RX ADMIN — THERA TABS 1 TABLET: TAB at 09:04

## 2022-04-16 NOTE — PROGRESS NOTES
[facility] Medicine  Progress Note    Patient Name: Forest Lopez  MRN: [id number]  Patient Class: IP- Inpatient   Admission Date: 1/31/2022  Length of Stay: 75 days  Attending Physician: Alma Elizalde MD  Primary Care Provider: Julian Escobar        Subjective:     Principal Problem:Acute stroke due to ischemia        HPI:  Mr. Lopez is a 72 year old man who presented after a syncopal episode.  He reports he had been feeling well prior to the episode but then had a prodrome prior to passing out.  EMS was called, report patient's BP was low normal on their arrival, improved after an IV fluids bolus.  Patient denies chest pain or shortness of breath and says he does not feel weak currently although is rather tired.  When seen in the ED this morning he could barely express himself audibly.     Vital signs were normal on presentation here.  He is on warfarin for recurrent DVT, but his INR was 1, and d-dimer markedly elevated at 21.7.  Tox screen was positive for cocaine.  He had a CTA of the brain and neck done on presentation so CTA of the chest for PE study could not be done for 48 hours.  Ultrasound of the lower extremities showed extensive bilateral DVT.  On the right there was thrombosis of the common femoral vein, femoral vein, popliteal vein, one of the paired posterior tibial veins and both visualized peroneal veins.  On the left there was thrombosis of the common femoral vein, femoral vein and popliteal vein.  Patient was started on full dose Lovenox and admitted.    Medical history is from the chart as he is unable to give me much information.  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  He smokes 5-6 cigarettes/day and is not interested in quitting.  He is currently homeless and staying with a friend.  Despite the normal INR he is sure he  is taking his warfarin and is not having difficulty taking his medications.  I discussed his care with [facility] staff on the [facility] and patient had been lost to follow up since November 2021.      Overview/Hospital Course:  Patient presented to the ED after a syncopal episode and was found to have bilateral lower extremity DVTs and a low INR.  Tox screen was positive for cocaine.  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  He was started back on his warfarin with bridging Lovenox.  His 2D echo showed grade I diastolic dysfunction.  Neurology evaluated him and recommended echo with bubble study, which was negative for a shunt.    PT/OT evaluated him and recommended SNF placement versus inpatient rehab.  His INR was difficult to get therapeutic, and as he had no contraindication to a NOAC his warfarin was changed to apixaban, and the full dose Lovenox was discontinued.  As he appeared to be depressed and had no objection to starting an antidepressant Lexapro was started.  He had a fall in the hospital on 2/13, had gone to the bathroom for a BM with the nurse, and when she turned away while he was washing his hands he apparently lost his balance and fell.  He did not hit his head and did not lose consciousness, but his BP was low transiently and he was given a bolus of IV fluids.    Patient's mental status improved slowly, but he gradually became more talkative and was able to hold a conversation.  He was diagnosed with a polymicrobial UTI on 2/21 and completed treatment with antibiotics.  Psychiatry evaluated him on 3/3 and changed his antidepressant to Prozac as it can be more activating and patient was having difficulty with his level of motivation.      Patient had some episodes of nausea and vomiting and CT of the abdomen was done, with incidental finding of rectal wall thickening noted.  Colonoscopy was recommended, but  he was unable to finish the prep.  GI evaluated him and decided the patient would benefit from a flex sig to evaluate the rectum.  He had the procedure on 3/25 and no abnormalities were seen other than internal hemorrhoids.    Patient's discharge was delayed as he was not accepted by any SNF facilities in the area.  Reasons for the denials are unclear as he has a clear rehab diagnosis.  He has a history of drug use but has not shown any interest in getting any illicit or legal drugs since he has been here, and he has had no visitors that could have provided drugs to him.  He has been pleasant and cooperative while here although lacks motivation, likely due to the nature of the stroke affecting his frontal lobe.  He requires considerable encouragement to participate in therapy.  Discharging him to live on the street or shelter would not be appropriate as he would not be able to take care of himself.  At this point he has agreed to shelter nursing care, as his stroke was more than 2 months ago and he has already had sufficient rehab while here in the hospital.  Discharge was planned for 4/8/22, but was delayed due to him not having access to his own bank account.  Hopefully he can be transferred to nursing home as soon as financial situation is straightened out.      Interval History: Tolerating diet, awaiting placement, no bm yesterday  Review of Systems   Constitutional:  Negative for chills and fever.   HENT:  Negative for trouble swallowing.    Respiratory:  Negative for cough and shortness of breath.    Cardiovascular:  Negative for chest pain and leg swelling.   Gastrointestinal:  Negative for abdominal pain, blood in stool, nausea and vomiting.   Genitourinary:  Negative for dysuria and hematuria.   Neurological:  Negative for headaches.   Objective:     Vital Signs (Most Recent):  Temp: 98.4 °F (36.9 °C) (04/16/22 1110)  Pulse: 100 (04/16/22 1110)  Resp: 16 (04/16/22 1110)  BP: (!) 98/53 (04/16/22  1110)  SpO2: 98 % (04/16/22 1110)   Vital Signs (24h Range):  Temp:  [97.4 °F (36.3 °C)-98.8 °F (37.1 °C)] 98.4 °F (36.9 °C)  Pulse:  [] 100  Resp:  [14-18] 16  SpO2:  [95 %-99 %] 98 %  BP: ()/(53-78) 98/53     Weight: 66.2 kg (145 lb 15.1 oz)  Body mass index is 19.79 kg/m².    Intake/Output Summary (Last 24 hours) at 4/16/2022 1446  Last data filed at 4/15/2022 1800  Gross per 24 hour   Intake --   Output 700 ml   Net -700 ml        Physical Exam  Vitals reviewed.   Constitutional:       General: He is not in acute distress.     Appearance: He is well-developed.   HENT:      Head: Normocephalic and atraumatic.   Eyes:      Extraocular Movements: Extraocular movements intact.      Pupils: Pupils are equal, round, and reactive to light.   Cardiovascular:      Rate and Rhythm: Normal rate and regular rhythm.   Pulmonary:      Effort: Pulmonary effort is normal. No respiratory distress.      Breath sounds: Normal breath sounds.   Abdominal:      General: Bowel sounds are normal. There is no distension.      Palpations: Abdomen is soft.      Tenderness: There is no abdominal tenderness.   Musculoskeletal:         General: No swelling. Normal range of motion.      Cervical back: Normal range of motion and neck supple.   Skin:     General: Skin is warm.      Findings: No rash.   Neurological:      General: No focal deficit present.      Mental Status: He is alert and oriented to person, place, and time.   Psychiatric:         Mood and Affect: Mood normal.         Behavior: Behavior normal.       Significant Labs: All pertinent labs within the past 24 hours have been reviewed.    Significant Imaging: I have reviewed all pertinent imaging results/findings within the past 24 hours.      Assessment/Plan:      * Acute stroke due to ischemia  - MRI showed areas of diffusion signal hyperintensity consistent with areas of acute ischemia/infarct involving the left cerebral hemisphere, the most prominent measuring  approximately 1.4 cm, also a small focus of the right frontal lobe.  - Patient on full dose anticoagulation currently due to acute bilateral DVT.  - Sinus rhythm noted throughout hospital stay  - Risk factor modification - control diabetes, continue statin.  - RPR, HIV non reactive.  B12 low normal.   - CTA showed a focal segment of 60-70% stenosis involving the proximal to mid left vertebral artery that was new when compared to the prior study of 09/25/2019.  No evidence of large vessel intracranial occlusion.   - Neurology noted symptoms do not correspond to stroke location, although the frontal lobe stroke might have something to do with his lack of motivation, and Speech has noted he has had delayed swallowing.  - Echo with bubble study done, no shunt seen.  - Follow up with vascular neurology in 4 weeks.  - Therapy recommended rehab initially but changed recommendation to SNF due to his low interest in participation.  - Started Lexapro due to suspicion for depression.  - Psych consulted, changed to Prozac and started thiamine, folic acid, MVI due to previous history of alcohol abuse, although Wernicke encephalopathy is not suspected.  - Re-consulted SNF for rehab from the stroke, as discharging him to a shelter would be inappropriate and unsafe for him.  Discussed with his son, Forest, at length on 3/10.  Forest is out of town on a job but will be returning to [place] eventually.  He agrees his father will eventually need nursing home care as he is unable to care for himself.  - awaiting placement.      LTBI (latent tuberculosis infection)  See ID note.  Patient had positive PPD 2 years ago and was not treated.  PPD this admission is also positive.  He has been afebrile and has no cough or other TB symptoms.  CXR repeated and was normal.  Clinically he does not have TB.  Quantiferon gold has been ordered to see if he has latent TB.  Discussed with ID, if quantiferon gold is positive would treat for latent  TB with INH and pyridoxine for 6 months. Tb gold positive,started  INH and pyridoxine.        Severe protein-calorie malnutrition  Diet as tolerated  Patient seen by dietician, recommendations made.      Debility  PT/OT recommending rehab vs SNF after stroke with debility/poor motivation  Having difficulty finding placement for unclear reasons.  Will continue to pursue this as above.  Again, patient has been pleasant and cooperative.      Type 2 diabetes mellitus with hyperglycemia, without long-term current use of insulin  Reportedly he is on metformin and glipizide, both held.  He had 4+ sugar in urine and HbA1c was 10.3, and he was hyperglycemic on presentation.  Started on Levemir and ISS, then increased Levemir to 25 units daily  Added scheduled novolog 5 units tid, increased to 7 units with improvement  OK to resume metformin.    Increased levemir to 28 units daily with good improvement.  Held metformin with contrast with CT.  BG lower now off metformin, has had a few low BG as well due to being on clear liquids for colonoscopy prep.  Levemir decreased to 20 units daily.    As expected BG increased when diet was resumed so increased Levemir back to 28, will increase to 30 units as sugars on higher side  Metformin discontinued while he was NPO but now resumed  Sugars in 70s for a day,levemir decreased to 25 units, monitor sugars, higher , will increase to 28 units daily.    Acute deep vein thrombosis (DVT) of both femoral veins  Last ultrasound showed partially occlusive DVTs, now completely occlusive DVT of multiple lower extremity veins on ultrasound.  Patient reports compliance with warfarin but his INR is only one.  He is homeless but says he does not have difficulty obtaining his medications.  Does not seem to care about anything, which again might be due to the frontal lobe stroke.  D-dimer was markedly elevated on presentation and there was a question of possible PE, but he had a CTA of the brain/neck on  presentation so could not get another CTA for another 48 hours.    V/Q scan was done, showed low probability for PE.  2D echo showed grade I diastolic dysfunction.  Warfarin was restarted with bridging Lovenox, but INR was still low.  Changed to apixaban and discontinued Lovenox.    Hyperlipidemia  Continue atorvastatin status post stroke.        VTE Risk Mitigation (From admission, onward)         Ordered     apixaban tablet 5 mg  2 times daily         03/29/22 1555     Place sequential compression device  Until discontinued         01/31/22 0533                Discharge Planning   EFRAÍN:      Code Status: Full Code   Is the patient medically ready for discharge?:     Reason for patient still in hospital (select all that apply): Pending disposition  Discharge Plan A: Skilled Nursing Facility   Discharge Delays:  (Patient's finances can't be varified with the bank.)              Alma Elizalde MD  Department of Hospital Medicine   [facility] ([facility])

## 2022-04-16 NOTE — PLAN OF CARE
Plan of care reviewed with pt. Pt verbalized understanding. Hourly rounding performed. No complaints of pain during shift.Pt has condom cath on & was changed 2X during the shift. Pt had no BM. Pt sitting up in chair eating dinner with wheels locked. Personal belongings and call bell within reach.

## 2022-04-16 NOTE — PT/OT/SLP PROGRESS
Physical Therapy Treatment    Patient Name:  Forest Lopez   MRN:  [id number]    Recommendations:     Discharge Recommendations:  nursing facility, skilled, rehabilitation facility, nursing facility, basic   Discharge Equipment Recommendations: bedside commode, shower chair, rollator   Barriers to discharge: None to NH    Assessment:     Forest Lopez is a 72 y.o. male admitted with a medical diagnosis of Acute stroke due to ischemia.  He presents with the following impairments/functional limitations:  weakness, gait instability, impaired balance, impaired cognition, impaired endurance, impaired functional mobilty, impaired self care skills, decreased safety awareness.    Supine>sit with mod(I)  Sit>stand with SPC and Varun from low bed  Amb ~200' with SPC and CGA, pt with decreased stability, no overt LoB  Stand>sit on bed with modA req'd to control descent 2/2 pt fatigue after gait bout  Pt with decreased strength and dynamic balance from spending all his time in bed  Rec SNF vs IRF with transition to NH    Rehab Prognosis: Good; patient would benefit from acute skilled PT services to address these deficits and reach maximum level of function.    Recent Surgery: Procedure(s) (LRB):  COLONOSCOPY (N/A) 22 Days Post-Op    Plan:     During this hospitalization, patient to be seen 3 x/week to address the identified rehab impairments via gait training, therapeutic activities, therapeutic exercises, neuromuscular re-education and progress toward the following goals:    · Plan of Care Expires:  05/05/22    Subjective     Chief Complaint: none stated  Patient/Family Comments/goals: I once met the real [place]  Pain/Comfort:  · Pain Rating 1: 0/10  · Pain Rating Post-Intervention 1: 0/10      Objective:     Communicated with nursing prior to session.  Patient found HOB elevated with peripheral IV, Condom Catheter ([employer] telesitter) upon PT entry to room.     General Precautions: Standard, aspiration, fall   Orthopedic  Precautions:N/A   Braces:    Respiratory Status: Room air     Functional Mobility:  · Bed Mobility:     · Supine to Sit: modified independence  · Transfers:     · Sit to Stand:  minimum assistance with straight cane and modA req'd to control descent after gait bout 2/2 fatigue  · Gait: ~200' with SPC and  CGA, pt with decreased stability but no overt LoB    Pt sat up EOB, urinated into condom catheter, then removed condom. RN notified.    AM-PAC 6 CLICK MOBILITY  Turning over in bed (including adjusting bedclothes, sheets and blankets)?: 4  Sitting down on and standing up from a chair with arms (e.g., wheelchair, bedside commode, etc.): 3  Moving from lying on back to sitting on the side of the bed?: 4  Moving to and from a bed to a chair (including a wheelchair)?: 3  Need to walk in hospital room?: 3  Climbing 3-5 steps with a railing?: 3  Basic Mobility Total Score: 20       Therapeutic Activities and Exercises:   Pt encouraged to sit up in chair for lunch, opts instead to sit EOB    Patient left sitting edge of bed with all lines intact, call button in reach, nurse Harini notified, Crista telesitter present and lunch served..    GOALS:   Multidisciplinary Problems     Physical Therapy Goals        Problem: Physical Therapy Goal    Goal Priority Disciplines Outcome Goal Variances Interventions   Physical Therapy Goal     PT, PT/OT Ongoing, Progressing     Description: Goals to be met by: 2022    Patient will increase functional independence with mobility by performin. Sit<>stand with SPV with LRAD.   2. Gait x 300 feet with SPV with rollator.   3. Static standing in SLS with no UE support with SBA x10 sec.   4. Gait x50 ft with cane with SPV.                     Time Tracking:     PT Received On: 22  PT Start Time: 1308     PT Stop Time: 1331  PT Total Time (min): 23 min     Billable Minutes: Gait Training 15 and Therapeutic Activity 8    Treatment Type: Treatment  PT/PTA: PTA     PTA Visit Number: 3      04/16/2022

## 2022-04-16 NOTE — PLAN OF CARE
Pt in bed, no noted acute distress, no complaints of pain, AAO x 3, up with x 1 assist to restroom, blood glucose monitored, no supplemental insulin given, condom cath in place, no injuries or falls during shift, AVASYS in place, bed in low locked position, call light in reach, hourly rounds complete, will continue to assess

## 2022-04-17 LAB
POCT GLUCOSE: 133 MG/DL (ref 70–110)
POCT GLUCOSE: 137 MG/DL (ref 70–110)
POCT GLUCOSE: 180 MG/DL (ref 70–110)
POCT GLUCOSE: 187 MG/DL (ref 70–110)

## 2022-04-17 PROCEDURE — 99232 SBSQ HOSP IP/OBS MODERATE 35: CPT | Mod: ,,, | Performed by: INTERNAL MEDICINE

## 2022-04-17 PROCEDURE — 25000003 PHARM REV CODE 250: Performed by: INTERNAL MEDICINE

## 2022-04-17 PROCEDURE — 94761 N-INVAS EAR/PLS OXIMETRY MLT: CPT

## 2022-04-17 PROCEDURE — 11000001 HC ACUTE MED/SURG PRIVATE ROOM

## 2022-04-17 PROCEDURE — 25000003 PHARM REV CODE 250: Performed by: HOSPITALIST

## 2022-04-17 PROCEDURE — 99232 PR SUBSEQUENT HOSPITAL CARE,LEVL II: ICD-10-PCS | Mod: ,,, | Performed by: INTERNAL MEDICINE

## 2022-04-17 RX ADMIN — ATORVASTATIN CALCIUM 80 MG: 20 TABLET, FILM COATED ORAL at 08:04

## 2022-04-17 RX ADMIN — METFORMIN HYDROCHLORIDE 500 MG: 500 TABLET ORAL at 06:04

## 2022-04-17 RX ADMIN — TAMSULOSIN HYDROCHLORIDE 0.4 MG: 0.4 CAPSULE ORAL at 08:04

## 2022-04-17 RX ADMIN — Medication 25 MG: at 08:04

## 2022-04-17 RX ADMIN — INSULIN ASPART 7 UNITS: 100 INJECTION, SOLUTION INTRAVENOUS; SUBCUTANEOUS at 03:04

## 2022-04-17 RX ADMIN — METFORMIN HYDROCHLORIDE 500 MG: 500 TABLET ORAL at 08:04

## 2022-04-17 RX ADMIN — ISONIAZID 300 MG: 300 TABLET ORAL at 08:04

## 2022-04-17 RX ADMIN — APIXABAN 5 MG: 2.5 TABLET, FILM COATED ORAL at 08:04

## 2022-04-17 RX ADMIN — FLUOXETINE 20 MG: 20 CAPSULE ORAL at 08:04

## 2022-04-17 RX ADMIN — FOLIC ACID 1 MG: 1 TABLET ORAL at 08:04

## 2022-04-17 RX ADMIN — INSULIN ASPART 7 UNITS: 100 INJECTION, SOLUTION INTRAVENOUS; SUBCUTANEOUS at 08:04

## 2022-04-17 RX ADMIN — THIAMINE HCL TAB 100 MG 100 MG: 100 TAB at 08:04

## 2022-04-17 RX ADMIN — POLYETHYLENE GLYCOL 3350 17 G: 17 POWDER, FOR SOLUTION ORAL at 08:04

## 2022-04-17 RX ADMIN — THERA TABS 1 TABLET: TAB at 08:04

## 2022-04-17 NOTE — PROGRESS NOTES
[facility] Medicine  Progress Note    Patient Name: Forest Lopez  MRN: [id number]  Patient Class: IP- Inpatient   Admission Date: 1/31/2022  Length of Stay: 76 days  Attending Physician: Alma Elizalde MD  Primary Care Provider: Julian Escobar        Subjective:     Principal Problem:Acute stroke due to ischemia        HPI:  Mr. Lopez is a 72 year old man who presented after a syncopal episode.  He reports he had been feeling well prior to the episode but then had a prodrome prior to passing out.  EMS was called, report patient's BP was low normal on their arrival, improved after an IV fluids bolus.  Patient denies chest pain or shortness of breath and says he does not feel weak currently although is rather tired.  When seen in the ED this morning he could barely express himself audibly.     Vital signs were normal on presentation here.  He is on warfarin for recurrent DVT, but his INR was 1, and d-dimer markedly elevated at 21.7.  Tox screen was positive for cocaine.  He had a CTA of the brain and neck done on presentation so CTA of the chest for PE study could not be done for 48 hours.  Ultrasound of the lower extremities showed extensive bilateral DVT.  On the right there was thrombosis of the common femoral vein, femoral vein, popliteal vein, one of the paired posterior tibial veins and both visualized peroneal veins.  On the left there was thrombosis of the common femoral vein, femoral vein and popliteal vein.  Patient was started on full dose Lovenox and admitted.    Medical history is from the chart as he is unable to give me much information.  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  He smokes 5-6 cigarettes/day and is not interested in quitting.  He is currently homeless and staying with a friend.  Despite the normal INR he is sure he  is taking his warfarin and is not having difficulty taking his medications.  I discussed his care with [facility] staff on the [facility] and patient had been lost to follow up since November 2021.      Overview/Hospital Course:  Patient presented to the ED after a syncopal episode and was found to have bilateral lower extremity DVTs and a low INR.  Tox screen was positive for cocaine.  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  He was started back on his warfarin with bridging Lovenox.  His 2D echo showed grade I diastolic dysfunction.  Neurology evaluated him and recommended echo with bubble study, which was negative for a shunt.    PT/OT evaluated him and recommended SNF placement versus inpatient rehab.  His INR was difficult to get therapeutic, and as he had no contraindication to a NOAC his warfarin was changed to apixaban, and the full dose Lovenox was discontinued.  As he appeared to be depressed and had no objection to starting an antidepressant Lexapro was started.  He had a fall in the hospital on 2/13, had gone to the bathroom for a BM with the nurse, and when she turned away while he was washing his hands he apparently lost his balance and fell.  He did not hit his head and did not lose consciousness, but his BP was low transiently and he was given a bolus of IV fluids.    Patient's mental status improved slowly, but he gradually became more talkative and was able to hold a conversation.  He was diagnosed with a polymicrobial UTI on 2/21 and completed treatment with antibiotics.  Psychiatry evaluated him on 3/3 and changed his antidepressant to Prozac as it can be more activating and patient was having difficulty with his level of motivation.      Patient had some episodes of nausea and vomiting and CT of the abdomen was done, with incidental finding of rectal wall thickening noted.  Colonoscopy was recommended, but  he was unable to finish the prep.  GI evaluated him and decided the patient would benefit from a flex sig to evaluate the rectum.  He had the procedure on 3/25 and no abnormalities were seen other than internal hemorrhoids.    Patient's discharge was delayed as he was not accepted by any SNF facilities in the area.  Reasons for the denials are unclear as he has a clear rehab diagnosis.  He has a history of drug use but has not shown any interest in getting any illicit or legal drugs since he has been here, and he has had no visitors that could have provided drugs to him.  He has been pleasant and cooperative while here although lacks motivation, likely due to the nature of the stroke affecting his frontal lobe.  He requires considerable encouragement to participate in therapy.  Discharging him to live on the street or shelter would not be appropriate as he would not be able to take care of himself.  At this point he has agreed to penitentiary nursing care, as his stroke was more than 2 months ago and he has already had sufficient rehab while here in the hospital.  Discharge was planned for 4/8/22, but was delayed due to him not having access to his own bank account.  Hopefully he can be transferred to nursing home as soon as financial situation is straightened out.      Interval History: Tolerating diet, awaiting placement, no acute issues.  Review of Systems   Constitutional:  Negative for chills and fever.   HENT:  Negative for trouble swallowing.    Respiratory:  Negative for cough and shortness of breath.    Cardiovascular:  Negative for chest pain and leg swelling.   Gastrointestinal:  Negative for abdominal pain, blood in stool, nausea and vomiting.   Genitourinary:  Negative for dysuria and hematuria.   Neurological:  Negative for headaches.   Objective:     Vital Signs (Most Recent):  Temp: 98.3 °F (36.8 °C) (04/17/22 1112)  Pulse: 79 (04/17/22 1112)  Resp: 16 (04/17/22 1112)  BP: 122/63 (04/17/22 1112)  SpO2:  95 % (04/17/22 1112)   Vital Signs (24h Range):  Temp:  [98.1 °F (36.7 °C)-98.6 °F (37 °C)] 98.3 °F (36.8 °C)  Pulse:  [74-90] 79  Resp:  [16-18] 16  SpO2:  [95 %-99 %] 95 %  BP: (110-132)/(58-67) 122/63     Weight: 66.2 kg (145 lb 15.1 oz)  Body mass index is 19.79 kg/m².    Intake/Output Summary (Last 24 hours) at 4/17/2022 1226  Last data filed at 4/17/2022 0857  Gross per 24 hour   Intake 375 ml   Output 1900 ml   Net -1525 ml        Physical Exam  Vitals reviewed.   Constitutional:       General: He is not in acute distress.     Appearance: He is well-developed.   HENT:      Head: Normocephalic and atraumatic.   Eyes:      Extraocular Movements: Extraocular movements intact.      Pupils: Pupils are equal, round, and reactive to light.   Cardiovascular:      Rate and Rhythm: Normal rate and regular rhythm.   Pulmonary:      Effort: Pulmonary effort is normal. No respiratory distress.      Breath sounds: Normal breath sounds.   Abdominal:      General: Bowel sounds are normal. There is no distension.      Palpations: Abdomen is soft.      Tenderness: There is no abdominal tenderness.   Musculoskeletal:         General: No swelling. Normal range of motion.      Cervical back: Normal range of motion and neck supple.   Skin:     General: Skin is warm.      Findings: No rash.   Neurological:      General: No focal deficit present.      Mental Status: He is alert and oriented to person, place, and time.   Psychiatric:         Mood and Affect: Mood normal.         Behavior: Behavior normal.       Significant Labs: All pertinent labs within the past 24 hours have been reviewed.    Significant Imaging: I have reviewed all pertinent imaging results/findings within the past 24 hours.      Assessment/Plan:      * Acute stroke due to ischemia  - MRI showed areas of diffusion signal hyperintensity consistent with areas of acute ischemia/infarct involving the left cerebral hemisphere, the most prominent measuring approximately  1.4 cm, also a small focus of the right frontal lobe.  - Patient on full dose anticoagulation currently due to acute bilateral DVT.  - Sinus rhythm noted throughout hospital stay  - Risk factor modification - control diabetes, continue statin.  - RPR, HIV non reactive.  B12 low normal.   - CTA showed a focal segment of 60-70% stenosis involving the proximal to mid left vertebral artery that was new when compared to the prior study of 09/25/2019.  No evidence of large vessel intracranial occlusion.   - Neurology noted symptoms do not correspond to stroke location, although the frontal lobe stroke might have something to do with his lack of motivation, and Speech has noted he has had delayed swallowing.  - Echo with bubble study done, no shunt seen.  - Follow up with vascular neurology in 4 weeks.  - Therapy recommended rehab initially but changed recommendation to SNF due to his low interest in participation.  - Started Lexapro due to suspicion for depression.  - Psych consulted, changed to Prozac and started thiamine, folic acid, MVI due to previous history of alcohol abuse, although Wernicke encephalopathy is not suspected.  - Re-consulted SNF for rehab from the stroke, as discharging him to a shelter would be inappropriate and unsafe for him.  Discussed with his son, Forest, at length on 3/10.  Forest is out of town on a job but will be returning to [place] eventually.  He agrees his father will eventually need nursing home care as he is unable to care for himself.  - awaiting placement.      LTBI (latent tuberculosis infection)  See ID note.  Patient had positive PPD 2 years ago and was not treated.  PPD this admission is also positive.  He has been afebrile and has no cough or other TB symptoms.  CXR repeated and was normal.  Clinically he does not have TB.  Quantiferon gold has been ordered to see if he has latent TB.  Discussed with ID, if quantiferon gold is positive would treat for latent TB with INH and  pyridoxine for 6 months. Tb gold positive,started  INH and pyridoxine.        Severe protein-calorie malnutrition  Diet as tolerated  Patient seen by dietician, recommendations made.      Debility  PT/OT recommending rehab vs SNF after stroke with debility/poor motivation  Having difficulty finding placement for unclear reasons.  Will continue to pursue this as above.  Again, patient has been pleasant and cooperative.      Type 2 diabetes mellitus with hyperglycemia, without long-term current use of insulin  Reportedly he is on metformin and glipizide, both held.  He had 4+ sugar in urine and HbA1c was 10.3, and he was hyperglycemic on presentation.  Started on Levemir and ISS, then increased Levemir to 25 units daily  Added scheduled novolog 5 units tid, increased to 7 units with improvement  OK to resume metformin.    Increased levemir to 28 units daily with good improvement.  Held metformin with contrast with CT.  BG lower now off metformin, has had a few low BG as well due to being on clear liquids for colonoscopy prep.  Levemir decreased to 20 units daily.    As expected BG increased when diet was resumed so increased Levemir back to 28, will increase to 30 units as sugars on higher side  Metformin discontinued while he was NPO but now resumed  Sugars in 70s for a day,levemir decreased to 25 units, monitor sugars, higher , will increase to 28 units daily.    Acute deep vein thrombosis (DVT) of both femoral veins  Last ultrasound showed partially occlusive DVTs, now completely occlusive DVT of multiple lower extremity veins on ultrasound.  Patient reports compliance with warfarin but his INR is only one.  He is homeless but says he does not have difficulty obtaining his medications.  Does not seem to care about anything, which again might be due to the frontal lobe stroke.  D-dimer was markedly elevated on presentation and there was a question of possible PE, but he had a CTA of the brain/neck on presentation so  could not get another CTA for another 48 hours.    V/Q scan was done, showed low probability for PE.  2D echo showed grade I diastolic dysfunction.  Warfarin was restarted with bridging Lovenox, but INR was still low.  Changed to apixaban and discontinued Lovenox.    Hyperlipidemia  Continue atorvastatin status post stroke.        VTE Risk Mitigation (From admission, onward)         Ordered     apixaban tablet 5 mg  2 times daily         03/29/22 1555     Place sequential compression device  Until discontinued         01/31/22 0533                Discharge Planning   EFRAÍN:      Code Status: Full Code   Is the patient medically ready for discharge?:     Reason for patient still in hospital (select all that apply): Pending disposition  Discharge Plan A: Skilled Nursing Facility   Discharge Delays:  (Patient's finances can't be varified with the bank.)              Alma Elizalde MD  Department of Hospital Medicine   [facility] ([place])

## 2022-04-17 NOTE — PLAN OF CARE
Pt AAOx4. No acute events this shift. Pt free from falls and complaints of pain.Bed low and locked in place. Call bell and personal items in reach.      Problem: Adult Inpatient Plan of Care  Goal: Plan of Care Review  Outcome: Ongoing, Progressing  Goal: Patient-Specific Goal (Individualized)  Outcome: Ongoing, Progressing  Goal: Absence of Hospital-Acquired Illness or Injury  Outcome: Ongoing, Progressing  Goal: Optimal Comfort and Wellbeing  Outcome: Ongoing, Progressing     Problem: Diabetes Comorbidity  Goal: Blood Glucose Level Within Targeted Range  Outcome: Ongoing, Progressing     Problem: Skin Injury Risk Increased  Goal: Skin Health and Integrity  Outcome: Ongoing, Progressing     Problem: Coping Ineffective  Goal: Effective Coping  Outcome: Ongoing, Progressing     Problem: Fall Injury Risk  Goal: Absence of Fall and Fall-Related Injury  Outcome: Ongoing, Progressing     Problem: Adjustment to Illness (Sepsis/Septic Shock)  Goal: Optimal Coping  Outcome: Ongoing, Progressing     Problem: Bleeding (Sepsis/Septic Shock)  Goal: Absence of Bleeding  Outcome: Ongoing, Progressing     Problem: Glycemic Control Impaired (Sepsis/Septic Shock)  Goal: Blood Glucose Level Within Desired Range  Outcome: Ongoing, Progressing     Problem: Infection Progression (Sepsis/Septic Shock)  Goal: Absence of Infection Signs and Symptoms  Outcome: Ongoing, Progressing     Problem: Nutrition Impaired (Sepsis/Septic Shock)  Goal: Optimal Nutrition Intake  Outcome: Ongoing, Progressing     Problem: Infection  Goal: Absence of Infection Signs and Symptoms  Outcome: Ongoing, Progressing     Problem: Spiritual Distress Risk or Actual  Goal: Spiritual Wellbeing  Outcome: Ongoing, Progressing

## 2022-04-17 NOTE — PLAN OF CARE
AAO x 4 but can be a little forgetful at times.  Skin is warm/dry without breakdown observed.  Foam padding to sacrum intact.  No IV site (MD aware).  Condom catheter patent with clear yellow urine draining.  One large soft brown stool during shift noted ( see I& Os). HS accucheck at 195 so no coverage required.  No complaints of pain or distress voiced/observed.  [employer] camera #3 in use.  Door left open.  Call light within reach.  Rounding maintained per protocol.      Problem: Adult Inpatient Plan of Care  Goal: Plan of Care Review  Outcome: Ongoing, Progressing  Flowsheets (Taken 4/17/2022 0529)  Plan of Care Reviewed With: patient     Problem: Diabetes Comorbidity  Goal: Blood Glucose Level Within Targeted Range  Outcome: Ongoing, Progressing  Intervention: Monitor and Manage Glycemia  Flowsheets (Taken 4/17/2022 0529)  Glycemic Management: blood glucose monitored     Problem: Skin Injury Risk Increased  Goal: Skin Health and Integrity  Outcome: Ongoing, Progressing  Intervention: Optimize Skin Protection  Flowsheets (Taken 4/17/2022 0529)  Pressure Reduction Techniques: frequent weight shift encouraged  Pressure Reduction Devices:   alternating pressure pump (ADD)   pressure-redistributing mattress utilized  Skin Protection: silicone foam dressing in place  Head of Bed (HOB) Positioning: HOB elevated     Problem: Fall Injury Risk  Goal: Absence of Fall and Fall-Related Injury  Outcome: Ongoing, Progressing  Intervention: Identify and Manage Contributors  Flowsheets (Taken 4/17/2022 0529)  Self-Care Promotion:   independence encouraged   BADL personal routines maintained   meal set-up provided  Medication Review/Management:   medications reviewed   high-risk medications identified  Intervention: Promote Injury-Free Environment  Flowsheets (Taken 4/17/2022 0529)  Safety Promotion/Fall Prevention:   /camera at bedside   room near unit station   instructed to call staff for mobility   high risk  medications identified   Fall Risk reviewed with patient/family   Fall Risk signage in place   lighting adjusted   medications reviewed   nonskid shoes/socks when out of bed     Problem: Glycemic Control Impaired (Sepsis/Septic Shock)  Goal: Blood Glucose Level Within Desired Range  Outcome: Ongoing, Progressing  Intervention: Optimize Glycemic Control  Flowsheets (Taken 4/17/2022 0529)  Glycemic Management: blood glucose monitored     Problem: Infection Progression (Sepsis/Septic Shock)  Goal: Absence of Infection Signs and Symptoms  Outcome: Ongoing, Progressing  Intervention: Initiate Sepsis Management  Flowsheets (Taken 4/17/2022 0529)  Infection Prevention: single patient room provided  Infection Management: aseptic technique maintained

## 2022-04-17 NOTE — SUBJECTIVE & OBJECTIVE
Interval History: Tolerating diet, awaiting placement, no acute issues.  Review of Systems   Constitutional:  Negative for chills and fever.   HENT:  Negative for trouble swallowing.    Respiratory:  Negative for cough and shortness of breath.    Cardiovascular:  Negative for chest pain and leg swelling.   Gastrointestinal:  Negative for abdominal pain, blood in stool, nausea and vomiting.   Genitourinary:  Negative for dysuria and hematuria.   Neurological:  Negative for headaches.   Objective:     Vital Signs (Most Recent):  Temp: 98.3 °F (36.8 °C) (04/17/22 1112)  Pulse: 79 (04/17/22 1112)  Resp: 16 (04/17/22 1112)  BP: 122/63 (04/17/22 1112)  SpO2: 95 % (04/17/22 1112)   Vital Signs (24h Range):  Temp:  [98.1 °F (36.7 °C)-98.6 °F (37 °C)] 98.3 °F (36.8 °C)  Pulse:  [74-90] 79  Resp:  [16-18] 16  SpO2:  [95 %-99 %] 95 %  BP: (110-132)/(58-67) 122/63     Weight: 66.2 kg (145 lb 15.1 oz)  Body mass index is 19.79 kg/m².    Intake/Output Summary (Last 24 hours) at 4/17/2022 1226  Last data filed at 4/17/2022 0857  Gross per 24 hour   Intake 375 ml   Output 1900 ml   Net -1525 ml        Physical Exam  Vitals reviewed.   Constitutional:       General: He is not in acute distress.     Appearance: He is well-developed.   HENT:      Head: Normocephalic and atraumatic.   Eyes:      Extraocular Movements: Extraocular movements intact.      Pupils: Pupils are equal, round, and reactive to light.   Cardiovascular:      Rate and Rhythm: Normal rate and regular rhythm.   Pulmonary:      Effort: Pulmonary effort is normal. No respiratory distress.      Breath sounds: Normal breath sounds.   Abdominal:      General: Bowel sounds are normal. There is no distension.      Palpations: Abdomen is soft.      Tenderness: There is no abdominal tenderness.   Musculoskeletal:         General: No swelling. Normal range of motion.      Cervical back: Normal range of motion and neck supple.   Skin:     General: Skin is warm.      Findings:  No rash.   Neurological:      General: No focal deficit present.      Mental Status: He is alert and oriented to person, place, and time.   Psychiatric:         Mood and Affect: Mood normal.         Behavior: Behavior normal.       Significant Labs: All pertinent labs within the past 24 hours have been reviewed.    Significant Imaging: I have reviewed all pertinent imaging results/findings within the past 24 hours.

## 2022-04-18 LAB
POCT GLUCOSE: 168 MG/DL (ref 70–110)
POCT GLUCOSE: 172 MG/DL (ref 70–110)
POCT GLUCOSE: 309 MG/DL (ref 70–110)
POCT GLUCOSE: 85 MG/DL (ref 70–110)

## 2022-04-18 PROCEDURE — 99232 SBSQ HOSP IP/OBS MODERATE 35: CPT | Mod: ,,, | Performed by: INTERNAL MEDICINE

## 2022-04-18 PROCEDURE — 25000003 PHARM REV CODE 250: Performed by: INTERNAL MEDICINE

## 2022-04-18 PROCEDURE — 94761 N-INVAS EAR/PLS OXIMETRY MLT: CPT

## 2022-04-18 PROCEDURE — 25000003 PHARM REV CODE 250: Performed by: HOSPITALIST

## 2022-04-18 PROCEDURE — 99232 PR SUBSEQUENT HOSPITAL CARE,LEVL II: ICD-10-PCS | Mod: ,,, | Performed by: INTERNAL MEDICINE

## 2022-04-18 PROCEDURE — 11000001 HC ACUTE MED/SURG PRIVATE ROOM

## 2022-04-18 RX ADMIN — INSULIN ASPART 7 UNITS: 100 INJECTION, SOLUTION INTRAVENOUS; SUBCUTANEOUS at 01:04

## 2022-04-18 RX ADMIN — APIXABAN 5 MG: 2.5 TABLET, FILM COATED ORAL at 10:04

## 2022-04-18 RX ADMIN — INSULIN ASPART 4 UNITS: 100 INJECTION, SOLUTION INTRAVENOUS; SUBCUTANEOUS at 09:04

## 2022-04-18 RX ADMIN — APIXABAN 5 MG: 2.5 TABLET, FILM COATED ORAL at 09:04

## 2022-04-18 RX ADMIN — ISONIAZID 300 MG: 300 TABLET ORAL at 09:04

## 2022-04-18 RX ADMIN — Medication 25 MG: at 09:04

## 2022-04-18 RX ADMIN — FLUOXETINE 20 MG: 20 CAPSULE ORAL at 09:04

## 2022-04-18 RX ADMIN — THERA TABS 1 TABLET: TAB at 09:04

## 2022-04-18 RX ADMIN — METFORMIN HYDROCHLORIDE 500 MG: 500 TABLET ORAL at 06:04

## 2022-04-18 RX ADMIN — METFORMIN HYDROCHLORIDE 500 MG: 500 TABLET ORAL at 09:04

## 2022-04-18 RX ADMIN — THIAMINE HCL TAB 100 MG 100 MG: 100 TAB at 09:04

## 2022-04-18 RX ADMIN — TAMSULOSIN HYDROCHLORIDE 0.4 MG: 0.4 CAPSULE ORAL at 10:04

## 2022-04-18 RX ADMIN — POLYETHYLENE GLYCOL 3350 17 G: 17 POWDER, FOR SOLUTION ORAL at 09:04

## 2022-04-18 RX ADMIN — ATORVASTATIN CALCIUM 80 MG: 20 TABLET, FILM COATED ORAL at 09:04

## 2022-04-18 RX ADMIN — FOLIC ACID 1 MG: 1 TABLET ORAL at 09:04

## 2022-04-18 RX ADMIN — INSULIN ASPART 7 UNITS: 100 INJECTION, SOLUTION INTRAVENOUS; SUBCUTANEOUS at 09:04

## 2022-04-18 NOTE — PROGRESS NOTES
[facility], [place], operated by [facility]  Progress Note    Patient Name: Forest Lopez  MRN: [id number]  Patient Class: IP- Inpatient   Admission Date: 1/31/2022  Length of Stay: 77 days  Attending Physician: Alma Elizalde MD  Primary Care Provider: Julian Escobar        Subjective:     Principal Problem:Acute stroke due to ischemia        HPI:  Mr. Lopez is a 72 year old man who presented after a syncopal episode.  He reports he had been feeling well prior to the episode but then had a prodrome prior to passing out.  EMS was called, report patient's BP was low normal on their arrival, improved after an IV fluids bolus.  Patient denies chest pain or shortness of breath and says he does not feel weak currently although is rather tired.  When seen in the ED this morning he could barely express himself audibly.     Vital signs were normal on presentation here.  He is on warfarin for recurrent DVT, but his INR was 1, and d-dimer markedly elevated at 21.7.  Tox screen was positive for cocaine.  He had a CTA of the brain and neck done on presentation so CTA of the chest for PE study could not be done for 48 hours.  Ultrasound of the lower extremities showed extensive bilateral DVT.  On the right there was thrombosis of the common femoral vein, femoral vein, popliteal vein, one of the paired posterior tibial veins and both visualized peroneal veins.  On the left there was thrombosis of the common femoral vein, femoral vein and popliteal vein.  Patient was started on full dose Lovenox and admitted.    Medical history is from the chart as he is unable to give me much information.  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  He smokes 5-6 cigarettes/day and is not interested in quitting.  He is currently homeless and staying with a friend.  Despite the normal INR he is sure he  is taking his warfarin and is not having difficulty taking his medications.  I discussed his care with [facility] staff on the [facility] and patient had been lost to follow up since November 2021.      Overview/Hospital Course:  Patient presented to the ED after a syncopal episode and was found to have bilateral lower extremity DVTs and a low INR.  Tox screen was positive for cocaine.  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  He was started back on his warfarin with bridging Lovenox.  His 2D echo showed grade I diastolic dysfunction.  Neurology evaluated him and recommended echo with bubble study, which was negative for a shunt.    PT/OT evaluated him and recommended SNF placement versus inpatient rehab.  His INR was difficult to get therapeutic, and as he had no contraindication to a NOAC his warfarin was changed to apixaban, and the full dose Lovenox was discontinued.  As he appeared to be depressed and had no objection to starting an antidepressant Lexapro was started.  He had a fall in the hospital on 2/13, had gone to the bathroom for a BM with the nurse, and when she turned away while he was washing his hands he apparently lost his balance and fell.  He did not hit his head and did not lose consciousness, but his BP was low transiently and he was given a bolus of IV fluids.    Patient's mental status improved slowly, but he gradually became more talkative and was able to hold a conversation.  He was diagnosed with a polymicrobial UTI on 2/21 and completed treatment with antibiotics.  Psychiatry evaluated him on 3/3 and changed his antidepressant to Prozac as it can be more activating and patient was having difficulty with his level of motivation.      Patient had some episodes of nausea and vomiting and CT of the abdomen was done, with incidental finding of rectal wall thickening noted.  Colonoscopy was recommended, but  he was unable to finish the prep.  GI evaluated him and decided the patient would benefit from a flex sig to evaluate the rectum.  He had the procedure on 3/25 and no abnormalities were seen other than internal hemorrhoids.    Patient's discharge was delayed as he was not accepted by any SNF facilities in the area.  Reasons for the denials are unclear as he has a clear rehab diagnosis.  He has a history of drug use but has not shown any interest in getting any illicit or legal drugs since he has been here, and he has had no visitors that could have provided drugs to him.  He has been pleasant and cooperative while here although lacks motivation, likely due to the nature of the stroke affecting his frontal lobe.  He requires considerable encouragement to participate in therapy.  Discharging him to live on the street or shelter would not be appropriate as he would not be able to take care of himself.  At this point he has agreed to group home nursing care, as his stroke was more than 2 months ago and he has already had sufficient rehab while here in the hospital.  Discharge was planned for 4/8/22, but was delayed due to him not having access to his own bank account.  Hopefully he can be transferred to nursing home as soon as financial situation is straightened out.      Interval History: Tolerating diet, awaiting placement, no acute issues.  Review of Systems   Constitutional:  Negative for chills and fever.   HENT:  Negative for trouble swallowing.    Respiratory:  Negative for cough and shortness of breath.    Cardiovascular:  Negative for chest pain and leg swelling.   Gastrointestinal:  Negative for abdominal pain, blood in stool, nausea and vomiting.   Genitourinary:  Negative for dysuria and hematuria.   Neurological:  Negative for headaches.   Objective:     Vital Signs (Most Recent):  Temp: 97.2 °F (36.2 °C) (04/18/22 1109)  Pulse: 99 (04/18/22 1109)  Resp: 18 (04/18/22 1109)  BP: 117/78 (04/18/22 1109)  SpO2:  97 % (04/18/22 1109)   Vital Signs (24h Range):  Temp:  [97.2 °F (36.2 °C)-98.4 °F (36.9 °C)] 97.2 °F (36.2 °C)  Pulse:  [74-99] 99  Resp:  [16-20] 18  SpO2:  [96 %-98 %] 97 %  BP: ()/(58-78) 117/78     Weight: 66.2 kg (145 lb 15.1 oz)  Body mass index is 19.79 kg/m².    Intake/Output Summary (Last 24 hours) at 4/18/2022 1315  Last data filed at 4/18/2022 0720  Gross per 24 hour   Intake 640 ml   Output 1200 ml   Net -560 ml        Physical Exam  Vitals reviewed.   Constitutional:       General: He is not in acute distress.     Appearance: He is well-developed.   HENT:      Head: Normocephalic and atraumatic.   Eyes:      Extraocular Movements: Extraocular movements intact.      Pupils: Pupils are equal, round, and reactive to light.   Cardiovascular:      Rate and Rhythm: Normal rate and regular rhythm.   Pulmonary:      Effort: Pulmonary effort is normal. No respiratory distress.      Breath sounds: Normal breath sounds.   Abdominal:      General: Bowel sounds are normal. There is no distension.      Palpations: Abdomen is soft.      Tenderness: There is no abdominal tenderness.   Musculoskeletal:         General: No swelling. Normal range of motion.      Cervical back: Normal range of motion and neck supple.   Skin:     General: Skin is warm.      Findings: No rash.   Neurological:      General: No focal deficit present.      Mental Status: He is alert and oriented to person, place, and time.   Psychiatric:         Mood and Affect: Mood normal.         Behavior: Behavior normal.       Significant Labs: All pertinent labs within the past 24 hours have been reviewed.    Significant Imaging: I have reviewed all pertinent imaging results/findings within the past 24 hours.      Assessment/Plan:      * Acute stroke due to ischemia  - MRI showed areas of diffusion signal hyperintensity consistent with areas of acute ischemia/infarct involving the left cerebral hemisphere, the most prominent measuring approximately  1.4 cm, also a small focus of the right frontal lobe.  - Patient on full dose anticoagulation currently due to acute bilateral DVT.  - Sinus rhythm noted throughout hospital stay  - Risk factor modification - control diabetes, continue statin.  - RPR, HIV non reactive.  B12 low normal.   - CTA showed a focal segment of 60-70% stenosis involving the proximal to mid left vertebral artery that was new when compared to the prior study of 09/25/2019.  No evidence of large vessel intracranial occlusion.   - Neurology noted symptoms do not correspond to stroke location, although the frontal lobe stroke might have something to do with his lack of motivation, and Speech has noted he has had delayed swallowing.  - Echo with bubble study done, no shunt seen.  - Follow up with vascular neurology in 4 weeks.  - Therapy recommended rehab initially but changed recommendation to SNF due to his low interest in participation.  - Started Lexapro due to suspicion for depression.  - Psych consulted, changed to Prozac and started thiamine, folic acid, MVI due to previous history of alcohol abuse, although Wernicke encephalopathy is not suspected.  - Re-consulted SNF for rehab from the stroke, as discharging him to a shelter would be inappropriate and unsafe for him.  Discussed with his son, Forest, at length on 3/10.  Forest is out of town on a job but will be returning to [place] eventually.  He agrees his father will eventually need nursing home care as he is unable to care for himself.  - awaiting placement.      LTBI (latent tuberculosis infection)  See ID note.  Patient had positive PPD 2 years ago and was not treated.  PPD this admission is also positive.  He has been afebrile and has no cough or other TB symptoms.  CXR repeated and was normal.  Clinically he does not have TB.  Quantiferon gold has been ordered to see if he has latent TB.  Discussed with ID, if quantiferon gold is positive would treat for latent TB with INH and  pyridoxine for 6 months. Tb gold positive,started  INH and pyridoxine.        Severe protein-calorie malnutrition  Diet as tolerated  Patient seen by dietician, recommendations made.      Debility  PT/OT recommending rehab vs SNF after stroke with debility/poor motivation  Having difficulty finding placement for unclear reasons.  Will continue to pursue this as above.  Again, patient has been pleasant and cooperative.      Type 2 diabetes mellitus with hyperglycemia, without long-term current use of insulin  Reportedly he is on metformin and glipizide, both held.  He had 4+ sugar in urine and HbA1c was 10.3, and he was hyperglycemic on presentation.  Started on Levemir and ISS, then increased Levemir to 25 units daily  Added scheduled novolog 5 units tid, increased to 7 units with improvement  OK to resume metformin.    Increased levemir to 28 units daily with good improvement.  Held metformin with contrast with CT.  BG lower now off metformin, has had a few low BG as well due to being on clear liquids for colonoscopy prep.  Levemir decreased to 20 units daily.    As expected BG increased when diet was resumed so increased Levemir back to 28, will increase to 30 units as sugars on higher side  Metformin discontinued while he was NPO but now resumed  Sugars in 70s for a day,levemir decreased to 25 units, monitor sugars, higher , will increase to 28 units daily  Sugars high this am, better later in day    Acute deep vein thrombosis (DVT) of both femoral veins  Last ultrasound showed partially occlusive DVTs, now completely occlusive DVT of multiple lower extremity veins on ultrasound.  Patient reports compliance with warfarin but his INR is only one.  He is homeless but says he does not have difficulty obtaining his medications.  Does not seem to care about anything, which again might be due to the frontal lobe stroke.  D-dimer was markedly elevated on presentation and there was a question of possible PE, but he had a  CTA of the brain/neck on presentation so could not get another CTA for another 48 hours.    V/Q scan was done, showed low probability for PE.  2D echo showed grade I diastolic dysfunction.  Warfarin was restarted with bridging Lovenox, but INR was still low.  Changed to apixaban and discontinued Lovenox.    Hyperlipidemia  Continue atorvastatin status post stroke.        VTE Risk Mitigation (From admission, onward)         Ordered     apixaban tablet 5 mg  2 times daily         03/29/22 1555     Place sequential compression device  Until discontinued         01/31/22 0533                Discharge Planning   EFRAÍN:      Code Status: Full Code   Is the patient medically ready for discharge?:     Reason for patient still in hospital (select all that apply): Patient trending condition and Treatment  Discharge Plan A: Skilled Nursing Facility   Discharge Delays:  (Patient's finances can't be varified with the bank.)              Alma Elizalde MD  Department of Hospital Medicine   [facility] - Med Surg ([place])

## 2022-04-18 NOTE — PLAN OF CARE
Mr. Garg is AAO x 4 for this shift.  His memory remained intact during the night.  Skin warm/dry without breakdown.  He pulled off the sacral foam dressing due to discomfort.  He is frequently turning in bed.  Ambulated with standby assist to toilet.  One large brown BM produced.  Patient cleaned and fresh linen and gown applied.  Condom catheter changed once during the night.  Urine clear yellow (see I&Os for volume).  HS  so NO sliding scale required.  Patient has a very good appetite for supper and ate 100%.  No signs/symptoms of infection (no temps, ST or chills).  Call light within reach.  Room near nurses' station with door left open.  [employer] camera #3 in use.  No falls occurred.  Rounding maintained per protocol.    Problem: Adult Inpatient Plan of Care  Goal: Plan of Care Review  Outcome: Adequate for Care Transition  Flowsheets (Taken 4/18/2022 0543)  Plan of Care Reviewed With: patient     Problem: Diabetes Comorbidity  Goal: Blood Glucose Level Within Targeted Range  Outcome: Adequate for Care Transition  Intervention: Monitor and Manage Glycemia  Flowsheets (Taken 4/18/2022 0543)  Glycemic Management: blood glucose monitored     Problem: Skin Injury Risk Increased  Goal: Skin Health and Integrity  Outcome: Adequate for Care Transition  Intervention: Optimize Skin Protection  Flowsheets (Taken 4/18/2022 0543)  Pressure Reduction Techniques: frequent weight shift encouraged  Pressure Reduction Devices:   alternating pressure pump (ADD)   pressure-redistributing mattress utilized  Skin Protection: silicone foam dressing in place     Problem: Coping Ineffective  Goal: Effective Coping  Outcome: Adequate for Care Transition  Intervention: Support and Enhance Coping Strategies  Flowsheets (Taken 4/18/2022 0543)  Supportive Measures:   positive reinforcement provided   self-care encouraged   self-reflection promoted   self-responsibility promoted   verbalization of feelings encouraged  Family/Support  System Care:   self-care encouraged   support provided  Environmental Support: calm environment promoted     Problem: Fall Injury Risk  Goal: Absence of Fall and Fall-Related Injury  Outcome: Adequate for Care Transition  Intervention: Identify and Manage Contributors  Flowsheets (Taken 4/18/2022 0543)  Self-Care Promotion: independence encouraged  Medication Review/Management:   medications reviewed   high-risk medications identified  Intervention: Promote Injury-Free Environment  Flowsheets (Taken 4/18/2022 0543)  Safety Promotion/Fall Prevention:   /camera at bedside   side rails raised x 2   instructed to call staff for mobility   high risk medications identified   lighting adjusted   medications reviewed   Fall Risk signage in place   Fall Risk reviewed with patient/family     Problem: Glycemic Control Impaired (Sepsis/Septic Shock)  Goal: Blood Glucose Level Within Desired Range  Outcome: Adequate for Care Transition  Intervention: Optimize Glycemic Control  Flowsheets (Taken 4/18/2022 0543)  Glycemic Management: blood glucose monitored     Problem: Infection Progression (Sepsis/Septic Shock)  Goal: Absence of Infection Signs and Symptoms  Outcome: Adequate for Care Transition

## 2022-04-18 NOTE — ASSESSMENT & PLAN NOTE
Reportedly he is on metformin and glipizide, both held.  He had 4+ sugar in urine and HbA1c was 10.3, and he was hyperglycemic on presentation.  Started on Levemir and ISS, then increased Levemir to 25 units daily  Added scheduled novolog 5 units tid, increased to 7 units with improvement  OK to resume metformin.    Increased levemir to 28 units daily with good improvement.  Held metformin with contrast with CT.  BG lower now off metformin, has had a few low BG as well due to being on clear liquids for colonoscopy prep.  Levemir decreased to 20 units daily.    As expected BG increased when diet was resumed so increased Levemir back to 28, will increase to 30 units as sugars on higher side  Metformin discontinued while he was NPO but now resumed  Sugars in 70s for a day,levemir decreased to 25 units, monitor sugars, higher , will increase to 28 units daily  Sugars high this am, better later in day

## 2022-04-18 NOTE — SUBJECTIVE & OBJECTIVE
Interval History: Tolerating diet, awaiting placement, no acute issues.  Review of Systems   Constitutional:  Negative for chills and fever.   HENT:  Negative for trouble swallowing.    Respiratory:  Negative for cough and shortness of breath.    Cardiovascular:  Negative for chest pain and leg swelling.   Gastrointestinal:  Negative for abdominal pain, blood in stool, nausea and vomiting.   Genitourinary:  Negative for dysuria and hematuria.   Neurological:  Negative for headaches.   Objective:     Vital Signs (Most Recent):  Temp: 97.2 °F (36.2 °C) (04/18/22 1109)  Pulse: 99 (04/18/22 1109)  Resp: 18 (04/18/22 1109)  BP: 117/78 (04/18/22 1109)  SpO2: 97 % (04/18/22 1109)   Vital Signs (24h Range):  Temp:  [97.2 °F (36.2 °C)-98.4 °F (36.9 °C)] 97.2 °F (36.2 °C)  Pulse:  [74-99] 99  Resp:  [16-20] 18  SpO2:  [96 %-98 %] 97 %  BP: ()/(58-78) 117/78     Weight: 66.2 kg (145 lb 15.1 oz)  Body mass index is 19.79 kg/m².    Intake/Output Summary (Last 24 hours) at 4/18/2022 1315  Last data filed at 4/18/2022 0720  Gross per 24 hour   Intake 640 ml   Output 1200 ml   Net -560 ml        Physical Exam  Vitals reviewed.   Constitutional:       General: He is not in acute distress.     Appearance: He is well-developed.   HENT:      Head: Normocephalic and atraumatic.   Eyes:      Extraocular Movements: Extraocular movements intact.      Pupils: Pupils are equal, round, and reactive to light.   Cardiovascular:      Rate and Rhythm: Normal rate and regular rhythm.   Pulmonary:      Effort: Pulmonary effort is normal. No respiratory distress.      Breath sounds: Normal breath sounds.   Abdominal:      General: Bowel sounds are normal. There is no distension.      Palpations: Abdomen is soft.      Tenderness: There is no abdominal tenderness.   Musculoskeletal:         General: No swelling. Normal range of motion.      Cervical back: Normal range of motion and neck supple.   Skin:     General: Skin is warm.      Findings:  No rash.   Neurological:      General: No focal deficit present.      Mental Status: He is alert and oriented to person, place, and time.   Psychiatric:         Mood and Affect: Mood normal.         Behavior: Behavior normal.       Significant Labs: All pertinent labs within the past 24 hours have been reviewed.    Significant Imaging: I have reviewed all pertinent imaging results/findings within the past 24 hours.

## 2022-04-19 ENCOUNTER — PATIENT OUTREACH (OUTPATIENT)
Dept: ADMINISTRATIVE | Facility: OTHER | Age: 72
End: 2022-04-19
Payer: MEDICARE

## 2022-04-19 LAB
ERYTHROCYTE [DISTWIDTH] IN BLOOD BY AUTOMATED COUNT: 13.7 % (ref 11.5–14.5)
HCT VFR BLD AUTO: 40.5 % (ref 40–54)
HGB BLD-MCNC: 12.8 G/DL (ref 14–18)
MCH RBC QN AUTO: 26 PG (ref 27–31)
MCHC RBC AUTO-ENTMCNC: 31.6 G/DL (ref 32–36)
MCV RBC AUTO: 82 FL (ref 82–98)
PLATELET # BLD AUTO: 247 K/UL (ref 150–450)
PMV BLD AUTO: 10 FL (ref 9.2–12.9)
POCT GLUCOSE: 107 MG/DL (ref 70–110)
POCT GLUCOSE: 115 MG/DL (ref 70–110)
POCT GLUCOSE: 125 MG/DL (ref 70–110)
POCT GLUCOSE: 128 MG/DL (ref 70–110)
RBC # BLD AUTO: 4.93 M/UL (ref 4.6–6.2)
WBC # BLD AUTO: 9.27 K/UL (ref 3.9–12.7)

## 2022-04-19 PROCEDURE — 25000003 PHARM REV CODE 250: Performed by: INTERNAL MEDICINE

## 2022-04-19 PROCEDURE — 36415 COLL VENOUS BLD VENIPUNCTURE: CPT | Performed by: INTERNAL MEDICINE

## 2022-04-19 PROCEDURE — 99231 PR SUBSEQUENT HOSPITAL CARE,LEVL I: ICD-10-PCS | Mod: ,,, | Performed by: HOSPITALIST

## 2022-04-19 PROCEDURE — 85027 COMPLETE CBC AUTOMATED: CPT | Performed by: INTERNAL MEDICINE

## 2022-04-19 PROCEDURE — 97530 THERAPEUTIC ACTIVITIES: CPT | Mod: CQ

## 2022-04-19 PROCEDURE — 97530 THERAPEUTIC ACTIVITIES: CPT

## 2022-04-19 PROCEDURE — 99231 SBSQ HOSP IP/OBS SF/LOW 25: CPT | Mod: ,,, | Performed by: HOSPITALIST

## 2022-04-19 PROCEDURE — 25000003 PHARM REV CODE 250: Performed by: HOSPITALIST

## 2022-04-19 PROCEDURE — 11000001 HC ACUTE MED/SURG PRIVATE ROOM

## 2022-04-19 RX ADMIN — THERA TABS 1 TABLET: TAB at 09:04

## 2022-04-19 RX ADMIN — THIAMINE HCL TAB 100 MG 100 MG: 100 TAB at 09:04

## 2022-04-19 RX ADMIN — FLUOXETINE 20 MG: 20 CAPSULE ORAL at 09:04

## 2022-04-19 RX ADMIN — METFORMIN HYDROCHLORIDE 500 MG: 500 TABLET ORAL at 06:04

## 2022-04-19 RX ADMIN — POLYETHYLENE GLYCOL 3350 17 G: 17 POWDER, FOR SOLUTION ORAL at 09:04

## 2022-04-19 RX ADMIN — INSULIN ASPART 7 UNITS: 100 INJECTION, SOLUTION INTRAVENOUS; SUBCUTANEOUS at 01:04

## 2022-04-19 RX ADMIN — APIXABAN 5 MG: 2.5 TABLET, FILM COATED ORAL at 09:04

## 2022-04-19 RX ADMIN — METFORMIN HYDROCHLORIDE 500 MG: 500 TABLET ORAL at 09:04

## 2022-04-19 RX ADMIN — FOLIC ACID 1 MG: 1 TABLET ORAL at 09:04

## 2022-04-19 RX ADMIN — INSULIN ASPART 7 UNITS: 100 INJECTION, SOLUTION INTRAVENOUS; SUBCUTANEOUS at 09:04

## 2022-04-19 RX ADMIN — Medication 25 MG: at 09:04

## 2022-04-19 RX ADMIN — ISONIAZID 300 MG: 300 TABLET ORAL at 09:04

## 2022-04-19 RX ADMIN — ATORVASTATIN CALCIUM 80 MG: 20 TABLET, FILM COATED ORAL at 09:04

## 2022-04-19 RX ADMIN — TAMSULOSIN HYDROCHLORIDE 0.4 MG: 0.4 CAPSULE ORAL at 09:04

## 2022-04-19 NOTE — SUBJECTIVE & OBJECTIVE
Interval History:  No complaints, awaiting placement.    Review of Systems   Constitutional:  Negative for chills and fever.   Respiratory:  Negative for cough and shortness of breath.    Cardiovascular:  Negative for chest pain and palpitations.   Gastrointestinal:  Negative for abdominal pain, nausea and vomiting.   Objective:     Vital Signs (Most Recent):  Temp: 98.2 °F (36.8 °C) (04/19/22 1622)  Pulse: 89 (04/19/22 1622)  Resp: 16 (04/19/22 1622)  BP: (!) 99/57 (04/19/22 1622)  SpO2: (!) 93 % (04/19/22 1622)   Vital Signs (24h Range):  Temp:  [97 °F (36.1 °C)-98.5 °F (36.9 °C)] 98.2 °F (36.8 °C)  Pulse:  [87-95] 89  Resp:  [14-20] 16  SpO2:  [93 %-100 %] 93 %  BP: ()/(57-79) 99/57     Weight: 66.2 kg (145 lb 15.1 oz)  Body mass index is 19.79 kg/m².    Intake/Output Summary (Last 24 hours) at 4/19/2022 1700  Last data filed at 4/19/2022 1400  Gross per 24 hour   Intake 840 ml   Output 1125 ml   Net -285 ml      Physical Exam  Constitutional:       General: He is not in acute distress.     Comments: Thin   Cardiovascular:      Rate and Rhythm: Normal rate and regular rhythm.   Pulmonary:      Effort: Pulmonary effort is normal.      Breath sounds: Normal breath sounds.   Abdominal:      General: Bowel sounds are normal.      Palpations: Abdomen is soft.   Skin:     General: Skin is warm and dry.   Neurological:      Mental Status: He is alert.      Comments: Not oriented to time.       Significant Labs: All pertinent labs within the past 24 hours have been reviewed.    Significant Imaging: I have reviewed all pertinent imaging results/findings within the past 24 hours.

## 2022-04-19 NOTE — PLAN OF CARE
04/18/22 2119   Post-Acute Status   Post-Acute Authorization Placement  (SNF)   Coverage [employer] MANAGED MEDICARE - [employer] (SPECIAL NEEDS PLAN)   Other Status   (Patient is going to a SNF)   Discharge Delays (!) Other  (Waiting on financial clearance.)   Discharge Plan   Discharge Plan A Skilled Nursing Facility   Discharge Plan B New Nursing Home placement - [facility] care facility     SHANICE spoke with Lexie at the [facility]. She stated that she had spoken to Any from [facility].  She solves problems with finances like that which Mr. Lopez is experiencing. Lexie stated that she will speak to Any again tomorrow, to find out what progress she has made.   SHANICE spoke with the patient today to update him on why he has not been transferred to [place] as of yet.

## 2022-04-19 NOTE — PLAN OF CARE
Pt in bed, no noted acute distress, no complaints of pain, AAO x 3, up with x 1 assist to restroom, blood glucose monitored, no supplemental insulin given, no injuries or falls during shift, AVASYS in place, bed in low locked position, call light in reach, hourly rounds complete, will continue to assess

## 2022-04-19 NOTE — PT/OT/SLP PROGRESS
Occupational Therapy   Treatment    Name: Forest Lopez  MRN: [id number]  Admitting Diagnosis:  Acute stroke due to ischemia  25 Days Post-Op    Recommendations:     Discharge Recommendations: Rehab vs. SNF  Discharge Equipment Recommendations:  bedside commode, shower chair, rollator  Barriers to discharge:  Decreased caregiver support    Assessment:     Forest Lopez is a 72 y.o. male with a medical diagnosis of Acute stroke due to ischemia.  He presents with no pain. Performance deficits affecting function are weakness, impaired self care skills, impaired functional mobilty, gait instability, decreased safety awareness, impaired balance, impaired cognition, impaired endurance.  Pt agreeable to participating in therapy upon arrival to room.  Pt able to perform sit <> stand transfer with SBA and take steps to chair with SBA-CGA.  Impaired balance noted with mobility, but no LOB.  Mild SOB observed with UB exercises.    Overall, pt tolerated therapy well.  He would continue to benefit from skilled OT services to address problems listed above and increase independence with ADLs.  Rehab vs. SNF is recommended upon d/c from acute care to further address deficits and help pt improve overall functional independence.         Rehab Prognosis:  Fair; patient would benefit from acute skilled OT services to address these deficits and reach maximum level of function.       Plan:     Patient to be seen 2 x/week to address the above listed problems via self-care/home management, therapeutic exercises, therapeutic activities  · Plan of Care Expires: 04/29/22  · Plan of Care Reviewed with: patient    Subjective     Pain/Comfort:  · Pain Rating 1: 0/10  · Pain Rating Post-Intervention 1: 0/10    Objective:     Communicated with:  Patient found HOB elevated with peripheral IV, AVASYS upon OT entry to room.    General Precautions: Standard, anti-coagulation medicine, aspiration, fall   Orthopedic Precautions:N/A   Braces:  Problem: Infection:  Goal: Will remain free from infection  Description: Will remain free from infection  Outcome: Ongoing  Note: Patient remains free from new signs and symptoms of infection during this shift. Infection prevention measures are in place. Will continue to monitor for alterations in patient condition throughout the shift. Problem: Safety:  Goal: Free from accidental physical injury  Description: Free from accidental physical injury  Outcome: Ongoing  Note: Patient remains free from physical harm during this shift. Will continue to monitor and assess patient. Goal: Free from intentional harm  Description: Free from intentional harm  Outcome: Ongoing  Note: Patient remains free from intentional harm during this shift. Will continue to monitor and assess patient. Problem: Pain:  Goal: Patient's pain/discomfort is manageable  Description: Patient's pain/discomfort is manageable  Outcome: Ongoing  Note: Patient declines pain during this shift. Pharmacologic and non-pharmacologic interventions will be implemented as needed. Will continue to monitor and assess patient. Problem: Skin Integrity:  Goal: Skin integrity will stabilize  Description: Skin integrity will stabilize  Outcome: Ongoing  Note: Patient skin condition and mucus membrane integrity remain unchanged during this shift. Skin breakdown prevention interventions are in place. Will continue to monitor and assess. Problem: Discharge Planning:  Goal: Patients continuum of care needs are met  Description: Patients continuum of care needs are met  Outcome: Ongoing  Note: Patient reports needs are met at this time, will continue to monitor and assess     Problem: Activity Intolerance:  Goal: Ability to tolerate increased activity will improve  Description: Ability to tolerate increased activity will improve  Outcome: Ongoing  Note: Patient tolerating ambulation w/o complication.  Patient UAL at this time w/ steady gait "N/A  Respiratory Status: Room air     Occupational Performance:     Bed Mobility:    · Supine <> sit: Supervision with HOB elvated  · Scooting: Supervision  · Sit <> Supine: Supervision    Functional Mobility/Transfers:  · Sit <> Stand:   · SBA x 1 trial from chair; with use of bed rail  · SBA x 1 trial from EOB  · Mild impaired balance/postural instability noted  · Functional Mobility: Pt took ~2 steps between chair <> bed with SBA-CGA.  · Impaired balance noted; no LOB observed    Activities of Daily Living:  · Upper Body Dressing: stand by assistance for adjusting gown and pulling upwards while seated at EOB.      [facility] 6 Click ADL: 19    Treatment & Education:  *Pt educated on role of OT in acute care setting.  *Pt performed UB exercises to provide stretch and promote increased endurance needed for ADLs: 2 sets x 12 reps per exercise; seated up in chair   -overhead claps  -"Runners" with shoulder flexion/extension  *Pt performed sit <> stand transfer from EOB and chair  *POC reviewed with pt      Patient left sitting edge of bed with call button in reachEducation:   and PCT present    GOALS:   Multidisciplinary Problems     Occupational Therapy Goals        Problem: Occupational Therapy Goal    Goal Priority Disciplines Outcome Interventions   Occupational Therapy Goal     OT, PT/OT Ongoing, Progressing    Description: Goals to be met by: 4/26/2022    Patient will increase functional independence with ADLs by performing:    UE Dressing with [place].  LE Dressing with [place].  Grooming while standing at sink with Supervision.  Toileting from toilet with Supervision for hygiene and clothing management.   Toilet transfer to toilet with Supervision.                     Time Tracking:     OT Date of Treatment: 04/19/22  OT Start Time: 1048  OT Stop Time: 1059  OT Total Time (min): 11 min    Billable Minutes:Therapeutic Activity 11    OT/DUTCH: OT          4/19/2022    " Problem: Airway Clearance - Ineffective:  Goal: Ability to maintain a clear airway will improve  Description: Ability to maintain a clear airway will improve  Outcome: Ongoing  Note: Patient airway clear during this shift, supported by 2L of O2      Problem: Breathing Pattern - Ineffective:  Goal: Ability to achieve and maintain a regular respiratory rate will improve  Description: Ability to achieve and maintain a regular respiratory rate will improve  Outcome: Ongoing     Problem: Gas Exchange - Impaired:  Goal: Levels of oxygenation will improve  Description: Levels of oxygenation will improve  Outcome: Ongoing  Note: Patient remains on 2L of O2 via NC this shift to maintain O2 > 90%      Problem: Health Behavior:  Goal: Identification of resources available to assist in meeting health care needs will improve  Description: Identification of resources available to assist in meeting health care needs will improve  Outcome: Ongoing     Problem: Physical Regulation:  Goal: Complications related to the disease process, condition or treatment will be avoided or minimized  Description: Complications related to the disease process, condition or treatment will be avoided or minimized  Outcome: Ongoing

## 2022-04-19 NOTE — ASSESSMENT & PLAN NOTE
See ID note.  Patient had positive PPD 2 years ago and was not treated.  PPD this admission is also positive.  He has been afebrile and has no cough or other TB symptoms.  CXR repeated and was normal.  Clinically he does not have TB.  Quantiferon gold has been ordered to see if he has latent TB.  Discussed with ID, if quantiferon gold is positive would treat for latent TB with INH and pyridoxine for 6 months. Tb gold positive, started  INH and pyridoxine.

## 2022-04-19 NOTE — PROGRESS NOTES
[facility] Medicine  Progress Note    Patient Name: Forest Lopez  MRN: [id number]  Patient Class: IP- Inpatient   Admission Date: 1/31/2022  Length of Stay: 78 days  Attending Physician: Mojgan Bosch MD  Primary Care Provider: Julian Escobar        Subjective:     Principal Problem:Acute stroke due to ischemia        HPI:  Mr. Lopez is a 72 year old man who presented after a syncopal episode.  He reports he had been feeling well prior to the episode but then had a prodrome prior to passing out.  EMS was called, report patient's BP was low normal on their arrival, improved after an IV fluids bolus.  Patient denies chest pain or shortness of breath and says he does not feel weak currently although is rather tired.  When seen in the ED this morning he could barely express himself audibly.     Vital signs were normal on presentation here.  He is on warfarin for recurrent DVT, but his INR was 1, and d-dimer markedly elevated at 21.7.  Tox screen was positive for cocaine.  He had a CTA of the brain and neck done on presentation so CTA of the chest for PE study could not be done for 48 hours.  Ultrasound of the lower extremities showed extensive bilateral DVT.  On the right there was thrombosis of the common femoral vein, femoral vein, popliteal vein, one of the paired posterior tibial veins and both visualized peroneal veins.  On the left there was thrombosis of the common femoral vein, femoral vein and popliteal vein.  Patient was started on full dose Lovenox and admitted.    Medical history is from the chart as he is unable to give me much information.  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  He smokes 5-6 cigarettes/day and is not interested in quitting.  He is currently homeless and staying with a friend.  Despite the normal INR he is sure  he is taking his warfarin and is not having difficulty taking his medications.  I discussed his care with [facility] staff on the [facility] and patient had been lost to follow up since November 2021.      Overview/Hospital Course:  Patient presented to the ED after a syncopal episode and was found to have bilateral lower extremity DVTs and a low INR.  Tox screen was positive for cocaine.  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  He was started back on his warfarin with bridging Lovenox.  His 2D echo showed grade I diastolic dysfunction.  Neurology evaluated him and recommended echo with bubble study, which was negative for a shunt.    PT/OT evaluated him and recommended SNF placement versus inpatient rehab.  His INR was difficult to get therapeutic, and as he had no contraindication to a NOAC his warfarin was changed to apixaban, and the full dose Lovenox was discontinued.  As he appeared to be depressed and had no objection to starting an antidepressant Lexapro was started.  He had a fall in the hospital on 2/13, had gone to the bathroom for a BM with the nurse, and when she turned away while he was washing his hands he apparently lost his balance and fell.  He did not hit his head and did not lose consciousness, but his BP was low transiently and he was given a bolus of IV fluids.    Patient's mental status improved slowly, but he gradually became more talkative and was able to hold a conversation.  He was diagnosed with a polymicrobial UTI on 2/21 and completed treatment with antibiotics.  Psychiatry evaluated him on 3/3 and changed his antidepressant to Prozac as it can be more activating and patient was having difficulty with his level of motivation.      Patient had some episodes of nausea and vomiting and CT of the abdomen was done, with incidental finding of rectal wall thickening noted.  Colonoscopy was recommended,  but he was unable to finish the prep.  GI evaluated him and decided the patient would benefit from a flex sig to evaluate the rectum.  He had the procedure on 3/25 and no abnormalities were seen other than internal hemorrhoids.    Patient's discharge was delayed as he was not accepted by any SNF facilities in the area.  Reasons for the denials are unclear as he has a clear rehab diagnosis.  He has a history of drug use but has not shown any interest in getting any illicit or legal drugs since he has been here, and he has had no visitors that could have provided drugs to him.  He has been pleasant and cooperative while here although lacks motivation, likely due to the nature of the stroke affecting his frontal lobe.  He requires considerable encouragement to participate in therapy.  Discharging him to live on the street or shelter would not be appropriate as he would not be able to take care of himself.  At this point he has agreed to snf nursing care, as his stroke was more than 2 months ago and he has already had sufficient rehab while here in the hospital.  Discharge was planned for 4/8/22, but was delayed due to him not having access to his own bank account.  Hopefully he can be transferred to nursing home as soon as financial situation is straightened out.      Interval History:  No complaints, awaiting placement.    Review of Systems   Constitutional:  Negative for chills and fever.   Respiratory:  Negative for cough and shortness of breath.    Cardiovascular:  Negative for chest pain and palpitations.   Gastrointestinal:  Negative for abdominal pain, nausea and vomiting.   Objective:     Vital Signs (Most Recent):  Temp: 98.2 °F (36.8 °C) (04/19/22 1622)  Pulse: 89 (04/19/22 1622)  Resp: 16 (04/19/22 1622)  BP: (!) 99/57 (04/19/22 1622)  SpO2: (!) 93 % (04/19/22 1622)   Vital Signs (24h Range):  Temp:  [97 °F (36.1 °C)-98.5 °F (36.9 °C)] 98.2 °F (36.8 °C)  Pulse:  [87-95] 89  Resp:  [14-20] 16  SpO2:   [93 %-100 %] 93 %  BP: ()/(57-79) 99/57     Weight: 66.2 kg (145 lb 15.1 oz)  Body mass index is 19.79 kg/m².    Intake/Output Summary (Last 24 hours) at 4/19/2022 1700  Last data filed at 4/19/2022 1400  Gross per 24 hour   Intake 840 ml   Output 1125 ml   Net -285 ml      Physical Exam  Constitutional:       General: He is not in acute distress.     Comments: Thin   Cardiovascular:      Rate and Rhythm: Normal rate and regular rhythm.   Pulmonary:      Effort: Pulmonary effort is normal.      Breath sounds: Normal breath sounds.   Abdominal:      General: Bowel sounds are normal.      Palpations: Abdomen is soft.   Skin:     General: Skin is warm and dry.   Neurological:      Mental Status: He is alert.      Comments: Not oriented to time.       Significant Labs: All pertinent labs within the past 24 hours have been reviewed.    Significant Imaging: I have reviewed all pertinent imaging results/findings within the past 24 hours.      Assessment/Plan:      * Acute stroke due to ischemia  - MRI showed areas of diffusion signal hyperintensity consistent with areas of acute ischemia/infarct involving the left cerebral hemisphere, the most prominent measuring approximately 1.4 cm, also a small focus of the right frontal lobe.  - Patient on full dose anticoagulation currently due to acute bilateral DVT.  - Sinus rhythm noted throughout hospital stay  - Risk factor modification - control diabetes, continue statin.  - RPR, HIV non reactive.  B12 low normal.   - CTA showed a focal segment of 60-70% stenosis involving the proximal to mid left vertebral artery that was new when compared to the prior study of 09/25/2019.  No evidence of large vessel intracranial occlusion.   - Neurology noted symptoms do not correspond to stroke location, although the frontal lobe stroke might have something to do with his lack of motivation, and Speech has noted he has had delayed swallowing.  - Echo with bubble study done, no shunt  seen.  - Follow up with vascular neurology in 4 weeks.  - Therapy recommended rehab initially but changed recommendation to SNF due to his low interest in participation.  - Started Lexapro due to suspicion for depression.  - Psych consulted, changed to Prozac and started thiamine, folic acid, MVI due to previous history of alcohol abuse, although Wernicke encephalopathy is not suspected.  - Re-consulted SNF for rehab from the stroke, as discharging him to a shelter would be inappropriate and unsafe for him.  Discussed with his son, Forest, at length on 3/10.  Forest is out of town on a job but will be returning to [place] eventually.  He agrees his father will eventually need nursing home care as he is unable to care for himself.  - awaiting placement.      Acute deep vein thrombosis (DVT) of both femoral veins  Last ultrasound showed partially occlusive DVTs, now completely occlusive DVT of multiple lower extremity veins on ultrasound.  Patient reports compliance with warfarin but his INR is only one.  He is homeless but says he does not have difficulty obtaining his medications.  Does not seem to care about anything, which again might be due to the frontal lobe stroke.  D-dimer was markedly elevated on presentation and there was a question of possible PE, but he had a CTA of the brain/neck on presentation so could not get another CTA for another 48 hours.    V/Q scan was done, showed low probability for PE.  2D echo showed grade I diastolic dysfunction.  Warfarin was restarted with bridging Lovenox, but INR was still low.  Changed to apixaban and discontinued Lovenox.    LTBI (latent tuberculosis infection)  See ID note.  Patient had positive PPD 2 years ago and was not treated.  PPD this admission is also positive.  He has been afebrile and has no cough or other TB symptoms.  CXR repeated and was normal.  Clinically he does not have TB.  Quantiferon gold has been ordered to see if he has latent TB.  Discussed  with ID, if quantiferon gold is positive would treat for latent TB with INH and pyridoxine for 6 months. Tb gold positive, started  INH and pyridoxine.        Severe protein-calorie malnutrition  Diet as tolerated  Patient seen by dietician, recommendations made.      Debility  PT/OT recommending rehab vs SNF after stroke with debility/poor motivation  Having difficulty finding placement for unclear reasons.  Will continue to pursue this as above.  Again, patient has been pleasant and cooperative.      Type 2 diabetes mellitus with hyperglycemia, without long-term current use of insulin  Reportedly he is on metformin and glipizide, both held.  He had 4+ sugar in urine and HbA1c was 10.3, and he was hyperglycemic on presentation.  Started on Levemir and ISS, then increased Levemir to 25 units daily  Added scheduled novolog 5 units tid, increased to 7 units with improvement  OK to resume metformin.    Increased levemir to 28 units daily with good improvement.  Held metformin with contrast with CT.  BG lower now off metformin, has had a few low BG as well due to being on clear liquids for colonoscopy prep.  Continue to adjust Levemir as needed.    Hyperlipidemia  Continue atorvastatin status post stroke.        VTE Risk Mitigation (From admission, onward)         Ordered     apixaban tablet 5 mg  2 times daily         03/29/22 1555     Place sequential compression device  Until discontinued         01/31/22 0533                Discharge Planning   EFRAÍN:      Code Status: Full Code   Is the patient medically ready for discharge?:     Reason for patient still in hospital (select all that apply): Pending disposition  Discharge Plan A: Skilled Nursing Facility   Discharge Delays: (!) Other (Waiting on financial clearance.)              Mojgan Meza MD  Department of Hospital Medicine   [facility]

## 2022-04-19 NOTE — ASSESSMENT & PLAN NOTE
Reportedly he is on metformin and glipizide, both held.  He had 4+ sugar in urine and HbA1c was 10.3, and he was hyperglycemic on presentation.  Started on Levemir and ISS, then increased Levemir to 25 units daily  Added scheduled novolog 5 units tid, increased to 7 units with improvement  OK to resume metformin.    Increased levemir to 28 units daily with good improvement.  Held metformin with contrast with CT.  BG lower now off metformin, has had a few low BG as well due to being on clear liquids for colonoscopy prep.  Continue to adjust Levemir as needed.

## 2022-04-19 NOTE — PT/OT/SLP PROGRESS
Physical Therapy Treatment    Patient Name:  Forest Lopez   MRN:  [id number]    Recommendations:     Discharge Recommendations:  nursing facility, skilled, rehabilitation facility, nursing facility, basic   Discharge Equipment Recommendations: bedside commode, shower chair, rollator   Barriers to discharge: None to NH    Assessment:     Forest Lopez is a 72 y.o. male admitted with a medical diagnosis of Acute stroke due to ischemia.  He presents with the following impairments/functional limitations:  weakness, gait instability, impaired balance, impaired cognition, impaired endurance, impaired functional mobilty, impaired self care skills, decreased safety awareness.    Scoot toward HOB with CGA and use of BUE and BLE  Pt repositioned in bed for lunch, declined to sit in chair or to ambulate at this time.  Rec SNF vs IRF    Rehab Prognosis: Good; patient would benefit from acute skilled PT services to address these deficits and reach maximum level of function.    Recent Surgery: Procedure(s) (LRB):  COLONOSCOPY (N/A) 25 Days Post-Op    Plan:     During this hospitalization, patient to be seen 3 x/week to address the identified rehab impairments via gait training, therapeutic activities, therapeutic exercises, neuromuscular re-education and progress toward the following goals:    · Plan of Care Expires:  05/05/22    Subjective     Chief Complaint: none stated  Patient/Family Comments/goals: pt agreeable to bed mobility for better positioning   Pain/Comfort:  · Pain Rating 1: 0/10  · Pain Rating Post-Intervention 1: 0/10      Objective:     Communicated with nursing prior to session.  Patient found HOB elevated with peripheral IV, Condom Catheter upon PT entry to room.     General Precautions: Standard, aspiration, fall   Orthopedic Precautions:N/A   Braces:    Respiratory Status: Room air     Functional Mobility:  · Bed Mobility:     · Scooting: contact guard assistance and toward HOB      AM-PAC 6 CLICK  MOBILITY  Turning over in bed (including adjusting bedclothes, sheets and blankets)?: 4  Sitting down on and standing up from a chair with arms (e.g., wheelchair, bedside commode, etc.): 3  Moving from lying on back to sitting on the side of the bed?: 4  Moving to and from a bed to a chair (including a wheelchair)?: 3  Need to walk in hospital room?: 3  Climbing 3-5 steps with a railing?: 3  Basic Mobility Total Score: 20       Therapeutic Activities and Exercises:   Supine therex: AP x 15  Bed mobility as noted, pt declined further therapy this visit    Patient left HOB elevated with all lines intact, call button in reach and lunch served..    GOALS:   Multidisciplinary Problems     Physical Therapy Goals        Problem: Physical Therapy Goal    Goal Priority Disciplines Outcome Goal Variances Interventions   Physical Therapy Goal     PT, PT/OT Ongoing, Progressing     Description: Goals to be met by: 2022    Patient will increase functional independence with mobility by performin. Sit<>stand with SPV with LRAD.   2. Gait x 300 feet with SPV with rollator.   3. Static standing in SLS with no UE support with SBA x10 sec.   4. Gait x50 ft with cane with SPV.                     Time Tracking:     PT Received On: 22  PT Start Time: 1247     PT Stop Time: 1300  PT Total Time (min): 13 min     Billable Minutes: Therapeutic Activity 13    Treatment Type: Treatment  PT/PTA: PTA     PTA Visit Number: 4     2022

## 2022-04-19 NOTE — PROGRESS NOTES
SHANICE called leaving a message for Lexie at the [facility] requesting a call back.  SHANICE called, leaving a message for Bettina at [facility], requesting a return call..

## 2022-04-19 NOTE — ASSESSMENT & PLAN NOTE
- MRI showed areas of diffusion signal hyperintensity consistent with areas of acute ischemia/infarct involving the left cerebral hemisphere, the most prominent measuring approximately 1.4 cm, also a small focus of the right frontal lobe.  - Patient on full dose anticoagulation currently due to acute bilateral DVT.  - Sinus rhythm noted throughout hospital stay  - Risk factor modification - control diabetes, continue statin.  - RPR, HIV non reactive.  B12 low normal.   - CTA showed a focal segment of 60-70% stenosis involving the proximal to mid left vertebral artery that was new when compared to the prior study of 09/25/2019.  No evidence of large vessel intracranial occlusion.   - Neurology noted symptoms do not correspond to stroke location, although the frontal lobe stroke might have something to do with his lack of motivation, and Speech has noted he has had delayed swallowing.  - Echo with bubble study done, no shunt seen.  - Follow up with vascular neurology in 4 weeks.  - Therapy recommended rehab initially but changed recommendation to SNF due to his low interest in participation.  - Started Lexapro due to suspicion for depression.  - Psych consulted, changed to Prozac and started thiamine, folic acid, MVI due to previous history of alcohol abuse, although Wernicke encephalopathy is not suspected.  - Re-consulted SNF for rehab from the stroke, as discharging him to a shelter would be inappropriate and unsafe for him.  Discussed with his son, Forest, at length on 3/10.  Forest is out of town on a job but will be returning to [place] eventually.  He agrees his father will eventually need nursing home care as he is unable to care for himself.  - awaiting placement.

## 2022-04-20 ENCOUNTER — PATIENT OUTREACH (OUTPATIENT)
Dept: ADMINISTRATIVE | Facility: OTHER | Age: 72
End: 2022-04-20
Payer: MEDICARE

## 2022-04-20 LAB
POCT GLUCOSE: 122 MG/DL (ref 70–110)
POCT GLUCOSE: 130 MG/DL (ref 70–110)
POCT GLUCOSE: 135 MG/DL (ref 70–110)
POCT GLUCOSE: 98 MG/DL (ref 70–110)

## 2022-04-20 PROCEDURE — 25000003 PHARM REV CODE 250: Performed by: INTERNAL MEDICINE

## 2022-04-20 PROCEDURE — 99231 SBSQ HOSP IP/OBS SF/LOW 25: CPT | Mod: ,,, | Performed by: HOSPITALIST

## 2022-04-20 PROCEDURE — 94761 N-INVAS EAR/PLS OXIMETRY MLT: CPT

## 2022-04-20 PROCEDURE — 99231 PR SUBSEQUENT HOSPITAL CARE,LEVL I: ICD-10-PCS | Mod: ,,, | Performed by: HOSPITALIST

## 2022-04-20 PROCEDURE — 25000003 PHARM REV CODE 250: Performed by: HOSPITALIST

## 2022-04-20 PROCEDURE — 11000001 HC ACUTE MED/SURG PRIVATE ROOM

## 2022-04-20 RX ADMIN — FOLIC ACID 1 MG: 1 TABLET ORAL at 09:04

## 2022-04-20 RX ADMIN — FLUOXETINE 20 MG: 20 CAPSULE ORAL at 09:04

## 2022-04-20 RX ADMIN — TAMSULOSIN HYDROCHLORIDE 0.4 MG: 0.4 CAPSULE ORAL at 09:04

## 2022-04-20 RX ADMIN — METFORMIN HYDROCHLORIDE 500 MG: 500 TABLET ORAL at 06:04

## 2022-04-20 RX ADMIN — APIXABAN 5 MG: 2.5 TABLET, FILM COATED ORAL at 09:04

## 2022-04-20 RX ADMIN — METFORMIN HYDROCHLORIDE 500 MG: 500 TABLET ORAL at 09:04

## 2022-04-20 RX ADMIN — THERA TABS 1 TABLET: TAB at 09:04

## 2022-04-20 RX ADMIN — THIAMINE HCL TAB 100 MG 100 MG: 100 TAB at 09:04

## 2022-04-20 RX ADMIN — ISONIAZID 300 MG: 300 TABLET ORAL at 09:04

## 2022-04-20 RX ADMIN — INSULIN ASPART 7 UNITS: 100 INJECTION, SOLUTION INTRAVENOUS; SUBCUTANEOUS at 12:04

## 2022-04-20 RX ADMIN — ATORVASTATIN CALCIUM 80 MG: 20 TABLET, FILM COATED ORAL at 09:04

## 2022-04-20 RX ADMIN — Medication 25 MG: at 09:04

## 2022-04-20 RX ADMIN — INSULIN ASPART 7 UNITS: 100 INJECTION, SOLUTION INTRAVENOUS; SUBCUTANEOUS at 09:04

## 2022-04-20 NOTE — SUBJECTIVE & OBJECTIVE
Interval History:  No change.    Review of Systems   Constitutional:  Negative for chills and fever.   Respiratory:  Negative for cough and shortness of breath.    Cardiovascular:  Negative for chest pain and palpitations.   Gastrointestinal:  Negative for abdominal pain, nausea and vomiting.   Objective:     Vital Signs (Most Recent):  Temp: 96.1 °F (35.6 °C) (04/20/22 1607)  Pulse: 86 (04/20/22 1607)  Resp: 18 (04/20/22 1607)  BP: 136/74 (04/20/22 1607)  SpO2: 95 % (04/20/22 1607) Vital Signs (24h Range):  Temp:  [96.1 °F (35.6 °C)-98.5 °F (36.9 °C)] 96.1 °F (35.6 °C)  Pulse:  [71-88] 86  Resp:  [16-20] 18  SpO2:  [95 %-99 %] 95 %  BP: ()/(60-74) 136/74     Weight: 66.2 kg (145 lb 15.1 oz)  Body mass index is 19.79 kg/m².    Intake/Output Summary (Last 24 hours) at 4/20/2022 1751  Last data filed at 4/20/2022 0900  Gross per 24 hour   Intake 240 ml   Output 1125 ml   Net -885 ml      Physical Exam  Constitutional:       General: He is not in acute distress.     Comments: Thin   Cardiovascular:      Rate and Rhythm: Normal rate and regular rhythm.   Pulmonary:      Effort: Pulmonary effort is normal.      Breath sounds: Normal breath sounds.   Abdominal:      General: Bowel sounds are normal.      Palpations: Abdomen is soft.   Skin:     General: Skin is warm and dry.   Neurological:      Mental Status: He is alert.      Comments: Not oriented to time.       Significant Labs: All pertinent labs within the past 24 hours have been reviewed.    Significant Imaging: I have reviewed all pertinent imaging results/findings within the past 24 hours.

## 2022-04-20 NOTE — PLAN OF CARE
POC reviewed. No significant events this shift.No complaints of pain. Pt voids and shifts in bed independently. Bed low and locked, side rails up x3, call light within reach. Refused PT today.      Problem: Adult Inpatient Plan of Care  Goal: Plan of Care Review  Outcome: Ongoing, Progressing  Goal: Patient-Specific Goal (Individualized)  Outcome: Ongoing, Progressing  Goal: Absence of Hospital-Acquired Illness or Injury  Outcome: Ongoing, Progressing  Goal: Optimal Comfort and Wellbeing  Outcome: Ongoing, Progressing     Problem: Diabetes Comorbidity  Goal: Blood Glucose Level Within Targeted Range  Outcome: Ongoing, Progressing     Problem: Skin Injury Risk Increased  Goal: Skin Health and Integrity  Outcome: Ongoing, Progressing

## 2022-04-20 NOTE — PROGRESS NOTES
RSW called patient and patient has no additional needs. P:atient expressed aggravation and readiness to leave hospital. RSW assured patient that case management is working diligently. RSW to follow up.     SUMMER Wilson

## 2022-04-20 NOTE — PROGRESS NOTES
[facility] Medicine  Progress Note    Patient Name: Forest Lopez  MRN: [id number]  Patient Class: IP- Inpatient   Admission Date: 1/31/2022  Length of Stay: 79 days  Attending Physician: Mojgan Bosch MD  Primary Care Provider: Julian Escobar        Subjective:     Principal Problem:Acute stroke due to ischemia        HPI:  Mr. Lopez is a 72 year old man who presented after a syncopal episode.  He reports he had been feeling well prior to the episode but then had a prodrome prior to passing out.  EMS was called, report patient's BP was low normal on their arrival, improved after an IV fluids bolus.  Patient denies chest pain or shortness of breath and says he does not feel weak currently although is rather tired.  When seen in the ED this morning he could barely express himself audibly.     Vital signs were normal on presentation here.  He is on warfarin for recurrent DVT, but his INR was 1, and d-dimer markedly elevated at 21.7.  Tox screen was positive for cocaine.  He had a CTA of the brain and neck done on presentation so CTA of the chest for PE study could not be done for 48 hours.  Ultrasound of the lower extremities showed extensive bilateral DVT.  On the right there was thrombosis of the common femoral vein, femoral vein, popliteal vein, one of the paired posterior tibial veins and both visualized peroneal veins.  On the left there was thrombosis of the common femoral vein, femoral vein and popliteal vein.  Patient was started on full dose Lovenox and admitted.    Medical history is from the chart as he is unable to give me much information.  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  He smokes 5-6 cigarettes/day and is not interested in quitting.  He is currently homeless and staying with a friend.  Despite the normal INR he is sure  he is taking his warfarin and is not having difficulty taking his medications.  I discussed his care with [facility] staff on the [facility] and patient had been lost to follow up since November 2021.      Overview/Hospital Course:  Patient presented to the ED after a syncopal episode and was found to have bilateral lower extremity DVTs and a low INR.  Tox screen was positive for cocaine.  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  He was started back on his warfarin with bridging Lovenox.  His 2D echo showed grade I diastolic dysfunction.  Neurology evaluated him and recommended echo with bubble study, which was negative for a shunt.    PT/OT evaluated him and recommended SNF placement versus inpatient rehab.  His INR was difficult to get therapeutic, and as he had no contraindication to a NOAC his warfarin was changed to apixaban, and the full dose Lovenox was discontinued.  As he appeared to be depressed and had no objection to starting an antidepressant Lexapro was started.  He had a fall in the hospital on 2/13, had gone to the bathroom for a BM with the nurse, and when she turned away while he was washing his hands he apparently lost his balance and fell.  He did not hit his head and did not lose consciousness, but his BP was low transiently and he was given a bolus of IV fluids.    Patient's mental status improved slowly, but he gradually became more talkative and was able to hold a conversation.  He was diagnosed with a polymicrobial UTI on 2/21 and completed treatment with antibiotics.  Psychiatry evaluated him on 3/3 and changed his antidepressant to Prozac as it can be more activating and patient was having difficulty with his level of motivation.      Patient had some episodes of nausea and vomiting and CT of the abdomen was done, with incidental finding of rectal wall thickening noted.  Colonoscopy was recommended,  but he was unable to finish the prep.  GI evaluated him and decided the patient would benefit from a flex sig to evaluate the rectum.  He had the procedure on 3/25 and no abnormalities were seen other than internal hemorrhoids.    Patient's discharge was delayed as he was not accepted by any SNF facilities in the area.  Reasons for the denials are unclear as he has a clear rehab diagnosis.  He has a history of drug use but has not shown any interest in getting any illicit or legal drugs since he has been here, and he has had no visitors that could have provided drugs to him.  He has been pleasant and cooperative while here although lacks motivation, likely due to the nature of the stroke affecting his frontal lobe.  He requires considerable encouragement to participate in therapy.  Discharging him to live on the street or shelter would not be appropriate as he would not be able to take care of himself.  At this point he has agreed to senior living nursing care, as his stroke was more than 2 months ago and he has already had sufficient rehab while here in the hospital.  Discharge was planned for 4/8/22, but was delayed due to him not having access to his own bank account.  Hopefully he can be transferred to nursing home as soon as financial situation is straightened out.      Interval History:  No change.    Review of Systems   Constitutional:  Negative for chills and fever.   Respiratory:  Negative for cough and shortness of breath.    Cardiovascular:  Negative for chest pain and palpitations.   Gastrointestinal:  Negative for abdominal pain, nausea and vomiting.   Objective:     Vital Signs (Most Recent):  Temp: 96.1 °F (35.6 °C) (04/20/22 1607)  Pulse: 86 (04/20/22 1607)  Resp: 18 (04/20/22 1607)  BP: 136/74 (04/20/22 1607)  SpO2: 95 % (04/20/22 1607) Vital Signs (24h Range):  Temp:  [96.1 °F (35.6 °C)-98.5 °F (36.9 °C)] 96.1 °F (35.6 °C)  Pulse:  [71-88] 86  Resp:  [16-20] 18  SpO2:  [95 %-99 %] 95 %  BP:  ()/(60-74) 136/74     Weight: 66.2 kg (145 lb 15.1 oz)  Body mass index is 19.79 kg/m².    Intake/Output Summary (Last 24 hours) at 4/20/2022 1751  Last data filed at 4/20/2022 0900  Gross per 24 hour   Intake 240 ml   Output 1125 ml   Net -885 ml      Physical Exam  Constitutional:       General: He is not in acute distress.     Comments: Thin   Cardiovascular:      Rate and Rhythm: Normal rate and regular rhythm.   Pulmonary:      Effort: Pulmonary effort is normal.      Breath sounds: Normal breath sounds.   Abdominal:      General: Bowel sounds are normal.      Palpations: Abdomen is soft.   Skin:     General: Skin is warm and dry.   Neurological:      Mental Status: He is alert.      Comments: Not oriented to time.       Significant Labs: All pertinent labs within the past 24 hours have been reviewed.    Significant Imaging: I have reviewed all pertinent imaging results/findings within the past 24 hours.      Assessment/Plan:      * Acute stroke due to ischemia  - MRI showed areas of diffusion signal hyperintensity consistent with areas of acute ischemia/infarct involving the left cerebral hemisphere, the most prominent measuring approximately 1.4 cm, also a small focus of the right frontal lobe.  - Patient on full dose anticoagulation currently due to acute bilateral DVT.  - Sinus rhythm noted throughout hospital stay  - Risk factor modification - control diabetes, continue statin.  - RPR, HIV non reactive.  B12 low normal.   - CTA showed a focal segment of 60-70% stenosis involving the proximal to mid left vertebral artery that was new when compared to the prior study of 09/25/2019.  No evidence of large vessel intracranial occlusion.   - Neurology noted symptoms do not correspond to stroke location, although the frontal lobe stroke might have something to do with his lack of motivation, and Speech has noted he has had delayed swallowing.  - Echo with bubble study done, no shunt seen.  - Follow up with  vascular neurology in 4 weeks.  - Therapy recommended rehab initially but changed recommendation to SNF due to his low interest in participation.  - Started Lexapro due to suspicion for depression.  - Psych consulted, changed to Prozac and started thiamine, folic acid, MVI due to previous history of alcohol abuse, although Wernicke encephalopathy is not suspected.  - Re-consulted SNF for rehab from the stroke, as discharging him to a shelter would be inappropriate and unsafe for him.  Discussed with his son, Forest, at length on 3/10.  Forest is out of town on a job but will be returning to [place] eventually.  He agrees his father will eventually need nursing home care as he is unable to care for himself.  - awaiting placement.      Acute deep vein thrombosis (DVT) of both femoral veins  Last ultrasound showed partially occlusive DVTs, now completely occlusive DVT of multiple lower extremity veins on ultrasound.  Patient reports compliance with warfarin but his INR is only one.  He is homeless but says he does not have difficulty obtaining his medications.  Does not seem to care about anything, which again might be due to the frontal lobe stroke.  D-dimer was markedly elevated on presentation and there was a question of possible PE, but he had a CTA of the brain/neck on presentation so could not get another CTA for another 48 hours.    V/Q scan was done, showed low probability for PE.  2D echo showed grade I diastolic dysfunction.  Warfarin was restarted with bridging Lovenox, but INR was still low.  Changed to apixaban and discontinued Lovenox.    LTBI (latent tuberculosis infection)  See ID note.  Patient had positive PPD 2 years ago and was not treated.  PPD this admission is also positive.  He has been afebrile and has no cough or other TB symptoms.  CXR repeated and was normal.  Clinically he does not have TB.  Quantiferon gold has been ordered to see if he has latent TB.  Discussed with ID, if quantiferon  gold is positive would treat for latent TB with INH and pyridoxine for 6 months. Tb gold positive, started  INH and pyridoxine.        Severe protein-calorie malnutrition  Diet as tolerated  Patient seen by dietician, recommendations made.      Debility  PT/OT recommending rehab vs SNF after stroke with debility/poor motivation  Having difficulty finding placement for unclear reasons.  Will continue to pursue this as above.  Again, patient has been pleasant and cooperative.      Type 2 diabetes mellitus with hyperglycemia, without long-term current use of insulin  Reportedly he is on metformin and glipizide, both held.  He had 4+ sugar in urine and HbA1c was 10.3, and he was hyperglycemic on presentation.  Started on Levemir and ISS, then increased Levemir to 25 units daily  Added scheduled novolog 5 units tid, increased to 7 units with improvement  OK to resume metformin.    Increased levemir to 28 units daily with good improvement.  Held metformin with contrast with CT.  BG lower now off metformin, has had a few low BG as well due to being on clear liquids for colonoscopy prep.  Continue to adjust Levemir as needed.    Hyperlipidemia  Continue atorvastatin status post stroke.        VTE Risk Mitigation (From admission, onward)         Ordered     apixaban tablet 5 mg  2 times daily         03/29/22 1555     Place sequential compression device  Until discontinued         01/31/22 0533                Discharge Planning   EFRAÍN:      Code Status: Full Code   Is the patient medically ready for discharge?:     Reason for patient still in hospital (select all that apply): Pending disposition  Discharge Plan A: Skilled Nursing Facility   Discharge Delays: (!) Other (Waiting on financial clearance.)              Mojgan Meza MD  Department of Hospital Medicine   [facility] ([place])

## 2022-04-20 NOTE — ASSESSMENT & PLAN NOTE
- MRI showed areas of diffusion signal hyperintensity consistent with areas of acute ischemia/infarct involving the left cerebral hemisphere, the most prominent measuring approximately 1.4 cm, also a small focus of the right frontal lobe.  - Patient on full dose anticoagulation currently due to acute bilateral DVT.  - Sinus rhythm noted throughout hospital stay  - Risk factor modification - control diabetes, continue statin.  - RPR, HIV non reactive.  B12 low normal.   - CTA showed a focal segment of 60-70% stenosis involving the proximal to mid left vertebral artery that was new when compared to the prior study of 09/25/2019.  No evidence of large vessel intracranial occlusion.   - Neurology noted symptoms do not correspond to stroke location, although the frontal lobe stroke might have something to do with his lack of motivation, and Speech has noted he has had delayed swallowing.  - Echo with bubble study done, no shunt seen.  - Follow up with vascular neurology in 4 weeks.  - Therapy recommended rehab initially but changed recommendation to SNF due to his low interest in participation.  - Started Lexapro due to suspicion for depression.  - Psych consulted, changed to Prozac and started thiamine, folic acid, MVI due to previous history of alcohol abuse, although Wernicke encephalopathy is not suspected.  - Re-consulted SNF for rehab from the stroke, as discharging him to a shelter would be inappropriate and unsafe for him.  Discussed with his son, Forest, at length on 3/10.  Forest is out of town on a job but will be returning to [place] eventually.  He agrees his father will eventually need nursing home care as he is unable to care for himself.  - awaiting placement.

## 2022-04-20 NOTE — PLAN OF CARE
Per Lexie, won't able to get to his [employer] Card. In order for him to get into a nursing home, needs three months of bank statements. EPS never filed a report.  Lexie will suspend benefits and work with social security so he can go to a nursing home.  Lexie will call this director back to provide an update. If someone needs to speak with the patient, she will call Leanna to go in the room with the patient. Will update Dr. Bosch and Leanna.

## 2022-04-20 NOTE — PROGRESS NOTES
[facility] ([place])  Adult Nutrition  Progress Note    SUMMARY       Recommendations   1.Continue consistent CHO mech soft diet with ONS Boost Glucose Control TID + Huber BID.     2.Encourage good PO intake as needed.    Goals: Pt to meet % EEN/EPN via PO intake by RD f/u.  Nutrition Goal Status: goal met  Communication of RD Recs:  (POC)    Assessment and Plan  Nutrition Problem  swallowing difficulty     Related to (etiology):   stroke     Signs and Symptoms (as evidenced by):   Pt requiring texture modified diet and speech therapy services for difficulty swallowing  PO intake 100%      Interventions(treatment strategy):  Collaboration with other providers  Commercial beverage  Carbohydrate modified diet (diabetic)  Texture modified diet (IDDSI L5 MM)     Nutrition Diagnosis Status:   Continues    Malnutrition Assessment     Skin (Micronutrient):  ([place] Score 20)      Reason for Assessment    Reason For Assessment: RD follow-up  Diagnosis:  (acute DVT)  Relevant Medical History: HTN, HLD, T2DM, BPH  Interdisciplinary Rounds: did not attend  General Information Comments: 4/18- RD f/u.. Remains on consistent carb, with mech soft. BGC TID and Huber BID. PO intake %. Good appeite. Would like to make sure he has sugar/ sugar sub with each breakfast. No other complaints. Will continue to monitor.    Nutrition Discharge Planning: Pt to follow a cardiac/ DM diet(vit K restriction as needed) as tolerated.    Nutrition Risk Screen    Nutrition Risk Screen: no indicators present    Nutrition/Diet History    Spiritual, Cultural Beliefs, Zoroastrianism Practices, Values that Affect Care: no  Factors Affecting Nutritional Intake: None identified at this time    Anthropometrics    Temp: 98.1 °F (36.7 °C)  Height: 6' (182.9 cm)  Height (inches): 72 in  Weight Method: Bed Scale  Weight: 66.2 kg (145 lb 15.1 oz)  Weight (lb): 145.95 lb  Ideal Body Weight (IBW), Male: 178 lb  % Ideal Body Weight, Male (lb): 81.99  %  BMI (Calculated): 19.8  BMI Grade: 18.5-24.9 - normal    Lab/Procedures/Meds    Pertinent Labs Reviewed: reviewed  Pertinent Labs Comments: Hemo 12.8  Pertinent Medications Reviewed: reviewed  Pertinent Medications Comments: atorvasatin, fluoxetine, folic acid, insulin asapart, insulin detemir, isoniazid, metformin, MV, polyethylene glycol, vit B6, tamsulsoin, thiamine    Estimated/Assessed Needs    Weight Used For Calorie Calculations: 66.2 kg (145 lb 15.1 oz)  Energy Calorie Requirements (kcal): 2000 kcal day (MSJ X AF 1.4)  Energy Need Method: [place]-St Jeor  Protein Requirements: 66-80 g day (1.0-1.2 g/kg)  Weight Used For Protein Calculations: 66.2 kg (145 lb 15.1 oz)  Estimated Fluid Requirement Method: RDA Method  RDA Method (mL): 2000  CHO Requirement: 250 g day    Nutrition Prescription Ordered    Current Diet Order: consistent carb/ mech soft  Oral Nutrition Supplement: BGC TID and Huber BID    Evaluation of Received Nutrient/Fluid Intake    Energy Calories Required: meeting needs  Protein Required: meeting needs  Fluid Required: meeting needs  Comments: LBM 4/18  Tolerance: tolerating  % Intake of Estimated Energy Needs: 75 - 100 %  % Meal Intake: 75 - 100 %    Nutrition Risk    Level of Risk/Frequency of Follow-up: low (1x weekly)     Monitor and Evaluation    Food and Nutrient Intake: energy intake, food and beverage intake  Food and Nutrient Adminstration: diet order  Knowledge/Beliefs/Attitudes: food and nutrition knowledge/skill  Physical Activity and Function: nutrition-related ADLs and IADLs  Anthropometric Measurements: weight, weight change, body mass index  Biochemical Data, Medical Tests and Procedures: glucose/endocrine profile, gastrointestinal profile, electrolyte and renal panel, inflammatory profile, lipid profile  Nutrition-Focused Physical Findings: overall appearance     Nutrition Follow-Up    RD Follow-up?: Yes

## 2022-04-21 ENCOUNTER — PATIENT OUTREACH (OUTPATIENT)
Dept: ADMINISTRATIVE | Facility: OTHER | Age: 72
End: 2022-04-21
Payer: MEDICARE

## 2022-04-21 LAB
POCT GLUCOSE: 115 MG/DL (ref 70–110)
POCT GLUCOSE: 121 MG/DL (ref 70–110)
POCT GLUCOSE: 131 MG/DL (ref 70–110)
POCT GLUCOSE: 134 MG/DL (ref 70–110)

## 2022-04-21 PROCEDURE — 25000003 PHARM REV CODE 250: Performed by: INTERNAL MEDICINE

## 2022-04-21 PROCEDURE — 11000001 HC ACUTE MED/SURG PRIVATE ROOM

## 2022-04-21 PROCEDURE — 99231 PR SUBSEQUENT HOSPITAL CARE,LEVL I: ICD-10-PCS | Mod: ,,, | Performed by: HOSPITALIST

## 2022-04-21 PROCEDURE — 25000003 PHARM REV CODE 250: Performed by: HOSPITALIST

## 2022-04-21 PROCEDURE — 99231 SBSQ HOSP IP/OBS SF/LOW 25: CPT | Mod: ,,, | Performed by: HOSPITALIST

## 2022-04-21 PROCEDURE — 97116 GAIT TRAINING THERAPY: CPT | Mod: CQ

## 2022-04-21 RX ORDER — INSULIN ASPART 100 [IU]/ML
3 INJECTION, SOLUTION INTRAVENOUS; SUBCUTANEOUS
Status: DISCONTINUED | OUTPATIENT
Start: 2022-04-21 | End: 2022-04-27 | Stop reason: HOSPADM

## 2022-04-21 RX ADMIN — Medication 25 MG: at 09:04

## 2022-04-21 RX ADMIN — INSULIN ASPART 3 UNITS: 100 INJECTION, SOLUTION INTRAVENOUS; SUBCUTANEOUS at 12:04

## 2022-04-21 RX ADMIN — METFORMIN HYDROCHLORIDE 500 MG: 500 TABLET ORAL at 09:04

## 2022-04-21 RX ADMIN — INSULIN ASPART 3 UNITS: 100 INJECTION, SOLUTION INTRAVENOUS; SUBCUTANEOUS at 05:04

## 2022-04-21 RX ADMIN — FLUOXETINE 20 MG: 20 CAPSULE ORAL at 09:04

## 2022-04-21 RX ADMIN — THIAMINE HCL TAB 100 MG 100 MG: 100 TAB at 09:04

## 2022-04-21 RX ADMIN — ISONIAZID 300 MG: 300 TABLET ORAL at 09:04

## 2022-04-21 RX ADMIN — ATORVASTATIN CALCIUM 80 MG: 20 TABLET, FILM COATED ORAL at 09:04

## 2022-04-21 RX ADMIN — APIXABAN 5 MG: 2.5 TABLET, FILM COATED ORAL at 09:04

## 2022-04-21 RX ADMIN — TAMSULOSIN HYDROCHLORIDE 0.4 MG: 0.4 CAPSULE ORAL at 08:04

## 2022-04-21 RX ADMIN — METFORMIN HYDROCHLORIDE 500 MG: 500 TABLET ORAL at 05:04

## 2022-04-21 RX ADMIN — APIXABAN 5 MG: 2.5 TABLET, FILM COATED ORAL at 08:04

## 2022-04-21 RX ADMIN — FOLIC ACID 1 MG: 1 TABLET ORAL at 09:04

## 2022-04-21 RX ADMIN — INSULIN ASPART 3 UNITS: 100 INJECTION, SOLUTION INTRAVENOUS; SUBCUTANEOUS at 09:04

## 2022-04-21 RX ADMIN — THERA TABS 1 TABLET: TAB at 09:04

## 2022-04-21 NOTE — PROGRESS NOTES
SHANICE spoke with Lexie from the [facility]. She said that she is working with Social Security on the patient's problem with his bank accounts being inaccessible to him. She asked SHANICE to get the patient's bank card and call to speak with someone in the customer service dept.   SHANICE visited the patient and explained what we were going to try to do and he assisted in every way he could. SHANICE got Laila at [facility] on the line and explained what the problem was. The patient got on to the phone and told her he was in agreement with doing this. She took his Social Security number and his card number, but needed to verify that it was the patient by having a text message sent to his phone. The patient doesn't have his phone anymore. It has been lost. (Patient and SHANICE were using SHANICE's cell phone for the call.) He does not remember his phone number anymore, but SHANICE looked on the his facesheet and that is the correct number. However, without the phone you can not receive the text message, containing the code, that is necessary to do the identification. The service rep at [facility] told SHANICE to go on line to attempt to verify the account, but SHANICE ran into the above problem. SHANICE to speak with Lexie at the [facility] again tomorrow.

## 2022-04-21 NOTE — PT/OT/SLP PROGRESS
Physical Therapy Treatment    Patient Name:  Forest Lopez   MRN:  [id number]    Recommendations:     Discharge Recommendations:  nursing facility, skilled, rehabilitation facility, nursing facility, basic   Discharge Equipment Recommendations: bedside commode, shower chair, rollator   Barriers to discharge: None to NH    Assessment:     Forest Lopez is a 72 y.o. male admitted with a medical diagnosis of Acute stroke due to ischemia.  He presents with the following impairments/functional limitations:  weakness, gait instability, impaired balance, impaired cognition, impaired endurance, impaired functional mobilty, impaired self care skills, decreased safety awareness.    Supine>sit with SBA  Sit>stand with SPC and CGA  Amb 200' with SPC and CGA/Varun, pt with decreased stability and focus, several minor LoB's req'ing Varun to stabilize  Rec SNF vs IRF    Rehab Prognosis: Good; patient would benefit from acute skilled PT services to address these deficits and reach maximum level of function.    Recent Surgery: Procedure(s) (LRB):  COLONOSCOPY (N/A) 27 Days Post-Op    Plan:     During this hospitalization, patient to be seen 3 x/week to address the identified rehab impairments via gait training, therapeutic activities, therapeutic exercises, neuromuscular re-education and progress toward the following goals:    · Plan of Care Expires:  05/05/22    Subjective     Chief Complaint: No, I don't want to sit in the chair  Patient/Family Comments/goals: I knew it was you when I heard the music  Pain/Comfort:  · Pain Rating 1: 0/10  · Pain Rating Post-Intervention 1: 0/10      Objective:     Communicated with nurse Frederick prior to session.  Patient found HOB elevated with peripheral IV upon PT entry to room.     General Precautions: Standard, aspiration, fall   Orthopedic Precautions:N/A   Braces:    Respiratory Status: Room air     Functional Mobility:  · Bed Mobility:     · Supine to Sit: stand by assistance  · Transfers:      · Sit to Stand:  contact guard assistance with straight cane  · Gait: 200' with SPC and CGA/Varun, pt with decreased stability and focus, several minor LoB's req'ing Varun to stabilize      AM-PAC 6 CLICK MOBILITY  Turning over in bed (including adjusting bedclothes, sheets and blankets)?: 4  Sitting down on and standing up from a chair with arms (e.g., wheelchair, bedside commode, etc.): 3  Moving from lying on back to sitting on the side of the bed?: 4  Moving to and from a bed to a chair (including a wheelchair)?: 3  Need to walk in hospital room?: 3  Climbing 3-5 steps with a railing?: 3  Basic Mobility Total Score: 20       Therapeutic Activities and Exercises:   Gait training as noted  Pt unwilling to sit up in chair to finish lunch, opting to sit EOB    Patient left sitting edge of bed with all lines intact, call button in reach, nurse Yoly notified and Crista telesitter present..    GOALS:   Multidisciplinary Problems     Physical Therapy Goals        Problem: Physical Therapy Goal    Goal Priority Disciplines Outcome Goal Variances Interventions   Physical Therapy Goal     PT, PT/OT Ongoing, Progressing     Description: Goals to be met by: 2022    Patient will increase functional independence with mobility by performin. Sit<>stand with SPV with LRAD.   2. Gait x 300 feet with SPV with rollator.   3. Static standing in SLS with no UE support with SBA x10 sec.   4. Gait x50 ft with cane with SPV.                     Time Tracking:     PT Received On: 22  PT Start Time: 1534     PT Stop Time: 1557  PT Total Time (min): 23 min     Billable Minutes: Gait Training 23    Treatment Type: Treatment  PT/PTA: PTA     PTA Visit Number: 2022

## 2022-04-21 NOTE — PROGRESS NOTES
[facility], [place], operated by [facility]  Progress Note    Patient Name: Forest Lopez  MRN: [id number]  Patient Class: IP- Inpatient   Admission Date: 1/31/2022  Length of Stay: 80 days  Attending Physician: Mojgan Bosch MD  Primary Care Provider: Julian Escobar        Subjective:     Principal Problem:Acute stroke due to ischemia        HPI:  Mr. Lopez is a 72 year old man who presented after a syncopal episode.  He reports he had been feeling well prior to the episode but then had a prodrome prior to passing out.  EMS was called, report patient's BP was low normal on their arrival, improved after an IV fluids bolus.  Patient denies chest pain or shortness of breath and says he does not feel weak currently although is rather tired.  When seen in the ED this morning he could barely express himself audibly.     Vital signs were normal on presentation here.  He is on warfarin for recurrent DVT, but his INR was 1, and d-dimer markedly elevated at 21.7.  Tox screen was positive for cocaine.  He had a CTA of the brain and neck done on presentation so CTA of the chest for PE study could not be done for 48 hours.  Ultrasound of the lower extremities showed extensive bilateral DVT.  On the right there was thrombosis of the common femoral vein, femoral vein, popliteal vein, one of the paired posterior tibial veins and both visualized peroneal veins.  On the left there was thrombosis of the common femoral vein, femoral vein and popliteal vein.  Patient was started on full dose Lovenox and admitted.    Medical history is from the chart as he is unable to give me much information.  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  He smokes 5-6 cigarettes/day and is not interested in quitting.  He is currently homeless and staying with a friend.  Despite the normal INR he is sure  he is taking his warfarin and is not having difficulty taking his medications.  I discussed his care with [facility] staff on the [facility] and patient had been lost to follow up since November 2021.      Overview/Hospital Course:  Patient presented to the ED after a syncopal episode and was found to have bilateral lower extremity DVTs and a low INR.  Tox screen was positive for cocaine.  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  He was started back on his warfarin with bridging Lovenox.  His 2D echo showed grade I diastolic dysfunction.  Neurology evaluated him and recommended echo with bubble study, which was negative for a shunt.    PT/OT evaluated him and recommended SNF placement versus inpatient rehab.  His INR was difficult to get therapeutic, and as he had no contraindication to a NOAC his warfarin was changed to apixaban, and the full dose Lovenox was discontinued.  As he appeared to be depressed and had no objection to starting an antidepressant Lexapro was started.  He had a fall in the hospital on 2/13, had gone to the bathroom for a BM with the nurse, and when she turned away while he was washing his hands he apparently lost his balance and fell.  He did not hit his head and did not lose consciousness, but his BP was low transiently and he was given a bolus of IV fluids.    Patient's mental status improved slowly, but he gradually became more talkative and was able to hold a conversation.  He was diagnosed with a polymicrobial UTI on 2/21 and completed treatment with antibiotics.  Psychiatry evaluated him on 3/3 and changed his antidepressant to Prozac as it can be more activating and patient was having difficulty with his level of motivation.      Patient had some episodes of nausea and vomiting and CT of the abdomen was done, with incidental finding of rectal wall thickening noted.  Colonoscopy was recommended,  but he was unable to finish the prep.  GI evaluated him and decided the patient would benefit from a flex sig to evaluate the rectum.  He had the procedure on 3/25 and no abnormalities were seen other than internal hemorrhoids.    Patient's discharge was delayed as he was not accepted by any SNF facilities in the area.  Reasons for the denials are unclear as he has a clear rehab diagnosis.  He has a history of drug use but has not shown any interest in getting any illicit or legal drugs since he has been here, and he has had no visitors that could have provided drugs to him.  He has been pleasant and cooperative while here although lacks motivation, likely due to the nature of the stroke affecting his frontal lobe.  He requires considerable encouragement to participate in therapy.  Discharging him to live on the street or shelter would not be appropriate as he would not be able to take care of himself.  At this point he has agreed to retirement nursing care, as his stroke was more than 2 months ago and he has already had sufficient rehab while here in the hospital.  Discharge was planned for 4/8/22, but was delayed due to him not having access to his own bank account.  Hopefully he can be transferred to nursing home as soon as financial situation is straightened out.  Case management has had to involve the Office of Elder Protective Services in order to get access to his bank account.      Interval History:  Nothing new.  Awaiting placement.    Review of Systems   Constitutional:  Negative for chills and fever.   Respiratory:  Negative for cough and shortness of breath.    Cardiovascular:  Negative for chest pain and palpitations.   Gastrointestinal:  Negative for abdominal pain, nausea and vomiting.   Objective:     Vital Signs (Most Recent):  Temp: 98.4 °F (36.9 °C) (04/21/22 1606)  Pulse: 94 (04/21/22 1606)  Resp: 16 (04/21/22 1606)  BP: 120/75 (04/21/22 1606)  SpO2: 96 % (04/21/22 1606) Vital Signs (24h  Range):  Temp:  [97.6 °F (36.4 °C)-98.4 °F (36.9 °C)] 98.4 °F (36.9 °C)  Pulse:  [] 94  Resp:  [16-20] 16  SpO2:  [96 %-100 %] 96 %  BP: (110-137)/(60-82) 120/75     Weight: 66.2 kg (145 lb 15.1 oz)  Body mass index is 19.79 kg/m².    Intake/Output Summary (Last 24 hours) at 4/21/2022 1620  Last data filed at 4/21/2022 0938  Gross per 24 hour   Intake 980 ml   Output 625 ml   Net 355 ml      Physical Exam  Not examined today.    Significant Labs: All pertinent labs within the past 24 hours have been reviewed.    Significant Imaging: I have reviewed all pertinent imaging results/findings within the past 24 hours.      Assessment/Plan:      * Acute stroke due to ischemia  - MRI showed areas of diffusion signal hyperintensity consistent with areas of acute ischemia/infarct involving the left cerebral hemisphere, the most prominent measuring approximately 1.4 cm, also a small focus of the right frontal lobe.  - Patient on full dose anticoagulation currently due to acute bilateral DVT.  - Sinus rhythm noted throughout hospital stay  - Risk factor modification - control diabetes, continue statin.  - RPR, HIV non reactive.  B12 low normal.   - CTA showed a focal segment of 60-70% stenosis involving the proximal to mid left vertebral artery that was new when compared to the prior study of 09/25/2019.  No evidence of large vessel intracranial occlusion.   - Neurology noted symptoms do not correspond to stroke location, although the frontal lobe stroke might have something to do with his lack of motivation, and Speech has noted he has had delayed swallowing.  - Echo with bubble study done, no shunt seen.  - Follow up with vascular neurology in 4 weeks.  - Therapy recommended rehab initially but changed recommendation to SNF due to his low interest in participation.  - Started Lexapro due to suspicion for depression.  - Psych consulted, changed to Prozac and started thiamine, folic acid, MVI due to previous history of  alcohol abuse, although Wernicke encephalopathy is not suspected.  - Re-consulted SNF for rehab from the stroke, as discharging him to a shelter would be inappropriate and unsafe for him.  Discussed with his son, Forest, at length on 3/10.  Forest is out of town on a job but will be returning to [place] eventually.  He agrees his father will eventually need nursing home care as he is unable to care for himself.  - awaiting placement.      Acute deep vein thrombosis (DVT) of both femoral veins  Last ultrasound showed partially occlusive DVTs, now completely occlusive DVT of multiple lower extremity veins on ultrasound.  Patient reports compliance with warfarin but his INR is only one.  He is homeless but says he does not have difficulty obtaining his medications.  Does not seem to care about anything, which again might be due to the frontal lobe stroke.  D-dimer was markedly elevated on presentation and there was a question of possible PE, but he had a CTA of the brain/neck on presentation so could not get another CTA for another 48 hours.    V/Q scan was done, showed low probability for PE.  2D echo showed grade I diastolic dysfunction.  Warfarin was restarted with bridging Lovenox, but INR was still low.  Changed to apixaban and discontinued Lovenox.    LTBI (latent tuberculosis infection)  See ID note.  Patient had positive PPD 2 years ago and was not treated.  PPD this admission is also positive.  He has been afebrile and has no cough or other TB symptoms.  CXR repeated and was normal.  Clinically he does not have TB.  Quantiferon gold has been ordered to see if he has latent TB.  Discussed with ID, if quantiferon gold is positive would treat for latent TB with INH and pyridoxine for 6 months. Tb gold positive, started  INH and pyridoxine.        Severe protein-calorie malnutrition  Diet as tolerated  Patient seen by dietician, recommendations made.      Debility  PT/OT recommending rehab vs SNF after stroke  with debility/poor motivation  Having difficulty finding placement for unclear reasons.  Will continue to pursue this as above.  Again, patient has been pleasant and cooperative.      Type 2 diabetes mellitus with hyperglycemia, without long-term current use of insulin  Reportedly he is on metformin and glipizide, both held.  He had 4+ sugar in urine and HbA1c was 10.3, and he was hyperglycemic on presentation.  Started on Levemir and ISS, then increased Levemir to 25 units daily  Added scheduled novolog 5 units tid, increased to 7 units with improvement  OK to resume metformin.    Increased levemir to 28 units daily with good improvement.  Held metformin with contrast with CT.  BG lower now off metformin, has had a few low BG as well due to being on clear liquids for colonoscopy prep.  Continue to adjust Levemir as needed.    Hyperlipidemia  Continue atorvastatin status post stroke.        VTE Risk Mitigation (From admission, onward)         Ordered     apixaban tablet 5 mg  2 times daily         03/29/22 1555     Place sequential compression device  Until discontinued         01/31/22 0533                Discharge Planning   EFRAÍN:      Code Status: Full Code   Is the patient medically ready for discharge?:     Reason for patient still in hospital (select all that apply): Pending disposition  Discharge Plan A: Skilled Nursing Facility   Discharge Delays: (!) Other (Waiting on financial clearance.)              Mojgan Meza MD  Department of Hospital Medicine   [facility] ([place])

## 2022-04-21 NOTE — SUBJECTIVE & OBJECTIVE
Interval History:  Nothing new.  Awaiting placement.    Review of Systems   Constitutional:  Negative for chills and fever.   Respiratory:  Negative for cough and shortness of breath.    Cardiovascular:  Negative for chest pain and palpitations.   Gastrointestinal:  Negative for abdominal pain, nausea and vomiting.   Objective:     Vital Signs (Most Recent):  Temp: 98.4 °F (36.9 °C) (04/21/22 1606)  Pulse: 94 (04/21/22 1606)  Resp: 16 (04/21/22 1606)  BP: 120/75 (04/21/22 1606)  SpO2: 96 % (04/21/22 1606) Vital Signs (24h Range):  Temp:  [97.6 °F (36.4 °C)-98.4 °F (36.9 °C)] 98.4 °F (36.9 °C)  Pulse:  [] 94  Resp:  [16-20] 16  SpO2:  [96 %-100 %] 96 %  BP: (110-137)/(60-82) 120/75     Weight: 66.2 kg (145 lb 15.1 oz)  Body mass index is 19.79 kg/m².    Intake/Output Summary (Last 24 hours) at 4/21/2022 1620  Last data filed at 4/21/2022 0938  Gross per 24 hour   Intake 980 ml   Output 625 ml   Net 355 ml      Physical Exam  Not examined today.    Significant Labs: All pertinent labs within the past 24 hours have been reviewed.    Significant Imaging: I have reviewed all pertinent imaging results/findings within the past 24 hours.

## 2022-04-21 NOTE — ASSESSMENT & PLAN NOTE
- MRI showed areas of diffusion signal hyperintensity consistent with areas of acute ischemia/infarct involving the left cerebral hemisphere, the most prominent measuring approximately 1.4 cm, also a small focus of the right frontal lobe.  - Patient on full dose anticoagulation currently due to acute bilateral DVT.  - Sinus rhythm noted throughout hospital stay  - Risk factor modification - control diabetes, continue statin.  - RPR, HIV non reactive.  B12 low normal.   - CTA showed a focal segment of 60-70% stenosis involving the proximal to mid left vertebral artery that was new when compared to the prior study of 09/25/2019.  No evidence of large vessel intracranial occlusion.   - Neurology noted symptoms do not correspond to stroke location, although the frontal lobe stroke might have something to do with his lack of motivation, and Speech has noted he has had delayed swallowing.  - Echo with bubble study done, no shunt seen.  - Follow up with vascular neurology in 4 weeks.  - Therapy recommended rehab initially but changed recommendation to SNF due to his low interest in participation.  - Started Lexapro due to suspicion for depression.  - Psych consulted, changed to Prozac and started thiamine, folic acid, MVI due to previous history of alcohol abuse, although Wernicke encephalopathy is not suspected.  - Re-consulted SNF for rehab from the stroke, as discharging him to a shelter would be inappropriate and unsafe for him.  Discussed with his son, Forest, at length on 3/10.  Forest is out of town on a job but will be returning to [place] eventually.  He agrees his father will eventually need nursing home care as he is unable to care for himself.  - awaiting placement.

## 2022-04-22 ENCOUNTER — PATIENT OUTREACH (OUTPATIENT)
Dept: ADMINISTRATIVE | Facility: OTHER | Age: 72
End: 2022-04-22
Payer: MEDICARE

## 2022-04-22 LAB
ERYTHROCYTE [DISTWIDTH] IN BLOOD BY AUTOMATED COUNT: 13.6 % (ref 11.5–14.5)
HCT VFR BLD AUTO: 39 % (ref 40–54)
HGB BLD-MCNC: 12.4 G/DL (ref 14–18)
MCH RBC QN AUTO: 25.7 PG (ref 27–31)
MCHC RBC AUTO-ENTMCNC: 31.8 G/DL (ref 32–36)
MCV RBC AUTO: 81 FL (ref 82–98)
PLATELET # BLD AUTO: 246 K/UL (ref 150–450)
PMV BLD AUTO: 10.2 FL (ref 9.2–12.9)
POCT GLUCOSE: 114 MG/DL (ref 70–110)
POCT GLUCOSE: 117 MG/DL (ref 70–110)
POCT GLUCOSE: 153 MG/DL (ref 70–110)
POCT GLUCOSE: 163 MG/DL (ref 70–110)
RBC # BLD AUTO: 4.82 M/UL (ref 4.6–6.2)
WBC # BLD AUTO: 10.43 K/UL (ref 3.9–12.7)

## 2022-04-22 PROCEDURE — 85027 COMPLETE CBC AUTOMATED: CPT | Performed by: HOSPITALIST

## 2022-04-22 PROCEDURE — 97530 THERAPEUTIC ACTIVITIES: CPT

## 2022-04-22 PROCEDURE — 99231 SBSQ HOSP IP/OBS SF/LOW 25: CPT | Mod: ,,, | Performed by: HOSPITALIST

## 2022-04-22 PROCEDURE — 97116 GAIT TRAINING THERAPY: CPT

## 2022-04-22 PROCEDURE — 99231 PR SUBSEQUENT HOSPITAL CARE,LEVL I: ICD-10-PCS | Mod: ,,, | Performed by: HOSPITALIST

## 2022-04-22 PROCEDURE — 25000003 PHARM REV CODE 250: Performed by: HOSPITALIST

## 2022-04-22 PROCEDURE — 11000001 HC ACUTE MED/SURG PRIVATE ROOM

## 2022-04-22 PROCEDURE — 92526 ORAL FUNCTION THERAPY: CPT

## 2022-04-22 PROCEDURE — 25000003 PHARM REV CODE 250: Performed by: INTERNAL MEDICINE

## 2022-04-22 PROCEDURE — 97535 SELF CARE MNGMENT TRAINING: CPT

## 2022-04-22 PROCEDURE — 94761 N-INVAS EAR/PLS OXIMETRY MLT: CPT

## 2022-04-22 PROCEDURE — 36415 COLL VENOUS BLD VENIPUNCTURE: CPT | Performed by: HOSPITALIST

## 2022-04-22 RX ADMIN — INSULIN ASPART 3 UNITS: 100 INJECTION, SOLUTION INTRAVENOUS; SUBCUTANEOUS at 05:04

## 2022-04-22 RX ADMIN — METFORMIN HYDROCHLORIDE 500 MG: 500 TABLET ORAL at 05:04

## 2022-04-22 RX ADMIN — APIXABAN 5 MG: 2.5 TABLET, FILM COATED ORAL at 09:04

## 2022-04-22 RX ADMIN — TAMSULOSIN HYDROCHLORIDE 0.4 MG: 0.4 CAPSULE ORAL at 09:04

## 2022-04-22 RX ADMIN — THERA TABS 1 TABLET: TAB at 09:04

## 2022-04-22 RX ADMIN — METFORMIN HYDROCHLORIDE 500 MG: 500 TABLET ORAL at 09:04

## 2022-04-22 RX ADMIN — ISONIAZID 300 MG: 300 TABLET ORAL at 09:04

## 2022-04-22 RX ADMIN — INSULIN ASPART 3 UNITS: 100 INJECTION, SOLUTION INTRAVENOUS; SUBCUTANEOUS at 12:04

## 2022-04-22 RX ADMIN — THIAMINE HCL TAB 100 MG 100 MG: 100 TAB at 09:04

## 2022-04-22 RX ADMIN — FOLIC ACID 1 MG: 1 TABLET ORAL at 09:04

## 2022-04-22 RX ADMIN — ATORVASTATIN CALCIUM 80 MG: 20 TABLET, FILM COATED ORAL at 09:04

## 2022-04-22 RX ADMIN — INSULIN ASPART 3 UNITS: 100 INJECTION, SOLUTION INTRAVENOUS; SUBCUTANEOUS at 09:04

## 2022-04-22 RX ADMIN — Medication 25 MG: at 09:04

## 2022-04-22 RX ADMIN — FLUOXETINE 20 MG: 20 CAPSULE ORAL at 09:04

## 2022-04-22 NOTE — PLAN OF CARE
"  Problem: Physical Therapy Goal  Goal: Physical Therapy Goal  Description: Goals to be met by: 2022    Patient will increase functional independence with mobility by performin. Sit<>stand with SPV with LRAD. MET   2. Gait x 300 feet with SPV with rollator. MET  3. Static standing in SLS with no UE support with SBA x10 sec.   4. Gait x50 ft with cane with SPV. MET    Outcome: Adequate for Care Transition     Patient with good progress towards goals from initial evaluation - he is now s/p skilled PT for 2 months and has plateaued at current functional level. Patient remains most limited by decreased endurance, impaired balance, and impaired safety awareness but continues to defer participation in balance activities despite balance one of largest deficits and self limits his OOB mobility despite continuous encouragement (rarely agreeable to sit in bedside chair).     Patient has sufficiently met acute care PT goals and will discontinue PT at this time with plan for daily mobility with nursing supervision. Rec for dc to NH.      Discussed with nursing plan for continued hallway ambulation with RW and supervision to maintain pt's current functional level, pt's level of motivation greatly increased with use of "oldies" music.   "

## 2022-04-22 NOTE — PLAN OF CARE
POC reviewed with patient. AAOx4. VSS. No other complaints verbalized during shift.  BSC utilized for voiding. Positions self. Turn Q2/frequent weight shift encouraged by RN. Low air loss mattress in use. Instructed to call for help ambulating. No injuries, falls, or trauma occurred during shift. Purposeful rounding completed. Bed low and locked with side rails up x 3 and call light within reach.          Problem: Adult Inpatient Plan of Care  Goal: Plan of Care Review  Outcome: Ongoing, Progressing  Goal: Patient-Specific Goal (Individualized)  Outcome: Ongoing, Progressing  Goal: Absence of Hospital-Acquired Illness or Injury  Outcome: Ongoing, Progressing  Intervention: Identify and Manage Fall Risk  Flowsheets (Taken 4/22/2022 1712)  Safety Promotion/Fall Prevention:   assistive device/personal item within reach   commode/urinal/bedpan at bedside   Fall Risk signage in place   /camera at bedside   instructed to call staff for mobility  Intervention: Prevent Skin Injury  Flowsheets (Taken 4/22/2022 1712)  Skin Protection: silicone foam dressing in place  Intervention: Prevent and Manage VTE (Venous Thromboembolism) Risk  Flowsheets (Taken 4/22/2022 1712)  VTE Prevention/Management: fluids promoted  Goal: Optimal Comfort and Wellbeing  Outcome: Ongoing, Progressing  Intervention: Monitor Pain and Promote Comfort  Flowsheets (Taken 4/22/2022 1712)  Pain Management Interventions:   position adjusted   relaxation techniques promoted   quiet environment facilitated   pillow support provided

## 2022-04-22 NOTE — PLAN OF CARE
Problem: Occupational Therapy Goal  Goal: Occupational Therapy Goal  Description: Goals to be met by: 4/26/2022    Patient will increase functional independence with ADLs by performing:    UE Dressing with [place].  LE Dressing with [place].  Grooming while standing at sink with Supervision.  Toileting from toilet with Supervision for hygiene and clothing management.   Toilet transfer to toilet with Supervision.    Completed Goals:  Grooming while standing at sink with Supervision. Met 4/22/2022  Toileting from toilet with Supervision for hygiene and clothing management.  MET 4/22/2022    Outcome: Ongoing, Progressing     Pt has made progress and is performing at baseline.  He no longer requires OT services in the acute care setting.  Pt to d/c from OT.  NH (residential placement) is recommended upon d/c from hospital.    MICHELLE Willett  4/22/2022

## 2022-04-22 NOTE — SUBJECTIVE & OBJECTIVE
Interval History:  Awaiting placement.    Review of Systems   Constitutional:  Negative for chills and fever.   Respiratory:  Negative for cough and shortness of breath.    Cardiovascular:  Negative for chest pain and palpitations.   Gastrointestinal:  Negative for abdominal pain, nausea and vomiting.   Objective:     Vital Signs (Most Recent):  Temp: 97.7 °F (36.5 °C) (04/22/22 1137)  Pulse: 92 (04/22/22 1137)  Resp: 16 (04/22/22 1137)  BP: 124/69 (04/22/22 1137)  SpO2: 98 % (04/22/22 1137) Vital Signs (24h Range):  Temp:  [96.2 °F (35.7 °C)-98.4 °F (36.9 °C)] 97.7 °F (36.5 °C)  Pulse:  [77-96] 92  Resp:  [16-20] 16  SpO2:  [96 %-100 %] 98 %  BP: (102-124)/(57-75) 124/69     Weight: 66.2 kg (145 lb 15.1 oz)  Body mass index is 19.79 kg/m².    Intake/Output Summary (Last 24 hours) at 4/22/2022 1524  Last data filed at 4/22/2022 1300  Gross per 24 hour   Intake 500 ml   Output 1200 ml   Net -700 ml      Physical Exam  Patient not examined today.    Significant Labs: All pertinent labs within the past 24 hours have been reviewed.    Significant Imaging: I have reviewed all pertinent imaging results/findings within the past 24 hours.

## 2022-04-22 NOTE — PT/OT/SLP PROGRESS
"Physical Therapy Treatment    Patient Name:  Forest Lopez   MRN:  [id number]    Recommendations:     Discharge Recommendations:  nursing facility, basic   Discharge Equipment Recommendations: bedside commode, shower chair, rollator  Barriers to discharge: None to NH    Assessment:     Forest Lopez is a 72 y.o. male admitted with a medical diagnosis of Acute stroke due to ischemia.  He presents with the following impairments/functional limitations:  impaired endurance, impaired self care skills, gait instability, impaired balance, impaired cognition, decreased safety awareness.    Patient with good progress towards goals from initial evaluation - he is now s/p skilled PT for 2 months and has plateaued at current functional level. Patient remains most limited by decreased endurance, impaired balance, and impaired safety awareness but continues to defer participation in balance activities despite balance one of largest deficits and self limits his OOB mobility despite continuous encouragement (rarely agreeable to sit in bedside chair).     Patient has sufficiently met acute care PT goals and will discontinue PT at this time with plan for daily mobility with nursing supervision. Rec for dc to NH.     PT/PTA met face to face to discuss patient presentation, examination, evaluation, assessment, and appropriate interventions towards goals with PTAs involved in this patient's care. Patient will be seen by physical therapist every sixth visit and minimally once per month.     Rehab Prognosis: Good; DC PT  Recent Surgery: Procedure(s) (LRB):  COLONOSCOPY (N/A) 28 Days Post-Op    Plan:     During this hospitalization, patient to be seen  (DC) to address the identified rehab impairments via  (DC) and progress toward the following goals:    · Plan of Care Expires:  04/22/22    Subjective     Chief Complaint: "I'm ready to get out of here"  Patient/Family Comments/goals: Asking for "oldies but goodies"; Patient agreeable to PT " "treatment.  Pain/Comfort:  Pain Rating 1: 0/10  Pain Rating Post-Intervention 1: 0/10      Objective:     Patient found HOB elevated with peripheral IV and tash sys upon PT entry to room.     General Precautions: Standard, anti-coagulation medicine, fall, aspiration  Orthopedic Precautions:N/A   Braces: N/A  Respiratory Status: Room air     Patient donned red socks and gait belt for OOB mobility. Patient donned mask for hallway ambulation.    Functional Mobility:  · Bed Mobility:  · Supine to Sit: mod(I)  · Transfers:     · Sit to Stand:  modified independence and supervision with straight cane  · From EOB and bedside chair  · Gait: 1x200 ft with SPC and supervision progressing to CGA  · Improved gait speed and stability with SPC since initial gait  · Increased dizziness/fatigue after 150 ft requiring CGA to maintain safety from falls    AM-PAC 6 CLICK MOBILITY  Turning over in bed (including adjusting bedclothes, sheets and blankets)?: 4  Sitting down on and standing up from a chair with arms (e.g., wheelchair, bedside commode, etc.): 4  Moving from lying on back to sitting on the side of the bed?: 4  Moving to and from a bed to a chair (including a wheelchair)?: 4  Need to walk in hospital room?: 3  Climbing 3-5 steps with a railing?: 3  Basic Mobility Total Score: 22       Therapeutic Activities and Exercises:  · Slow mobility requiring increased time for all tasks  · Playing "oldies but goodies" music on PT's personal phone during gait bout, pt singing along    Patient left up in chair with all lines intact, call button in reach, chair alarm on, PCT  notified and [employer] telesitter present.    GOALS:   Multidisciplinary Problems     Physical Therapy Goals        Problem: Physical Therapy Goal    Goal Priority Disciplines Outcome Goal Variances Interventions   Physical Therapy Goal     PT, PT/OT Adequate for Care Transition     Description: Goals to be met by: 5/5/2022    Patient will increase functional " independence with mobility by performin. Sit<>stand with SPV with LRAD. MET   2. Gait x 300 feet with SPV with rollator. MET  3. Static standing in SLS with no UE support with SBA x10 sec.   4. Gait x50 ft with cane with SPV. MET                     Time Tracking:     PT Received On: 22  PT Start Time: 1039     PT Stop Time: 1102  PT Total Time (min): 23 min     Billable Minutes: Therapeutic Activity 23    Treatment Type: 6th Visit  PT/PTA: PT     PTA Visit Number: 0     2022

## 2022-04-22 NOTE — PT/OT/SLP PROGRESS
Occupational Therapy   Treatment & Discharge Summary    Name: Forest Lopez  MRN: [id number]  Admitting Diagnosis:  Acute stroke due to ischemia  28 Days Post-Op    Recommendations:     Discharge Recommendations: Nursing home (FPC placement)  Discharge Equipment Recommendations:  bedside commode, shower chair, rollator  Barriers to discharge:  Decreased caregiver support    Assessment:     Forest Lopez is a 72 y.o. male with a medical diagnosis of Acute stroke due to ischemia.  He presents with no pain. Performance deficits affecting function are impaired cognition, impaired balance, gait instability, impaired self care skills, impaired functional mobilty, decreased safety awareness.  Pt agreeable to participating in therapy upon arrival to room.  Pt able to perform toileting from BSC placed over toilet and grooming while standing with RW with supervision.  Mild impaired balance and decreased safety awareness noted when ambulating with SBA-CGA, RW; no LOB observed.    Overall, pt tolerated therapy well.  Pt has been in hospital for 81 days (OT eval performed on 2/1).  He has made progress and is performing at baseline.  He no longer requires skilled OT services in the acute care setting at this time.  Pt to d/c from OT.  NH (FPC placement) is recommended upon d/c from hospital.        Rehab Prognosis:  Good; patient would benefit from acute skilled OT services to address these deficits and reach maximum level of function.       Plan:     Patient to be seen 2 x/week to address the above listed problems via self-care/home management, therapeutic exercises, therapeutic activities  · Plan of Care Expires: 04/29/22  · Plan of Care Reviewed with: patient    Subjective     Pain/Comfort:  · Pain Rating 1: 0/10  · Pain Rating Post-Intervention 1: 0/10    Objective:     Communicated with: PT prior to session.  Patient found up in chair with peripheral IV, chair alarm upon OT entry to room.    General Precautions:  Standard, anti-coagulation medicine, aspiration, fall   Orthopedic Precautions:N/A   Braces: N/A  Respiratory Status: Room air     Occupational Performance:     Bed Mobility:    · Not assessed 2* pt up in chair upon arrival.    Functional Mobility/Transfers:  · Sit <> Stand:   · SBA, RW x 2 trials from chair  · Supervision, RW x 1 trial from BSC placed over toilet  · Toilet:  SBA, RW walking to BSC placed over toilet  · Functional Mobility: Pt ambulated ~30 ft in room with SBA-CGA, RW.  · Mild impaired balance and decreased safety awareness noted; no LOB observed.  · Cues provided for RW management    Activities of Daily Living:  · Grooming:   · SBA, RW for washing hands while standing at sink at sink for first trial.  · Supervision, RW for washing hands while standing at sink for second trial.  · Cues provided for RW placement  · Toileting: Supervision, RW from BSC placed over toilet for BM.  · Pt performed hygiene care in seated position.      [facility] 6 Click ADL: 19    Treatment & Education:  *Pt educated on role of OT in acute care setting.  *Pt ambulated within room to address coordination, endurance, and balance needed for functional mobility.  *Pt performed UB exercise seated in chair to promote increased endurance and ROM needed for ADLs: 2 sets x 10  -Overhead press  *Pt performed toileting from BSC placed over toilet, and grooming standing at sink.  *POC and discharge from therapy discussed        Patient left up in chair with call button in reach and chair alarm onEducation:      GOALS:   Multidisciplinary Problems     Occupational Therapy Goals        Problem: Occupational Therapy Goal    Goal Priority Disciplines Outcome Interventions   Occupational Therapy Goal     OT, PT/OT Ongoing, Progressing    Description: Goals to be met by: 4/26/2022    Patient will increase functional independence with ADLs by performing:    UE Dressing with [place].  LE Dressing with [place].  Grooming while standing  at sink with Supervision.  Toileting from toilet with Supervision for hygiene and clothing management.   Toilet transfer to toilet with Supervision.    Completed Goals:  Grooming while standing at sink with Supervision. Met 4/22/2022  Toileting from toilet with Supervision for hygiene and clothing management.  MET 4/22/2022                     Time Tracking:     OT Date of Treatment: 04/22/22  OT Start Time: 1148  OT Stop Time: 1214  OT Total Time (min): 26 min    Billable Minutes:Self Care/Home Management 16  Therapeutic Activity 10    OT/DUTCH: OT          4/22/2022

## 2022-04-22 NOTE — PROGRESS NOTES
RSW met with patient and patient has no additional needs, waiting on another phone to get bank statements to give to accepting facility.     SUMMER Wilson

## 2022-04-22 NOTE — PROGRESS NOTES
[facility] Medicine  Progress Note    Patient Name: Forest Lopez  MRN: [id number]  Patient Class: IP- Inpatient   Admission Date: 1/31/2022  Length of Stay: 81 days  Attending Physician: Mojgan Bosch MD  Primary Care Provider: Julian Escobar        Subjective:     Principal Problem:Acute stroke due to ischemia        HPI:  Mr. Lopez is a 72 year old man who presented after a syncopal episode.  He reports he had been feeling well prior to the episode but then had a prodrome prior to passing out.  EMS was called, report patient's BP was low normal on their arrival, improved after an IV fluids bolus.  Patient denies chest pain or shortness of breath and says he does not feel weak currently although is rather tired.  When seen in the ED this morning he could barely express himself audibly.     Vital signs were normal on presentation here.  He is on warfarin for recurrent DVT, but his INR was 1, and d-dimer markedly elevated at 21.7.  Tox screen was positive for cocaine.  He had a CTA of the brain and neck done on presentation so CTA of the chest for PE study could not be done for 48 hours.  Ultrasound of the lower extremities showed extensive bilateral DVT.  On the right there was thrombosis of the common femoral vein, femoral vein, popliteal vein, one of the paired posterior tibial veins and both visualized peroneal veins.  On the left there was thrombosis of the common femoral vein, femoral vein and popliteal vein.  Patient was started on full dose Lovenox and admitted.    Medical history is from the chart as he is unable to give me much information.  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  He smokes 5-6 cigarettes/day and is not interested in quitting.  He is currently homeless and staying with a friend.  Despite the normal INR he is sure  he is taking his warfarin and is not having difficulty taking his medications.  I discussed his care with [facility] staff on the [facility] and patient had been lost to follow up since November 2021.      Overview/Hospital Course:  Patient presented to the ED after a syncopal episode and was found to have bilateral lower extremity DVTs and a low INR.  Tox screen was positive for cocaine.  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  He was started back on his warfarin with bridging Lovenox.  His 2D echo showed grade I diastolic dysfunction.  Neurology evaluated him and recommended echo with bubble study, which was negative for a shunt.    PT/OT evaluated him and recommended SNF placement versus inpatient rehab.  His INR was difficult to get therapeutic, and as he had no contraindication to a NOAC his warfarin was changed to apixaban, and the full dose Lovenox was discontinued.  As he appeared to be depressed and had no objection to starting an antidepressant Lexapro was started.  He had a fall in the hospital on 2/13, had gone to the bathroom for a BM with the nurse, and when she turned away while he was washing his hands he apparently lost his balance and fell.  He did not hit his head and did not lose consciousness, but his BP was low transiently and he was given a bolus of IV fluids.    Patient's mental status improved slowly, but he gradually became more talkative and was able to hold a conversation.  He was diagnosed with a polymicrobial UTI on 2/21 and completed treatment with antibiotics.  Psychiatry evaluated him on 3/3 and changed his antidepressant to Prozac as it can be more activating and patient was having difficulty with his level of motivation.      Patient had some episodes of nausea and vomiting and CT of the abdomen was done, with incidental finding of rectal wall thickening noted.  Colonoscopy was recommended,  but he was unable to finish the prep.  GI evaluated him and decided the patient would benefit from a flex sig to evaluate the rectum.  He had the procedure on 3/25 and no abnormalities were seen other than internal hemorrhoids.    Patient's discharge was delayed as he was not accepted by any SNF facilities in the area.  Reasons for the denials are unclear as he has a clear rehab diagnosis.  He has a history of drug use but has not shown any interest in getting any illicit or legal drugs since he has been here, and he has had no visitors that could have provided drugs to him.  He has been pleasant and cooperative while here although lacks motivation, likely due to the nature of the stroke affecting his frontal lobe.  He requires considerable encouragement to participate in therapy.  Discharging him to live on the street or shelter would not be appropriate as he would not be able to take care of himself.  At this point he has agreed to detention nursing care, as his stroke was more than 2 months ago and he has already had sufficient rehab while here in the hospital.  Discharge was planned for 4/8/22, but was delayed due to him not having access to his own bank account.  Hopefully he can be transferred to nursing home as soon as financial situation is straightened out.  Case management has had to involve the Office of Elder Protective Services in order to get access to his bank account.      Interval History:  Awaiting placement.    Review of Systems   Constitutional:  Negative for chills and fever.   Respiratory:  Negative for cough and shortness of breath.    Cardiovascular:  Negative for chest pain and palpitations.   Gastrointestinal:  Negative for abdominal pain, nausea and vomiting.   Objective:     Vital Signs (Most Recent):  Temp: 97.7 °F (36.5 °C) (04/22/22 1137)  Pulse: 92 (04/22/22 1137)  Resp: 16 (04/22/22 1137)  BP: 124/69 (04/22/22 1137)  SpO2: 98 % (04/22/22 1137) Vital Signs (24h Range):  Temp:  [96.2  °F (35.7 °C)-98.4 °F (36.9 °C)] 97.7 °F (36.5 °C)  Pulse:  [77-96] 92  Resp:  [16-20] 16  SpO2:  [96 %-100 %] 98 %  BP: (102-124)/(57-75) 124/69     Weight: 66.2 kg (145 lb 15.1 oz)  Body mass index is 19.79 kg/m².    Intake/Output Summary (Last 24 hours) at 4/22/2022 1524  Last data filed at 4/22/2022 1300  Gross per 24 hour   Intake 500 ml   Output 1200 ml   Net -700 ml      Physical Exam  Patient not examined today.    Significant Labs: All pertinent labs within the past 24 hours have been reviewed.    Significant Imaging: I have reviewed all pertinent imaging results/findings within the past 24 hours.      Assessment/Plan:      * Acute stroke due to ischemia  - MRI showed areas of diffusion signal hyperintensity consistent with areas of acute ischemia/infarct involving the left cerebral hemisphere, the most prominent measuring approximately 1.4 cm, also a small focus of the right frontal lobe.  - Patient on full dose anticoagulation currently due to acute bilateral DVT.  - Sinus rhythm noted throughout hospital stay  - Risk factor modification - control diabetes, continue statin.  - RPR, HIV non reactive.  B12 low normal.   - CTA showed a focal segment of 60-70% stenosis involving the proximal to mid left vertebral artery that was new when compared to the prior study of 09/25/2019.  No evidence of large vessel intracranial occlusion.   - Neurology noted symptoms do not correspond to stroke location, although the frontal lobe stroke might have something to do with his lack of motivation, and Speech has noted he has had delayed swallowing.  - Echo with bubble study done, no shunt seen.  - Follow up with vascular neurology in 4 weeks.  - Therapy recommended rehab initially but changed recommendation to SNF due to his low interest in participation.  - Started Lexapro due to suspicion for depression.  - Psych consulted, changed to Prozac and started thiamine, folic acid, MVI due to previous history of alcohol abuse,  although Wernicke encephalopathy is not suspected.  - Patient's son, Forest, agrees to nursing home care.  He has another son who is serving in the .  Neither is willing to take on the job of durable power of .  - awaiting placement.        Acute deep vein thrombosis (DVT) of both femoral veins  Last ultrasound showed partially occlusive DVTs, now completely occlusive DVT of multiple lower extremity veins on ultrasound.  Patient reports compliance with warfarin but his INR is only one.  He is homeless but says he does not have difficulty obtaining his medications.  Does not seem to care about anything, which again might be due to the frontal lobe stroke.  D-dimer was markedly elevated on presentation and there was a question of possible PE, but he had a CTA of the brain/neck on presentation so could not get another CTA for another 48 hours.    V/Q scan was done, showed low probability for PE.  2D echo showed grade I diastolic dysfunction.  Warfarin was restarted with bridging Lovenox, but INR was still low.  Changed to apixaban and discontinued Lovenox.    LTBI (latent tuberculosis infection)  See ID note.  Patient had positive PPD 2 years ago and was not treated.  PPD this admission is also positive.  He has been afebrile and has no cough or other TB symptoms.  CXR repeated and was normal.  Clinically he does not have TB.  Quantiferon gold has been ordered to see if he has latent TB.  Discussed with ID, if quantiferon gold is positive would treat for latent TB with INH and pyridoxine for 6 months. Tb gold positive, started  INH and pyridoxine.        Severe protein-calorie malnutrition  Diet as tolerated  Patient seen by dietician, recommendations made.      Debility  PT/OT recommending rehab vs SNF after stroke with debility/poor motivation  Having difficulty finding placement for unclear reasons.  Will continue to pursue this as above.  Again, patient has been pleasant and cooperative.      Type 2  diabetes mellitus with hyperglycemia, without long-term current use of insulin  Reportedly he is on metformin and glipizide, both held.  He had 4+ sugar in urine and HbA1c was 10.3, and he was hyperglycemic on presentation.  Started on Levemir and ISS, then increased Levemir to 25 units daily  Added scheduled novolog 5 units tid, increased to 7 units with improvement  OK to resume metformin.    Increased levemir to 28 units daily with good improvement.  Held metformin with contrast with CT.  BG lower now off metformin, has had a few low BG as well due to being on clear liquids for colonoscopy prep.  Continue to adjust Levemir as needed.    Hyperlipidemia  Continue atorvastatin status post stroke.        VTE Risk Mitigation (From admission, onward)         Ordered     apixaban tablet 5 mg  2 times daily         03/29/22 1555     Place sequential compression device  Until discontinued         01/31/22 0533                Discharge Planning   EFRAÍN:      Code Status: Full Code   Is the patient medically ready for discharge?:     Reason for patient still in hospital (select all that apply): Pending disposition  Discharge Plan A: Skilled Nursing Facility   Discharge Delays: (!) Post-Acute Set-up              Mojgan Meza MD  Department of Hospital Medicine   [facility] ([place])

## 2022-04-22 NOTE — PT/OT/SLP DISCHARGE
Physical Therapy Discharge Summary    Name: Forest Lopez  MRN: [id number]   Principal Problem: Acute stroke due to ischemia     Patient Discharged from acute Physical Therapy on 22.  Please refer to prior PT noted date on 22 for functional status.     Assessment:     Patient is no longer making progress. He is now s/p skilled PT for 2 months and has plateaued at current functional level. Patient remains most limited by decreased endurance, impaired balance, and impaired safety awareness but continues to defer participation in balance activities despite balance one of largest deficits and self limits his OOB mobility despite continuous encouragement (rarely agreeable to sit in bedside chair). Ambulatory with RW or rollator with supervision.     Patient has sufficiently met acute care PT goals and will discontinue PT at this time with plan for daily mobility with nursing supervision. Rec for dc to NH.     Objective:     GOALS:   Multidisciplinary Problems     Physical Therapy Goals        Problem: Physical Therapy Goal    Goal Priority Disciplines Outcome Goal Variances Interventions   Physical Therapy Goal     PT, PT/OT Adequate for Care Transition     Description: Goals to be met by: 2022    Patient will increase functional independence with mobility by performin. Sit<>stand with SPV with LRAD. MET   2. Gait x 300 feet with SPV with rollator. MET  3. Static standing in SLS with no UE support with SBA x10 sec.   4. Gait x50 ft with cane with SPV. MET                     Reasons for Discontinuation of Therapy Services  Satisfactory goal achievement. and Therapist determines that the patient will no longer benefit from therapy services.      Plan:     Patient Discharged to: In house with walking program.      2022

## 2022-04-22 NOTE — ASSESSMENT & PLAN NOTE
- MRI showed areas of diffusion signal hyperintensity consistent with areas of acute ischemia/infarct involving the left cerebral hemisphere, the most prominent measuring approximately 1.4 cm, also a small focus of the right frontal lobe.  - Patient on full dose anticoagulation currently due to acute bilateral DVT.  - Sinus rhythm noted throughout hospital stay  - Risk factor modification - control diabetes, continue statin.  - RPR, HIV non reactive.  B12 low normal.   - CTA showed a focal segment of 60-70% stenosis involving the proximal to mid left vertebral artery that was new when compared to the prior study of 09/25/2019.  No evidence of large vessel intracranial occlusion.   - Neurology noted symptoms do not correspond to stroke location, although the frontal lobe stroke might have something to do with his lack of motivation, and Speech has noted he has had delayed swallowing.  - Echo with bubble study done, no shunt seen.  - Follow up with vascular neurology in 4 weeks.  - Therapy recommended rehab initially but changed recommendation to SNF due to his low interest in participation.  - Started Lexapro due to suspicion for depression.  - Psych consulted, changed to Prozac and started thiamine, folic acid, MVI due to previous history of alcohol abuse, although Wernicke encephalopathy is not suspected.  - Patient's son, Forest, agrees to nursing home care.  He has another son who is serving in the .  Neither is willing to take on the job of durable power of .  - awaiting placement.

## 2022-04-22 NOTE — PROGRESS NOTES
SHANICE spoke with Lexie on 4/21/22, to discuss what happened when SW and the patient called the number on his bank card,(). The patient could not get anyone to release any information to him. SHANICE went on line and was also unable to making any progress in getting his information there, either. Today Stan Allison  [facility] , Case Management [facility]  spoke with SHANICE and advised her that the intake department at [facility] in [place] may have learned a way to get the needed information. SHANICE attempted to call Abiola at [facility] but had to leave a message.

## 2022-04-22 NOTE — PLAN OF CARE
Patient had a very good night.  He ate well.  He had a bowel movement at shift change last night.  Since then he has been continent and using the urinal without issue (400 ml).  No skin breakdown.  He has worn the foam dressings to his sacrum and right ischial tuberosity without attempting to remove them.  His hs accucheck was 134 so no sliding scale required.  Avasys camera #3 noted in use.  Room near nurses desk.  Rounding maintained per protocol.  Problem: Adult Inpatient Plan of Care  Goal: Plan of Care Review  Outcome: Adequate for Care Transition  Flowsheets (Taken 4/22/2022 0458)  Plan of Care Reviewed With: patient     Problem: Adult Inpatient Plan of Care  Goal: Absence of Hospital-Acquired Illness or Injury  Intervention: Prevent Skin Injury  Flowsheets (Taken 4/22/2022 0458)  Body Position: position changed independently     Problem: Diabetes Comorbidity  Goal: Blood Glucose Level Within Targeted Range  Outcome: Adequate for Care Transition  Intervention: Monitor and Manage Glycemia  Flowsheets (Taken 4/22/2022 0458)  Glycemic Management: blood glucose monitored     Problem: Skin Injury Risk Increased  Goal: Skin Health and Integrity  Outcome: Adequate for Care Transition  Intervention: Optimize Skin Protection  Flowsheets (Taken 4/22/2022 0458)  Pressure Reduction Techniques: frequent weight shift encouraged  Pressure Reduction Devices:   alternating pressure pump (ADD)   pressure-redistributing mattress utilized  Head of Bed (HOB) Positioning: HOB elevated     Problem: Coping Ineffective  Goal: Effective Coping  Outcome: Adequate for Care Transition  Intervention: Support and Enhance Coping Strategies  Flowsheets (Taken 4/22/2022 0458)  Supportive Measures:   positive reinforcement provided   self-care encouraged   self-reflection promoted   self-responsibility promoted   verbalization of feelings encouraged   active listening utilized   decision-making supported  Family/Support System Care: self-care  encouraged  Environmental Support: calm environment promoted     Problem: Fall Injury Risk  Goal: Absence of Fall and Fall-Related Injury  Outcome: Adequate for Care Transition  Intervention: Identify and Manage Contributors  Flowsheets (Taken 4/22/2022 0458)  Self-Care Promotion: independence encouraged  Medication Review/Management:   medications reviewed   high-risk medications identified  Intervention: Promote Injury-Free Environment  Flowsheets (Taken 4/22/2022 0458)  Safety Promotion/Fall Prevention:   commode/urinal/bedpan at bedside   Fall Risk reviewed with patient/family   Fall Risk signage in place   medications reviewed   lighting adjusted   high risk medications identified   nonskid shoes/socks when out of bed   /camera at bedside   room near unit station   side rails raised x 2   instructed to call staff for mobility     Problem: Bleeding (Sepsis/Septic Shock)  Goal: Absence of Bleeding  Outcome: Adequate for Care Transition  Intervention: Monitor and Manage Bleeding  Flowsheets (Taken 4/22/2022 0458)  Bleeding Precautions: blood pressure closely monitored

## 2022-04-22 NOTE — PT/OT/SLP PROGRESS
Speech Language Pathology Treatment    Patient Name:  Forest Lopez   MRN:  [id number]  Admitting Diagnosis: Acute stroke due to ischemia    Recommendations:                  General Recommendations:   1. Speech pathology to continue to follow 1-2x/week for ongoing assessment of cognitive-communicative deficits s/p acute infarcts of L cerebral hemisphere     Diet recommendations: Solids: Regular, Liquids: Thin      Aspiration Precautions: Eliminate distractions, Feed only when awake/alert, Frequent oral care and HOB to 90 degrees      General Precautions: Standard,       Communication strategies:  Reduce informational content/context; frequent repetition and cues to redirect    Subjective     · Pt awake/alert, just finished PT session. Sitting upright in beside chair.  · Agreeable to SLP session.     Respiratory Status: Room air    Objective:     Has the patient been evaluated by SLP for swallowing?   Yes  Keep patient NPO? No   Current Respiratory Status:    Room air    Cognitive-Communicative and Language Status:  Pt agreeable to participate in session this date. Improving participation and motivation.   Targeting language skills of word finding and sentence formation, pt given 4-letters to re-arrange to make target word. Pt able to complete task with 100% acc given mod assist. Pt then formulated sentence with target word with 80% acc given mod-max assist.     Swallowing:  Pt reported distaste of menu options. Noted pt has remained on a mechanical soft chopped diet. SLP assessed pt's readiness for diet upgrade. Prior to po intake, noted pt with slightly wet vocal quality, cleared with spontaneous throat clear.    Oral Phase:  · Adequate labial seal without anterior spillage  · Mildly disorganized bolus prep/mastication 2/2 pt being edentulous  · However, functional- complete bolus collection prior to a-p transport  · Intermittent lingual pumping   · Mild oral residue    Pharyngeal Phase:  · Trigger of the pharyngeal  swallow appears to be slightly delayed  · Pt with x1 s/s of airway threat- dry throat clear following trial of solid  · No further overt s/s of aspiration or airway threat during 100% of po intake- no coughing, choking, changes in breath stream or vocal quality.     Education: Pt would benefit from ongoing SLP services at next level of care. Notified Dr. Bosch of diet upgrade rec to regular solids with thin liquids. Diet order changed.     Assessment:     Forest Lopez is a 72 y.o. 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    Goals:   Multidisciplinary Problems     SLP Goals        Problem: SLP Goal    Goal Priority Disciplines Outcome   SLP Goal     SLP Ongoing, Progressing   Description: 1. Pt will consume a regular/thin diet without overt s/s of aspiration or airway threat without assistance.  2. Pt will increase orientation to person, place, situation and date with 90% acc given min assist.  3. Pt will follow 3-4 step commands to increase functional IND with 75% acc given min assist.   4. Pt will complete immediate and delayed recall tasks with 75% acc given mod assist.  5. Targeting word finding, pt will complete divergent naming tasks given 1-minute time frame with 50% acc given mod assist.  6. Ongoing cognitive-communicative assessment.     Updated Goals 2/8:  7. Pt will sustain attention to topic/task without distraction in 5-10 minute increments given mod verbal cues.                   Plan:     · Patient to be seen:  1 x/week, 2 x/week   · Plan of Care expires:  03/21/22  · Plan of Care reviewed with:  patient   · SLP Follow-Up:  Yes       Discharge recommendations: IRF vs. Skilled Nursing Facility     Time Tracking:     SLP Treatment Date:   04/22/22  Speech Start Time:  1104  Speech Stop Time:  1135     Speech Total Time (min):  31 min    Billable Minutes: Speech Therapy Individual 31  min    04/22/2022

## 2022-04-23 LAB
POCT GLUCOSE: 199 MG/DL (ref 70–110)
POCT GLUCOSE: 62 MG/DL (ref 70–110)
POCT GLUCOSE: 69 MG/DL (ref 70–110)
POCT GLUCOSE: 77 MG/DL (ref 70–110)

## 2022-04-23 PROCEDURE — 25000003 PHARM REV CODE 250: Performed by: INTERNAL MEDICINE

## 2022-04-23 PROCEDURE — 99231 SBSQ HOSP IP/OBS SF/LOW 25: CPT | Mod: ,,, | Performed by: HOSPITALIST

## 2022-04-23 PROCEDURE — 11000001 HC ACUTE MED/SURG PRIVATE ROOM

## 2022-04-23 PROCEDURE — 94761 N-INVAS EAR/PLS OXIMETRY MLT: CPT

## 2022-04-23 PROCEDURE — 25000003 PHARM REV CODE 250: Performed by: HOSPITALIST

## 2022-04-23 PROCEDURE — 99231 PR SUBSEQUENT HOSPITAL CARE,LEVL I: ICD-10-PCS | Mod: ,,, | Performed by: HOSPITALIST

## 2022-04-23 RX ADMIN — TAMSULOSIN HYDROCHLORIDE 0.4 MG: 0.4 CAPSULE ORAL at 09:04

## 2022-04-23 RX ADMIN — Medication 25 MG: at 09:04

## 2022-04-23 RX ADMIN — INSULIN ASPART 3 UNITS: 100 INJECTION, SOLUTION INTRAVENOUS; SUBCUTANEOUS at 09:04

## 2022-04-23 RX ADMIN — THIAMINE HCL TAB 100 MG 100 MG: 100 TAB at 09:04

## 2022-04-23 RX ADMIN — THERA TABS 1 TABLET: TAB at 09:04

## 2022-04-23 RX ADMIN — ATORVASTATIN CALCIUM 80 MG: 20 TABLET, FILM COATED ORAL at 09:04

## 2022-04-23 RX ADMIN — APIXABAN 5 MG: 2.5 TABLET, FILM COATED ORAL at 09:04

## 2022-04-23 RX ADMIN — FLUOXETINE 20 MG: 20 CAPSULE ORAL at 09:04

## 2022-04-23 RX ADMIN — ISONIAZID 300 MG: 300 TABLET ORAL at 09:04

## 2022-04-23 RX ADMIN — METFORMIN HYDROCHLORIDE 500 MG: 500 TABLET ORAL at 09:04

## 2022-04-23 RX ADMIN — FOLIC ACID 1 MG: 1 TABLET ORAL at 09:04

## 2022-04-23 NOTE — PLAN OF CARE
POC reviewed with patient, questions and concerns addressed. No acute events through the night.  All Vital signs stable. No complaints.  AAOx3. Safety maintained.  Bed locked, in lowest position, call light within reach, side rails x2. Mobilized to their highest function. See doc flow sheets for further information. Will continue to monitor.    Problem: Adult Inpatient Plan of Care  Goal: Plan of Care Review  Outcome: Ongoing, Progressing  Goal: Patient-Specific Goal (Individualized)  Outcome: Ongoing, Progressing  Goal: Absence of Hospital-Acquired Illness or Injury  Outcome: Ongoing, Progressing  Goal: Optimal Comfort and Wellbeing  Outcome: Ongoing, Progressing     Problem: Diabetes Comorbidity  Goal: Blood Glucose Level Within Targeted Range  Outcome: Ongoing, Progressing     Problem: Skin Injury Risk Increased  Goal: Skin Health and Integrity  Outcome: Ongoing, Progressing     Problem: Coping Ineffective  Goal: Effective Coping  Outcome: Ongoing, Progressing     Problem: Fall Injury Risk  Goal: Absence of Fall and Fall-Related Injury  Outcome: Ongoing, Progressing     Problem: Infection  Goal: Absence of Infection Signs and Symptoms  Outcome: Ongoing, Progressing     Problem: Spiritual Distress Risk or Actual  Goal: Spiritual Wellbeing  Outcome: Ongoing, Progressing

## 2022-04-23 NOTE — PROGRESS NOTES
[facility] Medicine  Progress Note    Patient Name: Forest Lopez  MRN: [id number]  Patient Class: IP- Inpatient   Admission Date: 1/31/2022  Length of Stay: 82 days  Attending Physician: Mojgan Bosch MD  Primary Care Provider: Julian Escobar        Subjective:     Principal Problem:Acute stroke due to ischemia        HPI:  Mr. Lopez is a 72 year old man who presented after a syncopal episode.  He reports he had been feeling well prior to the episode but then had a prodrome prior to passing out.  EMS was called, report patient's BP was low normal on their arrival, improved after an IV fluids bolus.  Patient denies chest pain or shortness of breath and says he does not feel weak currently although is rather tired.  When seen in the ED this morning he could barely express himself audibly.     Vital signs were normal on presentation here.  He is on warfarin for recurrent DVT, but his INR was 1, and d-dimer markedly elevated at 21.7.  Tox screen was positive for cocaine.  He had a CTA of the brain and neck done on presentation so CTA of the chest for PE study could not be done for 48 hours.  Ultrasound of the lower extremities showed extensive bilateral DVT.  On the right there was thrombosis of the common femoral vein, femoral vein, popliteal vein, one of the paired posterior tibial veins and both visualized peroneal veins.  On the left there was thrombosis of the common femoral vein, femoral vein and popliteal vein.  Patient was started on full dose Lovenox and admitted.    Medical history is from the chart as he is unable to give me much information.  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  He smokes 5-6 cigarettes/day and is not interested in quitting.  He is currently homeless and staying with a friend.  Despite the normal INR he is sure  he is taking his warfarin and is not having difficulty taking his medications.  I discussed his care with [facility] staff on the [facility] and patient had been lost to follow up since November 2021.      Overview/Hospital Course:  Patient presented to the ED after a syncopal episode and was found to have bilateral lower extremity DVTs and a low INR.  Tox screen was positive for cocaine.  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  He was started back on his warfarin with bridging Lovenox.  His 2D echo showed grade I diastolic dysfunction.  Neurology evaluated him and recommended echo with bubble study, which was negative for a shunt.    PT/OT evaluated him and recommended SNF placement versus inpatient rehab.  His INR was difficult to get therapeutic, and as he had no contraindication to a NOAC his warfarin was changed to apixaban, and the full dose Lovenox was discontinued.  As he appeared to be depressed and had no objection to starting an antidepressant Lexapro was started.  He had a fall in the hospital on 2/13, had gone to the bathroom for a BM with the nurse, and when she turned away while he was washing his hands he apparently lost his balance and fell.  He did not hit his head and did not lose consciousness, but his BP was low transiently and he was given a bolus of IV fluids.    Patient's mental status improved slowly, but he gradually became more talkative and was able to hold a conversation.  He was diagnosed with a polymicrobial UTI on 2/21 and completed treatment with antibiotics.  Psychiatry evaluated him on 3/3 and changed his antidepressant to Prozac as it can be more activating and patient was having difficulty with his level of motivation.      Patient had some episodes of nausea and vomiting and CT of the abdomen was done, with incidental finding of rectal wall thickening noted.  Colonoscopy was recommended,  but he was unable to finish the prep.  GI evaluated him and decided the patient would benefit from a flex sig to evaluate the rectum.  He had the procedure on 3/25 and no abnormalities were seen other than internal hemorrhoids.    Patient's discharge was delayed as he was not accepted by any SNF facilities in the area.  Reasons for the denials are unclear as he has a clear rehab diagnosis.  He has a history of drug use but has not shown any interest in getting any illicit or legal drugs since he has been here, and he has had no visitors that could have provided drugs to him.  He has been pleasant and cooperative while here although lacks motivation, likely due to the nature of the stroke affecting his frontal lobe.  He requires considerable encouragement to participate in therapy.  Discharging him to live on the street or shelter would not be appropriate as he would not be able to take care of himself.  At this point he has agreed to skilled nursing nursing care, as his stroke was more than 2 months ago and he has already had sufficient rehab while here in the hospital.  Discharge was planned for 4/8/22, but was delayed due to him not having access to his own bank account.  Hopefully he can be transferred to nursing home as soon as financial situation is straightened out.  Case management has had to involve the Office of Elder Protective Services in order to get access to his bank account.      Interval History:  Awaiting placement    Review of Systems   Constitutional:  Negative for chills and fever.   Respiratory:  Negative for cough and shortness of breath.    Cardiovascular:  Negative for chest pain and palpitations.   Gastrointestinal:  Negative for abdominal pain, nausea and vomiting.   Objective:     Vital Signs (Most Recent):  Temp: 98.2 °F (36.8 °C) (04/23/22 1122)  Pulse: 78 (04/23/22 1122)  Resp: 16 (04/23/22 1122)  BP: 105/64 (04/23/22 1122)  SpO2: 96 % (04/23/22 1122) Vital Signs (24h Range):  Temp:  [97.9 °F  (36.6 °C)-98.2 °F (36.8 °C)] 98.2 °F (36.8 °C)  Pulse:  [77-96] 78  Resp:  [12-18] 16  SpO2:  [96 %-100 %] 96 %  BP: (105-125)/(57-68) 105/64     Weight: 66.2 kg (145 lb 15.1 oz)  Body mass index is 19.79 kg/m².    Intake/Output Summary (Last 24 hours) at 4/23/2022 1353  Last data filed at 4/23/2022 1000  Gross per 24 hour   Intake 240 ml   Output 400 ml   Net -160 ml      Physical Exam  Not examined today.    Significant Labs: All pertinent labs within the past 24 hours have been reviewed.    Significant Imaging: I have reviewed all pertinent imaging results/findings within the past 24 hours.      Assessment/Plan:      * Acute stroke due to ischemia  - MRI showed areas of diffusion signal hyperintensity consistent with areas of acute ischemia/infarct involving the left cerebral hemisphere, the most prominent measuring approximately 1.4 cm, also a small focus of the right frontal lobe.  - Patient on full dose anticoagulation currently due to acute bilateral DVT.  - Sinus rhythm noted throughout hospital stay  - Risk factor modification - control diabetes, continue statin.  - RPR, HIV non reactive.  B12 low normal.   - CTA showed a focal segment of 60-70% stenosis involving the proximal to mid left vertebral artery that was new when compared to the prior study of 09/25/2019.  No evidence of large vessel intracranial occlusion.   - Neurology noted symptoms do not correspond to stroke location, although the frontal lobe stroke might have something to do with his lack of motivation, and Speech has noted he has had delayed swallowing.  - Echo with bubble study done, no shunt seen.  - Follow up with vascular neurology in 4 weeks.  - Therapy recommended rehab initially but changed recommendation to SNF due to his low interest in participation.  - Started Lexapro due to suspicion for depression.  - Psych consulted, changed to Prozac and started thiamine, folic acid, MVI due to previous history of alcohol abuse, although  Wernicke encephalopathy is not suspected.  - Patient's son, Forest, agrees to nursing home care.  He has another son who is serving in the .  Neither is willing to take on the job of durable power of .  - awaiting placement.        Acute deep vein thrombosis (DVT) of both femoral veins  Last ultrasound showed partially occlusive DVTs, now completely occlusive DVT of multiple lower extremity veins on ultrasound.  Patient reports compliance with warfarin but his INR is only one.  He is homeless but says he does not have difficulty obtaining his medications.  Does not seem to care about anything, which again might be due to the frontal lobe stroke.  D-dimer was markedly elevated on presentation and there was a question of possible PE, but he had a CTA of the brain/neck on presentation so could not get another CTA for another 48 hours.    V/Q scan was done, showed low probability for PE.  2D echo showed grade I diastolic dysfunction.  Warfarin was restarted with bridging Lovenox, but INR was still low.  Changed to apixaban and discontinued Lovenox.    LTBI (latent tuberculosis infection)  See ID note.  Patient had positive PPD 2 years ago and was not treated.  PPD this admission is also positive.  He has been afebrile and has no cough or other TB symptoms.  CXR repeated and was normal.  Clinically he does not have TB.  Quantiferon gold has been ordered to see if he has latent TB.  Discussed with ID, if quantiferon gold is positive would treat for latent TB with INH and pyridoxine for 6 months. Tb gold positive, started  INH and pyridoxine.        Severe protein-calorie malnutrition  Diet as tolerated  Patient seen by dietician, recommendations made.      Debility  PT/OT recommending rehab vs SNF after stroke with debility/poor motivation  Having difficulty finding placement for unclear reasons.  Will continue to pursue this as above.  Again, patient has been pleasant and cooperative.      Type 2 diabetes  mellitus with hyperglycemia, without long-term current use of insulin  Reportedly he is on metformin and glipizide, both held.  He had 4+ sugar in urine and HbA1c was 10.3, and he was hyperglycemic on presentation.  Started on Levemir and ISS, then increased Levemir to 25 units daily  Added scheduled novolog 5 units tid, increased to 7 units with improvement  OK to resume metformin.    Increased levemir to 28 units daily with good improvement.  Held metformin with contrast with CT.  BG lower now off metformin, has had a few low BG as well due to being on clear liquids for colonoscopy prep.  Continue to adjust Levemir as needed.    Hyperlipidemia  Continue atorvastatin status post stroke.        VTE Risk Mitigation (From admission, onward)         Ordered     apixaban tablet 5 mg  2 times daily         03/29/22 1555     Place sequential compression device  Until discontinued         01/31/22 0533                Discharge Planning   EFRAÍN:      Code Status: Full Code   Is the patient medically ready for discharge?:     Reason for patient still in hospital (select all that apply): Pending disposition  Discharge Plan A: Skilled Nursing Facility   Discharge Delays: (!) Post-Acute Set-up              Mojgan Meza MD  Department of Hospital Medicine   [facility] ([place])

## 2022-04-23 NOTE — SUBJECTIVE & OBJECTIVE
Interval History:  Awaiting placement    Review of Systems   Constitutional:  Negative for chills and fever.   Respiratory:  Negative for cough and shortness of breath.    Cardiovascular:  Negative for chest pain and palpitations.   Gastrointestinal:  Negative for abdominal pain, nausea and vomiting.   Objective:     Vital Signs (Most Recent):  Temp: 98.2 °F (36.8 °C) (04/23/22 1122)  Pulse: 78 (04/23/22 1122)  Resp: 16 (04/23/22 1122)  BP: 105/64 (04/23/22 1122)  SpO2: 96 % (04/23/22 1122) Vital Signs (24h Range):  Temp:  [97.9 °F (36.6 °C)-98.2 °F (36.8 °C)] 98.2 °F (36.8 °C)  Pulse:  [77-96] 78  Resp:  [12-18] 16  SpO2:  [96 %-100 %] 96 %  BP: (105-125)/(57-68) 105/64     Weight: 66.2 kg (145 lb 15.1 oz)  Body mass index is 19.79 kg/m².    Intake/Output Summary (Last 24 hours) at 4/23/2022 1353  Last data filed at 4/23/2022 1000  Gross per 24 hour   Intake 240 ml   Output 400 ml   Net -160 ml      Physical Exam  Not examined today.    Significant Labs: All pertinent labs within the past 24 hours have been reviewed.    Significant Imaging: I have reviewed all pertinent imaging results/findings within the past 24 hours.

## 2022-04-24 LAB
POCT GLUCOSE: 138 MG/DL (ref 70–110)
POCT GLUCOSE: 157 MG/DL (ref 70–110)
POCT GLUCOSE: 177 MG/DL (ref 70–110)
POCT GLUCOSE: 262 MG/DL (ref 70–110)
POCT GLUCOSE: 97 MG/DL (ref 70–110)

## 2022-04-24 PROCEDURE — 25000003 PHARM REV CODE 250: Performed by: INTERNAL MEDICINE

## 2022-04-24 PROCEDURE — 25000003 PHARM REV CODE 250: Performed by: HOSPITALIST

## 2022-04-24 PROCEDURE — 94761 N-INVAS EAR/PLS OXIMETRY MLT: CPT

## 2022-04-24 PROCEDURE — 99231 SBSQ HOSP IP/OBS SF/LOW 25: CPT | Mod: ,,, | Performed by: HOSPITALIST

## 2022-04-24 PROCEDURE — 99231 PR SUBSEQUENT HOSPITAL CARE,LEVL I: ICD-10-PCS | Mod: ,,, | Performed by: HOSPITALIST

## 2022-04-24 PROCEDURE — 11000001 HC ACUTE MED/SURG PRIVATE ROOM

## 2022-04-24 RX ADMIN — APIXABAN 5 MG: 2.5 TABLET, FILM COATED ORAL at 08:04

## 2022-04-24 RX ADMIN — INSULIN ASPART 2 UNITS: 100 INJECTION, SOLUTION INTRAVENOUS; SUBCUTANEOUS at 01:04

## 2022-04-24 RX ADMIN — FOLIC ACID 1 MG: 1 TABLET ORAL at 08:04

## 2022-04-24 RX ADMIN — Medication 25 MG: at 08:04

## 2022-04-24 RX ADMIN — METFORMIN HYDROCHLORIDE 500 MG: 500 TABLET ORAL at 08:04

## 2022-04-24 RX ADMIN — TAMSULOSIN HYDROCHLORIDE 0.4 MG: 0.4 CAPSULE ORAL at 08:04

## 2022-04-24 RX ADMIN — THERA TABS 1 TABLET: TAB at 08:04

## 2022-04-24 RX ADMIN — ISONIAZID 300 MG: 300 TABLET ORAL at 08:04

## 2022-04-24 RX ADMIN — THIAMINE HCL TAB 100 MG 100 MG: 100 TAB at 08:04

## 2022-04-24 RX ADMIN — FLUOXETINE 20 MG: 20 CAPSULE ORAL at 08:04

## 2022-04-24 RX ADMIN — POLYETHYLENE GLYCOL 3350 17 G: 17 POWDER, FOR SOLUTION ORAL at 08:04

## 2022-04-24 RX ADMIN — ATORVASTATIN CALCIUM 80 MG: 20 TABLET, FILM COATED ORAL at 08:04

## 2022-04-24 NOTE — PLAN OF CARE
"Plan of care reviewed with the pt. Hourly rounding performed. No complaints of pain but complaints of feeling "not good". Pt not eating, MD made aware. Insulin held in AM, lunch time blood sugar 262 and sliding scale giving with scheduled insulin held. Blood sugar at 1606 at 138. Pt sat in bedside chair for a few hours during the morning time but then back in the bed, refusing to get up and walk the unit despite several attempts by RN. Bed lowered and locked. Personal items and call bell within reach.   "

## 2022-04-24 NOTE — PROGRESS NOTES
"Pt currently sitting in chair with wheels locked and bedside table in front of him with breakfast. Pt stating, "I just don't feel well." When further questioning the pt he says he just can not explain it. Pt did not not eat breakfast, insulin held. MD made aware.   "

## 2022-04-24 NOTE — SUBJECTIVE & OBJECTIVE
Interval History:  He did not eat yesterday, had no appetite.  Tells me he is fine today but nurses noticed he still was not interested in food.  BG on the low side and insulin held, back up to 177 this morning.    Review of Systems   Constitutional:  Positive for appetite change. Negative for chills and fever.   Respiratory:  Negative for cough and shortness of breath.    Cardiovascular:  Negative for chest pain and palpitations.   Gastrointestinal:  Negative for abdominal pain, nausea and vomiting.   Objective:     Vital Signs (Most Recent):  Temp: 98.2 °F (36.8 °C) (04/24/22 0407)  Pulse: 79 (04/24/22 0407)  Resp: 20 (04/24/22 0407)  BP: (!) 98/53 (04/24/22 0407)  SpO2: 97 % (04/24/22 1000)   Vital Signs (24h Range):  Temp:  [97.5 °F (36.4 °C)-98.2 °F (36.8 °C)] 98.2 °F (36.8 °C)  Pulse:  [73-79] 79  Resp:  [14-20] 20  SpO2:  [94 %-97 %] 97 %  BP: ()/(53-69) 98/53     Weight: 66.2 kg (145 lb 15.1 oz)  Body mass index is 19.79 kg/m².    Intake/Output Summary (Last 24 hours) at 4/24/2022 1054  Last data filed at 4/24/2022 0600  Gross per 24 hour   Intake 240 ml   Output 700 ml   Net -460 ml      Physical Exam  Constitutional:       General: He is not in acute distress.     Comments: Thin   Cardiovascular:      Rate and Rhythm: Normal rate and regular rhythm.   Pulmonary:      Effort: Pulmonary effort is normal.      Breath sounds: Normal breath sounds.   Abdominal:      General: Bowel sounds are normal.      Palpations: Abdomen is soft.   Skin:     General: Skin is warm and dry.   Neurological:      Mental Status: He is alert.      Comments: Not oriented to time.       Significant Labs: All pertinent labs within the past 24 hours have been reviewed.    Significant Imaging: I have reviewed all pertinent imaging results/findings within the past 24 hours.

## 2022-04-24 NOTE — PROGRESS NOTES
[facility] Medicine  Progress Note    Patient Name: Forest Lopez  MRN: [id number]  Patient Class: IP- Inpatient   Admission Date: 1/31/2022  Length of Stay: 83 days  Attending Physician: Mojgan Bosch MD  Primary Care Provider: Julian Escobar        Subjective:     Principal Problem:Acute stroke due to ischemia        HPI:  Mr. Lopez is a 72 year old man who presented after a syncopal episode.  He reports he had been feeling well prior to the episode but then had a prodrome prior to passing out.  EMS was called, report patient's BP was low normal on their arrival, improved after an IV fluids bolus.  Patient denies chest pain or shortness of breath and says he does not feel weak currently although is rather tired.  When seen in the ED this morning he could barely express himself audibly.     Vital signs were normal on presentation here.  He is on warfarin for recurrent DVT, but his INR was 1, and d-dimer markedly elevated at 21.7.  Tox screen was positive for cocaine.  He had a CTA of the brain and neck done on presentation so CTA of the chest for PE study could not be done for 48 hours.  Ultrasound of the lower extremities showed extensive bilateral DVT.  On the right there was thrombosis of the common femoral vein, femoral vein, popliteal vein, one of the paired posterior tibial veins and both visualized peroneal veins.  On the left there was thrombosis of the common femoral vein, femoral vein and popliteal vein.  Patient was started on full dose Lovenox and admitted.    Medical history is from the chart as he is unable to give me much information.  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  He smokes 5-6 cigarettes/day and is not interested in quitting.  He is currently homeless and staying with a friend.  Despite the normal INR he is sure  he is taking his warfarin and is not having difficulty taking his medications.  I discussed his care with [facility] staff on the [facility] and patient had been lost to follow up since November 2021.      Overview/Hospital Course:  Patient presented to the ED after a syncopal episode and was found to have bilateral lower extremity DVTs and a low INR.  Tox screen was positive for cocaine.  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  He was started back on his warfarin with bridging Lovenox.  His 2D echo showed grade I diastolic dysfunction.  Neurology evaluated him and recommended echo with bubble study, which was negative for a shunt.    PT/OT evaluated him and recommended SNF placement versus inpatient rehab.  His INR was difficult to get therapeutic, and as he had no contraindication to a NOAC his warfarin was changed to apixaban, and the full dose Lovenox was discontinued.  As he appeared to be depressed and had no objection to starting an antidepressant Lexapro was started.  He had a fall in the hospital on 2/13, had gone to the bathroom for a BM with the nurse, and when she turned away while he was washing his hands he apparently lost his balance and fell.  He did not hit his head and did not lose consciousness, but his BP was low transiently and he was given a bolus of IV fluids.    Patient's mental status improved slowly, but he gradually became more talkative and was able to hold a conversation.  He was diagnosed with a polymicrobial UTI on 2/21 and completed treatment with antibiotics.  Psychiatry evaluated him on 3/3 and changed his antidepressant to Prozac as it can be more activating and patient was having difficulty with his level of motivation.      Patient had some episodes of nausea and vomiting and CT of the abdomen was done, with incidental finding of rectal wall thickening noted.  Colonoscopy was recommended,  but he was unable to finish the prep.  GI evaluated him and decided the patient would benefit from a flex sig to evaluate the rectum.  He had the procedure on 3/25 and no abnormalities were seen other than internal hemorrhoids.    Patient's discharge was delayed as he was not accepted by any SNF facilities in the area.  Reasons for the denials are unclear as he has a clear rehab diagnosis.  He has a history of drug use but has not shown any interest in getting any illicit or legal drugs since he has been here, and he has had no visitors that could have provided drugs to him.  He has been pleasant and cooperative while here although lacks motivation, likely due to the nature of the stroke affecting his frontal lobe.  He requires considerable encouragement to participate in therapy.  Discharging him to live on the street or shelter would not be appropriate as he would not be able to take care of himself.  At this point he has agreed to snf nursing care, as his stroke was more than 2 months ago and he has already had sufficient rehab while here in the hospital.  Discharge was planned for 4/8/22, but was delayed due to him not having access to his own bank account.  Hopefully he can be transferred to nursing home as soon as financial situation is straightened out.  Case management has had to involve the Office of Elder Protective Services in order to get access to his bank account.      Interval History:  He did not eat yesterday, had no appetite.  Tells me he is fine today but nurses noticed he still was not interested in food.  BG on the low side and insulin held, back up to 177 this morning.    Review of Systems   Constitutional:  Positive for appetite change. Negative for chills and fever.   Respiratory:  Negative for cough and shortness of breath.    Cardiovascular:  Negative for chest pain and palpitations.   Gastrointestinal:  Negative for abdominal pain, nausea and vomiting.   Objective:     Vital Signs  (Most Recent):  Temp: 98.2 °F (36.8 °C) (04/24/22 0407)  Pulse: 79 (04/24/22 0407)  Resp: 20 (04/24/22 0407)  BP: (!) 98/53 (04/24/22 0407)  SpO2: 97 % (04/24/22 1000)   Vital Signs (24h Range):  Temp:  [97.5 °F (36.4 °C)-98.2 °F (36.8 °C)] 98.2 °F (36.8 °C)  Pulse:  [73-79] 79  Resp:  [14-20] 20  SpO2:  [94 %-97 %] 97 %  BP: ()/(53-69) 98/53     Weight: 66.2 kg (145 lb 15.1 oz)  Body mass index is 19.79 kg/m².    Intake/Output Summary (Last 24 hours) at 4/24/2022 1054  Last data filed at 4/24/2022 0600  Gross per 24 hour   Intake 240 ml   Output 700 ml   Net -460 ml      Physical Exam  Constitutional:       General: He is not in acute distress.     Comments: Thin   Cardiovascular:      Rate and Rhythm: Normal rate and regular rhythm.   Pulmonary:      Effort: Pulmonary effort is normal.      Breath sounds: Normal breath sounds.   Abdominal:      General: Bowel sounds are normal.      Palpations: Abdomen is soft.   Skin:     General: Skin is warm and dry.   Neurological:      Mental Status: He is alert.      Comments: Not oriented to time.       Significant Labs: All pertinent labs within the past 24 hours have been reviewed.    Significant Imaging: I have reviewed all pertinent imaging results/findings within the past 24 hours.      Assessment/Plan:      * Acute stroke due to ischemia  - MRI showed areas of diffusion signal hyperintensity consistent with areas of acute ischemia/infarct involving the left cerebral hemisphere, the most prominent measuring approximately 1.4 cm, also a small focus of the right frontal lobe.  - Patient on full dose anticoagulation currently due to acute bilateral DVT.  - Sinus rhythm noted throughout hospital stay  - Risk factor modification - control diabetes, continue statin.  - RPR, HIV non reactive.  B12 low normal.   - CTA showed a focal segment of 60-70% stenosis involving the proximal to mid left vertebral artery that was new when compared to the prior study of 09/25/2019.   No evidence of large vessel intracranial occlusion.   - Neurology noted symptoms do not correspond to stroke location, although the frontal lobe stroke might have something to do with his lack of motivation, and Speech has noted he has had delayed swallowing.  - Echo with bubble study done, no shunt seen.  - Follow up with vascular neurology in 4 weeks.  - Therapy recommended rehab initially but changed recommendation to SNF due to his low interest in participation.  - Started Lexapro due to suspicion for depression.  - Psych consulted, changed to Prozac and started thiamine, folic acid, MVI due to previous history of alcohol abuse, although Wernicke encephalopathy is not suspected.  - Patient's son, Forest, agrees to nursing home care.  He has another son who is serving in the .  Neither is willing to take on the job of durable power of .  - awaiting placement.        Acute deep vein thrombosis (DVT) of both femoral veins  Last ultrasound showed partially occlusive DVTs, now completely occlusive DVT of multiple lower extremity veins on ultrasound.  Patient reports compliance with warfarin but his INR is only one.  He is homeless but says he does not have difficulty obtaining his medications.  Does not seem to care about anything, which again might be due to the frontal lobe stroke.  D-dimer was markedly elevated on presentation and there was a question of possible PE, but he had a CTA of the brain/neck on presentation so could not get another CTA for another 48 hours.    V/Q scan was done, showed low probability for PE.  2D echo showed grade I diastolic dysfunction.  Warfarin was restarted with bridging Lovenox, but INR was still low.  Changed to apixaban and discontinued Lovenox.    LTBI (latent tuberculosis infection)  See ID note.  Patient had positive PPD 2 years ago and was not treated.  PPD this admission is also positive.  He has been afebrile and has no cough or other TB symptoms.  CXR  repeated and was normal.  Clinically he does not have TB.  Quantiferon gold has been ordered to see if he has latent TB.  Discussed with ID, if quantiferon gold is positive would treat for latent TB with INH and pyridoxine for 6 months. Tb gold positive, started  INH and pyridoxine.        Severe protein-calorie malnutrition  Diet as tolerated  Patient seen by dietician, recommendations made.      Debility  PT/OT recommending rehab vs SNF after stroke with debility/poor motivation  Having difficulty finding placement for unclear reasons.  Will continue to pursue this as above.  Again, patient has been pleasant and cooperative.      Type 2 diabetes mellitus with hyperglycemia, without long-term current use of insulin  Reportedly he is on metformin and glipizide, both held.  He had 4+ sugar in urine and HbA1c was 10.3, and he was hyperglycemic on presentation.  Started on Levemir and ISS, then increased Levemir to 25 units daily  Added scheduled novolog 5 units tid, increased to 7 units with improvement  OK to resume metformin.    Increased levemir to 28 units daily with good improvement.  Held metformin with contrast with CT.  BG lower now off metformin, has had a few low BG as well due to being on clear liquids for colonoscopy prep.  Continue to adjust Levemir as needed.  Appetite poor again and Levemir held.    Hyperlipidemia  Continue atorvastatin status post stroke.        VTE Risk Mitigation (From admission, onward)         Ordered     apixaban tablet 5 mg  2 times daily         03/29/22 1555     Place sequential compression device  Until discontinued         01/31/22 0553                Discharge Planning   EFRAÍN:      Code Status: Full Code   Is the patient medically ready for discharge?:     Reason for patient still in hospital (select all that apply): Pending disposition  Discharge Plan A: Skilled Nursing Facility   Discharge Delays: (!) Post-Acute Set-up              Mojgan Meza MD  Department of  [facility]urg ([place])

## 2022-04-24 NOTE — ASSESSMENT & PLAN NOTE
Reportedly he is on metformin and glipizide, both held.  He had 4+ sugar in urine and HbA1c was 10.3, and he was hyperglycemic on presentation.  Started on Levemir and ISS, then increased Levemir to 25 units daily  Added scheduled novolog 5 units tid, increased to 7 units with improvement  OK to resume metformin.    Increased levemir to 28 units daily with good improvement.  Held metformin with contrast with CT.  BG lower now off metformin, has had a few low BG as well due to being on clear liquids for colonoscopy prep.  Continue to adjust Levemir as needed.  Appetite poor again and Levemir held.

## 2022-04-24 NOTE — PLAN OF CARE
"Patient is alert and oriented but a little forgetful sometimes with the date.  He has been quiet without any appetite.  He declined supper last night.  He did agree to a some bites of pudding and sips of milk.  HS accucheck noted at 77 and early 4 am spot check at 97.  He states he just has the "blahs".  Offered to assist OOB to ambulate in the hallway for a change of environment but he declined.  Skin warm/dry without breakdown.  He is turning self in bed.  Urinal emptied of 200 ml urine and returned to the bedside.  No bowel movement or incontinence noted.  Foam dressing to sacrum c/d/i.  Vitals recorded.  [employer] camera #3 in use.  Room near nurse station with door left open.  Rounding maintained per protocol.      Problem: Adult Inpatient Plan of Care  Goal: Plan of Care Review  Outcome: Adequate for Care Transition  Flowsheets (Taken 4/24/2022 0331)  Plan of Care Reviewed With: patient     Problem: Diabetes Comorbidity  Goal: Blood Glucose Level Within Targeted Range  Outcome: Adequate for Care Transition  Intervention: Monitor and Manage Glycemia  Flowsheets (Taken 4/24/2022 0331)  Glycemic Management: blood glucose monitored     Problem: Skin Injury Risk Increased  Goal: Skin Health and Integrity  Outcome: Adequate for Care Transition  Intervention: Optimize Skin Protection  Flowsheets (Taken 4/24/2022 0331)  Pressure Reduction Techniques: frequent weight shift encouraged  Pressure Reduction Devices:   alternating pressure pump (ADD)   pressure-redistributing mattress utilized  Skin Protection: silicone foam dressing in place  Head of Bed (HOB) Positioning: HOB elevated     Problem: Coping Ineffective  Goal: Effective Coping  Outcome: Adequate for Care Transition  Intervention: Support and Enhance Coping Strategies  Flowsheets (Taken 4/24/2022 0331)  Supportive Measures:   decision-making supported   positive reinforcement provided   self-responsibility promoted   self-reflection promoted   self-care " encouraged   active listening utilized  Family/Support System Care:   self-care encouraged   support provided  Environmental Support:   calm environment promoted   environmental consistency promoted     Problem: Fall Injury Risk  Goal: Absence of Fall and Fall-Related Injury  Outcome: Adequate for Care Transition  Intervention: Identify and Manage Contributors  Flowsheets (Taken 4/24/2022 0331)  Self-Care Promotion: independence encouraged  Medication Review/Management:   medications reviewed   high-risk medications identified  Intervention: Promote Injury-Free Environment  Flowsheets (Taken 4/24/2022 0331)  Safety Promotion/Fall Prevention:   Fall Risk reviewed with patient/family   Fall Risk signage in place   medications reviewed   lighting adjusted   high risk medications identified   nonskid shoes/socks when out of bed   /camera at bedside   room near unit station   side rails raised x 2   instructed to call staff for mobility     Problem: Infection  Goal: Absence of Infection Signs and Symptoms  Outcome: Adequate for Care Transition  Intervention: Prevent or Manage Infection  Flowsheets (Taken 4/24/2022 0331)  Fever Reduction/Comfort Measures: lightweight clothing

## 2022-04-25 LAB
ERYTHROCYTE [DISTWIDTH] IN BLOOD BY AUTOMATED COUNT: 13.6 % (ref 11.5–14.5)
HCT VFR BLD AUTO: 41.6 % (ref 40–54)
HGB BLD-MCNC: 13.4 G/DL (ref 14–18)
MCH RBC QN AUTO: 26.2 PG (ref 27–31)
MCHC RBC AUTO-ENTMCNC: 32.2 G/DL (ref 32–36)
MCV RBC AUTO: 81 FL (ref 82–98)
PLATELET # BLD AUTO: 237 K/UL (ref 150–450)
PMV BLD AUTO: 9.8 FL (ref 9.2–12.9)
POCT GLUCOSE: 109 MG/DL (ref 70–110)
POCT GLUCOSE: 131 MG/DL (ref 70–110)
POCT GLUCOSE: 138 MG/DL (ref 70–110)
POCT GLUCOSE: 182 MG/DL (ref 70–110)
POCT GLUCOSE: 183 MG/DL (ref 70–110)
POCT GLUCOSE: 231 MG/DL (ref 70–110)
RBC # BLD AUTO: 5.12 M/UL (ref 4.6–6.2)
WBC # BLD AUTO: 9.14 K/UL (ref 3.9–12.7)

## 2022-04-25 PROCEDURE — 25000003 PHARM REV CODE 250: Performed by: HOSPITALIST

## 2022-04-25 PROCEDURE — 25000003 PHARM REV CODE 250: Performed by: INTERNAL MEDICINE

## 2022-04-25 PROCEDURE — 99233 SBSQ HOSP IP/OBS HIGH 50: CPT | Mod: ,,, | Performed by: HOSPITALIST

## 2022-04-25 PROCEDURE — 11000001 HC ACUTE MED/SURG PRIVATE ROOM

## 2022-04-25 PROCEDURE — 36415 COLL VENOUS BLD VENIPUNCTURE: CPT | Performed by: HOSPITALIST

## 2022-04-25 PROCEDURE — 85027 COMPLETE CBC AUTOMATED: CPT | Performed by: HOSPITALIST

## 2022-04-25 PROCEDURE — 94761 N-INVAS EAR/PLS OXIMETRY MLT: CPT

## 2022-04-25 PROCEDURE — 99233 PR SUBSEQUENT HOSPITAL CARE,LEVL III: ICD-10-PCS | Mod: ,,, | Performed by: HOSPITALIST

## 2022-04-25 RX ADMIN — INSULIN ASPART 3 UNITS: 100 INJECTION, SOLUTION INTRAVENOUS; SUBCUTANEOUS at 09:04

## 2022-04-25 RX ADMIN — Medication 25 MG: at 09:04

## 2022-04-25 RX ADMIN — METFORMIN HYDROCHLORIDE 500 MG: 500 TABLET ORAL at 09:04

## 2022-04-25 RX ADMIN — ISONIAZID 300 MG: 300 TABLET ORAL at 09:04

## 2022-04-25 RX ADMIN — ATORVASTATIN CALCIUM 80 MG: 20 TABLET, FILM COATED ORAL at 09:04

## 2022-04-25 RX ADMIN — POLYETHYLENE GLYCOL 3350 17 G: 17 POWDER, FOR SOLUTION ORAL at 09:04

## 2022-04-25 RX ADMIN — THERA TABS 1 TABLET: TAB at 09:04

## 2022-04-25 RX ADMIN — INSULIN ASPART 3 UNITS: 100 INJECTION, SOLUTION INTRAVENOUS; SUBCUTANEOUS at 01:04

## 2022-04-25 RX ADMIN — FOLIC ACID 1 MG: 1 TABLET ORAL at 09:04

## 2022-04-25 RX ADMIN — INSULIN ASPART 2 UNITS: 100 INJECTION, SOLUTION INTRAVENOUS; SUBCUTANEOUS at 01:04

## 2022-04-25 RX ADMIN — APIXABAN 5 MG: 2.5 TABLET, FILM COATED ORAL at 09:04

## 2022-04-25 RX ADMIN — TAMSULOSIN HYDROCHLORIDE 0.4 MG: 0.4 CAPSULE ORAL at 09:04

## 2022-04-25 RX ADMIN — THIAMINE HCL TAB 100 MG 100 MG: 100 TAB at 09:04

## 2022-04-25 RX ADMIN — FLUOXETINE 20 MG: 20 CAPSULE ORAL at 09:04

## 2022-04-25 NOTE — PT/OT/SLP PROGRESS
Speech Language Pathology      Forest Lopez  MRN: [id number]    Patient not seen today secondary to Patient fatigue. Pt unable to awaken despite sternal rub and verbal cues. Per chart, pt has not felt well over the past couple of days. PCT reports pt did awaken this AM and ate yesterday's dinner tray, as well as, this mornings breakfast tray. Will follow-up next available date 4/26/22.

## 2022-04-25 NOTE — PLAN OF CARE
Called to follow up with Gali at [facility] on e-mail correspondence to bank statements; Per Gali have not heard from bank.     SHANICE Meyer to continue to follow patient

## 2022-04-25 NOTE — ASSESSMENT & PLAN NOTE
Reportedly he is on metformin and glipizide, both held.  He had 4+ sugar in urine and HbA1c was 10.3, and he was hyperglycemic on presentation.  Started on Levemir and ISS, then increased Levemir to 25 units daily  Added scheduled novolog 5 units tid, increased to 7 units with improvement  OK to resume metformin.    Increased levemir to 28 units daily with good improvement.  Held metformin with contrast with CT.  BG lower now off metformin, has had a few low BG as well due to being on clear liquids for colonoscopy prep.  Continue to adjust Levemir as needed.  Held for 24 hours due to poor appetite and low BG.  Resumed Levemir 20 units qhs, prandial insulin 3 units.  Appetite has been poor.

## 2022-04-25 NOTE — SUBJECTIVE & OBJECTIVE
"Interval History:  He ate dinner and part of his breakfast, but has been very passive and refusing to do anything such as take walks, participate with speech, etc.  Not very talkative today but says he is "fine."    Review of Systems   Constitutional:  Positive for appetite change. Negative for chills and fever.   Respiratory:  Negative for cough and shortness of breath.    Cardiovascular:  Negative for chest pain and palpitations.   Gastrointestinal:  Negative for abdominal pain, nausea and vomiting.   Objective:     Vital Signs (Most Recent):  Temp: 98.3 °F (36.8 °C) (04/25/22 1219)  Pulse: 94 (04/25/22 1219)  Resp: 18 (04/25/22 1219)  BP: 112/71 (04/25/22 1219)  SpO2: 95 % (04/25/22 1219) Vital Signs (24h Range):  Temp:  [98.2 °F (36.8 °C)-98.6 °F (37 °C)] 98.3 °F (36.8 °C)  Pulse:  [67-94] 94  Resp:  [14-20] 18  SpO2:  [95 %-99 %] 95 %  BP: (112-131)/(65-79) 112/71     Weight: 66.2 kg (145 lb 15.1 oz)  Body mass index is 19.79 kg/m².    Intake/Output Summary (Last 24 hours) at 4/25/2022 1406  Last data filed at 4/25/2022 0800  Gross per 24 hour   Intake 150 ml   Output 200 ml   Net -50 ml      Physical Exam  Constitutional:       General: He is not in acute distress.     Comments: Thin   Cardiovascular:      Rate and Rhythm: Normal rate and regular rhythm.   Pulmonary:      Effort: Pulmonary effort is normal.      Breath sounds: Normal breath sounds.   Abdominal:      General: Bowel sounds are normal.      Palpations: Abdomen is soft.   Skin:     General: Skin is warm and dry.   Neurological:      Mental Status: He is alert.      Comments: Not oriented to time.       Significant Labs: All pertinent labs within the past 24 hours have been reviewed.    Significant Imaging: I have reviewed all pertinent imaging results/findings within the past 24 hours.  "

## 2022-04-25 NOTE — PLAN OF CARE
"Mr. GUTIERREZ has had a poor appetite for the last two days with very little intake.  His HS accucheck was 157 and 4 am spot check was 138.  He had incontinence of bowel and bladder x once during the shift. He had been continent the days prior.  He declines to get out of bed to ambulate or even to sit in the chair.  He denies active pain but just feels "down".  He has yet to fully voice his reasoning for his depression.  No skin breakdown observed as he is independently mobile with turning self.  No temps/chills voiced/observed.  [employer] camera #3 noted in use.  Room near nurse station with door left open.  Rounding maintained per protocol.    Problem: Adult Inpatient Plan of Care  Goal: Plan of Care Review  Outcome: Adequate for Care Transition  Flowsheets (Taken 4/25/2022 0511)  Plan of Care Reviewed With: patient     Problem: Adult Inpatient Plan of Care  Goal: Absence of Hospital-Acquired Illness or Injury  Intervention: Prevent Skin Injury  Flowsheets (Taken 4/25/2022 0511)  Body Position: position changed independently  Skin Protection: silicone foam dressing in place     Problem: Diabetes Comorbidity  Goal: Blood Glucose Level Within Targeted Range  Outcome: Adequate for Care Transition  Intervention: Monitor and Manage Glycemia  Flowsheets (Taken 4/25/2022 0511)  Glycemic Management: blood glucose monitored     Problem: Skin Injury Risk Increased  Goal: Skin Health and Integrity  Outcome: Adequate for Care Transition  Intervention: Optimize Skin Protection  Flowsheets (Taken 4/25/2022 0511)  Pressure Reduction Techniques: frequent weight shift encouraged  Pressure Reduction Devices:   alternating pressure pump (ADD)   pressure-redistributing mattress utilized  Skin Protection: silicone foam dressing in place  Head of Bed (HOB) Positioning: HOB elevated     Problem: Coping Ineffective  Goal: Effective Coping  Outcome: Adequate for Care Transition  Intervention: Support and Enhance Coping Strategies  Flowsheets (Taken " 4/25/2022 0511)  Environmental Support: calm environment promoted     Problem: Fall Injury Risk  Goal: Absence of Fall and Fall-Related Injury  Outcome: Adequate for Care Transition  Intervention: Identify and Manage Contributors  Flowsheets (Taken 4/25/2022 0511)  Self-Care Promotion: independence encouraged  Medication Review/Management:   medications reviewed   high-risk medications identified  Intervention: Promote Injury-Free Environment  Flowsheets (Taken 4/25/2022 0511)  Safety Promotion/Fall Prevention:   commode/urinal/bedpan at bedside   Fall Risk reviewed with patient/family   Fall Risk signage in place   high risk medications identified   nonskid shoes/socks when out of bed   /camera at bedside   room near unit station   side rails raised x 2   instructed to call staff for mobility     Problem: Infection  Goal: Absence of Infection Signs and Symptoms  Outcome: Adequate for Care Transition  Intervention: Prevent or Manage Infection  Flowsheets (Taken 4/25/2022 0511)  Fever Reduction/Comfort Measures:   lightweight bedding   lightweight clothing  Infection Management: aseptic technique maintained

## 2022-04-25 NOTE — PLAN OF CARE
"Plan of care reviewed with pt. Hourly rounding performed. No complaints of pain through out shift. Pt states he doesn't feel well & just feels "blah". Pt ate breakfast but no other meal. Pt went for walk and went outside to the 2nd floor balcony with staff.  Pt seem to cheer up afterwards. VSS on RA. Bed lowered and locked. Personal items and call bell within reach. Report given to MARIBEL Catalan  "

## 2022-04-25 NOTE — ASSESSMENT & PLAN NOTE
Diet as tolerated  Patient seen by dietician, recommendations made.  He sometimes loses his appetite, gets nauseated, doesn't feel like doing anything.  This is happening again currently.  Levemir held, continue to encourage activity, oral intake.

## 2022-04-25 NOTE — PROGRESS NOTES
[facility], [place], operated by [facility]  Progress Note    Patient Name: Forest Lopez  MRN: [id number]  Patient Class: IP- Inpatient   Admission Date: 1/31/2022  Length of Stay: 84 days  Attending Physician: Mojgan Bosch MD  Primary Care Provider: Julian Escobar        Subjective:     Principal Problem:Acute stroke due to ischemia        HPI:  Mr. Lopez is a 72 year old man who presented after a syncopal episode.  He reports he had been feeling well prior to the episode but then had a prodrome prior to passing out.  EMS was called, report patient's BP was low normal on their arrival, improved after an IV fluids bolus.  Patient denies chest pain or shortness of breath and says he does not feel weak currently although is rather tired.  When seen in the ED this morning he could barely express himself audibly.     Vital signs were normal on presentation here.  He is on warfarin for recurrent DVT, but his INR was 1, and d-dimer markedly elevated at 21.7.  Tox screen was positive for cocaine.  He had a CTA of the brain and neck done on presentation so CTA of the chest for PE study could not be done for 48 hours.  Ultrasound of the lower extremities showed extensive bilateral DVT.  On the right there was thrombosis of the common femoral vein, femoral vein, popliteal vein, one of the paired posterior tibial veins and both visualized peroneal veins.  On the left there was thrombosis of the common femoral vein, femoral vein and popliteal vein.  Patient was started on full dose Lovenox and admitted.    Medical history is from the chart as he is unable to give me much information.  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  He smokes 5-6 cigarettes/day and is not interested in quitting.  He is currently homeless and staying with a friend.  Despite the normal INR he is sure  he is taking his warfarin and is not having difficulty taking his medications.  I discussed his care with [facility] staff on the [facility] [place], [place] and patient had been lost to follow up since November 2021.      Overview/Hospital Course:  Patient presented to the ED after a syncopal episode and was found to have bilateral lower extremity DVTs and a low INR.  Tox screen was positive for cocaine.  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  He was started back on his warfarin with bridging Lovenox.  His 2D echo showed grade I diastolic dysfunction.  Neurology evaluated him and recommended echo with bubble study, which was negative for a shunt.    PT/OT evaluated him and recommended SNF placement versus inpatient rehab.  His INR was difficult to get therapeutic, and as he had no contraindication to a NOAC his warfarin was changed to apixaban, and the full dose Lovenox was discontinued.  As he appeared to be depressed and had no objection to starting an antidepressant Lexapro was started.  He had a fall in the hospital on 2/13, had gone to the bathroom for a BM with the nurse, and when she turned away while he was washing his hands he apparently lost his balance and fell.  He did not hit his head and did not lose consciousness, but his BP was low transiently and he was given a bolus of IV fluids.    Patient's mental status improved slowly, but he gradually became more talkative and was able to hold a conversation.  He was diagnosed with a polymicrobial UTI on 2/21 and completed treatment with antibiotics.  Psychiatry evaluated him on 3/3 and changed his antidepressant to Prozac as it can be more activating and patient was having difficulty with his level of motivation.      Patient had some episodes of nausea and vomiting and CT of the abdomen was done, with incidental finding of rectal wall thickening noted.  Colonoscopy was recommended,  "but he was unable to finish the prep.  GI evaluated him and decided the patient would benefit from a flex sig to evaluate the rectum.  He had the procedure on 3/25 and no abnormalities were seen other than internal hemorrhoids.    Patient's discharge was delayed as he was not accepted by any SNF facilities in the area.  Reasons for the denials are unclear as he has a clear rehab diagnosis.  He has a history of drug use but has not shown any interest in getting any illicit or legal drugs since he has been here, and he has had no visitors that could have provided drugs to him.  He has been pleasant and cooperative while here although lacks motivation, likely due to the nature of the stroke affecting his frontal lobe.  He requires considerable encouragement to participate in therapy.  Discharging him to live on the street or shelter would not be appropriate as he would not be able to take care of himself.  At this point he has agreed to longterm nursing care, as his stroke was more than 2 months ago and he has already had sufficient rehab while here in the hospital.  Discharge was planned for 4/8/22, but was delayed due to him not having access to his own bank account.  Hopefully he can be transferred to nursing home as soon as financial situation is straightened out.  Case management has had to involve the Office of Elder Protective Services in order to get access to his bank account.      Interval History:  He ate dinner and part of his breakfast, but has been very passive and refusing to do anything such as take walks, participate with speech, etc.  Not very talkative today but says he is "fine."    Review of Systems   Constitutional:  Positive for appetite change. Negative for chills and fever.   Respiratory:  Negative for cough and shortness of breath.    Cardiovascular:  Negative for chest pain and palpitations.   Gastrointestinal:  Negative for abdominal pain, nausea and vomiting.   Objective:     Vital Signs " (Most Recent):  Temp: 98.3 °F (36.8 °C) (04/25/22 1219)  Pulse: 94 (04/25/22 1219)  Resp: 18 (04/25/22 1219)  BP: 112/71 (04/25/22 1219)  SpO2: 95 % (04/25/22 1219) Vital Signs (24h Range):  Temp:  [98.2 °F (36.8 °C)-98.6 °F (37 °C)] 98.3 °F (36.8 °C)  Pulse:  [67-94] 94  Resp:  [14-20] 18  SpO2:  [95 %-99 %] 95 %  BP: (112-131)/(65-79) 112/71     Weight: 66.2 kg (145 lb 15.1 oz)  Body mass index is 19.79 kg/m².    Intake/Output Summary (Last 24 hours) at 4/25/2022 1406  Last data filed at 4/25/2022 0800  Gross per 24 hour   Intake 150 ml   Output 200 ml   Net -50 ml      Physical Exam  Constitutional:       General: He is not in acute distress.     Comments: Thin   Cardiovascular:      Rate and Rhythm: Normal rate and regular rhythm.   Pulmonary:      Effort: Pulmonary effort is normal.      Breath sounds: Normal breath sounds.   Abdominal:      General: Bowel sounds are normal.      Palpations: Abdomen is soft.   Skin:     General: Skin is warm and dry.   Neurological:      Mental Status: He is alert.      Comments: Not oriented to time.       Significant Labs: All pertinent labs within the past 24 hours have been reviewed.    Significant Imaging: I have reviewed all pertinent imaging results/findings within the past 24 hours.      Assessment/Plan:      * Acute stroke due to ischemia  - MRI showed areas of diffusion signal hyperintensity consistent with areas of acute ischemia/infarct involving the left cerebral hemisphere, the most prominent measuring approximately 1.4 cm, also a small focus of the right frontal lobe.  - Patient on full dose anticoagulation currently due to acute bilateral DVT.  - Sinus rhythm noted throughout hospital stay  - Risk factor modification - control diabetes, continue statin.  - RPR, HIV non reactive.  B12 low normal.   - CTA showed a focal segment of 60-70% stenosis involving the proximal to mid left vertebral artery that was new when compared to the prior study of 09/25/2019.  No  evidence of large vessel intracranial occlusion.   - Neurology noted symptoms do not correspond to stroke location, although the frontal lobe stroke might have something to do with his lack of motivation, and Speech has noted he has had delayed swallowing.  - Echo with bubble study done, no shunt seen.  - Follow up with vascular neurology in 4 weeks.  - Therapy recommended rehab initially but changed recommendation to SNF due to his low interest in participation.  - Started Lexapro due to suspicion for depression.  - Psych consulted, changed to Prozac and started thiamine, folic acid, MVI due to previous history of alcohol abuse, although Wernicke encephalopathy is not suspected.  - Patient's son, Forest, agrees to nursing home care.  He has another son who is serving in the .  Neither is willing to take on the job of durable power of .  - awaiting placement.        Acute deep vein thrombosis (DVT) of both femoral veins  Last ultrasound showed partially occlusive DVTs, now completely occlusive DVT of multiple lower extremity veins on ultrasound.  Patient reports compliance with warfarin but his INR is only one.  He is homeless but says he does not have difficulty obtaining his medications.  Does not seem to care about anything, which again might be due to the frontal lobe stroke.  D-dimer was markedly elevated on presentation and there was a question of possible PE, but he had a CTA of the brain/neck on presentation so could not get another CTA for another 48 hours.    V/Q scan was done, showed low probability for PE.  2D echo showed grade I diastolic dysfunction.  Warfarin was restarted with bridging Lovenox, but INR was still low.  Changed to apixaban and discontinued Lovenox.    LTBI (latent tuberculosis infection)  See ID note.  Patient had positive PPD 2 years ago and was not treated.  PPD this admission is also positive.  He has been afebrile and has no cough or other TB symptoms.  CXR repeated  and was normal.  Clinically he does not have TB.  Quantiferon gold has been ordered to see if he has latent TB.  Discussed with ID, if quantiferon gold is positive would treat for latent TB with INH and pyridoxine for 6 months. Tb gold positive, started  INH and pyridoxine.        Severe protein-calorie malnutrition  Diet as tolerated  Patient seen by dietician, recommendations made.  He sometimes loses his appetite, gets nauseated, doesn't feel like doing anything.  This is happening again currently.  Levemir held, continue to encourage activity, oral intake.      Debility  PT/OT recommending rehab vs SNF after stroke with debility/poor motivation  Having difficulty finding placement for unclear reasons.  Will continue to pursue this as above.  Again, patient has been pleasant and cooperative.      Type 2 diabetes mellitus with hyperglycemia, without long-term current use of insulin  Reportedly he is on metformin and glipizide, both held.  He had 4+ sugar in urine and HbA1c was 10.3, and he was hyperglycemic on presentation.  Started on Levemir and ISS, then increased Levemir to 25 units daily  Added scheduled novolog 5 units tid, increased to 7 units with improvement  OK to resume metformin.    Increased levemir to 28 units daily with good improvement.  Held metformin with contrast with CT.  BG lower now off metformin, has had a few low BG as well due to being on clear liquids for colonoscopy prep.  Continue to adjust Levemir as needed.  Held for 24 hours due to poor appetite and low BG.  Resumed Levemir 20 units qhs, prandial insulin 3 units.  Appetite has been poor.    Hyperlipidemia  Continue atorvastatin status post stroke.        VTE Risk Mitigation (From admission, onward)         Ordered     apixaban tablet 5 mg  2 times daily         03/29/22 8924     Place sequential compression device  Until discontinued         01/31/22 9427                          Mojgan Meza MD  Department of Hospital Medicine    [facility] ([place])

## 2022-04-26 ENCOUNTER — PATIENT OUTREACH (OUTPATIENT)
Dept: ADMINISTRATIVE | Facility: OTHER | Age: 72
End: 2022-04-26
Payer: MEDICARE

## 2022-04-26 LAB
ALBUMIN SERPL BCP-MCNC: 3.6 G/DL (ref 3.5–5.2)
ALP SERPL-CCNC: 68 U/L (ref 55–135)
ALT SERPL W/O P-5'-P-CCNC: 22 U/L (ref 10–44)
ANION GAP SERPL CALC-SCNC: 11 MMOL/L (ref 8–16)
AST SERPL-CCNC: 16 U/L (ref 10–40)
BILIRUB SERPL-MCNC: 0.4 MG/DL (ref 0.1–1)
BUN SERPL-MCNC: 21 MG/DL (ref 8–23)
CALCIUM SERPL-MCNC: 9.6 MG/DL (ref 8.7–10.5)
CHLORIDE SERPL-SCNC: 105 MMOL/L (ref 95–110)
CO2 SERPL-SCNC: 25 MMOL/L (ref 23–29)
CREAT SERPL-MCNC: 1 MG/DL (ref 0.5–1.4)
EST. GFR  (AFRICAN AMERICAN): >60 ML/MIN/1.73 M^2
EST. GFR  (NON AFRICAN AMERICAN): >60 ML/MIN/1.73 M^2
GLUCOSE SERPL-MCNC: 160 MG/DL (ref 70–110)
MAGNESIUM SERPL-MCNC: 1.9 MG/DL (ref 1.6–2.6)
PHOSPHATE SERPL-MCNC: 3.9 MG/DL (ref 2.7–4.5)
POCT GLUCOSE: 110 MG/DL (ref 70–110)
POCT GLUCOSE: 150 MG/DL (ref 70–110)
POCT GLUCOSE: 182 MG/DL (ref 70–110)
POCT GLUCOSE: 97 MG/DL (ref 70–110)
POTASSIUM SERPL-SCNC: 3.9 MMOL/L (ref 3.5–5.1)
PROT SERPL-MCNC: 6.7 G/DL (ref 6–8.4)
SODIUM SERPL-SCNC: 141 MMOL/L (ref 136–145)

## 2022-04-26 PROCEDURE — 25000003 PHARM REV CODE 250: Performed by: INTERNAL MEDICINE

## 2022-04-26 PROCEDURE — 36415 COLL VENOUS BLD VENIPUNCTURE: CPT | Performed by: INTERNAL MEDICINE

## 2022-04-26 PROCEDURE — 83735 ASSAY OF MAGNESIUM: CPT | Performed by: INTERNAL MEDICINE

## 2022-04-26 PROCEDURE — 11000001 HC ACUTE MED/SURG PRIVATE ROOM

## 2022-04-26 PROCEDURE — 84100 ASSAY OF PHOSPHORUS: CPT | Performed by: INTERNAL MEDICINE

## 2022-04-26 PROCEDURE — 80053 COMPREHEN METABOLIC PANEL: CPT | Performed by: INTERNAL MEDICINE

## 2022-04-26 PROCEDURE — 94761 N-INVAS EAR/PLS OXIMETRY MLT: CPT

## 2022-04-26 PROCEDURE — 25000003 PHARM REV CODE 250: Performed by: HOSPITALIST

## 2022-04-26 RX ORDER — INSULIN ASPART 100 [IU]/ML
3 INJECTION, SOLUTION INTRAVENOUS; SUBCUTANEOUS 3 TIMES DAILY
Refills: 0
Start: 2022-04-26 | End: 2023-04-26

## 2022-04-26 RX ORDER — PYRIDOXINE HCL (VITAMIN B6) 25 MG
25 TABLET ORAL DAILY
Refills: 0
Start: 2022-04-27

## 2022-04-26 RX ORDER — POLYETHYLENE GLYCOL 3350 17 G/17G
17 POWDER, FOR SOLUTION ORAL DAILY
Refills: 0
Start: 2022-04-27

## 2022-04-26 RX ORDER — ISONIAZID 300 MG/1
300 TABLET ORAL DAILY
Start: 2022-04-27

## 2022-04-26 RX ORDER — LANOLIN ALCOHOL/MO/W.PET/CERES
100 CREAM (GRAM) TOPICAL DAILY
Start: 2022-04-27 | End: 2022-05-27

## 2022-04-26 RX ADMIN — Medication 25 MG: at 09:04

## 2022-04-26 RX ADMIN — ISONIAZID 300 MG: 300 TABLET ORAL at 09:04

## 2022-04-26 RX ADMIN — THIAMINE HCL TAB 100 MG 100 MG: 100 TAB at 09:04

## 2022-04-26 RX ADMIN — THERA TABS 1 TABLET: TAB at 09:04

## 2022-04-26 RX ADMIN — APIXABAN 5 MG: 2.5 TABLET, FILM COATED ORAL at 09:04

## 2022-04-26 RX ADMIN — INSULIN ASPART 3 UNITS: 100 INJECTION, SOLUTION INTRAVENOUS; SUBCUTANEOUS at 01:04

## 2022-04-26 RX ADMIN — INSULIN ASPART 3 UNITS: 100 INJECTION, SOLUTION INTRAVENOUS; SUBCUTANEOUS at 09:04

## 2022-04-26 RX ADMIN — TAMSULOSIN HYDROCHLORIDE 0.4 MG: 0.4 CAPSULE ORAL at 09:04

## 2022-04-26 RX ADMIN — METFORMIN HYDROCHLORIDE 500 MG: 500 TABLET ORAL at 09:04

## 2022-04-26 RX ADMIN — FOLIC ACID 1 MG: 1 TABLET ORAL at 09:04

## 2022-04-26 RX ADMIN — FLUOXETINE 20 MG: 20 CAPSULE ORAL at 09:04

## 2022-04-26 RX ADMIN — ATORVASTATIN CALCIUM 80 MG: 20 TABLET, FILM COATED ORAL at 09:04

## 2022-04-26 NOTE — PLAN OF CARE
This CM Director spoke with Gali with [facility] ([phone]).  Per Gali, the patient can be admitted on 4/27.  The following are needed:    1. Admission orders post-dated for 4/27  2.PASSR  3.142  4. Chest xray  5. PPD  6. Covid test (if vaccinated, not needed)      Information discussed with [facility]  and BRITTON Lara

## 2022-04-26 NOTE — PLAN OF CARE
[facility]      Department of Hospital Medicine            NURSING HOME ORDERS    04/27/22    Admit to Nursing Home: Regular Bed                                                Diagnoses:  Active Hospital Problems    Diagnosis  POA    *Acute stroke due to ischemia [I63.9]  Yes    LTBI (latent tuberculosis infection) [Z22.7]  Yes    Severe protein-calorie malnutrition [E43]  Yes    Debility [R53.81]  Yes    Acute deep vein thrombosis (DVT) of both femoral veins [I82.413]  Yes    Type 2 diabetes mellitus with hyperglycemia, without long-term current use of insulin [E11.65]  Yes    Hyperlipidemia [E78.5]  Yes     Chronic      Resolved Hospital Problems    Diagnosis Date Resolved POA    Abnormal CT scan, gastrointestinal tract [R93.3] 04/06/2022 Yes    Vomiting [R11.10] 03/23/2022 No    Sepsis [A41.9] 03/03/2022 No    Drowsy [R40.0] 03/05/2022 No    Fever [R50.9] 02/08/2022 No    Syncope and collapse [R55] 03/25/2022 Yes    Cocaine abuse [F14.10] 03/14/2022 Yes     Chronic    Tobacco dependence [F17.200] 03/25/2022 Yes     Chronic    Essential hypertension [I10] 03/25/2022 Yes     Chronic       Patient is homebound due to:  Acute stroke due to ischemia    Allergies:Review of patient's allergies indicates:  No Known Allergies    Vitals:  per facility    Diet: diabetic 2000 calorie diet   Supplement:  1 can every three times a day with meals                         Type:      Glucerna     Huber bid      Acitivities:      - Up in a chair each morning as tolerated   - Ambulate with assistance to bathroom   - Weight bearing: as tolerated    LABS:  Per facility protocol    Nursing Precautions:    - Aspiration precautions:                        -  Upright 90 degrees befor during and after meals                 - Fall precautions per nursing home protocol   - Seizure precaution per [facility] protocol   - Decubitus precautions:        -  for positioning   - Pressure reducing foam  mattress   - Turn patient every two hours. Use wedge pillows to anchor patient    CONSULTS:     Physical Therapy to evaluate and treat     Occupational Therapy to evaluate and treat     Speech Therapy  to evaluate and treat     Nutrition to evaluate and recommend diet      MISCELLANEOUS CARE:     Routine Skin for Bedridden Patients:  Apply moisture barrier cream to all    skin folds and wet areas in perineal area daily and after baths and                           all bowel movements.                      DIABETES CARE:        Check blood sugar:       Fingerstick blood sugar AC and HS         Report CBG < 60 or > 400 to physician.                                          Insulin Sliding Scale          Glucose  Novolog Insulin Subcutaneous        0 - 60   Orange juice or glucose tablet, hold insulin      No insulin   201-250  2 units   251-300  4 units   301-350  6 units   351-400  8 units   >400   10 units then call physician      Medications:   Current Discharge Medication List      START taking these medications    Details   apixaban (ELIQUIS) 5 mg Tab Take 1 tablet (5 mg total) by mouth 2 (two) times daily.      atorvastatin (LIPITOR) 80 MG tablet Take 1 tablet (80 mg total) by mouth once daily.      FLUoxetine 20 MG capsule Take 1 capsule (20 mg total) by mouth once daily.      insulin aspart U-100 (NOVOLOG) 100 unit/mL (3 mL) InPn pen Inject 3 Units into the skin 3 (three) times daily.  Refills: 0      insulin detemir U-100 (LEVEMIR FLEXTOUCH) 100 unit/mL (3 mL) SubQ InPn pen Inject 20 Units into the skin every evening.  Refills: 0      isoniazid (NYDRAZID) 300 MG Tab Take 1 tablet (300 mg total) by mouth once daily.   For total 6 months, started on 4/13/22   multivitamin Tab Take 1 tablet by mouth once daily.      polyethylene glycol (GLYCOLAX) 17 gram PwPk Take 17 g by mouth once daily.  Refills: 0      pyridoxine, vitamin B6, (B-6) 25 MG Tab Take 1 tablet (25 mg total) by mouth once daily.  Refills: 0    For 6 months started on 4/13/22   thiamine 100 MG tablet Take 1 tablet (100 mg total) by mouth once daily.         CONTINUE these medications which have CHANGED    Details   metFORMIN (GLUCOPHAGE) 500 MG tablet Take 1 tablet (500 mg total) by mouth 2 (two) times daily with meals.      tamsulosin (FLOMAX) 0.4 mg Cap Take 1 capsule (0.4 mg total) by mouth every evening.    Associated Diagnoses: BPH without urinary obstruction         CONTINUE these medications which have NOT CHANGED    Details   albuterol (PROVENTIL/VENTOLIN HFA) 90 mcg/actuation inhaler Inhale 2 puffs into the lungs every 6 (six) hours as needed for Wheezing or Shortness of Breath.  Qty: 18 g, Refills: 0    Comments: Dispense with spacer      fluticasone-salmeterol diskus inhaler 250-50 mcg Inhale 1 puff into the lungs 2 (two) times daily. Controller         STOP taking these medications       warfarin (COUMADIN) 7.5 MG tablet Comments:   Reason for Stopping:         blood sugar diagnostic (BLOOD GLUCOSE TEST) Strp Comments:   Reason for Stopping:         citalopram (CELEXA) 20 MG tablet Comments:   Reason for Stopping:         finasteride (PROSCAR) 5 mg tablet Comments:   Reason for Stopping:         folic acid (FOLVITE) 1 MG tablet Comments:   Reason for Stopping:         gemfibrozil (LOPID) 600 MG tablet Comments:   Reason for Stopping:         glipiZIDE (GLUCOTROL) 10 MG tablet Comments:   Reason for Stopping:         losartan (COZAAR) 25 MG tablet Comments:   Reason for Stopping:         meclizine (ANTIVERT) 25 mg tablet Comments:   Reason for Stopping:         omeprazole (PRILOSEC) 20 MG capsule Comments:   Reason for Stopping:         rosuvastatin (CRESTOR) 40 MG Tab Comments:   Reason for Stopping:         sildenafiL (VIAGRA) 25 MG tablet Comments:   Reason for Stopping:                     _________________________________  Alma Elizalde MD  04/26/2022

## 2022-04-26 NOTE — SUBJECTIVE & OBJECTIVE
Interval History:  c/o numbness and tingling in hands to nurse, no nausea, vomiting, ate breakfast.    Review of Systems   Constitutional:  Negative for chills and fever.   Respiratory:  Negative for cough and shortness of breath.    Cardiovascular:  Negative for chest pain and palpitations.   Gastrointestinal:  Negative for abdominal pain, nausea and vomiting.   Objective:     Vital Signs (Most Recent):  Temp: 98.6 °F (37 °C) (04/26/22 1223)  Pulse: 75 (04/26/22 1223)  Resp: 18 (04/26/22 1223)  BP: (!) 145/80 (04/26/22 1223)  SpO2: 96 % (04/26/22 1223) Vital Signs (24h Range):  Temp:  [98 °F (36.7 °C)-98.6 °F (37 °C)] 98.6 °F (37 °C)  Pulse:  [71-85] 75  Resp:  [18-20] 18  SpO2:  [96 %-98 %] 96 %  BP: (116-145)/(61-80) 145/80     Weight: 66.2 kg (145 lb 15.1 oz)  Body mass index is 19.79 kg/m².    Intake/Output Summary (Last 24 hours) at 4/26/2022 1305  Last data filed at 4/26/2022 0745  Gross per 24 hour   Intake 120 ml   Output 400 ml   Net -280 ml        Physical Exam  Constitutional:       General: He is not in acute distress.     Comments: Thin   Cardiovascular:      Rate and Rhythm: Normal rate and regular rhythm.   Pulmonary:      Effort: Pulmonary effort is normal.      Breath sounds: Normal breath sounds.   Abdominal:      General: Bowel sounds are normal.      Palpations: Abdomen is soft.   Skin:     General: Skin is warm and dry.   Neurological:      Mental Status: He is alert.      Comments: Not oriented to time.       Significant Labs: All pertinent labs within the past 24 hours have been reviewed.    Significant Imaging: I have reviewed all pertinent imaging results/findings within the past 24 hours.

## 2022-04-26 NOTE — PT/OT/SLP PROGRESS
Speech Language Pathology      Forest Lopez  MRN: [id number]    Patient not seen today secondary to Patient unwilling to participate. Stated he is not feeling well today. Will follow-up next available date 4/27/22.

## 2022-04-27 ENCOUNTER — PATIENT OUTREACH (OUTPATIENT)
Dept: ADMINISTRATIVE | Facility: OTHER | Age: 72
End: 2022-04-27
Payer: MEDICARE

## 2022-04-27 VITALS
SYSTOLIC BLOOD PRESSURE: 132 MMHG | HEIGHT: 72 IN | DIASTOLIC BLOOD PRESSURE: 79 MMHG | OXYGEN SATURATION: 95 % | HEART RATE: 73 BPM | TEMPERATURE: 98 F | RESPIRATION RATE: 18 BRPM | BODY MASS INDEX: 19.77 KG/M2 | WEIGHT: 145.94 LBS

## 2022-04-27 LAB
POCT GLUCOSE: 151 MG/DL (ref 70–110)
POCT GLUCOSE: 158 MG/DL (ref 70–110)

## 2022-04-27 PROCEDURE — 99239 HOSP IP/OBS DSCHRG MGMT >30: CPT | Mod: ,,, | Performed by: INTERNAL MEDICINE

## 2022-04-27 PROCEDURE — 1111F PR DISCHARGE MEDS RECONCILED W/ CURRENT OUTPATIENT MED LIST: ICD-10-PCS | Mod: CPTII,,, | Performed by: INTERNAL MEDICINE

## 2022-04-27 PROCEDURE — 25000003 PHARM REV CODE 250: Performed by: INTERNAL MEDICINE

## 2022-04-27 PROCEDURE — 25000003 PHARM REV CODE 250: Performed by: HOSPITALIST

## 2022-04-27 PROCEDURE — 1111F DSCHRG MED/CURRENT MED MERGE: CPT | Mod: CPTII,,, | Performed by: INTERNAL MEDICINE

## 2022-04-27 PROCEDURE — 99239 PR HOSPITAL DISCHARGE DAY,>30 MIN: ICD-10-PCS | Mod: ,,, | Performed by: INTERNAL MEDICINE

## 2022-04-27 PROCEDURE — 94761 N-INVAS EAR/PLS OXIMETRY MLT: CPT

## 2022-04-27 RX ADMIN — Medication 25 MG: at 09:04

## 2022-04-27 RX ADMIN — APIXABAN 5 MG: 2.5 TABLET, FILM COATED ORAL at 09:04

## 2022-04-27 RX ADMIN — FOLIC ACID 1 MG: 1 TABLET ORAL at 09:04

## 2022-04-27 RX ADMIN — ATORVASTATIN CALCIUM 80 MG: 20 TABLET, FILM COATED ORAL at 09:04

## 2022-04-27 RX ADMIN — FLUOXETINE 20 MG: 20 CAPSULE ORAL at 09:04

## 2022-04-27 RX ADMIN — THERA TABS 1 TABLET: TAB at 09:04

## 2022-04-27 RX ADMIN — THIAMINE HCL TAB 100 MG 100 MG: 100 TAB at 09:04

## 2022-04-27 RX ADMIN — INSULIN ASPART 3 UNITS: 100 INJECTION, SOLUTION INTRAVENOUS; SUBCUTANEOUS at 09:04

## 2022-04-27 RX ADMIN — METFORMIN HYDROCHLORIDE 500 MG: 500 TABLET ORAL at 09:04

## 2022-04-27 RX ADMIN — ISONIAZID 300 MG: 300 TABLET ORAL at 09:04

## 2022-04-27 NOTE — PLAN OF CARE
POC reviewed with patient, questions and concerns addressed. No acute events through the night.  All Vital signs stable. No complaints.  AAOx3. Safety maintained.  Bed locked, in lowest position, call light within reach, side rails x2. Mobilized to their highest function. See doc flow sheets for further information. Will continue to monitor    Problem: Adult Inpatient Plan of Care  Goal: Plan of Care Review  Outcome: Ongoing, Progressing  Goal: Patient-Specific Goal (Individualized)  Outcome: Ongoing, Progressing  Goal: Absence of Hospital-Acquired Illness or Injury  Outcome: Ongoing, Progressing  Goal: Optimal Comfort and Wellbeing  Outcome: Ongoing, Progressing     Problem: Diabetes Comorbidity  Goal: Blood Glucose Level Within Targeted Range  Outcome: Ongoing, Progressing     Problem: Skin Injury Risk Increased  Goal: Skin Health and Integrity  Outcome: Ongoing, Progressing     Problem: Coping Ineffective  Goal: Effective Coping  Outcome: Ongoing, Progressing     Problem: Fall Injury Risk  Goal: Absence of Fall and Fall-Related Injury  Outcome: Ongoing, Progressing     Problem: Infection  Goal: Absence of Infection Signs and Symptoms  Outcome: Ongoing, Progressing     Problem: Spiritual Distress Risk or Actual  Goal: Spiritual Wellbeing  Outcome: Ongoing, Progressing

## 2022-04-27 NOTE — NURSING
Patient being discharged to NH. Report called to Ines. Patient denies any issues; no distress noted. Patient left the unit via wheelchair with transport services. Patient belongings and discharge paperwork provided to transport services.

## 2022-04-27 NOTE — PLAN OF CARE
04/26/22 1910   Discharge Reassessment   Assessment Type Discharge Planning Assessment   Did the patient's condition or plan change since previous assessment? No   Discharge Plan discussed with: Patient   Communicated EFRAÍN with patient/caregiver Yes   Discharge Plan A New Nursing Home placement - intermediate care facility   DME Needed Upon Discharge  none   Discharge Barriers Identified None   Post-Acute Status   Post-Acute Authorization Placement   Post-Acute Placement Status Set-up Complete/Auth obtained   Coverage [employer] MANAGED MEDICARE - [employer]   Discharge Delays None known at this time     SHANICE spoke with Gali at [place], who said the patient could be admitted in the am tomorrow  If she receives his orders for admission today. SHANICE faxed and sent in [facility] the patient's   142 & PASRR, TB test, chest xray, Nursing Home Orders, and MAR. SHANICE told the patient that he should be d/c'd tomorrow. He stated that he was glad and thanked SHANICE for working in his behalf.

## 2022-04-27 NOTE — PROGRESS NOTES
IP Liaison - Final Visit Note    Patient: Forest Lopez  MRN:  [id number]  Date of Service:  4/27/2022  Completed by:  SUMMER Wilson    Reason for Visit   Patient presents with    IP Liaison Follow-up Visit       Patient will be discharged to [facility].     Patient Summary     Discharge Date: 4/27/2022  Discharge telephone number/[phone]/ [address] [place] [address]  Follow up provider: n/a  Follow up appointments: n/a  Home Health agency & telephone number: n/a  DME ordered &  name: n/a  Assigned OPCM RN/SW: n/a  Report sent to follow up team (PCP/OPCM) via in basket message: n/a  Community Resources arranged including agency name & contact info: none    SUMMER Wilson

## 2022-04-28 NOTE — ASSESSMENT & PLAN NOTE
Diet as tolerated  Patient seen by dietician, recommendations made.  He sometimes loses his appetite, gets nauseated, doesn't feel like doing anything.

## 2022-04-28 NOTE — ASSESSMENT & PLAN NOTE
Reportedly he is on metformin and glipizide, both held.  He had 4+ sugar in urine and HbA1c was 10.3, and he was hyperglycemic on presentation.  Started on Levemir and ISS, then increased Levemir to 25 units daily  Added scheduled novolog 5 units tid, increased to 7 units with improvement  OK to resume metformin.    Increased levemir to 28 units daily with good improvement.  Held metformin with contrast with CT.  BG lower now off metformin, has had a few low BG as well due to being on clear liquids for colonoscopy prep.  Continue to adjust Levemir as needed.  Held for 24 hours due to poor appetite and low BG.  Resumed Levemir 20 units qhs, prandial insulin 3 units.

## 2022-04-28 NOTE — ASSESSMENT & PLAN NOTE
- MRI showed areas of diffusion signal hyperintensity consistent with areas of acute ischemia/infarct involving the left cerebral hemisphere, the most prominent measuring approximately 1.4 cm, also a small focus of the right frontal lobe.  - Patient on full dose anticoagulation currently due to acute bilateral DVT.  - Sinus rhythm noted throughout hospital stay  - Risk factor modification - control diabetes, continue statin.  - RPR, HIV non reactive.  B12 low normal.   - CTA showed a focal segment of 60-70% stenosis involving the proximal to mid left vertebral artery that was new when compared to the prior study of 09/25/2019.  No evidence of large vessel intracranial occlusion.   - Neurology noted symptoms do not correspond to stroke location, although the frontal lobe stroke might have something to do with his lack of motivation, and Speech has noted he has had delayed swallowing.  - Echo with bubble study done, no shunt seen.  - Follow up with vascular neurology in 4 weeks.  - Therapy recommended rehab initially but changed recommendation to SNF due to his low interest in participation.  - Started Lexapro due to suspicion for depression.  - Psych consulted, changed to Prozac and started thiamine, folic acid, MVI due to previous history of alcohol abuse, although Wernicke encephalopathy is not suspected.  - Patient's son, Forest, agrees to nursing home care.  He has another son who is serving in the .  Neither is willing to take on the job of durable power of .  - [place] to NH

## 2022-04-28 NOTE — DISCHARGE SUMMARY
[facility] Medicine  Discharge Summary      Patient Name: Forest Lopez  MRN: [id number]  Patient Class: IP- Inpatient  Admission Date: 1/31/2022  Hospital Length of Stay: 86 days  Discharge Date and Time: 4/27/2022  1:30 PM  Attending Physician: No att. providers found   Discharging Provider: Alma Elizalde MD  Primary Care Provider: Julian Escobar      HPI:   Mr. Lopez is a 72 year old man who presented after a syncopal episode.  He reports he had been feeling well prior to the episode but then had a prodrome prior to passing out.  EMS was called, report patient's BP was low normal on their arrival, improved after an IV fluids bolus.  Patient denies chest pain or shortness of breath and says he does not feel weak currently although is rather tired.  When seen in the ED this morning he could barely express himself audibly.     Vital signs were normal on presentation here.  He is on warfarin for recurrent DVT, but his INR was 1, and d-dimer markedly elevated at 21.7.  Tox screen was positive for cocaine.  He had a CTA of the brain and neck done on presentation so CTA of the chest for PE study could not be done for 48 hours.  Ultrasound of the lower extremities showed extensive bilateral DVT.  On the right there was thrombosis of the common femoral vein, femoral vein, popliteal vein, one of the paired posterior tibial veins and both visualized peroneal veins.  On the left there was thrombosis of the common femoral vein, femoral vein and popliteal vein.  Patient was started on full dose Lovenox and admitted.    Medical history is from the chart as he is unable to give me much information.  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  He smokes 5-6 cigarettes/day and is not interested in quitting.  He is currently homeless and staying with a friend.   Despite the normal INR he is sure he is taking his warfarin and is not having difficulty taking his medications.  I discussed his care with [facility] staff on the [facility] and patient had been lost to follow up since November 2021.      Procedure(s) (LRB):  COLONOSCOPY (N/A)      Hospital Course:   Patient presented to the ED after a syncopal episode and was found to have bilateral lower extremity DVTs and a low INR.  Tox screen was positive for cocaine.  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  He was started back on his warfarin with bridging Lovenox.  His 2D echo showed grade I diastolic dysfunction.  Neurology evaluated him and recommended echo with bubble study, which was negative for a shunt.    PT/OT evaluated him and recommended SNF placement versus inpatient rehab.  His INR was difficult to get therapeutic, and as he had no contraindication to a NOAC his warfarin was changed to apixaban, and the full dose Lovenox was discontinued.  As he appeared to be depressed and had no objection to starting an antidepressant Lexapro was started.  He had a fall in the hospital on 2/13, had gone to the bathroom for a BM with the nurse, and when she turned away while he was washing his hands he apparently lost his balance and fell.  He did not hit his head and did not lose consciousness, but his BP was low transiently and he was given a bolus of IV fluids.    Patient's mental status improved slowly, but he gradually became more talkative and was able to hold a conversation.  He was diagnosed with a polymicrobial UTI on 2/21 and completed treatment with antibiotics.  Psychiatry evaluated him on 3/3 and changed his antidepressant to Prozac as it can be more activating and patient was having difficulty with his level of motivation.      Patient had some episodes of nausea and vomiting and CT of the abdomen was done, with incidental  finding of rectal wall thickening noted.  Colonoscopy was recommended, but he was unable to finish the prep.  GI evaluated him and decided the patient would benefit from a flex sig to evaluate the rectum.  He had the procedure on 3/25 and no abnormalities were seen other than internal hemorrhoids.    Patient's discharge was delayed as he was not accepted by any SNF facilities in the area.  Reasons for the denials are unclear as he has a clear rehab diagnosis.  He has a history of drug use but has not shown any interest in getting any illicit or legal drugs since he has been here, and he has had no visitors that could have provided drugs to him.  He has been pleasant and cooperative while here although lacks motivation, likely due to the nature of the stroke affecting his frontal lobe.  He requires considerable encouragement to participate in therapy.  Discharging him to live on the street or shelter would not be appropriate as he would not be able to take care of himself.  At this point he has agreed to alf nursing care, as his stroke was more than 2 months ago and he has already had sufficient rehab while here in the hospital.  Discharge was planned for 4/8/22, but was delayed due to him not having access to his own bank account.  Hopefully he can be transferred to nursing home as soon as financial situation is straightened out.  Case management has had to involve the Office of Elder Protective Services in order to get access to his bank account.He was finally discharged to nursing facility.       Goals of Care Treatment Preferences:  Code Status: Full Code      Consults:   Consults (From admission, onward)        Status Ordering Provider     Inpatient consult to Infectious Diseases  Once        Provider:  Yuri Mtz MD    Completed CLAUDE SIFUENTES     Inpatient consult to Telemedicine - Psyc  Once        Provider:  Hernandez Brady MD    Completed HANDY CONTRERAS     Inpatient virtual consult to  Hospital Medicine  Once        Provider:  Mary Carmen Barr MD    Completed HANDY CONTRERAS     Inpatient consult to Neurology  Once        Provider:  Carlee Marquez MD    Completed HANDY CONTRERAS     Inpatient consult to Palliative Care  Once        Provider:  Tanna Mims DNP    Completed HANDY CONTRERAS     Inpatient consult to Social Work  Once        Provider:  (Not yet assigned)    Completed HANDY CONTRERAS     Inpatient virtual consult to Hospital Medicine  Once        Provider:  Mary Carmen Barr MD    Completed HANDY CONTRERAS          * Acute stroke due to ischemia  - MRI showed areas of diffusion signal hyperintensity consistent with areas of acute ischemia/infarct involving the left cerebral hemisphere, the most prominent measuring approximately 1.4 cm, also a small focus of the right frontal lobe.  - Patient on full dose anticoagulation currently due to acute bilateral DVT.  - Sinus rhythm noted throughout hospital stay  - Risk factor modification - control diabetes, continue statin.  - RPR, HIV non reactive.  B12 low normal.   - CTA showed a focal segment of 60-70% stenosis involving the proximal to mid left vertebral artery that was new when compared to the prior study of 09/25/2019.  No evidence of large vessel intracranial occlusion.   - Neurology noted symptoms do not correspond to stroke location, although the frontal lobe stroke might have something to do with his lack of motivation, and Speech has noted he has had delayed swallowing.  - Echo with bubble study done, no shunt seen.  - Follow up with vascular neurology in 4 weeks.  - Therapy recommended rehab initially but changed recommendation to SNF due to his low interest in participation.  - Started Lexapro due to suspicion for depression.  - Psych consulted, changed to Prozac and started thiamine, folic acid, MVI due to previous history of alcohol abuse, although Wernicke encephalopathy is not suspected.  - Patient's son, Forest,  agrees to nursing home care.  He has another son who is serving in the .  Neither is willing to take on the job of durable power of .  - Dc to NH      LTBI (latent tuberculosis infection)  See ID note.  Patient had positive PPD 2 years ago and was not treated.  PPD this admission is also positive.  He has been afebrile and has no cough or other TB symptoms.  CXR repeated and was normal.  Clinically he does not have TB.  Quantiferon gold has been ordered to see if he has latent TB.  Discussed with ID, if quantiferon gold is positive would treat for latent TB with INH and pyridoxine for 6 months. Tb gold positive, started  INH and pyridoxine ON 4/13/22      Severe protein-calorie malnutrition  Diet as tolerated  Patient seen by dietician, recommendations made.  He sometimes loses his appetite, gets nauseated, doesn't feel like doing anything.      Debility  PT/OT recommending rehab vs SNF after stroke with debility/poor motivation  Having difficulty finding placement for unclear reasons.  Will continue to pursue this as above.  Again, patient has been pleasant and cooperative.      Type 2 diabetes mellitus with hyperglycemia, without long-term current use of insulin  Reportedly he is on metformin and glipizide, both held.  He had 4+ sugar in urine and HbA1c was 10.3, and he was hyperglycemic on presentation.  Started on Levemir and ISS, then increased Levemir to 25 units daily  Added scheduled novolog 5 units tid, increased to 7 units with improvement  OK to resume metformin.    Increased levemir to 28 units daily with good improvement.  Held metformin with contrast with CT.  BG lower now off metformin, has had a few low BG as well due to being on clear liquids for colonoscopy prep.  Continue to adjust Levemir as needed.  Held for 24 hours due to poor appetite and low BG.  Resumed Levemir 20 units qhs, prandial insulin 3 units.      Acute deep vein thrombosis (DVT) of both femoral veins  Last  ultrasound showed partially occlusive DVTs, now completely occlusive DVT of multiple lower extremity veins on ultrasound.  Patient reports compliance with warfarin but his INR is only one.  He is homeless but says he does not have difficulty obtaining his medications.  Does not seem to care about anything, which again might be due to the frontal lobe stroke.  D-dimer was markedly elevated on presentation and there was a question of possible PE, but he had a CTA of the brain/neck on presentation so could not get another CTA for another 48 hours.    V/Q scan was done, showed low probability for PE.  2D echo showed grade I diastolic dysfunction.  Warfarin was restarted with bridging Lovenox, but INR was still low.  Changed to apixaban and discontinued Lovenox.    Hyperlipidemia  Continue atorvastatin status post stroke.        Final Active Diagnoses:    Diagnosis Date Noted POA    PRINCIPAL PROBLEM:  Acute stroke due to ischemia [I63.9] 02/06/2022 Yes    LTBI (latent tuberculosis infection) [Z22.7] 04/11/2022 Yes    Severe protein-calorie malnutrition [E43] 02/04/2022 Yes    Debility [R53.81] 02/02/2022 Yes    Acute deep vein thrombosis (DVT) of both femoral veins [I82.413] 01/31/2022 Yes    Type 2 diabetes mellitus with hyperglycemia, without long-term current use of insulin [E11.65] 01/31/2022 Yes    Hyperlipidemia [E78.5] 03/03/2014 Yes     Chronic      Problems Resolved During this Admission:    Diagnosis Date Noted Date Resolved POA    Abnormal CT scan, gastrointestinal tract [R93.3] 03/24/2022 04/06/2022 Yes    Vomiting [R11.10] 03/15/2022 03/23/2022 No    Sepsis [A41.9] 02/21/2022 03/03/2022 No    Drowsy [R40.0] 02/21/2022 03/05/2022 No    Fever [R50.9] 02/04/2022 02/08/2022 No    Syncope and collapse [R55] 01/31/2022 03/25/2022 Yes    Cocaine abuse [F14.10] 09/25/2019 03/14/2022 Yes     Chronic    Tobacco dependence [F17.200] 03/03/2014 03/25/2022 Yes     Chronic    Essential hypertension [I10]  03/03/2014 03/25/2022 Yes     Chronic       Discharged Condition: stable    Disposition: [facility] Home    Follow Up:    Patient Instructions:      Diet diabetic     Diet diabetic     Activity as tolerated     Activity as tolerated       Significant Diagnostic Studies:   Lab Results   Component Value Date    WBC 9.14 04/25/2022    HGB 13.4 (L) 04/25/2022    HCT 41.6 04/25/2022    MCV 81 (L) 04/25/2022     04/25/2022       BMP  Lab Results   Component Value Date     04/26/2022    K 3.9 04/26/2022     04/26/2022    CO2 25 04/26/2022    BUN 21 04/26/2022    CREATININE 1.0 04/26/2022    CALCIUM 9.6 04/26/2022    ANIONGAP 11 04/26/2022    ESTGFRAFRICA >60 04/26/2022    EGFRNONAA >60 04/26/2022       X-Ray Chest PA And Lateral  Narrative: EXAMINATION:  XR CHEST PA AND LATERAL    CLINICAL HISTORY:  Positive PPD;    TECHNIQUE:  PA and lateral views of the chest were performed.    COMPARISON:  None    FINDINGS:  The lungs are clear, with normal appearance of pulmonary vasculature.No pleural effusion.No pneumothorax.    The cardiac silhouette is normal in size. The hilar and mediastinal contours are unremarkable.    Old left rib fractures.  Impression: No acute abnormality.    Electronically signed by: Diego Aden MD  Date:    04/11/2022  Time:    14:18      EXAMINATION:  CT ABDOMEN PELVIS WITH CONTRAST     CLINICAL HISTORY:  Nausea/vomiting;     TECHNIQUE:  Low dose axial images, sagittal and coronal reformations were obtained from the lung bases to the pubic symphysis following the IV administration of 75 mL of Omnipaque 350 and the oral administration of 500 mL of Omnipaque 9.     COMPARISON:  03/14/2022     FINDINGS:  Bibasilar subsegmental atelectasis.  The base of the heart appears normal.  Calcified atheromatous disease affects the aorta and its major branch vessels.     No radiopaque gallstones are seen.  No intrahepatic or extrahepatic biliary ductal dilatation is identified.  The  liver, spleen, pancreas, adrenal glands and kidneys are normal in size, shape and contour.  Multiple bilateral renal cyst are visualized.  No hydronephrosis or hydroureter is seen.  The urinary bladder is partially distended and has a normal appearance.     Moderate colonic stool retention in the left colon.  Liquid stool in the right colon and transverse colon.  Appendix is normal.  Focal region of thickening of the rectum versus region of peristalsis/underdistention, measuring approximately 1.7 cm in greatest dimension.  Slightly prominent left internal iliac lymph node measuring 1 cm.     Remote left-sided rib fractures.  Age-appropriate degenerative changes affect the skeleton.     Impression:     Mild left-sided colonic stool retention with liquid stools in the right colon and transverse colon.     Focal region of thickening of the walls of the rectum versus under distension/peristalsis.  Consider further evaluation with colonoscopy if not previously performed.  There is an adjacent borderline prominent left internal iliac lymph node that measures approximately 1 cm.     Bilateral renal cysts.        Electronically signed by: Casandra Nickerson MD  Date:                                            03/20/2022  Time:                                           15:38      EXAMINATION:  MRI BRAIN WITHOUT CONTRAST     CLINICAL HISTORY:  Stroke, follow up;r/o stroke;     TECHNIQUE:  Multiplanar multisequence MR imaging of the brain was performed without contrast.     COMPARISON:  CT examination of the brain February 5, 2022, MRI examination of the brain September 25, 2019     FINDINGS:  Routine noncontrast MRI examination of the brain was performed, there is artifact present diminishing image quality and diminishing the sensitivity of the exam.     The ventricular system and sulcal pattern demonstrate chronic involutional change.  There is prominent subcortical and periventricular white matter change most consistent with  chronic white matter change appearing similar to the prior MRI examination with progression.  There are also focal areas consistent with remote lacunar-type ischemic change noted.     On diffusion imaging there are areas of diffusion signal hyperintensity most consistent with areas of acute ischemia/infarct, the most prominent is seen adjacent to the left lateral ventricle and extending inferiorly to the left basal ganglia, as best seen on series 3, image 17 measuring up to approximately 1.3 x 1.4 cm in size on axial imaging.  Additional focus is seen just posterosuperior to this level, lateral to the posterior aspect of the left lateral ventricle.  There is also a small focus seen in the right frontal lobe as seen on diffusion axial image 20.     There is no evidence for mass effect or midline shift.  Appropriate CSF spaces are appropriate flow voids are noted at the skull base.  The upper cervical cord, brainstem and cervical cranial junction appear appropriate.  The visualized orbits appear intact.  There is appearance that may relate to prior cataract surgery.  The mastoid air cells demonstrate appropriate signal void.  Mild paranasal sinus mucosal thickening is noted.     Impression:     There are areas of diffusion signal hyperintensity consistent with areas of acute ischemia/infarct involving the left cerebral hemisphere, the most prominent measures approximately 1.4 cm, there is also a small focus of the right frontal lobe, as discussed above.     In addition chronic intracranial changes are noted.     This report was flagged in Epic as abnormal.        Electronically signed by: Zana Luque  Date:                                            02/05/2022  Time:                                           21:37    EXAMINATION:  US LOWER EXTREMITY VEINS BILATERAL     CLINICAL HISTORY:  Suspected DVT/PE;     TECHNIQUE:  Duplex and color flow Doppler and dynamic compression was performed of the bilateral lower  extremity veins was performed.     COMPARISON:  None     FINDINGS:  Examination positive for DVT.     Right: There is thrombosis of the common femoral vein, femoral vein, popliteal vein, 1 of the paired posterior tibial veins and both visualized peroneal veins.     The greater saphenous vein appears patent and compressible.  One of the paired posterior tibial veins and both of the paired anterior tibial veins appear patent.     Left: There is thrombosis of the common femoral vein, femoral vein, and popliteal vein.     The greater saphenous vein appears patent and compressible.  The paired posterior tibial veins, anterior tibial veins, peroneal veins appear patent.     Miscellaneous: None     Impression:     Examination positive for deep venous thrombosis in bilateral lower extremities, as described.        Pending Diagnostic Studies:     None         Medications:  Reconciled Home Medications:      Medication List      START taking these medications    apixaban 5 mg Tab  Commonly known as: ELIQUIS  Take 1 tablet (5 mg total) by mouth 2 (two) times daily.     atorvastatin 80 MG tablet  Commonly known as: LIPITOR  Take 1 tablet (80 mg total) by mouth once daily.     FLUoxetine 20 MG capsule  Take 1 capsule (20 mg total) by mouth once daily.     insulin aspart U-100 100 unit/mL (3 mL) Inpn pen  Commonly known as: NovoLOG  Inject 3 Units into the skin 3 (three) times daily.     insulin detemir U-100 100 unit/mL (3 mL) Inpn pen  Commonly known as: Levemir FLEXTOUCH  Inject 20 Units into the skin every evening.     isoniazid 300 MG Tab  Commonly known as: NYDRAZID  Take 1 tablet (300 mg total) by mouth once daily.     multivitamin Tab  Take 1 tablet by mouth once daily.     polyethylene glycol 17 gram Pwpk  Commonly known as: GLYCOLAX  Take 17 g by mouth once daily.     pyridoxine (vitamin B6) 25 MG Tab  Commonly known as: B-6  Take 1 tablet (25 mg total) by mouth once daily.     thiamine 100 MG tablet  Take 1 tablet (100  mg total) by mouth once daily.        CHANGE how you take these medications    metFORMIN 500 MG tablet  Commonly known as: GLUCOPHAGE  Take 1 tablet (500 mg total) by mouth 2 (two) times daily with meals.  What changed: how much to take     tamsulosin 0.4 mg Cap  Commonly known as: FLOMAX  Take 1 capsule (0.4 mg total) by mouth every evening.  What changed: when to take this        CONTINUE taking these medications    albuterol 90 mcg/actuation inhaler  Commonly known as: PROVENTIL/VENTOLIN HFA  Inhale 2 puffs into the lungs every 6 (six) hours as needed for Wheezing or Shortness of Breath.     fluticasone-salmeterol 250-50 mcg/dose 250-50 mcg/dose diskus inhaler  Commonly known as: ADVAIR  Inhale 1 puff into the lungs 2 (two) times daily. Controller        STOP taking these medications    BLOOD GLUCOSE TEST Strp  Generic drug: blood sugar diagnostic     citalopram 20 MG tablet  Commonly known as: CeleXA     finasteride 5 mg tablet  Commonly known as: PROSCAR     folic acid 1 MG tablet  Commonly known as: FOLVITE     gemfibroziL 600 MG tablet  Commonly known as: LOPID     glipiZIDE 10 MG tablet  Commonly known as: GLUCOTROL     losartan 25 MG tablet  Commonly known as: COZAAR     meclizine 25 mg tablet  Commonly known as: ANTIVERT     omeprazole 20 MG capsule  Commonly known as: PRILOSEC     rosuvastatin 40 MG Tab  Commonly known as: CRESTOR     sildenafiL 25 MG tablet  Commonly known as: VIAGRA     warfarin 7.5 MG tablet  Commonly known as: COUMADIN            Indwelling Lines/Drains at time of discharge:   Lines/Drains/Airways     None                 Time spent on the discharge of patient: 35 minutes         Alma Elizalde MD  Department of Hospital Medicine  [facility]

## 2022-04-28 NOTE — ASSESSMENT & PLAN NOTE
See ID note.  Patient had positive PPD 2 years ago and was not treated.  PPD this admission is also positive.  He has been afebrile and has no cough or other TB symptoms.  CXR repeated and was normal.  Clinically he does not have TB.  Quantiferon gold has been ordered to see if he has latent TB.  Discussed with ID, if quantiferon gold is positive would treat for latent TB with INH and pyridoxine for 6 months. Tb gold positive, started  INH and pyridoxine ON 4/13/22

## 2022-04-30 NOTE — NURSING
Mr. Lopez's son, Forest Christiansen called very upset stating that no one had informed him that his father had been discharged. [place] and has not been able to reach him stating the nursing home has stated he is not allowed to receive information on his father. Updated  Cody Motley via secure chat however Migel is gone for the day. Also updated Harini Esquivel, charge nurse for tomorrow, Thai May 1.

## 2022-06-17 NOTE — PROGRESS NOTES
HTN Report: Patient notified to obtain a home BP reading and schedule annual exam with PCP. Patient states she does not check her BP at home, patient has scheduled the next available with PCP on 7/27/22.     [facility] Medicine  Progress Note    Patient Name: Forest Lopez  MRN: [id number]  Patient Class: IP- Inpatient   Admission Date: 1/31/2022  Length of Stay: 74 days  Attending Physician: Alma Elizalde MD  Primary Care Provider: Julian Escobar        Subjective:     Principal Problem:Acute stroke due to ischemia        HPI:  Mr. Lopez is a 72 year old man who presented after a syncopal episode.  He reports he had been feeling well prior to the episode but then had a prodrome prior to passing out.  EMS was called, report patient's BP was low normal on their arrival, improved after an IV fluids bolus.  Patient denies chest pain or shortness of breath and says he does not feel weak currently although is rather tired.  When seen in the ED this morning he could barely express himself audibly.     Vital signs were normal on presentation here.  He is on warfarin for recurrent DVT, but his INR was 1, and d-dimer markedly elevated at 21.7.  Tox screen was positive for cocaine.  He had a CTA of the brain and neck done on presentation so CTA of the chest for PE study could not be done for 48 hours.  Ultrasound of the lower extremities showed extensive bilateral DVT.  On the right there was thrombosis of the common femoral vein, femoral vein, popliteal vein, one of the paired posterior tibial veins and both visualized peroneal veins.  On the left there was thrombosis of the common femoral vein, femoral vein and popliteal vein.  Patient was started on full dose Lovenox and admitted.    Medical history is from the chart as he is unable to give me much information.  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  He smokes 5-6 cigarettes/day and is not interested in quitting.  He is currently homeless and staying with a friend.  Despite the normal INR he is sure he  is taking his warfarin and is not having difficulty taking his medications.  I discussed his care with [facility] staff on the [facility] and patient had been lost to follow up since November 2021.      Overview/Hospital Course:  Patient presented to the ED after a syncopal episode and was found to have bilateral lower extremity DVTs and a low INR.  Tox screen was positive for cocaine.  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  He was started back on his warfarin with bridging Lovenox.  His 2D echo showed grade I diastolic dysfunction.  Neurology evaluated him and recommended echo with bubble study, which was negative for a shunt.    PT/OT evaluated him and recommended SNF placement versus inpatient rehab.  His INR was difficult to get therapeutic, and as he had no contraindication to a NOAC his warfarin was changed to apixaban, and the full dose Lovenox was discontinued.  As he appeared to be depressed and had no objection to starting an antidepressant Lexapro was started.  He had a fall in the hospital on 2/13, had gone to the bathroom for a BM with the nurse, and when she turned away while he was washing his hands he apparently lost his balance and fell.  He did not hit his head and did not lose consciousness, but his BP was low transiently and he was given a bolus of IV fluids.    Patient's mental status improved slowly, but he gradually became more talkative and was able to hold a conversation.  He was diagnosed with a polymicrobial UTI on 2/21 and completed treatment with antibiotics.  Psychiatry evaluated him on 3/3 and changed his antidepressant to Prozac as it can be more activating and patient was having difficulty with his level of motivation.      Patient had some episodes of nausea and vomiting and CT of the abdomen was done, with incidental finding of rectal wall thickening noted.  Colonoscopy was recommended, but  he was unable to finish the prep.  GI evaluated him and decided the patient would benefit from a flex sig to evaluate the rectum.  He had the procedure on 3/25 and no abnormalities were seen other than internal hemorrhoids.    Patient's discharge was delayed as he was not accepted by any SNF facilities in the area.  Reasons for the denials are unclear as he has a clear rehab diagnosis.  He has a history of drug use but has not shown any interest in getting any illicit or legal drugs since he has been here, and he has had no visitors that could have provided drugs to him.  He has been pleasant and cooperative while here although lacks motivation, likely due to the nature of the stroke affecting his frontal lobe.  He requires considerable encouragement to participate in therapy.  Discharging him to live on the street or shelter would not be appropriate as he would not be able to take care of himself.  At this point he has agreed to skilled nursing nursing care, as his stroke was more than 2 months ago and he has already had sufficient rehab while here in the hospital.  Discharge was planned for 4/8/22, but was delayed due to him not having access to his own bank account.  Hopefully he can be transferred to nursing home as soon as financial situation is straightened out.      Interval History: Tolerating diet, awaiting placement, no bm yesterday  Review of Systems   Constitutional:  Negative for chills and fever.   HENT:  Negative for trouble swallowing.    Respiratory:  Negative for cough and shortness of breath.    Cardiovascular:  Negative for chest pain and leg swelling.   Gastrointestinal:  Negative for abdominal pain, blood in stool, nausea and vomiting.   Genitourinary:  Negative for dysuria and hematuria.   Neurological:  Negative for headaches.   Objective:     Vital Signs (Most Recent):  Temp: 98 °F (36.7 °C) (04/15/22 1148)  Pulse: 92 (04/15/22 1148)  Resp: 18 (04/15/22 1148)  BP: 104/63 (04/15/22 1148)  SpO2: 97  % (04/15/22 1148)   Vital Signs (24h Range):  Temp:  [97.5 °F (36.4 °C)-98.5 °F (36.9 °C)] 98 °F (36.7 °C)  Pulse:  [72-92] 92  Resp:  [15-20] 18  SpO2:  [97 %-100 %] 97 %  BP: (104-154)/(57-75) 104/63     Weight: 66.2 kg (145 lb 15.1 oz)  Body mass index is 19.79 kg/m².    Intake/Output Summary (Last 24 hours) at 4/15/2022 1359  Last data filed at 4/15/2022 0400  Gross per 24 hour   Intake 250 ml   Output 900 ml   Net -650 ml        Physical Exam  Vitals reviewed.   Constitutional:       General: He is not in acute distress.     Appearance: He is well-developed.   HENT:      Head: Normocephalic and atraumatic.   Eyes:      Extraocular Movements: Extraocular movements intact.      Pupils: Pupils are equal, round, and reactive to light.   Cardiovascular:      Rate and Rhythm: Normal rate and regular rhythm.   Pulmonary:      Effort: Pulmonary effort is normal. No respiratory distress.      Breath sounds: Normal breath sounds.   Abdominal:      General: Bowel sounds are normal. There is no distension.      Palpations: Abdomen is soft.      Tenderness: There is no abdominal tenderness.   Musculoskeletal:         General: No swelling. Normal range of motion.      Cervical back: Normal range of motion and neck supple.   Skin:     General: Skin is warm.      Findings: No rash.   Neurological:      General: No focal deficit present.      Mental Status: He is alert and oriented to person, place, and time.   Psychiatric:         Mood and Affect: Mood normal.         Behavior: Behavior normal.       Significant Labs: All pertinent labs within the past 24 hours have been reviewed.    Significant Imaging: I have reviewed all pertinent imaging results/findings within the past 24 hours.      Assessment/Plan:      * Acute stroke due to ischemia  - MRI showed areas of diffusion signal hyperintensity consistent with areas of acute ischemia/infarct involving the left cerebral hemisphere, the most prominent measuring approximately 1.4  cm, also a small focus of the right frontal lobe.  - Patient on full dose anticoagulation currently due to acute bilateral DVT.  - Sinus rhythm noted throughout hospital stay  - Risk factor modification - control diabetes, continue statin.  - RPR, HIV non reactive.  B12 low normal.   - CTA showed a focal segment of 60-70% stenosis involving the proximal to mid left vertebral artery that was new when compared to the prior study of 09/25/2019.  No evidence of large vessel intracranial occlusion.   - Neurology noted symptoms do not correspond to stroke location, although the frontal lobe stroke might have something to do with his lack of motivation, and Speech has noted he has had delayed swallowing.  - Echo with bubble study done, no shunt seen.  - Follow up with vascular neurology in 4 weeks.  - Therapy recommended rehab initially but changed recommendation to SNF due to his low interest in participation.  - Started Lexapro due to suspicion for depression.  - Psych consulted, changed to Prozac and started thiamine, folic acid, MVI due to previous history of alcohol abuse, although Wernicke encephalopathy is not suspected.  - Re-consulted SNF for rehab from the stroke, as discharging him to a shelter would be inappropriate and unsafe for him.  Discussed with his son, Forest, at length on 3/10.  Forest is out of town on a job but will be returning to [place] eventually.  He agrees his father will eventually need nursing home care as he is unable to care for himself.  - awaiting placement.      LTBI (latent tuberculosis infection)  See ID note.  Patient had positive PPD 2 years ago and was not treated.  PPD this admission is also positive.  He has been afebrile and has no cough or other TB symptoms.  CXR repeated and was normal.  Clinically he does not have TB.  Quantiferon gold has been ordered to see if he has latent TB.  Discussed with ID, if quantiferon gold is positive would treat for latent TB with INH and  pyridoxine for 6 months. Tb gold positive,started  INH and pyridoxine.        Severe protein-calorie malnutrition  Diet as tolerated  Patient seen by dietician, recommendations made.      Debility  PT/OT recommending rehab vs SNF after stroke with debility/poor motivation  Having difficulty finding placement for unclear reasons.  Will continue to pursue this as above.  Again, patient has been pleasant and cooperative.      Type 2 diabetes mellitus with hyperglycemia, without long-term current use of insulin  Reportedly he is on metformin and glipizide, both held.  He had 4+ sugar in urine and HbA1c was 10.3, and he was hyperglycemic on presentation.  Started on Levemir and ISS, then increased Levemir to 25 units daily  Added scheduled novolog 5 units tid, increased to 7 units with improvement  OK to resume metformin.    Increased levemir to 28 units daily with good improvement.  Held metformin with contrast with CT.  BG lower now off metformin, has had a few low BG as well due to being on clear liquids for colonoscopy prep.  Levemir decreased to 20 units daily.    As expected BG increased when diet was resumed so increased Levemir back to 28, will increase to 30 units as sugars on higher side  Metformin discontinued while he was NPO but now resumed.  Sugars in 70s for a day,levemir decreased to 25 units, monitor sugars.    Acute deep vein thrombosis (DVT) of both femoral veins  Last ultrasound showed partially occlusive DVTs, now completely occlusive DVT of multiple lower extremity veins on ultrasound.  Patient reports compliance with warfarin but his INR is only one.  He is homeless but says he does not have difficulty obtaining his medications.  Does not seem to care about anything, which again might be due to the frontal lobe stroke.  D-dimer was markedly elevated on presentation and there was a question of possible PE, but he had a CTA of the brain/neck on presentation so could not get another CTA for another 48  hours.    V/Q scan was done, showed low probability for PE.  2D echo showed grade I diastolic dysfunction.  Warfarin was restarted with bridging Lovenox, but INR was still low.  Changed to apixaban and discontinued Lovenox.    Hyperlipidemia  Continue atorvastatin status post stroke.        VTE Risk Mitigation (From admission, onward)         Ordered     apixaban tablet 5 mg  2 times daily         03/29/22 1555     Place sequential compression device  Until discontinued         01/31/22 0533                Discharge Planning   EFRAÍN:      Code Status: Full Code   Is the patient medically ready for discharge?:     Reason for patient still in hospital (select all that apply): Pending disposition  Discharge Plan A: Skilled Nursing Facility   Discharge Delays:  (Patient's finances can't be varified with the bank.)              Alma Elzialde MD  Department of Hospital Medicine   [facility] ([place])

## 2022-07-25 NOTE — PLAN OF CARE
POC reviewed with patient, questions and concerns addressed. No acute events through the night. Pt refused to  drink more than 50% of mag citrate and did not have a bowel movement. All Vital signs stable. No complaints.  AAOx3. Safety maintained.  Bed locked, in lowest position, call light within reach, side rails x2. Drank mag citrate throughout the night.  Mobilized to their highest function. See doc flow sheets for further information. Will continue to monitor.    Problem: Adult Inpatient Plan of Care  Goal: Plan of Care Review  Outcome: Ongoing, Progressing  Goal: Patient-Specific Goal (Individualized)  Outcome: Ongoing, Progressing  Goal: Absence of Hospital-Acquired Illness or Injury  Outcome: Ongoing, Progressing  Goal: Optimal Comfort and Wellbeing  Outcome: Ongoing, Progressing  Goal: Readiness for Transition of Care  Outcome: Ongoing, Progressing     Problem: Diabetes Comorbidity  Goal: Blood Glucose Level Within Targeted Range  Outcome: Ongoing, Progressing     Problem: Skin Injury Risk Increased  Goal: Skin Health and Integrity  Outcome: Ongoing, Progressing     Problem: Coping Ineffective  Goal: Effective Coping  Outcome: Ongoing, Progressing     Problem: Fall Injury Risk  Goal: Absence of Fall and Fall-Related Injury  Outcome: Ongoing, Progressing     Problem: Adjustment to Illness (Sepsis/Septic Shock)  Goal: Optimal Coping  Outcome: Ongoing, Progressing     Problem: Bleeding (Sepsis/Septic Shock)  Goal: Absence of Bleeding  Outcome: Ongoing, Progressing     Problem: Glycemic Control Impaired (Sepsis/Septic Shock)  Goal: Blood Glucose Level Within Desired Range  Outcome: Ongoing, Progressing     Problem: Infection Progression (Sepsis/Septic Shock)  Goal: Absence of Infection Signs and Symptoms  Outcome: Ongoing, Progressing     Problem: Nutrition Impaired (Sepsis/Septic Shock)  Goal: Optimal Nutrition Intake  Outcome: Ongoing, Progressing     Problem: Infection  Goal: Absence of Infection Signs and  Symptoms  Outcome: Ongoing, Progressing     Problem: Spiritual Distress Risk or Actual  Goal: Spiritual Wellbeing  Outcome: Ongoing, Progressing      no

## 2022-07-28 NOTE — PT/OT/SLP PROGRESS
Printed and faxed back. Entering another FMLA encounter for patient's condition.    Speech Language Pathology Treatment    Patient Name:  Forest Lopez   MRN:  [id number]  Admitting Diagnosis: Acute deep vein thrombosis (DVT) of both lower extremities    Recommendations:                  General Recommendations:   1. Speech pathology to continue to follow 3-5x/week for ongoing assessment of cognitive-communicative deficits s/p acute infarcts of L cerebral hemisphere     Diet recommendations: Solids: Regular, Liquids: Thin      Aspiration Precautions: Eliminate distractions, Feed only when awake/alert, Frequent oral care and HOB to 90 degrees      General Precautions: Standard,       Communication strategies:  Reduce informational content/context    Subjective     · Pt awake/alert, reclined in bed. Agreeable to SLP session.     Respiratory Status: Room air    Objective:     Has the patient been evaluated by SLP for swallowing?   Yes  Keep patient NPO? No   Current Respiratory Status:    Room air    Cognitive-Communicative Status:  Ongoing cognitive-communicative assessment continued this date. Pt oriented to person, place, and month. Stated he is hospitalized due to falling, unable to recall stroke diagnosis. Pt continues to be highly distractible, requires MAX verbal cues to sustain attention for 2-5 minute increments. Frequently falling asleep throughout session, suspect possible aversion tactic when asked challenging questions.     Targeting information processing and word finding, pt completed divergent naming task with familiar items ([place] street names, favorite foods, etc.). Pt completed task with 0% acc IND, 40% acc given MAX verbal, semantic, and phonemic cues. Given semantic description, pt completed object ID task with 90% acc given min cues. Pt completed odd-one-out task with 30% acc given MAX verbal and semantic cues. Frequently required repetition of directions and redirectional cues. Pt unable to recall questions/directions with 20 second delay.      Education: SLP to continue to  follow for cognitive-communicative tx. Pt would benefit from ongoing SLP services at next level of care.       Assessment:     Forest Lopez is a 72 y.o. 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    Goals:   Multidisciplinary Problems     SLP Goals        Problem: SLP Goal    Goal Priority Disciplines Outcome   SLP Goal     SLP Ongoing, Progressing   Description: 1. Pt will consume a regular/thin diet without overt s/s of aspiration or airway threat without assistance.  2. Pt will increase orientation to person, place, situation and date with 90% acc given min assist.  3. Pt will follow 3-4 step commands to increase functional IND with 75% acc given min assist.   4. Pt will complete immediate and delayed recall tasks with 75% acc given mod assist.  5. Targeting word finding, pt will complete divergent naming tasks given 1-minute time frame with 50% acc given mod assist.  6. Ongoing cognitive-communicative assessment.     Updated Goals 2/8:  7. Pt will sustain attention to topic/task without distraction in 5-10 minute increments given mod verbal cues.                   Plan:     · Patient to be seen:  3 x/week,5 x/week   · Plan of Care expires:  02/21/22  · Plan of Care reviewed with:  patient   · SLP Follow-Up:  Yes       Discharge recommendations:  nursing facility, skilled     Time Tracking:     SLP Treatment Date:   02/11/22  Speech Start Time:  1010  Speech Stop Time:  1035     Speech Total Time (min):  25 min    Billable Minutes: Speech Therapy Individual 25 min    02/11/2022

## 2022-10-01 NOTE — ASSESSMENT & PLAN NOTE
Reportedly he is on metformin and glipizide, both held.  He had 4+ sugar in urine and HbA1c was 10.3, and he was hyperglycemic on presentation.  Started on Levemir and ISS, then increased Levemir to 25 units daily  Added scheduled novolog 5 units tid, increased to 7 units with improvement  OK to resume metformin.    Increased levemir to 28 units daily with good improvement.  Held metformin with contrast with CT.  BG lower now off metformin, has had a few low BG as well due to being on clear liquids for colonoscopy prep.  Levemir decreased to 20 units daily.    As expected BG increased when diet was resumed so will increase Levemir back to 28.   Urine culture in progress. ~Armin Evans PA-C

## 2023-02-07 NOTE — PLAN OF CARE
POC reviewed with patient, questions and concerns addressed. No acute events through the night.  All Vital signs stable. No complaints.  AAOx3. Safety maintained.  Bed locked, in lowest position, call light within reach, side rails x2. Mobilized to their highest function. See doc flow sheets for further information. Will continue to monitor.      Problem: Adult Inpatient Plan of Care  Goal: Plan of Care Review  Outcome: Ongoing, Progressing  Goal: Patient-Specific Goal (Individualized)  Outcome: Ongoing, Progressing  Goal: Absence of Hospital-Acquired Illness or Injury  Outcome: Ongoing, Progressing  Goal: Optimal Comfort and Wellbeing  Outcome: Ongoing, Progressing     Problem: Diabetes Comorbidity  Goal: Blood Glucose Level Within Targeted Range  Outcome: Ongoing, Progressing     Problem: Skin Injury Risk Increased  Goal: Skin Health and Integrity  Outcome: Ongoing, Progressing     Problem: Coping Ineffective  Goal: Effective Coping  Outcome: Ongoing, Progressing     Problem: Fall Injury Risk  Goal: Absence of Fall and Fall-Related Injury  Outcome: Ongoing, Progressing     Problem: Adjustment to Illness (Sepsis/Septic Shock)  Goal: Optimal Coping  Outcome: Ongoing, Progressing     Problem: Bleeding (Sepsis/Septic Shock)  Goal: Absence of Bleeding  Outcome: Ongoing, Progressing     Problem: Glycemic Control Impaired (Sepsis/Septic Shock)  Goal: Blood Glucose Level Within Desired Range  Outcome: Ongoing, Progressing     Problem: Infection Progression (Sepsis/Septic Shock)  Goal: Absence of Infection Signs and Symptoms  Outcome: Ongoing, Progressing     Problem: Nutrition Impaired (Sepsis/Septic Shock)  Goal: Optimal Nutrition Intake  Outcome: Ongoing, Progressing     Problem: Infection  Goal: Absence of Infection Signs and Symptoms  Outcome: Ongoing, Progressing     Problem: Spiritual Distress Risk or Actual  Goal: Spiritual Wellbeing  Outcome: Ongoing, Progressing      n/a

## 2023-12-26 NOTE — SUBJECTIVE & OBJECTIVE
Interval History: No acute overnight events. Net negative volume over past 24 hrs.     Review of Systems   All other systems reviewed and are negative.  Objective:     Vital Signs (Most Recent):  Temp: 98 °F (36.7 °C) (02/25/22 1228)  Pulse: 82 (02/25/22 1228)  Resp: 16 (02/25/22 1228)  BP: (!) 147/70 (02/25/22 1228)  SpO2: 96 % (02/25/22 1228)   Vital Signs (24h Range):  Temp:  [98 °F (36.7 °C)-99.2 °F (37.3 °C)] 98 °F (36.7 °C)  Pulse:  [62-82] 82  Resp:  [12-20] 16  SpO2:  [94 %-99 %] 96 %  BP: (121-165)/(70-88) 147/70     Weight: 63 kg (139 lb)  Body mass index is 18.85 kg/m².    Intake/Output Summary (Last 24 hours) at 2/25/2022 1434  Last data filed at 2/25/2022 1427  Gross per 24 hour   Intake 2566.02 ml   Output 2800 ml   Net -233.98 ml      Physical Exam  HENT:      Head: Normocephalic.      Nose: Nose normal.   Eyes:      Pupils: Pupils are equal, round, and reactive to light.   Cardiovascular:      Rate and Rhythm: Normal rate.   Pulmonary:      Effort: Pulmonary effort is normal.   Abdominal:      General: Abdomen is flat.   Musculoskeletal:         General: Normal range of motion.      Cervical back: Normal range of motion.   Skin:     General: Skin is warm.   Neurological:      Mental Status: He is alert.   Psychiatric:         Mood and Affect: Mood normal.       Significant Labs: All pertinent labs within the past 24 hours have been reviewed.  CBC:   Recent Labs   Lab 02/25/22  0900   WBC 7.98   HGB 11.3*   HCT 34.7*        CMP:   Recent Labs   Lab 02/25/22  0900      K 3.8      CO2 26   *   BUN 9   CREATININE 0.8   CALCIUM 9.7   PROT 6.4   ALBUMIN 2.8*   BILITOT 0.3   ALKPHOS 81   AST 22   ALT 46*   ANIONGAP 5*   EGFRNONAA >60       Significant Imaging: I have reviewed all pertinent imaging results/findings within the past 24 hours.  I have reviewed and interpreted all pertinent imaging results/findings within the past 24 hours.   catheter inserted over wire.

## 2024-06-05 NOTE — SUBJECTIVE & OBJECTIVE
Interval History:  Colonoscopy not done, patient only drank 1/3 GoLytely and stool was still brown this morning.  Planning to clear out again this evening to try again tomorrow.  He has no complaints, is a little tired today.    Review of Systems   Constitutional:  Negative for chills and fever.   Respiratory:  Negative for cough and shortness of breath.    Cardiovascular:  Negative for chest pain and palpitations.   Gastrointestinal:  Negative for abdominal pain, nausea and vomiting.   Objective:     Vital Signs (Most Recent):  Temp: 98.1 °F (36.7 °C) (03/22/22 1138)  Pulse: 80 (03/22/22 1138)  Resp: 12 (03/22/22 1138)  BP: (!) 128/58 (03/22/22 1138)  SpO2: 97 % (03/22/22 1138)   Vital Signs (24h Range):  Temp:  [97.9 °F (36.6 °C)-98.2 °F (36.8 °C)] 98.1 °F (36.7 °C)  Pulse:  [70-83] 80  Resp:  [12-18] 12  SpO2:  [96 %-100 %] 97 %  BP: (113-155)/(58-82) 128/58     Weight: 66.2 kg (145 lb 15.1 oz)  Body mass index is 19.79 kg/m².    Intake/Output Summary (Last 24 hours) at 3/22/2022 1528  Last data filed at 3/22/2022 0800  Gross per 24 hour   Intake 240 ml   Output 1500 ml   Net -1260 ml      Physical Exam  Constitutional:       General: He is not in acute distress.     Comments: Thin   HENT:      Mouth/Throat:      Comments: Edentulous  Cardiovascular:      Rate and Rhythm: Normal rate and regular rhythm.      Pulses: Normal pulses.      Heart sounds: Normal heart sounds. No murmur heard.    No gallop.   Pulmonary:      Effort: Pulmonary effort is normal.      Breath sounds: Normal breath sounds.   Abdominal:      General: Bowel sounds are normal. There is no distension.      Palpations: Abdomen is soft.      Tenderness: There is no abdominal tenderness.   Skin:     General: Skin is warm and dry.   Neurological:      Mental Status: He is alert.      Comments: Not oriented to time.       Significant Labs: All pertinent labs within the past 24 hours have been reviewed.    Significant Imaging: I have reviewed all  pertinent imaging results/findings within the past 24 hours.   No

## 2024-08-16 NOTE — ASSESSMENT & PLAN NOTE
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  No evidence of large vessel intracranial occlusion.   Neuro recs appr  Echo with bubble study done, no shunt seen.  Follow vascular neuro in 4 weeks and neuro  RPR, HIV non reactive.  B12 low normal.  B1 pending.  Therapy recommending SNF due to his low interest in participation.     Walk in yes...

## 2024-09-28 NOTE — ASSESSMENT & PLAN NOTE
Please call & inform patient:  Labs are grossly normal, minor abnormalities not concerning  No changes  Thank you,  HARSHAD Blunt Reportedly he is on metformin and glipizide, both held.  Continue SSI for now.  He has 4+ sugar in urine and HbA1c is 10.3, and he is hyperglycemic here.  Will start levemir and continue iss., increase levemir to 15 units daily  Add scheduled novolog 5 units tid  Will monitor

## 2025-02-18 NOTE — PLAN OF CARE
Problem: Physical Therapy Goal  Goal: Physical Therapy Goal  Description: Goals to be met by: 2/15/2022    Patient will increase functional independence with mobility by performin. Sit<>stand with SPV with LRAD.  2. Gait x 300 feet with SPV with LRAD.   3. Static standing in SLS with no UE support with SBA x10 sec.     Outcome: Ongoing, Progressing     Patient with progress towards goals, goals updated to reflect increased (I) with functional mobility.    swing-to gait

## 2025-05-14 NOTE — ASSESSMENT & PLAN NOTE
Reportedly he is on metformin and glipizide, both held.  Continue SSI for now.  He has 4+ sugar in urine and HbA1c is 10.3, and he is hyperglycemic here.  Will start levemir and continue ISS, increase levemir to 25 units daily  Add scheduled novolog 5 units tid, increase to 7 units  Improving  Resume metformin in SNF  Sugars high, increase levemir to 28 units daily   numerical 0-10

## 2025-06-13 NOTE — ASSESSMENT & PLAN NOTE
- Working with PT/OT   PAST SURGICAL HISTORY:  Neoplasm of ventral surface of tongue     S/P colonoscopy

## 2025-06-16 DIAGNOSIS — F03.90 DEMENTIA: Primary | ICD-10-CM

## 2025-06-19 ENCOUNTER — OFFICE VISIT (OUTPATIENT)
Dept: NEUROLOGY | Facility: CLINIC | Age: 75
End: 2025-06-19
Payer: MEDICAID

## 2025-06-19 ENCOUNTER — LAB VISIT (OUTPATIENT)
Dept: LAB | Facility: HOSPITAL | Age: 75
End: 2025-06-19
Attending: STUDENT IN AN ORGANIZED HEALTH CARE EDUCATION/TRAINING PROGRAM
Payer: COMMERCIAL

## 2025-06-19 VITALS
BODY MASS INDEX: 20.75 KG/M2 | WEIGHT: 153 LBS | HEART RATE: 73 BPM | DIASTOLIC BLOOD PRESSURE: 74 MMHG | SYSTOLIC BLOOD PRESSURE: 115 MMHG

## 2025-06-19 DIAGNOSIS — R41.3 OTHER AMNESIA: ICD-10-CM

## 2025-06-19 DIAGNOSIS — F03.90 DEMENTIA: ICD-10-CM

## 2025-06-19 DIAGNOSIS — F03.90 DEMENTIA: Primary | ICD-10-CM

## 2025-06-19 PROCEDURE — 4010F ACE/ARB THERAPY RXD/TAKEN: CPT | Mod: CPTII,,, | Performed by: STUDENT IN AN ORGANIZED HEALTH CARE EDUCATION/TRAINING PROGRAM

## 2025-06-19 PROCEDURE — 3288F FALL RISK ASSESSMENT DOCD: CPT | Mod: CPTII,,, | Performed by: STUDENT IN AN ORGANIZED HEALTH CARE EDUCATION/TRAINING PROGRAM

## 2025-06-19 PROCEDURE — 1159F MED LIST DOCD IN RCRD: CPT | Mod: CPTII,,, | Performed by: STUDENT IN AN ORGANIZED HEALTH CARE EDUCATION/TRAINING PROGRAM

## 2025-06-19 PROCEDURE — 36415 COLL VENOUS BLD VENIPUNCTURE: CPT

## 2025-06-19 PROCEDURE — 3078F DIAST BP <80 MM HG: CPT | Mod: CPTII,,, | Performed by: STUDENT IN AN ORGANIZED HEALTH CARE EDUCATION/TRAINING PROGRAM

## 2025-06-19 PROCEDURE — 99204 OFFICE O/P NEW MOD 45 MIN: CPT | Mod: S$PBB,,, | Performed by: STUDENT IN AN ORGANIZED HEALTH CARE EDUCATION/TRAINING PROGRAM

## 2025-06-19 PROCEDURE — 84393 TAU PHOSPHORYLATED EA: CPT

## 2025-06-19 PROCEDURE — 83520 IMMUNOASSAY QUANT NOS NONAB: CPT

## 2025-06-19 PROCEDURE — 1157F ADVNC CARE PLAN IN RCRD: CPT | Mod: CPTII,,, | Performed by: STUDENT IN AN ORGANIZED HEALTH CARE EDUCATION/TRAINING PROGRAM

## 2025-06-19 PROCEDURE — 1126F AMNT PAIN NOTED NONE PRSNT: CPT | Mod: CPTII,,, | Performed by: STUDENT IN AN ORGANIZED HEALTH CARE EDUCATION/TRAINING PROGRAM

## 2025-06-19 PROCEDURE — 3074F SYST BP LT 130 MM HG: CPT | Mod: CPTII,,, | Performed by: STUDENT IN AN ORGANIZED HEALTH CARE EDUCATION/TRAINING PROGRAM

## 2025-06-19 PROCEDURE — 99214 OFFICE O/P EST MOD 30 MIN: CPT | Mod: PBBFAC | Performed by: STUDENT IN AN ORGANIZED HEALTH CARE EDUCATION/TRAINING PROGRAM

## 2025-06-19 PROCEDURE — 99999 PR PBB SHADOW E&M-EST. PATIENT-LVL IV: CPT | Mod: PBBFAC,,, | Performed by: STUDENT IN AN ORGANIZED HEALTH CARE EDUCATION/TRAINING PROGRAM

## 2025-06-19 PROCEDURE — 82233 BETA-AMYLOID 1-40 (ABETA 40): CPT

## 2025-06-19 PROCEDURE — 1101F PT FALLS ASSESS-DOCD LE1/YR: CPT | Mod: CPTII,,, | Performed by: STUDENT IN AN ORGANIZED HEALTH CARE EDUCATION/TRAINING PROGRAM

## 2025-06-19 RX ORDER — MEMANTINE HYDROCHLORIDE 5 MG/1
5 TABLET ORAL 2 TIMES DAILY
Qty: 60 TABLET | Refills: 11 | Status: SHIPPED | OUTPATIENT
Start: 2025-06-19 | End: 2026-06-19

## 2025-06-19 RX ORDER — ONDANSETRON 4 MG/1
TABLET, FILM COATED ORAL
COMMUNITY
Start: 2025-06-05

## 2025-06-19 RX ORDER — FAMOTIDINE 40 MG/1
40 TABLET, FILM COATED ORAL DAILY
COMMUNITY

## 2025-06-19 RX ORDER — THIAMINE HCL 100 MG
CAPSULE ORAL
COMMUNITY

## 2025-06-19 RX ORDER — INSULIN GLARGINE 100 [IU]/ML
INJECTION, SOLUTION SUBCUTANEOUS
COMMUNITY
Start: 2025-06-12

## 2025-06-19 RX ORDER — IPRATROPIUM BROMIDE AND ALBUTEROL SULFATE 2.5; .5 MG/3ML; MG/3ML
SOLUTION RESPIRATORY (INHALATION)
COMMUNITY
Start: 2025-03-28

## 2025-06-19 NOTE — PROGRESS NOTES
[facility]  Clinic Note    Date of Service: 6/19/2025  Patient seen at the request of: Natalia Lamb, *    Reason for Consultation  Memory loss    Assessment:  Forest Lopez is a 75 y.o. male who presents with memory loss.    Patient is able to provide limited history and denies memory loss.  MoCA showed an 8/30 with 0/5 on delayed recall.  He is currently at assisted living.  Concern is for underlying neurodegenerative disease.    MRI b 2022 - There are areas of diffusion signal hyperintensity consistent with areas of acute ischemia/infarct involving the left cerebral hemisphere, the most prominent measures approximately 1.4 cm, there is also a small focus of the right frontal lobe    Plan:    - MRI brain wo contrast  - ptau-181  - ptau-217  - AD Detect Beta-amyloid 42/40 ratio  - start memantine 5 mg twice daily      Transfer to more permanent neurologist    A dictation device was used to produce this document. Use of such devices sometimes results in grammatical errors or replacement of words that sound similarly.     Signed:    Leon Carranza MD  Neurology/Epilepsy  06/19/2025 8:49 AM      HPI:  Forest Lopez is a 75 y.o. male with   Past Medical History:   Diagnosis Date    Blind left eye     able to see, but poorly    BPH (benign prostatic hyperplasia)     Diabetes mellitus, type 2     Hyperlipidemia     Hypertension     Left rib fracture 03/30/2017    Stroke-like symptoms 09/25/2019    L facial droop, slurred speech & L arm weakness     Patient comes in from an assisted living facility.  He is only able to provide limited history.  Patient denies memory loss.        This is the extent of the patient's complaints at this time.      TSH   Date Value Ref Range Status   02/07/2022 1.309 0.400 - 4.000 uIU/mL Final   09/25/2019 0.251 (L) 0.400 - 4.000 uIU/mL Final     Vitamin B-12   Date Value Ref Range Status   02/08/2022 316 210 - 950 pg/mL Final     Hemoglobin A1C   Date Value Ref Range Status    01/31/2022 10.3 (H) 4.0 - 5.6 % Final     Comment:     ADA Screening Guidelines:  5.7-6.4%  Consistent with prediabetes  >or=6.5%  Consistent with diabetes    High levels of fetal hemoglobin interfere with the HbA1C  assay. Heterozygous hemoglobin variants (HbS, HgC, etc)do  not significantly interfere with this assay.   However, presence of multiple variants may affect accuracy.     06/14/2020 7.6 (H) 4.0 - 5.6 % Final     Comment:     ADA Screening Guidelines:  5.7-6.4%  Consistent with prediabetes  >or=6.5%  Consistent with diabetes  High levels of fetal hemoglobin interfere with the HbA1C  assay. Heterozygous hemoglobin variants (HbS, HgC, etc)do  not significantly interfere with this assay.   However, presence of multiple variants may affect accuracy.           Review of Systems:  ROS negative unless noted in HPI    Past Surgical History:  Past Surgical History:   Procedure Laterality Date    COLONOSCOPY N/A 3/25/2022    Procedure: COLONOSCOPY;  Surgeon: Carlos Uriarte MD;  Location: [facility];  Service: Endoscopy;  Laterality: N/A;    NO PAST SURGERIES  09/25/2019       Family History:  Family History   Problem Relation Name Age of Onset    Heart disease Mother          heart surgery    Heart disease Father          heart attack    Stroke Sister 3     Cancer Sister          Unknown type of cancer    No Known Problems Son      No Known Problems Son         Social History:  Social History[1]    Allergies:  Patient has no known allergies.    Outpatient Medications:  Prior to Admission medications    Medication Sig Start Date End Date Taking? Authorizing Provider   albuterol (PROVENTIL/VENTOLIN HFA) 90 mcg/actuation inhaler Inhale 2 puffs into the lungs every 6 (six) hours as needed for Wheezing or Shortness of Breath. 8/14/21   Abi Jefferson MD   apixaban (ELIQUIS) 5 mg Tab Take 1 tablet (5 mg total) by mouth 2 (two) times daily. 2/10/22   Mojgan Bosch MD   atorvastatin (LIPITOR) 80 MG tablet Take 1  tablet (80 mg total) by mouth once daily. 2/10/22 2/10/23  Mojgan Bosch MD   FLUoxetine 20 MG capsule Take 1 capsule (20 mg total) by mouth once daily. 4/8/22 4/8/23  Mojgan Bosch MD   fluticasone-salmeterol diskus inhaler 250-50 mcg Inhale 1 puff into the lungs 2 (two) times daily. Controller    Provider, Historical   insulin aspart U-100 (NOVOLOG) 100 unit/mL (3 mL) InPn pen Inject 3 Units into the skin 3 (three) times daily. 4/26/22 4/26/23  Alma Elizalde MD   insulin detemir U-100 (LEVEMIR FLEXTOUCH) 100 unit/mL (3 mL) SubQ InPn pen Inject 20 Units into the skin every evening. 4/26/22 4/26/23  Alma Elizalde MD   isoniazid (NYDRAZID) 300 MG Tab Take 1 tablet (300 mg total) by mouth once daily. 4/27/22   Alma Elizalde MD   metFORMIN (GLUCOPHAGE) 500 MG tablet Take 1 tablet (500 mg total) by mouth 2 (two) times daily with meals. 4/8/22 4/8/23  Mojgan Bosch MD   multivitamin Tab Take 1 tablet by mouth once daily. 4/27/22   Alma Elizalde MD   polyethylene glycol (GLYCOLAX) 17 gram PwPk Take 17 g by mouth once daily. 4/27/22   Alma Elizalde MD   pyridoxine, vitamin B6, (B-6) 25 MG Tab Take 1 tablet (25 mg total) by mouth once daily. 4/27/22   Alma Elizalde MD   tamsulosin (FLOMAX) 0.4 mg Cap Take 1 capsule (0.4 mg total) by mouth every evening. 4/7/22   Mojgan Bosch MD   losartan (COZAAR) 25 MG tablet Take 25 mg by mouth once daily.  4/7/22  Provider, Historical       Physical exam:    Vitals: /74 (BP Location: Left arm, Patient Position: Sitting)   Pulse 73   Wt 69.4 kg (153 lb)   BMI 20.75 kg/m²     General:   in no distress, well-nourished, well-developed, appears stated age.  Head/Neck:   Normocephalic,atraumatic  Pulm:  Non-labored breathing     Mental Status: Alert and oriented to person, time, place, situation. Speech spontaneous and fluent without paraphasias; no dysarthria  CN: III, IV, VI: EOM intact without nystagmus or diplopia.   VII: Facial movement full  and symmetric.   VIII: Hearing grossly normal to conversation.  IX, X: Palate midline with symmetric elevation.    XII: Tongue midline without fasciculations.  Motor: Normal bulk throughout all four extremities.   RUE: antigravity  LUE: antigravity   RLE: antigravity  LLE: antigravity  No tremors       MoCA 6/19/2025:            Imaging:  All pertinent imaging was personally reviewed.      Results for orders placed during the hospital encounter of 01/31/22    MRI Brain Without Contrast    Narrative  EXAMINATION:  MRI BRAIN WITHOUT CONTRAST    CLINICAL HISTORY:  Stroke, follow up;r/o stroke;    TECHNIQUE:  Multiplanar multisequence MR imaging of the brain was performed without contrast.    COMPARISON:  CT examination of the brain February 5, 2022, MRI examination of the brain September 25, 2019    FINDINGS:  Routine noncontrast MRI examination of the brain was performed, there is artifact present diminishing image quality and diminishing the sensitivity of the exam.    The ventricular system and sulcal pattern demonstrate chronic involutional change.  There is prominent subcortical and periventricular white matter change most consistent with chronic white matter change appearing similar to the prior MRI examination with progression.  There are also focal areas consistent with remote lacunar-type ischemic change noted.    On diffusion imaging there are areas of diffusion signal hyperintensity most consistent with areas of acute ischemia/infarct, the most prominent is seen adjacent to the left lateral ventricle and extending inferiorly to the left basal ganglia, as best seen on series 3, image 17 measuring up to approximately 1.3 x 1.4 cm in size on axial imaging.  Additional focus is seen just posterosuperior to this level, lateral to the posterior aspect of the left lateral ventricle.  There is also a small focus seen in the right frontal lobe as seen on diffusion axial image 20.    There is no evidence for mass  effect or midline shift.  Appropriate CSF spaces are appropriate flow voids are noted at the skull base.  The upper cervical cord, brainstem and cervical cranial junction appear appropriate.  The visualized orbits appear intact.  There is appearance that may relate to prior cataract surgery.  The mastoid air cells demonstrate appropriate signal void.  Mild paranasal sinus mucosal thickening is noted.    Impression  There are areas of diffusion signal hyperintensity consistent with areas of acute ischemia/infarct involving the left cerebral hemisphere, the most prominent measures approximately 1.4 cm, there is also a small focus of the right frontal lobe, as discussed above.    In addition chronic intracranial changes are noted.    This report was flagged in Epic as abnormal.      Electronically signed by: Zana Luque  Date:    02/05/2022  Time:    21:37      Results for orders placed during the hospital encounter of 09/25/19    MRI Brain Without Contrast    Narrative  EXAMINATION:  MRI BRAIN WITHOUT CONTRAST    CLINICAL HISTORY:  Focal neuro deficit, new, fixed or worsening, >6 hours;    TECHNIQUE:  Multiplanar multisequence MR imaging of the brain was performed without contrast.    COMPARISON:  CT head and CTA head and neck from the same date.    FINDINGS:  There is moderate chronic microvascular ischemic disease.  Remote lacunar infarcts are seen involving the bilateral basal ganglia and thalami.  Two foci of diffusion hyperintensity are seen within the medial aspect of the left thalamus and within the right thalamus/basal ganglia.  These are felt to be related to T2 shine through.  Otherwise no evidence diffusion signal abnormality to suggest acute infarction.  Ventricles are normal in size and configuration without evidence for hydrocephalus.  No intracranial hemorrhage or extraaxial fluid collection.  No mass or mass effect.  Flow voids are normal in appearance.    Right maxillary sinus disease is noted with  "suspected mucous retention cyst.  Remaining visualized paranasal sinuses and mastoid air cells are essentially clear.    Impression  Two foci of diffusion hyperintensity within the left thalamus and right thalamus/basal ganglia felt to be related to T2 shine through from remote lacunar infarcts.  Otherwise no additional diffusion signal abnormality seen to suggest acute infarction.    Moderate chronic microvascular ischemic disease.      Electronically signed by: Fish Larsen MD  Date:    09/25/2019  Time:    22:48           [1]   Social History  Tobacco Use    Smoking status: Light Smoker     Types: Cigarettes, Cigars    Smokeless tobacco: Never    Tobacco comments:     Smokes 5-6 cigarettes/day   Substance Use Topics    Alcohol use: Yes     Alcohol/week: 0.0 standard drinks of alcohol     Comment: drinks beer socially     Drug use: Not Currently     Types: Marijuana, "Crack" cocaine     "

## 2025-06-23 LAB
IMMUNOLOGIST REVIEW: ABNORMAL
LC PHOSPHO-TAU (181P): 0.48 PG/ML (ref 0–0.97)
M PHOSPHO-TAU 217: 1 PG/ML

## 2025-06-27 ENCOUNTER — HOSPITAL ENCOUNTER (OUTPATIENT)
Dept: RADIOLOGY | Facility: OTHER | Age: 75
Discharge: HOME | End: 2025-06-27
Attending: STUDENT IN AN ORGANIZED HEALTH CARE EDUCATION/TRAINING PROGRAM
Payer: MEDICAID

## 2025-06-27 DIAGNOSIS — R41.3 OTHER AMNESIA: ICD-10-CM

## 2025-06-27 LAB
ABETA 42/40 RATIO: 0.15
ABETA40: 324 PG/ML
ABETA42: 50 PG/ML

## 2025-06-27 PROCEDURE — 70551 MRI BRAIN STEM W/O DYE: CPT | Mod: TC

## 2025-06-27 PROCEDURE — 70551 MRI BRAIN STEM W/O DYE: CPT | Mod: 26,,, | Performed by: RADIOLOGY

## 2025-08-16 ENCOUNTER — HOSPITAL ENCOUNTER (EMERGENCY)
Facility: OTHER | Age: 75
Discharge: HOME | End: 2025-08-16
Attending: EMERGENCY MEDICINE
Payer: COMMERCIAL

## 2025-08-16 VITALS
OXYGEN SATURATION: 99 % | RESPIRATION RATE: 16 BRPM | HEART RATE: 78 BPM | SYSTOLIC BLOOD PRESSURE: 137 MMHG | WEIGHT: 165 LBS | DIASTOLIC BLOOD PRESSURE: 81 MMHG | HEIGHT: 66 IN | BODY MASS INDEX: 26.52 KG/M2 | TEMPERATURE: 98 F
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
## 2025-08-16 DIAGNOSIS — S32.028A OTHER CLOSED FRACTURE OF SECOND LUMBAR VERTEBRA, INITIAL ENCOUNTER: ICD-10-CM

## 2025-08-16 DIAGNOSIS — M54.50 ACUTE BILATERAL LOW BACK PAIN WITHOUT SCIATICA: ICD-10-CM

## 2025-08-16 DIAGNOSIS — S32.018A OTHER CLOSED FRACTURE OF FIRST LUMBAR VERTEBRA, INITIAL ENCOUNTER: Primary | ICD-10-CM

## 2025-08-16 PROCEDURE — 25000003 PHARM REV CODE 250

## 2025-08-16 PROCEDURE — 99284 EMERGENCY DEPT VISIT MOD MDM: CPT | Mod: 25

## 2025-08-16 RX ORDER — LIDOCAINE 50 MG/G
1 PATCH TOPICAL ONCE
Status: DISCONTINUED | OUTPATIENT
Start: 2025-08-16 | End: 2025-08-16 | Stop reason: HOSPADM

## 2025-08-16 RX ORDER — LIDOCAINE 50 MG/G
1 PATCH TOPICAL DAILY
Qty: 15 PATCH | Refills: 0 | Status: SHIPPED | OUTPATIENT
Start: 2025-08-16

## 2025-08-16 RX ORDER — HYDROCODONE BITARTRATE AND ACETAMINOPHEN 5; 325 MG/1; MG/1
1 TABLET ORAL
Refills: 0 | Status: COMPLETED | OUTPATIENT
Start: 2025-08-16 | End: 2025-08-16

## 2025-08-16 RX ORDER — HYDROCODONE BITARTRATE AND ACETAMINOPHEN 5; 325 MG/1; MG/1
1 TABLET ORAL EVERY 6 HOURS PRN
Qty: 12 TABLET | Refills: 0 | Status: SHIPPED | OUTPATIENT
Start: 2025-08-16

## 2025-08-16 RX ADMIN — HYDROCODONE BITARTRATE AND ACETAMINOPHEN 1 TABLET: 5; 325 TABLET ORAL at 04:08

## 2025-08-16 RX ADMIN — LIDOCAINE 1 PATCH: 50 PATCH CUTANEOUS at 04:08

## 2025-08-18 ENCOUNTER — TELEPHONE (OUTPATIENT)
Dept: PAIN MEDICINE | Facility: CLINIC | Age: 75
End: 2025-08-18
Payer: COMMERCIAL

## 2025-08-27 DIAGNOSIS — M51.369 DEGENERATION OF INTERVERTEBRAL DISC OF LUMBAR REGION, UNSPECIFIED WHETHER PAIN PRESENT: Primary | ICD-10-CM

## 2025-08-28 ENCOUNTER — TELEPHONE (OUTPATIENT)
Dept: PAIN MEDICINE | Facility: CLINIC | Age: 75
End: 2025-08-28
Payer: COMMERCIAL

## 2025-08-28 ENCOUNTER — TELEPHONE (OUTPATIENT)
Dept: ORTHOPEDICS | Facility: CLINIC | Age: 75
End: 2025-08-28
Payer: COMMERCIAL

## 2025-08-29 ENCOUNTER — OFFICE VISIT (OUTPATIENT)
Dept: PAIN MEDICINE | Facility: CLINIC | Age: 75
End: 2025-08-29
Payer: COMMERCIAL

## 2025-08-29 VITALS
SYSTOLIC BLOOD PRESSURE: 162 MMHG | HEIGHT: 66 IN | DIASTOLIC BLOOD PRESSURE: 73 MMHG | RESPIRATION RATE: 18 BRPM | HEART RATE: 75 BPM | TEMPERATURE: 98 F | WEIGHT: 164.88 LBS | OXYGEN SATURATION: 100 % | BODY MASS INDEX: 26.5 KG/M2

## 2025-08-29 DIAGNOSIS — R07.81 RIB PAIN ON LEFT SIDE: Primary | ICD-10-CM
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
## 2025-08-29 PROCEDURE — 99999 PR PBB SHADOW E&M-EST. PATIENT-LVL V: CPT | Mod: PBBFAC,,, | Performed by: ANESTHESIOLOGY
